# Patient Record
Sex: MALE | Race: WHITE | NOT HISPANIC OR LATINO | Employment: OTHER | URBAN - METROPOLITAN AREA
[De-identification: names, ages, dates, MRNs, and addresses within clinical notes are randomized per-mention and may not be internally consistent; named-entity substitution may affect disease eponyms.]

---

## 2017-01-05 ENCOUNTER — CLINICAL SUPPORT (OUTPATIENT)
Dept: ELECTROPHYSIOLOGY | Facility: CLINIC | Age: 82
End: 2017-01-05
Payer: MEDICARE

## 2017-01-05 ENCOUNTER — TELEPHONE (OUTPATIENT)
Dept: INTERNAL MEDICINE | Facility: CLINIC | Age: 82
End: 2017-01-05

## 2017-01-05 DIAGNOSIS — Z95.810 ICD (IMPLANTABLE CARDIOVERTER-DEFIBRILLATOR) IN PLACE: ICD-10-CM

## 2017-01-05 DIAGNOSIS — I47.20 VT (VENTRICULAR TACHYCARDIA): ICD-10-CM

## 2017-01-05 PROCEDURE — 93283 PRGRMG EVAL IMPLANTABLE DFB: CPT | Mod: S$GLB,,, | Performed by: INTERNAL MEDICINE

## 2017-01-09 ENCOUNTER — PATIENT MESSAGE (OUTPATIENT)
Dept: INTERNAL MEDICINE | Facility: CLINIC | Age: 82
End: 2017-01-09

## 2017-01-09 ENCOUNTER — OFFICE VISIT (OUTPATIENT)
Dept: INTERNAL MEDICINE | Facility: CLINIC | Age: 82
End: 2017-01-09
Payer: MEDICARE

## 2017-01-09 VITALS
DIASTOLIC BLOOD PRESSURE: 72 MMHG | HEIGHT: 73 IN | BODY MASS INDEX: 22.38 KG/M2 | SYSTOLIC BLOOD PRESSURE: 130 MMHG | HEART RATE: 72 BPM | WEIGHT: 168.88 LBS

## 2017-01-09 DIAGNOSIS — I10 ESSENTIAL HYPERTENSION: ICD-10-CM

## 2017-01-09 DIAGNOSIS — L02.212 ABSCESS OF BACK: Primary | ICD-10-CM

## 2017-01-09 PROCEDURE — 3075F SYST BP GE 130 - 139MM HG: CPT | Mod: S$GLB,,, | Performed by: INTERNAL MEDICINE

## 2017-01-09 PROCEDURE — 1160F RVW MEDS BY RX/DR IN RCRD: CPT | Mod: S$GLB,,, | Performed by: INTERNAL MEDICINE

## 2017-01-09 PROCEDURE — 99213 OFFICE O/P EST LOW 20 MIN: CPT | Mod: S$GLB,,, | Performed by: INTERNAL MEDICINE

## 2017-01-09 PROCEDURE — 3078F DIAST BP <80 MM HG: CPT | Mod: S$GLB,,, | Performed by: INTERNAL MEDICINE

## 2017-01-09 PROCEDURE — 99999 PR PBB SHADOW E&M-EST. PATIENT-LVL III: CPT | Mod: PBBFAC,,, | Performed by: INTERNAL MEDICINE

## 2017-01-09 PROCEDURE — 1126F AMNT PAIN NOTED NONE PRSNT: CPT | Mod: S$GLB,,, | Performed by: INTERNAL MEDICINE

## 2017-01-09 PROCEDURE — 1159F MED LIST DOCD IN RCRD: CPT | Mod: S$GLB,,, | Performed by: INTERNAL MEDICINE

## 2017-01-09 PROCEDURE — 99499 UNLISTED E&M SERVICE: CPT | Mod: S$GLB,,, | Performed by: INTERNAL MEDICINE

## 2017-01-09 PROCEDURE — 1157F ADVNC CARE PLAN IN RCRD: CPT | Mod: S$GLB,,, | Performed by: INTERNAL MEDICINE

## 2017-01-09 RX ORDER — SULFAMETHOXAZOLE AND TRIMETHOPRIM 800; 160 MG/1; MG/1
1 TABLET ORAL 2 TIMES DAILY
Qty: 14 TABLET | Refills: 0 | Status: SHIPPED | OUTPATIENT
Start: 2017-01-09 | End: 2017-01-18

## 2017-01-09 NOTE — PROGRESS NOTES
Subjective:       Patient ID: Nelson GIBBONS Parent is a 81 y.o. male.    Chief Complaint: Sore (Mid-Back)    HPI Pt. Here for sore to back for past few weeks and f/u for HTN; he has noticed some drainage at the site as well  Review of Systems   Constitutional: Negative for chills, fatigue and fever.   HENT: Negative for congestion, rhinorrhea and sore throat.    Respiratory: Negative for cough, shortness of breath and wheezing.    Cardiovascular: Negative for chest pain.   Gastrointestinal: Negative for abdominal pain, nausea and vomiting.   Genitourinary: Negative for dysuria.   Musculoskeletal: Negative for arthralgias.   Skin: Positive for wound. Negative for rash.        Draining lesion to mid back    Neurological: Negative for dizziness and headaches.   Psychiatric/Behavioral: Negative for sleep disturbance. The patient is not nervous/anxious.        Objective:      Physical Exam   Constitutional: He is oriented to person, place, and time. He appears well-developed.   Eyes: EOM are normal.   Neck: Normal range of motion.   Cardiovascular: Normal rate, regular rhythm and normal heart sounds.    Pulmonary/Chest: Effort normal and breath sounds normal.   Abdominal: Soft. There is no tenderness. There is no rebound and no guarding.   Musculoskeletal: Normal range of motion.   Neurological: He is alert and oriented to person, place, and time.   Skin: No rash noted.   Quarter size abscess with surrounding erythema noted to mid back; drainage noted       Assessment:       1. Abscess of back Active   2. Essential hypertension Well controlled       Plan:         Nelson GIBBONS was seen today for sore.    Diagnoses and all orders for this visit:    Abscess of back  Comments:  start bactrim x 10 days and refer to surgery for I&D; re-evaluate in 2 weeks  Orders:  -     sulfamethoxazole-trimethoprim 800-160mg (BACTRIM DS) 800-160 mg Tab; Take 1 tablet by mouth 2 (two) times daily.  -     Ambulatory referral to General  Surgery    Essential hypertension  Comments:  continue current regimen and encouraged low Na diet

## 2017-01-09 NOTE — MR AVS SNAPSHOT
[facility] Internal Medicine   [address]  [place] [address]  Phone: [phone]  Fax: [phone]                  Nelson Huntley   2017 3:40 PM   Office Visit    Description:  Male : 1935   Provider:  Ruddy Skelton MD   Department:  [facility]           Reason for Visit     Sore           Diagnoses this Visit        Comments    Abscess of back    -  Primary start bactrim x 10 days and refer to surgery for I&D; re-evaluate in 2 weeks    Essential hypertension     continue current regimen and encouraged low Na diet            To Do List           Future Appointments        Provider Department Dept Phone    2017 9:20 AM Mackenzie Mathew MD [facility] General Surgery [phone]    2017 8:00 AM LAB, [facility] [phone]    2017 1:00 PM EKG, APPT [facility] -[phone]    2017 2:00 PM Shiraz Cartagena MD [facility] - Cardiology [phone]    2017 3:40 PM Ruddy Skelton MD [facility] [phone]      Goals (5 Years of Data)     None      Follow-Up and Disposition     Return in about 2 weeks (around 2017).       These Medications        Disp Refills Start End    sulfamethoxazole-trimethoprim 800-160mg (BACTRIM DS) 800-160 mg Tab 14 tablet 0 2017    Take 1 tablet by mouth 2 (two) times daily. - Oral    [address] [address] [place] [address] [place] [address] Ph #: [phone]         [facility] On Call     [facility] On Call Nurse Care Line -  Assistance  Registered nurses in the [facility] Call Center provide clinical advisement, health education, appointment booking, and other advisory services.  Call for this free service at [phone].             Medications           Message regarding Medications     Verify the changes and/or additions to your medication regime listed below are the same as discussed with your clinician today.  If any of  these changes or additions are incorrect, please notify your healthcare provider.        START taking these NEW medications        Refills    sulfamethoxazole-trimethoprim 800-160mg (BACTRIM DS) 800-160 mg Tab 0    Sig: Take 1 tablet by mouth 2 (two) times daily.    Class: Normal    Route: Oral           Verify that the below list of medications is an accurate representation of the medications you are currently taking.  If none reported, the list may be blank. If incorrect, please contact your healthcare provider. Carry this list with you in case of emergency.           Current Medications     amlodipine (NORVASC) 10 MG tablet TAKE 1 TABLET BY MOUTH ONCE DAILY.    ascorbic acid (VITAMIN C) 500 MG tablet Take 1 tablet by mouth Daily. 1 Tablet Oral Every day    aspirin 81 MG chewable tablet Take 1 tablet by mouth Daily. 81  By mouth Every day    calcium citrate-vitamin D2 1,500-200 mg-unit Tab     clopidogrel (PLAVIX) 75 mg tablet TAKE 1 TABLET ONE TIME DAILY    DOCOSAHEXANOIC ACID/EPA (FISH OIL ORAL) Take 1 capsule by mouth Twice daily.    folic acid (FOLVITE) 400 MCG tablet Take 1 tablet by mouth Daily. 1 Tablet Oral Every day    lisinopril (PRINIVIL,ZESTRIL) 40 MG tablet TAKE 1/2 TO 1 TABLET EVERY DAY OR AS DIRECTED.    metoprolol succinate (TOPROL-XL) 25 MG 24 hr tablet TAKE 1/2 TO 1 TABLET TWO  TIMES  DAILY OR AS DIRECTED.    niacin 500 MG tablet Take 3 tablets by mouth Every PM. 3 Tablet Oral Every evening    nitroGLYCERIN (NITROSTAT) 0.4 MG SL tablet Take 1 tab under the tongue for chest pain. May repeat every 5 minutes for a total of 3 doses. If chest pain not relieved go to the ED.    pantoprazole (PROTONIX) 40 MG tablet 40 mg Twice daily.    pravastatin (PRAVACHOL) 40 MG tablet TAKE 1 TABLET AT BEDTIME EXCEPT ON WEDNESDAY AND FRIDAY TAKE 1/2 TABLET AT BEDTIME    sotalol (BETAPACE) 120 MG Tab Take 1 tablet (120 mg total) by mouth 2 (two) times daily.    VITAMIN B COMPLEX ORAL Take by mouth.     "sulfamethoxazole-trimethoprim 800-160mg (BACTRIM DS) 800-160 mg Tab Take 1 tablet by mouth 2 (two) times daily.           Clinical Reference Information           Vital Signs - Last Recorded  Most recent update: 1/9/2017  3:31 PM by Camille Barnett MA    BP Pulse Ht Wt BMI    130/72 (BP Location: Right arm, Patient Position: Sitting, BP Method: Manual) 72 6' 1" (1.854 m) 76.6 kg (168 lb 14 oz) 22.28 kg/m2      Blood Pressure          Most Recent Value    BP  130/72      Allergies as of 1/9/2017     Pcn  [Penicillins]    Clindamycin      Immunizations Administered on Date of Encounter - 1/9/2017     None      Orders Placed During Today's Visit      Normal Orders This Visit    Ambulatory referral to General Surgery       "

## 2017-01-11 ENCOUNTER — OFFICE VISIT (OUTPATIENT)
Dept: SURGERY | Facility: CLINIC | Age: 82
End: 2017-01-11
Payer: MEDICARE

## 2017-01-11 ENCOUNTER — APPOINTMENT (OUTPATIENT)
Dept: LAB | Facility: HOSPITAL | Age: 82
End: 2017-01-11
Attending: SURGERY
Payer: MEDICARE

## 2017-01-11 VITALS
WEIGHT: 168 LBS | BODY MASS INDEX: 22.26 KG/M2 | TEMPERATURE: 98 F | HEIGHT: 73 IN | HEART RATE: 66 BPM | DIASTOLIC BLOOD PRESSURE: 69 MMHG | SYSTOLIC BLOOD PRESSURE: 128 MMHG

## 2017-01-11 DIAGNOSIS — L02.212 ABSCESS OF UPPER BACK EXCLUDING SCAPULAR REGION: Primary | ICD-10-CM

## 2017-01-11 DIAGNOSIS — L02.212 ABSCESS OF BACK: ICD-10-CM

## 2017-01-11 LAB
GRAM STN SPEC: NORMAL
GRAM STN SPEC: NORMAL

## 2017-01-11 PROCEDURE — 87075 CULTR BACTERIA EXCEPT BLOOD: CPT

## 2017-01-11 PROCEDURE — 87070 CULTURE OTHR SPECIMN AEROBIC: CPT

## 2017-01-11 PROCEDURE — 99999 PR PBB SHADOW E&M-EST. PATIENT-LVL III: CPT | Mod: PBBFAC,,, | Performed by: SURGERY

## 2017-01-11 PROCEDURE — 99204 OFFICE O/P NEW MOD 45 MIN: CPT | Mod: 25,S$GLB,, | Performed by: SURGERY

## 2017-01-11 PROCEDURE — 1159F MED LIST DOCD IN RCRD: CPT | Mod: S$GLB,,, | Performed by: SURGERY

## 2017-01-11 PROCEDURE — 3078F DIAST BP <80 MM HG: CPT | Mod: S$GLB,,, | Performed by: SURGERY

## 2017-01-11 PROCEDURE — 1160F RVW MEDS BY RX/DR IN RCRD: CPT | Mod: S$GLB,,, | Performed by: SURGERY

## 2017-01-11 PROCEDURE — 3074F SYST BP LT 130 MM HG: CPT | Mod: S$GLB,,, | Performed by: SURGERY

## 2017-01-11 PROCEDURE — 87205 SMEAR GRAM STAIN: CPT

## 2017-01-11 PROCEDURE — 1157F ADVNC CARE PLAN IN RCRD: CPT | Mod: S$GLB,,, | Performed by: SURGERY

## 2017-01-11 PROCEDURE — 1126F AMNT PAIN NOTED NONE PRSNT: CPT | Mod: S$GLB,,, | Performed by: SURGERY

## 2017-01-11 PROCEDURE — 10061 I&D ABSCESS COMP/MULTIPLE: CPT | Mod: S$GLB,,, | Performed by: SURGERY

## 2017-01-11 NOTE — PROCEDURES
"Incision & Drainage  Date/Time: 1/11/2017 1:36 PM  Performed by: GERTRUDIS RONDON  Authorized by: GERTRUDIS RONDON     Time out: Immediately prior to procedure a "time out" was called to verify the correct patient, procedure, equipment, support staff and site/side marked as required.    Consent Done?:  Yes (Written)    Type:  Abscess  Body area:  Trunk  Anesthesia:  Local infiltration  Local anesthetic:  Xylocaine 1% with epinephrinelidocaine 1% with epinephrine  Anesthetic total (ml):  8  Scalpel size:  11  Incision type: Cruciate.  Complexity:  Complex (Cyst Removal)  Drainage:  Pus and bloody  Drainage amount:  Copious  Wound treatment:  Incision, sebum removal, deloculation, removal of cyst capsule, expression of material and wound packed  Packing material:  1/4 in gauze  Patient tolerance:  Patient tolerated the procedure well with no immediate complications      "

## 2017-01-11 NOTE — MR AVS SNAPSHOT
[facility] Surgery  [address] [place] [address]  [address]th [address] [place] [address]  Phone: [phone]                  Nelson Huntley   2017 9:20 AM   Office Visit    Description:  Male : 1935   Provider:  Mackenzie Mathew MD   Department:  [facility]           Reason for Visit     Consult                To Do List           Future Appointments        Provider Department Dept Phone    2017 9:20 AM Mackenzie Mathew MD [facility] Surgery [phone]    2017 8:00 AM LAB, [facility] [phone]    2017 1:00 PM EKG, APPT [facility] -[phone]    2017 2:00 PM Shiraz Cartagena MD [facility] - Cardiology [phone]    2017 3:40 PM Ruddy Skelton MD [facility] Internal Medicine [phone]      Goals (5 Years of Data)     None      [facility] On Call     [facility] On Call Nurse Care Line -  Assistance  Registered nurses in the [facility] On Call Center provide clinical advisement, health education, appointment booking, and other advisory services.  Call for this free service at [phone].             Medications           Message regarding Medications     Verify the changes and/or additions to your medication regime listed below are the same as discussed with your clinician today.  If any of these changes or additions are incorrect, please notify your healthcare provider.             Verify that the below list of medications is an accurate representation of the medications you are currently taking.  If none reported, the list may be blank. If incorrect, please contact your healthcare provider. Carry this list with you in case of emergency.           Current Medications     amlodipine (NORVASC) 10 MG tablet TAKE 1 TABLET BY MOUTH ONCE DAILY.    ascorbic acid (VITAMIN C) 500 MG tablet Take 1 tablet by mouth Daily. 1 Tablet Oral Every day    aspirin 81 MG chewable tablet Take 1 tablet by mouth Daily. 81  By mouth Every day     "calcium citrate-vitamin D2 1,500-200 mg-unit Tab     clopidogrel (PLAVIX) 75 mg tablet TAKE 1 TABLET ONE TIME DAILY    DOCOSAHEXANOIC ACID/EPA (FISH OIL ORAL) Take 1 capsule by mouth Twice daily.    folic acid (FOLVITE) 400 MCG tablet Take 1 tablet by mouth Daily. 1 Tablet Oral Every day    lisinopril (PRINIVIL,ZESTRIL) 40 MG tablet TAKE 1/2 TO 1 TABLET EVERY DAY OR AS DIRECTED.    metoprolol succinate (TOPROL-XL) 25 MG 24 hr tablet TAKE 1/2 TO 1 TABLET TWO  TIMES  DAILY OR AS DIRECTED.    niacin 500 MG tablet Take 3 tablets by mouth Every PM. 3 Tablet Oral Every evening    nitroGLYCERIN (NITROSTAT) 0.4 MG SL tablet Take 1 tab under the tongue for chest pain. May repeat every 5 minutes for a total of 3 doses. If chest pain not relieved go to the ED.    pantoprazole (PROTONIX) 40 MG tablet 40 mg Twice daily.    pravastatin (PRAVACHOL) 40 MG tablet TAKE 1 TABLET AT BEDTIME EXCEPT ON WEDNESDAY AND FRIDAY TAKE 1/2 TABLET AT BEDTIME    sotalol (BETAPACE) 120 MG Tab Take 1 tablet (120 mg total) by mouth 2 (two) times daily.    sulfamethoxazole-trimethoprim 800-160mg (BACTRIM DS) 800-160 mg Tab Take 1 tablet by mouth 2 (two) times daily.    VITAMIN B COMPLEX ORAL Take by mouth.           Clinical Reference Information           Vital Signs - Last Recorded  Most recent update: 1/11/2017  9:11 AM by Susan Easley LPN    BP Pulse Temp Ht Wt BMI    128/69 66 97.5 °F (36.4 °C) 6' 1" (1.854 m) 76.2 kg (168 lb) 22.16 kg/m2      Blood Pressure          Most Recent Value    BP  128/69      Allergies as of 1/11/2017     Pcn  [Penicillins]    Clindamycin      Immunizations Administered on Date of Encounter - 1/11/2017     None      "

## 2017-01-11 NOTE — LETTER
January 11, 2017      Ruddy Skelton MD  [address]  [place] [address]           [facility]  [address]  4th [address]  [place] [address]  Phone: [phone]          Patient: Nelson Huntley   MR Number: [id number]   YOB: 1935   Date of Visit: 1/11/2017       Dear Dr. Ruddy Skelton:    Thank you for referring Nelson Huntley to me for evaluation. Attached you will find relevant portions of my assessment and plan of care.    If you have questions, please do not hesitate to call me. I look forward to following Nelson Huntley along with you.    Sincerely,    Mackenzie Mathew MD    Enclosure  CC:  No Recipients    If you would like to receive this communication electronically, please contact [email or web address] or [phone] to request more information on [employer] access.    For providers and/or their staff who would like to refer a patient to [facility], please contact us through our one-stop-shop provider referral line, [employer] [facility], at [phone].    If you feel you have received this communication in error or would no longer like to receive these types of communications, please e-mail [email or web address]

## 2017-01-11 NOTE — PROGRESS NOTES
"History & Physical    SUBJECTIVE:     History of Present Illness:  Referred by Dr. Skelton for evalutation for back abscess  He started Bactrim 1/9  Feeling discomfort the past few weeks but over the past few days it started draining, drainage described as "white gooey" and now oozing "pink white"   No prior infections.      The pain is described as vague, and is 0-1/10 in intensity, except when touched or cleaned. The patient is experiencing middle back discomfort without radiation. Onset was a few days ago. Symptoms have been gradually worsening. Aggravating factors: pressure.  Alleviating factors: none. Associated symptoms: none. The patient denies constipation, diarrhea, dysuria, fever, hematochezia, hematuria, nausea and vomiting.    No history of DM  Takes plavix daily for TIA in 2002, 3 days a week      Chief Complaint   Patient presents with    Consult       Review of patient's allergies indicates:   Allergen Reactions    Pcn  [penicillins]      Other reaction(s): Unknown    Clindamycin Rash       Current Outpatient Prescriptions   Medication Sig Dispense Refill    amlodipine (NORVASC) 10 MG tablet TAKE 1 TABLET BY MOUTH ONCE DAILY. 90 tablet 3    ascorbic acid (VITAMIN C) 500 MG tablet Take 1 tablet by mouth Daily. 1 Tablet Oral Every day      aspirin 81 MG chewable tablet Take 1 tablet by mouth Daily. 81  By mouth Every day      calcium citrate-vitamin D2 1,500-200 mg-unit Tab       clopidogrel (PLAVIX) 75 mg tablet TAKE 1 TABLET ONE TIME DAILY 90 tablet 6    DOCOSAHEXANOIC ACID/EPA (FISH OIL ORAL) Take 1 capsule by mouth Twice daily.      folic acid (FOLVITE) 400 MCG tablet Take 1 tablet by mouth Daily. 1 Tablet Oral Every day      lisinopril (PRINIVIL,ZESTRIL) 40 MG tablet TAKE 1/2 TO 1 TABLET EVERY DAY OR AS DIRECTED. 90 tablet 3    metoprolol succinate (TOPROL-XL) 25 MG 24 hr tablet TAKE 1/2 TO 1 TABLET TWO  TIMES  DAILY OR AS DIRECTED. 180 tablet 3    niacin 500 MG tablet Take 3 tablets by " mouth Every PM. 3 Tablet Oral Every evening      nitroGLYCERIN (NITROSTAT) 0.4 MG SL tablet Take 1 tab under the tongue for chest pain. May repeat every 5 minutes for a total of 3 doses. If chest pain not relieved go to the ED. 25 tablet 4    pantoprazole (PROTONIX) 40 MG tablet 40 mg Twice daily.      pravastatin (PRAVACHOL) 40 MG tablet TAKE 1 TABLET AT BEDTIME EXCEPT ON WEDNESDAY AND FRIDAY TAKE 1/2 TABLET AT BEDTIME 78 tablet 3    sotalol (BETAPACE) 120 MG Tab Take 1 tablet (120 mg total) by mouth 2 (two) times daily. 180 tablet 3    sulfamethoxazole-trimethoprim 800-160mg (BACTRIM DS) 800-160 mg Tab Take 1 tablet by mouth 2 (two) times daily. 14 tablet 0    VITAMIN B COMPLEX ORAL Take by mouth.       No current facility-administered medications for this visit.        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 Vu    Hyperlipidemia 7/17/2012    Hypertension 7/17/2012    Ischemic cardiomyopathy 7/17/2012    Thrombocytopenia 7/17/2012    Ventricular tachycardia     VT (ventricular tachycardia) 7/17/2012     Past Surgical History   Procedure Laterality Date    Hernia repair      Aaa repair      Coronary angioplasty      Cardiac defibrillator placement       Family History   Problem Relation Age of Onset    Cancer Mother     Heart disease Father      Social History   Substance Use Topics    Smoking status: Never Smoker    Smokeless tobacco: None    Alcohol use No          Review of Systems   Constitutional: Negative for chills and fever.   HENT: Negative for congestion and sore throat.    Eyes: Negative for photophobia and visual disturbance.   Respiratory: Negative for cough and shortness of breath.    Cardiovascular: Negative for chest pain and  "palpitations.   Gastrointestinal: Negative for abdominal distention and abdominal pain.   Genitourinary: Negative for dysuria, frequency and hematuria.   Musculoskeletal: Negative for back pain and gait problem.   Skin: Positive for color change and wound. Negative for rash.   Neurological: Negative for seizures and headaches.   Hematological: Negative for adenopathy. Does not bruise/bleed easily.   Psychiatric/Behavioral: Negative for sleep disturbance. The patient is not nervous/anxious.        OBJECTIVE:     Vital Signs (Most Recent)  Temp: 97.5 °F (36.4 °C) (01/11/17 0910)  Pulse: 66 (01/11/17 0910)  BP: 128/69 (01/11/17 0910)  6' 1" (1.854 m)  76.2 kg (168 lb)     Physical Exam   Constitutional: He is oriented to person, place, and time. He appears well-developed and well-nourished. No distress.   HENT:   Head: Normocephalic and atraumatic.   Eyes: Conjunctivae and EOM are normal. No scleral icterus.   Neck: Normal range of motion.   Cardiovascular: Normal rate and intact distal pulses.    Pulmonary/Chest: Effort normal. No stridor. No respiratory distress.   Abdominal: Soft. He exhibits no distension. There is no tenderness.   Musculoskeletal: Normal range of motion. He exhibits no edema or tenderness.   Neurological: He is alert and oriented to person, place, and time.   Skin: No rash noted. He is not diaphoretic. No pallor.        Psychiatric: He has a normal mood and affect. His behavior is normal.       Laboratory  n/a    Diagnostic Results:  n/a    ASSESSMENT/PLAN:   81-year-old male with back abscess, need source control  We discussed the nature of this pathology and indications for surgery  Risks of surgery discussed including bleeding, infection, pain, scarring, wound complications, injury to local structures, and need for further surgery. he demonstrated understanding of risks and consent signed.     Procedure note to follow  Culture obtained  Local wound care discussed  Return to clinic 1 week    "

## 2017-01-12 ENCOUNTER — TELEPHONE (OUTPATIENT)
Dept: SURGERY | Facility: CLINIC | Age: 82
End: 2017-01-12

## 2017-01-12 NOTE — TELEPHONE ENCOUNTER
----- Message from Paris Wilder LPN sent at 1/12/2017  8:13 AM CST -----  Contact: Aleah, spouse, [phone]      ----- Message -----     From: Kayla Knight     Sent: 1/11/2017   4:59 PM       To: [facility] Staff    States patient took a shower and washed his incision with peroxide as instructed but states he is bleeding and wants to make sure that is considered normal. Please advise.

## 2017-01-12 NOTE — TELEPHONE ENCOUNTER
Attempted to return patient call x2 today with no answer. Voicemail left for patient to call office with questions or concerns.

## 2017-01-14 LAB — BACTERIA SPEC AEROBE CULT: NO GROWTH

## 2017-01-16 LAB — BACTERIA SPEC ANAEROBE CULT: NORMAL

## 2017-01-18 ENCOUNTER — LAB VISIT (OUTPATIENT)
Dept: LAB | Facility: HOSPITAL | Age: 82
End: 2017-01-18
Attending: INTERNAL MEDICINE
Payer: MEDICARE

## 2017-01-18 ENCOUNTER — OFFICE VISIT (OUTPATIENT)
Dept: SURGERY | Facility: CLINIC | Age: 82
End: 2017-01-18
Payer: MEDICARE

## 2017-01-18 VITALS
HEART RATE: 60 BPM | WEIGHT: 167.31 LBS | HEIGHT: 73 IN | TEMPERATURE: 98 F | SYSTOLIC BLOOD PRESSURE: 125 MMHG | BODY MASS INDEX: 22.17 KG/M2 | DIASTOLIC BLOOD PRESSURE: 69 MMHG

## 2017-01-18 DIAGNOSIS — E78.5 HYPERLIPIDEMIA: ICD-10-CM

## 2017-01-18 DIAGNOSIS — I25.10 CORONARY ARTERY DISEASE INVOLVING NATIVE CORONARY ARTERY WITHOUT ANGINA PECTORIS, UNSPECIFIED WHETHER NATIVE OR TRANSPLANTED HEART: ICD-10-CM

## 2017-01-18 DIAGNOSIS — L02.212 ABSCESS OF BACK: ICD-10-CM

## 2017-01-18 DIAGNOSIS — I10 ESSENTIAL HYPERTENSION: ICD-10-CM

## 2017-01-18 DIAGNOSIS — D69.6 THROMBOCYTOPENIA: ICD-10-CM

## 2017-01-18 LAB
ALBUMIN SERPL BCP-MCNC: 4.1 G/DL
ALP SERPL-CCNC: 52 U/L
ALT SERPL W/O P-5'-P-CCNC: 19 U/L
ANION GAP SERPL CALC-SCNC: 7 MMOL/L
AST SERPL-CCNC: 23 U/L
BASOPHILS # BLD AUTO: 0.06 K/UL
BASOPHILS NFR BLD: 1 %
BILIRUB SERPL-MCNC: 0.9 MG/DL
BUN SERPL-MCNC: 29 MG/DL
CALCIUM SERPL-MCNC: 9.9 MG/DL
CHLORIDE SERPL-SCNC: 108 MMOL/L
CHOLEST/HDLC SERPL: 2.2 {RATIO}
CO2 SERPL-SCNC: 26 MMOL/L
CREAT SERPL-MCNC: 1.9 MG/DL
DIFFERENTIAL METHOD: ABNORMAL
EOSINOPHIL # BLD AUTO: 0.3 K/UL
EOSINOPHIL NFR BLD: 4.9 %
ERYTHROCYTE [DISTWIDTH] IN BLOOD BY AUTOMATED COUNT: 13.9 %
EST. GFR  (AFRICAN AMERICAN): 37.4 ML/MIN/1.73 M^2
EST. GFR  (NON AFRICAN AMERICAN): 32.3 ML/MIN/1.73 M^2
GLUCOSE SERPL-MCNC: 94 MG/DL
HCT VFR BLD AUTO: 41.2 %
HDL/CHOLESTEROL RATIO: 45.5 %
HDLC SERPL-MCNC: 121 MG/DL
HDLC SERPL-MCNC: 55 MG/DL
HGB BLD-MCNC: 14.6 G/DL
LDLC SERPL CALC-MCNC: 57.2 MG/DL
LYMPHOCYTES # BLD AUTO: 2.5 K/UL
LYMPHOCYTES NFR BLD: 41 %
MCH RBC QN AUTO: 31.5 PG
MCHC RBC AUTO-ENTMCNC: 35.4 %
MCV RBC AUTO: 89 FL
MONOCYTES # BLD AUTO: 0.5 K/UL
MONOCYTES NFR BLD: 7.6 %
NEUTROPHILS # BLD AUTO: 2.8 K/UL
NEUTROPHILS NFR BLD: 45.3 %
NONHDLC SERPL-MCNC: 66 MG/DL
PLATELET # BLD AUTO: 86 K/UL
PMV BLD AUTO: 12 FL
POTASSIUM SERPL-SCNC: 4.4 MMOL/L
PROT SERPL-MCNC: 7.1 G/DL
RBC # BLD AUTO: 4.63 M/UL
SODIUM SERPL-SCNC: 141 MMOL/L
TRIGL SERPL-MCNC: 44 MG/DL
TSH SERPL DL<=0.005 MIU/L-ACNC: 1.45 UIU/ML
WBC # BLD AUTO: 6.17 K/UL

## 2017-01-18 PROCEDURE — 99024 POSTOP FOLLOW-UP VISIT: CPT | Mod: S$GLB,,, | Performed by: SURGERY

## 2017-01-18 PROCEDURE — 99999 PR PBB SHADOW E&M-EST. PATIENT-LVL III: CPT | Mod: PBBFAC,,, | Performed by: SURGERY

## 2017-01-18 RX ORDER — SULFAMETHOXAZOLE AND TRIMETHOPRIM 800; 160 MG/1; MG/1
1 TABLET ORAL 2 TIMES DAILY
Qty: 14 TABLET | Refills: 0 | Status: SHIPPED | OUTPATIENT
Start: 2017-01-18 | End: 2017-01-25

## 2017-01-18 NOTE — MR AVS SNAPSHOT
[facility] Surgery  [address] [place] [address]  [address] [place] [address]  Phone: [phone]                  Nelson GIBBONS Parent   2017 10:40 AM   Office Visit    Description:  Male : 1935   Provider:  Mackenzie Mathew MD   Department:  [facility] Surgery                To Do List           Future Appointments        Provider Department Dept Phone    2017 1:00 PM EKG, APPT [facility] - [phone]    2017 2:00 PM Shiraz Cartagena MD [facility] - Cardiology [phone]    2017 3:40 PM Ruddy Skelton MD [facility] Internal Medicine [phone]    3/1/2017 10:20 AM Ruddy Skelton MD [facility] Internal Medicine [phone]    2017 1:00 PM PACEMAKER, ICD [facility] Arrhythmia [phone]      Goals (5 Years of Data)     None      [facility] On Call     [facility] On Call Nurse [facility] -  Assistance  Registered nurses in the [facility] On Call Center provide clinical advisement, health education, appointment booking, and other advisory services.  Call for this free service at [phone].             Medications           Message regarding Medications     Verify the changes and/or additions to your medication regime listed below are the same as discussed with your clinician today.  If any of these changes or additions are incorrect, please notify your healthcare provider.             Verify that the below list of medications is an accurate representation of the medications you are currently taking.  If none reported, the list may be blank. If incorrect, please contact your healthcare provider. Carry this list with you in case of emergency.           Current Medications     amlodipine (NORVASC) 10 MG tablet TAKE 1 TABLET BY MOUTH ONCE DAILY.    ascorbic acid (VITAMIN C) 500 MG tablet Take 1 tablet by mouth Daily. 1 Tablet Oral Every day    aspirin 81 MG chewable tablet Take 1 tablet by mouth Daily. 81  By mouth Every day    calcium citrate-vitamin D2 1,500-200 mg-unit  "Tab     clopidogrel (PLAVIX) 75 mg tablet TAKE 1 TABLET ONE TIME DAILY    DOCOSAHEXANOIC ACID/EPA (FISH OIL ORAL) Take 1 capsule by mouth Twice daily.    folic acid (FOLVITE) 400 MCG tablet Take 1 tablet by mouth Daily. 1 Tablet Oral Every day    lisinopril (PRINIVIL,ZESTRIL) 40 MG tablet TAKE 1/2 TO 1 TABLET EVERY DAY OR AS DIRECTED.    metoprolol succinate (TOPROL-XL) 25 MG 24 hr tablet TAKE 1/2 TO 1 TABLET TWO  TIMES  DAILY OR AS DIRECTED.    niacin 500 MG tablet Take 3 tablets by mouth Every PM. 3 Tablet Oral Every evening    nitroGLYCERIN (NITROSTAT) 0.4 MG SL tablet Take 1 tab under the tongue for chest pain. May repeat every 5 minutes for a total of 3 doses. If chest pain not relieved go to the ED.    pantoprazole (PROTONIX) 40 MG tablet 40 mg Twice daily.    pravastatin (PRAVACHOL) 40 MG tablet TAKE 1 TABLET AT BEDTIME EXCEPT ON WEDNESDAY AND FRIDAY TAKE 1/2 TABLET AT BEDTIME    sotalol (BETAPACE) 120 MG Tab Take 1 tablet (120 mg total) by mouth 2 (two) times daily.    sulfamethoxazole-trimethoprim 800-160mg (BACTRIM DS) 800-160 mg Tab Take 1 tablet by mouth 2 (two) times daily.    VITAMIN B COMPLEX ORAL Take by mouth.           Clinical Reference Information           Vital Signs - Last Recorded  Most recent update: 1/18/2017 10:59 AM by Paris Wilder LPN    BP Pulse Temp Ht Wt BMI    125/69 (BP Location: Left arm, Patient Position: Sitting) 60 97.6 °F (36.4 °C) (Oral) 6' 1" (1.854 m) 75.9 kg (167 lb 5.3 oz) 22.08 kg/m2      Blood Pressure          Most Recent Value    BP  125/69      Allergies as of 1/18/2017     Pcn  [Penicillins]    Clindamycin      Immunizations Administered on Date of Encounter - 1/18/2017     None      "

## 2017-01-20 NOTE — PROGRESS NOTES
On bactrim, 1 week post I&D. Still with minimal erythema and drainage  Will extend abx  Cont local wound care  F/up 2 weeks

## 2017-01-25 ENCOUNTER — HOSPITAL ENCOUNTER (OUTPATIENT)
Dept: CARDIOLOGY | Facility: CLINIC | Age: 82
Discharge: HOME | End: 2017-01-25
Payer: MEDICARE

## 2017-01-25 ENCOUNTER — OFFICE VISIT (OUTPATIENT)
Dept: CARDIOLOGY | Facility: CLINIC | Age: 82
End: 2017-01-25
Payer: MEDICARE

## 2017-01-25 VITALS
HEART RATE: 59 BPM | WEIGHT: 168 LBS | BODY MASS INDEX: 22.26 KG/M2 | DIASTOLIC BLOOD PRESSURE: 60 MMHG | HEIGHT: 73 IN | SYSTOLIC BLOOD PRESSURE: 122 MMHG

## 2017-01-25 DIAGNOSIS — Z86.79 S/P AAA (ABDOMINAL AORTIC ANEURYSM) REPAIR: ICD-10-CM

## 2017-01-25 DIAGNOSIS — D63.1 ANEMIA ASSOCIATED WITH CHRONIC RENAL FAILURE: ICD-10-CM

## 2017-01-25 DIAGNOSIS — I10 ESSENTIAL HYPERTENSION: ICD-10-CM

## 2017-01-25 DIAGNOSIS — N18.9 ANEMIA ASSOCIATED WITH CHRONIC RENAL FAILURE: ICD-10-CM

## 2017-01-25 DIAGNOSIS — I25.10 CORONARY ARTERY DISEASE INVOLVING NATIVE CORONARY ARTERY WITHOUT ANGINA PECTORIS, UNSPECIFIED WHETHER NATIVE OR TRANSPLANTED HEART: ICD-10-CM

## 2017-01-25 DIAGNOSIS — N52.01 ERECTILE DYSFUNCTION DUE TO ARTERIAL INSUFFICIENCY: ICD-10-CM

## 2017-01-25 DIAGNOSIS — N18.30 CKD (CHRONIC KIDNEY DISEASE) STAGE 3, GFR 30-59 ML/MIN: ICD-10-CM

## 2017-01-25 DIAGNOSIS — I25.5 ISCHEMIC CARDIOMYOPATHY: ICD-10-CM

## 2017-01-25 DIAGNOSIS — I25.10 CORONARY ARTERY DISEASE INVOLVING NATIVE CORONARY ARTERY OF NATIVE HEART WITHOUT ANGINA PECTORIS: Primary | ICD-10-CM

## 2017-01-25 DIAGNOSIS — E86.0 DEHYDRATION: ICD-10-CM

## 2017-01-25 DIAGNOSIS — I47.20 VT (VENTRICULAR TACHYCARDIA): ICD-10-CM

## 2017-01-25 DIAGNOSIS — I66.9 CEREBRAL EMBOLISM AND THROMBOSIS: ICD-10-CM

## 2017-01-25 DIAGNOSIS — Z95.810 AICD (AUTOMATIC CARDIOVERTER/DEFIBRILLATOR) PRESENT: ICD-10-CM

## 2017-01-25 DIAGNOSIS — I25.2 OLD MYOCARDIAL INFARCTION: ICD-10-CM

## 2017-01-25 DIAGNOSIS — K22.710 BARRETT'S ESOPHAGUS WITH LOW GRADE DYSPLASIA: ICD-10-CM

## 2017-01-25 DIAGNOSIS — E78.2 MIXED HYPERLIPIDEMIA: ICD-10-CM

## 2017-01-25 DIAGNOSIS — I10 ESSENTIAL HYPERTENSION: Primary | ICD-10-CM

## 2017-01-25 DIAGNOSIS — Z98.890 S/P AAA (ABDOMINAL AORTIC ANEURYSM) REPAIR: ICD-10-CM

## 2017-01-25 DIAGNOSIS — R55 SYNCOPE AND COLLAPSE: ICD-10-CM

## 2017-01-25 DIAGNOSIS — D69.6 THROMBOCYTOPENIA: ICD-10-CM

## 2017-01-25 PROCEDURE — 3078F DIAST BP <80 MM HG: CPT | Mod: S$GLB,,, | Performed by: INTERNAL MEDICINE

## 2017-01-25 PROCEDURE — 93000 ELECTROCARDIOGRAM COMPLETE: CPT | Mod: S$GLB,,, | Performed by: INTERNAL MEDICINE

## 2017-01-25 PROCEDURE — 3074F SYST BP LT 130 MM HG: CPT | Mod: S$GLB,,, | Performed by: INTERNAL MEDICINE

## 2017-01-25 PROCEDURE — 1126F AMNT PAIN NOTED NONE PRSNT: CPT | Mod: S$GLB,,, | Performed by: INTERNAL MEDICINE

## 2017-01-25 PROCEDURE — 99999 PR PBB SHADOW E&M-EST. PATIENT-LVL III: CPT | Mod: PBBFAC,,, | Performed by: INTERNAL MEDICINE

## 2017-01-25 PROCEDURE — 99215 OFFICE O/P EST HI 40 MIN: CPT | Mod: S$GLB,,, | Performed by: INTERNAL MEDICINE

## 2017-01-25 PROCEDURE — 99499 UNLISTED E&M SERVICE: CPT | Mod: S$GLB,,, | Performed by: INTERNAL MEDICINE

## 2017-01-25 PROCEDURE — 1157F ADVNC CARE PLAN IN RCRD: CPT | Mod: S$GLB,,, | Performed by: INTERNAL MEDICINE

## 2017-01-25 PROCEDURE — 1160F RVW MEDS BY RX/DR IN RCRD: CPT | Mod: S$GLB,,, | Performed by: INTERNAL MEDICINE

## 2017-01-25 PROCEDURE — 1159F MED LIST DOCD IN RCRD: CPT | Mod: S$GLB,,, | Performed by: INTERNAL MEDICINE

## 2017-01-25 NOTE — MR AVS SNAPSHOT
[facility]  [address]  [facility] [address]  Phone: [phone]                  Nelson GIBBONS Parent   2017 2:00 PM   Office Visit    Description:  Male : 1935   Provider:  Shiraz Cartagena MD   Department:  [facility]           Reason for Visit     Coronary Artery Disease     Dizziness           Diagnoses this Visit        Comments    Coronary artery disease involving native coronary artery of native heart without angina pectoris    -  Primary     Old myocardial infarction         S/P AAA (abdominal aortic aneurysm) repair         Syncope and collapse         AICD (automatic cardioverter/defibrillator) present         VT (ventricular tachycardia)         Thrombocytopenia         Ischemic cardiomyopathy         Essential hypertension         Mixed hyperlipidemia         Erectile dysfunction due to arterial insufficiency         CKD (chronic kidney disease) stage 3, GFR 30-59 ml/min         Cerebral embolism and thrombosis         Tovar's esophagus with low grade dysplasia         Anemia associated with chronic renal failure                To Do List           Future Appointments        Provider Department Dept Phone    2017 3:40 PM Ruddy Skelton MD [facility] Internal Medicine [phone]    2017 10:20 AM Mackenzie Mathew MD [facility] General Surgery [phone]    2017 10:00 AM LAB, [facility] [phone]    3/1/2017 10:20 AM Ruddy Skelton MD [facility] [phone]    2017 1:00 PM PACEMAKER, ICD [facility] - Arrhythmia [phone]      Goals (5 Years of Data)     None      Follow-Up and Disposition     Return in about 9 months (around 10/25/2017) for with CFD Osiel day and ECG labs;chem 7 in 1 month.      [facility]     [facility] -  Assistance  Registered nurses in the [facility] provide clinical advisement, health education, appointment booking, and other  advisory services.  Call for this free service at [phone].             Medications           Message regarding Medications     Verify the changes and/or additions to your medication regime listed below are the same as discussed with your clinician today.  If any of these changes or additions are incorrect, please notify your healthcare provider.        STOP taking these medications     VITAMIN B COMPLEX ORAL Take by mouth.    folic acid (FOLVITE) 400 MCG tablet Take 1 tablet by mouth Daily. 1 Tablet Oral Every day    calcium citrate-vitamin D2 1,500-200 mg-unit Tab            Verify that the below list of medications is an accurate representation of the medications you are currently taking.  If none reported, the list may be blank. If incorrect, please contact your healthcare provider. Carry this list with you in case of emergency.           Current Medications     amlodipine (NORVASC) 10 MG tablet TAKE 1 TABLET BY MOUTH ONCE DAILY.    ascorbic acid (VITAMIN C) 500 MG tablet Take 1 tablet by mouth Daily. 1 Tablet Oral Every day    aspirin 81 MG chewable tablet Take 1 tablet by mouth Daily. 81  By mouth Every day    clopidogrel (PLAVIX) 75 mg tablet TAKE 1 TABLET ONE TIME DAILY    DOCOSAHEXANOIC ACID/EPA (FISH OIL ORAL) Take 1 capsule by mouth Twice daily.    lisinopril (PRINIVIL,ZESTRIL) 40 MG tablet TAKE 1/2 TO 1 TABLET EVERY DAY OR AS DIRECTED.    metoprolol succinate (TOPROL-XL) 25 MG 24 hr tablet TAKE 1/2 TO 1 TABLET TWO  TIMES  DAILY OR AS DIRECTED.    multivitamin with minerals tablet     niacin 500 MG tablet Take 3 tablets by mouth Every PM. 3 Tablet Oral Every evening    nitroGLYCERIN (NITROSTAT) 0.4 MG SL tablet Take 1 tab under the tongue for chest pain. May repeat every 5 minutes for a total of 3 doses. If chest pain not relieved go to the ED.    pantoprazole (PROTONIX) 40 MG tablet 40 mg Twice daily.    pravastatin (PRAVACHOL) 40 MG tablet TAKE 1 TABLET AT BEDTIME EXCEPT ON WEDNESDAY AND FRIDAY  "TAKE 1/2 TABLET AT BEDTIME    sotalol (BETAPACE) 120 MG Tab Take 1 tablet (120 mg total) by mouth 2 (two) times daily.    sulfamethoxazole-trimethoprim 800-160mg (BACTRIM DS) 800-160 mg Tab Take 1 tablet by mouth 2 (two) times daily.           Clinical Reference Information           Vital Signs - Last Recorded  Most recent update: 1/25/2017 12:57 PM by Nubia Miranda MA    BP Pulse Ht Wt BMI    122/60 (BP Location: Left arm, Patient Position: Sitting, BP Method: Automatic) (!) 59 6' 1" (1.854 m) 76.2 kg (167 lb 15.9 oz) 22.16 kg/m2      Blood Pressure          Most Recent Value    Right Arm BP - Sitting  118/61    Left Arm BP - Sitting  122/60    BP  122/60      Allergies as of 1/25/2017     Pcn  [Penicillins]    Clindamycin      Immunizations Administered on Date of Encounter - 1/25/2017     None      Instructions    Discussed diet , achieving and maintaining ideal body weight, and exercise.   We reviewed meds in detail.  Reassured  Discussed slightly more water intake  Take pulse and BP during spells         "

## 2017-01-25 NOTE — PATIENT INSTRUCTIONS
Discussed diet , achieving and maintaining ideal body weight, and exercise.   We reviewed meds in detail.  Reassured  Discussed slightly more water intake  Take pulse and BP during spells

## 2017-01-25 NOTE — PROGRESS NOTES
Subjective:   Patient ID:  Nelson GIBBONS Parent is a 81 y.o. male who presents for follow-up of Coronary Artery Disease (1 yr f/u ) and Dizziness      HPI:  The patient is here for CAD/ICM/VT    he patient has no chest pain, SOB, TIA, palpitations, syncope or pre-syncope.He has some dizziness.Patient currently exercises many times per week.Has some spells of wooziness/dizziness that last minutes to hours and started after he was on hands and knees for several hours-he has had several since.      Review of Systems   Constitution: Negative for chills, decreased appetite, diaphoresis, fever, weakness, malaise/fatigue, night sweats, weight gain and weight loss.   HENT: Negative for congestion, headaches, hoarse voice, nosebleeds, sore throat and tinnitus.    Eyes: Negative for blurred vision, double vision, vision loss in left eye, vision loss in right eye, visual disturbance and visual halos.   Cardiovascular: Negative for chest pain, claudication, cyanosis, dyspnea on exertion, irregular heartbeat, leg swelling, near-syncope, orthopnea, palpitations, paroxysmal nocturnal dyspnea and syncope.   Respiratory: Negative for cough, hemoptysis, shortness of breath, sleep disturbances due to breathing, snoring, sputum production and wheezing.    Endocrine: Negative for cold intolerance, heat intolerance, polydipsia, polyphagia and polyuria.   Hematologic/Lymphatic: Negative for adenopathy and bleeding problem. Does not bruise/bleed easily.   Skin: Negative for color change, dry skin, flushing, itching, nail changes, poor wound healing, rash, skin cancer, suspicious lesions and unusual hair distribution.   Musculoskeletal: Negative for arthritis, back pain, falls, gout, joint pain, joint swelling, muscle cramps, muscle weakness, myalgias and stiffness.   Gastrointestinal: Negative for abdominal pain, anorexia, change in bowel habit, constipation, diarrhea, dysphagia, heartburn, hematemesis, hematochezia, melena and vomiting.  "  Genitourinary: Negative for decreased libido, dysuria, hematuria, hesitancy and urgency.   Neurological: Positive for dizziness and light-headedness. Negative for excessive daytime sleepiness, focal weakness, loss of balance, numbness, paresthesias, seizures, sensory change, tremors and vertigo.   Psychiatric/Behavioral: Negative for altered mental status, depression, hallucinations, memory loss, substance abuse and suicidal ideas. The patient does not have insomnia and is not nervous/anxious.    Allergic/Immunologic: Negative for environmental allergies and hives.       Objective:   Visit Vitals    /60 (BP Location: Left arm, Patient Position: Sitting, BP Method: Automatic)    Pulse (!) 59    Ht 6' 1" (1.854 m)    Wt 76.2 kg (167 lb 15.9 oz)    BMI 22.16 kg/m2        Physical Exam   Constitutional: He is oriented to person, place, and time. He appears well-developed and well-nourished. No distress.   HENT:   Head: Normocephalic.   Eyes: EOM are normal. Pupils are equal, round, and reactive to light.   Neck: Normal range of motion. No thyromegaly present.   Cardiovascular: Normal rate, regular rhythm, normal heart sounds and intact distal pulses.  Exam reveals no gallop and no friction rub.    No murmur heard.  Pulses:       Carotid pulses are 3+ on the right side, and 3+ on the left side.       Radial pulses are 3+ on the right side, and 3+ on the left side.        Femoral pulses are 3+ on the right side, and 3+ on the left side.       Popliteal pulses are 3+ on the right side, and 3+ on the left side.        Dorsalis pedis pulses are 3+ on the right side, and 3+ on the left side.        Posterior tibial pulses are 3+ on the right side, and 3+ on the left side.   Pulmonary/Chest: Effort normal and breath sounds normal. No respiratory distress. He has no wheezes. He has no rales. He exhibits no tenderness.   Abdominal: Soft. He exhibits no distension and no mass. There is no tenderness. "   Musculoskeletal: Normal range of motion.   Lymphadenopathy:     He has no cervical adenopathy.   Neurological: He is alert and oriented to person, place, and time.   Skin: Skin is warm. He is not diaphoretic. No cyanosis. Nails show no clubbing.   Psychiatric: He has a normal mood and affect. His speech is normal and behavior is normal. Judgment and thought content normal. Cognition and memory are normal.       Assessment:     1. Coronary artery disease involving native coronary artery of native heart without angina pectoris    2. Old myocardial infarction    3. S/P AAA (abdominal aortic aneurysm) repair    4. Syncope and collapse    5. AICD (automatic cardioverter/defibrillator) present    6. VT (ventricular tachycardia)    7. Thrombocytopenia    8. Ischemic cardiomyopathy    9. Essential hypertension    10. Mixed hyperlipidemia    11. Erectile dysfunction due to arterial insufficiency    12. CKD (chronic kidney disease) stage 3, GFR 30-59 ml/min    13. Cerebral embolism and thrombosis    14. Tovar's esophagus with low grade dysplasia    15. Anemia associated with chronic renal failure        Plan:   Discussed diet , achieving and maintaining ideal body weight, and exercise.   We reviewed meds in detail.  Reassured  Discussed slightly more water intake  Take pulse and BP during spells      Nelson was seen today for coronary artery disease and dizziness.    Diagnoses and all orders for this visit:    Coronary artery disease involving native coronary artery of native heart without angina pectoris  -     Lipid panel; Future; Expected date: 10/25/17  -     Comprehensive metabolic panel; Future; Expected date: 10/25/17  -     TSH; Future; Expected date: 10/25/17  -     EKG 12-lead; Future; Expected date: 10/25/17  -     2D echo with color flow doppler; Future; Expected date: 10/25/17    Old myocardial infarction  -     Comprehensive metabolic panel; Future; Expected date: 10/25/17  -     EKG 12-lead; Future;  Expected date: 10/25/17    S/P AAA (abdominal aortic aneurysm) repair    Syncope and collapse  -     Lipid panel; Future; Expected date: 10/25/17  -     Comprehensive metabolic panel; Future; Expected date: 10/25/17  -     TSH; Future; Expected date: 10/25/17    AICD (automatic cardioverter/defibrillator) present  -     Lipid panel; Future; Expected date: 10/25/17  -     Comprehensive metabolic panel; Future; Expected date: 10/25/17  -     EKG 12-lead; Future; Expected date: 10/25/17    VT (ventricular tachycardia)  -     Lipid panel; Future; Expected date: 10/25/17  -     Comprehensive metabolic panel; Future; Expected date: 10/25/17  -     TSH; Future; Expected date: 10/25/17  -     EKG 12-lead; Future; Expected date: 10/25/17    Thrombocytopenia  -     CBC auto differential; Future; Expected date: 10/25/17    Ischemic cardiomyopathy  -     Lipid panel; Future; Expected date: 10/25/17  -     Comprehensive metabolic panel; Future; Expected date: 10/25/17  -     TSH; Future; Expected date: 10/25/17  -     2D echo with color flow doppler; Future; Expected date: 10/25/17    Essential hypertension  -     Lipid panel; Future; Expected date: 10/25/17  -     Comprehensive metabolic panel; Future; Expected date: 10/25/17  -     TSH; Future; Expected date: 10/25/17    Mixed hyperlipidemia  -     TSH; Future; Expected date: 10/25/17    Erectile dysfunction due to arterial insufficiency    CKD (chronic kidney disease) stage 3, GFR 30-59 ml/min  -     Comprehensive metabolic panel; Future; Expected date: 10/25/17    Cerebral embolism and thrombosis    Tovar's esophagus with low grade dysplasia    Anemia associated with chronic renal failure  -     CBC auto differential; Future; Expected date: 10/25/17    Other orders  -     multivitamin with minerals tablet;             Return in about 9 months (around 10/25/2017) for with CFD Lavie day and ECG labs;chem 7 in 1 month.

## 2017-01-27 ENCOUNTER — OFFICE VISIT (OUTPATIENT)
Dept: INTERNAL MEDICINE | Facility: CLINIC | Age: 82
End: 2017-01-27
Payer: MEDICARE

## 2017-01-27 VITALS
HEIGHT: 73 IN | DIASTOLIC BLOOD PRESSURE: 68 MMHG | SYSTOLIC BLOOD PRESSURE: 124 MMHG | HEART RATE: 65 BPM | BODY MASS INDEX: 22.09 KG/M2 | WEIGHT: 166.69 LBS

## 2017-01-27 DIAGNOSIS — I25.10 CORONARY ARTERY DISEASE, ANGINA PRESENCE UNSPECIFIED, UNSPECIFIED VESSEL OR LESION TYPE, UNSPECIFIED WHETHER NATIVE OR TRANSPLANTED HEART: ICD-10-CM

## 2017-01-27 DIAGNOSIS — E78.5 HYPERLIPIDEMIA, UNSPECIFIED HYPERLIPIDEMIA TYPE: ICD-10-CM

## 2017-01-27 DIAGNOSIS — I10 ESSENTIAL HYPERTENSION: Primary | ICD-10-CM

## 2017-01-27 DIAGNOSIS — N18.30 CHRONIC RENAL INSUFFICIENCY, STAGE 3 (MODERATE): ICD-10-CM

## 2017-01-27 DIAGNOSIS — L02.91 ABSCESS: ICD-10-CM

## 2017-01-27 DIAGNOSIS — D69.6 THROMBOCYTOPENIA: ICD-10-CM

## 2017-01-27 PROBLEM — N18.4 CHRONIC RENAL IMPAIRMENT, STAGE 4 (SEVERE): Status: ACTIVE | Noted: 2017-01-27

## 2017-01-27 PROCEDURE — 1126F AMNT PAIN NOTED NONE PRSNT: CPT | Mod: S$GLB,,, | Performed by: INTERNAL MEDICINE

## 2017-01-27 PROCEDURE — 1157F ADVNC CARE PLAN IN RCRD: CPT | Mod: S$GLB,,, | Performed by: INTERNAL MEDICINE

## 2017-01-27 PROCEDURE — 3074F SYST BP LT 130 MM HG: CPT | Mod: S$GLB,,, | Performed by: INTERNAL MEDICINE

## 2017-01-27 PROCEDURE — 1159F MED LIST DOCD IN RCRD: CPT | Mod: S$GLB,,, | Performed by: INTERNAL MEDICINE

## 2017-01-27 PROCEDURE — 3078F DIAST BP <80 MM HG: CPT | Mod: S$GLB,,, | Performed by: INTERNAL MEDICINE

## 2017-01-27 PROCEDURE — 99999 PR PBB SHADOW E&M-EST. PATIENT-LVL III: CPT | Mod: PBBFAC,,, | Performed by: INTERNAL MEDICINE

## 2017-01-27 PROCEDURE — 99499 UNLISTED E&M SERVICE: CPT | Mod: S$GLB,,, | Performed by: INTERNAL MEDICINE

## 2017-01-27 PROCEDURE — 1160F RVW MEDS BY RX/DR IN RCRD: CPT | Mod: S$GLB,,, | Performed by: INTERNAL MEDICINE

## 2017-01-27 PROCEDURE — 99214 OFFICE O/P EST MOD 30 MIN: CPT | Mod: S$GLB,,, | Performed by: INTERNAL MEDICINE

## 2017-01-27 NOTE — PROGRESS NOTES
Patient, Nelson Huntley (MRN #[id number]), presented with a recent Platelet count less than 150 K/uL consistent with the definition of thrombocytopenia (ICD10 - D69.6).    Platelets   Date Value Ref Range Status   01/18/2017 86 (L) 150 - 350 K/uL Final     The patient's thrombocytopenia was monitored, evaluated, addressed and/or treated. This addendum to the medical record is made on 01/27/2017.

## 2017-01-27 NOTE — MR AVS SNAPSHOT
[facility] Internal Medicine   [place]  [address] [address]  Phone: [phone]  Fax: [phone]                  Nelson Huntley   2017 3:40 PM   Office Visit    Description:  Male : 1935   Provider:  Ruddy Skelton MD   Department:  [facility]           Reason for Visit     Follow-up           Diagnoses this Visit        Comments    Essential hypertension    -  Primary continue current regimen and encouraged low Na diet     Abscess     completed antbx and s/p I/D    Thrombocytopenia         Chronic renal insufficiency, stage 4 (severe)         Hyperlipidemia, unspecified hyperlipidemia type         Coronary artery disease, angina presence unspecified, unspecified vessel or lesion type, unspecified whether native or transplanted heart                To Do List           Future Appointments        Provider Department Dept Phone    2017 10:20 AM Mackenzie Mathew MD [facility] General Surgery [phone]    2017 10:00 AM LAB, [facility] [phone]    3/1/2017 10:20 AM Ruddy Skelton MD [facility] [phone]    2017 1:00 PM PACEMAKER, ICD [address] - Arrhythmia [phone]      Goals (5 Years of Data)     None      Follow-Up and Disposition     Return in about 1 month (around 2017).      [facility]     [facility] Care [facility] - 24/7 Assistance  Registered nurses in the [facility] provide clinical advisement, health education, appointment booking, and other advisory services.  Call for this free service at [phone].             Medications           Message regarding Medications     Verify the changes and/or additions to your medication regime listed below are the same as discussed with your clinician today.  If any of these changes or additions are incorrect, please notify your healthcare provider.             Verify that the below list of medications is an accurate representation  "of the medications you are currently taking.  If none reported, the list may be blank. If incorrect, please contact your healthcare provider. Carry this list with you in case of emergency.           Current Medications     amlodipine (NORVASC) 10 MG tablet TAKE 1 TABLET BY MOUTH ONCE DAILY.    ascorbic acid (VITAMIN C) 500 MG tablet Take 1 tablet by mouth Daily. 1 Tablet Oral Every day    aspirin 81 MG chewable tablet Take 1 tablet by mouth Daily. 81  By mouth Every day    clopidogrel (PLAVIX) 75 mg tablet TAKE 1 TABLET ONE TIME DAILY    DOCOSAHEXANOIC ACID/EPA (FISH OIL ORAL) Take 1 capsule by mouth Twice daily.    lisinopril (PRINIVIL,ZESTRIL) 40 MG tablet TAKE 1/2 TO 1 TABLET EVERY DAY OR AS DIRECTED.    metoprolol succinate (TOPROL-XL) 25 MG 24 hr tablet TAKE 1/2 TO 1 TABLET TWO  TIMES  DAILY OR AS DIRECTED.    multivitamin with minerals tablet     niacin 500 MG tablet Take 3 tablets by mouth Every PM. 3 Tablet Oral Every evening    nitroGLYCERIN (NITROSTAT) 0.4 MG SL tablet Take 1 tab under the tongue for chest pain. May repeat every 5 minutes for a total of 3 doses. If chest pain not relieved go to the ED.    pantoprazole (PROTONIX) 40 MG tablet 40 mg Twice daily.    pravastatin (PRAVACHOL) 40 MG tablet TAKE 1 TABLET AT BEDTIME EXCEPT ON WEDNESDAY AND FRIDAY TAKE 1/2 TABLET AT BEDTIME    sotalol (BETAPACE) 120 MG Tab Take 1 tablet (120 mg total) by mouth 2 (two) times daily.           Clinical Reference Information           Vital Signs - Last Recorded  Most recent update: 1/27/2017  3:27 PM by Camille Barnett MA    BP Pulse Ht Wt BMI    124/68 (BP Location: Left arm, Patient Position: Sitting, BP Method: Manual) 65 6' 1" (1.854 m) 75.6 kg (166 lb 10.7 oz) 21.99 kg/m2      Blood Pressure          Most Recent Value    BP  124/68      Allergies as of 1/27/2017     Pcn  [Penicillins]    Clindamycin      Immunizations Administered on Date of Encounter - 1/27/2017     None      Orders Placed During Today's Visit  "     Normal Orders This Visit    Ambulatory Referral to Nephrology     Future Labs/Procedures Expected by Expires    Basic metabolic panel  2/17/2017 9/27/2017

## 2017-01-27 NOTE — PROGRESS NOTES
Subjective:       Patient ID: Nelson GIBBONS Parent is a 81 y.o. male.    Chief Complaint: Follow-up    HPI Pt. Here for f/u for abscess to back; his wife is with the pt. He has completed antbx and is scheduled to see surgery; area has improved; he is compliant with meds; he has f/u cardiology for CAD; I reviewed labs dated 1/18/17;  platelets were low but stable; kidney fxn went up from Cr 1.4 on 12/3/15; cholesterol is well controlled; he takes asa 81 mg QD  Review of Systems   Constitutional: Negative for chills, fatigue and fever.   HENT: Negative for congestion, rhinorrhea and sore throat.    Respiratory: Negative for cough, shortness of breath and wheezing.    Cardiovascular: Negative for chest pain.   Gastrointestinal: Negative for abdominal pain, nausea and vomiting.   Genitourinary: Negative for dysuria.   Musculoskeletal: Negative for arthralgias.   Skin: Negative for rash.   Neurological: Negative for dizziness and headaches.   Psychiatric/Behavioral: Negative for sleep disturbance. The patient is not nervous/anxious.        Objective:      Physical Exam   Constitutional: He is oriented to person, place, and time. He appears well-developed.   Eyes: EOM are normal.   Neck: Normal range of motion.   Cardiovascular: Normal rate, regular rhythm and normal heart sounds.    Pulmonary/Chest: Effort normal and breath sounds normal.   Abdominal: Soft. There is no tenderness. There is no rebound and no guarding.   Musculoskeletal: Normal range of motion.   Neurological: He is alert and oriented to person, place, and time.   Skin: No rash noted.       Assessment:       1. Essential hypertension Well controlled   2. Abscess Active   3. Chronic renal insufficiency, stage 3 (moderate) Active   4. Thrombocytopenia Stable   5. Hyperlipidemia, unspecified hyperlipidemia type Well controlled   6. Coronary artery disease, angina presence unspecified, unspecified vessel or lesion type, unspecified whether native or transplanted  heart Well controlled       Plan:         Essential hypertension  Comments:  continue current regimen and encouraged low Na diet   Orders:  -     Basic metabolic panel; Future; Expected date: 2/17/17    Abscess  Comments:  completed antbx and s/p I/D; f/u surgery as scheduled    Chronic renal insufficiency, stage 3 (moderate)  Comments:  encouraged PO fluid intake and repeat BMP in 3 weeks for surveillence; refer to renal and avoid NSAIDs  Orders:  -     Basic metabolic panel; Future; Expected date: 2/17/17  -     Ambulatory Referral to Nephrology    Thrombocytopenia  Comments:  monitor    Hyperlipidemia, unspecified hyperlipidemia type  Comments:  continue current regimen and encouraged diet modification    Coronary artery disease, angina presence unspecified, unspecified vessel or lesion type, unspecified whether native or transplanted heart  Comments:  continue current regimen and f/u cardiology who is monitoring

## 2017-01-31 ENCOUNTER — PATIENT MESSAGE (OUTPATIENT)
Dept: INTERNAL MEDICINE | Facility: CLINIC | Age: 82
End: 2017-01-31

## 2017-02-01 ENCOUNTER — OFFICE VISIT (OUTPATIENT)
Dept: SURGERY | Facility: CLINIC | Age: 82
End: 2017-02-01
Payer: MEDICARE

## 2017-02-01 VITALS
HEART RATE: 72 BPM | TEMPERATURE: 98 F | SYSTOLIC BLOOD PRESSURE: 118 MMHG | HEIGHT: 73 IN | DIASTOLIC BLOOD PRESSURE: 68 MMHG | WEIGHT: 167.44 LBS | BODY MASS INDEX: 22.19 KG/M2

## 2017-02-01 DIAGNOSIS — L02.212 ABSCESS OF BACK: Primary | ICD-10-CM

## 2017-02-01 PROBLEM — L02.91 ABSCESS: Status: RESOLVED | Noted: 2017-01-27 | Resolved: 2017-02-01

## 2017-02-01 PROCEDURE — 99999 PR PBB SHADOW E&M-EST. PATIENT-LVL III: CPT | Mod: PBBFAC,,, | Performed by: SURGERY

## 2017-02-01 PROCEDURE — 99024 POSTOP FOLLOW-UP VISIT: CPT | Mod: S$GLB,,, | Performed by: SURGERY

## 2017-02-01 NOTE — PROGRESS NOTES
Wound healing well no drainage  Having issues with elev cr likely related to bactrim, his nephro is following this    Plan rtc PRN

## 2017-02-06 ENCOUNTER — OFFICE VISIT (OUTPATIENT)
Dept: NEPHROLOGY | Facility: CLINIC | Age: 82
End: 2017-02-06
Payer: MEDICARE

## 2017-02-06 VITALS
HEIGHT: 73 IN | DIASTOLIC BLOOD PRESSURE: 70 MMHG | HEART RATE: 65 BPM | BODY MASS INDEX: 22.62 KG/M2 | WEIGHT: 170.63 LBS | SYSTOLIC BLOOD PRESSURE: 110 MMHG | OXYGEN SATURATION: 99 %

## 2017-02-06 DIAGNOSIS — K21.00 REFLUX ESOPHAGITIS: ICD-10-CM

## 2017-02-06 DIAGNOSIS — I10 ESSENTIAL HYPERTENSION: Primary | ICD-10-CM

## 2017-02-06 DIAGNOSIS — D63.1 ANEMIA ASSOCIATED WITH CHRONIC RENAL FAILURE: ICD-10-CM

## 2017-02-06 DIAGNOSIS — N17.9 ACUTE-ON-CHRONIC KIDNEY INJURY: ICD-10-CM

## 2017-02-06 DIAGNOSIS — Z95.810 AICD (AUTOMATIC CARDIOVERTER/DEFIBRILLATOR) PRESENT: ICD-10-CM

## 2017-02-06 DIAGNOSIS — N18.4 CHRONIC RENAL IMPAIRMENT, STAGE 4 (SEVERE): ICD-10-CM

## 2017-02-06 DIAGNOSIS — E78.2 MIXED HYPERLIPIDEMIA: ICD-10-CM

## 2017-02-06 DIAGNOSIS — K22.710 BARRETT'S ESOPHAGUS WITH LOW GRADE DYSPLASIA: ICD-10-CM

## 2017-02-06 DIAGNOSIS — N18.9 ACUTE-ON-CHRONIC KIDNEY INJURY: ICD-10-CM

## 2017-02-06 DIAGNOSIS — N18.30 CKD (CHRONIC KIDNEY DISEASE) STAGE 3, GFR 30-59 ML/MIN: ICD-10-CM

## 2017-02-06 DIAGNOSIS — N18.9 ANEMIA ASSOCIATED WITH CHRONIC RENAL FAILURE: ICD-10-CM

## 2017-02-06 PROCEDURE — 1160F RVW MEDS BY RX/DR IN RCRD: CPT | Mod: S$GLB,,, | Performed by: INTERNAL MEDICINE

## 2017-02-06 PROCEDURE — 3074F SYST BP LT 130 MM HG: CPT | Mod: S$GLB,,, | Performed by: INTERNAL MEDICINE

## 2017-02-06 PROCEDURE — 1159F MED LIST DOCD IN RCRD: CPT | Mod: S$GLB,,, | Performed by: INTERNAL MEDICINE

## 2017-02-06 PROCEDURE — 3078F DIAST BP <80 MM HG: CPT | Mod: S$GLB,,, | Performed by: INTERNAL MEDICINE

## 2017-02-06 PROCEDURE — 99999 PR PBB SHADOW E&M-EST. PATIENT-LVL III: CPT | Mod: PBBFAC,,, | Performed by: INTERNAL MEDICINE

## 2017-02-06 PROCEDURE — 1126F AMNT PAIN NOTED NONE PRSNT: CPT | Mod: S$GLB,,, | Performed by: INTERNAL MEDICINE

## 2017-02-06 PROCEDURE — 1157F ADVNC CARE PLAN IN RCRD: CPT | Mod: S$GLB,,, | Performed by: INTERNAL MEDICINE

## 2017-02-06 PROCEDURE — 99203 OFFICE O/P NEW LOW 30 MIN: CPT | Mod: S$GLB,,, | Performed by: INTERNAL MEDICINE

## 2017-02-06 PROCEDURE — 99499 UNLISTED E&M SERVICE: CPT | Mod: S$GLB,,, | Performed by: INTERNAL MEDICINE

## 2017-02-06 NOTE — MR AVS SNAPSHOT
[facility] - Nephrology  [address]  [facility] [address]  Phone: [phone]  Fax: [phone]                  Nelson Huntley   2017 1:30 PM   Office Visit    Description:  Male : 1935   Provider:  Jazmyn Guillen MD   Department:  [facility]           Diagnoses this Visit        Comments    Essential hypertension    -  Primary     Anemia associated with chronic renal failure         Chronic renal impairment, stage 4 (severe)         Acute-on-chronic kidney injury         Mixed hyperlipidemia         Reflux esophagitis         Tovar's esophagus with low grade dysplasia         AICD (automatic cardioverter/defibrillator) present         CKD (chronic kidney disease) stage 3, GFR 30-59 ml/min                To Do List           Future Appointments        Provider Department Dept Phone    2017 10:00 AM [facility], [facility] [phone]    3/1/2017 10:20 AM Ruddy Skelton MD [facility] Internal [facility] [phone]    2017 1:00 PM COORDINATED DEVICE CHECK [facility] [phone]      Goals (5 Years of Data)     None      Follow-Up and Disposition     Return in about 4 months (around 2017).      [facility]     [facility] Care [facility] -  Assistance  Registered nurses in the [facility] provide clinical advisement, health education, appointment booking, and other advisory services.  Call for this free service at [phone].             Medications           Message regarding Medications     Verify the changes and/or additions to your medication regime listed below are the same as discussed with your clinician today.  If any of these changes or additions are incorrect, please notify your healthcare provider.             Verify that the below list of medications is an accurate representation of the medications you are currently taking.  If none reported, the list may be blank. If incorrect, please  "contact your healthcare provider. Carry this list with you in case of emergency.           Current Medications     amlodipine (NORVASC) 10 MG tablet TAKE 1 TABLET BY MOUTH ONCE DAILY.    ascorbic acid (VITAMIN C) 500 MG tablet Take 1 tablet by mouth Daily. 1 Tablet Oral Every day    aspirin 81 MG chewable tablet Take 1 tablet by mouth Daily. 81  By mouth Every day    clopidogrel (PLAVIX) 75 mg tablet TAKE 1 TABLET ONE TIME DAILY    DOCOSAHEXANOIC ACID/EPA (FISH OIL ORAL) Take 1 capsule by mouth Twice daily.    lisinopril (PRINIVIL,ZESTRIL) 40 MG tablet TAKE 1/2 TO 1 TABLET EVERY DAY OR AS DIRECTED.    metoprolol succinate (TOPROL-XL) 25 MG 24 hr tablet TAKE 1/2 TO 1 TABLET TWO  TIMES  DAILY OR AS DIRECTED.    multivitamin with minerals tablet     niacin 500 MG tablet Take 3 tablets by mouth Every PM. 3 Tablet Oral Every evening    nitroGLYCERIN (NITROSTAT) 0.4 MG SL tablet Take 1 tab under the tongue for chest pain. May repeat every 5 minutes for a total of 3 doses. If chest pain not relieved go to the ED.    pantoprazole (PROTONIX) 40 MG tablet 40 mg Twice daily.    pravastatin (PRAVACHOL) 40 MG tablet TAKE 1 TABLET AT BEDTIME EXCEPT ON WEDNESDAY AND FRIDAY TAKE 1/2 TABLET AT BEDTIME    sotalol (BETAPACE) 120 MG Tab Take 1 tablet (120 mg total) by mouth 2 (two) times daily.           Clinical Reference Information           Your Vitals Were     BP Pulse Height Weight SpO2 BMI    110/70 65 6' 1" (1.854 m) 77.4 kg (170 lb 10.2 oz) 99% 22.51 kg/m2      Blood Pressure          Most Recent Value    BP  110/70      Allergies as of 2/6/2017     Pcn  [Penicillins]    Clindamycin      Immunizations Administered on Date of Encounter - 2/6/2017     None      Orders Placed During Today's Visit     Future Labs/Procedures Expected by Expires    ELYSIA  2/6/2017 4/7/2018    Anti-neutrophilic cytoplasmic antibody  2/6/2017 4/7/2018    Hepatitis B surface antibody  2/6/2017 4/7/2018    Hepatitis B surface antigen  2/6/2017 4/7/2018    " Hepatitis C antibody  2/6/2017 4/7/2018    Immunofixation electrophoresis  2/6/2017 4/7/2018    Protein / creatinine ratio, urine  2/6/2017 2/6/2018    Protein electrophoresis, serum  2/6/2017 4/7/2018    PTH, intact  2/6/2017 4/7/2018    Renal function panel  2/6/2017 4/7/2018    Urinalysis  2/6/2017 2/6/2018    US Doppler Renal Artery and Vein (xpd)  2/6/2017 2/6/2018    US Retroperitoneal Complete  2/6/2017 2/6/2018    Vitamin D  2/6/2017 4/7/2018    CBC auto differential  6/6/2017 2/6/2018    Protein / creatinine ratio, urine  6/6/2017 2/6/2018    Renal function panel  6/6/2017 2/6/2018    Urinalysis  6/6/2017 2/6/2018      Instructions      1. Labs: with other labs in 2 weeks and prior to next visit and US as soon as possible    2.  Medications: no change    \    4. Follow up with PCP regarding: switching from protonix /prilsoec to H2 blocker such as Pepcid, though with cami's esophagus this may not be feasible.    5. BP:  Take BP and pulse  twice daily for one week, record              Bring results  to next visit.              Goal :   <140/90    6. Diet:         Sodium: < 2000 milligrams daily including all food and drink      (one teaspoon of table salt has 2300 milligrams of sodium)          7. Please avoid or minimize all NSAIDS (ibuprofen, motrin, aleve, indocin, naprosyn) to minimize the risk to your kidneys    8. Return to clinic: 4 months       Language Assistance Services     ATTENTION: Language assistance services are available, free of charge. Please call [phone].      ATENCIÓN: Si habla español, tiene a paredes disposición servicios gratuitos de asistencia lingüística. Llame al [phone].     CHÚ Ý: N?u b?n nói Ti?ng Vi?t, có các d?ch v? h? tr? ngôn ng? mi?n phí dành cho b?n. G?i s? [phone].         [facility] complies with applicable Federal civil rights laws and does not discriminate on the basis of race, color, national origin, age, disability, or sex.

## 2017-02-06 NOTE — LETTER
February 6, 2017      Ruddy Skelton MD  [address] [place] [address]           [facility]  [address]  [facility] [address]  Phone: [phone]  Fax: [phone]          Patient: Nelson Huntley   MR Number: [id number]   YOB: 1935   Date of Visit: 2/6/2017       Dear Dr. Ruddy Skelton:    Thank you for referring Nelson Huntley to me for evaluation. Attached you will find relevant portions of my assessment and plan of care.    If you have questions, please do not hesitate to call me. I look forward to following Nelson Huntley along with you.    Sincerely,    Jazmyn Guillen MD    Enclosure  CC:  No Recipients    If you would like to receive this communication electronically, please contact [email or web address] or [phone] to request more information on [employer] access.    For providers and/or their staff who would like to refer a patient to [facility], please contact us through our one-stop-shop provider referral line, [facility] , at [phone].    If you feel you have received this communication in error or would no longer like to receive these types of communications, please e-mail [email or web address]

## 2017-02-06 NOTE — PROGRESS NOTES
Subjective:       Patient ID: Nelson GIBBONS Parent is a 81 y.o. White male who presents for new evaluation of renal function.  HPI     80 yo WM with h/o AAA repair (appears to have been above the renal arteries),TIA, thrombocytopenia, ischemic cardiomyopathy, v-tahc, AICD, HTN, Tovar's esophagus on PPI, who recently had a skin infection requiring bactrim. Serum crt had been 1.4 2015 and now is 1.9 serum K 4.3 CO2 26. Prior serum crt 2013 as high  as 2.2 He denies NSAIDs, LUTS, dysuria, hematuria, peripheral edema, SOB.  Possible remote nephrolithiasis x 1 episode, denies gout. There are no available Urinalysis and patient just relieved himself prior to exam.    Review of Systems   Constitutional: Negative for activity change, appetite change, chills, diaphoresis, fatigue, fever and unexpected weight change.   HENT: Negative for congestion, ear discharge, ear pain, facial swelling, hearing loss, nosebleeds, sinus pressure, sore throat and trouble swallowing.    Eyes: Negative for photophobia, pain, discharge, redness, itching and visual disturbance.   Respiratory: Negative for apnea, cough, chest tightness, shortness of breath and wheezing.    Cardiovascular: Negative for chest pain, palpitations and leg swelling.   Gastrointestinal: Negative for abdominal distention, abdominal pain, constipation, diarrhea and vomiting.   Endocrine: Negative for cold intolerance, heat intolerance, polydipsia and polyuria.   Genitourinary: Negative for decreased urine volume, difficulty urinating, dysuria, flank pain, frequency, hematuria, scrotal swelling, testicular pain and urgency.   Musculoskeletal: Negative for arthralgias, back pain, gait problem, joint swelling, myalgias, neck pain and neck stiffness.   Skin: Negative for color change, pallor, rash and wound.   Allergic/Immunologic: Negative for environmental allergies and food allergies.   Neurological: Negative for dizziness, tremors, seizures, syncope, facial asymmetry, speech  "difficulty, weakness, light-headedness, numbness and headaches.        Remote TIA   Hematological: Negative for adenopathy. Does not bruise/bleed easily.   Psychiatric/Behavioral: Negative for agitation, behavioral problems, dysphoric mood and sleep disturbance. The patient is not nervous/anxious.        Objective:     Blood pressure 110/70, pulse 65, height 6' 1" (1.854 m), weight 77.4 kg (170 lb 10.2 oz), SpO2 99 %.    Physical Exam   Constitutional: He is oriented to person, place, and time. He appears well-developed and well-nourished. No distress.   Lean  WNWD NAD   HENT:   Head: Normocephalic and atraumatic.   Mouth/Throat: Oropharynx is clear and moist. No oropharyngeal exudate.   Eyes: Conjunctivae and EOM are normal. Pupils are equal, round, and reactive to light. Right eye exhibits no discharge. Left eye exhibits no discharge. No scleral icterus.   Neck: Normal range of motion. Neck supple. No JVD present. No tracheal deviation present. No thyromegaly present.   Cardiovascular: Normal rate, regular rhythm, normal heart sounds and intact distal pulses.  Exam reveals no gallop and no friction rub.    No murmur heard.  Bilateral DP pulses +2 , no LE edema   Pulmonary/Chest: Effort normal and breath sounds normal. No stridor. No respiratory distress. He has no wheezes. He has no rales. He exhibits no tenderness.   Abdominal: Soft. Bowel sounds are normal. He exhibits no distension and no mass. There is no tenderness. There is no rebound and no guarding. No hernia.   Musculoskeletal: Normal range of motion. He exhibits no edema or tenderness.   Lymphadenopathy:     He has no cervical adenopathy.   Neurological: He is alert and oriented to person, place, and time. No cranial nerve deficit. He exhibits normal muscle tone. Coordination normal.   Skin: Skin is warm and dry. No rash noted. He is not diaphoretic. No erythema. No pallor.   Psychiatric: He has a normal mood and affect. His behavior is normal. Judgment " and thought content normal.   Nursing note and vitals reviewed.      Assessment:       1. Essential hypertension    2. Anemia associated with chronic renal failure    3. Chronic renal impairment, stage 4 (severe)    4. Acute-on-chronic kidney injury    5. Mixed hyperlipidemia    6. Reflux esophagitis    7. Tovar's esophagus with low grade dysplasia    8. AICD (automatic cardioverter/defibrillator) present    9. 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     KIMBERLY possibly complicating CKD. KIMBERLY related to use of bactrim which  may cause a reversible inhibition of renal  tubular secretion of creatinine and elevation of serum crt, and AIN. Would evaluate for obstruction, and obtain US and  serologies as well.  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 It is possible PPI may be contributing to LOYDA, but will await further tests as above. Given dx of Lizandro's esophagus would not change therapy unless ok with PCP and GI. The chronic use of PPI and association, but not causality of PPIs with CKD discussed with patient.    1. CKD: baseline serum crt 1.5-1.9 with est GFR 35 cc/min.  Would continue RAAS blockade at this time as there is no hyperkalemia , further recommendations after above evaluation.  Follow serum crt serially, maintaining low sodium diet and good BP control discussed with patient  Avoid NSAIDs       Lab Results   Component Value Date    CREATININE 1.9 (H) 01/18/2017         Proteinuria: check  No results found for: UTPCR       HTN: maintain  lsinopril      Medication: no change      Monitor BP as directed in instructions    Metabolic acidosis:  none       Hyponatremia: none       Hyperkalemia: none     Lab Results   Component Value Date     01/18/2017    K 4.4 01/18/2017    CO2 26 01/18/2017         Renal osteodystrophy secondary hyperparathyroidism:  Check PTH and vit d levels  Lab Results   Component Value Date    CALCIUM 9.9 01/18/2017        Anemia:  none  Lab Results   Component Value Date    HGB 14.6 01/18/2017             Weight: Weight: 77.4 kg (170 lb 10.2 oz). he states that his daily weights are stable  Wt Readings from Last 3 Encounters:   02/06/17 77.4 kg (170 lb 10.2 oz)   02/01/17 76 kg (167 lb 7 oz)   01/27/17 75.6 kg (166 lb 10.7 oz)          Hyperlipidemia: satble  Lab Results   Component Value Date    LDLCALC 57.2 (L) 01/18/2017         D iet:  Education/referral:       Sodium: 2gm    Potassium:      Phosphorus:      Protein:      Fluid:       ESRD planing: not indicated at this time       Education:       Access:     PCP followup: use of PPI   Referrals:      Please avoid or minimize all NSAIDS (ibuprofen, motrin, aleve, indocin, naprosyn) to minimize the risk to your kidneys.        [place] avoid and minimize use of all Proton Pump inhibitors (such as nexium prilosec, omeprazole, pantroprazole, protonix)and Please speak with your PCP about switching to H2 blocker such as Pepcid        Follow up in: 4 months, labs in 2 weeks

## 2017-02-06 NOTE — PATIENT INSTRUCTIONS
1. Labs: with other labs in 2 weeks and prior to next visit and US as soon as possible    2.  Medications: no change    \    4. Follow up with PCP regarding: switching from protonix /prilsoec to H2 blocker such as Pepcid, though with cami's esophagus this may not be feasible.    5. BP:  Take BP and pulse  twice daily for one week, record              Bring results  to next visit.              Goal :   <140/90    6. Diet:         Sodium: < 2000 milligrams daily including all food and drink      (one teaspoon of table salt has 2300 milligrams of sodium)          7. Please avoid or minimize all NSAIDS (ibuprofen, motrin, aleve, indocin, naprosyn) to minimize the risk to your kidneys    8. Return to clinic: 4 months

## 2017-02-22 ENCOUNTER — LAB VISIT (OUTPATIENT)
Dept: LAB | Facility: HOSPITAL | Age: 82
End: 2017-02-22
Attending: INTERNAL MEDICINE
Payer: MEDICARE

## 2017-02-22 DIAGNOSIS — N18.30 CKD (CHRONIC KIDNEY DISEASE) STAGE 3, GFR 30-59 ML/MIN: ICD-10-CM

## 2017-02-22 DIAGNOSIS — N18.9 ACUTE-ON-CHRONIC KIDNEY INJURY: ICD-10-CM

## 2017-02-22 DIAGNOSIS — I10 ESSENTIAL HYPERTENSION: ICD-10-CM

## 2017-02-22 DIAGNOSIS — K22.710 BARRETT'S ESOPHAGUS WITH LOW GRADE DYSPLASIA: ICD-10-CM

## 2017-02-22 DIAGNOSIS — N18.4 CHRONIC RENAL IMPAIRMENT, STAGE 4 (SEVERE): ICD-10-CM

## 2017-02-22 DIAGNOSIS — Z95.810 AICD (AUTOMATIC CARDIOVERTER/DEFIBRILLATOR) PRESENT: ICD-10-CM

## 2017-02-22 DIAGNOSIS — K21.00 REFLUX ESOPHAGITIS: ICD-10-CM

## 2017-02-22 DIAGNOSIS — E78.2 MIXED HYPERLIPIDEMIA: ICD-10-CM

## 2017-02-22 DIAGNOSIS — N17.9 ACUTE-ON-CHRONIC KIDNEY INJURY: ICD-10-CM

## 2017-02-22 DIAGNOSIS — N18.9 ANEMIA ASSOCIATED WITH CHRONIC RENAL FAILURE: ICD-10-CM

## 2017-02-22 DIAGNOSIS — N18.30 CHRONIC RENAL INSUFFICIENCY, STAGE 3 (MODERATE): ICD-10-CM

## 2017-02-22 DIAGNOSIS — D63.1 ANEMIA ASSOCIATED WITH CHRONIC RENAL FAILURE: ICD-10-CM

## 2017-02-22 LAB
25(OH)D3+25(OH)D2 SERPL-MCNC: 28 NG/ML
ALBUMIN SERPL BCP-MCNC: 4.1 G/DL
ANION GAP SERPL CALC-SCNC: 12 MMOL/L
ANION GAP SERPL CALC-SCNC: 12 MMOL/L
BASOPHILS # BLD AUTO: 0.04 K/UL
BASOPHILS NFR BLD: 0.5 %
BUN SERPL-MCNC: 16 MG/DL
BUN SERPL-MCNC: 16 MG/DL
CALCIUM SERPL-MCNC: 10.2 MG/DL
CALCIUM SERPL-MCNC: 10.2 MG/DL
CHLORIDE SERPL-SCNC: 108 MMOL/L
CHLORIDE SERPL-SCNC: 108 MMOL/L
CO2 SERPL-SCNC: 25 MMOL/L
CO2 SERPL-SCNC: 25 MMOL/L
CREAT SERPL-MCNC: 1.4 MG/DL
CREAT SERPL-MCNC: 1.4 MG/DL
DIFFERENTIAL METHOD: ABNORMAL
EOSINOPHIL # BLD AUTO: 0.3 K/UL
EOSINOPHIL NFR BLD: 3 %
ERYTHROCYTE [DISTWIDTH] IN BLOOD BY AUTOMATED COUNT: 14.2 %
EST. GFR  (AFRICAN AMERICAN): 54.1 ML/MIN/1.73 M^2
EST. GFR  (AFRICAN AMERICAN): 54.1 ML/MIN/1.73 M^2
EST. GFR  (NON AFRICAN AMERICAN): 46.8 ML/MIN/1.73 M^2
EST. GFR  (NON AFRICAN AMERICAN): 46.8 ML/MIN/1.73 M^2
GLUCOSE SERPL-MCNC: 94 MG/DL
GLUCOSE SERPL-MCNC: 94 MG/DL
HCT VFR BLD AUTO: 45.5 %
HGB BLD-MCNC: 15.4 G/DL
LYMPHOCYTES # BLD AUTO: 2.5 K/UL
LYMPHOCYTES NFR BLD: 29.4 %
MCH RBC QN AUTO: 31.4 PG
MCHC RBC AUTO-ENTMCNC: 33.8 %
MCV RBC AUTO: 93 FL
MONOCYTES # BLD AUTO: 0.6 K/UL
MONOCYTES NFR BLD: 6.8 %
NEUTROPHILS # BLD AUTO: 5 K/UL
NEUTROPHILS NFR BLD: 59.9 %
PHOSPHATE SERPL-MCNC: 3 MG/DL
PLATELET # BLD AUTO: 100 K/UL
PMV BLD AUTO: 12.4 FL
POTASSIUM SERPL-SCNC: 4 MMOL/L
POTASSIUM SERPL-SCNC: 4 MMOL/L
PTH-INTACT SERPL-MCNC: 40 PG/ML
RBC # BLD AUTO: 4.9 M/UL
SODIUM SERPL-SCNC: 145 MMOL/L
SODIUM SERPL-SCNC: 145 MMOL/L
WBC # BLD AUTO: 8.33 K/UL

## 2017-02-22 PROCEDURE — 85025 COMPLETE CBC W/AUTO DIFF WBC: CPT

## 2017-02-22 PROCEDURE — 86235 NUCLEAR ANTIGEN ANTIBODY: CPT | Mod: 59

## 2017-02-22 PROCEDURE — 36415 COLL VENOUS BLD VENIPUNCTURE: CPT | Mod: PO

## 2017-02-22 PROCEDURE — 83970 ASSAY OF PARATHORMONE: CPT

## 2017-02-22 PROCEDURE — 86803 HEPATITIS C AB TEST: CPT

## 2017-02-22 PROCEDURE — 80069 RENAL FUNCTION PANEL: CPT

## 2017-02-22 PROCEDURE — 86039 ANTINUCLEAR ANTIBODIES (ANA): CPT

## 2017-02-22 PROCEDURE — 86706 HEP B SURFACE ANTIBODY: CPT

## 2017-02-22 PROCEDURE — 87340 HEPATITIS B SURFACE AG IA: CPT

## 2017-02-22 PROCEDURE — 86334 IMMUNOFIX E-PHORESIS SERUM: CPT

## 2017-02-22 PROCEDURE — 86334 IMMUNOFIX E-PHORESIS SERUM: CPT | Mod: 26,,, | Performed by: PATHOLOGY

## 2017-02-22 PROCEDURE — 86255 FLUORESCENT ANTIBODY SCREEN: CPT

## 2017-02-22 PROCEDURE — 82306 VITAMIN D 25 HYDROXY: CPT

## 2017-02-22 PROCEDURE — 86038 ANTINUCLEAR ANTIBODIES: CPT

## 2017-02-22 PROCEDURE — 84165 PROTEIN E-PHORESIS SERUM: CPT | Mod: 26,,, | Performed by: PATHOLOGY

## 2017-02-22 PROCEDURE — 84165 PROTEIN E-PHORESIS SERUM: CPT

## 2017-02-23 DIAGNOSIS — D47.2 MONOCLONAL PARAPROTEINEMIA: ICD-10-CM

## 2017-02-23 LAB
ALBUMIN SERPL ELPH-MCNC: 4.36 G/DL
ALPHA1 GLOB SERPL ELPH-MCNC: 0.25 G/DL
ALPHA2 GLOB SERPL ELPH-MCNC: 0.76 G/DL
ANA SER QL IF: POSITIVE
ANA TITR SER IF: NORMAL {TITER}
B-GLOBULIN SERPL ELPH-MCNC: 0.73 G/DL
GAMMA GLOB SERPL ELPH-MCNC: 1.09 G/DL
HBV SURFACE AB SER-ACNC: NEGATIVE M[IU]/ML
HBV SURFACE AG SERPL QL IA: NEGATIVE
HCV AB SERPL QL IA: NEGATIVE
INTERPRETATION SERPL IFE-IMP: NORMAL
PATHOLOGIST INTERPRETATION IFE: NORMAL
PATHOLOGIST INTERPRETATION SPE: NORMAL
PROT SERPL-MCNC: 7.2 G/DL

## 2017-02-24 ENCOUNTER — OFFICE VISIT (OUTPATIENT)
Dept: FAMILY MEDICINE | Facility: CLINIC | Age: 82
End: 2017-02-24
Payer: MEDICARE

## 2017-02-24 ENCOUNTER — TELEPHONE (OUTPATIENT)
Dept: NEPHROLOGY | Facility: CLINIC | Age: 82
End: 2017-02-24

## 2017-02-24 VITALS
DIASTOLIC BLOOD PRESSURE: 64 MMHG | HEART RATE: 63 BPM | WEIGHT: 169.75 LBS | BODY MASS INDEX: 22.5 KG/M2 | HEIGHT: 73 IN | SYSTOLIC BLOOD PRESSURE: 116 MMHG | OXYGEN SATURATION: 97 %

## 2017-02-24 DIAGNOSIS — Z13.5 SCREENING FOR GLAUCOMA: ICD-10-CM

## 2017-02-24 DIAGNOSIS — I70.0 AORTIC ATHEROSCLEROSIS: ICD-10-CM

## 2017-02-24 DIAGNOSIS — I10 ESSENTIAL HYPERTENSION: ICD-10-CM

## 2017-02-24 DIAGNOSIS — J30.2 SEASONAL ALLERGIC RHINITIS, UNSPECIFIED ALLERGIC RHINITIS TRIGGER: ICD-10-CM

## 2017-02-24 DIAGNOSIS — D69.6 THROMBOCYTOPENIA: ICD-10-CM

## 2017-02-24 DIAGNOSIS — I47.20 VT (VENTRICULAR TACHYCARDIA): ICD-10-CM

## 2017-02-24 DIAGNOSIS — I25.5 ISCHEMIC CARDIOMYOPATHY: ICD-10-CM

## 2017-02-24 DIAGNOSIS — Z00.00 ENCOUNTER FOR PREVENTIVE HEALTH EXAMINATION: Primary | ICD-10-CM

## 2017-02-24 DIAGNOSIS — Z23 NEED FOR VACCINATION AGAINST STREPTOCOCCUS PNEUMONIAE: ICD-10-CM

## 2017-02-24 DIAGNOSIS — E78.2 MIXED HYPERLIPIDEMIA: ICD-10-CM

## 2017-02-24 LAB
ANCA AB TITR SER IF: NORMAL TITER
P-ANCA TITR SER IF: NORMAL TITER

## 2017-02-24 PROCEDURE — 90670 PCV13 VACCINE IM: CPT | Mod: S$GLB,,, | Performed by: NURSE PRACTITIONER

## 2017-02-24 PROCEDURE — G0439 PPPS, SUBSEQ VISIT: HCPCS | Mod: 25,S$GLB,, | Performed by: NURSE PRACTITIONER

## 2017-02-24 PROCEDURE — G0009 ADMIN PNEUMOCOCCAL VACCINE: HCPCS | Mod: 59,S$GLB,, | Performed by: NURSE PRACTITIONER

## 2017-02-24 PROCEDURE — 99499 UNLISTED E&M SERVICE: CPT | Mod: S$GLB,,, | Performed by: NURSE PRACTITIONER

## 2017-02-24 PROCEDURE — 3078F DIAST BP <80 MM HG: CPT | Mod: S$GLB,,, | Performed by: NURSE PRACTITIONER

## 2017-02-24 PROCEDURE — 99999 PR PBB SHADOW E&M-EST. PATIENT-LVL V: CPT | Mod: PBBFAC,,, | Performed by: NURSE PRACTITIONER

## 2017-02-24 PROCEDURE — 3074F SYST BP LT 130 MM HG: CPT | Mod: S$GLB,,, | Performed by: NURSE PRACTITIONER

## 2017-02-24 RX ORDER — LEVOCETIRIZINE DIHYDROCHLORIDE 5 MG/1
5 TABLET, FILM COATED ORAL NIGHTLY
Qty: 30 TABLET | Refills: 11 | Status: SHIPPED | OUTPATIENT
Start: 2017-02-24 | End: 2017-07-06

## 2017-02-24 RX ORDER — FLUTICASONE PROPIONATE 50 MCG
2 SPRAY, SUSPENSION (ML) NASAL DAILY
Qty: 1 BOTTLE | Refills: 0 | Status: SHIPPED | OUTPATIENT
Start: 2017-02-24 | End: 2017-07-06

## 2017-02-24 NOTE — PROGRESS NOTES
"Nelson Parent presented for a  Medicare AWV and comprehensive Health Risk Assessment today. The following components were reviewed and updated:    · Medical history  · Family History  · Social history  · Allergies and Current Medications  · Health Risk Assessment  · Health Maintenance  · Care Team     ** See Completed Assessments for Annual Wellness Visit within the encounter summary.**       The following assessments were completed:  · Living Situation  · CAGE  · Depression Screening  · Timed Get Up and Go  · Whisper Test  · Cognitive Function Screening  · Nutrition Screening  · ADL Screening  · PAQ Screening    Vitals:    02/24/17 1308   BP: 116/64   BP Location: Right arm   Patient Position: Sitting   BP Method: Manual   Pulse: 63   SpO2: 97%   Weight: 77 kg (169 lb 12.1 oz)   Height: 6' 1" (1.854 m)     Body mass index is 22.4 kg/(m^2).     Physical Exam   Constitutional: He is oriented to person, place, and time. He appears well-developed and well-nourished. No distress.   HENT:   Head: Normocephalic and atraumatic.   Right Ear: Hearing, tympanic membrane, external ear and ear canal normal.   Left Ear: Hearing, tympanic membrane, external ear and ear canal normal.   Nose: Mucosal edema and rhinorrhea present. Right sinus exhibits no maxillary sinus tenderness and no frontal sinus tenderness. Left sinus exhibits no maxillary sinus tenderness and no frontal sinus tenderness.   Mouth/Throat: Uvula is midline, oropharynx is clear and moist and mucous membranes are normal.   Eyes: EOM are normal. Pupils are equal, round, and reactive to light.   Neck: Neck supple. No JVD present. No tracheal deviation present.   Cardiovascular: Normal rate, regular rhythm, normal heart sounds and intact distal pulses.    No murmur heard.  Pulmonary/Chest: Effort normal and breath sounds normal. No respiratory distress. He has no wheezes. He has no rales.   Abdominal: Soft. Bowel sounds are normal. He exhibits no distension and no " mass. There is no tenderness.   Musculoskeletal: Normal range of motion. He exhibits no edema or tenderness.   Neurological: He is alert and oriented to person, place, and time. Coordination normal.   Skin: Skin is warm and dry. No erythema. No pallor.   Psychiatric: He has a normal mood and affect. His behavior is normal. Judgment and thought content normal. Cognition and memory are normal. He expresses no homicidal and no suicidal ideation.   Nursing note and vitals reviewed.        Diagnoses and health risks identified today and associated recommendations/orders:    1. Encounter for preventive health examination    2. Essential hypertension  Chronic; stable on medication.  Followed by PCP.    3. Mixed hyperlipidemia  Chronic; stable on medication.  Followed by PCP.    4. Aortic atherosclerosis  Chronic; stable.  As seen on imaging dated 03/15/12.  Followed by Cardiology.    5. Ischemic cardiomyopathy  Chronic; stable on medication.  Followed by Cardiology.    6. VT (ventricular tachycardia)  Chronic; stable.  Defibrillation/pacemaker in place.  Followed by Cardiology.    7. Thrombocytopenia  Chronic; stable.  Followed by PCP.    8. Screening for glaucoma  - Ambulatory referral to Optometry    9. Need for vaccination against Streptococcus pneumoniae  Prevnar 13 vaccination administered today in clinic.  - PNEUMOCOCCAL CONJUGATE VACCINE 13-VALENT LESS THAN 6YO & GREATER THAN 49YO IM    10. Body mass index (BMI) 22.0-22.9, adult      Provided Edward with a 5-10 year written screening schedule and personal prevention plan. Recommendations were developed using the USPSTF age appropriate recommendations. Education, counseling, and referrals were provided as needed. After Visit Summary printed and given to patient which includes a list of additional screenings\tests needed.    Return in 5 days (on 3/1/2017) for follow-up with PCP, HRA visit in 1 year.    Yue Marie NP

## 2017-02-24 NOTE — MR AVS SNAPSHOT
[facility]   [place] [address]  Phone: [phone]  Fax: [phone]                  Nelson Huntley   2017 1:00 PM   Office Visit    Description:  Male : 1935   Provider:  Yue Marie NP   Department:  [facility]           Reason for Visit     Health Risk Assessment           Diagnoses this Visit        Comments    Seasonal allergic rhinitis, unspecified allergic rhinitis trigger    -  Primary     Encounter for preventive health examination         Screening for glaucoma         Need for vaccination against Streptococcus pneumoniae                To Do List           Future Appointments        Provider Department Dept Phone    3/1/2017 10:20 AM Ruddy Skelton MD [facility] Internal Medicine [phone]    3/6/2017 8:00 AM [facility] [phone]    3/6/2017 2:00 PM [facility] [phone]    3/7/2017 1:40 PM Aye Price MD [facility] [phone]    3/28/2017 10:00 AM Shane Arvizu OD [facility] [phone]      Goals (5 Years of Data)     None      Follow-Up and Disposition     Return in 5 days (on 3/1/2017) for follow-up with PCP, HRA visit in 1 year.       These Medications        Disp Refills Start End    fluticasone (FLONASE) 50 mcg/actuation nasal spray 1 Bottle 0 2017     2 sprays by Each Nare route once daily. - Each Nare    Pharmacy: [email or web address] [address] [place], [place] - [address] Ph #: [phone]       levocetirizine (XYZAL) 5 MG tablet 30 tablet 11 2017    Take 1 tablet (5 mg total) by mouth every evening. - Oral    Pharmacy: [email or web address] [address] - [place] - [address] Ph #: [phone]         [facility]     [facility] On Call Nurse Care Line -  Assistance  Registered nurses in the [facility] On Call Center provide clinical advisement, health  education, appointment booking, and other advisory services.  Call for this free service at [phone].             Medications           Message regarding Medications     Verify the changes and/or additions to your medication regime listed below are the same as discussed with your clinician today.  If any of these changes or additions are incorrect, please notify your healthcare provider.        START taking these NEW medications        Refills    fluticasone (FLONASE) 50 mcg/actuation nasal spray 0    Si sprays by Each Nare route once daily.    Class: Normal    Route: Each Nare    levocetirizine (XYZAL) 5 MG tablet 11    Sig: Take 1 tablet (5 mg total) by mouth every evening.    Class: Normal    Route: Oral           Verify that the below list of medications is an accurate representation of the medications you are currently taking.  If none reported, the list may be blank. If incorrect, please contact your healthcare provider. Carry this list with you in case of emergency.           Current Medications     amlodipine (NORVASC) 10 MG tablet TAKE 1 TABLET BY MOUTH ONCE DAILY.    ascorbic acid (VITAMIN C) 500 MG tablet Take 1 tablet by mouth Daily. 1 Tablet Oral Every day    aspirin 81 MG chewable tablet Take 1 tablet by mouth Daily. 81  By mouth Every day    clopidogrel (PLAVIX) 75 mg tablet TAKE 1 TABLET ONE TIME DAILY    lisinopril (PRINIVIL,ZESTRIL) 40 MG tablet TAKE 1/2 TO 1 TABLET EVERY DAY OR AS DIRECTED.    metoprolol succinate (TOPROL-XL) 25 MG 24 hr tablet TAKE 1/2 TO 1 TABLET TWO  TIMES  DAILY OR AS DIRECTED.    multivitamin with minerals tablet Take 1 tablet by mouth once daily.    niacin 500 MG tablet Take 3 tablets by mouth Every PM. 3 Tablet Oral Every evening    omega-3 fatty acids-vitamin E (FISH OIL) 1,000 mg Cap Take 1 capsule by mouth 2 (two) times daily.    pantoprazole (PROTONIX) 40 MG tablet 40 mg Twice daily.    pravastatin (PRAVACHOL) 40 MG tablet TAKE 1 TABLET AT BEDTIME EXCEPT ON  WEDNESDAY AND FRIDAY TAKE 1/2 TABLET AT BEDTIME    sotalol (BETAPACE) 120 MG Tab Take 1 tablet (120 mg total) by mouth 2 (two) times daily.    fluticasone (FLONASE) 50 mcg/actuation nasal spray 2 sprays by Each Nare route once daily.    levocetirizine (XYZAL) 5 MG tablet Take 1 tablet (5 mg total) by mouth every evening.    nitroGLYCERIN (NITROSTAT) 0.4 MG SL tablet Take 1 tab under the tongue for chest pain. May repeat every 5 minutes for a total of 3 doses. If chest pain not relieved go to the ED.           Clinical Reference Information           Your Vitals Were     BP                   116/64 (BP Location: Right arm, Patient Position: Sitting, BP Method: Manual)           Blood Pressure          Most Recent Value    BP  116/64      Allergies as of 2/24/2017     Pcn  [Penicillins]    Clindamycin      Immunizations Administered on Date of Encounter - 2/24/2017     Name Date Dose VIS Date Route    Pneumococcal Conjugate - 13 Valent  Incomplete 0.5 mL 11/5/2015 Intramuscular      Orders Placed During Today's Visit      Normal Orders This Visit    Ambulatory referral to Optometry     PNEUMOCOCCAL CONJUGATE VACCINE 13-VALENT LESS THAN 6YO & GREATER THAN 51YO IM       Instructions      Counseling and Referral of Other Preventative  (Italic type indicates deductible and co-insurance are waived)    Patient Name: Nelson Huntley  Today's Date: 2/24/2017      SERVICE LIMITATIONS RECOMMENDATION    Vaccines    · Pneumococcal (once after 65)    · Influenza (annually)    · Hepatitis B (if medium/high risk)    · Prevnar 13      Hepatitis B medium/high risk factors:       - End-stage renal disease       - Hemophiliacs who received Factor VII or         IX concentrates       - Clients of institutions for the mentally             retarded       - Persons who live in the same house as          a HepB carrier       - Homosexual men       - Illicit injectable drug abusers     Pneumococcal: Done, no repeat necessary     Influenza:  Done, repeat in one year     Hepatitis B: N/A Not recommended     Prevnar 13: Scheduled - see appointments    Prostate cancer screening (annually to age 75)     Prostate specific antigen (PSA) Shared decision making with Provider. Sometimes a co-pay may be required if the patient decides to have this test. The USPSTF no longer recommends prostate cancer screening routinely in medicine: not to follow    Colorectal cancer screening (to age 75)    · Fecal occult blood test (annual)  · Flexible sigmoidoscopy (5y)  · Screening colonoscopy (10y)  · Barium enema   N/A Not recommended    Diabetes self-management training (no USPSTF recommendations)  Requires referral by treating physician for patient with diabetes or renal disease. 10 hours of initial DSMT sessions of no less than 30 minutes each in a continuous 12-month period. 2 hours of follow-up DSMT in subsequent years.  N/A Not recommended    Glaucoma screening (no USPSTF recommendation)  Diabetes mellitus, family history   , age 50 or over    American, age 65 or over  Scheduled, see appointments    Medical nutrition therapy for diabetes or renal disease (no recommended schedule)  Requires referral by treating physician for patient with diabetes or renal disease or kidney transplant within the past 3 years.  Can be provided in same year as diabetes self-management training (DSMT), and CMS recommends medical nutrition therapy take place after DSMT. Up to 3 hours for initial year and 2 hours in subsequent years.  N/A Not recommended    Cardiovascular screening blood tests (every 5 years)  · Fasting lipid panel  Order as a panel if possible  Done this year, repeat every year    Diabetes screening tests (at least every 3 years, Medicare covers annually or at 6-month intervals for prediabetic patients)  · Fasting blood sugar (FBS) or glucose tolerance test (GTT)  Patient must be diagnosed with one of the following:       - Hypertension       -  Dyslipidemia       - Obesity (BMI 30kg/m2)       - Previous elevated impaired FBS or GTT       ... or any two of the following:       - Overweight (BMI 25 but <30)       - Family history of diabetes       - Age 65 or older       - History of gestational diabetes or birth of baby weighing more than 9 pounds  Done this year, repeat every year    Abdominal aortic aneurysm screening (once)  · Sonogram   Limited to patients who meet one of the following criteria:       - Men who are 65-75 years old and have smoked more than 100 cigarette in their lifetime       - Anyone with a family history of abdominal aortic aneurysm       - Anyone recommended for screening by the USPSTF  N/A AAA Repair    HIV screening (annually for increased risk patients)  · HIV-1 and HIV-2 by EIA, or MADELEINE, rapid antibody test or oral mucosa transudate  Patients must be at increased risk for HIV infection per USPSTF guidelines or pregnant. Tests covered annually for patient at increased risk or as requested by the patient. Pregnant patients may receive up to 3 tests during pregnancy.  Risks discussed, screening is not recommended    Smoking cessation counseling (up to 8 sessions per year)  Patients must be asymptomatic of tobacco-related conditions to receive as a preventative service.  Never Smoker    Subsequent annual wellness visit  At least 12 months since last AWV  Return in one year     The following information is provided to all patients.  This information is to help you find resources for any of the problems found today that may be affecting your health:                Living healthy guide: www.[facility].[place].gov      Understanding Diabetes: www.diabetes.org      Eating healthy: www.cdc.gov/healthyweight      CDC home safety checklist: www.cdc.gov/steadi/patient.html      Agency on Aging: www.goea.[place].gov      Alcoholics anonymous (AA): www.aa.org      Physical Activity: www.blas.nih.gov/nq4zmvg      Tobacco use: www.quitwithusla.org           Language Assistance Services     ATTENTION: Language assistance services are available, free of charge. Please call [phone].      ATENCIÓN: Si habla renetta, tiene a paredes disposición servicios gratuitos de asistencia lingüística. Llame al [phone].     CHÚ Ý: N?u b?n nói Ti?ng Vi?t, có các d?ch v? h? tr? ngôn ng? mi?n phí dành cho b?n. G?i s? [phone].         [facility] complies with applicable Federal civil rights laws and does not discriminate on the basis of race, color, national origin, age, disability, or sex.

## 2017-02-24 NOTE — PROGRESS NOTES
Subjective:       Patient ID: Nelson GIBBONS Parent is a 81 y.o. male.    Chief Complaint: Health Risk Assessment    Sinus Problem   This is a new problem. The current episode started in the past 7 days. The problem is unchanged. There has been no fever. He is experiencing no pain. Pertinent negatives include no congestion, coughing, headaches, sinus pressure, sneezing or sore throat. Past treatments include nothing.     Review of Systems   HENT: Positive for postnasal drip and rhinorrhea. Negative for congestion, sinus pressure, sneezing and sore throat.    Respiratory: Negative for cough.    Neurological: Negative for headaches.   All other systems reviewed and are negative.      Objective:      Physical Exam    SEE ATTACHED PE    Assessment:       1. Seasonal allergic rhinitis, unspecified allergic rhinitis trigger        Plan:     Nelson was seen today for health risk assessment.    Diagnoses and all orders for this visit:    Seasonal allergic rhinitis, unspecified allergic rhinitis trigger  -     fluticasone (FLONASE) 50 mcg/actuation nasal spray; 2 sprays by Each Nare route once daily.  -     levocetirizine (XYZAL) 5 MG tablet; Take 1 tablet (5 mg total) by mouth every evening.      Follow-up with PCP if symptoms worsen or fail to improve.

## 2017-02-24 NOTE — Clinical Note
Primary Care Providers: Ruddy Skelton MD, MD (General)  Your patient was seen today for a HRA visit. Gap(s) in care (HEDIS gaps) have been identified during this visit that require additional testing and possible follow up.  Orders Placed This Encounter     PNEUMOCOCCAL CONJUGATE VACCINE 13-VALENT LESS THAN 4YO & GREATER THAN 51YO IM     Ambulatory referral to Optometry         Referral Priority:Routine         Referral Type:Vision (Optometry)         Referral Reason:Specialty Services Required         Requested Specialty:Optometry         Number of Visits Requested:1   These orders were placed using [facility] approved protocol and any results will be forwarded to your office for appropriate follow up. I have included a copy of my visit note; please review the note and feel free to contact me with any questions.   Thank you for allowing me to participate in the care of your patients. Yue Marie NP

## 2017-02-24 NOTE — PATIENT INSTRUCTIONS
Counseling and Referral of Other Preventative  (Italic type indicates deductible and co-insurance are waived)    Patient Name: Nelson Huntley  Today's Date: 2/24/2017      SERVICE LIMITATIONS RECOMMENDATION    Vaccines    · Pneumococcal (once after 65)    · Influenza (annually)    · Hepatitis B (if medium/high risk)    · Prevnar 13      Hepatitis B medium/high risk factors:       - End-stage renal disease       - Hemophiliacs who received Factor VII or         IX concentrates       - Clients of institutions for the mentally             retarded       - Persons who live in the same house as          a HepB carrier       - Homosexual men       - Illicit injectable drug abusers     Pneumococcal: Done, no repeat necessary     Influenza: Done, repeat in one year     Hepatitis B: N/A Not recommended     Prevnar 13: Scheduled - see appointments    Prostate cancer screening (annually to age 75)     Prostate specific antigen (PSA) Shared decision making with Provider. Sometimes a co-pay may be required if the patient decides to have this test. The USPSTF no longer recommends prostate cancer screening routinely in medicine: not to follow    Colorectal cancer screening (to age 75)    · Fecal occult blood test (annual)  · Flexible sigmoidoscopy (5y)  · Screening colonoscopy (10y)  · Barium enema   N/A Not recommended    Diabetes self-management training (no USPSTF recommendations)  Requires referral by treating physician for patient with diabetes or renal disease. 10 hours of initial DSMT sessions of no less than 30 minutes each in a continuous 12-month period. 2 hours of follow-up DSMT in subsequent years.  N/A Not recommended    Glaucoma screening (no USPSTF recommendation)  Diabetes mellitus, family history   , age 50 or over    American, age 65 or over  Scheduled, see appointments    Medical nutrition therapy for diabetes or renal disease (no recommended schedule)  Requires referral by treating  physician for patient with diabetes or renal disease or kidney transplant within the past 3 years.  Can be provided in same year as diabetes self-management training (DSMT), and CMS recommends medical nutrition therapy take place after DSMT. Up to 3 hours for initial year and 2 hours in subsequent years.  N/A Not recommended    Cardiovascular screening blood tests (every 5 years)  · Fasting lipid panel  Order as a panel if possible  Done this year, repeat every year    Diabetes screening tests (at least every 3 years, Medicare covers annually or at 6-month intervals for prediabetic patients)  · Fasting blood sugar (FBS) or glucose tolerance test (GTT)  Patient must be diagnosed with one of the following:       - Hypertension       - Dyslipidemia       - Obesity (BMI 30kg/m2)       - Previous elevated impaired FBS or GTT       ... or any two of the following:       - Overweight (BMI 25 but <30)       - Family history of diabetes       - Age 65 or older       - History of gestational diabetes or birth of baby weighing more than 9 pounds  Done this year, repeat every year    Abdominal aortic aneurysm screening (once)  · Sonogram   Limited to patients who meet one of the following criteria:       - Men who are 65-75 years old and have smoked more than 100 cigarette in their lifetime       - Anyone with a family history of abdominal aortic aneurysm       - Anyone recommended for screening by the USPSTF  N/A AAA Repair    HIV screening (annually for increased risk patients)  · HIV-1 and HIV-2 by EIA, or MADELEINE, rapid antibody test or oral mucosa transudate  Patients must be at increased risk for HIV infection per USPSTF guidelines or pregnant. Tests covered annually for patient at increased risk or as requested by the patient. Pregnant patients may receive up to 3 tests during pregnancy.  Risks discussed, screening is not recommended    Smoking cessation counseling (up to 8 sessions per year)  Patients must be asymptomatic  of tobacco-related conditions to receive as a preventative service.  Never Smoker    Subsequent annual wellness visit  At least 12 months since last AWV  Return in one year     The following information is provided to all patients.  This information is to help you find resources for any of the problems found today that may be affecting your health:                Living healthy guide: www.[facility].[place].[facility]      Understanding Diabetes: www.diabetes.org      Eating healthy: www.cdc.gov/healthyweight      CDC home safety checklist: www.cdc.gov/steadi/patient.html      Agency on Aging: www.goea.[place].[facility]      Alcoholics anonymous (AA): www.aa.org      Physical Activity: www.blas.nih.gov/tl6icfk      Tobacco use: www.quitwithusla.org   Bandaid applied, tolerated well, pt instructed to wait 15 minutes.

## 2017-02-25 ENCOUNTER — PATIENT MESSAGE (OUTPATIENT)
Dept: NEPHROLOGY | Facility: CLINIC | Age: 82
End: 2017-02-25

## 2017-02-27 ENCOUNTER — TELEPHONE (OUTPATIENT)
Dept: NEPHROLOGY | Facility: CLINIC | Age: 82
End: 2017-02-27

## 2017-02-27 LAB
ANTI SM ANTIBODY: 0.35 EU
ANTI SM/RNP ANTIBODY: 1.68 EU
ANTI-SM INTERPRETATION: NEGATIVE
ANTI-SM/RNP INTERPRETATION: NEGATIVE
ANTI-SSA ANTIBODY: 1.73 EU
ANTI-SSA INTERPRETATION: NEGATIVE
ANTI-SSB ANTIBODY: 0.21 EU
ANTI-SSB INTERPRETATION: NEGATIVE
DSDNA AB SER-ACNC: NORMAL [IU]/ML

## 2017-03-01 ENCOUNTER — LAB VISIT (OUTPATIENT)
Dept: LAB | Facility: HOSPITAL | Age: 82
End: 2017-03-01
Attending: INTERNAL MEDICINE
Payer: MEDICARE

## 2017-03-01 ENCOUNTER — OFFICE VISIT (OUTPATIENT)
Dept: INTERNAL MEDICINE | Facility: CLINIC | Age: 82
End: 2017-03-01
Payer: MEDICARE

## 2017-03-01 VITALS
SYSTOLIC BLOOD PRESSURE: 142 MMHG | DIASTOLIC BLOOD PRESSURE: 66 MMHG | WEIGHT: 171.06 LBS | BODY MASS INDEX: 22.57 KG/M2 | HEART RATE: 69 BPM

## 2017-03-01 DIAGNOSIS — R76.8 POSITIVE ANA (ANTINUCLEAR ANTIBODY): ICD-10-CM

## 2017-03-01 DIAGNOSIS — R77.8 ABNORMAL SERUM PROTEIN ELECTROPHORESIS: ICD-10-CM

## 2017-03-01 DIAGNOSIS — D47.2 MONOCLONAL PARAPROTEINEMIA: ICD-10-CM

## 2017-03-01 DIAGNOSIS — N18.30 CKD (CHRONIC KIDNEY DISEASE) STAGE 3, GFR 30-59 ML/MIN: ICD-10-CM

## 2017-03-01 DIAGNOSIS — I10 ESSENTIAL HYPERTENSION: Primary | ICD-10-CM

## 2017-03-01 DIAGNOSIS — D69.6 THROMBOCYTOPENIA: ICD-10-CM

## 2017-03-01 DIAGNOSIS — E55.9 VITAMIN D DEFICIENCY: ICD-10-CM

## 2017-03-01 LAB
PROT 24H UR-MRATE: NORMAL MG/SPEC
PROT UR-MCNC: <7 MG/DL
URINE COLLECTION DURATION: 24 HR
URINE VOLUME: 1800 ML

## 2017-03-01 PROCEDURE — 86335 IMMUNFIX E-PHORSIS/URINE/CSF: CPT | Mod: 26,,, | Performed by: PATHOLOGY

## 2017-03-01 PROCEDURE — 1159F MED LIST DOCD IN RCRD: CPT | Mod: S$GLB,,, | Performed by: INTERNAL MEDICINE

## 2017-03-01 PROCEDURE — 86335 IMMUNFIX E-PHORSIS/URINE/CSF: CPT

## 2017-03-01 PROCEDURE — 1160F RVW MEDS BY RX/DR IN RCRD: CPT | Mod: S$GLB,,, | Performed by: INTERNAL MEDICINE

## 2017-03-01 PROCEDURE — 3077F SYST BP >= 140 MM HG: CPT | Mod: S$GLB,,, | Performed by: INTERNAL MEDICINE

## 2017-03-01 PROCEDURE — 99999 PR PBB SHADOW E&M-EST. PATIENT-LVL III: CPT | Mod: PBBFAC,,, | Performed by: INTERNAL MEDICINE

## 2017-03-01 PROCEDURE — 1126F AMNT PAIN NOTED NONE PRSNT: CPT | Mod: S$GLB,,, | Performed by: INTERNAL MEDICINE

## 2017-03-01 PROCEDURE — 3078F DIAST BP <80 MM HG: CPT | Mod: S$GLB,,, | Performed by: INTERNAL MEDICINE

## 2017-03-01 PROCEDURE — 84166 PROTEIN E-PHORESIS/URINE/CSF: CPT | Mod: 26,,, | Performed by: PATHOLOGY

## 2017-03-01 PROCEDURE — 84166 PROTEIN E-PHORESIS/URINE/CSF: CPT

## 2017-03-01 PROCEDURE — 84156 ASSAY OF PROTEIN URINE: CPT

## 2017-03-01 PROCEDURE — 1157F ADVNC CARE PLAN IN RCRD: CPT | Mod: S$GLB,,, | Performed by: INTERNAL MEDICINE

## 2017-03-01 PROCEDURE — 99214 OFFICE O/P EST MOD 30 MIN: CPT | Mod: S$GLB,,, | Performed by: INTERNAL MEDICINE

## 2017-03-01 NOTE — MR AVS SNAPSHOT
[facility] Internal Medicine   [place] [address]  Phone: [phone]  Fax: [phone]                  Nelson Huntley   3/1/2017 10:20 AM   Office Visit    Description:  Male : 1935   Provider:  Ruddy Skelton MD   Department:  [facility]           Reason for Visit     Follow-up           Diagnoses this Visit        Comments    Essential hypertension    -  Primary asked pt. to take BP med 2 hours prior to f/u; continue current regimen and encouraged low Na diet     CKD (chronic kidney disease) stage 3, GFR 30-59 ml/min         Thrombocytopenia         Positive ELYSIA (antinuclear antibody)         Abnormal serum protein electrophoresis         Vitamin D deficiency                To Do List           Future Appointments        Provider Department Dept Phone    3/1/2017 11:00 AM [facility] [phone]    3/6/2017 8:00 AM [facility] [phone]    3/6/2017 2:00 PM [facility] [phone]    3/7/2017 1:40 PM Aye Price MD [facility] [phone]    3/28/2017 10:00 AM Shane Arvizu OD [place] - Optometry [phone]      Goals (5 Years of Data)     None      Follow-Up and Disposition     Return in about 2 months (around 2017).      [facility]     [facility] Nurse Care Line -  Assistance  Registered nurses in the [facility] Center provide clinical advisement, health education, appointment booking, and other advisory services.  Call for this free service at [phone].             Medications           Message regarding Medications     Verify the changes and/or additions to your medication regime listed below are the same as discussed with your clinician today.  If any of these changes or additions are incorrect, please notify your healthcare provider.             Verify that the below list of medications is an accurate representation of  the medications you are currently taking.  If none reported, the list may be blank. If incorrect, please contact your healthcare provider. Carry this list with you in case of emergency.           Current Medications     amlodipine (NORVASC) 10 MG tablet TAKE 1 TABLET BY MOUTH ONCE DAILY.    ascorbic acid (VITAMIN C) 500 MG tablet Take 1 tablet by mouth Daily. 1 Tablet Oral Every day    aspirin 81 MG chewable tablet Take 1 tablet by mouth Daily. 81  By mouth Every day    clopidogrel (PLAVIX) 75 mg tablet TAKE 1 TABLET ONE TIME DAILY    fluticasone (FLONASE) 50 mcg/actuation nasal spray 2 sprays by Each Nare route once daily.    levocetirizine (XYZAL) 5 MG tablet Take 1 tablet (5 mg total) by mouth every evening.    lisinopril (PRINIVIL,ZESTRIL) 40 MG tablet TAKE 1/2 TO 1 TABLET EVERY DAY OR AS DIRECTED.    metoprolol succinate (TOPROL-XL) 25 MG 24 hr tablet TAKE 1/2 TO 1 TABLET TWO  TIMES  DAILY OR AS DIRECTED.    multivitamin with minerals tablet Take 1 tablet by mouth once daily.    niacin 500 MG tablet Take 3 tablets by mouth Every PM. 3 Tablet Oral Every evening    nitroGLYCERIN (NITROSTAT) 0.4 MG SL tablet Take 1 tab under the tongue for chest pain. May repeat every 5 minutes for a total of 3 doses. If chest pain not relieved go to the ED.    omega-3 fatty acids-vitamin E (FISH OIL) 1,000 mg Cap Take 1 capsule by mouth 2 (two) times daily.    pantoprazole (PROTONIX) 40 MG tablet 40 mg Twice daily.    pravastatin (PRAVACHOL) 40 MG tablet TAKE 1 TABLET AT BEDTIME EXCEPT ON WEDNESDAY AND FRIDAY TAKE 1/2 TABLET AT BEDTIME    sotalol (BETAPACE) 120 MG Tab Take 1 tablet (120 mg total) by mouth 2 (two) times daily.           Clinical Reference Information           Your Vitals Were     BP                   142/66 (BP Location: Right arm, Patient Position: Sitting, BP Method: Manual)           Blood Pressure          Most Recent Value    BP  (!)  142/66      Allergies as of 3/1/2017     Pcn  [Penicillins]    Clindamycin       Immunizations Administered on Date of Encounter - 3/1/2017     None      Language Assistance Services     ATTENTION: Language assistance services are available, free of charge. Please call [phone].      ATENCIÓN: Si habmichael jackson, tiene a paredes disposición servicios gratuitos de asistencia lingüística. Llame al [phone].     CHÚ Ý: N?u b?n nói Ti?ng Vi?t, có các d?ch v? h? tr? ngôn ng? mi?n phí dành cho b?n. G?i s? [phone].         [facility] Medicine complies with applicable Federal civil rights laws and does not discriminate on the basis of race, color, national origin, age, disability, or sex.

## 2017-03-01 NOTE — PROGRESS NOTES
Subjective:       Patient ID: Nelson GIBBONS Parent is a 81 y.o. male.    Chief Complaint: Follow-up (6 mths follow-up)    HPI Pt. Here for f/u for HTN and renal insufficiency; I reviewed labs dated 2/22/17; kidney fxn is mildly elevated but improving; vitamin D was mildly low; ELYSIA was positive; platelets were low but stable; SPE was abnormal and pt. Was referred to hematology; BP is borderline elevated and he states he just took BP med; he states he is compliant with meds; he does not have smart phone for HTN digital medicine program  Review of Systems   Constitutional: Negative for chills, fatigue and fever.   HENT: Negative for congestion, rhinorrhea and sore throat.    Respiratory: Negative for cough, shortness of breath and wheezing.    Cardiovascular: Negative for chest pain.   Gastrointestinal: Negative for abdominal pain, nausea and vomiting.   Genitourinary: Negative for dysuria.   Musculoskeletal: Negative for arthralgias.   Skin: Negative for rash.   Neurological: Negative for dizziness and headaches.   Psychiatric/Behavioral: Negative for sleep disturbance. The patient is not nervous/anxious.        Objective:      Physical Exam   Constitutional: He is oriented to person, place, and time. He appears well-developed.   Eyes: EOM are normal.   Neck: Normal range of motion.   Cardiovascular: Normal rate, regular rhythm and normal heart sounds.    Pulmonary/Chest: Effort normal and breath sounds normal.   Abdominal: Soft. There is no tenderness. There is no rebound and no guarding.   Musculoskeletal: Normal range of motion.   Neurological: He is alert and oriented to person, place, and time.   Skin: No rash noted.       Assessment:       1. Essential hypertension Sub-optimally controlled   2. CKD (chronic kidney disease) stage 3, GFR 30-59 ml/min Stable   3. Thrombocytopenia Stable   4. Positive ELYSIA (antinuclear antibody) Active   5. Abnormal serum protein electrophoresis Active   6. Vitamin D deficiency Active        Plan:         Nelson was seen today for follow-up.    Diagnoses and all orders for this visit:    Essential hypertension  Comments:  asked pt. to take BP med 2 hours prior to f/u; continue current regimen and encouraged low Na diet     CKD (chronic kidney disease) stage 3, GFR 30-59 ml/min  Comments:  improving; avoid NSAIDs and workup underway by renal; encouraged fluid intake    Thrombocytopenia  Comments:  monitor    Positive ELYSIA (antinuclear antibody)  Comments:  workup underway     Abnormal serum protein electrophoresis  Comments:  f/u hematology as scheduled    Vitamin D deficiency  Comments:  start vitamin D 1000 units QD

## 2017-03-02 ENCOUNTER — TELEPHONE (OUTPATIENT)
Dept: ELECTROPHYSIOLOGY | Facility: CLINIC | Age: 82
End: 2017-03-02

## 2017-03-02 ENCOUNTER — TELEPHONE (OUTPATIENT)
Dept: NEPHROLOGY | Facility: CLINIC | Age: 82
End: 2017-03-02

## 2017-03-02 LAB
PATHOLOGIST INTERPRETATION UPE: NORMAL
PROT PATTERN UR ELPH-IMP: NORMAL

## 2017-03-03 LAB
INTERPRETATION UR IFE-IMP: NORMAL
PATHOLOGIST INTERPRETATION UIFE: NORMAL

## 2017-03-06 ENCOUNTER — HOSPITAL ENCOUNTER (OUTPATIENT)
Dept: RADIOLOGY | Facility: HOSPITAL | Age: 82
Discharge: HOME | End: 2017-03-06
Attending: INTERNAL MEDICINE
Payer: MEDICARE

## 2017-03-06 DIAGNOSIS — N18.30 CKD (CHRONIC KIDNEY DISEASE) STAGE 3, GFR 30-59 ML/MIN: ICD-10-CM

## 2017-03-06 DIAGNOSIS — N17.9 ACUTE-ON-CHRONIC KIDNEY INJURY: ICD-10-CM

## 2017-03-06 DIAGNOSIS — E78.2 MIXED HYPERLIPIDEMIA: ICD-10-CM

## 2017-03-06 DIAGNOSIS — Z95.810 AICD (AUTOMATIC CARDIOVERTER/DEFIBRILLATOR) PRESENT: ICD-10-CM

## 2017-03-06 DIAGNOSIS — I10 ESSENTIAL HYPERTENSION: ICD-10-CM

## 2017-03-06 DIAGNOSIS — N18.4 CHRONIC RENAL IMPAIRMENT, STAGE 4 (SEVERE): ICD-10-CM

## 2017-03-06 DIAGNOSIS — N18.9 ANEMIA ASSOCIATED WITH CHRONIC RENAL FAILURE: ICD-10-CM

## 2017-03-06 DIAGNOSIS — N18.9 ACUTE-ON-CHRONIC KIDNEY INJURY: ICD-10-CM

## 2017-03-06 DIAGNOSIS — K21.00 REFLUX ESOPHAGITIS: ICD-10-CM

## 2017-03-06 DIAGNOSIS — K22.710 BARRETT'S ESOPHAGUS WITH LOW GRADE DYSPLASIA: ICD-10-CM

## 2017-03-06 DIAGNOSIS — D63.1 ANEMIA ASSOCIATED WITH CHRONIC RENAL FAILURE: ICD-10-CM

## 2017-03-06 PROCEDURE — 93975 VASCULAR STUDY: CPT | Mod: 26,,, | Performed by: RADIOLOGY

## 2017-03-06 PROCEDURE — 76770 US EXAM ABDO BACK WALL COMP: CPT | Mod: 26,59,, | Performed by: RADIOLOGY

## 2017-03-06 PROCEDURE — 76770 US EXAM ABDO BACK WALL COMP: CPT | Mod: TC

## 2017-03-07 ENCOUNTER — INITIAL CONSULT (OUTPATIENT)
Dept: HEMATOLOGY/ONCOLOGY | Facility: CLINIC | Age: 82
End: 2017-03-07
Payer: MEDICARE

## 2017-03-07 VITALS
HEART RATE: 65 BPM | DIASTOLIC BLOOD PRESSURE: 63 MMHG | HEIGHT: 73 IN | OXYGEN SATURATION: 95 % | SYSTOLIC BLOOD PRESSURE: 135 MMHG | TEMPERATURE: 98 F | BODY MASS INDEX: 22.65 KG/M2 | RESPIRATION RATE: 18 BRPM | WEIGHT: 170.88 LBS

## 2017-03-07 DIAGNOSIS — D47.2 MGUS (MONOCLONAL GAMMOPATHY OF UNKNOWN SIGNIFICANCE): Primary | ICD-10-CM

## 2017-03-07 PROCEDURE — 1160F RVW MEDS BY RX/DR IN RCRD: CPT | Mod: S$GLB,,, | Performed by: INTERNAL MEDICINE

## 2017-03-07 PROCEDURE — 3075F SYST BP GE 130 - 139MM HG: CPT | Mod: S$GLB,,, | Performed by: INTERNAL MEDICINE

## 2017-03-07 PROCEDURE — 99999 PR PBB SHADOW E&M-EST. PATIENT-LVL III: CPT | Mod: PBBFAC,,, | Performed by: INTERNAL MEDICINE

## 2017-03-07 PROCEDURE — 3078F DIAST BP <80 MM HG: CPT | Mod: S$GLB,,, | Performed by: INTERNAL MEDICINE

## 2017-03-07 PROCEDURE — 1159F MED LIST DOCD IN RCRD: CPT | Mod: S$GLB,,, | Performed by: INTERNAL MEDICINE

## 2017-03-07 PROCEDURE — 99214 OFFICE O/P EST MOD 30 MIN: CPT | Mod: S$GLB,,, | Performed by: INTERNAL MEDICINE

## 2017-03-07 PROCEDURE — 1126F AMNT PAIN NOTED NONE PRSNT: CPT | Mod: S$GLB,,, | Performed by: INTERNAL MEDICINE

## 2017-03-07 PROCEDURE — 1157F ADVNC CARE PLAN IN RCRD: CPT | Mod: S$GLB,,, | Performed by: INTERNAL MEDICINE

## 2017-03-07 NOTE — LETTER
March 7, 2017      Jazmyn Guillen MD  [address]  [facility] [address]           [facility]  [address]  [facility] [address]  Phone: [phone]          Patient: Nelson Huntley   MR Number: [id number]   YOB: 1935   Date of Visit: 3/7/2017       Dear Dr. Jazmyn Guillen:    Thank you for referring Nelson Huntley to me for evaluation. Attached you will find relevant portions of my assessment and plan of care.    If you have questions, please do not hesitate to call me. I look forward to following Nelson Huntley along with you.    Sincerely,    Aye Price MD    Enclosure  CC:  No Recipients    If you would like to receive this communication electronically, please contact [email or web address] or [phone] to request more information on [employer] access.    For providers and/or their staff who would like to refer a patient to [facility], please contact us through our one-stop-shop provider referral line, [facility] , at [phone].    If you feel you have received this communication in error or would no longer like to receive these types of communications, please e-mail [email or web address]

## 2017-03-07 NOTE — Clinical Note
Return visit in 6 months with labs 1 week prior: CBC, CMP, SPEP, free light chain and beta 2 microglobulin

## 2017-03-07 NOTE — PROGRESS NOTES
Subjective:       Patient ID: Nelson GIBBONS Parent is a 81 y.o. male.    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   The monoclonal protein was measured at 0.27 grams. The patient does not meet any CRAB criteria. He appears well today. He does not have constitutional B symptoms.  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    HPI  Review of Systems   Constitutional: Negative.    HENT: Negative.    Eyes: Negative.    Respiratory: Negative.    Cardiovascular: Negative.    Gastrointestinal: Negative.    Endocrine: Negative.    Genitourinary: Negative.    Musculoskeletal: Negative.    Skin: Negative.    Allergic/Immunologic: Negative for environmental allergies, food allergies and immunocompromised state.   Neurological: Negative.    Hematological: Negative for adenopathy. Does not bruise/bleed easily.   Psychiatric/Behavioral: Negative.        Objective:      Physical Exam   Constitutional: He is oriented to person, place, and time. He appears well-developed and well-nourished.   HENT:   Head: Normocephalic and atraumatic.   Eyes: Conjunctivae are normal. No scleral icterus.   Neck: Normal range of motion.   Cardiovascular: Normal rate and intact distal pulses.    Pulmonary/Chest: Effort normal. No respiratory distress.   Musculoskeletal: Normal range of motion.   Neurological: He is alert and oriented to person, place, and time.   Skin: Skin is warm and dry.   Psychiatric: He has a normal mood and affect. His behavior is normal.   Nursing note and vitals reviewed.      Assessment:       1. MGUS (monoclonal gammopathy of unknown significance)        Plan:       MGUS  Repeat monoclonal protein markers in 6 months

## 2017-03-10 ENCOUNTER — TELEPHONE (OUTPATIENT)
Dept: NEPHROLOGY | Facility: CLINIC | Age: 82
End: 2017-03-10

## 2017-03-21 ENCOUNTER — PATIENT MESSAGE (OUTPATIENT)
Dept: NEPHROLOGY | Facility: CLINIC | Age: 82
End: 2017-03-21

## 2017-05-08 ENCOUNTER — CLINICAL SUPPORT (OUTPATIENT)
Dept: ELECTROPHYSIOLOGY | Facility: CLINIC | Age: 82
End: 2017-05-08
Payer: MEDICARE

## 2017-05-08 DIAGNOSIS — I47.20 VT (VENTRICULAR TACHYCARDIA): ICD-10-CM

## 2017-05-08 DIAGNOSIS — Z95.810 ICD (IMPLANTABLE CARDIOVERTER-DEFIBRILLATOR) IN PLACE: ICD-10-CM

## 2017-05-08 PROCEDURE — 93283 PRGRMG EVAL IMPLANTABLE DFB: CPT | Mod: S$GLB,,, | Performed by: INTERNAL MEDICINE

## 2017-05-09 RX ORDER — LISINOPRIL 40 MG/1
TABLET ORAL
Qty: 90 TABLET | Refills: 3 | Status: SHIPPED | OUTPATIENT
Start: 2017-05-09 | End: 2017-05-10 | Stop reason: SDUPTHER

## 2017-05-12 RX ORDER — LISINOPRIL 40 MG/1
TABLET ORAL
Qty: 90 TABLET | Refills: 3 | Status: SHIPPED | OUTPATIENT
Start: 2017-05-12 | End: 2018-06-15 | Stop reason: SDUPTHER

## 2017-05-17 ENCOUNTER — OFFICE VISIT (OUTPATIENT)
Dept: INTERNAL MEDICINE | Facility: CLINIC | Age: 82
End: 2017-05-17
Payer: MEDICARE

## 2017-05-17 VITALS
SYSTOLIC BLOOD PRESSURE: 116 MMHG | DIASTOLIC BLOOD PRESSURE: 62 MMHG | HEIGHT: 73 IN | WEIGHT: 173.94 LBS | BODY MASS INDEX: 23.05 KG/M2

## 2017-05-17 DIAGNOSIS — I25.10 CORONARY ARTERY DISEASE, ANGINA PRESENCE UNSPECIFIED, UNSPECIFIED VESSEL OR LESION TYPE, UNSPECIFIED WHETHER NATIVE OR TRANSPLANTED HEART: ICD-10-CM

## 2017-05-17 DIAGNOSIS — N18.30 CKD (CHRONIC KIDNEY DISEASE) STAGE 3, GFR 30-59 ML/MIN: ICD-10-CM

## 2017-05-17 DIAGNOSIS — D47.2 MGUS (MONOCLONAL GAMMOPATHY OF UNKNOWN SIGNIFICANCE): ICD-10-CM

## 2017-05-17 DIAGNOSIS — Z00.00 PREVENTATIVE HEALTH CARE: ICD-10-CM

## 2017-05-17 DIAGNOSIS — I10 ESSENTIAL HYPERTENSION: Primary | ICD-10-CM

## 2017-05-17 DIAGNOSIS — E78.5 HYPERLIPIDEMIA, UNSPECIFIED HYPERLIPIDEMIA TYPE: ICD-10-CM

## 2017-05-17 DIAGNOSIS — Z12.5 PROSTATE CANCER SCREENING: ICD-10-CM

## 2017-05-17 PROCEDURE — 3078F DIAST BP <80 MM HG: CPT | Mod: S$GLB,,, | Performed by: INTERNAL MEDICINE

## 2017-05-17 PROCEDURE — 99499 UNLISTED E&M SERVICE: CPT | Mod: S$GLB,,, | Performed by: INTERNAL MEDICINE

## 2017-05-17 PROCEDURE — 99999 PR PBB SHADOW E&M-EST. PATIENT-LVL III: CPT | Mod: PBBFAC,,, | Performed by: INTERNAL MEDICINE

## 2017-05-17 PROCEDURE — 3074F SYST BP LT 130 MM HG: CPT | Mod: S$GLB,,, | Performed by: INTERNAL MEDICINE

## 2017-05-17 PROCEDURE — 99214 OFFICE O/P EST MOD 30 MIN: CPT | Mod: S$GLB,,, | Performed by: INTERNAL MEDICINE

## 2017-05-17 PROCEDURE — 1159F MED LIST DOCD IN RCRD: CPT | Mod: S$GLB,,, | Performed by: INTERNAL MEDICINE

## 2017-05-17 PROCEDURE — 1126F AMNT PAIN NOTED NONE PRSNT: CPT | Mod: S$GLB,,, | Performed by: INTERNAL MEDICINE

## 2017-05-17 PROCEDURE — 1160F RVW MEDS BY RX/DR IN RCRD: CPT | Mod: S$GLB,,, | Performed by: INTERNAL MEDICINE

## 2017-05-17 NOTE — PROGRESS NOTES
Subjective:       Patient ID: Nelson GIBBONS Parent is a 81 y.o. male.    Chief Complaint: Follow-up    HPI Pt. Here for f/u for HTN and CKD; pt. Wife Aleah is with the pt.; of note, pt. Is seeing Hem/Onc for MGUS; he is seeing renal for CKD; kidney fxn dated 2/22/17 was improving; he is compliant with meds; pt. Is seeing cardiology for CAD and AICD management; he is compliant with meds; cholesterol was well controlled dated 1/18/17  Review of Systems   Constitutional: Negative for chills, fatigue and fever.   HENT: Negative for congestion, rhinorrhea and sore throat.    Respiratory: Negative for cough, shortness of breath and wheezing.    Cardiovascular: Negative for chest pain.   Gastrointestinal: Negative for abdominal pain, nausea and vomiting.   Genitourinary: Negative for dysuria.   Musculoskeletal: Negative for arthralgias.   Skin: Negative for rash.   Neurological: Negative for dizziness and headaches.   Psychiatric/Behavioral: Negative for sleep disturbance. The patient is not nervous/anxious.        Objective:      Physical Exam   Constitutional: He is oriented to person, place, and time. He appears well-developed.   Eyes: EOM are normal.   Neck: Normal range of motion.   Cardiovascular: Normal rate, regular rhythm and normal heart sounds.    Pulmonary/Chest: Effort normal and breath sounds normal.   Abdominal: Soft. There is no tenderness. There is no rebound and no guarding.   Musculoskeletal: Normal range of motion.   Neurological: He is alert and oriented to person, place, and time.   Skin: No rash noted.       Assessment:       1. Essential hypertension Well controlled   2. Coronary artery disease, angina presence unspecified, unspecified vessel or lesion type, unspecified whether native or transplanted heart Well controlled   3. CKD (chronic kidney disease) stage 3, GFR 30-59 ml/min Stable   4. MGUS (monoclonal gammopathy of unknown significance) Active   5. Hyperlipidemia, unspecified hyperlipidemia type  Well controlled   6. Preventative health care Active   7. Prostate cancer screening Active       Plan:         Essential hypertension  Comments:  continue current regimen and encouraged low Na diet  Orders:  -     CBC auto differential; Future; Expected date: 8/17/17  -     Comprehensive metabolic panel; Future; Expected date: 8/17/17    Coronary artery disease, angina presence unspecified, unspecified vessel or lesion type, unspecified whether native or transplanted heart  Comments:  continue current regimen and f/u cardiology who is managing     CKD (chronic kidney disease) stage 3, GFR 30-59 ml/min  Comments:  monitor and f/u renal who is managing; avoid NSAIDs    MGUS (monoclonal gammopathy of unknown significance)  Comments:  f/u Hem/Onc who is monitoring    Hyperlipidemia, unspecified hyperlipidemia type  Comments:  continue current regimen and encouraged diet modification; repeat lipids on f/u   Orders:  -     Lipid panel; Future; Expected date: 8/17/17    Preventative health care  -     CBC auto differential; Future; Expected date: 8/17/17  -     Comprehensive metabolic panel; Future; Expected date: 8/17/17  -     Lipid panel; Future; Expected date: 8/17/17    Prostate cancer screening  -     PSA, Screening; Future; Expected date: 8/17/17

## 2017-05-17 NOTE — MR AVS SNAPSHOT
[facility]   [place]  [place] [address]  Phone: [phone]  Fax: [phone]                  Nelson Huntley   2017 2:00 PM   Office Visit    Description:  Male : 1935   Provider:  Ruddy Skelton MD   Department:  [facility]           Reason for Visit     Follow-up           Diagnoses this Visit        Comments    Essential hypertension    -  Primary continue current regimen and encouraged low Na diet    Coronary artery disease, angina presence unspecified, unspecified vessel or lesion type, unspecified whether native or transplanted heart     continue current regimen and f/u cardiology who is managing     CKD (chronic kidney disease) stage 3, GFR 30-59 ml/min     monitor and f/u renal who is managing; avoid NSAIDs    MGUS (monoclonal gammopathy of unknown significance)     f/u Hem/Onc who is monitoring    Hyperlipidemia, unspecified hyperlipidemia type     continue current regimen and encouraged diet modification; repeat lipids on f/u     Preventative health care         Prostate cancer screening                To Do List           Future Appointments        Provider Department Dept Phone    2017 11:00 AM [facility] US1 [facility] [phone]    2017 12:30 PM SPECIMEN, [facility] [phone]    2017 12:50 PM APPOINTMENT LAB, [facility] MOB [facility] [phone]    2017 3:00 PM MD Isma V[facility]quez [facility] - Nephrology [phone]    8/15/2017 3:00 PM PACEMAKER, ICD [facility] - Arrhythmia [phone]      Goals (5 Years of Data)     None      Follow-Up and Disposition     Return in about 4 months (around 2017).      [facility]     [facility] On [facility] Nurse Care Line - / Assistance  Unless otherwise directed by your provider, please contact [facility], our nurse care line that is available for  assistance.     Registered nurses in the [facility]  [facility] provide: appointment scheduling, clinical advisement, health education, and other advisory services.  Call: [phone] (toll free)               Medications           Message regarding Medications     Verify the changes and/or additions to your medication regime listed below are the same as discussed with your clinician today.  If any of these changes or additions are incorrect, please notify your healthcare provider.             Verify that the below list of medications is an accurate representation of the medications you are currently taking.  If none reported, the list may be blank. If incorrect, please contact your healthcare provider. Carry this list with you in case of emergency.           Current Medications     amlodipine (NORVASC) 10 MG tablet TAKE 1 TABLET BY MOUTH ONCE DAILY.    ascorbic acid (VITAMIN C) 500 MG tablet Take 1 tablet by mouth Daily. 1 Tablet Oral Every day    aspirin 81 MG chewable tablet Take 1 tablet by mouth Daily. 81  By mouth Every day    clopidogrel (PLAVIX) 75 mg tablet TAKE 1 TABLET ONE TIME DAILY    fluticasone (FLONASE) 50 mcg/actuation nasal spray 2 sprays by Each Nare route once daily.    levocetirizine (XYZAL) 5 MG tablet Take 1 tablet (5 mg total) by mouth every evening.    lisinopril (PRINIVIL,ZESTRIL) 40 MG tablet TAKE 1/2 TO 1 TABLET EVERY DAY OR AS DIRECTED.    metoprolol succinate (TOPROL-XL) 25 MG 24 hr tablet TAKE 1/2 TO 1 TABLET TWO  TIMES  DAILY OR AS DIRECTED.    multivitamin with minerals tablet Take 1 tablet by mouth once daily.    niacin 500 MG tablet Take 3 tablets by mouth Every PM. 3 Tablet Oral Every evening    nitroGLYCERIN (NITROSTAT) 0.4 MG SL tablet Take 1 tab under the tongue for chest pain. May repeat every 5 minutes for a total of 3 doses. If chest pain not relieved go to the ED.    omega-3 fatty acids-vitamin E (FISH OIL) 1,000 mg Cap Take 1 capsule by mouth 2 (two) times daily.    pantoprazole (PROTONIX) 40 MG tablet 40 mg Twice daily.     "pravastatin (PRAVACHOL) 40 MG tablet TAKE 1 TABLET AT BEDTIME EXCEPT ON WEDNESDAY AND FRIDAY TAKE 1/2 TABLET AT BEDTIME    sotalol (BETAPACE) 120 MG Tab Take 1 tablet (120 mg total) by mouth 2 (two) times daily.           Clinical Reference Information           Your Vitals Were     BP Height Weight BMI       116/62 (BP Location: Right arm, Patient Position: Sitting, BP Method: Manual) 6' 1" (1.854 m) 78.9 kg (173 lb 15.1 oz) 22.95 kg/m2       Blood Pressure          Most Recent Value    BP  116/62      Allergies as of 5/17/2017     Pcn  [Penicillins]    Clindamycin      Immunizations Administered on Date of Encounter - 5/17/2017     None      Orders Placed During Today's Visit     Future Labs/Procedures Expected by Expires    CBC auto differential  8/17/2017 1/17/2018    Comprehensive metabolic panel  8/17/2017 1/17/2018    Lipid panel  8/17/2017 1/17/2018    PSA, Screening  8/17/2017 1/17/2018      Language Assistance Services     ATTENTION: Language assistance services are available, free of charge. Please call [phone].      ATENCIÓN: Si habla español, tiene a paredes disposición servicios gratuitos de asistencia lingüística. Llame al [phone].     OMID Ý: N?u b?n nói Ti?ng Vi?t, có các d?ch v? h? tr? ngôn ng? mi?n phí dành cho b?n. G?i s? [phone].         [facility] complies with applicable Federal civil rights laws and does not discriminate on the basis of race, color, national origin, age, disability, or sex.        "

## 2017-06-20 ENCOUNTER — PATIENT MESSAGE (OUTPATIENT)
Dept: CARDIOLOGY | Facility: CLINIC | Age: 82
End: 2017-06-20

## 2017-06-23 ENCOUNTER — HOSPITAL ENCOUNTER (OUTPATIENT)
Dept: RADIOLOGY | Facility: HOSPITAL | Age: 82
Discharge: HOME | End: 2017-06-23
Attending: INTERNAL MEDICINE
Payer: MEDICARE

## 2017-06-23 DIAGNOSIS — N17.9 ACUTE-ON-CHRONIC KIDNEY INJURY: ICD-10-CM

## 2017-06-23 DIAGNOSIS — Z95.810 AICD (AUTOMATIC CARDIOVERTER/DEFIBRILLATOR) PRESENT: ICD-10-CM

## 2017-06-23 DIAGNOSIS — K22.710 BARRETT'S ESOPHAGUS WITH LOW GRADE DYSPLASIA: ICD-10-CM

## 2017-06-23 DIAGNOSIS — I10 ESSENTIAL HYPERTENSION: ICD-10-CM

## 2017-06-23 DIAGNOSIS — N18.4 CHRONIC RENAL IMPAIRMENT, STAGE 4 (SEVERE): ICD-10-CM

## 2017-06-23 DIAGNOSIS — D63.1 ANEMIA ASSOCIATED WITH CHRONIC RENAL FAILURE: ICD-10-CM

## 2017-06-23 DIAGNOSIS — K21.00 REFLUX ESOPHAGITIS: ICD-10-CM

## 2017-06-23 DIAGNOSIS — N18.30 CKD (CHRONIC KIDNEY DISEASE) STAGE 3, GFR 30-59 ML/MIN: ICD-10-CM

## 2017-06-23 DIAGNOSIS — N18.9 ANEMIA ASSOCIATED WITH CHRONIC RENAL FAILURE: ICD-10-CM

## 2017-06-23 DIAGNOSIS — N18.9 ACUTE-ON-CHRONIC KIDNEY INJURY: ICD-10-CM

## 2017-06-23 DIAGNOSIS — E78.2 MIXED HYPERLIPIDEMIA: ICD-10-CM

## 2017-06-23 PROCEDURE — 76770 US EXAM ABDO BACK WALL COMP: CPT | Mod: 26,59,, | Performed by: RADIOLOGY

## 2017-06-23 PROCEDURE — 93975 VASCULAR STUDY: CPT | Mod: 26,,, | Performed by: RADIOLOGY

## 2017-06-23 PROCEDURE — 76770 US EXAM ABDO BACK WALL COMP: CPT | Mod: TC

## 2017-06-28 RX ORDER — PRAVASTATIN SODIUM 40 MG/1
TABLET ORAL
Qty: 78 TABLET | Refills: 3 | Status: SHIPPED | OUTPATIENT
Start: 2017-06-28 | End: 2018-04-09 | Stop reason: SDUPTHER

## 2017-07-06 ENCOUNTER — OFFICE VISIT (OUTPATIENT)
Dept: NEPHROLOGY | Facility: CLINIC | Age: 82
End: 2017-07-06
Payer: MEDICARE

## 2017-07-06 ENCOUNTER — PATIENT MESSAGE (OUTPATIENT)
Dept: NEPHROLOGY | Facility: CLINIC | Age: 82
End: 2017-07-06

## 2017-07-06 VITALS
SYSTOLIC BLOOD PRESSURE: 130 MMHG | HEART RATE: 62 BPM | DIASTOLIC BLOOD PRESSURE: 70 MMHG | BODY MASS INDEX: 23.03 KG/M2 | WEIGHT: 173.75 LBS | OXYGEN SATURATION: 96 % | HEIGHT: 73 IN

## 2017-07-06 DIAGNOSIS — I10 ESSENTIAL HYPERTENSION: ICD-10-CM

## 2017-07-06 DIAGNOSIS — R80.0 ISOLATED PROTEINURIA WITHOUT SPECIFIC MORPHOLOGIC LESION: ICD-10-CM

## 2017-07-06 DIAGNOSIS — D63.1 ANEMIA ASSOCIATED WITH CHRONIC RENAL FAILURE: ICD-10-CM

## 2017-07-06 DIAGNOSIS — N18.9 ANEMIA ASSOCIATED WITH CHRONIC RENAL FAILURE: ICD-10-CM

## 2017-07-06 DIAGNOSIS — D47.2 MGUS (MONOCLONAL GAMMOPATHY OF UNKNOWN SIGNIFICANCE): ICD-10-CM

## 2017-07-06 DIAGNOSIS — N18.30 CKD (CHRONIC KIDNEY DISEASE) STAGE 3, GFR 30-59 ML/MIN: Primary | ICD-10-CM

## 2017-07-06 PROBLEM — R80.9 PROTEINURIA: Status: ACTIVE | Noted: 2017-07-06

## 2017-07-06 PROCEDURE — 99499 UNLISTED E&M SERVICE: CPT | Mod: S$GLB,,, | Performed by: INTERNAL MEDICINE

## 2017-07-06 PROCEDURE — 99999 PR PBB SHADOW E&M-EST. PATIENT-LVL IV: CPT | Mod: PBBFAC,,, | Performed by: INTERNAL MEDICINE

## 2017-07-06 PROCEDURE — 1126F AMNT PAIN NOTED NONE PRSNT: CPT | Mod: S$GLB,,, | Performed by: INTERNAL MEDICINE

## 2017-07-06 PROCEDURE — 1159F MED LIST DOCD IN RCRD: CPT | Mod: S$GLB,,, | Performed by: INTERNAL MEDICINE

## 2017-07-06 PROCEDURE — 99213 OFFICE O/P EST LOW 20 MIN: CPT | Mod: S$GLB,,, | Performed by: INTERNAL MEDICINE

## 2017-07-06 NOTE — PATIENT INSTRUCTIONS
1. Labs: in 3-4 months with 24 hr urine and UPEP    2.  Medications: no change      5. BP:  Take BP and pulse  twice daily for one week, record              Bring results  to next visit.              Goal :   <130/80    6. Diet:  National Kidney Foundation:  [email or web address]       Sodium: < 2000 milligrams daily including all food and drink      (one teaspoon of table salt has 2300 milligrams of sodium)         Phosphorus: <1000mg daily       Vaccines: please check with your PCP and be sure to have an annual flu vaccine and keep up to date with your pneumococcal vaccine for pneumonia      Please avoid or minimize all NSAIDS (ibuprofen, motrin, aleve, indocin, naprosyn, BC powder, mobic, relafen, alleve, and any others) to minimize the risk to your kidneys    Please avoid Proton pump inhibitors unless specifically necessary and speak with your PCP about alternatives such as H2 blockers     Return to clinic:  3-4 months with urine studies and labs prior

## 2017-07-06 NOTE — PROGRESS NOTES
Subjective:       Patient ID: Nelson GIBBONS Parent is a 81 y.o. White male who presents for new evaluation of renal function.  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 Serum crt had been 1.4 2015 and now is 1.9 serum K 4.3 CO2 26. Prior serum crt 2013 as high  as 2.2 He denies NSAIDs, LUTS, dysuria, hematuria, peripheral edema, SOB.  Possible remote nephrolithiasis x 1 episode, denies gout. There are no available Urinalysis and patient just relieved himself prior to exam.  With serum crt now 1.6, MGUS ig g kappa light chain of 0.27 mg/L, UPEP negative but now with UPRCT of 0.11 where it was not detectable in past, renal US with chronic medical disease and simple cysts.   The patient has no symptoms of fatigue, shortness of breath, chest pain, peripheral edema, flank pain, dysuria, hematuria, foamy urine, orange colored urine, nephrolithiasis,  diarrhea, constipation, lower extremity leg cramping, headache, nausea and vomiting, or weakness.      Review of Systems   Constitutional: Negative for activity change, appetite change, chills, diaphoresis, fatigue, fever and unexpected weight change.   HENT: Negative for congestion, ear discharge, ear pain, facial swelling, hearing loss, nosebleeds, sinus pressure, sore throat and trouble swallowing.    Eyes: Negative for photophobia, pain, discharge, redness, itching and visual disturbance.   Respiratory: Negative for apnea, cough, chest tightness, shortness of breath and wheezing.    Cardiovascular: Negative for chest pain, palpitations and leg swelling.   Gastrointestinal: Negative for abdominal distention, abdominal pain, constipation, diarrhea and vomiting.   Endocrine: Negative for cold intolerance, heat intolerance, polydipsia and polyuria.   Genitourinary: Negative for decreased urine  "volume, difficulty urinating, dysuria, flank pain, frequency, hematuria, scrotal swelling, testicular pain and urgency.   Musculoskeletal: Negative for arthralgias, back pain, gait problem, joint swelling, myalgias, neck pain and neck stiffness.   Skin: Negative for color change, pallor, rash and wound.   Allergic/Immunologic: Negative for environmental allergies and food allergies.   Neurological: Negative for dizziness, tremors, seizures, syncope, facial asymmetry, speech difficulty, weakness, light-headedness, numbness and headaches.        Remote TIA   Hematological: Negative for adenopathy. Does not bruise/bleed easily.   Psychiatric/Behavioral: Negative for agitation, behavioral problems, dysphoric mood and sleep disturbance. The patient is not nervous/anxious.        Objective:     Blood pressure 130/70, pulse 62, height 6' 1" (1.854 m), weight 78.8 kg (173 lb 11.6 oz), SpO2 96 %.    Physical Exam   Constitutional: He is oriented to person, place, and time. He appears well-developed and well-nourished. No distress.   Lean  WNWD NAD   HENT:   Head: Normocephalic and atraumatic.   Mouth/Throat: Oropharynx is clear and moist. No oropharyngeal exudate.   Eyes: Conjunctivae and EOM are normal. Pupils are equal, round, and reactive to light. Right eye exhibits no discharge. Left eye exhibits no discharge. No scleral icterus.   Neck: Normal range of motion. Neck supple. No JVD present. No tracheal deviation present. No thyromegaly present.   Cardiovascular: Normal rate, regular rhythm, normal heart sounds and intact distal pulses.  Exam reveals no gallop and no friction rub.    No murmur heard.  Bilateral DP pulses +2 , no LE edema   Pulmonary/Chest: Effort normal and breath sounds normal. No stridor. No respiratory distress. He has no wheezes. He has no rales. He exhibits no tenderness.   Abdominal: Soft. Bowel sounds are normal. He exhibits no distension and no mass. There is no tenderness. There is no rebound and " no guarding. No hernia.   Musculoskeletal: Normal range of motion. He exhibits no edema or tenderness.   Lymphadenopathy:     He has no cervical adenopathy.   Neurological: He is alert and oriented to person, place, and time. No cranial nerve deficit. He exhibits normal muscle tone. Coordination normal.   Skin: Skin is warm and dry. No rash noted. He is not diaphoretic. No erythema. No pallor.   Psychiatric: He has a normal mood and affect. His behavior is normal. Judgment and thought content normal.   Nursing note and vitals reviewed.      Assessment:       1. CKD (chronic kidney disease) stage 3, GFR 30-59 ml/min    2. Anemia associated with chronic renal failure    3. Essential hypertension    4. MGUS (monoclonal gammopathy of unknown significance)    5. 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     KIMBERLY resolved,  KIMBERLY related to use of bactrim which  may cause a reversible inhibition of renal  tubular secretion of creatinine and elevation of serum crt, and AIN. Would evaluate for obstruction, and obtain US and  serologies as well.    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 It is possible PPI may be contributing to LOYDA, but will await further tests as above. Given dx of Lizandro's esophagus would not change therapy unless ok with PCP and GI. The chronic use of PPI and association, but not causality of PPIs with CKD discussed with patient.      MGUS: follow by hematology oncology    UPEP negative, but now with UPRCT of 0.11 where it was not able  to calc in prior tests, will follow serially  For now ,and repeat UPEP/IPEP     1. CKD: baseline serum crt 1.5-1.9 with est GFR 35 cc/min.  Would continue RAAS blockade at this time as there is no hyperkalemia , further recommendations after above evaluation.  Follow serum crt serially, maintaining low sodium diet and good BP control discussed with, more than 50% of the time counseling on sodium/calorie restriction, weight loss and activity for better blood pressure control and prevention of progression of kidney disease  Avoid NSAIDs       Lab Results   Component Value Date    CREATININE 1.6 (H) 06/23/2017         Proteinuria: check as above repeat URPCT with upep/ipep  Prot/Creat Ratio, Ur   Date Value Ref Range Status   06/23/2017 0.11 0.00 - 0.20 Final   02/22/2017 Unable to calculate 0.00 - 0.20 Final          HTN: maintain lsinopril      Medication: no change      Monitor BP as directed in instructions    Metabolic acidosis:  none       Hyponatremia: none       Hyperkalemia: none     Lab Results   Component Value Date     06/23/2017    K 4.3 06/23/2017    CO2 29 06/23/2017         Renal osteodystrophy secondary hyperparathyroidism:  Check PTH and vit d levels next visit,s table now  Lab Results   Component Value Date    PTH 40.0 02/22/2017    CALCIUM 10.0 06/23/2017    PHOS 2.8 06/23/2017        Anemia:  none  Lab Results   Component Value Date    HGB 15.4 02/22/2017             Weight: Weight: 78.8 kg (173 lb 11.6 oz). he states that his daily weights are stable  Wt Readings from Last 3 Encounters:   07/06/17 78.8 kg (173 lb 11.6 oz)   05/17/17 78.9 kg (173 lb 15.1 oz)   03/07/17 77.5 kg (170 lb 13.7 oz)          Hyperlipidemia: satble  Lab Results   Component Value Date    LDLCALC 57.2 (L) 01/18/2017         D iet:  Education/referral:       Sodium: 2gm    Potassium:      Phosphorus:      Protein:      Fluid:       ESRD planing: not indicated at this time       Education:       Access:     PCP  followup: use of PPI   Referrals:      Please avoid or minimize all NSAIDS (ibuprofen, motrin, aleve, indocin, naprosyn) to minimize the risk to your kidneys.        [place] avoid and minimize use of all Proton Pump inhibitors (such as nexium prilosec, omeprazole, pantroprazole, protonix)and Please speak with your PCP about switching to H2 blocker such as Pepcid        Follow up in: 3-4 months with 24 hr urine

## 2017-07-31 ENCOUNTER — PATIENT MESSAGE (OUTPATIENT)
Dept: NEPHROLOGY | Facility: CLINIC | Age: 82
End: 2017-07-31

## 2017-08-10 ENCOUNTER — PATIENT MESSAGE (OUTPATIENT)
Dept: NEPHROLOGY | Facility: CLINIC | Age: 82
End: 2017-08-10

## 2017-08-15 ENCOUNTER — CLINICAL SUPPORT (OUTPATIENT)
Dept: ELECTROPHYSIOLOGY | Facility: CLINIC | Age: 82
End: 2017-08-15
Payer: MEDICARE

## 2017-08-15 ENCOUNTER — PATIENT MESSAGE (OUTPATIENT)
Dept: NEPHROLOGY | Facility: CLINIC | Age: 82
End: 2017-08-15

## 2017-08-15 DIAGNOSIS — I47.20 VT (VENTRICULAR TACHYCARDIA): ICD-10-CM

## 2017-08-15 DIAGNOSIS — Z95.810 ICD (IMPLANTABLE CARDIOVERTER-DEFIBRILLATOR) IN PLACE: Primary | ICD-10-CM

## 2017-08-15 DIAGNOSIS — Z95.810 ICD (IMPLANTABLE CARDIOVERTER-DEFIBRILLATOR) IN PLACE: ICD-10-CM

## 2017-08-15 PROCEDURE — 93283 PRGRMG EVAL IMPLANTABLE DFB: CPT | Mod: S$GLB,,, | Performed by: INTERNAL MEDICINE

## 2017-08-17 ENCOUNTER — LAB VISIT (OUTPATIENT)
Dept: LAB | Facility: HOSPITAL | Age: 82
End: 2017-08-17
Attending: INTERNAL MEDICINE
Payer: MEDICARE

## 2017-08-17 DIAGNOSIS — Z00.00 PREVENTATIVE HEALTH CARE: ICD-10-CM

## 2017-08-17 DIAGNOSIS — I10 ESSENTIAL HYPERTENSION: ICD-10-CM

## 2017-08-17 DIAGNOSIS — E78.5 HYPERLIPIDEMIA, UNSPECIFIED HYPERLIPIDEMIA TYPE: ICD-10-CM

## 2017-08-17 DIAGNOSIS — Z12.5 PROSTATE CANCER SCREENING: ICD-10-CM

## 2017-08-17 LAB
ALBUMIN SERPL BCP-MCNC: 4.1 G/DL
ALP SERPL-CCNC: 47 U/L
ALT SERPL W/O P-5'-P-CCNC: 20 U/L
ANION GAP SERPL CALC-SCNC: 11 MMOL/L
AST SERPL-CCNC: 25 U/L
BASOPHILS # BLD AUTO: 0.07 K/UL
BASOPHILS NFR BLD: 0.9 %
BILIRUB SERPL-MCNC: 1 MG/DL
BUN SERPL-MCNC: 26 MG/DL
CALCIUM SERPL-MCNC: 9.9 MG/DL
CHLORIDE SERPL-SCNC: 106 MMOL/L
CHOLEST/HDLC SERPL: 2.1 {RATIO}
CO2 SERPL-SCNC: 24 MMOL/L
COMPLEXED PSA SERPL-MCNC: 1 NG/ML
CREAT SERPL-MCNC: 1.9 MG/DL
DIFFERENTIAL METHOD: ABNORMAL
EOSINOPHIL # BLD AUTO: 0.3 K/UL
EOSINOPHIL NFR BLD: 3.3 %
ERYTHROCYTE [DISTWIDTH] IN BLOOD BY AUTOMATED COUNT: 14 %
EST. GFR  (AFRICAN AMERICAN): 37.1 ML/MIN/1.73 M^2
EST. GFR  (NON AFRICAN AMERICAN): 32.1 ML/MIN/1.73 M^2
GLUCOSE SERPL-MCNC: 83 MG/DL
HCT VFR BLD AUTO: 42.3 %
HDL/CHOLESTEROL RATIO: 48.3 %
HDLC SERPL-MCNC: 120 MG/DL
HDLC SERPL-MCNC: 58 MG/DL
HGB BLD-MCNC: 14.8 G/DL
LDLC SERPL CALC-MCNC: 53.8 MG/DL
LYMPHOCYTES # BLD AUTO: 3.1 K/UL
LYMPHOCYTES NFR BLD: 41.8 %
MCH RBC QN AUTO: 31.3 PG
MCHC RBC AUTO-ENTMCNC: 35 G/DL
MCV RBC AUTO: 89 FL
MONOCYTES # BLD AUTO: 0.7 K/UL
MONOCYTES NFR BLD: 8.9 %
NEUTROPHILS # BLD AUTO: 3.4 K/UL
NEUTROPHILS NFR BLD: 44.8 %
NONHDLC SERPL-MCNC: 62 MG/DL
PLATELET # BLD AUTO: 110 K/UL
PMV BLD AUTO: 11.5 FL
POTASSIUM SERPL-SCNC: 4.6 MMOL/L
PROT SERPL-MCNC: 7.2 G/DL
RBC # BLD AUTO: 4.73 M/UL
SODIUM SERPL-SCNC: 141 MMOL/L
TRIGL SERPL-MCNC: 41 MG/DL
WBC # BLD AUTO: 7.49 K/UL

## 2017-08-17 PROCEDURE — 85025 COMPLETE CBC W/AUTO DIFF WBC: CPT

## 2017-08-17 PROCEDURE — 80053 COMPREHEN METABOLIC PANEL: CPT

## 2017-08-17 PROCEDURE — 36415 COLL VENOUS BLD VENIPUNCTURE: CPT | Mod: PO

## 2017-08-17 PROCEDURE — 80061 LIPID PANEL: CPT

## 2017-08-17 PROCEDURE — 84153 ASSAY OF PSA TOTAL: CPT

## 2017-08-18 ENCOUNTER — TELEPHONE (OUTPATIENT)
Dept: INTERNAL MEDICINE | Facility: CLINIC | Age: 82
End: 2017-08-18

## 2017-08-21 ENCOUNTER — OFFICE VISIT (OUTPATIENT)
Dept: NEPHROLOGY | Facility: CLINIC | Age: 82
End: 2017-08-21
Payer: MEDICARE

## 2017-08-21 VITALS
OXYGEN SATURATION: 92 % | SYSTOLIC BLOOD PRESSURE: 138 MMHG | HEART RATE: 62 BPM | HEIGHT: 73 IN | WEIGHT: 171.5 LBS | DIASTOLIC BLOOD PRESSURE: 80 MMHG | BODY MASS INDEX: 22.73 KG/M2

## 2017-08-21 DIAGNOSIS — N18.30 CKD (CHRONIC KIDNEY DISEASE) STAGE 3, GFR 30-59 ML/MIN: Primary | ICD-10-CM

## 2017-08-21 DIAGNOSIS — R76.8 POSITIVE ANA (ANTINUCLEAR ANTIBODY): ICD-10-CM

## 2017-08-21 PROBLEM — Z00.00 PREVENTATIVE HEALTH CARE: Status: RESOLVED | Noted: 2017-05-17 | Resolved: 2017-08-21

## 2017-08-21 PROCEDURE — 3075F SYST BP GE 130 - 139MM HG: CPT | Mod: S$GLB,,, | Performed by: INTERNAL MEDICINE

## 2017-08-21 PROCEDURE — 1126F AMNT PAIN NOTED NONE PRSNT: CPT | Mod: S$GLB,,, | Performed by: INTERNAL MEDICINE

## 2017-08-21 PROCEDURE — 99499 UNLISTED E&M SERVICE: CPT | Mod: S$GLB,,, | Performed by: INTERNAL MEDICINE

## 2017-08-21 PROCEDURE — 99213 OFFICE O/P EST LOW 20 MIN: CPT | Mod: S$GLB,,, | Performed by: INTERNAL MEDICINE

## 2017-08-21 PROCEDURE — 99999 PR PBB SHADOW E&M-EST. PATIENT-LVL III: CPT | Mod: PBBFAC,,, | Performed by: INTERNAL MEDICINE

## 2017-08-21 PROCEDURE — 3008F BODY MASS INDEX DOCD: CPT | Mod: S$GLB,,, | Performed by: INTERNAL MEDICINE

## 2017-08-21 PROCEDURE — 3079F DIAST BP 80-89 MM HG: CPT | Mod: S$GLB,,, | Performed by: INTERNAL MEDICINE

## 2017-08-21 PROCEDURE — 1159F MED LIST DOCD IN RCRD: CPT | Mod: S$GLB,,, | Performed by: INTERNAL MEDICINE

## 2017-08-21 NOTE — PROGRESS NOTES
Subjective:       Patient ID: Nelson GIBBONS Parent is a 82 y.o. White male who presents for followup  evaluation of renal function.    HPI     82 yo WM with h/o AAA repair (appears to have been above the renal arteries),TIA, thrombocytopenia, ischemic cardiomyopathy 2015 echo LVEF 45 with diastolic dysfunction, v-tahc, AICD, HTN, Tovar's esophagus on PPI, who recently had a skin infection requiring bactrim. Serum crt had been 1.4 2015 and now is 1.9 serum K 4.3 CO2 26. Prior serum crt 2013 as high  as 2.2 He denies NSAIDs, LUTS, dysuria, hematuria, peripheral edema, SOB.  Possible remote nephrolithiasis x 1 episode, denies gout. There are no available Urinalysis and patient just relieved himself prior to exam.    Interval history:  The patient is concerned because he notices that his serum creatinine had gone down to 1.4 after stopping Bactrim in the past and now serum creatinine is gradually creep back up to 1.9 on labs last week.  He is drinking at least 60 cc of water a day and he has reduced his pantoprazole to 40 mg once a day.  Reviewing the media section there is an upper GI series from January 2016 that demonstrates normal esophagus without Tovar's or ulcer, just a hiatal hernia.  He has been on pantoprazole 40 mg twice a day for at least 8 years he claims.  Recent renal ultrasound did not clearly define renal arteries.  With suggestion of elevated renal resistive indices bilaterally.    MGUS IgG kappa light chain of 0.27 mg/L, UPEP negative in past , ELYSIA 1:160, ANCA negative, UPRCT of 0.11 where it was not detectable in past, renal US with chronic medical disease and simple cysts.   The patient has no symptoms of fatigue, shortness of breath, chest pain, peripheral edema, flank pain, dysuria, hematuria, foamy urine, orange colored urine, nephrolithiasis,  diarrhea, constipation, lower extremity leg cramping, headache, nausea and vomiting, or weakness.      Review of Systems   Constitutional: Negative for  "activity change, appetite change, chills, diaphoresis, fatigue, fever and unexpected weight change.   HENT: Negative for congestion, ear discharge, ear pain, facial swelling, hearing loss, nosebleeds, sinus pressure, sore throat and trouble swallowing.    Eyes: Negative for photophobia, pain, discharge, redness, itching and visual disturbance.   Respiratory: Negative for apnea, cough, chest tightness, shortness of breath and wheezing.    Cardiovascular: Negative for chest pain, palpitations and leg swelling.   Gastrointestinal: Negative for abdominal distention, abdominal pain, constipation, diarrhea and vomiting.   Endocrine: Negative for cold intolerance, heat intolerance, polydipsia and polyuria.   Genitourinary: Negative for decreased urine volume, difficulty urinating, dysuria, flank pain, frequency, hematuria, scrotal swelling, testicular pain and urgency.   Musculoskeletal: Negative for arthralgias, back pain, gait problem, joint swelling, myalgias, neck pain and neck stiffness.   Skin: Negative for color change, pallor, rash and wound.   Allergic/Immunologic: Negative for environmental allergies and food allergies.   Neurological: Negative for dizziness, tremors, seizures, syncope, facial asymmetry, speech difficulty, weakness, light-headedness, numbness and headaches.        Remote TIA   Hematological: Negative for adenopathy. Does not bruise/bleed easily.   Psychiatric/Behavioral: Negative for agitation, behavioral problems, dysphoric mood and sleep disturbance. The patient is not nervous/anxious.        Objective:     Blood pressure 138/80, pulse 62, height 6' 1" (1.854 m), weight 77.8 kg (171 lb 8.3 oz), SpO2 (!) 92 %.    Physical Exam   Constitutional: He is oriented to person, place, and time. He appears well-developed and well-nourished. No distress.   Lean  WNWD NAD   HENT:   Head: Normocephalic and atraumatic.   Mouth/Throat: Oropharynx is clear and moist. No oropharyngeal exudate.   Eyes: " Conjunctivae and EOM are normal. Pupils are equal, round, and reactive to light. Right eye exhibits no discharge. Left eye exhibits no discharge. No scleral icterus.   Neck: Normal range of motion. Neck supple. No JVD present. No tracheal deviation present. No thyromegaly present.   Cardiovascular: Normal rate, regular rhythm, normal heart sounds and intact distal pulses.  Exam reveals no gallop and no friction rub.    No murmur heard.  Bilateral DP pulses +2 , no LE edema   Pulmonary/Chest: Effort normal and breath sounds normal. No stridor. No respiratory distress. He has no wheezes. He has no rales. He exhibits no tenderness.   Abdominal: Soft. Bowel sounds are normal. He exhibits no distension and no mass. There is no tenderness. There is no rebound and no guarding. No hernia.   Musculoskeletal: Normal range of motion. He exhibits no edema or tenderness.   Lymphadenopathy:     He has no cervical adenopathy.   Neurological: He is alert and oriented to person, place, and time. No cranial nerve deficit. He exhibits normal muscle tone. Coordination normal.   Skin: Skin is warm and dry. No rash noted. He is not diaphoretic. No erythema. No pallor.   Psychiatric: He has a normal mood and affect. His behavior is normal. Judgment and thought content normal.   Nursing note and vitals reviewed.      Assessment:       1. CKD (chronic kidney disease) stage 3, GFR 30-59 ml/min    2. Positive ELYSIA (antinuclear antibody)        Plan:     82 yo WM with h/o AAA repair (appears to have been above the renal arteries),TIA, thrombocytopenia, ischemic cardiomyopathy, v-tahc, AICD, HTN well controlled, HPL, Tovar's esophagus on PPI, with CKD and remote KIMBERLY, with last serum crt 1.4 in 2015 and now with serum crt 1.9 after use of bactrim for skin infection that has resolved.     KIMBERLY resolved,  KIMBERLY related to use of bactrim which  may cause a reversible inhibition of renal  tubular secretion of creatinine and elevation of serum crt, and  AIN. Would evaluate for obstruction, and obtain US and  serologies as well.    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 It is possible PPI may be contributing to LOYDA, but will await further tests as above. Given dx of Lizandro's esophagus would not change therapy unless ok with PCP and GI. The chronic use of PPI and association, but not causality of PPIs with CKD discussed with patient.    CKD with recent  worsening renal function: may be related to LOYDA from Pantoprazole 40 mg bid long term, -advised to stop and switch to H2 blocker and discuss with GI  +ELYSIA borderline, will repeat with serum compelments  +Uprct, minimal, but will repeat and follow serially  Ischemic nephropathy, if serum creatinine rises any further will repeat Renal US with dopplers and consider further vascular workup..  baseline serum crt 1.5-1.9 with est GFR 35 cc/min.now serum crt top normal.  Would continue RAAS blockade at this time as there is no hyperkalemia , further recommendations after above evaluation.  Follow serum crt serially, maintaining low sodium diet and good BP control discussed with, more than 50% of the time counseling on sodium/calorie restriction, weight loss and activity for better blood pressure control and prevention of progression of kidney disease  Avoid NSAIDs      Decreased LVEF:  May need to consider further cardiac eval      MGUS: follow by hematology oncology    UPEP negative, but now with UPRCT of 0.11 where it was not able to calc in prior tests, will follow serially  For now ,and repeat UPEP/IPEP     1. CKD:    Lab Results   Component Value Date    CREATININE 1.9 (H) 08/17/2017         Proteinuria: check as above repeat URPCT with upep/ipep  Prot/Creat Ratio, Ur   Date Value Ref Range Status   06/23/2017 0.11 0.00 - 0.20 Final   02/22/2017 Unable to calculate 0.00 - 0.20 Final          HTN: maintain  lsinopril      Medication: no change      Monitor BP as directed in instructions    Metabolic acidosis:  none       Hyponatremia: none       Hyperkalemia: none     Lab Results   Component Value Date     08/17/2017    K 4.6 08/17/2017    CO2 24 08/17/2017         Renal osteodystrophy secondary hyperparathyroidism:  Check PTH and vit d levels next visit,s table now  Lab Results   Component Value Date    PTH 40.0 02/22/2017    CALCIUM 9.9 08/17/2017    PHOS 2.8 06/23/2017        Anemia:  none  Lab Results   Component Value Date    HGB 14.8 08/17/2017             Weight: Weight: 77.8 kg (171 lb 8.3 oz). he states that his daily weights are stable  Wt Readings from Last 3 Encounters:   08/21/17 77.8 kg (171 lb 8.3 oz)   07/06/17 78.8 kg (173 lb 11.6 oz)   05/17/17 78.9 kg (173 lb 15.1 oz)          Hyperlipidemia: satble  Lab Results   Component Value Date    LDLCALC 53.8 (L) 08/17/2017         D iet:  Education/referral:       Sodium: 2gm    Potassium:      Phosphorus:      Protein:      Fluid:       ESRD planing: not indicated at this time       Education:       Access:     PCP followup: use of PPI   Referrals:      Please avoid or minimize all NSAIDS (ibuprofen, motrin, aleve, indocin, naprosyn) to minimize the risk to your kidneys.        Dulce avoid and minimize use of all Proton Pump inhibitors (such as nexium prilosec, omeprazole, pantroprazole, protonix)and Please speak with your PCP about switching to H2 blocker such as Pepcid        Follow up in: 3-4 months with 24 hr urine

## 2017-08-21 NOTE — PATIENT INSTRUCTIONS
Labs in 4-6 weeks Ua, UPRCT, RFP    And again prior to next visit    Stay off Pantoprazole   And speak with Gi doctor about using an H2 blocker as Pepcid instead    RTC 12 weeks

## 2017-08-29 ENCOUNTER — OFFICE VISIT (OUTPATIENT)
Dept: INTERNAL MEDICINE | Facility: CLINIC | Age: 82
End: 2017-08-29
Payer: MEDICARE

## 2017-08-29 VITALS
BODY MASS INDEX: 22.82 KG/M2 | HEART RATE: 66 BPM | HEIGHT: 73 IN | DIASTOLIC BLOOD PRESSURE: 60 MMHG | WEIGHT: 172.19 LBS | SYSTOLIC BLOOD PRESSURE: 128 MMHG | OXYGEN SATURATION: 98 %

## 2017-08-29 DIAGNOSIS — D47.2 MGUS (MONOCLONAL GAMMOPATHY OF UNKNOWN SIGNIFICANCE): ICD-10-CM

## 2017-08-29 DIAGNOSIS — D69.6 THROMBOCYTOPENIA: ICD-10-CM

## 2017-08-29 DIAGNOSIS — K22.70 BARRETT'S ESOPHAGUS WITHOUT DYSPLASIA: ICD-10-CM

## 2017-08-29 DIAGNOSIS — E78.5 HYPERLIPIDEMIA, UNSPECIFIED HYPERLIPIDEMIA TYPE: ICD-10-CM

## 2017-08-29 DIAGNOSIS — I10 ESSENTIAL HYPERTENSION: Primary | ICD-10-CM

## 2017-08-29 DIAGNOSIS — N18.30 CKD (CHRONIC KIDNEY DISEASE) STAGE 3, GFR 30-59 ML/MIN: ICD-10-CM

## 2017-08-29 DIAGNOSIS — K21.9 GASTROESOPHAGEAL REFLUX DISEASE, ESOPHAGITIS PRESENCE NOT SPECIFIED: ICD-10-CM

## 2017-08-29 PROCEDURE — 1159F MED LIST DOCD IN RCRD: CPT | Mod: S$GLB,,, | Performed by: INTERNAL MEDICINE

## 2017-08-29 PROCEDURE — 3078F DIAST BP <80 MM HG: CPT | Mod: S$GLB,,, | Performed by: INTERNAL MEDICINE

## 2017-08-29 PROCEDURE — 1126F AMNT PAIN NOTED NONE PRSNT: CPT | Mod: S$GLB,,, | Performed by: INTERNAL MEDICINE

## 2017-08-29 PROCEDURE — 99499 UNLISTED E&M SERVICE: CPT | Mod: S$GLB,,, | Performed by: INTERNAL MEDICINE

## 2017-08-29 PROCEDURE — 99999 PR PBB SHADOW E&M-EST. PATIENT-LVL IV: CPT | Mod: PBBFAC,,, | Performed by: INTERNAL MEDICINE

## 2017-08-29 PROCEDURE — 3008F BODY MASS INDEX DOCD: CPT | Mod: S$GLB,,, | Performed by: INTERNAL MEDICINE

## 2017-08-29 PROCEDURE — 3074F SYST BP LT 130 MM HG: CPT | Mod: S$GLB,,, | Performed by: INTERNAL MEDICINE

## 2017-08-29 PROCEDURE — 99214 OFFICE O/P EST MOD 30 MIN: CPT | Mod: S$GLB,,, | Performed by: INTERNAL MEDICINE

## 2017-08-29 NOTE — PROGRESS NOTES
Pt. ID: Nelson GIBBONS Parent is a 82 y.o. male      Chief complaint:   Chief Complaint   Patient presents with    Results     follow up       HPI: Pt. Here for f/u for HTN and CKD; pt. Wife, Aleah, is with pt.; I reviewed labs dated 8/17/17; platelet count is low but stable; kidney fxn went up slightly and he went to renal 1 weeks ago and is scheduled to f/u renal again with kidney fxn check; he was asked by renal to stop PPI for GERD and start zantac or pepcid; cholesterol is WNL; he sees GI for Barretts and has been lost to f/u for a few years; he is compliant with meds; he is scheduled to see Hem/Onc who is managing  MGUS      Review of Systems   HENT: Negative for hearing loss.    Eyes: Negative for discharge.   Respiratory: Negative for wheezing.    Cardiovascular: Negative for chest pain and palpitations.   Gastrointestinal: Positive for heartburn. Negative for blood in stool, constipation, diarrhea and vomiting.   Genitourinary: Negative for hematuria and urgency.   Musculoskeletal: Negative for neck pain.   Neurological: Negative for weakness and headaches.   Endo/Heme/Allergies: Negative for polydipsia.         Objective:    Physical Exam   Constitutional: He is oriented to person, place, and time. He appears well-developed.   Eyes: EOM are normal.   Neck: Normal range of motion.   Cardiovascular: Normal rate, regular rhythm and normal heart sounds.    Pulmonary/Chest: Effort normal and breath sounds normal.   Abdominal: Soft. There is no tenderness. There is no rebound and no guarding.   Musculoskeletal: Normal range of motion.   Neurological: He is alert and oriented to person, place, and time.   Skin: No rash noted.         Health Maintenance   Topic Date Due    Influenza Vaccine  08/01/2017    Lipid Panel  08/17/2022    TETANUS VACCINE  11/22/2023    Zoster Vaccine  Addressed    Pneumococcal (65+)  Completed         Assessment:     1. Essential hypertension Well controlled   2. CKD (chronic kidney  disease) stage 3, GFR 30-59 ml/min Active   3. Hyperlipidemia, unspecified hyperlipidemia type Well controlled   4. Thrombocytopenia Stable   5. Gastroesophageal reflux disease, esophagitis presence not specified Active   6. Tovar's esophagus without dysplasia Active   7. MGUS (monoclonal gammopathy of unknown significance) Active         Plan: Essential hypertension  Comments:  continue current regimen and encouraged low Na diet  Orders:  -     CBC auto differential; Future; Expected date: 11/29/2017  -     Comprehensive metabolic panel; Future; Expected date: 11/29/2017    CKD (chronic kidney disease) stage 3, GFR 30-59 ml/min  Comments:  f/u renal who is managing; avoid NSAIDs; taken off PPI   Orders:  -     Comprehensive metabolic panel; Future; Expected date: 11/29/2017    Hyperlipidemia, unspecified hyperlipidemia type  Comments:  continue current regimen and encouraged diet modification     Thrombocytopenia  Comments:  monitor  Orders:  -     CBC auto differential; Future; Expected date: 11/29/2017    Gastroesophageal reflux disease, esophagitis presence not specified  Comments:  D/C PPI and start zantac or pepcid prn     Tovar's esophagus without dysplasia  Comments:  f/u GI who is managing; pt. states he will schedule f/u appt.     MGUS (monoclonal gammopathy of unknown significance)  Comments:  f/u hem/onc who is managing        Problem List Items Addressed This Visit        Cardiac/Vascular    Hypertension - Primary    Relevant Orders    CBC auto differential    Comprehensive metabolic panel    Hyperlipidemia       Renal/    CKD (chronic kidney disease) stage 3, GFR 30-59 ml/min    Relevant Orders    Comprehensive metabolic panel       Hematology    Thrombocytopenia    Relevant Orders    CBC auto differential       Oncology    MGUS (monoclonal gammopathy of unknown significance)       GI    Tovar's esophagus without dysplasia    Gastroesophageal reflux disease      Other Visit Diagnoses    None.        Answers for HPI/ROS submitted by the patient on 8/27/2017   activity change: No  unexpected weight change: No  rhinorrhea: No  trouble swallowing: No  visual disturbance: No  chest tightness: No  polyuria: No  difficulty urinating: No  joint swelling: No  arthralgias: No  confusion: No  dysphoric mood: No    Answers for HPI/ROS submitted by the patient on 8/27/2017   activity change: No  unexpected weight change: No  rhinorrhea: No  trouble swallowing: No  visual disturbance: No  chest tightness: No  polyuria: No  difficulty urinating: No  joint swelling: No  arthralgias: No  confusion: No  dysphoric mood: No

## 2017-09-05 ENCOUNTER — LAB VISIT (OUTPATIENT)
Dept: LAB | Facility: HOSPITAL | Age: 82
End: 2017-09-05
Attending: INTERNAL MEDICINE
Payer: MEDICARE

## 2017-09-05 ENCOUNTER — OFFICE VISIT (OUTPATIENT)
Dept: HEMATOLOGY/ONCOLOGY | Facility: CLINIC | Age: 82
End: 2017-09-05
Payer: MEDICARE

## 2017-09-05 VITALS
HEART RATE: 65 BPM | WEIGHT: 171.94 LBS | TEMPERATURE: 98 F | SYSTOLIC BLOOD PRESSURE: 139 MMHG | DIASTOLIC BLOOD PRESSURE: 66 MMHG | OXYGEN SATURATION: 95 % | RESPIRATION RATE: 16 BRPM | BODY MASS INDEX: 22.79 KG/M2 | HEIGHT: 73 IN

## 2017-09-05 DIAGNOSIS — D47.2 MGUS (MONOCLONAL GAMMOPATHY OF UNKNOWN SIGNIFICANCE): Primary | ICD-10-CM

## 2017-09-05 DIAGNOSIS — D47.2 MGUS (MONOCLONAL GAMMOPATHY OF UNKNOWN SIGNIFICANCE): ICD-10-CM

## 2017-09-05 LAB
ALBUMIN SERPL BCP-MCNC: 3.9 G/DL
ALP SERPL-CCNC: 50 U/L
ALT SERPL W/O P-5'-P-CCNC: 25 U/L
ANION GAP SERPL CALC-SCNC: 5 MMOL/L
AST SERPL-CCNC: 25 U/L
B2 MICROGLOB SERPL-MCNC: 3.4 UG/ML
BASOPHILS # BLD AUTO: 0.06 K/UL
BASOPHILS NFR BLD: 0.9 %
BILIRUB SERPL-MCNC: 0.8 MG/DL
BUN SERPL-MCNC: 16 MG/DL
CALCIUM SERPL-MCNC: 10.2 MG/DL
CHLORIDE SERPL-SCNC: 103 MMOL/L
CO2 SERPL-SCNC: 32 MMOL/L
CREAT SERPL-MCNC: 1.7 MG/DL
DIFFERENTIAL METHOD: ABNORMAL
EOSINOPHIL # BLD AUTO: 0.3 K/UL
EOSINOPHIL NFR BLD: 4.2 %
ERYTHROCYTE [DISTWIDTH] IN BLOOD BY AUTOMATED COUNT: 13.5 %
EST. GFR  (AFRICAN AMERICAN): 42.5 ML/MIN/1.73 M^2
EST. GFR  (NON AFRICAN AMERICAN): 36.7 ML/MIN/1.73 M^2
GLUCOSE SERPL-MCNC: 99 MG/DL
HCT VFR BLD AUTO: 42.4 %
HGB BLD-MCNC: 15 G/DL
LYMPHOCYTES # BLD AUTO: 2 K/UL
LYMPHOCYTES NFR BLD: 29.9 %
MCH RBC QN AUTO: 31.6 PG
MCHC RBC AUTO-ENTMCNC: 35.4 G/DL
MCV RBC AUTO: 89 FL
MONOCYTES # BLD AUTO: 0.8 K/UL
MONOCYTES NFR BLD: 12.5 %
NEUTROPHILS # BLD AUTO: 3.5 K/UL
NEUTROPHILS NFR BLD: 52.2 %
PLATELET # BLD AUTO: 89 K/UL
PMV BLD AUTO: 10.8 FL
POTASSIUM SERPL-SCNC: 4.4 MMOL/L
PROT SERPL-MCNC: 7.2 G/DL
RBC # BLD AUTO: 4.75 M/UL
SODIUM SERPL-SCNC: 140 MMOL/L
WBC # BLD AUTO: 6.73 K/UL

## 2017-09-05 PROCEDURE — 83520 IMMUNOASSAY QUANT NOS NONAB: CPT

## 2017-09-05 PROCEDURE — 82232 ASSAY OF BETA-2 PROTEIN: CPT

## 2017-09-05 PROCEDURE — 99999 PR PBB SHADOW E&M-EST. PATIENT-LVL III: CPT | Mod: PBBFAC,,, | Performed by: INTERNAL MEDICINE

## 2017-09-05 PROCEDURE — 99499 UNLISTED E&M SERVICE: CPT | Mod: S$GLB,,, | Performed by: INTERNAL MEDICINE

## 2017-09-05 PROCEDURE — 3008F BODY MASS INDEX DOCD: CPT | Mod: S$GLB,,, | Performed by: INTERNAL MEDICINE

## 2017-09-05 PROCEDURE — 99215 OFFICE O/P EST HI 40 MIN: CPT | Mod: S$GLB,,, | Performed by: INTERNAL MEDICINE

## 2017-09-05 PROCEDURE — 3075F SYST BP GE 130 - 139MM HG: CPT | Mod: S$GLB,,, | Performed by: INTERNAL MEDICINE

## 2017-09-05 PROCEDURE — 80053 COMPREHEN METABOLIC PANEL: CPT

## 2017-09-05 PROCEDURE — 85025 COMPLETE CBC W/AUTO DIFF WBC: CPT

## 2017-09-05 PROCEDURE — 1126F AMNT PAIN NOTED NONE PRSNT: CPT | Mod: S$GLB,,, | Performed by: INTERNAL MEDICINE

## 2017-09-05 PROCEDURE — 84165 PROTEIN E-PHORESIS SERUM: CPT | Mod: 26,,, | Performed by: PATHOLOGY

## 2017-09-05 PROCEDURE — 1159F MED LIST DOCD IN RCRD: CPT | Mod: S$GLB,,, | Performed by: INTERNAL MEDICINE

## 2017-09-05 PROCEDURE — 36415 COLL VENOUS BLD VENIPUNCTURE: CPT

## 2017-09-05 PROCEDURE — 3078F DIAST BP <80 MM HG: CPT | Mod: S$GLB,,, | Performed by: INTERNAL MEDICINE

## 2017-09-05 PROCEDURE — 84165 PROTEIN E-PHORESIS SERUM: CPT

## 2017-09-05 NOTE — PROGRESS NOTES
Subjective:       Patient ID: Nelson GIBBONS Parent is a 82 y.o. male.    Chief Complaint: MGUS (monoclonal gammopathy of unknown significance) and Results    Nelson presents with his wife for evaluation of small IgM kappa monoclonal protein found during renal evaluation after kidney injury during antibiotic therapy with Bactrim.   The monoclonal protein was measured at 0.27 grams. The patient does not meet any CRAB criteria. He appears well today. He does not have constitutional B symptoms.  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    Follow-up 9/5/17  Return visit with his wife for evaluation of MGUS. Renal function is stable. CBC is stable with moderate, stable thrombocytopenia.  Total protein remains normal with paraprotein levels pending.    HPI  Review of Systems   Constitutional: Negative.    HENT: Negative.    Eyes: Negative.    Respiratory: Negative.    Cardiovascular: Negative.    Gastrointestinal: Negative.    Endocrine: Negative.    Genitourinary: Negative.    Musculoskeletal: Negative.    Skin: Negative.    Allergic/Immunologic: Negative for environmental allergies, food allergies and immunocompromised state.   Neurological: Negative.    Hematological: Negative for adenopathy. Does not bruise/bleed easily.   Psychiatric/Behavioral: Negative.        Objective:      Physical Exam   Constitutional: He is oriented to person, place, and time. He appears well-developed and well-nourished.   HENT:   Head: Normocephalic and atraumatic.   Eyes: Conjunctivae are normal. No scleral icterus.   Neck: Normal range of motion.   Cardiovascular: Normal rate and intact distal pulses.    Pulmonary/Chest: Effort normal. No respiratory distress.   Musculoskeletal: Normal range of motion.   Neurological: He is alert and oriented to person, place, and time.   Skin: Skin is warm and dry.   Psychiatric: He has a normal mood and affect. His behavior is normal.   Nursing note and vitals  reviewed.      Assessment:       1. MGUS (monoclonal gammopathy of unknown significance)        Plan:       MGUS stable.  Repeat monoclonal protein markers in 6 months

## 2017-09-06 LAB
ALBUMIN SERPL ELPH-MCNC: 4.22 G/DL
ALPHA1 GLOB SERPL ELPH-MCNC: 0.26 G/DL
ALPHA2 GLOB SERPL ELPH-MCNC: 0.72 G/DL
B-GLOBULIN SERPL ELPH-MCNC: 0.74 G/DL
GAMMA GLOB SERPL ELPH-MCNC: 1.06 G/DL
KAPPA LC SER QL IA: 2.54 MG/DL
KAPPA LC/LAMBDA SER IA: 1.25
LAMBDA LC SER QL IA: 2.04 MG/DL
PATHOLOGIST INTERPRETATION SPE: NORMAL
PROT SERPL-MCNC: 7 G/DL

## 2017-09-11 DIAGNOSIS — I47.20 VT (VENTRICULAR TACHYCARDIA): ICD-10-CM

## 2017-09-11 DIAGNOSIS — I25.2 OLD MYOCARDIAL INFARCTION: ICD-10-CM

## 2017-09-11 DIAGNOSIS — I25.10 CORONARY ARTERY DISEASE INVOLVING NATIVE CORONARY ARTERY WITHOUT ANGINA PECTORIS, UNSPECIFIED WHETHER NATIVE OR TRANSPLANTED HEART: ICD-10-CM

## 2017-09-11 DIAGNOSIS — I25.5 ISCHEMIC CARDIOMYOPATHY: ICD-10-CM

## 2017-09-11 DIAGNOSIS — I10 ESSENTIAL HYPERTENSION: ICD-10-CM

## 2017-09-11 RX ORDER — AMLODIPINE BESYLATE 10 MG/1
TABLET ORAL
Qty: 90 TABLET | Refills: 3 | Status: SHIPPED | OUTPATIENT
Start: 2017-09-11 | End: 2018-06-15 | Stop reason: SDUPTHER

## 2017-09-11 RX ORDER — CLOPIDOGREL BISULFATE 75 MG/1
TABLET ORAL
Qty: 90 TABLET | Refills: 6 | Status: SHIPPED | OUTPATIENT
Start: 2017-09-11 | End: 2018-11-05 | Stop reason: SDUPTHER

## 2017-09-11 RX ORDER — METOPROLOL SUCCINATE 25 MG/1
TABLET, EXTENDED RELEASE ORAL
Qty: 180 TABLET | Refills: 3 | Status: SHIPPED | OUTPATIENT
Start: 2017-09-11 | End: 2018-06-15 | Stop reason: SDUPTHER

## 2017-09-12 ENCOUNTER — TELEPHONE (OUTPATIENT)
Dept: NEPHROLOGY | Facility: CLINIC | Age: 82
End: 2017-09-12

## 2017-09-12 NOTE — TELEPHONE ENCOUNTER
Spoke with pt wife pt was recently seen in August  Wants him to return in Nov----- Message from Afua Alexander sent at 9/12/2017 11:47 AM CDT -----  Contact: lAeah, pts wife  Aleah has received a letter asking her to schedule a follow up for pt.  Please call her to schedule as I am not showing anything in the month of October to offer her.    Aleah can be reached at [phone]

## 2017-09-19 ENCOUNTER — TELEPHONE (OUTPATIENT)
Dept: NEPHROLOGY | Facility: CLINIC | Age: 82
End: 2017-09-19

## 2017-09-19 NOTE — TELEPHONE ENCOUNTER
Contacted pt to schedule his appt but the slots was heal and lock by the manager so I let pt wife know ill leave a note with dr mindy gutiérrez who normally schedule her appt and they request wasn't ignore    ----- Message from Marilyn Benítez sent at 9/19/2017 10:50 AM CDT -----  Contact: Self   [phone]  Mindy  /  Pt calling  To  make an appt to see the  for the month of November   Call back number [phone]  Thanks,

## 2017-10-23 ENCOUNTER — PATIENT MESSAGE (OUTPATIENT)
Dept: NEPHROLOGY | Facility: CLINIC | Age: 82
End: 2017-10-23

## 2017-11-10 ENCOUNTER — HOSPITAL ENCOUNTER (OUTPATIENT)
Dept: CARDIOLOGY | Facility: CLINIC | Age: 82
Discharge: HOME | End: 2017-11-10
Payer: MEDICARE

## 2017-11-10 DIAGNOSIS — I25.10 CORONARY ARTERY DISEASE INVOLVING NATIVE CORONARY ARTERY OF NATIVE HEART WITHOUT ANGINA PECTORIS: ICD-10-CM

## 2017-11-10 DIAGNOSIS — I25.5 ISCHEMIC CARDIOMYOPATHY: ICD-10-CM

## 2017-11-10 LAB
DIASTOLIC DYSFUNCTION: YES
ESTIMATED PA SYSTOLIC PRESSURE: 17.29
RETIRED EF AND QEF - SEE NOTES: 45 (ref 55–65)
TRICUSPID VALVE REGURGITATION: ABNORMAL

## 2017-11-10 PROCEDURE — 93306 TTE W/DOPPLER COMPLETE: CPT | Mod: S$GLB,,, | Performed by: INTERNAL MEDICINE

## 2017-11-13 ENCOUNTER — PATIENT MESSAGE (OUTPATIENT)
Dept: NEPHROLOGY | Facility: CLINIC | Age: 82
End: 2017-11-13

## 2017-11-13 ENCOUNTER — OFFICE VISIT (OUTPATIENT)
Dept: NEPHROLOGY | Facility: CLINIC | Age: 82
End: 2017-11-13
Payer: MEDICARE

## 2017-11-13 VITALS
OXYGEN SATURATION: 98 % | WEIGHT: 169 LBS | HEART RATE: 62 BPM | SYSTOLIC BLOOD PRESSURE: 112 MMHG | DIASTOLIC BLOOD PRESSURE: 70 MMHG | BODY MASS INDEX: 22.4 KG/M2 | HEIGHT: 73 IN

## 2017-11-13 DIAGNOSIS — N18.30 CKD (CHRONIC KIDNEY DISEASE) STAGE 3, GFR 30-59 ML/MIN: Primary | ICD-10-CM

## 2017-11-13 DIAGNOSIS — D47.2 MGUS (MONOCLONAL GAMMOPATHY OF UNKNOWN SIGNIFICANCE): ICD-10-CM

## 2017-11-13 DIAGNOSIS — E78.49 OTHER HYPERLIPIDEMIA: ICD-10-CM

## 2017-11-13 DIAGNOSIS — N18.9 ANEMIA ASSOCIATED WITH CHRONIC RENAL FAILURE: ICD-10-CM

## 2017-11-13 DIAGNOSIS — E55.9 VITAMIN D DEFICIENCY: ICD-10-CM

## 2017-11-13 DIAGNOSIS — K22.70 BARRETT'S ESOPHAGUS WITHOUT DYSPLASIA: ICD-10-CM

## 2017-11-13 DIAGNOSIS — I10 ESSENTIAL HYPERTENSION: ICD-10-CM

## 2017-11-13 DIAGNOSIS — I25.5 ISCHEMIC CARDIOMYOPATHY: ICD-10-CM

## 2017-11-13 DIAGNOSIS — D63.1 ANEMIA ASSOCIATED WITH CHRONIC RENAL FAILURE: ICD-10-CM

## 2017-11-13 PROCEDURE — 99999 PR PBB SHADOW E&M-EST. PATIENT-LVL III: CPT | Mod: PBBFAC,,, | Performed by: INTERNAL MEDICINE

## 2017-11-13 PROCEDURE — 99499 UNLISTED E&M SERVICE: CPT | Mod: S$GLB,,, | Performed by: INTERNAL MEDICINE

## 2017-11-13 PROCEDURE — 99213 OFFICE O/P EST LOW 20 MIN: CPT | Mod: S$GLB,,, | Performed by: INTERNAL MEDICINE

## 2017-11-13 RX ORDER — PANTOPRAZOLE SODIUM 40 MG/1
TABLET, DELAYED RELEASE ORAL
COMMUNITY
Start: 2017-09-20 | End: 2017-11-17

## 2017-11-13 NOTE — PROGRESS NOTES
Subjective:       Patient ID: Nelson GIBBONS Parent is a 82 y.o. White male who presents for followup  evaluation of renal function.    HPI   82 yo WM with h/o AAA repair (appears to have been above the renal arteries),TIA, thrombocytopenia, ischemic cardiomyopathy 2015 echo LVEF 45 with diastolic dysfunction, v-tahc, AICD, HTN, Tovar's esophagus on PPI, who recently had a skin infection requiring bactrim. Serum crt had been 1.4 2015 and now is 1.9 serum K 4.3 CO2 26. Prior serum crt 2013 as high  as 2.2 He denies NSAIDs, LUTS, dysuria, hematuria, peripheral edema, SOB.  Possible remote nephrolithiasis x 1 episode, denies gout. There are no available Urinalysis and patient just relieved himself prior to exam.    Interval history:  Overall he feels quite well, following a low sal t mediterranean diet.   Serum creatinine had gone down to 1.4 after stopping Bactrim in the past and now serum creatinine is gradually creep back up to 1.8-  1.9 on labs last week.  He is drinking at least 60 cc of water a day and he has reduced his pantoprazole to 40 mg once a day.  Reviewing the media section there is an upper GI series from January 2016 that demonstrates normal esophagus without Tovar's or ulcer, just a hiatal hernia.  He has been on pantoprazole 40 mg twice a day for at least 8 years he claims.  Recent renal ultrasound did not clearly define renal arteries.  With suggestion of elevated renal resistive indices bilaterally.   11/10/2017 echo demonstrates EF  and grade 1 disatolic dysfunction.   MGUS IgG kappa light chain of 0.27 mg/L present 2/2017, not seen on repeat 6/2017.  UPEP negative in past , ELYSIA 1:160, ANCA negative, UPRCT of 0.11 where it was not detectable in past, renal US with chronic medical disease and simple cysts.   The patient has no symptoms of fatigue, shortness of breath, chest pain, peripheral edema, flank pain, dysuria, hematuria, foamy urine, orange colored urine, nephrolithiasis,  diarrhea,  "constipation, lower extremity leg cramping, headache, nausea and vomiting, or weakness.      Review of Systems   Constitutional: Negative for activity change, appetite change, chills, diaphoresis, fatigue, fever and unexpected weight change.   HENT: Negative for congestion, ear discharge, ear pain, facial swelling, hearing loss, nosebleeds, sinus pressure, sore throat and trouble swallowing.    Eyes: Negative for photophobia, pain, discharge, redness, itching and visual disturbance.   Respiratory: Negative for apnea, cough, chest tightness, shortness of breath and wheezing.    Cardiovascular: Negative for chest pain, palpitations and leg swelling.   Gastrointestinal: Negative for abdominal distention, abdominal pain, constipation, diarrhea and vomiting.   Endocrine: Negative for cold intolerance, heat intolerance, polydipsia and polyuria.   Genitourinary: Negative for decreased urine volume, difficulty urinating, dysuria, flank pain, frequency, hematuria, scrotal swelling, testicular pain and urgency.   Musculoskeletal: Negative for arthralgias, back pain, gait problem, joint swelling, myalgias, neck pain and neck stiffness.   Skin: Negative for color change, pallor, rash and wound.   Allergic/Immunologic: Negative for environmental allergies and food allergies.   Neurological: Negative for dizziness, tremors, seizures, syncope, facial asymmetry, speech difficulty, weakness, light-headedness, numbness and headaches.        Remote TIA   Hematological: Negative for adenopathy. Does not bruise/bleed easily.   Psychiatric/Behavioral: Negative for agitation, behavioral problems, dysphoric mood and sleep disturbance. The patient is not nervous/anxious.        Objective:     Blood pressure 112/70, pulse 62, height 6' 1" (1.854 m), weight 76.7 kg (169 lb), SpO2 98 %.    Physical Exam   Constitutional: He is oriented to person, place, and time. He appears well-developed and well-nourished. No distress.   Lean  WNWD NAD "   HENT:   Head: Normocephalic and atraumatic.   Mouth/Throat: Oropharynx is clear and moist. No oropharyngeal exudate.   Eyes: Conjunctivae and EOM are normal. Pupils are equal, round, and reactive to light. Right eye exhibits no discharge. Left eye exhibits no discharge. No scleral icterus.   Neck: Normal range of motion. Neck supple. No JVD present. No tracheal deviation present. No thyromegaly present.   Cardiovascular: Normal rate, regular rhythm, normal heart sounds and intact distal pulses.  Exam reveals no gallop and no friction rub.    No murmur heard.  Bilateral DP pulses +2 , no LE edema   Pulmonary/Chest: Effort normal and breath sounds normal. No stridor. No respiratory distress. He has no wheezes. He has no rales. He exhibits no tenderness.   Abdominal: Soft. Bowel sounds are normal. He exhibits no distension and no mass. There is no tenderness. There is no rebound and no guarding. No hernia.   Musculoskeletal: Normal range of motion. He exhibits no edema or tenderness.   Lymphadenopathy:     He has no cervical adenopathy.   Neurological: He is alert and oriented to person, place, and time. No cranial nerve deficit. He exhibits normal muscle tone. Coordination normal.   Skin: Skin is warm and dry. No rash noted. He is not diaphoretic. No erythema. No pallor.   Psychiatric: He has a normal mood and affect. His behavior is normal. Judgment and thought content normal.   Nursing note and vitals reviewed.      Assessment:       1. CKD (chronic kidney disease) stage 3, GFR 30-59 ml/min    2. Anemia associated with chronic renal failure    3. Vitamin D deficiency    4. Tovar's esophagus without dysplasia    5. MGUS (monoclonal gammopathy of unknown significance)    6. Essential hypertension    7. Ischemic cardiomyopathy    8. Other hyperlipidemia        Plan:     80 yo WM with h/o AAA repair (appears to have been above the renal arteries),TIA, thrombocytopenia, ischemic cardiomyopathy, v-tahc, AICD, HTN well  controlled, HPL, Tovar's esophagus on PPI, with CKD and remote KIMBERLY, with last serum crt 1.4 in 2015 and now with serum crt 1.9 after use of bactrim for skin infection that has resolved.     KIMBERLY resolved,  KIMBERLY related to use of bactrim which  may cause a reversible inhibition of renal  tubular secretion of creatinine and elevation of serum crt, and AIN. Would evaluate for obstruction, and obtain US and  serologies as well.    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 It is possible PPI may be contributing to LOYDA, Given dx of Lizandro's esophagus would not change therapy unless ok with PCP and GI. The chronic use of PPI and association, but not causality of PPIs with CKD discussed with patient.  Repeat lila next labs a sit was borderline elevated in past , complements wnl.  +Uprct, minimal, but will repeat and follow serially    Would continue RAAS blockade at this time as there is no hyperkalemia , further recommendations after above evaluation.  Follow serum crt serially, maintaining low sodium diet and good BP control discussed with, more than 50% of the time counseling on sodium/calorie restriction, weight loss and activity for better blood pressure control and prevention of progression of kidney disease  Avoid NSAIDs      Decreased LVEF f/u with PCP on RAAS inhibtion    MGUS: follow by hematology oncology    UPEP negative      1. CKD:    Lab Results   Component Value Date    CREATININE 1.8 (H) 11/10/2017    CREATININE 1.8 (H) 11/10/2017    CREATININE 1.8 (H) 11/10/2017         Proteinuria: stable on lisinopril  Prot/Creat Ratio, Ur   Date Value Ref Range Status   11/10/2017 Unable to calculate 0.00 - 0.20 Final   06/23/2017 0.11 0.00 - 0.20 Final   02/22/2017 Unable to calculate 0.00 - 0.20 Final          HTN:stable       Medication: no  change      Monitor BP as directed in instructions    Metabolic acidosis:  none       Hyponatremia: none       Hyperkalemia: none     Lab Results   Component Value Date     11/10/2017     11/10/2017     11/10/2017    K 4.2 11/10/2017    K 4.2 11/10/2017    K 4.2 11/10/2017    CO2 26 11/10/2017    CO2 26 11/10/2017    CO2 26 11/10/2017         Renal osteodystrophy secondary hyperparathyroidism: taking vit d 3 1000 u daily check pth and vit d next visit  Lab Results   Component Value Date    PTH 40.0 02/22/2017    CALCIUM 10.4 11/10/2017    CALCIUM 10.4 11/10/2017    CALCIUM 10.4 11/10/2017    PHOS 3.0 11/10/2017        Anemia:  none  Lab Results   Component Value Date    HGB 14.7 11/10/2017             Weight: Weight: 76.7 kg (169 lb). he states that his daily weights are stable  Wt Readings from Last 3 Encounters:   11/13/17 76.7 kg (169 lb)   09/05/17 78 kg (171 lb 15.3 oz)   08/29/17 78.1 kg (172 lb 2.9 oz)          Hyperlipidemia: stable on pravachol  Lab Results   Component Value Date    LDLCALC 52.2 (L) 11/10/2017         D iet:  Education/referral: mediterranean/vegetarian diet discussed      Sodium: 2gm    Potassium:      Phosphorus:      Protein:      Fluid:       ESRD planing: not indicated at this time       Education:       Access:     PCP followup: use of PPI   Referrals:      Please avoid or minimize all NSAIDS (ibuprofen, motrin, aleve, indocin, naprosyn) to minimize the risk to your kidneys.        [place] avoid and minimize use of all Proton Pump inhibitors (such as nexium prilosec, omeprazole, pantroprazole, protonix)and Please speak with your PCP about switching to H2 blocker such as Pepcid        Follow up in: 3-4 months

## 2017-11-16 ENCOUNTER — CLINICAL SUPPORT (OUTPATIENT)
Dept: ELECTROPHYSIOLOGY | Facility: CLINIC | Age: 82
End: 2017-11-16
Payer: MEDICARE

## 2017-11-16 DIAGNOSIS — I47.20 VT (VENTRICULAR TACHYCARDIA): ICD-10-CM

## 2017-11-16 DIAGNOSIS — Z95.810 ICD (IMPLANTABLE CARDIOVERTER-DEFIBRILLATOR) IN PLACE: ICD-10-CM

## 2017-11-16 PROCEDURE — 93283 PRGRMG EVAL IMPLANTABLE DFB: CPT | Mod: S$GLB,,, | Performed by: INTERNAL MEDICINE

## 2017-11-16 NOTE — PROGRESS NOTES
Subjective:   Patient ID:  Nelson GIBBONS Parent is a 82 y.o. male who presents for follow-up of CVD    HPI: The patient is here for CAD/ICM/VT.     The patient has no chest pain, SOB, TIA, palpitations, syncope or pre-syncope.Patient currently exercises several times per week.BP typically 120 or less        Review of Systems   Constitution: Negative for chills, decreased appetite, diaphoresis, fever, weakness, malaise/fatigue, night sweats, weight gain and weight loss.   HENT: Negative for congestion, hoarse voice, nosebleeds, sore throat and tinnitus.    Eyes: Negative for blurred vision, double vision, vision loss in left eye, vision loss in right eye, visual disturbance and visual halos.   Cardiovascular: Negative for chest pain, claudication, cyanosis, dyspnea on exertion, irregular heartbeat, leg swelling, near-syncope, orthopnea, palpitations, paroxysmal nocturnal dyspnea and syncope.   Respiratory: Negative for cough, hemoptysis, shortness of breath, sleep disturbances due to breathing, snoring, sputum production and wheezing.    Endocrine: Negative for cold intolerance, heat intolerance, polydipsia, polyphagia and polyuria.   Hematologic/Lymphatic: Negative for adenopathy and bleeding problem. Does not bruise/bleed easily.   Skin: Negative for color change, dry skin, flushing, itching, nail changes, poor wound healing, rash, skin cancer, suspicious lesions and unusual hair distribution.   Musculoskeletal: Negative for arthritis, back pain, falls, gout, joint pain, joint swelling, muscle cramps, muscle weakness, myalgias and stiffness.   Gastrointestinal: Negative for abdominal pain, anorexia, change in bowel habit, constipation, diarrhea, dysphagia, heartburn, hematemesis, hematochezia, melena and vomiting.   Genitourinary: Negative for decreased libido, dysuria, hematuria, hesitancy and urgency.   Neurological: Negative for excessive daytime sleepiness, dizziness, focal weakness, headaches, light-headedness,  "loss of balance, numbness, paresthesias, seizures, sensory change, tremors and vertigo.   Psychiatric/Behavioral: Negative for altered mental status, depression, hallucinations, memory loss, substance abuse and suicidal ideas. The patient does not have insomnia and is not nervous/anxious.    Allergic/Immunologic: Negative for environmental allergies and hives.       Objective: /69 (BP Location: Left arm, Patient Position: Sitting, BP Method: Medium (Automatic))   Pulse 72   Ht 6' 1" (1.854 m)   Wt 77.4 kg (170 lb 10.2 oz)   BMI 22.51 kg/m²      Physical Exam   Constitutional: He is oriented to person, place, and time. He appears well-developed and well-nourished. No distress.   HENT:   Head: Normocephalic.   Eyes: EOM are normal. Pupils are equal, round, and reactive to light.   Neck: Normal range of motion. No thyromegaly present.   Cardiovascular: Normal rate, regular rhythm, normal heart sounds and intact distal pulses.  Exam reveals no gallop and no friction rub.    No murmur heard.  Pulses:       Carotid pulses are 3+ on the right side, and 3+ on the left side.       Radial pulses are 3+ on the right side, and 3+ on the left side.        Femoral pulses are 3+ on the right side, and 3+ on the left side.       Popliteal pulses are 3+ on the right side, and 3+ on the left side.        Dorsalis pedis pulses are 3+ on the right side, and 3+ on the left side.        Posterior tibial pulses are 3+ on the right side, and 3+ on the left side.   Pulmonary/Chest: Effort normal and breath sounds normal. No respiratory distress. He has no wheezes. He has no rales. He exhibits no tenderness.   Abdominal: Soft. He exhibits no distension and no mass. There is no tenderness.   Musculoskeletal: Normal range of motion.   Lymphadenopathy:     He has no cervical adenopathy.   Neurological: He is alert and oriented to person, place, and time.   Skin: Skin is warm. He is not diaphoretic. No cyanosis. Nails show no clubbing. "   Psychiatric: He has a normal mood and affect. His speech is normal and behavior is normal. Judgment and thought content normal. Cognition and memory are normal.       Assessment:     1. Coronary artery disease involving native coronary artery of native heart without angina pectoris    2. AICD (automatic cardioverter/defibrillator) present    3. Ischemic cardiomyopathy    4. MGUS (monoclonal gammopathy of unknown significance)    5. VT (ventricular tachycardia)    6. Vitamin D deficiency    7. Thrombocytopenia    8. S/P AAA (abdominal aortic aneurysm) repair    9. Reflux esophagitis    10. Old myocardial infarction    11. Monoclonal paraproteinemia    12. Essential hypertension    13. Mixed hyperlipidemia    14. Gastroesophageal reflux disease with esophagitis    15. Erectile dysfunction due to arterial insufficiency        Plan:   Discussed diet , achieving and maintaining ideal body weight, and exercise.   We reviewed meds in detail.  Reassured-discussed options, goals, and plan.        Nelson was seen today for coronary artery disease.    Diagnoses and all orders for this visit:    Coronary artery disease involving native coronary artery of native heart without angina pectoris  -     Lipid panel; Future; Expected date: 11/17/2018  -     Comprehensive metabolic panel; Future; Expected date: 11/17/2018  -     TSH; Future; Expected date: 11/17/2018  -     EKG 12-lead; Future; Expected date: 11/17/2018    AICD (automatic cardioverter/defibrillator) present  -     EKG 12-lead; Future; Expected date: 11/17/2018    Ischemic cardiomyopathy  -     Lipid panel; Future; Expected date: 11/17/2018  -     Comprehensive metabolic panel; Future; Expected date: 11/17/2018  -     TSH; Future; Expected date: 11/17/2018  -     EKG 12-lead; Future; Expected date: 11/17/2018    MGUS (monoclonal gammopathy of unknown significance)    VT (ventricular tachycardia)  -     TSH; Future; Expected date: 11/17/2018  -     EKG 12-lead; Future;  Expected date: 11/17/2018    Vitamin D deficiency  -     Vitamin D; Future; Expected date: 11/17/2018    Thrombocytopenia  -     CBC auto differential; Future; Expected date: 11/17/2018    S/P AAA (abdominal aortic aneurysm) repair    Reflux esophagitis    Old myocardial infarction    Monoclonal paraproteinemia  -     CBC auto differential; Future; Expected date: 11/17/2018    Essential hypertension  -     Comprehensive metabolic panel; Future; Expected date: 11/17/2018  -     TSH; Future; Expected date: 11/17/2018    Mixed hyperlipidemia  -     Lipid panel; Future; Expected date: 11/17/2018  -     Comprehensive metabolic panel; Future; Expected date: 11/17/2018  -     TSH; Future; Expected date: 11/17/2018    Gastroesophageal reflux disease with esophagitis    Erectile dysfunction due to arterial insufficiency            Return in about 1 year (around 11/17/2018) for with ECG and labs.

## 2017-11-17 ENCOUNTER — OFFICE VISIT (OUTPATIENT)
Dept: CARDIOLOGY | Facility: CLINIC | Age: 82
End: 2017-11-17
Payer: MEDICARE

## 2017-11-17 VITALS
SYSTOLIC BLOOD PRESSURE: 135 MMHG | HEIGHT: 73 IN | WEIGHT: 170.63 LBS | HEART RATE: 72 BPM | DIASTOLIC BLOOD PRESSURE: 69 MMHG | BODY MASS INDEX: 22.62 KG/M2

## 2017-11-17 DIAGNOSIS — Z95.810 AICD (AUTOMATIC CARDIOVERTER/DEFIBRILLATOR) PRESENT: ICD-10-CM

## 2017-11-17 DIAGNOSIS — D47.2 MONOCLONAL PARAPROTEINEMIA: ICD-10-CM

## 2017-11-17 DIAGNOSIS — D47.2 MGUS (MONOCLONAL GAMMOPATHY OF UNKNOWN SIGNIFICANCE): ICD-10-CM

## 2017-11-17 DIAGNOSIS — I25.5 ISCHEMIC CARDIOMYOPATHY: ICD-10-CM

## 2017-11-17 DIAGNOSIS — I25.2 OLD MYOCARDIAL INFARCTION: ICD-10-CM

## 2017-11-17 DIAGNOSIS — N52.01 ERECTILE DYSFUNCTION DUE TO ARTERIAL INSUFFICIENCY: ICD-10-CM

## 2017-11-17 DIAGNOSIS — Z98.890 S/P AAA (ABDOMINAL AORTIC ANEURYSM) REPAIR: ICD-10-CM

## 2017-11-17 DIAGNOSIS — Z86.79 S/P AAA (ABDOMINAL AORTIC ANEURYSM) REPAIR: ICD-10-CM

## 2017-11-17 DIAGNOSIS — I10 ESSENTIAL HYPERTENSION: ICD-10-CM

## 2017-11-17 DIAGNOSIS — E55.9 VITAMIN D DEFICIENCY: ICD-10-CM

## 2017-11-17 DIAGNOSIS — K21.00 REFLUX ESOPHAGITIS: ICD-10-CM

## 2017-11-17 DIAGNOSIS — I25.10 CORONARY ARTERY DISEASE INVOLVING NATIVE CORONARY ARTERY OF NATIVE HEART WITHOUT ANGINA PECTORIS: Primary | ICD-10-CM

## 2017-11-17 DIAGNOSIS — I47.20 VT (VENTRICULAR TACHYCARDIA): ICD-10-CM

## 2017-11-17 DIAGNOSIS — D69.6 THROMBOCYTOPENIA: ICD-10-CM

## 2017-11-17 DIAGNOSIS — E78.2 MIXED HYPERLIPIDEMIA: ICD-10-CM

## 2017-11-17 DIAGNOSIS — K21.00 GASTROESOPHAGEAL REFLUX DISEASE WITH ESOPHAGITIS: ICD-10-CM

## 2017-11-17 PROCEDURE — 99499 UNLISTED E&M SERVICE: CPT | Mod: S$GLB,,, | Performed by: INTERNAL MEDICINE

## 2017-11-17 PROCEDURE — 99215 OFFICE O/P EST HI 40 MIN: CPT | Mod: S$GLB,,, | Performed by: INTERNAL MEDICINE

## 2017-11-17 PROCEDURE — 99999 PR PBB SHADOW E&M-EST. PATIENT-LVL III: CPT | Mod: PBBFAC,,, | Performed by: INTERNAL MEDICINE

## 2017-11-17 NOTE — PATIENT INSTRUCTIONS
Discussed diet , achieving and maintaining ideal body weight, and exercise.   We reviewed meds in detail.  Reassured-discussed options, goals, and plan.

## 2017-11-21 RX ORDER — SOTALOL HYDROCHLORIDE 120 MG/1
TABLET ORAL
Qty: 180 TABLET | Refills: 3 | Status: SHIPPED | OUTPATIENT
Start: 2017-11-21 | End: 2018-08-29 | Stop reason: SDUPTHER

## 2017-12-22 ENCOUNTER — LAB VISIT (OUTPATIENT)
Dept: LAB | Facility: HOSPITAL | Age: 82
End: 2017-12-22
Attending: INTERNAL MEDICINE
Payer: MEDICARE

## 2017-12-22 DIAGNOSIS — D69.6 THROMBOCYTOPENIA: ICD-10-CM

## 2017-12-22 DIAGNOSIS — I10 ESSENTIAL HYPERTENSION: ICD-10-CM

## 2017-12-22 DIAGNOSIS — N18.30 CKD (CHRONIC KIDNEY DISEASE) STAGE 3, GFR 30-59 ML/MIN: ICD-10-CM

## 2017-12-22 LAB
ALBUMIN SERPL BCP-MCNC: 3.9 G/DL
ALP SERPL-CCNC: 52 U/L
ALT SERPL W/O P-5'-P-CCNC: 20 U/L
ANION GAP SERPL CALC-SCNC: 9 MMOL/L
AST SERPL-CCNC: 24 U/L
BASOPHILS # BLD AUTO: 0.08 K/UL
BASOPHILS NFR BLD: 1.1 %
BILIRUB SERPL-MCNC: 0.9 MG/DL
BUN SERPL-MCNC: 20 MG/DL
CALCIUM SERPL-MCNC: 10.1 MG/DL
CHLORIDE SERPL-SCNC: 108 MMOL/L
CO2 SERPL-SCNC: 27 MMOL/L
CREAT SERPL-MCNC: 1.6 MG/DL
DIFFERENTIAL METHOD: ABNORMAL
EOSINOPHIL # BLD AUTO: 0.3 K/UL
EOSINOPHIL NFR BLD: 4.4 %
ERYTHROCYTE [DISTWIDTH] IN BLOOD BY AUTOMATED COUNT: 13.5 %
EST. GFR  (AFRICAN AMERICAN): 45.7 ML/MIN/1.73 M^2
EST. GFR  (NON AFRICAN AMERICAN): 39.5 ML/MIN/1.73 M^2
GLUCOSE SERPL-MCNC: 94 MG/DL
HCT VFR BLD AUTO: 41.4 %
HGB BLD-MCNC: 14.2 G/DL
IMM GRANULOCYTES # BLD AUTO: 0.03 K/UL
IMM GRANULOCYTES NFR BLD AUTO: 0.4 %
LYMPHOCYTES # BLD AUTO: 2.7 K/UL
LYMPHOCYTES NFR BLD: 36.4 %
MCH RBC QN AUTO: 31.6 PG
MCHC RBC AUTO-ENTMCNC: 34.3 G/DL
MCV RBC AUTO: 92 FL
MONOCYTES # BLD AUTO: 0.8 K/UL
MONOCYTES NFR BLD: 10.9 %
NEUTROPHILS # BLD AUTO: 3.5 K/UL
NEUTROPHILS NFR BLD: 46.8 %
NRBC BLD-RTO: 0 /100 WBC
PLATELET # BLD AUTO: 86 K/UL
PMV BLD AUTO: 12.5 FL
POTASSIUM SERPL-SCNC: 3.7 MMOL/L
PROT SERPL-MCNC: 7.2 G/DL
RBC # BLD AUTO: 4.49 M/UL
SODIUM SERPL-SCNC: 144 MMOL/L
WBC # BLD AUTO: 7.44 K/UL

## 2017-12-22 PROCEDURE — 80053 COMPREHEN METABOLIC PANEL: CPT

## 2017-12-22 PROCEDURE — 85025 COMPLETE CBC W/AUTO DIFF WBC: CPT

## 2017-12-22 PROCEDURE — 36415 COLL VENOUS BLD VENIPUNCTURE: CPT | Mod: PO

## 2017-12-27 ENCOUNTER — OFFICE VISIT (OUTPATIENT)
Dept: INTERNAL MEDICINE | Facility: CLINIC | Age: 82
End: 2017-12-27
Payer: MEDICARE

## 2017-12-27 VITALS
HEART RATE: 70 BPM | WEIGHT: 166.69 LBS | DIASTOLIC BLOOD PRESSURE: 60 MMHG | HEIGHT: 73 IN | SYSTOLIC BLOOD PRESSURE: 108 MMHG | BODY MASS INDEX: 22.09 KG/M2

## 2017-12-27 DIAGNOSIS — I10 ESSENTIAL HYPERTENSION: Primary | ICD-10-CM

## 2017-12-27 DIAGNOSIS — N18.30 CKD (CHRONIC KIDNEY DISEASE) STAGE 3, GFR 30-59 ML/MIN: ICD-10-CM

## 2017-12-27 PROCEDURE — 99499 UNLISTED E&M SERVICE: CPT | Mod: S$GLB,,, | Performed by: INTERNAL MEDICINE

## 2017-12-27 PROCEDURE — 99213 OFFICE O/P EST LOW 20 MIN: CPT | Mod: S$GLB,,, | Performed by: INTERNAL MEDICINE

## 2017-12-27 PROCEDURE — 99999 PR PBB SHADOW E&M-EST. PATIENT-LVL III: CPT | Mod: PBBFAC,,, | Performed by: INTERNAL MEDICINE

## 2017-12-27 NOTE — PROGRESS NOTES
Pt. ID: Nelson GIBBONS Parent is a 82 y.o. male      Chief complaint:   Chief Complaint   Patient presents with    Hypertension     follow up       HPI: Pt. Here for f/u for HTN; I reviewed labs dated 12/22/17; platelets were low but stable; he sees hematology; kidney fxn was elevated but stable and he sees renal      Review of Systems   HENT: Negative for hearing loss.    Eyes: Negative for discharge.   Respiratory: Negative for wheezing.    Cardiovascular: Negative for chest pain and palpitations.   Gastrointestinal: Negative for blood in stool, constipation, diarrhea and vomiting.   Genitourinary: Negative for hematuria and urgency.   Musculoskeletal: Negative for neck pain.   Neurological: Negative for weakness and headaches.   Endo/Heme/Allergies: Negative for polydipsia.         Objective:    Physical Exam   Constitutional: He is oriented to person, place, and time. He appears well-developed.   Eyes: EOM are normal.   Neck: Normal range of motion.   Cardiovascular: Normal rate, regular rhythm and normal heart sounds.    Pulmonary/Chest: Effort normal and breath sounds normal.   Abdominal: Soft. There is no tenderness. There is no rebound and no guarding.   Musculoskeletal: Normal range of motion.   Neurological: He is alert and oriented to person, place, and time.   Skin: No rash noted.         Health Maintenance   Topic Date Due    Lipid Panel  11/10/2022    TETANUS VACCINE  11/22/2023    Zoster Vaccine  Addressed    Pneumococcal (65+)  Completed    Influenza Vaccine  Completed         Assessment:     1. Essential hypertension Well controlled   2. CKD (chronic kidney disease) stage 3, GFR 30-59 ml/min Stable         Plan: Essential hypertension  Comments:  continue current regimen and encouraged low Na diet     CKD (chronic kidney disease) stage 3, GFR 30-59 ml/min  Comments:  monitor and f/u renal who is managing; avoid NSAIDs        Problem List Items Addressed This Visit        Cardiac/Vascular     Hypertension - Primary       Renal/    CKD (chronic kidney disease) stage 3, GFR 30-59 ml/min

## 2018-02-08 ENCOUNTER — LAB VISIT (OUTPATIENT)
Dept: LAB | Facility: HOSPITAL | Age: 83
End: 2018-02-08
Attending: INTERNAL MEDICINE
Payer: MEDICARE

## 2018-02-08 DIAGNOSIS — N18.9 ANEMIA ASSOCIATED WITH CHRONIC RENAL FAILURE: ICD-10-CM

## 2018-02-08 DIAGNOSIS — E55.9 VITAMIN D DEFICIENCY: ICD-10-CM

## 2018-02-08 DIAGNOSIS — K22.70 BARRETT'S ESOPHAGUS WITHOUT DYSPLASIA: ICD-10-CM

## 2018-02-08 DIAGNOSIS — D47.2 MGUS (MONOCLONAL GAMMOPATHY OF UNKNOWN SIGNIFICANCE): ICD-10-CM

## 2018-02-08 DIAGNOSIS — R80.0 ISOLATED PROTEINURIA WITHOUT SPECIFIC MORPHOLOGIC LESION: ICD-10-CM

## 2018-02-08 DIAGNOSIS — E78.49 OTHER HYPERLIPIDEMIA: ICD-10-CM

## 2018-02-08 DIAGNOSIS — I25.5 ISCHEMIC CARDIOMYOPATHY: ICD-10-CM

## 2018-02-08 DIAGNOSIS — N18.30 CKD (CHRONIC KIDNEY DISEASE) STAGE 3, GFR 30-59 ML/MIN: ICD-10-CM

## 2018-02-08 DIAGNOSIS — I10 ESSENTIAL HYPERTENSION: ICD-10-CM

## 2018-02-08 DIAGNOSIS — D63.1 ANEMIA ASSOCIATED WITH CHRONIC RENAL FAILURE: ICD-10-CM

## 2018-02-08 LAB
ALBUMIN SERPL BCP-MCNC: 3.9 G/DL
ANION GAP SERPL CALC-SCNC: 7 MMOL/L
BASOPHILS # BLD AUTO: 0.08 K/UL
BASOPHILS NFR BLD: 1.2 %
BUN SERPL-MCNC: 23 MG/DL
CALCIUM SERPL-MCNC: 10 MG/DL
CHLORIDE SERPL-SCNC: 107 MMOL/L
CO2 SERPL-SCNC: 28 MMOL/L
CREAT SERPL-MCNC: 1.8 MG/DL
DIFFERENTIAL METHOD: ABNORMAL
EOSINOPHIL # BLD AUTO: 0.4 K/UL
EOSINOPHIL NFR BLD: 6.2 %
ERYTHROCYTE [DISTWIDTH] IN BLOOD BY AUTOMATED COUNT: 13.2 %
EST. GFR  (AFRICAN AMERICAN): 39.6 ML/MIN/1.73 M^2
EST. GFR  (NON AFRICAN AMERICAN): 34.3 ML/MIN/1.73 M^2
GLUCOSE SERPL-MCNC: 102 MG/DL
HCT VFR BLD AUTO: 42.9 %
HGB BLD-MCNC: 14.6 G/DL
IMM GRANULOCYTES # BLD AUTO: 0.02 K/UL
IMM GRANULOCYTES NFR BLD AUTO: 0.3 %
LYMPHOCYTES # BLD AUTO: 2.5 K/UL
LYMPHOCYTES NFR BLD: 38.1 %
MCH RBC QN AUTO: 31.3 PG
MCHC RBC AUTO-ENTMCNC: 34 G/DL
MCV RBC AUTO: 92 FL
MONOCYTES # BLD AUTO: 0.7 K/UL
MONOCYTES NFR BLD: 11.2 %
NEUTROPHILS # BLD AUTO: 2.8 K/UL
NEUTROPHILS NFR BLD: 43 %
NRBC BLD-RTO: 0 /100 WBC
PHOSPHATE SERPL-MCNC: 3.6 MG/DL
PLATELET # BLD AUTO: 97 K/UL
PMV BLD AUTO: 12.2 FL
POTASSIUM SERPL-SCNC: 4.1 MMOL/L
PTH-INTACT SERPL-MCNC: 40 PG/ML
RBC # BLD AUTO: 4.66 M/UL
SODIUM SERPL-SCNC: 142 MMOL/L
WBC # BLD AUTO: 6.61 K/UL

## 2018-02-08 PROCEDURE — 85025 COMPLETE CBC W/AUTO DIFF WBC: CPT

## 2018-02-08 PROCEDURE — 36415 COLL VENOUS BLD VENIPUNCTURE: CPT | Mod: PO

## 2018-02-08 PROCEDURE — 83970 ASSAY OF PARATHORMONE: CPT

## 2018-02-08 PROCEDURE — 80069 RENAL FUNCTION PANEL: CPT

## 2018-02-12 ENCOUNTER — PES CALL (OUTPATIENT)
Dept: ADMINISTRATIVE | Facility: CLINIC | Age: 83
End: 2018-02-12

## 2018-02-21 ENCOUNTER — OFFICE VISIT (OUTPATIENT)
Dept: NEPHROLOGY | Facility: CLINIC | Age: 83
End: 2018-02-21
Payer: MEDICARE

## 2018-02-21 VITALS
HEART RATE: 65 BPM | BODY MASS INDEX: 22.32 KG/M2 | WEIGHT: 168.44 LBS | HEIGHT: 73 IN | SYSTOLIC BLOOD PRESSURE: 122 MMHG | DIASTOLIC BLOOD PRESSURE: 60 MMHG | OXYGEN SATURATION: 98 %

## 2018-02-21 DIAGNOSIS — E78.5 HYPERLIPIDEMIA, UNSPECIFIED HYPERLIPIDEMIA TYPE: ICD-10-CM

## 2018-02-21 DIAGNOSIS — N18.30 CKD (CHRONIC KIDNEY DISEASE) STAGE 3, GFR 30-59 ML/MIN: Primary | ICD-10-CM

## 2018-02-21 DIAGNOSIS — D47.2 MGUS (MONOCLONAL GAMMOPATHY OF UNKNOWN SIGNIFICANCE): ICD-10-CM

## 2018-02-21 DIAGNOSIS — I10 ESSENTIAL HYPERTENSION: ICD-10-CM

## 2018-02-21 PROCEDURE — 99999 PR PBB SHADOW E&M-EST. PATIENT-LVL III: CPT | Mod: PBBFAC,,, | Performed by: INTERNAL MEDICINE

## 2018-02-21 PROCEDURE — 99213 OFFICE O/P EST LOW 20 MIN: CPT | Mod: S$GLB,,, | Performed by: INTERNAL MEDICINE

## 2018-02-21 PROCEDURE — 1159F MED LIST DOCD IN RCRD: CPT | Mod: S$GLB,,, | Performed by: INTERNAL MEDICINE

## 2018-02-21 PROCEDURE — 3008F BODY MASS INDEX DOCD: CPT | Mod: S$GLB,,, | Performed by: INTERNAL MEDICINE

## 2018-02-21 PROCEDURE — 1126F AMNT PAIN NOTED NONE PRSNT: CPT | Mod: S$GLB,,, | Performed by: INTERNAL MEDICINE

## 2018-02-21 PROCEDURE — 99499 UNLISTED E&M SERVICE: CPT | Mod: S$GLB,,, | Performed by: INTERNAL MEDICINE

## 2018-02-21 NOTE — PROGRESS NOTES
Subjective:       Patient ID: Nelson GIBBONS Parent is a 82 y.o. White male who presents for followup  evaluation of renal function.    HPI   80 yo WM with h/o AAA repair (appears to have been above the renal arteries),TIA, thrombocytopenia, ischemic cardiomyopathy 2015 echo LVEF 45 with diastolic dysfunction, v-tahc, AICD, HTN, Tovar's esophagus on PPI, who recently had a skin infection requiring bactrim. Serum crt had been 1.4 2015 and now is 1.8- 1.9 prior serum crt 2013 as high  as 2.2 He denies NSAIDs, LUTS, dysuria, hematuria, peripheral edema, SOB.  Possible remote nephrolithiasis x 1 episode, denies gout      Interval history:  Overall he feels quite well, following a low sal t mediterranean diet.   Serum creatinine had gone down to 1.4 after stopping Bactrim in the past and now serum creatinine is gradually creep back up to 1.8-  1.9 on labs last week.  He is drinking at least 60 cc of water a day and he has reduced his pantoprazole to 40 mg once a day.  Reviewing the media section there is an upper GI series from January 2016 that demonstrates normal esophagus without Tovar's or ulcer, just a hiatal hernia.  He has been on pantoprazole 40 mg twice a day for at least 8 years he claims.  Recent renal ultrasound did not clearly define renal arteries.  With suggestion of elevated renal resistive indices bilaterally.   11/10/2017 echo demonstrates EF  and grade 1 disatolic dysfunction.   MGUS IgG kappa light chain of 0.27 mg/L present 2/2017, not seen on repeat 6/2017.  UPEP negative in past , ELYSIA 1:160, ANCA negative, UPRCT of 0.11 where it was not detectable in past, renal US with chronic medical disease and simple cysts.   The patient has no symptoms of fatigue, shortness of breath, chest pain, peripheral edema, flank pain, dysuria, hematuria, foamy urine, orange colored urine, nephrolithiasis,  diarrhea, constipation, lower extremity leg cramping, headache, nausea and vomiting, or weakness.      Review  "of Systems   Constitutional: Negative for activity change, appetite change, chills, diaphoresis, fatigue, fever and unexpected weight change.   HENT: Negative for congestion, ear discharge, ear pain, facial swelling, hearing loss, nosebleeds, sinus pressure, sore throat and trouble swallowing.    Eyes: Negative for photophobia, pain, discharge, redness, itching and visual disturbance.   Respiratory: Negative for apnea, cough, chest tightness, shortness of breath and wheezing.    Cardiovascular: Negative for chest pain, palpitations and leg swelling.   Gastrointestinal: Negative for abdominal distention, abdominal pain, constipation, diarrhea and vomiting.   Endocrine: Negative for cold intolerance, heat intolerance, polydipsia and polyuria.   Genitourinary: Negative for decreased urine volume, difficulty urinating, dysuria, flank pain, frequency, hematuria, scrotal swelling, testicular pain and urgency.   Musculoskeletal: Negative for arthralgias, back pain, gait problem, joint swelling, myalgias, neck pain and neck stiffness.   Skin: Negative for color change, pallor, rash and wound.   Allergic/Immunologic: Negative for environmental allergies and food allergies.   Neurological: Negative for dizziness, tremors, seizures, syncope, facial asymmetry, speech difficulty, weakness, light-headedness, numbness and headaches.        Remote TIA   Hematological: Negative for adenopathy. Does not bruise/bleed easily.   Psychiatric/Behavioral: Negative for agitation, behavioral problems, dysphoric mood and sleep disturbance. The patient is not nervous/anxious.        Objective:     Blood pressure 122/60, pulse 65, height 6' 1" (1.854 m), weight 76.4 kg (168 lb 6.9 oz), SpO2 98 %.    Physical Exam   Constitutional: He is oriented to person, place, and time. He appears well-developed and well-nourished. No distress.   Lean  WNWD NAD   HENT:   Head: Normocephalic and atraumatic.   Mouth/Throat: Oropharynx is clear and moist. No " oropharyngeal exudate.   Eyes: Conjunctivae and EOM are normal. Pupils are equal, round, and reactive to light. Right eye exhibits no discharge. Left eye exhibits no discharge. No scleral icterus.   Neck: Normal range of motion. Neck supple. No JVD present. No tracheal deviation present. No thyromegaly present.   Cardiovascular: Normal rate, regular rhythm, normal heart sounds and intact distal pulses.  Exam reveals no gallop and no friction rub.    No murmur heard.  Bilateral DP pulses +2 , no LE edema   Pulmonary/Chest: Effort normal and breath sounds normal. No stridor. No respiratory distress. He has no wheezes. He has no rales. He exhibits no tenderness.   Abdominal: Soft. Bowel sounds are normal. He exhibits no distension and no mass. There is no tenderness. There is no rebound and no guarding. No hernia.   Musculoskeletal: Normal range of motion. He exhibits no edema or tenderness.   Lymphadenopathy:     He has no cervical adenopathy.   Neurological: He is alert and oriented to person, place, and time. No cranial nerve deficit. He exhibits normal muscle tone. Coordination normal.   Skin: Skin is warm and dry. No rash noted. He is not diaphoretic. No erythema. No pallor.   Psychiatric: He has a normal mood and affect. His behavior is normal. Judgment and thought content normal.   Nursing note and vitals reviewed.      Assessment:       1. CKD (chronic kidney disease) stage 3, GFR 30-59 ml/min    2. Essential hypertension    3. Hyperlipidemia, unspecified hyperlipidemia type    4. MGUS (monoclonal gammopathy of unknown significance)        Plan:     82 yo WM with h/o AAA repair (appears to have been above the renal arteries),TIA, thrombocytopenia, ischemic cardiomyopathy, v-tahc, AICD, HTN well controlled, HPL, Tovar's esophagus on PPI, with CKD and remote KIMBERLY, with last serum crt 1.4 in 2015 and now with serum crt 1.9 after use of bactrim for skin infection that has resolved.   IgM kappa 0.27 mg/dl followed  by Hem/Onc    KIMBERLY resolved,  KIMBERLY related to use of bactrim which  may cause a reversible inhibition of renal  tubular secretion of creatinine and elevation of serum crt, and AIN. Would evaluate for obstruction, and obtain US and  serologies as well.    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 It is possible PPI may be contributing to LOYDA, Given dx of Lizandro's esophagus would not change therapy unless ok with PCP and GI. The chronic use of PPI and association, but not causality of PPIs with CKD discussed with patient.  Repeat lila next labs a sit was borderline elevated in past , complements wnl.  +Uprct, minimal, but will repeat and follow serially    Would continue RAAS blockade at this time as there is no hyperkalemia , further recommendations after above evaluation.  Follow serum crt serially, maintaining low sodium diet and good BP control discussed with, more than 50% of the time counseling on sodium/calorie restriction, weight loss and activity for better blood pressure control and prevention of progression of kidney disease  Avoid NSAIDs      Decreased LVEF f/u with PCP on RAAS inhibtion    MGUS: follow by hematology oncology    UPEP negative      1. CKD:    Lab Results   Component Value Date    CREATININE 1.8 (H) 02/08/2018         Proteinuria: stable on lisinopril  Prot/Creat Ratio, Ur   Date Value Ref Range Status   02/08/2018 Unable to calculate 0.00 - 0.20 Final   11/10/2017 Unable to calculate 0.00 - 0.20 Final   06/23/2017 0.11 0.00 - 0.20 Final          HTN:stable       Medication: no change      Monitor BP as directed in instructions    Metabolic acidosis:  none       Hyponatremia: none       Hyperkalemia: none     Lab Results   Component Value Date     02/08/2018    K 4.1 02/08/2018    CO2 28 02/08/2018         Renal  osteodystrophy secondary hyperparathyroidism: taking vit d 3 1000 u daily check pth and vit d next visit  Lab Results   Component Value Date    PTH 40.0 02/08/2018    CALCIUM 10.0 02/08/2018    PHOS 3.6 02/08/2018        Anemia:  none  Lab Results   Component Value Date    HGB 14.6 02/08/2018             Weight: Weight: 76.4 kg (168 lb 6.9 oz). he states that his daily weights are stable  Wt Readings from Last 3 Encounters:   02/21/18 76.4 kg (168 lb 6.9 oz)   12/27/17 75.6 kg (166 lb 10.7 oz)   11/17/17 77.4 kg (170 lb 10.2 oz)          Hyperlipidemia: stable on pravachol  Lab Results   Component Value Date    LDLCALC 52.2 (L) 11/10/2017         D iet:  Education/referral: mediterranean/vegetarian diet discussed      Sodium: 2gm    Potassium:      Phosphorus:      Protein:      Fluid:       ESRD planing: not indicated at this time       Education:       Access:     PCP followup: use of PPI   Referrals:      Please avoid or minimize all NSAIDS (ibuprofen, motrin, aleve, indocin, naprosyn) to minimize the risk to your kidneys.        Dulce avoid and minimize use of all Proton Pump inhibitors (such as nexium prilosec, omeprazole, pantroprazole, protonix)and Please speak with your PCP about switching to H2 blocker such as Pepcid        Follow up in: 3-4 months

## 2018-02-27 ENCOUNTER — PATIENT MESSAGE (OUTPATIENT)
Dept: NEPHROLOGY | Facility: CLINIC | Age: 83
End: 2018-02-27

## 2018-03-19 ENCOUNTER — CLINICAL SUPPORT (OUTPATIENT)
Dept: ELECTROPHYSIOLOGY | Facility: CLINIC | Age: 83
End: 2018-03-19
Payer: MEDICARE

## 2018-03-19 DIAGNOSIS — I47.20 VT (VENTRICULAR TACHYCARDIA): ICD-10-CM

## 2018-03-19 DIAGNOSIS — Z95.810 ICD (IMPLANTABLE CARDIOVERTER-DEFIBRILLATOR) IN PLACE: ICD-10-CM

## 2018-03-19 PROCEDURE — 93283 PRGRMG EVAL IMPLANTABLE DFB: CPT | Mod: S$GLB,,, | Performed by: INTERNAL MEDICINE

## 2018-03-20 ENCOUNTER — OFFICE VISIT (OUTPATIENT)
Dept: HEMATOLOGY/ONCOLOGY | Facility: CLINIC | Age: 83
End: 2018-03-20
Payer: MEDICARE

## 2018-03-20 ENCOUNTER — LAB VISIT (OUTPATIENT)
Dept: LAB | Facility: HOSPITAL | Age: 83
End: 2018-03-20
Attending: INTERNAL MEDICINE
Payer: MEDICARE

## 2018-03-20 VITALS
OXYGEN SATURATION: 95 % | DIASTOLIC BLOOD PRESSURE: 70 MMHG | TEMPERATURE: 98 F | HEART RATE: 68 BPM | SYSTOLIC BLOOD PRESSURE: 155 MMHG | BODY MASS INDEX: 22.73 KG/M2 | WEIGHT: 171.5 LBS | RESPIRATION RATE: 16 BRPM | HEIGHT: 73 IN

## 2018-03-20 DIAGNOSIS — D69.6 THROMBOCYTOPENIA: ICD-10-CM

## 2018-03-20 DIAGNOSIS — D47.2 MGUS (MONOCLONAL GAMMOPATHY OF UNKNOWN SIGNIFICANCE): Primary | ICD-10-CM

## 2018-03-20 DIAGNOSIS — D47.2 MGUS (MONOCLONAL GAMMOPATHY OF UNKNOWN SIGNIFICANCE): ICD-10-CM

## 2018-03-20 LAB
BASOPHILS # BLD AUTO: 0.08 K/UL
BASOPHILS NFR BLD: 1.2 %
DIFFERENTIAL METHOD: ABNORMAL
EOSINOPHIL # BLD AUTO: 0.3 K/UL
EOSINOPHIL NFR BLD: 5.1 %
ERYTHROCYTE [DISTWIDTH] IN BLOOD BY AUTOMATED COUNT: 12.9 %
HCT VFR BLD AUTO: 42.9 %
HGB BLD-MCNC: 14.9 G/DL
IMM GRANULOCYTES # BLD AUTO: 0.02 K/UL
IMM GRANULOCYTES NFR BLD AUTO: 0.3 %
LYMPHOCYTES # BLD AUTO: 2.1 K/UL
LYMPHOCYTES NFR BLD: 31.9 %
MCH RBC QN AUTO: 31.9 PG
MCHC RBC AUTO-ENTMCNC: 34.7 G/DL
MCV RBC AUTO: 92 FL
MONOCYTES # BLD AUTO: 0.8 K/UL
MONOCYTES NFR BLD: 12.4 %
NEUTROPHILS # BLD AUTO: 3.3 K/UL
NEUTROPHILS NFR BLD: 49.1 %
NRBC BLD-RTO: 0 /100 WBC
PLATELET # BLD AUTO: 87 K/UL
PMV BLD AUTO: 11.8 FL
RBC # BLD AUTO: 4.67 M/UL
WBC # BLD AUTO: 6.71 K/UL

## 2018-03-20 PROCEDURE — 3077F SYST BP >= 140 MM HG: CPT | Mod: CPTII,S$GLB,, | Performed by: INTERNAL MEDICINE

## 2018-03-20 PROCEDURE — 3078F DIAST BP <80 MM HG: CPT | Mod: CPTII,S$GLB,, | Performed by: INTERNAL MEDICINE

## 2018-03-20 PROCEDURE — 86334 IMMUNOFIX E-PHORESIS SERUM: CPT

## 2018-03-20 PROCEDURE — 83520 IMMUNOASSAY QUANT NOS NONAB: CPT | Mod: 59

## 2018-03-20 PROCEDURE — 86334 IMMUNOFIX E-PHORESIS SERUM: CPT | Mod: 26,,, | Performed by: PATHOLOGY

## 2018-03-20 PROCEDURE — 36415 COLL VENOUS BLD VENIPUNCTURE: CPT

## 2018-03-20 PROCEDURE — 84165 PROTEIN E-PHORESIS SERUM: CPT

## 2018-03-20 PROCEDURE — 99499 UNLISTED E&M SERVICE: CPT | Mod: S$GLB,,, | Performed by: INTERNAL MEDICINE

## 2018-03-20 PROCEDURE — 85025 COMPLETE CBC W/AUTO DIFF WBC: CPT

## 2018-03-20 PROCEDURE — 99215 OFFICE O/P EST HI 40 MIN: CPT | Mod: S$GLB,,, | Performed by: INTERNAL MEDICINE

## 2018-03-20 PROCEDURE — 99999 PR PBB SHADOW E&M-EST. PATIENT-LVL III: CPT | Mod: PBBFAC,,, | Performed by: INTERNAL MEDICINE

## 2018-03-20 PROCEDURE — 84165 PROTEIN E-PHORESIS SERUM: CPT | Mod: 26,,, | Performed by: PATHOLOGY

## 2018-03-20 NOTE — PROGRESS NOTES
Subjective:       Patient ID: Nelson GIBBONS Parent is a 82 y.o. male.    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   The monoclonal protein was measured at 0.27 grams. The patient does not meet any CRAB criteria. He appears well today. He does not have constitutional B symptoms.  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    Follow-up 9/5/17  Return visit with his wife for evaluation of MGUS. Renal function is stable. CBC is stable with moderate, stable thrombocytopenia.  Total protein remains normal with paraprotein levels pending.    Follow-up 3/20/18  Return visit for MGUS. Renal function remains stable. CBC remains stable- mild, asymptomatic thrombocytopenia.  Total protein is normal. Paraprotein was normal 9/2017. Free light chains pending.  ROS is negative.    HPI  Review of Systems   Constitutional: Negative.    HENT: Negative.    Eyes: Negative.    Respiratory: Negative.    Cardiovascular: Negative.    Gastrointestinal: Negative.    Endocrine: Negative.    Genitourinary: Negative.    Musculoskeletal: Negative.    Skin: Negative.    Allergic/Immunologic: Negative for environmental allergies, food allergies and immunocompromised state.   Neurological: Negative.    Hematological: Negative for adenopathy. Does not bruise/bleed easily.   Psychiatric/Behavioral: Negative.        Objective:      Physical Exam   Constitutional: He is oriented to person, place, and time. He appears well-developed and well-nourished.   HENT:   Head: Normocephalic and atraumatic.   Eyes: Conjunctivae are normal. No scleral icterus.   Neck: Normal range of motion.   Cardiovascular: Normal rate and intact distal pulses.    Pulmonary/Chest: Effort normal. No respiratory distress.   Musculoskeletal: Normal range of motion.   Neurological: He is  alert and oriented to person, place, and time.   Skin: Skin is warm and dry.   Psychiatric: He has a normal mood and affect. His behavior is normal.   Nursing note and vitals reviewed.      Assessment:       1. MGUS (monoclonal gammopathy of unknown significance)    2. Thrombocytopenia        Plan:       MGUS stable. Continue to monitor.  Thrombocytopenia. Continue to monitor  Repeat monoclonal protein markers in 12 months

## 2018-03-21 LAB
ALBUMIN SERPL ELPH-MCNC: 4.09 G/DL
ALPHA1 GLOB SERPL ELPH-MCNC: 0.27 G/DL
ALPHA2 GLOB SERPL ELPH-MCNC: 0.7 G/DL
B-GLOBULIN SERPL ELPH-MCNC: 0.71 G/DL
GAMMA GLOB SERPL ELPH-MCNC: 1.03 G/DL
KAPPA LC SER QL IA: 2.67 MG/DL
KAPPA LC/LAMBDA SER IA: 1.24
LAMBDA LC SER QL IA: 2.15 MG/DL
PROT SERPL-MCNC: 6.8 G/DL

## 2018-03-22 LAB
INTERPRETATION SERPL IFE-IMP: NORMAL
PATHOLOGIST INTERPRETATION IFE: NORMAL

## 2018-03-23 ENCOUNTER — PATIENT MESSAGE (OUTPATIENT)
Dept: HEMATOLOGY/ONCOLOGY | Facility: CLINIC | Age: 83
End: 2018-03-23

## 2018-03-23 LAB — PATHOLOGIST INTERPRETATION SPE: NORMAL

## 2018-03-24 ENCOUNTER — PATIENT MESSAGE (OUTPATIENT)
Dept: CARDIOLOGY | Facility: CLINIC | Age: 83
End: 2018-03-24

## 2018-03-27 ENCOUNTER — PES CALL (OUTPATIENT)
Dept: ADMINISTRATIVE | Facility: CLINIC | Age: 83
End: 2018-03-27

## 2018-04-10 RX ORDER — PRAVASTATIN SODIUM 40 MG/1
TABLET ORAL
Qty: 78 TABLET | Refills: 3 | Status: SHIPPED | OUTPATIENT
Start: 2018-04-10 | End: 2019-02-07 | Stop reason: SDUPTHER

## 2018-05-15 ENCOUNTER — OFFICE VISIT (OUTPATIENT)
Dept: FAMILY MEDICINE | Facility: CLINIC | Age: 83
End: 2018-05-15
Payer: MEDICARE

## 2018-05-15 VITALS
BODY MASS INDEX: 22.62 KG/M2 | HEIGHT: 73 IN | DIASTOLIC BLOOD PRESSURE: 60 MMHG | HEART RATE: 67 BPM | WEIGHT: 170.63 LBS | SYSTOLIC BLOOD PRESSURE: 112 MMHG | OXYGEN SATURATION: 97 %

## 2018-05-15 DIAGNOSIS — D69.6 THROMBOCYTOPENIA: ICD-10-CM

## 2018-05-15 DIAGNOSIS — Z00.00 ENCOUNTER FOR PREVENTIVE HEALTH EXAMINATION: Primary | ICD-10-CM

## 2018-05-15 DIAGNOSIS — I25.5 ISCHEMIC CARDIOMYOPATHY: ICD-10-CM

## 2018-05-15 DIAGNOSIS — I25.10 CORONARY ARTERY DISEASE INVOLVING NATIVE CORONARY ARTERY OF NATIVE HEART WITHOUT ANGINA PECTORIS: ICD-10-CM

## 2018-05-15 DIAGNOSIS — I70.0 AORTIC ATHEROSCLEROSIS: ICD-10-CM

## 2018-05-15 DIAGNOSIS — I10 ESSENTIAL HYPERTENSION: ICD-10-CM

## 2018-05-15 DIAGNOSIS — E55.9 VITAMIN D DEFICIENCY: ICD-10-CM

## 2018-05-15 DIAGNOSIS — K21.00 GASTROESOPHAGEAL REFLUX DISEASE WITH ESOPHAGITIS: ICD-10-CM

## 2018-05-15 DIAGNOSIS — N18.30 CKD (CHRONIC KIDNEY DISEASE) STAGE 3, GFR 30-59 ML/MIN: ICD-10-CM

## 2018-05-15 DIAGNOSIS — E78.5 HYPERLIPIDEMIA, UNSPECIFIED HYPERLIPIDEMIA TYPE: ICD-10-CM

## 2018-05-15 PROBLEM — Z86.79 HISTORY OF CEREBRAL EMBOLISM: Status: ACTIVE | Noted: 2018-05-15

## 2018-05-15 PROCEDURE — 3078F DIAST BP <80 MM HG: CPT | Mod: CPTII,S$GLB,, | Performed by: NURSE PRACTITIONER

## 2018-05-15 PROCEDURE — 99999 PR PBB SHADOW E&M-EST. PATIENT-LVL IV: CPT | Mod: PBBFAC,,, | Performed by: NURSE PRACTITIONER

## 2018-05-15 PROCEDURE — G0439 PPPS, SUBSEQ VISIT: HCPCS | Mod: S$GLB,,, | Performed by: NURSE PRACTITIONER

## 2018-05-15 PROCEDURE — 99499 UNLISTED E&M SERVICE: CPT | Mod: S$PBB,,, | Performed by: NURSE PRACTITIONER

## 2018-05-15 PROCEDURE — 3074F SYST BP LT 130 MM HG: CPT | Mod: CPTII,S$GLB,, | Performed by: NURSE PRACTITIONER

## 2018-05-15 RX ORDER — CHOLECALCIFEROL (VITAMIN D3) 25 MCG
1000 TABLET ORAL
COMMUNITY

## 2018-05-15 NOTE — PROGRESS NOTES
"Nelson Parent presented for a  Medicare AWV and comprehensive Health Risk Assessment today. The following components were reviewed and updated:    · Medical history  · Family History  · Social history  · Allergies and Current Medications  · Health Risk Assessment  · Health Maintenance  · Care Team     ** See Completed Assessments for Annual Wellness Visit within the encounter summary.**       The following assessments were completed:  · Living Situation  · CAGE  · Depression Screening  · Timed Get Up and Go  · Whisper Test  · Cognitive Function Screening  · Nutrition Screening  · ADL Screening  · PAQ Screening    Vitals:    05/15/18 1307   BP: 112/60   BP Location: Right arm   Patient Position: Sitting   BP Method: Medium (Manual)   Pulse: 67   SpO2: 97%   Weight: 77.4 kg (170 lb 10.2 oz)   Height: 6' 1" (1.854 m)     Body mass index is 22.51 kg/m².     Physical Exam   Constitutional: He is oriented to person, place, and time. He appears well-developed and well-nourished. No distress.   HENT:   Head: Normocephalic and atraumatic.   Right sided hearing loss   Eyes: EOM are normal. Pupils are equal, round, and reactive to light.   Neck: Neck supple. No JVD present. No tracheal deviation present.   Cardiovascular: Normal rate, regular rhythm, normal heart sounds and intact distal pulses.    No murmur heard.  Pulmonary/Chest: Effort normal and breath sounds normal. No respiratory distress. He has no wheezes. He has no rales.   Abdominal: Soft. Bowel sounds are normal. He exhibits no distension and no mass. There is no tenderness.   Musculoskeletal: Normal range of motion. He exhibits no edema or tenderness.   Neurological: He is alert and oriented to person, place, and time. Coordination normal.   Skin: Skin is warm and dry. No erythema. No pallor.   Psychiatric: He has a normal mood and affect. His behavior is normal. Judgment and thought content normal. Cognition and memory are normal. He expresses no homicidal and no " suicidal ideation.   Nursing note and vitals reviewed.        Diagnoses and health risks identified today and associated recommendations/orders:    1. Encounter for preventive health examination    2. Ischemic cardiomyopathy  Chronic; stable on medication.  Followed by Cardiology.    3. Essential hypertension  Chronic; stable on medication.  Followed by PCP.    4. Hyperlipidemia, unspecified hyperlipidemia type  Chronic; stable on medication.  Followed by PCP.    5. Aortic atherosclerosis  Chronic; stable.  Followed by Cardiology.    6. Coronary artery disease involving native coronary artery of native heart without angina pectoris  Chronic; stable on medication.  Followed by Cardiology.    7. CKD (chronic kidney disease) stage 3, GFR 30-59 ml/min  Chronic; stable.  Followed by Nephrology.    8. Gastroesophageal reflux disease with esophagitis  Chronic; stable on medication.  Followed by PCP.    9. Thrombocytopenia  Chronic; stable on medication.  Followed by Hem/Onc.    10. Vitamin D deficiency  Chronic; stable on medication.  Followed by PCP.    11. Body mass index (BMI) 22.0-22.9, adult      Provided Edward with a 5-10 year written screening schedule and personal prevention plan. Recommendations were developed using the USPSTF age appropriate recommendations. Education, counseling, and referrals were provided as needed. After Visit Summary printed and given to patient which includes a list of additional screenings\tests needed.    Follow-up in 6 weeks (on 6/28/2018) for follow-up with PCP, Annual Wellness Visit in 1 year.    Yue Marie NP

## 2018-05-15 NOTE — PATIENT INSTRUCTIONS
Counseling and Referral of Other Preventative  (Italic type indicates deductible and co-insurance are waived)    Patient Name: Nelson Huntley  Today's Date: 5/15/2018    Health Maintenance       Date Due Completion Date    Influenza Vaccine 08/01/2018 10/10/2017    Lipid Panel 11/10/2022 11/10/2017    TETANUS VACCINE 11/22/2023 11/22/2013        No orders of the defined types were placed in this encounter.    The following information is provided to all patients.  This information is to help you find resources for any of the problems found today that may be affecting your health:                Living healthy guide: www.[facility].[place].[facility]      Understanding Diabetes: www.diabetes.org      Eating healthy: www.cdc.gov/healthyweight      CDC home safety checklist: www.cdc.gov/steadi/patient.html      Agency on Aging: www.goea.[place].[facility]      Alcoholics anonymous (AA): www.aa.org      Physical Activity: www.blas.nih.gov/gf6mvsl      Tobacco use: www.quitwithusla.org

## 2018-05-29 ENCOUNTER — PATIENT MESSAGE (OUTPATIENT)
Dept: INTERNAL MEDICINE | Facility: CLINIC | Age: 83
End: 2018-05-29

## 2018-06-08 ENCOUNTER — PATIENT MESSAGE (OUTPATIENT)
Dept: ADMINISTRATIVE | Facility: OTHER | Age: 83
End: 2018-06-08

## 2018-06-15 DIAGNOSIS — I25.10 CORONARY ARTERY DISEASE INVOLVING NATIVE CORONARY ARTERY WITHOUT ANGINA PECTORIS, UNSPECIFIED WHETHER NATIVE OR TRANSPLANTED HEART: ICD-10-CM

## 2018-06-15 DIAGNOSIS — I25.2 OLD MYOCARDIAL INFARCTION: ICD-10-CM

## 2018-06-15 DIAGNOSIS — I10 ESSENTIAL HYPERTENSION: ICD-10-CM

## 2018-06-15 DIAGNOSIS — I47.20 VT (VENTRICULAR TACHYCARDIA): ICD-10-CM

## 2018-06-15 DIAGNOSIS — I25.5 ISCHEMIC CARDIOMYOPATHY: ICD-10-CM

## 2018-06-18 ENCOUNTER — PATIENT MESSAGE (OUTPATIENT)
Dept: CARDIOLOGY | Facility: CLINIC | Age: 83
End: 2018-06-18

## 2018-06-18 RX ORDER — LISINOPRIL 40 MG/1
TABLET ORAL
Qty: 90 TABLET | Refills: 3 | Status: SHIPPED | OUTPATIENT
Start: 2018-06-18 | End: 2019-01-08 | Stop reason: ALTCHOICE

## 2018-06-18 RX ORDER — METOPROLOL SUCCINATE 25 MG/1
TABLET, EXTENDED RELEASE ORAL
Qty: 180 TABLET | Refills: 3 | Status: SHIPPED | OUTPATIENT
Start: 2018-06-18 | End: 2019-06-27

## 2018-06-18 RX ORDER — AMLODIPINE BESYLATE 10 MG/1
TABLET ORAL
Qty: 90 TABLET | Refills: 3 | Status: SHIPPED | OUTPATIENT
Start: 2018-06-18 | End: 2019-04-15 | Stop reason: SDUPTHER

## 2018-06-28 ENCOUNTER — OFFICE VISIT (OUTPATIENT)
Dept: INTERNAL MEDICINE | Facility: CLINIC | Age: 83
End: 2018-06-28
Payer: MEDICARE

## 2018-06-28 VITALS
HEIGHT: 73 IN | OXYGEN SATURATION: 99 % | SYSTOLIC BLOOD PRESSURE: 102 MMHG | BODY MASS INDEX: 22.67 KG/M2 | WEIGHT: 171.06 LBS | DIASTOLIC BLOOD PRESSURE: 60 MMHG | HEART RATE: 73 BPM

## 2018-06-28 DIAGNOSIS — I25.10 CORONARY ARTERY DISEASE INVOLVING NATIVE CORONARY ARTERY OF NATIVE HEART WITHOUT ANGINA PECTORIS: ICD-10-CM

## 2018-06-28 DIAGNOSIS — I10 ESSENTIAL HYPERTENSION: Primary | ICD-10-CM

## 2018-06-28 DIAGNOSIS — D47.2 MGUS (MONOCLONAL GAMMOPATHY OF UNKNOWN SIGNIFICANCE): ICD-10-CM

## 2018-06-28 DIAGNOSIS — N18.30 CKD (CHRONIC KIDNEY DISEASE) STAGE 3, GFR 30-59 ML/MIN: ICD-10-CM

## 2018-06-28 DIAGNOSIS — Z12.5 PROSTATE CANCER SCREENING: ICD-10-CM

## 2018-06-28 DIAGNOSIS — Z00.00 PREVENTATIVE HEALTH CARE: ICD-10-CM

## 2018-06-28 DIAGNOSIS — D69.6 THROMBOCYTOPENIA: ICD-10-CM

## 2018-06-28 DIAGNOSIS — E78.5 HYPERLIPIDEMIA, UNSPECIFIED HYPERLIPIDEMIA TYPE: ICD-10-CM

## 2018-06-28 DIAGNOSIS — Z95.810 AICD (AUTOMATIC CARDIOVERTER/DEFIBRILLATOR) PRESENT: ICD-10-CM

## 2018-06-28 PROCEDURE — 99999 PR PBB SHADOW E&M-EST. PATIENT-LVL IV: CPT | Mod: PBBFAC,,, | Performed by: INTERNAL MEDICINE

## 2018-06-28 PROCEDURE — 3078F DIAST BP <80 MM HG: CPT | Mod: CPTII,S$GLB,, | Performed by: INTERNAL MEDICINE

## 2018-06-28 PROCEDURE — 99214 OFFICE O/P EST MOD 30 MIN: CPT | Mod: S$GLB,,, | Performed by: INTERNAL MEDICINE

## 2018-06-28 PROCEDURE — 3074F SYST BP LT 130 MM HG: CPT | Mod: CPTII,S$GLB,, | Performed by: INTERNAL MEDICINE

## 2018-06-28 PROCEDURE — 99499 UNLISTED E&M SERVICE: CPT | Mod: S$PBB,,, | Performed by: INTERNAL MEDICINE

## 2018-06-28 RX ORDER — PANTOPRAZOLE SODIUM 40 MG/1
TABLET, DELAYED RELEASE ORAL
COMMUNITY
Start: 2018-06-19 | End: 2018-06-28

## 2018-06-28 NOTE — PROGRESS NOTES
Pt. ID: Nelson GIBBONS Parent is a 82 y.o. male      Chief complaint:   Chief Complaint   Patient presents with    Follow-up       HPI: Pt. Here for f/u for HTN and CKD; I reviewed labs dated 3/20/18 and 2/8/18; platelets are low but stable; kidney fxn is elevated but stable and pt. Sees renal; he is scheduled for f/u; of note, pt. Is seeing hematology for MGUS and thrombocytopenia; pt. Has f/u cardiology for CAD and AICD; he is compliant with meds     Answers for HPI/ROS submitted by the patient on 6/22/2018   activity change: No  unexpected weight change: No  rhinorrhea: No  trouble swallowing: No  visual disturbance: No  chest tightness: No  polyuria: No  difficulty urinating: No  joint swelling: No  arthralgias: No  confusion: No  dysphoric mood: No      Review of Systems   HENT: Negative for hearing loss.    Eyes: Negative for discharge.   Respiratory: Negative for wheezing.    Cardiovascular: Negative for chest pain and palpitations.   Gastrointestinal: Negative for blood in stool, constipation, diarrhea and vomiting.   Genitourinary: Negative for hematuria and urgency.   Musculoskeletal: Negative for neck pain.   Neurological: Negative for weakness and headaches.   Endo/Heme/Allergies: Negative for polydipsia.         Objective:    Physical Exam   Constitutional: He is oriented to person, place, and time.   Eyes: EOM are normal.   Neck: Normal range of motion.   Cardiovascular: Normal rate, regular rhythm and normal heart sounds.    Pulmonary/Chest: Effort normal and breath sounds normal.   Abdominal: Soft. There is no tenderness. There is no rebound and no guarding.   Musculoskeletal: Normal range of motion.   Neurological: He is alert and oriented to person, place, and time.   Skin: No rash noted.   Vitals reviewed.        Health Maintenance   Topic Date Due    Influenza Vaccine  08/01/2018    Lipid Panel  11/10/2022    Colonoscopy  05/07/2023    TETANUS VACCINE  11/22/2023    Zoster Vaccine  Addressed     Pneumococcal (65+)  Completed         Assessment:     1. Essential hypertension Well controlled   2. Thrombocytopenia Stable   3. CKD (chronic kidney disease) stage 3, GFR 30-59 ml/min Stable   4. Hyperlipidemia, unspecified hyperlipidemia type Active   5. MGUS (monoclonal gammopathy of unknown significance) Stable   6. AICD (automatic cardioverter/defibrillator) present Active   7. Coronary artery disease involving native coronary artery of native heart without angina pectoris Well controlled   8. Prostate cancer screening Active   9. Preventative health care Active         Plan: Nelson was seen today for follow-up.    Diagnoses and all orders for this visit:    Essential hypertension  Comments:  continue current regimen and encouraged low Na diet  Orders:  -     CBC auto differential; Future  -     Comprehensive metabolic panel; Future  -     Urinalysis; Future    Thrombocytopenia  Comments:  monitor   Orders:  -     CBC auto differential; Future    CKD (chronic kidney disease) stage 3, GFR 30-59 ml/min  Comments:  monitor and avoid NSAIDs; f/u renal who is managing    Orders:  -     Comprehensive metabolic panel; Future    Hyperlipidemia, unspecified hyperlipidemia type  Comments:  continue current regimen and encouraged diet modification   Orders:  -     Lipid panel; Future    MGUS (monoclonal gammopathy of unknown significance)  Comments:  f/u hematology who is managing     AICD (automatic cardioverter/defibrillator) present  Comments:  cardiology monitoring     Coronary artery disease involving native coronary artery of native heart without angina pectoris  Comments:  continue current regimen and f/u cardiology     Prostate cancer screening  -     PSA, Screening; Future    Preventative health care  -     CBC auto differential; Future  -     Comprehensive metabolic panel; Future  -     Lipid panel; Future  -     Urinalysis; Future        Problem List Items Addressed This Visit        Cardiac/Vascular    AICD  (automatic cardioverter/defibrillator) present    CAD (coronary artery disease)    Hypertension - Primary    Relevant Orders    CBC auto differential    Comprehensive metabolic panel    Urinalysis    Hyperlipidemia    Relevant Orders    Lipid panel       Renal/    CKD (chronic kidney disease) stage 3, GFR 30-59 ml/min    Relevant Orders    Comprehensive metabolic panel    Prostate cancer screening    Relevant Orders    PSA, Screening       Hematology    Thrombocytopenia    Relevant Orders    CBC auto differential       Oncology    MGUS (monoclonal gammopathy of unknown significance)

## 2018-06-29 ENCOUNTER — LAB VISIT (OUTPATIENT)
Dept: LAB | Facility: HOSPITAL | Age: 83
End: 2018-06-29
Attending: INTERNAL MEDICINE
Payer: MEDICARE

## 2018-06-29 DIAGNOSIS — N18.30 CKD (CHRONIC KIDNEY DISEASE) STAGE 3, GFR 30-59 ML/MIN: ICD-10-CM

## 2018-06-29 DIAGNOSIS — D47.2 MGUS (MONOCLONAL GAMMOPATHY OF UNKNOWN SIGNIFICANCE): ICD-10-CM

## 2018-06-29 DIAGNOSIS — E78.5 HYPERLIPIDEMIA, UNSPECIFIED HYPERLIPIDEMIA TYPE: ICD-10-CM

## 2018-06-29 DIAGNOSIS — I10 ESSENTIAL HYPERTENSION: ICD-10-CM

## 2018-06-29 LAB
ALBUMIN SERPL BCP-MCNC: 4.2 G/DL
ANION GAP SERPL CALC-SCNC: 8 MMOL/L
BASOPHILS # BLD AUTO: 0.07 K/UL
BASOPHILS NFR BLD: 1.1 %
BUN SERPL-MCNC: 14 MG/DL
CALCIUM SERPL-MCNC: 10.5 MG/DL
CHLORIDE SERPL-SCNC: 104 MMOL/L
CO2 SERPL-SCNC: 29 MMOL/L
CREAT SERPL-MCNC: 1.6 MG/DL
DIFFERENTIAL METHOD: ABNORMAL
EOSINOPHIL # BLD AUTO: 0.3 K/UL
EOSINOPHIL NFR BLD: 4 %
ERYTHROCYTE [DISTWIDTH] IN BLOOD BY AUTOMATED COUNT: 13.5 %
EST. GFR  (AFRICAN AMERICAN): 45.7 ML/MIN/1.73 M^2
EST. GFR  (NON AFRICAN AMERICAN): 39.5 ML/MIN/1.73 M^2
GLUCOSE SERPL-MCNC: 137 MG/DL
HCT VFR BLD AUTO: 43.8 %
HGB BLD-MCNC: 15.3 G/DL
IMM GRANULOCYTES # BLD AUTO: 0.02 K/UL
IMM GRANULOCYTES NFR BLD AUTO: 0.3 %
LYMPHOCYTES # BLD AUTO: 2.2 K/UL
LYMPHOCYTES NFR BLD: 33.7 %
MCH RBC QN AUTO: 32.2 PG
MCHC RBC AUTO-ENTMCNC: 34.9 G/DL
MCV RBC AUTO: 92 FL
MONOCYTES # BLD AUTO: 0.7 K/UL
MONOCYTES NFR BLD: 10.1 %
NEUTROPHILS # BLD AUTO: 3.3 K/UL
NEUTROPHILS NFR BLD: 50.8 %
NRBC BLD-RTO: 0 /100 WBC
PHOSPHATE SERPL-MCNC: 3.2 MG/DL
PLATELET # BLD AUTO: 86 K/UL
PMV BLD AUTO: 12.4 FL
POTASSIUM SERPL-SCNC: 3.9 MMOL/L
RBC # BLD AUTO: 4.75 M/UL
SODIUM SERPL-SCNC: 141 MMOL/L
WBC # BLD AUTO: 6.46 K/UL

## 2018-06-29 PROCEDURE — 80069 RENAL FUNCTION PANEL: CPT

## 2018-06-29 PROCEDURE — 85025 COMPLETE CBC W/AUTO DIFF WBC: CPT

## 2018-06-29 PROCEDURE — 36415 COLL VENOUS BLD VENIPUNCTURE: CPT | Mod: PO

## 2018-07-05 ENCOUNTER — OFFICE VISIT (OUTPATIENT)
Dept: NEPHROLOGY | Facility: CLINIC | Age: 83
End: 2018-07-05
Payer: MEDICARE

## 2018-07-05 VITALS
BODY MASS INDEX: 22.66 KG/M2 | DIASTOLIC BLOOD PRESSURE: 74 MMHG | OXYGEN SATURATION: 98 % | HEART RATE: 62 BPM | WEIGHT: 171 LBS | HEIGHT: 73 IN | SYSTOLIC BLOOD PRESSURE: 118 MMHG

## 2018-07-05 DIAGNOSIS — M85.88 OTHER SPECIFIED DISORDERS OF BONE DENSITY AND STRUCTURE, OTHER SITE: ICD-10-CM

## 2018-07-05 DIAGNOSIS — E78.49 OTHER HYPERLIPIDEMIA: ICD-10-CM

## 2018-07-05 DIAGNOSIS — E83.52 HYPERCALCEMIA: ICD-10-CM

## 2018-07-05 DIAGNOSIS — D47.2 MGUS (MONOCLONAL GAMMOPATHY OF UNKNOWN SIGNIFICANCE): ICD-10-CM

## 2018-07-05 DIAGNOSIS — I10 HYPERTENSION, UNSPECIFIED TYPE: ICD-10-CM

## 2018-07-05 DIAGNOSIS — N18.30 CKD (CHRONIC KIDNEY DISEASE) STAGE 3, GFR 30-59 ML/MIN: ICD-10-CM

## 2018-07-05 DIAGNOSIS — M89.8X9 METABOLIC BONE DISEASE: ICD-10-CM

## 2018-07-05 DIAGNOSIS — I25.5 ISCHEMIC CARDIOMYOPATHY: Primary | ICD-10-CM

## 2018-07-05 DIAGNOSIS — E55.9 VITAMIN D DEFICIENCY: ICD-10-CM

## 2018-07-05 PROCEDURE — 99499 UNLISTED E&M SERVICE: CPT | Mod: HCNC,S$GLB,, | Performed by: INTERNAL MEDICINE

## 2018-07-05 PROCEDURE — 99213 OFFICE O/P EST LOW 20 MIN: CPT | Mod: S$GLB,,, | Performed by: INTERNAL MEDICINE

## 2018-07-05 PROCEDURE — 3074F SYST BP LT 130 MM HG: CPT | Mod: CPTII,S$GLB,, | Performed by: INTERNAL MEDICINE

## 2018-07-05 PROCEDURE — 99999 PR PBB SHADOW E&M-EST. PATIENT-LVL III: CPT | Mod: PBBFAC,,, | Performed by: INTERNAL MEDICINE

## 2018-07-05 PROCEDURE — 3078F DIAST BP <80 MM HG: CPT | Mod: CPTII,S$GLB,, | Performed by: INTERNAL MEDICINE

## 2018-07-05 NOTE — PROGRESS NOTES
Subjective:       Patient ID: Nelson GIBBONS Parent is a 82 y.o. White male who presents for followup  evaluation of renal function.    HPI   82 yo WM with h/o AAA repair (appears to have been above the renal arteries),TIA, thrombocytopenia, ischemic cardiomyopathy 2015 echo LVEF 45 with diastolic dysfunction, v-tahc, AICD, HTN, Tovar's esophagus on PPI, who recently had a skin infection requiring bactrim. Serum crt had been 1.4 2015 and now is 1.8- 1.9 prior serum crt 2013 as high  as 2.2 He denies NSAIDs, LUTS, dysuria, hematuria, peripheral edema, SOB.  Possible remote nephrolithiasis x 1 episode, denies gout      Interval history:  Overall he feels quite well, following a low sal t mediterranean diet.   Serum creatinine had gone down to 1.6   On zantac rather than pantoprazole daily   Calcium 10.5 last pth 40 albumin 4.2 anion gap low normal   last seen by hem/onc 3/2018  No SOB, dysuria , hematuria       Recent renal ultrasound did not clearly define renal arteries.  With suggestion of elevated renal resistive indices bilaterally.   11/10/2017 echo demonstrates EF  and grade 1 disatolic dysfunction.   MGUS IgG kappa light chain of 0.27 mg/L present 2/2017, not seen on repeat 6/2017.  UPEP negative in past , ELYSIA 1:160, ANCA negative, UPRCT of 0.11 where it was not detectable in past, renal US with chronic medical disease and simple cysts.   The patient has no symptoms of fatigue, shortness of breath, chest pain, peripheral edema, flank pain, dysuria, hematuria, foamy urine, orange colored urine, nephrolithiasis,  diarrhea, constipation, lower extremity leg cramping, headache, nausea and vomiting, or weakness.      Review of Systems   Constitutional: Negative for activity change, appetite change, chills, diaphoresis, fatigue, fever and unexpected weight change.   HENT: Negative for congestion, ear discharge, ear pain, facial swelling, hearing loss, nosebleeds, sinus pressure, sore throat and trouble swallowing.  "   Eyes: Negative for photophobia, pain, discharge, redness, itching and visual disturbance.   Respiratory: Negative for apnea, cough, chest tightness, shortness of breath and wheezing.    Cardiovascular: Negative for chest pain, palpitations and leg swelling.   Gastrointestinal: Negative for abdominal distention, abdominal pain, constipation, diarrhea and vomiting.   Endocrine: Negative for cold intolerance, heat intolerance, polydipsia and polyuria.   Genitourinary: Negative for decreased urine volume, difficulty urinating, dysuria, flank pain, frequency, hematuria, scrotal swelling, testicular pain and urgency.   Musculoskeletal: Negative for arthralgias, back pain, gait problem, joint swelling, myalgias, neck pain and neck stiffness.   Skin: Negative for color change, pallor, rash and wound.   Allergic/Immunologic: Negative for environmental allergies and food allergies.   Neurological: Negative for dizziness, tremors, seizures, syncope, facial asymmetry, speech difficulty, weakness, light-headedness, numbness and headaches.        Remote TIA   Hematological: Negative for adenopathy. Does not bruise/bleed easily.   Psychiatric/Behavioral: Negative for agitation, behavioral problems, dysphoric mood and sleep disturbance. The patient is not nervous/anxious.        Objective:     Blood pressure 118/74, pulse 62, height 6' 1" (1.854 m), weight 77.6 kg (171 lb), SpO2 98 %.    Physical Exam   Constitutional: He is oriented to person, place, and time. He appears well-developed and well-nourished. No distress.   Lean  WNWD NAD   HENT:   Head: Normocephalic and atraumatic.   Mouth/Throat: Oropharynx is clear and moist. No oropharyngeal exudate.   Eyes: Conjunctivae and EOM are normal. Pupils are equal, round, and reactive to light. Right eye exhibits no discharge. Left eye exhibits no discharge. No scleral icterus.   Neck: Normal range of motion. Neck supple. No JVD present. No tracheal deviation present. No thyromegaly " present.   Cardiovascular: Normal rate, regular rhythm, normal heart sounds and intact distal pulses.  Exam reveals no gallop and no friction rub.    No murmur heard.  Bilateral DP pulses +2 , no LE edema   Pulmonary/Chest: Effort normal and breath sounds normal. No stridor. No respiratory distress. He has no wheezes. He has no rales. He exhibits no tenderness.   Abdominal: Soft. Bowel sounds are normal. He exhibits no distension and no mass. There is no tenderness. There is no rebound and no guarding. No hernia.   Musculoskeletal: Normal range of motion. He exhibits no edema or tenderness.   Lymphadenopathy:     He has no cervical adenopathy.   Neurological: He is alert and oriented to person, place, and time. No cranial nerve deficit. He exhibits normal muscle tone. Coordination normal.   Skin: Skin is warm and dry. No rash noted. He is not diaphoretic. No erythema. No pallor.   Psychiatric: He has a normal mood and affect. His behavior is normal. Judgment and thought content normal.   Nursing note and vitals reviewed.      Assessment:       No diagnosis found.    Plan:     82 yo WM with h/o AAA repair (appears to have been above the renal arteries),TIA, thrombocytopenia, ischemic cardiomyopathy, v-tahc, AICD, HTN well controlled, HPL, Tovar's esophagus on PPI, with CKD and remote KIMBERLY, with last serum crt 1.4 in 2015 and now with serum crt 1.9 after use of bactrim for skin infection that has resolved.   IgM kappa 0.27---> .43  mg/dl followed by Hem/Onc    KIMBERLY resolved,  KIMBERLY related to use of bactrim which  may cause a reversible inhibition of renal  tubular secretion of creatinine and elevation of serum crt, and AIN. Would evaluate for obstruction, and obtain US and  serologies as well.    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 It is  possible PPI may be contributing to LOYDA, Given dx of Lizandro's esophagus would not change therapy unless ok with PCP and GI. The chronic use of PPI and association, but not causality of PPIs with CKD discussed with patient., he is now on Zantac instead.  Repeat lila next labs a sit was borderline elevated in past , complements wnl.  +Uprct, minimal, but will repeat and follow serially    Would continue RAAS blockade at this time as there is no hyperkalemia , further recommendations after above evaluation.  Follow serum crt serially, maintaining low sodium diet and good BP control discussed with, more than 50% of the time counseling on sodium/calorie restriction, weight loss and activity for better blood pressure control and prevention of progression of kidney disease  Avoid NSAIDs      Decreased LVEF f/u with PCP on RAAS inhibition, consider repeat echo    MGUS: follow by hematology oncology    UPEP negative   Bordrline hypercalcemia, which appears to be trending up with PTH of 40 in pat,   repeat pthvit d level and ionized calcium ( alb 4.2),  With secondary hPTH related to renal disease would expect lower calcium.   Other possibilities include adynamic bone disease, in which case he may be a candidate for teripartide ( this may require a bone bx),     continue to observe with MGUS and repeat spep/ipep, free light chains  with next labs    will order Bone Dexa to eval for osteopenia/osteoproriss in [facility] case he may be a candidate for Prolia with elevated calcium   reduce vit d 1000 u tiw  Follow calcium serially       1. CKD:  Stable   Lab Results   Component Value Date    CREATININE 1.6 (H) 06/29/2018         Proteinuria: stable on lisinopril  Prot/Creat Ratio, Ur   Date Value Ref Range Status   06/29/2018 0.07 0.00 - 0.20 Final   02/08/2018 Unable to calculate 0.00 - 0.20 Final   11/10/2017 Unable to calculate 0.00 - 0.20 Final          HTN:stable       Medication: no change      Monitor BP as directed in  instructions    Metabolic acidosis:  none       Hyponatremia: none       Hyperkalemia: none     Lab Results   Component Value Date     06/29/2018    K 3.9 06/29/2018    CO2 29 06/29/2018         Renal osteodystrophy secondary hyperparathyroidism: see above   Lab Results   Component Value Date    PTH 40.0 02/08/2018    CALCIUM 10.5 06/29/2018    PHOS 3.2 06/29/2018        Anemia:  none  Lab Results   Component Value Date    HGB 15.3 06/29/2018             Weight: Weight: 77.6 kg (171 lb). he states that his daily weights are stable  Wt Readings from Last 3 Encounters:   07/05/18 77.6 kg (171 lb)   06/28/18 77.6 kg (171 lb 1.2 oz)   05/15/18 77.4 kg (170 lb 10.2 oz)          Hyperlipidemia: stable on pravachol  Lab Results   Component Value Date    LDLCALC 52.2 (L) 11/10/2017         D iet:  Education/referral: mediterranean/vegetarian diet discussed      Sodium: 2gm    Potassium:      Phosphorus:      Protein:      Fluid:       ESRD planing: not indicated at this time       Education:       Access:     PCP followup: cardiology eval    Referrals:      Please avoid or minimize all NSAIDS (ibuprofen, motrin, aleve, indocin, naprosyn) to minimize the risk to your kidneys.        Dulce avoid and minimize use of all Proton Pump inhibitors (such as nexium prilosec, omeprazole, pantroprazole, protonix)and Please speak with your PCP about switching to H2 blocker such as Pepcid        Follow up in: 4 months

## 2018-07-10 ENCOUNTER — HOSPITAL ENCOUNTER (OUTPATIENT)
Dept: RADIOLOGY | Facility: HOSPITAL | Age: 83
Discharge: HOME | End: 2018-07-10
Attending: INTERNAL MEDICINE
Payer: MEDICARE

## 2018-07-10 DIAGNOSIS — D47.2 MGUS (MONOCLONAL GAMMOPATHY OF UNKNOWN SIGNIFICANCE): ICD-10-CM

## 2018-07-10 DIAGNOSIS — E55.9 VITAMIN D DEFICIENCY: ICD-10-CM

## 2018-07-10 DIAGNOSIS — N18.30 CKD (CHRONIC KIDNEY DISEASE) STAGE 3, GFR 30-59 ML/MIN: ICD-10-CM

## 2018-07-10 DIAGNOSIS — I10 HYPERTENSION, UNSPECIFIED TYPE: ICD-10-CM

## 2018-07-10 DIAGNOSIS — E83.52 HYPERCALCEMIA: ICD-10-CM

## 2018-07-10 DIAGNOSIS — E78.49 OTHER HYPERLIPIDEMIA: ICD-10-CM

## 2018-07-10 DIAGNOSIS — M85.88 OTHER SPECIFIED DISORDERS OF BONE DENSITY AND STRUCTURE, OTHER SITE: ICD-10-CM

## 2018-07-10 DIAGNOSIS — I25.5 ISCHEMIC CARDIOMYOPATHY: ICD-10-CM

## 2018-07-10 DIAGNOSIS — M89.8X9 METABOLIC BONE DISEASE: ICD-10-CM

## 2018-07-10 PROCEDURE — 77080 DXA BONE DENSITY AXIAL: CPT | Mod: TC

## 2018-07-10 PROCEDURE — 77080 DXA BONE DENSITY AXIAL: CPT | Mod: 26,,, | Performed by: RADIOLOGY

## 2018-07-19 ENCOUNTER — CLINICAL SUPPORT (OUTPATIENT)
Dept: ELECTROPHYSIOLOGY | Facility: CLINIC | Age: 83
End: 2018-07-19
Payer: MEDICARE

## 2018-07-19 DIAGNOSIS — I47.20 VT (VENTRICULAR TACHYCARDIA): ICD-10-CM

## 2018-07-19 DIAGNOSIS — Z95.810 ICD (IMPLANTABLE CARDIOVERTER-DEFIBRILLATOR) IN PLACE: ICD-10-CM

## 2018-07-19 PROCEDURE — 93283 PRGRMG EVAL IMPLANTABLE DFB: CPT | Mod: S$GLB,,, | Performed by: INTERNAL MEDICINE

## 2018-08-27 ENCOUNTER — TELEPHONE (OUTPATIENT)
Dept: OPTOMETRY | Facility: CLINIC | Age: 83
End: 2018-08-27

## 2018-08-27 NOTE — TELEPHONE ENCOUNTER
----- Message from Alma Woodard sent at 8/27/2018  1:07 PM CDT -----  Contact: Parent,Luiz GIBBONS   The MsJaylonCarlene from the [facility] would like to speak with  nurse please about the meds,she can be reached at [phone] [phone] please thank you.

## 2018-08-29 RX ORDER — SOTALOL HYDROCHLORIDE 120 MG/1
TABLET ORAL
Qty: 180 TABLET | Refills: 3 | Status: SHIPPED | OUTPATIENT
Start: 2018-08-29 | End: 2019-06-13 | Stop reason: SDUPTHER

## 2018-09-12 ENCOUNTER — PATIENT MESSAGE (OUTPATIENT)
Dept: CARDIOLOGY | Facility: CLINIC | Age: 83
End: 2018-09-12

## 2018-09-13 ENCOUNTER — PATIENT MESSAGE (OUTPATIENT)
Dept: CARDIOLOGY | Facility: CLINIC | Age: 83
End: 2018-09-13

## 2018-09-24 ENCOUNTER — HOSPITAL ENCOUNTER (OUTPATIENT)
Facility: HOSPITAL | Age: 83
Discharge: HOME | End: 2018-09-25
Attending: EMERGENCY MEDICINE | Admitting: HOSPITALIST
Payer: MEDICARE

## 2018-09-24 DIAGNOSIS — R53.83 FATIGUE: ICD-10-CM

## 2018-09-24 DIAGNOSIS — R55 SYNCOPE: Primary | ICD-10-CM

## 2018-09-24 DIAGNOSIS — R55 SYNCOPE AND COLLAPSE: ICD-10-CM

## 2018-09-24 PROBLEM — R42 DIZZINESS: Chronic | Status: ACTIVE | Noted: 2018-09-24

## 2018-09-24 PROBLEM — Z86.79 HISTORY OF CEREBRAL EMBOLISM: Chronic | Status: ACTIVE | Noted: 2018-05-15

## 2018-09-24 PROBLEM — R42 DIZZINESS: Status: ACTIVE | Noted: 2018-09-24

## 2018-09-24 LAB
ALBUMIN SERPL BCP-MCNC: 3.7 G/DL
ALP SERPL-CCNC: 49 U/L
ALT SERPL W/O P-5'-P-CCNC: 21 U/L
ANION GAP SERPL CALC-SCNC: 9 MMOL/L
APTT BLDCRRT: 22.2 SEC
AST SERPL-CCNC: 24 U/L
BASOPHILS # BLD AUTO: 0.03 K/UL
BASOPHILS # BLD AUTO: 0.03 K/UL
BASOPHILS NFR BLD: 0.4 %
BASOPHILS NFR BLD: 0.4 %
BILIRUB SERPL-MCNC: 1.1 MG/DL
BILIRUB UR QL STRIP: NEGATIVE
BNP SERPL-MCNC: 170 PG/ML
BUN SERPL-MCNC: 16 MG/DL
CALCIUM SERPL-MCNC: 9.5 MG/DL
CHLORIDE SERPL-SCNC: 108 MMOL/L
CLARITY UR: CLEAR
CO2 SERPL-SCNC: 24 MMOL/L
COLOR UR: YELLOW
CREAT SERPL-MCNC: 1.6 MG/DL
DIFFERENTIAL METHOD: ABNORMAL
DIFFERENTIAL METHOD: ABNORMAL
EOSINOPHIL # BLD AUTO: 0.1 K/UL
EOSINOPHIL # BLD AUTO: 0.3 K/UL
EOSINOPHIL NFR BLD: 1.5 %
EOSINOPHIL NFR BLD: 3.1 %
ERYTHROCYTE [DISTWIDTH] IN BLOOD BY AUTOMATED COUNT: 13.6 %
ERYTHROCYTE [DISTWIDTH] IN BLOOD BY AUTOMATED COUNT: 13.6 %
EST. GFR  (AFRICAN AMERICAN): 45 ML/MIN/1.73 M^2
EST. GFR  (NON AFRICAN AMERICAN): 39 ML/MIN/1.73 M^2
GLUCOSE SERPL-MCNC: 103 MG/DL
GLUCOSE UR QL STRIP: NEGATIVE
HCT VFR BLD AUTO: 42.7 %
HCT VFR BLD AUTO: 42.9 %
HGB BLD-MCNC: 14.8 G/DL
HGB BLD-MCNC: 14.8 G/DL
HGB UR QL STRIP: ABNORMAL
INR PPP: 1.1
KETONES UR QL STRIP: NEGATIVE
LEUKOCYTE ESTERASE UR QL STRIP: NEGATIVE
LYMPHOCYTES # BLD AUTO: 1.6 K/UL
LYMPHOCYTES # BLD AUTO: 1.6 K/UL
LYMPHOCYTES NFR BLD: 19.2 %
LYMPHOCYTES NFR BLD: 19.6 %
MCH RBC QN AUTO: 31 PG
MCH RBC QN AUTO: 31.2 PG
MCHC RBC AUTO-ENTMCNC: 34.5 G/DL
MCHC RBC AUTO-ENTMCNC: 34.7 G/DL
MCV RBC AUTO: 90 FL
MCV RBC AUTO: 90 FL
MONOCYTES # BLD AUTO: 0.6 K/UL
MONOCYTES # BLD AUTO: 0.9 K/UL
MONOCYTES NFR BLD: 10.3 %
MONOCYTES NFR BLD: 7.4 %
NEUTROPHILS # BLD AUTO: 5.5 K/UL
NEUTROPHILS # BLD AUTO: 5.9 K/UL
NEUTROPHILS NFR BLD: 66.5 %
NEUTROPHILS NFR BLD: 70.6 %
NITRITE UR QL STRIP: NEGATIVE
PH UR STRIP: 6 [PH] (ref 5–8)
PLATELET # BLD AUTO: 80 K/UL
PLATELET # BLD AUTO: 87 K/UL
PMV BLD AUTO: 11.6 FL
PMV BLD AUTO: 12 FL
POCT GLUCOSE: 98 MG/DL (ref 70–110)
POTASSIUM SERPL-SCNC: 4.4 MMOL/L
PROT SERPL-MCNC: 6.1 G/DL
PROT UR QL STRIP: NEGATIVE
PROTHROMBIN TIME: 11.5 SEC
RBC # BLD AUTO: 4.75 M/UL
RBC # BLD AUTO: 4.77 M/UL
SODIUM SERPL-SCNC: 141 MMOL/L
SP GR UR STRIP: 1.01 (ref 1–1.03)
TROPONIN I SERPL DL<=0.01 NG/ML-MCNC: 0.01 NG/ML
TSH SERPL DL<=0.005 MIU/L-ACNC: 1.75 UIU/ML
URN SPEC COLLECT METH UR: ABNORMAL
UROBILINOGEN UR STRIP-ACNC: NEGATIVE EU/DL
WBC # BLD AUTO: 8.27 K/UL
WBC # BLD AUTO: 8.29 K/UL

## 2018-09-24 PROCEDURE — G0378 HOSPITAL OBSERVATION PER HR: HCPCS

## 2018-09-24 PROCEDURE — 82962 GLUCOSE BLOOD TEST: CPT

## 2018-09-24 PROCEDURE — 85025 COMPLETE CBC W/AUTO DIFF WBC: CPT | Mod: 91

## 2018-09-24 PROCEDURE — 84443 ASSAY THYROID STIM HORMONE: CPT

## 2018-09-24 PROCEDURE — 83880 ASSAY OF NATRIURETIC PEPTIDE: CPT

## 2018-09-24 PROCEDURE — 94761 N-INVAS EAR/PLS OXIMETRY MLT: CPT

## 2018-09-24 PROCEDURE — 80053 COMPREHEN METABOLIC PANEL: CPT

## 2018-09-24 PROCEDURE — 93005 ELECTROCARDIOGRAM TRACING: CPT

## 2018-09-24 PROCEDURE — 25000003 PHARM REV CODE 250: Performed by: PHYSICIAN ASSISTANT

## 2018-09-24 PROCEDURE — 25000003 PHARM REV CODE 250: Performed by: NURSE PRACTITIONER

## 2018-09-24 PROCEDURE — 81003 URINALYSIS AUTO W/O SCOPE: CPT

## 2018-09-24 PROCEDURE — 85610 PROTHROMBIN TIME: CPT

## 2018-09-24 PROCEDURE — 99285 EMERGENCY DEPT VISIT HI MDM: CPT | Mod: 25

## 2018-09-24 PROCEDURE — 36415 COLL VENOUS BLD VENIPUNCTURE: CPT

## 2018-09-24 PROCEDURE — 93010 ELECTROCARDIOGRAM REPORT: CPT | Mod: ,,, | Performed by: INTERNAL MEDICINE

## 2018-09-24 PROCEDURE — 85730 THROMBOPLASTIN TIME PARTIAL: CPT

## 2018-09-24 PROCEDURE — 84484 ASSAY OF TROPONIN QUANT: CPT | Mod: 91

## 2018-09-24 PROCEDURE — 25000003 PHARM REV CODE 250: Performed by: HOSPITALIST

## 2018-09-24 PROCEDURE — 84484 ASSAY OF TROPONIN QUANT: CPT

## 2018-09-24 PROCEDURE — 93010 ELECTROCARDIOGRAM REPORT: CPT | Mod: 76,,, | Performed by: INTERNAL MEDICINE

## 2018-09-24 RX ORDER — FAMOTIDINE 20 MG/1
20 TABLET, FILM COATED ORAL 2 TIMES DAILY
Status: DISCONTINUED | OUTPATIENT
Start: 2018-09-24 | End: 2018-09-25 | Stop reason: HOSPADM

## 2018-09-24 RX ORDER — ACETAMINOPHEN 325 MG/1
650 TABLET ORAL EVERY 6 HOURS PRN
Status: DISCONTINUED | OUTPATIENT
Start: 2018-09-24 | End: 2018-09-25 | Stop reason: HOSPADM

## 2018-09-24 RX ORDER — LISINOPRIL 20 MG/1
40 TABLET ORAL DAILY
Status: DISCONTINUED | OUTPATIENT
Start: 2018-09-25 | End: 2018-09-25 | Stop reason: HOSPADM

## 2018-09-24 RX ORDER — AMLODIPINE BESYLATE 5 MG/1
10 TABLET ORAL DAILY
Status: DISCONTINUED | OUTPATIENT
Start: 2018-09-25 | End: 2018-09-25 | Stop reason: HOSPADM

## 2018-09-24 RX ORDER — CLOPIDOGREL BISULFATE 75 MG/1
75 TABLET ORAL ONCE
Status: DISCONTINUED | OUTPATIENT
Start: 2018-09-24 | End: 2018-09-25 | Stop reason: HOSPADM

## 2018-09-24 RX ORDER — NAPROXEN SODIUM 220 MG/1
81 TABLET, FILM COATED ORAL DAILY
Status: DISCONTINUED | OUTPATIENT
Start: 2018-09-25 | End: 2018-09-25 | Stop reason: HOSPADM

## 2018-09-24 RX ORDER — SOTALOL HYDROCHLORIDE 80 MG/1
120 TABLET ORAL EVERY 12 HOURS
Status: DISCONTINUED | OUTPATIENT
Start: 2018-09-24 | End: 2018-09-25 | Stop reason: HOSPADM

## 2018-09-24 RX ORDER — SODIUM CHLORIDE 9 MG/ML
1000 INJECTION, SOLUTION INTRAVENOUS
Status: COMPLETED | OUTPATIENT
Start: 2018-09-24 | End: 2018-09-24

## 2018-09-24 RX ORDER — CHOLECALCIFEROL (VITAMIN D3) 25 MCG
1000 TABLET ORAL
Status: DISCONTINUED | OUTPATIENT
Start: 2018-09-25 | End: 2018-09-25 | Stop reason: HOSPADM

## 2018-09-24 RX ORDER — MULTIVIT WITH IRON,MINERALS
1500 TABLET ORAL NIGHTLY
Status: DISCONTINUED | OUTPATIENT
Start: 2018-09-24 | End: 2018-09-25 | Stop reason: SDUPTHER

## 2018-09-24 RX ORDER — ONDANSETRON 2 MG/ML
4 INJECTION INTRAMUSCULAR; INTRAVENOUS EVERY 6 HOURS PRN
Status: DISCONTINUED | OUTPATIENT
Start: 2018-09-24 | End: 2018-09-25 | Stop reason: HOSPADM

## 2018-09-24 RX ORDER — PRAVASTATIN SODIUM 40 MG/1
40 TABLET ORAL DAILY
Status: DISCONTINUED | OUTPATIENT
Start: 2018-09-25 | End: 2018-09-25 | Stop reason: HOSPADM

## 2018-09-24 RX ADMIN — METOPROLOL SUCCINATE 12.5 MG: 25 TABLET, EXTENDED RELEASE ORAL at 08:09

## 2018-09-24 RX ADMIN — FAMOTIDINE 20 MG: 20 TABLET, FILM COATED ORAL at 08:09

## 2018-09-24 RX ADMIN — SODIUM CHLORIDE 1000 ML: 0.9 INJECTION, SOLUTION INTRAVENOUS at 03:09

## 2018-09-24 RX ADMIN — Medication 1500 MG: at 08:09

## 2018-09-24 RX ADMIN — SOTALOL HYDROCHLORIDE 120 MG: 80 TABLET ORAL at 08:09

## 2018-09-24 NOTE — ED PROVIDER NOTES
"Encounter Date: 9/24/2018       History     Chief Complaint   Patient presents with    Fatigue     Pt was working out at [facility] and after he finished he felt weak became pale and diaphoretic, dizziness. Hx Pacemaker. No loss of consciousness.      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  He was working out today at the [facility] when he suddenly felt weak and lightheaded."  He sat down and then had a syncopal episode which lasted just a few seconds.  Wife states that he "wasn't responding to anything." No head injury. He denies chest pain and shortness of breath. No palpitations.  He does have history of AICD for previous episodes of V-tach but reports not feeling the device fired.  No headache, visual changes, numbness/tingling, vomiting, or diarrhea.  He reports that he now feels back to normal. The patient works out at least 3 times a week.  Patient is established with [facility] at [facility].  He takes eliquis three times a week, and is not scheduled to take it today.  No other complaints at this time.       The history is provided by the patient and the spouse.     Review of patient's allergies indicates:   Allergen Reactions    Pcn  [penicillins]      Other reaction(s): Unknown    Clindamycin Rash     Past Medical History:   Diagnosis Date    AICD (automatic cardioverter/defibrillator) present 7/17/2012    Cardiomyopathy     CKD (chronic kidney disease) stage 3, GFR 30-59 ml/min 7/17/2012    Coronary artery disease     GERD (gastroesophageal reflux disease)     [facility]; Dr. Vu    Hyperlipidemia 7/17/2012    Hypertension 7/17/2012    Ischemic cardiomyopathy 7/17/2012    Thrombocytopenia 7/17/2012    Ventricular tachycardia     VT (ventricular tachycardia) 7/17/2012 "     Past Surgical History:   Procedure Laterality Date    ABDOMINAL AORTIC ANEURYSM REPAIR      CARDIAC DEFIBRILLATOR PLACEMENT      CORONARY ANGIOPLASTY      HERNIA REPAIR      x2     Family History   Problem Relation Age of Onset    Cancer Mother         Lymphoma    Lymphoma Mother     Coronary artery disease Mother     Heart disease Father     Heart attacks under age 50 Father     No Known Problems Brother      Social History     Tobacco Use    Smoking status: Never Smoker    Smokeless tobacco: Never Used   Substance Use Topics    Alcohol use: No    Drug use: No     Review of Systems   Constitutional: Negative for activity change, appetite change, chills, fatigue and fever.   HENT: Negative for congestion, nosebleeds and sore throat.    Eyes: Negative for visual disturbance.   Respiratory: Negative for cough, chest tightness and shortness of breath.    Cardiovascular: Negative for chest pain.   Gastrointestinal: Negative for abdominal pain, diarrhea, nausea and vomiting.   Musculoskeletal: Negative for back pain, joint swelling, myalgias and neck pain.   Skin: Negative.    Neurological: Positive for dizziness, syncope and light-headedness. Negative for speech difficulty, weakness and headaches.   Hematological: Bruises/bleeds easily.   Psychiatric/Behavioral: Negative for confusion.   All other systems reviewed and are negative.      Physical Exam     Initial Vitals [09/24/18 1350]   BP Pulse Resp Temp SpO2   (!) 126/57 (!) 55 -- 97.6 °F (36.4 °C) 98 %      MAP       --         Physical Exam    Nursing note and vitals reviewed.  Constitutional: Vital signs are normal. He appears well-developed and well-nourished. He is active and cooperative. He is easily aroused.  Non-toxic appearance. He does not have a sickly appearance. He does not appear ill. No distress.   HENT:   Head: Normocephalic and atraumatic.   Right Ear: External ear normal.   Left Ear: External ear normal.   Nose: Nose normal.    Mouth/Throat: Uvula is midline and oropharynx is clear and moist.   Eyes: Conjunctivae and EOM are normal. Pupils are equal, round, and reactive to light.   Neck: Normal range of motion.   Cardiovascular: Normal rate, regular rhythm and normal heart sounds. The patient has a device (subcutaneous AICD) in the left chest.   Pulmonary/Chest: Effort normal and breath sounds normal.   Abdominal: Soft. Normal appearance and bowel sounds are normal. There is no tenderness.   Neurological: He is alert, oriented to person, place, and time and easily aroused. No cranial nerve deficit or sensory deficit. GCS eye subscore is 4. GCS verbal subscore is 5. GCS motor subscore is 6.   Skin: Skin is warm, dry and intact. No bruising and no rash noted.   Psychiatric: He has a normal mood and affect. His speech is normal and behavior is normal. Judgment and thought content normal. Cognition and memory are normal.         ED Course   Procedures  Labs Reviewed   COMPREHENSIVE METABOLIC PANEL - Abnormal; Notable for the following components:       Result Value    Creatinine 1.6 (*)     Total Bilirubin 1.1 (*)     Alkaline Phosphatase 49 (*)     eGFR if  45 (*)     eGFR if non  39 (*)     All other components within normal limits   TROPONIN I   CBC W/ AUTO DIFFERENTIAL   B-TYPE NATRIURETIC PEPTIDE   APTT   PROTIME-INR   TSH   POCT GLUCOSE MONITORING CONTINUOUS          Imaging Results          CT Head Without Contrast (Final result)  Result time 09/24/18 15:51:31    Final result by Kumar Sanchez MD (09/24/18 15:51:31)                 Impression:      1. Focus of low attenuation within the right frontoparietal lobe, configuration of which suggests sequela of remote infarct or insult although no previous exams are available for comparison, and remains age indeterminate.  Clinical correlation is advised.  2. Sequela of chronic microvascular ischemic change and senescent change.      Electronically signed  by: Kumar Sanchez MD  Date:    09/24/2018  Time:    15:51             Narrative:    EXAMINATION:  CT HEAD WITHOUT CONTRAST    CLINICAL HISTORY:  Syncope/fainting;    TECHNIQUE:  Low dose axial images were obtained through the head.  Coronal and sagittal reformations were also performed. Contrast was not administered.    COMPARISON:  None.    FINDINGS:  There is generalized cerebral volume loss.  There is hypoattenuation in a periventricular fashion, likely sequela of chronic microvascular ischemic change.There is a focus of low attenuation within the right frontoparietal lobe, may reflect sequela of previous infarct or insult although no previous exams are available for comparison.  There is a punctate focus of low attenuation within the right basal ganglia, suggesting prominent perivascular space or remote infarct.  There is no evidence of acute major vascular territory infarct, hemorrhage, or mass.  There is no hydrocephalus.  There are no abnormal extra-axial fluid collections.  The paranasal sinuses and mastoid air cells are clear, and there is no evidence of calvarial fracture.  The visualized soft tissues are unremarkable.                               X-Ray Chest 1 View (Final result)  Result time 09/24/18 14:51:58    Final result by Brent Polanco MD (09/24/18 14:51:58)                 Impression:      Mild increased opacity in the left lower lung zone that could relate to effusion or atelectasis versus chronic changes.      Electronically signed by: Brent Polanco MD  Date:    09/24/2018  Time:    14:51             Narrative:    EXAMINATION:  XR CHEST 1 VIEW    CLINICAL HISTORY:  Syncope and collapse    TECHNIQUE:  Single frontal view of the chest was performed.    COMPARISON:  12/13/2012    FINDINGS:  There is a left chest AICD with 2 leads projected over the heart.  The trachea is patent.  Cardiac silhouette is prominent.  There is atherosclerosis.  The visualized lungs appear clear.  There is blunting  of the left lower lung zone that could represent chronic changes.  No pneumothorax or free air below the diaphragm.  No acute osseous abnormality.                                 Medical Decision Making:   Initial Assessment:   82yo male here for a syncopal episode while exercising.  Pt denies CP, SOB, and feeling his AICD fire.  Upon presentation to ED, he felt back to normal. No focal neuro findings.    Differential Diagnosis:   STEMI, non-stemi, electrolyte derangement, dehydration, orthostatic hypotension, vasovagal episode, CVA, ICH, dysrhythmia  Clinical Tests:   Lab Tests: Ordered and Reviewed  Radiological Study: Ordered and Reviewed  Medical Tests: Ordered and Reviewed  ED Management:  Labs, EKG, CXR, CT head, IV fluids  Other:   I have discussed this case with another health care provider.       <> Summary of the Discussion: Abadco- Discussed HPI and ED course.  Agrees to accept patient to CDU.   Creatinine 1.6, which is the patient's baseline.  Initial troponin negative. No significant ectopy on the patient's bedside monitor.  Chest x-ray negative for acute change.  CT reveals a focus of low attenuation within the right frontoparietal lobe which suggests sequela of remote infarct.  Patient will be admitted to CDU for continued management.              Attending Attestation:     Physician Attestation Statement for NP/PA:   I have conducted a face to face encounter with this patient in addition to the NP/PA, due to Medical Complexity          Attending ED Notes:   Attending Note:  I have personally seen and examined this patient with the NP. As the supervising MD I agree with the above history and PE. I agree with the above treatment, course, plan, and disposition, with the following additions:    Patient is a 83 y.o. year-old M with presenting to ED with exertional syncope. Vitals stable in ED. Physical exam reassuring. Plan to place in observation for further management.            ED Course as of Sep 24  1615   Mon Sep 24, 2018   1601 EKG Interpretation:  Atrial paced rhythm, rate 55, nonspecific ST changes inferior leads, no reciprocal elevations or other signs of ischemia, normal intervals.  Compared to prior EKG dated 01/2017, grossly stable, no significant change. EKG 12-lead [SS]      ED Course User Index  [SS] Tarik Worrell MD     Clinical Impression:   Diagnoses of Fatigue and Syncope were pertinent to this visit.                             Ericka Allison, JEWELL  09/24/18 1617       Tarik Worrell MD  09/29/18 [id number]

## 2018-09-24 NOTE — ED NOTES
Pt A&Ox4, calm & cooperative. Denies any distress at this time. Resting comfortably in bed. VSS. Denies any needs at this time. Wife at bedside.

## 2018-09-24 NOTE — ED NOTES
Spoke to Carlo who will be doing the pacemaker interrogation, will head out as soon as possible but is currently in [place], may not be here until morning.

## 2018-09-24 NOTE — SUBJECTIVE & OBJECTIVE
Past Medical History:   Diagnosis Date    AICD (automatic cardioverter/defibrillator) present 7/17/2012    Cardiomyopathy     CKD (chronic kidney disease) stage 3, GFR 30-59 ml/min 7/17/2012    Coronary artery disease     GERD (gastroesophageal reflux disease)     Tovar's; Dr. Vu    Hyperlipidemia 7/17/2012    Hypertension 7/17/2012    Ischemic cardiomyopathy 7/17/2012    Thrombocytopenia 7/17/2012    Ventricular tachycardia     VT (ventricular tachycardia) 7/17/2012       Past Surgical History:   Procedure Laterality Date    ABDOMINAL AORTIC ANEURYSM REPAIR      CARDIAC DEFIBRILLATOR PLACEMENT      CORONARY ANGIOPLASTY      HERNIA REPAIR      x2       Review of patient's allergies indicates:   Allergen Reactions    Pcn  [penicillins]      Other reaction(s): Unknown    Clindamycin Rash       No current facility-administered medications on file prior to encounter.      Current Outpatient Medications on File Prior to Encounter   Medication Sig    amLODIPine (NORVASC) 10 MG tablet TAKE 1 TABLET EVERY DAY    aspirin 81 MG chewable tablet Take 1 tablet by mouth Daily. 81  By mouth Every day    clopidogrel (PLAVIX) 75 mg tablet TAKE 1 TABLET ONE TIME DAILY (Patient taking differently: ONE TABLET 3 DAYS A WEEK.)    lisinopril (PRINIVIL,ZESTRIL) 40 MG tablet TAKE 1/2 TO 1 TABLET EVERY DAY  OR AS DIRECTED    metoprolol succinate (TOPROL-XL) 25 MG 24 hr tablet TAKE 1/2 TO 1 TABLET ONE OR TWO TIMES  DAILY OR AS DIRECTED    multivitamin with minerals tablet Take 1 tablet by mouth once daily.     niacin 500 MG tablet Take 3 tablets by mouth Every PM. 3 Tablet Oral Every evening    omega-3 fatty acids-vitamin E (FISH OIL) 1,000 mg Cap Take 1 capsule by mouth 2 (two) times daily.    pravastatin (PRAVACHOL) 40 MG tablet TAKE 1 TABLET AT BEDTIME EXCEPT ON WEDNESDAY AND FRIDAY TAKE 1/2 TABLET AT BEDTIME    sotalol (BETAPACE) 120 MG Tab TAKE 1 TABLET TWICE DAILY    vitamin D 1000 units Tab Take 1,000  Units by mouth every Tues, Thurs, Sat.     multivitamin with minerals tablet     nitroGLYCERIN (NITROSTAT) 0.4 MG SL tablet Take 1 tab under the tongue for chest pain. May repeat every 5 minutes for a total of 3 doses. If chest pain not relieved go to the ED.    ranitidine (ZANTAC) 150 MG tablet Take 150 mg by mouth 2 (two) times daily.     Family History     Problem Relation (Age of Onset)    Cancer Mother    Coronary artery disease Mother    Heart attacks under age 50 Father    Heart disease Mother, Father, Sister    Lymphoma Mother    No Known Problems Brother        Tobacco Use    Smoking status: Never Smoker    Smokeless tobacco: Never Used   Substance and Sexual Activity    Alcohol use: No    Drug use: No    Sexual activity: Yes     Partners: Female     Comment:      Review of Systems   Constitutional: Negative for fever.   Respiratory: Negative for shortness of breath.    Cardiovascular: Negative for chest pain and palpitations.   Gastrointestinal: Negative for nausea and vomiting.   Musculoskeletal: Negative for gait problem.   Skin: Negative for color change.   Neurological: Positive for dizziness and light-headedness. Negative for facial asymmetry.   Hematological: Bruises/bleeds easily.   Psychiatric/Behavioral: Negative for agitation.   All other systems reviewed and are negative.    Objective:     Vital Signs (Most Recent):  Temp: 97.6 °F (36.4 °C) (09/24/18 1350)  Pulse: 67 (09/24/18 1700)  Resp: (!) 25 (09/24/18 1531)  BP: 137/60 (09/24/18 1700)  SpO2: 98 % (09/24/18 1700) Vital Signs (24h Range):  Temp:  [97.6 °F (36.4 °C)] 97.6 °F (36.4 °C)  Pulse:  [50-67] 67  Resp:  [23-32] 25  SpO2:  [96 %-98 %] 98 %  BP: (121-137)/(57-69) 137/60     Weight: 77.6 kg (171 lb)  Body mass index is 22.56 kg/m².    Physical Exam   Constitutional: He is oriented to person, place, and time. He appears well-developed and well-nourished. No distress.   HENT:   Head: Normocephalic.   Right Ear: External ear  normal.   Left Ear: External ear normal.   Nose: Nose normal.   Mouth/Throat: Oropharynx is clear and moist.   Eyes: EOM are normal. Pupils are equal, round, and reactive to light.   Neck: Normal range of motion. Neck supple. No JVD present.   Cardiovascular: Normal rate and regular rhythm.   AICD to left chest    Pulmonary/Chest: Effort normal and breath sounds normal. No stridor. No respiratory distress. He has no wheezes. He has no rales.   Abdominal: Soft. Bowel sounds are normal. He exhibits no distension and no mass. There is no tenderness. There is no guarding.   Musculoskeletal: Normal range of motion.   Neurological: He is alert and oriented to person, place, and time. He has normal strength. No cranial nerve deficit or sensory deficit. GCS eye subscore is 4. GCS verbal subscore is 5. GCS motor subscore is 6.   Skin: Skin is warm and dry. Capillary refill takes less than 2 seconds. He is not diaphoretic.   Nursing note and vitals reviewed.        CRANIAL NERVES     CN III, IV, VI   Pupils are equal, round, and reactive to light.  Extraocular motions are normal.        Significant Labs: All pertinent labs within the past 24 hours have been reviewed.    Significant Imaging: I have reviewed and interpreted all pertinent imaging results/findings within the past 24 hours.

## 2018-09-24 NOTE — ASSESSMENT & PLAN NOTE
Fatigue  - Resolved  - trend trops, repeat EKG.  CBC, CMP for AM labs and add on UA.    - ECHO and carotid US ordered.

## 2018-09-24 NOTE — HPI
"HPI: Nelson Huntley, a 83 y.o. 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   Wife states that he "wasn't responding to anything" and looked gray and diaphoretic.  Patient states that he heard questioning from others at all times, but was focused on feeling unwell and chose not to respond.  Denies syncope.  He states that he frequently works out and denies a more vigorous routine today.  No concerning issues yesterday; ate breakfast this morning.  No head injury. He denies chest pain and shortness of breath. No palpitations.  He does have history of AICD for previous episodes of V-tach but reports not feeling the device fire today.  He states that the device was placed about 6 years ago.  It has been regularly interrogated without issue.  No headache, visual changes, numbness/tingling, vomiting, or diarrhea.  He was initially hypotensive in the ambulance at 90/60s, IVF were given with improvement. Patient has no complaints at this time and states he's had no further episodes since his arrival to the hospital.    "

## 2018-09-24 NOTE — H&P
"[facility] Hospital Medicine  History & Physical    Patient Name: Nelson Huntley  MRN: [id number]  Admission Date: 9/24/2018  Attending Physician: Jayy Alberto MD   Primary Care Provider: Ruddy Skelton MD         Patient information was obtained from patient, spouse/SO and ER records.     Subjective:     Principal Problem:<principal problem not specified>    Chief Complaint:   Chief Complaint   Patient presents with    Fatigue     Pt was working out at [facility] and after he finished he felt weak became pale and diaphoretic, dizziness. Hx Pacemaker. No loss of consciousness.         HPI: HPI: Nelson Huntley, a 83 y.o. 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   Wife states that he "wasn't responding to anything" and looked gray and diaphoretic.  Patient states that he heard questioning from others at all times, but was focused on feeling unwell and chose not to respond.  Denies syncope.  He states that he frequently works out and denies a more vigorous routine today.  No concerning issues yesterday; ate breakfast this morning.  No head injury. He denies chest pain and shortness of breath. No palpitations.  He does have history of AICD for previous episodes of V-tach but reports not feeling the device fire today.  He states that the device was placed about 6 years ago.  It has been regularly interrogated without issue.  No headache, visual changes, numbness/tingling, vomiting, or diarrhea.  He was initially hypotensive in the ambulance at 90/60s, IVF were given with improvement. Patient has no complaints at this time and states he's had no further episodes " since his arrival to the hospital.      Past Medical History:   Diagnosis Date    AICD (automatic cardioverter/defibrillator) present 7/17/2012    Cardiomyopathy     CKD (chronic kidney disease) stage 3, GFR 30-59 ml/min 7/17/2012    Coronary artery disease     GERD (gastroesophageal reflux disease)     Tovar's; Dr. Vu    Hyperlipidemia 7/17/2012    Hypertension 7/17/2012    Ischemic cardiomyopathy 7/17/2012    Thrombocytopenia 7/17/2012    Ventricular tachycardia     VT (ventricular tachycardia) 7/17/2012       Past Surgical History:   Procedure Laterality Date    ABDOMINAL AORTIC ANEURYSM REPAIR      CARDIAC DEFIBRILLATOR PLACEMENT      CORONARY ANGIOPLASTY      HERNIA REPAIR      x2       Review of patient's allergies indicates:   Allergen Reactions    Pcn  [penicillins]      Other reaction(s): Unknown    Clindamycin Rash       No current facility-administered medications on file prior to encounter.      Current Outpatient Medications on File Prior to Encounter   Medication Sig    amLODIPine (NORVASC) 10 MG tablet TAKE 1 TABLET EVERY DAY    aspirin 81 MG chewable tablet Take 1 tablet by mouth Daily. 81  By mouth Every day    clopidogrel (PLAVIX) 75 mg tablet TAKE 1 TABLET ONE TIME DAILY (Patient taking differently: ONE TABLET 3 DAYS A WEEK.)    lisinopril (PRINIVIL,ZESTRIL) 40 MG tablet TAKE 1/2 TO 1 TABLET EVERY DAY  OR AS DIRECTED    metoprolol succinate (TOPROL-XL) 25 MG 24 hr tablet TAKE 1/2 TO 1 TABLET ONE OR TWO TIMES  DAILY OR AS DIRECTED    multivitamin with minerals tablet Take 1 tablet by mouth once daily.     niacin 500 MG tablet Take 3 tablets by mouth Every PM. 3 Tablet Oral Every evening    omega-3 fatty acids-vitamin E (FISH OIL) 1,000 mg Cap Take 1 capsule by mouth 2 (two) times daily.    pravastatin (PRAVACHOL) 40 MG tablet TAKE 1 TABLET AT BEDTIME EXCEPT ON WEDNESDAY AND FRIDAY TAKE 1/2 TABLET AT BEDTIME    sotalol (BETAPACE) 120 MG Tab TAKE 1 TABLET TWICE DAILY     vitamin D 1000 units Tab Take 1,000 Units by mouth every Tues, Thurs, Sat.     multivitamin with minerals tablet     nitroGLYCERIN (NITROSTAT) 0.4 MG SL tablet Take 1 tab under the tongue for chest pain. May repeat every 5 minutes for a total of 3 doses. If chest pain not relieved go to the ED.    ranitidine (ZANTAC) 150 MG tablet Take 150 mg by mouth 2 (two) times daily.     Family History     Problem Relation (Age of Onset)    Cancer Mother    Coronary artery disease Mother    Heart attacks under age 50 Father    Heart disease Mother, Father, Sister    Lymphoma Mother    No Known Problems Brother        Tobacco Use    Smoking status: Never Smoker    Smokeless tobacco: Never Used   Substance and Sexual Activity    Alcohol use: No    Drug use: No    Sexual activity: Yes     Partners: Female     Comment:      Review of Systems   Constitutional: Negative for fever.   Respiratory: Negative for shortness of breath.    Cardiovascular: Negative for chest pain and palpitations.   Gastrointestinal: Negative for nausea and vomiting.   Musculoskeletal: Negative for gait problem.   Skin: Negative for color change.   Neurological: Positive for dizziness and light-headedness. Negative for facial asymmetry.   Hematological: Bruises/bleeds easily.   Psychiatric/Behavioral: Negative for agitation.   All other systems reviewed and are negative.    Objective:     Vital Signs (Most Recent):  Temp: 97.6 °F (36.4 °C) (09/24/18 1350)  Pulse: 67 (09/24/18 1700)  Resp: (!) 25 (09/24/18 1531)  BP: 137/60 (09/24/18 1700)  SpO2: 98 % (09/24/18 1700) Vital Signs (24h Range):  Temp:  [97.6 °F (36.4 °C)] 97.6 °F (36.4 °C)  Pulse:  [50-67] 67  Resp:  [23-32] 25  SpO2:  [96 %-98 %] 98 %  BP: (121-137)/(57-69) 137/60     Weight: 77.6 kg (171 lb)  Body mass index is 22.56 kg/m².    Physical Exam   Constitutional: He is oriented to person, place, and time. He appears well-developed and well-nourished. No distress.   HENT:   Head:  Normocephalic.   Right Ear: External ear normal.   Left Ear: External ear normal.   Nose: Nose normal.   Mouth/Throat: Oropharynx is clear and moist.   Eyes: EOM are normal. Pupils are equal, round, and reactive to light.   Neck: Normal range of motion. Neck supple. No JVD present.   Cardiovascular: Normal rate and regular rhythm.   AICD to left chest    Pulmonary/Chest: Effort normal and breath sounds normal. No stridor. No respiratory distress. He has no wheezes. He has no rales.   Abdominal: Soft. Bowel sounds are normal. He exhibits no distension and no mass. There is no tenderness. There is no guarding.   Musculoskeletal: Normal range of motion.   Neurological: He is alert and oriented to person, place, and time. He has normal strength. No cranial nerve deficit or sensory deficit. GCS eye subscore is 4. GCS verbal subscore is 5. GCS motor subscore is 6.   Skin: Skin is warm and dry. Capillary refill takes less than 2 seconds. He is not diaphoretic.   Nursing note and vitals reviewed.        CRANIAL NERVES     CN III, IV, VI   Pupils are equal, round, and reactive to light.  Extraocular motions are normal.        Significant Labs: All pertinent labs within the past 24 hours have been reviewed.    Significant Imaging: I have reviewed and interpreted all pertinent imaging results/findings within the past 24 hours.    Assessment/Plan:     Dizziness    Fatigue  - Resolved  - trend trops, repeat EKG.  CBC, CMP for AM labs and add on UA.    - ECHO and carotid US ordered.            Hypertension    Hyperlipidemia  Vitamin D deficiency  - controlled, continue to give at home meds.            AICD (automatic cardioverter/defibrillator) present    - order placed for interrogation           CKD (chronic kidney disease) stage 3, GFR 30-59 ml/min    Creatinine at 1.6 today which is pt's baseline.  1L IVF given in ED.  Patient transitioned to PO hydration.  Continue to monitor.              VTE Risk Mitigation (From  admission, onward)    None             Mariya Warner PA-C  Department of Hospital Medicine   [facility]

## 2018-09-24 NOTE — ASSESSMENT & PLAN NOTE
Creatinine at 1.6 today which is pt's baseline.  1L IVF given in ED.  Patient transitioned to PO hydration.  Continue to monitor.

## 2018-09-24 NOTE — HOSPITAL COURSE
Labs: CBC, CMP, PT, PTT, BNP show no acute abnormalities    CXR: Mild increased opacity in the left lower lung zone that could relate to effusion or atelectasis versus chronic changes.    CT Head: 1. Focus of low attenuation within the right frontoparietal lobe, configuration of which suggests sequela of remote infarct or insult although no previous exams are available for comparison, and remains age indeterminate.  Clinical correlation is advised.  2. Sequela of chronic microvascular ischemic change and senescent change.    No orthostasis noted, but pt is on a beta blocker.  Fluids given in the ED.      While in CDU, pt has continuous cardiac monitoring.  Carotid US negative for significant stenosis.  Troponin negative X3.  Echo:   1 - Mildly to moderately depressed left ventricular systolic function (EF 40-45%).     2 - Impaired LV relaxation, normal LAP (grade 1 diastolic dysfunction).     3 - Eccentric hypertrophy.     4 - Normal right ventricular systolic function .     5 - The estimated PA systolic pressure is 19 mmHg.  Echo results nearly the same as results from previous echo.     Pt had his AICD interrogated and an event causing syncope was not identified.  He did have a short episode of v-tach on Friday, but denies symptoms.  Pt had no syncopal episodes, near-syncope, dizziness, weakness/numbness, or tingling while in CDU.  Likely, the pt's syncopal episode was caused by a vasovagal episode sustained while lifting weights.  He was given safety precautions.  Pt was discharged to home with his wife.

## 2018-09-25 ENCOUNTER — PATIENT MESSAGE (OUTPATIENT)
Dept: NEPHROLOGY | Facility: CLINIC | Age: 83
End: 2018-09-25

## 2018-09-25 VITALS
SYSTOLIC BLOOD PRESSURE: 128 MMHG | HEART RATE: 52 BPM | BODY MASS INDEX: 22.66 KG/M2 | TEMPERATURE: 96 F | WEIGHT: 171 LBS | RESPIRATION RATE: 16 BRPM | HEIGHT: 73 IN | DIASTOLIC BLOOD PRESSURE: 60 MMHG | OXYGEN SATURATION: 95 %

## 2018-09-25 PROBLEM — R53.83 FATIGUE: Status: RESOLVED | Noted: 2018-09-24 | Resolved: 2018-09-25

## 2018-09-25 PROBLEM — R42 DIZZINESS: Chronic | Status: RESOLVED | Noted: 2018-09-24 | Resolved: 2018-09-25

## 2018-09-25 LAB
ALBUMIN SERPL BCP-MCNC: 3.8 G/DL
ALP SERPL-CCNC: 54 U/L
ALT SERPL W/O P-5'-P-CCNC: 22 U/L
ANION GAP SERPL CALC-SCNC: 12 MMOL/L
AST SERPL-CCNC: 23 U/L
BASOPHILS # BLD AUTO: 0.05 K/UL
BASOPHILS NFR BLD: 0.6 %
BILIRUB SERPL-MCNC: 1.2 MG/DL
BUN SERPL-MCNC: 20 MG/DL
CALCIUM SERPL-MCNC: 9.9 MG/DL
CHLORIDE SERPL-SCNC: 108 MMOL/L
CO2 SERPL-SCNC: 22 MMOL/L
CREAT SERPL-MCNC: 1.4 MG/DL
DIASTOLIC DYSFUNCTION: YES
DIFFERENTIAL METHOD: ABNORMAL
EOSINOPHIL # BLD AUTO: 0.3 K/UL
EOSINOPHIL NFR BLD: 2.8 %
ERYTHROCYTE [DISTWIDTH] IN BLOOD BY AUTOMATED COUNT: 13.8 %
EST. GFR  (AFRICAN AMERICAN): 53 ML/MIN/1.73 M^2
EST. GFR  (NON AFRICAN AMERICAN): 46 ML/MIN/1.73 M^2
ESTIMATED PA SYSTOLIC PRESSURE: 18.84
GLOBAL PERICARDIAL EFFUSION: ABNORMAL
GLUCOSE SERPL-MCNC: 81 MG/DL
HCT VFR BLD AUTO: 43.2 %
HGB BLD-MCNC: 15 G/DL
LYMPHOCYTES # BLD AUTO: 2.7 K/UL
LYMPHOCYTES NFR BLD: 29.5 %
MCH RBC QN AUTO: 31.2 PG
MCHC RBC AUTO-ENTMCNC: 34.7 G/DL
MCV RBC AUTO: 90 FL
MONOCYTES # BLD AUTO: 0.9 K/UL
MONOCYTES NFR BLD: 10.4 %
NEUTROPHILS # BLD AUTO: 5.1 K/UL
NEUTROPHILS NFR BLD: 56.4 %
PLATELET # BLD AUTO: 82 K/UL
PMV BLD AUTO: 11.8 FL
POTASSIUM SERPL-SCNC: 3.8 MMOL/L
PROT SERPL-MCNC: 6.5 G/DL
RBC # BLD AUTO: 4.81 M/UL
RETIRED EF AND QEF - SEE NOTES: 40 (ref 55–65)
SODIUM SERPL-SCNC: 142 MMOL/L
WBC # BLD AUTO: 9.02 K/UL

## 2018-09-25 PROCEDURE — 80053 COMPREHEN METABOLIC PANEL: CPT

## 2018-09-25 PROCEDURE — 85025 COMPLETE CBC W/AUTO DIFF WBC: CPT

## 2018-09-25 PROCEDURE — 25000003 PHARM REV CODE 250: Performed by: PHYSICIAN ASSISTANT

## 2018-09-25 PROCEDURE — 94761 N-INVAS EAR/PLS OXIMETRY MLT: CPT

## 2018-09-25 PROCEDURE — 36415 COLL VENOUS BLD VENIPUNCTURE: CPT

## 2018-09-25 PROCEDURE — 25000003 PHARM REV CODE 250: Performed by: HOSPITALIST

## 2018-09-25 PROCEDURE — 93306 TTE W/DOPPLER COMPLETE: CPT

## 2018-09-25 PROCEDURE — G0378 HOSPITAL OBSERVATION PER HR: HCPCS

## 2018-09-25 PROCEDURE — 93306 TTE W/DOPPLER COMPLETE: CPT | Mod: 26,,, | Performed by: INTERNAL MEDICINE

## 2018-09-25 RX ORDER — NIACIN 500 MG/1
1500 TABLET, EXTENDED RELEASE ORAL NIGHTLY
Status: DISCONTINUED | OUTPATIENT
Start: 2018-09-25 | End: 2018-09-25 | Stop reason: HOSPADM

## 2018-09-25 RX ADMIN — LISINOPRIL 40 MG: 20 TABLET ORAL at 09:09

## 2018-09-25 RX ADMIN — SOTALOL HYDROCHLORIDE 120 MG: 80 TABLET ORAL at 09:09

## 2018-09-25 RX ADMIN — METOPROLOL SUCCINATE 12.5 MG: 25 TABLET, EXTENDED RELEASE ORAL at 09:09

## 2018-09-25 RX ADMIN — Medication 1 CAPSULE: at 09:09

## 2018-09-25 RX ADMIN — ASPIRIN 81 MG 81 MG: 81 TABLET ORAL at 09:09

## 2018-09-25 RX ADMIN — FAMOTIDINE 20 MG: 20 TABLET, FILM COATED ORAL at 09:09

## 2018-09-25 RX ADMIN — AMLODIPINE BESYLATE 10 MG: 5 TABLET ORAL at 09:09

## 2018-09-25 NOTE — PLAN OF CARE
Problem: Patient Care Overview  Goal: Plan of Care Review  Outcome: Ongoing (interventions implemented as appropriate)  Pt AAO x 4.  VSS.  Pt remained afebrile throughout this shift.    Pt remained free of falls this shift.   Pt denied pain this shift.  Plan of care reviewed. Patient verbalizes understanding.   Pt moving/turning. Frequent weight shifting encouraged.  Patient sinus seamus on monitor.   Bed low, side rails up x 2, wheels locked, call light in reach.   Patient instructed to call for assistance.   Hourly rounding completed.   24 hour chart check completed.  Will continue to monitor.

## 2018-09-25 NOTE — NURSING
Pt and spouse given D/C instructions and F/U appts. PIV and telemetry removed. Pt and spouse ambulated to exit without difficulty. NAD noted.

## 2018-09-25 NOTE — ASSESSMENT & PLAN NOTE
-Syncope while lifting weights at the gym.  - Resolved after syncopal episode.  No focal neuro findings to suggest CVA.     -CT head negative.   -Likely vasovagal episode  - Troponin negative X3.  Bilateral carotid US negative for significant stenosis. Echo essentially the same as 10 months ago.  AICD interrogated without event surrounding syncope.

## 2018-09-25 NOTE — DISCHARGE SUMMARY
"[facility] Hospital Medicine  Discharge Summary      Patient Name: Nelson Huntley  MRN: [id number]  Admission Date: 9/24/2018  Hospital Length of Stay: 0 days  Discharge Date and Time: No discharge date for patient encounter.  Attending Physician: Brenden Esparza MD   Discharging Provider: JEWELL Oh  Primary Care Provider: Ruddy Skelton MD      HPI:   HPI: Nelson Huntley, a 83 y.o. 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   Wife states that he "wasn't responding to anything" and looked gray and diaphoretic.  Patient states that he heard questioning from others at all times, but was focused on feeling unwell and chose not to respond.  Denies syncope.  He states that he frequently works out and denies a more vigorous routine today.  No concerning issues yesterday; ate breakfast this morning.  No head injury. He denies chest pain and shortness of breath. No palpitations.  He does have history of AICD for previous episodes of V-tach but reports not feeling the device fire today.  He states that the device was placed about 6 years ago.  It has been regularly interrogated without issue.  No headache, visual changes, numbness/tingling, vomiting, or diarrhea.  He was initially hypotensive in the ambulance at 90/60s, IVF were given with improvement. Patient has no complaints at this time and states he's had no further episodes since his arrival to the hospital.      * No surgery found *      Hospital Course:   Labs: CBC, CMP, PT, PTT, BNP show no acute abnormalities    CXR: Mild increased opacity in the left lower lung zone that could relate to " effusion or atelectasis versus chronic changes.    CT Head: 1. Focus of low attenuation within the right frontoparietal lobe, configuration of which suggests sequela of remote infarct or insult although no previous exams are available for comparison, and remains age indeterminate.  Clinical correlation is advised.  2. Sequela of chronic microvascular ischemic change and senescent change.    No orthostasis noted, but pt is on a beta blocker.  Fluids given in the ED.      While in CDU, pt has continuous cardiac monitoring.  Carotid US negative for significant stenosis.  Troponin negative X3.  Echo:   1 - Mildly to moderately depressed left ventricular systolic function (EF 40-45%).     2 - Impaired LV relaxation, normal LAP (grade 1 diastolic dysfunction).     3 - Eccentric hypertrophy.     4 - Normal right ventricular systolic function .     5 - The estimated PA systolic pressure is 19 mmHg.  Echo results nearly the same as results from previous echo.     Pt had his AICD interrogated and an event causing syncope was not identified.  He did have a short episode of v-tach on Friday, but denies symptoms.  Pt had no syncopal episodes, near-syncope, dizziness, weakness/numbness, or tingling while in CDU.  Likely, the pt's syncopal episode was caused by a vasovagal episode sustained while lifting weights.  He was given safety precautions.  Pt was discharged to home with his wife.       Consults:     * Dizziness-resolved as of 9/25/2018    -Syncope while lifting weights at the gym.  - Resolved after syncopal episode.  No focal neuro findings to suggest CVA.     -CT head negative.   -Likely vasovagal episode  - Troponin negative X3.  Bilateral carotid US negative for significant stenosis. Echo essentially the same as 10 months ago.  AICD interrogated without event surrounding syncope. Follow up with PCP.           CKD (chronic kidney disease) stage 3, GFR 30-59 ml/min    Creatinine at 1.4 today, improved from 1.6 yesterday.   Pt to continue PO hydration.  Follow up with PCP.         AICD (automatic cardioverter/defibrillator) present    - Interrogated.  No event yesterday.  Pt did have a 3 second run of v-tach on Friday, but denies symptoms.   -Follow up outpatient.             Final Active Diagnoses:    Diagnosis Date Noted POA    CKD (chronic kidney disease) stage 3, GFR 30-59 ml/min [N18.3] 07/17/2012 Yes     Chronic    AICD (automatic cardioverter/defibrillator) present [Z95.810] 07/17/2012 Yes     Chronic    Vitamin D deficiency [E55.9] 03/01/2017 Yes    Hypertension [I10] 07/17/2012 Yes    Hyperlipidemia [E78.5] 07/17/2012 Yes      Problems Resolved During this Admission:    Diagnosis Date Noted Date Resolved POA    PRINCIPAL PROBLEM:  Dizziness [R42] 09/24/2018 09/25/2018 Yes     Chronic    Fatigue [R53.83] 09/24/2018 09/25/2018 Yes       Discharged Condition: good    Disposition: Home or Self Care    Follow Up:  Follow-up Information     Ruddy Skelton MD On 10/8/2018.    Specialty:  Internal Medicine  Why:  Time: 2:40  Contact information:  [address] [facility]  [place] [address]  [phone]                 Patient Instructions:      Diet Cardiac     Notify your health care provider if you experience any of the following:  temperature >100.4     Notify your health care provider if you experience any of the following:  persistent nausea and vomiting or diarrhea     Notify your health care provider if you experience any of the following:  severe uncontrolled pain     Notify your health care provider if you experience any of the following:  difficulty breathing or increased cough     Notify your health care provider if you experience any of the following:  severe persistent headache     Notify your health care provider if you experience any of the following:  persistent dizziness, light-headedness, or visual disturbances     Notify your health care provider if you experience any of the following:  increased confusion or  weakness     Activity as tolerated       Significant Diagnostic Studies: Labs:   CMP   Recent Labs   Lab  09/24/18   1432  09/25/18   0430   NA  141  142   K  4.4  3.8   CL  108  108   CO2  24  22*   GLU  103  81   BUN  16  20   CREATININE  1.6*  1.4   CALCIUM  9.5  9.9   PROT  6.1  6.5   ALBUMIN  3.7  3.8   BILITOT  1.1*  1.2*   ALKPHOS  49*  54*   AST  24  23   ALT  21  22   ANIONGAP  9  12   ESTGFRAFRICA  45*  53*   EGFRNONAA  39*  46*   , CBC   Recent Labs   Lab  09/24/18   1432  09/24/18   1754  09/25/18   0430   WBC  8.29  8.27  9.02   HGB  14.8  14.8  15.0   HCT  42.7  42.9  43.2   PLT  87*  80*  82*   , INR   Lab Results   Component Value Date    INR 1.1 09/24/2018    INR 1.1 12/13/2012    INR 1.2 03/15/2012   , Troponin   Recent Labs   Lab  09/24/18 2033   TROPONINI  0.009    and All labs within the past 24 hours have been reviewed  Radiology: Ultrasound: Carotid Bilateral: No evidence of a hemodynamically significant carotid bifurcation stenosis.  Cardiac Graphics: Echo:  1 - Mildly to moderately depressed left ventricular systolic function (EF 40-45%).     2 - Impaired LV relaxation, normal LAP (grade 1 diastolic dysfunction).     3 - Eccentric hypertrophy.     4 - Normal right ventricular systolic function .     5 - The estimated PA systolic pressure is 19 mmHg.     Pending Diagnostic Studies:     None         Medications:  Reconciled Home Medications:      Medication List      CHANGE how you take these medications    clopidogrel 75 mg tablet  Commonly known as:  PLAVIX  TAKE 1 TABLET ONE TIME DAILY  What changed:  See the new instructions.        CONTINUE taking these medications    amLODIPine 10 MG tablet  Commonly known as:  NORVASC  TAKE 1 TABLET EVERY DAY     aspirin 81 MG Chew  Take 1 tablet by mouth Daily. 81  By mouth Every day     FISH OIL 1,000 mg Cap  Generic drug:  omega-3 fatty acids-vitamin E  Take 1 capsule by mouth 2 (two) times daily.     lisinopril 40 MG tablet  Commonly known as:   PRINIVIL,ZESTRIL  TAKE 1/2 TO 1 TABLET EVERY DAY  OR AS DIRECTED     metoprolol succinate 25 MG 24 hr tablet  Commonly known as:  TOPROL-XL  TAKE 1/2 TO 1 TABLET ONE OR TWO TIMES  DAILY OR AS DIRECTED     * multivitamin with minerals tablet  Take 1 tablet by mouth once daily.     * multivitamin with minerals tablet     niacin 500 MG Tab  Take 3 tablets by mouth Every PM. 3 Tablet Oral Every evening     nitroGLYCERIN 0.4 MG SL tablet  Commonly known as:  NITROSTAT  Take 1 tab under the tongue for chest pain. May repeat every 5 minutes for a total of 3 doses. If chest pain not relieved go to the ED.     pravastatin 40 MG tablet  Commonly known as:  PRAVACHOL  TAKE 1 TABLET AT BEDTIME EXCEPT ON WEDNESDAY AND FRIDAY TAKE 1/2 TABLET AT BEDTIME     ranitidine 150 MG tablet  Commonly known as:  ZANTAC  Take 150 mg by mouth 2 (two) times daily.     sotalol 120 MG Tab  Commonly known as:  BETAPACE  TAKE 1 TABLET TWICE DAILY     vitamin D 1000 units Tab  Commonly known as:  VITAMIN D3  Take 1,000 Units by mouth every Tues, Thurs, Sat.         * This list has 2 medication(s) that are the same as other medications prescribed for you. Read the directions carefully, and ask your doctor or other care provider to review them with you.                Indwelling Lines/Drains at time of discharge:   Lines/Drains/Airways          None          Time spent on the discharge of patient: 15 minutes  Patient was seen and examined on the date of discharge and determined to be suitable for discharge.         JEWELL Oh  Department of Hospital Medicine  [facility]

## 2018-09-25 NOTE — PLAN OF CARE
09/25/18 1436   Final Note   Assessment Type Final Discharge Note   Discharge Disposition Home   What phone number can be called within the next 1-3 days to see how you are doing after discharge? [phone]   Hospital Follow Up  Appt(s) scheduled? Yes   Discharge plans and expectations educations in teach back method with documentation complete? Yes   Right Care Referral Info   Post Acute Recommendation No Care

## 2018-09-25 NOTE — SUBJECTIVE & OBJECTIVE
Interval History: Uneventful night.  Pt states that he feels well and is ready for DC.  No noted significant ectopy. No syncope, CP, SOB. No headache or dizziness. US negative for significant stenosis.  Cardiac echo pending. DC anticipated for this afternoon.     Review of Systems   Constitutional: Negative for activity change, fatigue and fever.   HENT: Negative for congestion.    Eyes: Negative for visual disturbance.   Respiratory: Negative for cough, chest tightness and shortness of breath.    Cardiovascular: Negative for chest pain and leg swelling.   Gastrointestinal: Negative for abdominal pain, diarrhea, nausea and vomiting.   Musculoskeletal: Negative for back pain, joint swelling, myalgias and neck pain.   Skin: Negative.    Neurological: Negative for dizziness, syncope, weakness, light-headedness, numbness and headaches.   Hematological: Bruises/bleeds easily.   Psychiatric/Behavioral: Negative for confusion.   All other systems reviewed and are negative.    Objective:     Vital Signs (Most Recent):  Temp: 96.3 °F (35.7 °C) (09/25/18 1105)  Pulse: (!) 59 (09/25/18 1105)  Resp: 16 (09/25/18 1105)  BP: 128/60 (09/25/18 1105)  SpO2: 96 % (09/25/18 1105) Vital Signs (24h Range):  Temp:  [96.3 °F (35.7 °C)-98.6 °F (37 °C)] 96.3 °F (35.7 °C)  Pulse:  [50-67] 59  Resp:  [16-32] 16  SpO2:  [94 %-98 %] 96 %  BP: (121-153)/(54-71) 128/60     Weight: 77.6 kg (171 lb)  Body mass index is 22.56 kg/m².    Intake/Output Summary (Last 24 hours) at 9/25/2018 1132  Last data filed at 9/25/2018 0705  Gross per 24 hour   Intake --   Output 550 ml   Net -550 ml      Physical Exam   Constitutional: He is oriented to person, place, and time. Vital signs are normal. He appears well-developed and well-nourished. He is active and cooperative. He is easily aroused.  Non-toxic appearance. He does not have a sickly appearance. He does not appear ill. No distress.   HENT:   Head: Normocephalic and atraumatic.   Right Ear: External ear  normal.   Left Ear: External ear normal.   Nose: Nose normal.   Mouth/Throat: Uvula is midline, oropharynx is clear and moist and mucous membranes are normal.   Eyes: Conjunctivae and lids are normal.   Neck: Normal range of motion and phonation normal.   Cardiovascular: Normal rate, regular rhythm and normal heart sounds.   Pulmonary/Chest: Effort normal and breath sounds normal.   SQ AICD left chest.   Abdominal: Soft. Normal appearance and bowel sounds are normal. He exhibits no distension. There is no tenderness. There is no rigidity, no rebound and no guarding.   Neurological: He is alert, oriented to person, place, and time and easily aroused. He has normal strength. Coordination normal. GCS eye subscore is 4. GCS verbal subscore is 5. GCS motor subscore is 6.   Skin: Skin is warm, dry and intact. Capillary refill takes less than 2 seconds. No abrasion and no bruising noted. No pallor.   Psychiatric: He has a normal mood and affect. His speech is normal and behavior is normal. Judgment and thought content normal.       Significant Labs:   CBC:   Recent Labs   Lab  09/24/18   1432  09/24/18   1754  09/25/18   0430   WBC  8.29  8.27  9.02   HGB  14.8  14.8  15.0   HCT  42.7  42.9  43.2   PLT  87*  80*  82*     CMP:   Recent Labs   Lab  09/24/18   1432  09/25/18   0430   NA  141  142   K  4.4  3.8   CL  108  108   CO2  24  22*   GLU  103  81   BUN  16  20   CREATININE  1.6*  1.4   CALCIUM  9.5  9.9   PROT  6.1  6.5   ALBUMIN  3.7  3.8   BILITOT  1.1*  1.2*   ALKPHOS  49*  54*   AST  24  23   ALT  21  22   ANIONGAP  9  12   EGFRNONAA  39*  46*     Coagulation:   Recent Labs   Lab  09/24/18   1432   INR  1.1   APTT  22.2     Lipid Panel: No results for input(s): CHOL, HDL, LDLCALC, TRIG, CHOLHDL in the last 48 hours.  Troponin:   Recent Labs   Lab  09/24/18   1432  09/24/18   1754  09/24/18   2033   TROPONINI  0.010  0.013  0.009     All pertinent labs within the past 24 hours have been reviewed.    Significant  Imaging: Bilateral carotid US:  No evidence of a hemodynamically significant carotid bifurcation stenosis.

## 2018-09-25 NOTE — PROGRESS NOTES
"[facility] Hospital Medicine  Progress Note    Patient Name: Nelson Huntley  MRN: [id number]  Patient Class: OP- Observation   Admission Date: 9/24/2018  Length of Stay: 0 days  Attending Physician: Brenden Esparza MD  Primary Care Provider: Ruddy Skelton MD        Subjective:     Principal Problem:Dizziness    HPI:  HPI: Nelson Huntley, a 83 y.o. 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   Wife states that he "wasn't responding to anything" and looked gray and diaphoretic.  Patient states that he heard questioning from others at all times, but was focused on feeling unwell and chose not to respond.  Denies syncope.  He states that he frequently works out and denies a more vigorous routine today.  No concerning issues yesterday; ate breakfast this morning.  No head injury. He denies chest pain and shortness of breath. No palpitations.  He does have history of AICD for previous episodes of V-tach but reports not feeling the device fire today.  He states that the device was placed about 6 years ago.  It has been regularly interrogated without issue.  No headache, visual changes, numbness/tingling, vomiting, or diarrhea.  He was initially hypotensive in the ambulance at 90/60s, IVF were given with improvement. Patient has no complaints at this time and states he's had no further episodes since his arrival to the hospital.      Hospital Course:  Labs: CBC, CMP, PT, PTT, BNP show no acute abnormalities    CXR: Mild increased opacity in the left lower lung zone that could relate to effusion or atelectasis versus chronic changes.    CT Head: 1. Focus of low " attenuation within the right frontoparietal lobe, configuration of which suggests sequela of remote infarct or insult although no previous exams are available for comparison, and remains age indeterminate.  Clinical correlation is advised.  2. Sequela of chronic microvascular ischemic change and senescent change.    No orthostasis noted, but pt is on a beta blocker.  Fluids given in the ED.      While in CDU, pt has continuous cardiac monitoring.  Carotid US negative for significant stenosis.  Troponin negative X3.  Pt reports that he feels well.  Cardiac echo pending.     Interval History: Uneventful night.  Pt states that he feels well and is ready for DC.  No noted significant ectopy. No syncope, CP, SOB. No headache or dizziness. US negative for significant stenosis.  Cardiac echo pending. DC anticipated for this afternoon.     Review of Systems   Constitutional: Negative for activity change, fatigue and fever.   HENT: Negative for congestion.    Eyes: Negative for visual disturbance.   Respiratory: Negative for cough, chest tightness and shortness of breath.    Cardiovascular: Negative for chest pain and leg swelling.   Gastrointestinal: Negative for abdominal pain, diarrhea, nausea and vomiting.   Musculoskeletal: Negative for back pain, joint swelling, myalgias and neck pain.   Skin: Negative.    Neurological: Negative for dizziness, syncope, weakness, light-headedness, numbness and headaches.   Hematological: Bruises/bleeds easily.   Psychiatric/Behavioral: Negative for confusion.   All other systems reviewed and are negative.    Objective:     Vital Signs (Most Recent):  Temp: 96.3 °F (35.7 °C) (09/25/18 1105)  Pulse: (!) 59 (09/25/18 1105)  Resp: 16 (09/25/18 1105)  BP: 128/60 (09/25/18 1105)  SpO2: 96 % (09/25/18 1105) Vital Signs (24h Range):  Temp:  [96.3 °F (35.7 °C)-98.6 °F (37 °C)] 96.3 °F (35.7 °C)  Pulse:  [50-67] 59  Resp:  [16-32] 16  SpO2:  [94 %-98 %] 96 %  BP: (121-153)/(54-71) 128/60      Weight: 77.6 kg (171 lb)  Body mass index is 22.56 kg/m².    Intake/Output Summary (Last 24 hours) at 9/25/2018 1132  Last data filed at 9/25/2018 0705  Gross per 24 hour   Intake --   Output 550 ml   Net -550 ml      Physical Exam   Constitutional: He is oriented to person, place, and time. Vital signs are normal. He appears well-developed and well-nourished. He is active and cooperative. He is easily aroused.  Non-toxic appearance. He does not have a sickly appearance. He does not appear ill. No distress.   HENT:   Head: Normocephalic and atraumatic.   Right Ear: External ear normal.   Left Ear: External ear normal.   Nose: Nose normal.   Mouth/Throat: Uvula is midline, oropharynx is clear and moist and mucous membranes are normal.   Eyes: Conjunctivae and lids are normal.   Neck: Normal range of motion and phonation normal.   Cardiovascular: Normal rate, regular rhythm and normal heart sounds.   Pulmonary/Chest: Effort normal and breath sounds normal.   SQ AICD left chest.   Abdominal: Soft. Normal appearance and bowel sounds are normal. He exhibits no distension. There is no tenderness. There is no rigidity, no rebound and no guarding.   Neurological: He is alert, oriented to person, place, and time and easily aroused. He has normal strength. Coordination normal. GCS eye subscore is 4. GCS verbal subscore is 5. GCS motor subscore is 6.   Skin: Skin is warm, dry and intact. Capillary refill takes less than 2 seconds. No abrasion and no bruising noted. No pallor.   Psychiatric: He has a normal mood and affect. His speech is normal and behavior is normal. Judgment and thought content normal.       Significant Labs:   CBC:   Recent Labs   Lab  09/24/18   1432  09/24/18   1754  09/25/18   0430   WBC  8.29  8.27  9.02   HGB  14.8  14.8  15.0   HCT  42.7  42.9  43.2   PLT  87*  80*  82*     CMP:   Recent Labs   Lab  09/24/18   1432  09/25/18   0430   NA  141  142   K  4.4  3.8   CL  108  108   CO2  24  22*   GLU   103  81   BUN  16  20   CREATININE  1.6*  1.4   CALCIUM  9.5  9.9   PROT  6.1  6.5   ALBUMIN  3.7  3.8   BILITOT  1.1*  1.2*   ALKPHOS  49*  54*   AST  24  23   ALT  21  22   ANIONGAP  9  12   EGFRNONAA  39*  46*     Coagulation:   Recent Labs   Lab  09/24/18   1432   INR  1.1   APTT  22.2     Lipid Panel: No results for input(s): CHOL, HDL, LDLCALC, TRIG, CHOLHDL in the last 48 hours.  Troponin:   Recent Labs   Lab  09/24/18   1432  09/24/18   1754  09/24/18   2033   TROPONINI  0.010  0.013  0.009     All pertinent labs within the past 24 hours have been reviewed.    Significant Imaging: Bilateral carotid US:  No evidence of a hemodynamically significant carotid bifurcation stenosis.    Assessment/Plan:      * Dizziness    - Resolved after syncopal episode.  No focal neuro findings to suggest CVA.     -CT head negative.   -Likely vasovagal episode while lifting weights.   - Troponin negative X3.  Bilateral carotid US negative for significant stenosis. Echo pending.         CKD (chronic kidney disease) stage 3, GFR 30-59 ml/min    Creatinine at 1.4 today, improved from 1.6 yesterday.  Pt to continue PO hydration.          AICD (automatic cardioverter/defibrillator) present    - Interrogated.  No event yesterday.  Pt did have a 3 second run of v-tach on Friday, but denies symptoms.   -Follow up outpatient.           Hyperlipidemia    -Pravastatin 40mg tablet daily. Continue home medication.           Hypertension    - controlled, continue to give at home meds.    Lisinopril 40mg daily, metoprolol succinate 24mg daily, sotalol 120mg daily, and Norvasc 10mg daily.   -Follow up with PCP for management.           VTE Risk Mitigation (From admission, onward)        Ordered     Place STAR hose  Until discontinued.      09/24/18 1802              JEWELL Oh  Department of Hospital Medicine   [facility]

## 2018-09-25 NOTE — PLAN OF CARE
Pt voices no needs; wife at bedside and will provide transportation.PCP follow up scheduled.  D/C folder, brochure and card given.     09/25/18 1149   Discharge Assessment   Assessment Type Discharge Planning Assessment   Confirmed/corrected address and phone number on facesheet? Yes   Assessment information obtained from? Patient   Expected Length of Stay (days) 1   Communicated expected length of stay with patient/caregiver yes   Prior to hospitilization cognitive status: Alert/Oriented   Prior to hospitalization functional status: Independent   Current cognitive status: Alert/Oriented   Current Functional Status: Independent   Lives With spouse   Able to Return to Prior Arrangements yes   Is patient able to care for self after discharge? Yes   Who are your caregiver(s) and their phone number(s)? Aleah (spouse) [phone]   Patient's perception of discharge disposition home or selfcare   Readmission Within The Last 30 Days no previous admission in last 30 days   Patient currently being followed by outpatient case management? No   Patient currently receives any other outside agency services? No   Equipment Currently Used at Home none   Do you have any problems affording any of your prescribed medications? No   Is the patient taking medications as prescribed? yes   Does the patient have transportation home? Yes  (spouse)   Transportation Available family or friend will provide   Does the patient receive services at the [facility]madin Clinic? No   Discharge Plan A Home   Discharge Plan B Home with family   Patient/Family In Agreement With Plan yes

## 2018-09-25 NOTE — ASSESSMENT & PLAN NOTE
- controlled, continue to give at home meds.    Lisinopril 40mg daily, metoprolol succinate 24mg daily, sotalol 120mg daily, and Norvasc 10mg daily.   -Follow up with PCP for management.

## 2018-09-25 NOTE — ASSESSMENT & PLAN NOTE
- Interrogated.  No event yesterday.  Pt did have a 3 second run of v-tach on Friday, but denies symptoms.   -Follow up outpatient.

## 2018-09-25 NOTE — ASSESSMENT & PLAN NOTE
- Resolved after syncopal episode.  No focal neuro findings to suggest CVA.     -CT head negative.   -Likely vasovagal episode while lifting weights.   - Troponin negative X3.  Bilateral carotid US negative for significant stenosis. Echo pending.

## 2018-10-08 ENCOUNTER — OFFICE VISIT (OUTPATIENT)
Dept: INTERNAL MEDICINE | Facility: CLINIC | Age: 83
End: 2018-10-08
Payer: MEDICARE

## 2018-10-08 VITALS
WEIGHT: 172.81 LBS | SYSTOLIC BLOOD PRESSURE: 121 MMHG | OXYGEN SATURATION: 97 % | HEART RATE: 58 BPM | BODY MASS INDEX: 22.9 KG/M2 | HEIGHT: 73 IN | DIASTOLIC BLOOD PRESSURE: 64 MMHG

## 2018-10-08 DIAGNOSIS — I10 ESSENTIAL HYPERTENSION: ICD-10-CM

## 2018-10-08 DIAGNOSIS — R41.82 ALTERED MENTAL STATUS, UNSPECIFIED ALTERED MENTAL STATUS TYPE: Primary | ICD-10-CM

## 2018-10-08 DIAGNOSIS — R93.0 ABNORMAL CT OF THE HEAD: ICD-10-CM

## 2018-10-08 DIAGNOSIS — Z23 NEEDS FLU SHOT: ICD-10-CM

## 2018-10-08 DIAGNOSIS — N18.30 CKD (CHRONIC KIDNEY DISEASE) STAGE 3, GFR 30-59 ML/MIN: Chronic | ICD-10-CM

## 2018-10-08 DIAGNOSIS — Z95.810 AICD (AUTOMATIC CARDIOVERTER/DEFIBRILLATOR) PRESENT: Chronic | ICD-10-CM

## 2018-10-08 DIAGNOSIS — I25.10 CORONARY ARTERY DISEASE INVOLVING NATIVE CORONARY ARTERY OF NATIVE HEART WITHOUT ANGINA PECTORIS: ICD-10-CM

## 2018-10-08 DIAGNOSIS — D69.6 THROMBOCYTOPENIA: ICD-10-CM

## 2018-10-08 DIAGNOSIS — Z86.73 HISTORY OF TIA (TRANSIENT ISCHEMIC ATTACK): ICD-10-CM

## 2018-10-08 PROCEDURE — 3074F SYST BP LT 130 MM HG: CPT | Mod: CPTII,,, | Performed by: INTERNAL MEDICINE

## 2018-10-08 PROCEDURE — 3078F DIAST BP <80 MM HG: CPT | Mod: CPTII,,, | Performed by: INTERNAL MEDICINE

## 2018-10-08 PROCEDURE — 99999 PR PBB SHADOW E&M-EST. PATIENT-LVL V: CPT | Mod: PBBFAC,,, | Performed by: INTERNAL MEDICINE

## 2018-10-08 PROCEDURE — 99214 OFFICE O/P EST MOD 30 MIN: CPT | Mod: S$PBB,,, | Performed by: INTERNAL MEDICINE

## 2018-10-08 PROCEDURE — 1101F PT FALLS ASSESS-DOCD LE1/YR: CPT | Mod: CPTII,,, | Performed by: INTERNAL MEDICINE

## 2018-10-08 PROCEDURE — 90662 IIV NO PRSV INCREASED AG IM: CPT | Mod: PBBFAC,PO

## 2018-10-08 PROCEDURE — 99215 OFFICE O/P EST HI 40 MIN: CPT | Mod: PBBFAC,PO,25 | Performed by: INTERNAL MEDICINE

## 2018-10-08 NOTE — PROGRESS NOTES
Pt. ID: Nelson GIBBONS Parent is a 83 y.o. male      Chief complaint:   Chief Complaint   Patient presents with    Follow-up       HPI: Pt. Here for f/u from hospital for dizziness; pt. Wife, Aleah, is with the pt.; I reviewed CXR dated 9/24/18 which showed no acute changes; CT head showed age indeterminate infarct; he states he has hx of TIA; he is on plavix since his TIA; he states his plavix was decreased to 3 x week by cardiology a year or two ago;  he states his symptoms have resolved; he states he felt like he could hear everything but not respond for about 5 minutes; carotid US showed no hemodynamically significant stenosis; echo showed EF 40-45 % with diastolic dysfxn; this is unchanged from previous echos and pt. See cardiology for CAD; I reviewed labs dated 9/25/18; platelets were low but stable; kidney fxn was elevated but stable; pt. Sees renal; he also had AICD evaluated; he is scheduled to see cardiology       Review of Systems   Constitutional: Negative for chills and fever.   HENT: Negative for sore throat.    Respiratory: Negative for cough and shortness of breath.    Cardiovascular: Negative for chest pain.   Gastrointestinal: Negative for abdominal pain, nausea and vomiting.   Genitourinary: Negative for dysuria.   Neurological: Negative for dizziness and headaches.         Objective:    Physical Exam   Constitutional: He is oriented to person, place, and time.   Eyes: EOM are normal.   Neck: Normal range of motion.   Cardiovascular: Normal rate, regular rhythm and normal heart sounds.   Pulmonary/Chest: Effort normal and breath sounds normal. No respiratory distress. He has no wheezes. He has no rales.   Abdominal: Soft. There is no tenderness. There is no rebound and no guarding.   Musculoskeletal: Normal range of motion.   Neurological: He is alert and oriented to person, place, and time. Coordination normal.   Motor strength normal all extremities   Skin: No rash noted.   Vitals  reviewed.        Health Maintenance   Topic Date Due    Lipid Panel  11/10/2022    Colonoscopy  05/07/2023    TETANUS VACCINE  11/22/2023    Pneumococcal (65+)  Completed    Influenza Vaccine  Completed    Zoster Vaccine  Addressed         Assessment:     1. Altered mental status, unspecified altered mental status type Active   2. Essential hypertension Well controlled   3. History of TIA (transient ischemic attack) Well controlled   4. Abnormal CT of the head Active   5. CKD (chronic kidney disease) stage 3, GFR 30-59 ml/min Stable   6. Thrombocytopenia Stable   7. AICD (automatic cardioverter/defibrillator) present Active   8. Coronary artery disease involving native coronary artery of native heart without angina pectoris Active   9. Needs flu shot Active         Plan: Altered mental status, unspecified altered mental status type  Comments:  isolated episode of ~ 5 minutes with no further episodes; no focal neurologic deficits; refer to neurology to evaluate for seizure disorder    Essential hypertension  Comments:  continue current regimen and encouraged low Na diet  Orders:  -     CBC auto differential; Future; Expected date: 10/29/2018  -     Comprehensive metabolic panel; Future; Expected date: 10/29/2018    History of TIA (transient ischemic attack)  Comments:  continue current regimen and refer to neurology to evaluate   Orders:  -     Cancel: Ambulatory referral to Neurology  -     Ambulatory referral to Neurology    Abnormal CT of the head  Comments:  with AMS; refer to neurology for evaluation   Orders:  -     Ambulatory referral to Neurology    CKD (chronic kidney disease) stage 3, GFR 30-59 ml/min  Comments:  monitor and avoid NSAIDs; f/u renal who is managing   Orders:  -     Comprehensive metabolic panel; Future; Expected date: 10/29/2018    Thrombocytopenia  Comments:  monitor   Orders:  -     CBC auto differential; Future; Expected date: 10/29/2018    AICD (automatic  cardioverter/defibrillator) present    Coronary artery disease involving native coronary artery of native heart without angina pectoris  Comments:  continue ucrrent regimen and f/u cardiology who is managing     Needs flu shot  -     Influenza - High Dose (65+) (PF) (IM)        Problem List Items Addressed This Visit        Neuro    History of TIA (transient ischemic attack) (Chronic)    Overview     10/28/2012         Relevant Orders    Ambulatory referral to Neurology    Altered mental status - Primary       Cardiac/Vascular    CAD (coronary artery disease)    AICD (automatic cardioverter/defibrillator) present (Chronic)    Hypertension    Relevant Orders    CBC auto differential    Comprehensive metabolic panel       Renal/    CKD (chronic kidney disease) stage 3, GFR 30-59 ml/min (Chronic)    Relevant Orders    Comprehensive metabolic panel       ID    Needs flu shot    Relevant Orders    Influenza - High Dose (65+) (PF) (IM) (Completed)       Hematology    Thrombocytopenia    Relevant Orders    CBC auto differential       Other    Abnormal CT of the head    Relevant Orders    Ambulatory referral to Neurology

## 2018-10-09 ENCOUNTER — OFFICE VISIT (OUTPATIENT)
Dept: NEUROLOGY | Facility: CLINIC | Age: 83
End: 2018-10-09
Payer: MEDICARE

## 2018-10-09 VITALS
DIASTOLIC BLOOD PRESSURE: 66 MMHG | SYSTOLIC BLOOD PRESSURE: 112 MMHG | WEIGHT: 173.06 LBS | HEIGHT: 73 IN | HEART RATE: 73 BPM | BODY MASS INDEX: 22.94 KG/M2

## 2018-10-09 DIAGNOSIS — I21.A9 OTHER TYPE OF MYOCARDIAL INFARCTION: ICD-10-CM

## 2018-10-09 DIAGNOSIS — I63.9 CEREBROVASCULAR ACCIDENT (CVA), UNSPECIFIED MECHANISM: ICD-10-CM

## 2018-10-09 DIAGNOSIS — R40.4 ALTERED AWARENESS, TRANSIENT: Primary | ICD-10-CM

## 2018-10-09 DIAGNOSIS — Z86.73 HX OF TRANSIENT ISCHEMIC ATTACK (TIA): ICD-10-CM

## 2018-10-09 PROCEDURE — 3078F DIAST BP <80 MM HG: CPT | Mod: CPTII,,, | Performed by: PSYCHIATRY & NEUROLOGY

## 2018-10-09 PROCEDURE — 99205 OFFICE O/P NEW HI 60 MIN: CPT | Mod: S$PBB,,, | Performed by: PSYCHIATRY & NEUROLOGY

## 2018-10-09 PROCEDURE — 3074F SYST BP LT 130 MM HG: CPT | Mod: CPTII,,, | Performed by: PSYCHIATRY & NEUROLOGY

## 2018-10-09 PROCEDURE — 99999 PR PBB SHADOW E&M-EST. PATIENT-LVL IV: CPT | Mod: PBBFAC,,, | Performed by: PSYCHIATRY & NEUROLOGY

## 2018-10-09 PROCEDURE — 1101F PT FALLS ASSESS-DOCD LE1/YR: CPT | Mod: CPTII,,, | Performed by: PSYCHIATRY & NEUROLOGY

## 2018-10-09 PROCEDURE — 99214 OFFICE O/P EST MOD 30 MIN: CPT | Mod: PBBFAC,PO | Performed by: PSYCHIATRY & NEUROLOGY

## 2018-10-09 NOTE — LETTER
October 10, 2018      Ruddy Skelton MD  [address] [facility]  [place] [id number]           [facility]  [address] [place] [address]  [place] [address]  Phone: [phone]  Fax: [phone]          Patient: Nelson Huntley   MR Number: [id number]   YOB: 1935   Date of Visit: 10/9/2018       Dear Dr. Ruddy Skelton:    Thank you for referring Nelson Huntley to me for evaluation. Attached you will find relevant portions of my assessment and plan of care.    If you have questions, please do not hesitate to call me. I look forward to following Nelson Huntley along with you.    Sincerely,    Yovany Wooten MD    Enclosure  CC:  No Recipients    If you would like to receive this communication electronically, please contact [email or web address] or [phone] to request more information on [employer] Link access.    For providers and/or their staff who would like to refer a patient to [facility], please contact us through our one-stop-shop provider referral line, Kalin Castellanos, at [phone].    If you feel you have received this communication in error or would no longer like to receive these types of communications, please e-mail [email or web address]

## 2018-10-10 ENCOUNTER — PATIENT MESSAGE (OUTPATIENT)
Dept: NEUROLOGY | Facility: CLINIC | Age: 83
End: 2018-10-10

## 2018-10-10 ENCOUNTER — TELEPHONE (OUTPATIENT)
Dept: NEUROLOGY | Facility: CLINIC | Age: 83
End: 2018-10-10

## 2018-10-10 DIAGNOSIS — I25.10 CAD (CORONARY ARTERY DISEASE): ICD-10-CM

## 2018-10-10 DIAGNOSIS — I63.9 CEREBROVASCULAR ACCIDENT (CVA), UNSPECIFIED MECHANISM: Primary | ICD-10-CM

## 2018-10-10 DIAGNOSIS — I25.10 CORONARY ARTERY DISEASE, ANGINA PRESENCE UNSPECIFIED, UNSPECIFIED VESSEL OR LESION TYPE, UNSPECIFIED WHETHER NATIVE OR TRANSPLANTED HEART: Primary | ICD-10-CM

## 2018-10-10 NOTE — PROGRESS NOTES
Neurology Clinic Visit  Primary Care Provider: Ruddy Skelton  Referring Provider: Ruddy Skelton  Date of Visit: 10/9/2018  Reason for referral: Transient altered awareness and abnormal CTH     chief complaint:   Chief Complaint   Patient presents with    Transient Ischemic Attack     Abnormal CT       History of Present Illness  Nelson GIBBONS Parent is a 83 y.o. right handed male I have been asked to consult for evaluation and treatment of abnormal CT Head and recent hospitalization for transient altered awareness.  The patient is accompanied by his wife who assists with the history.  The patient states on 09/24/2018, he was at his baseline.  He was at the gym working out where he did 40 min of treadmill.  After exercising on the treadmill, he decided to lift weights.  While he was lifting weights he started to feel funny.  Patient has difficulty explaining what exactly this meant but he said that he felt different.  He then went to sit in the lounge where he became dizzy, felt weak all over, and started to have blue-gray vision.  The then leaned over on the counter.  His wife witnessed the event and stated that he was pale and he did not respond to those that were talking to him.  However, she does report that his eyes were still open.  Patient states that he was able to hear everything but he chose not to respond.  There was no seizure activity.  EMS was called and within 5 min of the onset of the patient's event he reports that he was back to his baseline.  There was no urinary incontinence or tongue bite.  The patient reports his blood pressure was 90/50 by EMS.  He presented to the emergency department and he was admitted for a presyncopal workup.  Patient does have an AICD which was interrogated and did not show any abnormal rhythm during the event.  He also had CT of the head which showed low attenuation in the right frontotemporal region.  He also had an echo that was done which showed an ejection fraction of 40-45  %.  Patient was discharged with the diagnosis of syncope/presyncope.  He is here for follow-up.    Of note the patient also mentions that in 2002 he had a TIA.  He reports he was at work and he felt funny.  He thought that his speech was slurred  but His colleague did not think so.  The patient decided to lay on the floor and within 5 minutes he was back to baseline.  He presented to a hospital and reports that he was diagnosed with a TIA but he is unclear of the workup that was done at that time.  He reports he was taking aspirin 81 mg daily at the time but then Plavix was added.  He now currently takes aspirin daily and Plavix  Three days a week and a statin daily for coronary artery disease. He denies any stroke like symptoms that has occurred in the past other than during his TIA.  He denies ever knowing that he had a stroke.  I discussed with the patient that the CT of the head shown during his recent hospitalization showed evidence of a remote infarct.    Stroke risk factors:  Hypertension, coronary artery disease, hyperlipidemia, previous TIA    Stroke workup:  CT of the head on 09/24/2018:  Low attenuation in the right frontal temporal region   Carotid ultrasound normal  TTE:  EF 40-45%, left atrial size normal  Patient reports he does not have a history of atrial fibrillation.    Stroke labs: I reviewed the following lab results.  TSH 1.752 on 09/24/2018  A1c to be ordered  Lipid panel pending      Patient Active Problem List    Diagnosis Date Noted    Abnormal CT of the head 10/08/2018    Altered mental status 10/08/2018    Needs flu shot 10/08/2018    Hypercalcemia 07/05/2018    Metabolic bone disease 07/05/2018    History of TIA (transient ischemic attack) 05/15/2018    Gastroesophageal reflux disease 08/29/2017    Proteinuria 07/06/2017    Prostate cancer screening 05/17/2017    MGUS (monoclonal gammopathy of unknown significance) 03/07/2017    Positive ELYSIA (antinuclear antibody) 03/01/2017     Abnormal serum protein electrophoresis 03/01/2017    Vitamin D deficiency 03/01/2017    Ischemic cardiomyopathy 02/24/2017    Seasonal allergic rhinitis 02/24/2017    Body mass index (BMI) 22.0-22.9, adult 02/24/2017    Monoclonal paraproteinemia 02/23/2017    Tovar's esophagus without dysplasia 01/18/2016    ED (erectile dysfunction) 07/08/2014    Nuclear sclerosis 12/13/2012    CAD (coronary artery disease) 10/28/2012    Old myocardial infarction 10/28/2012    Syncope and collapse 10/28/2012    S/P AAA (abdominal aortic aneurysm) repair 10/28/2012    Diverticulosis 10/28/2012    VT (ventricular tachycardia) 07/17/2012    Thrombocytopenia 07/17/2012    CKD (chronic kidney disease) stage 3, GFR 30-59 ml/min 07/17/2012    AICD (automatic cardioverter/defibrillator) present 07/17/2012    Hypertension 07/17/2012    Hyperlipidemia 07/17/2012    Anemia associated with chronic renal failure 07/17/2012    Aortic atherosclerosis 03/15/2012     Past Medical History:   Diagnosis Date    AICD (automatic cardioverter/defibrillator) present 7/17/2012    Cardiomyopathy     CKD (chronic kidney disease) stage 3, GFR 30-59 ml/min 7/17/2012    Coronary artery disease     GERD (gastroesophageal reflux disease)     Tovar's; Dr. Vu    Hyperlipidemia 7/17/2012    Hypertension 7/17/2012    Ischemic cardiomyopathy 7/17/2012    Thrombocytopenia 7/17/2012    Ventricular tachycardia     VT (ventricular tachycardia) 7/17/2012     Past Surgical History:   Procedure Laterality Date    ABDOMINAL AORTIC ANEURYSM REPAIR      CARDIAC DEFIBRILLATOR PLACEMENT      CORONARY ANGIOPLASTY      HERNIA REPAIR      x2     Family History   Problem Relation Age of Onset    Cancer Mother         Lymphoma    Lymphoma Mother     Coronary artery disease Mother     Heart disease Mother     Heart disease Father     Heart attacks under age 50 Father     No Known Problems Brother     Heart disease Sister           Current Outpatient Medications   Medication Sig    amLODIPine (NORVASC) 10 MG tablet TAKE 1 TABLET EVERY DAY    aspirin 81 MG chewable tablet Take 1 tablet by mouth Daily. 81  By mouth Every day    clopidogrel (PLAVIX) 75 mg tablet TAKE 1 TABLET ONE TIME DAILY (Patient taking differently: ONE TABLET 3 DAYS A WEEK.)    lisinopril (PRINIVIL,ZESTRIL) 40 MG tablet TAKE 1/2 TO 1 TABLET EVERY DAY  OR AS DIRECTED    metoprolol succinate (TOPROL-XL) 25 MG 24 hr tablet TAKE 1/2 TO 1 TABLET ONE OR TWO TIMES  DAILY OR AS DIRECTED    multivitamin with minerals tablet Take 1 tablet by mouth once daily.     nitroGLYCERIN (NITROSTAT) 0.4 MG SL tablet Take 1 tab under the tongue for chest pain. May repeat every 5 minutes for a total of 3 doses. If chest pain not relieved go to the ED.    omega-3 fatty acids-vitamin E (FISH OIL) 1,000 mg Cap Take 1 capsule by mouth 2 (two) times daily.    pravastatin (PRAVACHOL) 40 MG tablet TAKE 1 TABLET AT BEDTIME EXCEPT ON WEDNESDAY AND FRIDAY TAKE 1/2 TABLET AT BEDTIME    ranitidine (ZANTAC) 150 MG tablet Take 150 mg by mouth 2 (two) times daily.    sotalol (BETAPACE) 120 MG Tab TAKE 1 TABLET TWICE DAILY    vitamin D 1000 units Tab Take 1,000 Units by mouth every Tues, Thurs, Sat.     multivitamin with minerals tablet     niacin 500 MG tablet Take 3 tablets by mouth Every PM. 3 Tablet Oral Every evening     No current facility-administered medications for this visit.          Review of patient's allergies indicates:   Allergen Reactions    Fluress [fluorescein-benoxinate]     Mydriacyl [tropicamide]     Pcn  [penicillins]      Other reaction(s): Unknown    Clindamycin Rash     Social History     Socioeconomic History    Marital status:      Spouse name: Not on file    Number of children: Not on file    Years of education: Not on file    Highest education level: Not on file   Social Needs    Financial resource strain: Not on file    Food insecurity - worry:  "Not on file    Food insecurity - inability: Not on file    Transportation needs - medical: Not on file    Transportation needs - non-medical: Not on file   Occupational History    Not on file   Tobacco Use    Smoking status: Never Smoker    Smokeless tobacco: Never Used   Substance and Sexual Activity    Alcohol use: No    Drug use: No    Sexual activity: Yes     Partners: Female     Comment:    Other Topics Concern    Not on file   Social History Narrative    Not on file       Review of Systems  Constitutional: negative  Eyes: negative  Ears, nose, mouth, throat, and face: negative  Respiratory: negative  Cardiovascular: negative  Gastrointestinal: negative  Genitourinary:negative  Integument/breast: negative  Hematologic/lymphatic: negative  Musculoskeletal:negative  Neurological: negative    Objective:  Vital signs in last 24 hours:  Vitals:    10/09/18 1402   BP: 112/66   Pulse: 73   Weight: 78.5 kg (173 lb 1 oz)   Height: 6' 1" (1.854 m)     General: no acute distress, well nourished, well-groomed  CVS: RRR, no murmur, gallops or rubs  Respiratory: Clear to ausculation  Extremities: no edema    Neurological Examination:    HIGHER INTEGRATIVE FUNCTIONS:  -Normal attention span and concentration; immediately responds to questions and commands  -Oriented to time, place and person  -Recent and remote memory intact  -Language normal (no aphasia or dysarthria)  -Normal fund of knowledge    CN:  -Right pupil 2mm and left pupil 3mm, reactive to light, visual fields full, unable to visualized optic disc due to small pupils on fundoscopic exam  -EOMI with normal saccades and smooth pursuit  -Facial sensation intact bilaterally  -Facial strength/movement intact bilaterally  -Hearing intact to finger rub or tuning fork  -Palate elevates symmetrically  -Normal shoulder shrug and head turn  -Tongue protrudes midline    MOTOR: (left/right graded 1-5)  -UE: 5/5 deltoids; 5/5 biceps, triceps; 5/5 wrist " flexors, extensors; 5/5 interosseous; 5/5   -LEs: 5/5 hip flexion, extension; 5/5 knee flexion, extension; 5/5 ankle flexion, extension  -Tone: normal  -No pronator drift, no orbiting    SENSORY:  -Light touch, temperature sensation intact bilaterally    REFLEXES:  -2+ upper and lower bilaterally  -Flexor plantar reflex bilaterally  -No clonus    COORDINATION:  -FNF, HKS, OSMIN intact bilaterally    GAIT:  -Normal casual gait with intact tandem    Imaging.   CT Head: 9/24/2018:  Impression       1. Focus of low attenuation within the right frontoparietal lobe, configuration of which suggests sequela of remote infarct or insult although no previous exams are available for comparison, and remains age indeterminate.  Clinical correlation is advised.  2. Sequela of chronic microvascular ischemic change and senescent change.      I personally reviewed the above image and the report.    Assessment/Plan:  1. Recent transient altered awareness, likely related to presyncope in the setting of exertion.  CT Head shows evidence of old stroke. History is not suggestive of seizure.    No further workup needed from a neurological standpoint.    2. 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 History of TIA  As per #2: currently on asa/plavix and statin    Patient to followup in 1 month.    I discussed the assessment and plan with the patient and his wife and answered the questions that they had.

## 2018-10-10 NOTE — PROGRESS NOTES
Unable to get MRI/MRA due to cardiac device  Will place order for CTA of head and neck to evaluate vasculature.

## 2018-10-10 NOTE — TELEPHONE ENCOUNTER
I called patient to inform them that the MRI had to be canceled and Dr. Wooten will be ordering a CTA and I will call them the schedule when the orders were in. The understood

## 2018-10-10 NOTE — TELEPHONE ENCOUNTER
I spoke to Ms Parent and I have the model numbers and I am calling [facility] to see if the patient can still have the mri done

## 2018-10-25 ENCOUNTER — HOSPITAL ENCOUNTER (OUTPATIENT)
Dept: RADIOLOGY | Facility: HOSPITAL | Age: 83
Discharge: HOME | End: 2018-10-25
Attending: PSYCHIATRY & NEUROLOGY
Payer: MEDICARE

## 2018-10-25 DIAGNOSIS — I63.9 CEREBROVASCULAR ACCIDENT (CVA), UNSPECIFIED MECHANISM: ICD-10-CM

## 2018-10-25 PROCEDURE — 70496 CT ANGIOGRAPHY HEAD: CPT | Mod: TC

## 2018-10-25 PROCEDURE — 25500020 PHARM REV CODE 255: Performed by: PSYCHIATRY & NEUROLOGY

## 2018-10-25 PROCEDURE — 70498 CT ANGIOGRAPHY NECK: CPT | Mod: 26,,, | Performed by: RADIOLOGY

## 2018-10-25 PROCEDURE — 70496 CT ANGIOGRAPHY HEAD: CPT | Mod: 26,,, | Performed by: RADIOLOGY

## 2018-10-25 RX ADMIN — IOHEXOL 100 ML: 350 INJECTION, SOLUTION INTRAVENOUS at 01:10

## 2018-10-29 ENCOUNTER — PATIENT MESSAGE (OUTPATIENT)
Dept: CARDIOLOGY | Facility: CLINIC | Age: 83
End: 2018-10-29

## 2018-10-30 DIAGNOSIS — Z95.810 ICD (IMPLANTABLE CARDIOVERTER-DEFIBRILLATOR) IN PLACE: Primary | ICD-10-CM

## 2018-10-30 DIAGNOSIS — I25.5 ISCHEMIC CARDIOMYOPATHY: ICD-10-CM

## 2018-10-30 DIAGNOSIS — I47.20 V-TACH: ICD-10-CM

## 2018-10-30 RX ORDER — NITROGLYCERIN 0.4 MG/1
TABLET SUBLINGUAL
Qty: 25 TABLET | Refills: 4 | Status: SHIPPED | OUTPATIENT
Start: 2018-10-30 | End: 2021-08-16 | Stop reason: SDUPTHER

## 2018-11-01 ENCOUNTER — LAB VISIT (OUTPATIENT)
Dept: LAB | Facility: HOSPITAL | Age: 83
End: 2018-11-01
Attending: INTERNAL MEDICINE
Payer: MEDICARE

## 2018-11-01 ENCOUNTER — CLINICAL SUPPORT (OUTPATIENT)
Dept: ELECTROPHYSIOLOGY | Facility: CLINIC | Age: 83
End: 2018-11-01
Payer: MEDICARE

## 2018-11-01 DIAGNOSIS — N18.30 CKD (CHRONIC KIDNEY DISEASE) STAGE 3, GFR 30-59 ML/MIN: Chronic | ICD-10-CM

## 2018-11-01 DIAGNOSIS — Z95.810 ICD (IMPLANTABLE CARDIOVERTER-DEFIBRILLATOR) IN PLACE: ICD-10-CM

## 2018-11-01 DIAGNOSIS — I10 ESSENTIAL HYPERTENSION: ICD-10-CM

## 2018-11-01 DIAGNOSIS — I47.20 V-TACH: ICD-10-CM

## 2018-11-01 DIAGNOSIS — I25.5 ISCHEMIC CARDIOMYOPATHY: ICD-10-CM

## 2018-11-01 DIAGNOSIS — D69.6 THROMBOCYTOPENIA: ICD-10-CM

## 2018-11-01 LAB
ALBUMIN SERPL BCP-MCNC: 4 G/DL
ALP SERPL-CCNC: 49 U/L
ALT SERPL W/O P-5'-P-CCNC: 22 U/L
ANION GAP SERPL CALC-SCNC: 5 MMOL/L
AST SERPL-CCNC: 24 U/L
BASOPHILS # BLD AUTO: 0.07 K/UL
BASOPHILS NFR BLD: 1 %
BILIRUB SERPL-MCNC: 1.2 MG/DL
BUN SERPL-MCNC: 14 MG/DL
CALCIUM SERPL-MCNC: 10 MG/DL
CHLORIDE SERPL-SCNC: 104 MMOL/L
CO2 SERPL-SCNC: 30 MMOL/L
CREAT SERPL-MCNC: 1.6 MG/DL
DIFFERENTIAL METHOD: ABNORMAL
EOSINOPHIL # BLD AUTO: 0.3 K/UL
EOSINOPHIL NFR BLD: 4.1 %
ERYTHROCYTE [DISTWIDTH] IN BLOOD BY AUTOMATED COUNT: 13.9 %
EST. GFR  (AFRICAN AMERICAN): 45.4 ML/MIN/1.73 M^2
EST. GFR  (NON AFRICAN AMERICAN): 39.3 ML/MIN/1.73 M^2
GLUCOSE SERPL-MCNC: 94 MG/DL
HCT VFR BLD AUTO: 46.1 %
HGB BLD-MCNC: 15.4 G/DL
IMM GRANULOCYTES # BLD AUTO: 0.03 K/UL
IMM GRANULOCYTES NFR BLD AUTO: 0.4 %
LYMPHOCYTES # BLD AUTO: 2.7 K/UL
LYMPHOCYTES NFR BLD: 38 %
MCH RBC QN AUTO: 31.4 PG
MCHC RBC AUTO-ENTMCNC: 33.4 G/DL
MCV RBC AUTO: 94 FL
MONOCYTES # BLD AUTO: 0.9 K/UL
MONOCYTES NFR BLD: 12.5 %
NEUTROPHILS # BLD AUTO: 3.1 K/UL
NEUTROPHILS NFR BLD: 44 %
NRBC BLD-RTO: 0 /100 WBC
PLATELET # BLD AUTO: 91 K/UL
PMV BLD AUTO: 11.9 FL
POTASSIUM SERPL-SCNC: 4.1 MMOL/L
PROT SERPL-MCNC: 6.9 G/DL
RBC # BLD AUTO: 4.9 M/UL
SODIUM SERPL-SCNC: 139 MMOL/L
WBC # BLD AUTO: 7.02 K/UL

## 2018-11-01 PROCEDURE — 80053 COMPREHEN METABOLIC PANEL: CPT

## 2018-11-01 PROCEDURE — 85025 COMPLETE CBC W/AUTO DIFF WBC: CPT

## 2018-11-01 PROCEDURE — 36415 COLL VENOUS BLD VENIPUNCTURE: CPT | Mod: PO

## 2018-11-01 PROCEDURE — 93283 PRGRMG EVAL IMPLANTABLE DFB: CPT | Mod: S$GLB,,, | Performed by: INTERNAL MEDICINE

## 2018-11-05 RX ORDER — CLOPIDOGREL BISULFATE 75 MG/1
TABLET ORAL
Qty: 90 TABLET | Refills: 6 | Status: SHIPPED | OUTPATIENT
Start: 2018-11-05 | End: 2019-11-12 | Stop reason: SDUPTHER

## 2018-11-09 ENCOUNTER — OFFICE VISIT (OUTPATIENT)
Dept: INTERNAL MEDICINE | Facility: CLINIC | Age: 83
End: 2018-11-09
Payer: MEDICARE

## 2018-11-09 VITALS
HEART RATE: 63 BPM | OXYGEN SATURATION: 95 % | DIASTOLIC BLOOD PRESSURE: 62 MMHG | HEIGHT: 73 IN | WEIGHT: 176.56 LBS | BODY MASS INDEX: 23.4 KG/M2 | SYSTOLIC BLOOD PRESSURE: 126 MMHG

## 2018-11-09 DIAGNOSIS — D69.6 THROMBOCYTOPENIA: ICD-10-CM

## 2018-11-09 DIAGNOSIS — R41.82 ALTERED MENTAL STATUS, UNSPECIFIED ALTERED MENTAL STATUS TYPE: ICD-10-CM

## 2018-11-09 DIAGNOSIS — Z12.5 PROSTATE CANCER SCREENING: ICD-10-CM

## 2018-11-09 DIAGNOSIS — Z00.00 PREVENTATIVE HEALTH CARE: ICD-10-CM

## 2018-11-09 DIAGNOSIS — E78.5 HYPERLIPIDEMIA, UNSPECIFIED HYPERLIPIDEMIA TYPE: ICD-10-CM

## 2018-11-09 DIAGNOSIS — I10 ESSENTIAL HYPERTENSION: Primary | ICD-10-CM

## 2018-11-09 DIAGNOSIS — N18.30 CKD (CHRONIC KIDNEY DISEASE) STAGE 3, GFR 30-59 ML/MIN: Chronic | ICD-10-CM

## 2018-11-09 DIAGNOSIS — I25.10 CORONARY ARTERY DISEASE INVOLVING NATIVE CORONARY ARTERY OF NATIVE HEART WITHOUT ANGINA PECTORIS: ICD-10-CM

## 2018-11-09 PROCEDURE — 99214 OFFICE O/P EST MOD 30 MIN: CPT | Mod: S$GLB,,, | Performed by: INTERNAL MEDICINE

## 2018-11-09 PROCEDURE — 99999 PR PBB SHADOW E&M-EST. PATIENT-LVL III: CPT | Mod: PBBFAC,,, | Performed by: INTERNAL MEDICINE

## 2018-11-09 PROCEDURE — 1101F PT FALLS ASSESS-DOCD LE1/YR: CPT | Mod: CPTII,S$GLB,, | Performed by: INTERNAL MEDICINE

## 2018-11-09 PROCEDURE — 3078F DIAST BP <80 MM HG: CPT | Mod: CPTII,S$GLB,, | Performed by: INTERNAL MEDICINE

## 2018-11-09 PROCEDURE — 3074F SYST BP LT 130 MM HG: CPT | Mod: CPTII,S$GLB,, | Performed by: INTERNAL MEDICINE

## 2018-11-09 NOTE — PROGRESS NOTES
Answers for HPI/ROS submitted by the patient on 11/7/2018   activity change: No  unexpected weight change: No  rhinorrhea: No  trouble swallowing: No  visual disturbance: No  chest tightness: No  polyuria: No  difficulty urinating: No  joint swelling: No  arthralgias: No  confusion: No  dysphoric mood: No  Pt. ID: Nelson GIBBONS Parent is a 83 y.o. male      Chief complaint:   Chief Complaint   Patient presents with    Follow-up    Hypertension       HPI: Pt. Here for f/u for HTN and AMS; pt. Wife is with the pt.; I reviewed labs dated 11/1/18; platelets are low but stable; kidney fxn is elevated but stable and he sees renal; pt. Has f/u neurology for AMS and CTA  No significant vascular stenosis was noted; suspected remote infarct was noted and pt. Will f/u neurology; he is scheduled to see cardiology for CAD/AICD    Review of Systems   HENT: Negative for hearing loss.    Eyes: Negative for discharge.   Respiratory: Negative for wheezing.    Cardiovascular: Negative for chest pain and palpitations.   Gastrointestinal: Negative for blood in stool, constipation, diarrhea and vomiting.   Genitourinary: Negative for hematuria and urgency.   Musculoskeletal: Negative for neck pain.   Neurological: Negative for weakness and headaches.   Endo/Heme/Allergies: Negative for polydipsia.         Objective:    Physical Exam   Constitutional: He is oriented to person, place, and time.   Eyes: EOM are normal.   Neck: Normal range of motion.   Cardiovascular: Normal rate, regular rhythm and normal heart sounds.   Pulmonary/Chest: Effort normal and breath sounds normal.   Abdominal: Soft. There is no tenderness. There is no rebound and no guarding.   Musculoskeletal: Normal range of motion.   Neurological: He is alert and oriented to person, place, and time.   Skin: No rash noted.   Vitals reviewed.        Health Maintenance   Topic Date Due    Lipid Panel  11/10/2022    Colonoscopy  05/07/2023    TETANUS VACCINE  11/22/2023     Pneumococcal (65+)  Completed    Influenza Vaccine  Completed    Zoster Vaccine  Addressed         Assessment:     1. Essential hypertension Well controlled   2. Thrombocytopenia Stable   3. CKD (chronic kidney disease) stage 3, GFR 30-59 ml/min Stable   4. Altered mental status, unspecified altered mental status type Well controlled   5. Coronary artery disease involving native coronary artery of native heart without angina pectoris Well controlled   6. Hyperlipidemia, unspecified hyperlipidemia type Active   7. Preventative health care Active   8. Prostate cancer screening Active         Plan: Essential hypertension  Comments:  continue current regimen and encouraged low Na diet   Orders:  -     CBC auto differential; Future; Expected date: 02/09/2019  -     Comprehensive metabolic panel; Future; Expected date: 02/09/2019  -     Urinalysis; Future; Expected date: 02/09/2019    Thrombocytopenia  Comments:  monitor   Orders:  -     CBC auto differential; Future; Expected date: 02/09/2019    CKD (chronic kidney disease) stage 3, GFR 30-59 ml/min  Comments:  monitor and avoid NSAIDs; f/u renal who is managing   Orders:  -     Comprehensive metabolic panel; Future; Expected date: 02/09/2019    Altered mental status, unspecified altered mental status type  Comments:  no further episodes; f/u neurology who is evaluating     Coronary artery disease involving native coronary artery of native heart without angina pectoris  Comments:  continue current regimen and f/u cardiology who is managing     Hyperlipidemia, unspecified hyperlipidemia type  Comments:  continue current regimen and encouraged diet modification   Orders:  -     Lipid panel; Future; Expected date: 02/09/2019    Preventative health care  -     CBC auto differential; Future; Expected date: 02/09/2019  -     Comprehensive metabolic panel; Future; Expected date: 02/09/2019  -     Lipid panel; Future; Expected date: 02/09/2019  -     Urinalysis; Future;  Expected date: 02/09/2019    Prostate cancer screening  -     PSA, Screening; Future; Expected date: 02/09/2019        Problem List Items Addressed This Visit        Neuro    Altered mental status       Cardiac/Vascular    CAD (coronary artery disease)    Hypertension - Primary    Relevant Orders    CBC auto differential    Comprehensive metabolic panel    Urinalysis    Hyperlipidemia    Relevant Orders    Lipid panel       Renal/    CKD (chronic kidney disease) stage 3, GFR 30-59 ml/min (Chronic)    Relevant Orders    Comprehensive metabolic panel       Hematology    Thrombocytopenia    Relevant Orders    CBC auto differential       Other    Preventative health care    Relevant Orders    CBC auto differential    Comprehensive metabolic panel    Lipid panel    Urinalysis

## 2018-11-14 ENCOUNTER — PATIENT MESSAGE (OUTPATIENT)
Dept: NEUROLOGY | Facility: CLINIC | Age: 83
End: 2018-11-14

## 2018-11-18 ENCOUNTER — PATIENT MESSAGE (OUTPATIENT)
Dept: INTERNAL MEDICINE | Facility: CLINIC | Age: 83
End: 2018-11-18

## 2018-12-26 ENCOUNTER — HOSPITAL ENCOUNTER (EMERGENCY)
Facility: HOSPITAL | Age: 83
Discharge: HOME | End: 2018-12-26
Attending: EMERGENCY MEDICINE
Payer: MEDICARE

## 2018-12-26 VITALS
OXYGEN SATURATION: 96 % | RESPIRATION RATE: 16 BRPM | DIASTOLIC BLOOD PRESSURE: 62 MMHG | SYSTOLIC BLOOD PRESSURE: 132 MMHG | HEIGHT: 73 IN | WEIGHT: 176 LBS | TEMPERATURE: 98 F | HEART RATE: 61 BPM | BODY MASS INDEX: 23.33 KG/M2

## 2018-12-26 DIAGNOSIS — R19.4 DECREASED FREQUENCY OF BOWEL MOVEMENTS: ICD-10-CM

## 2018-12-26 DIAGNOSIS — K59.00 CONSTIPATION: Primary | ICD-10-CM

## 2018-12-26 PROCEDURE — 99283 EMERGENCY DEPT VISIT LOW MDM: CPT | Mod: 25,HCNC

## 2018-12-27 NOTE — ED PROVIDER NOTES
"Encounter Date: 12/26/2018    SCRIBE #1 NOTE: I, Luke Fam, am scribing for, and in the presence of,  . I have scribed the entire note.       History     Chief Complaint   Patient presents with    Constipation     pt to triage ambulatory and reports has constipation and gets this frequently; pt ate prunes and took 2 laxatives today and no bm and starting to feel pressure; last bm 1-2 days ago; pt reports usually needs an enema         Pt is an 82 yo WM with pmhx of chronic constipation who presents to the ED with the complaint of constipation. Pt states that he has not had a BM in the past "36 - 48 hours." He denies any abdominal pain, chest pain, fever, chills, nausea or vomiting when asked.       The history is provided by the patient.     Review of patient's allergies indicates:   Allergen Reactions    Fluress [fluorescein-benoxinate]     Mydriacyl [tropicamide]     Pcn  [penicillins]      Other reaction(s): Unknown    Clindamycin Rash     Past Medical History:   Diagnosis Date    AICD (automatic cardioverter/defibrillator) present 7/17/2012    Cardiomyopathy     CKD (chronic kidney disease) stage 3, GFR 30-59 ml/min 7/17/2012    Coronary artery disease     GERD (gastroesophageal reflux disease)     Tovar's; Dr. Vu    Hyperlipidemia 7/17/2012    Hypertension 7/17/2012    Ischemic cardiomyopathy 7/17/2012    Thrombocytopenia 7/17/2012    Ventricular tachycardia     VT (ventricular tachycardia) 7/17/2012     Past Surgical History:   Procedure Laterality Date    ABDOMINAL AORTIC ANEURYSM REPAIR      CARDIAC DEFIBRILLATOR PLACEMENT      CORONARY ANGIOPLASTY      HERNIA REPAIR      x2     Family History   Problem Relation Age of Onset    Cancer Mother         Lymphoma    Lymphoma Mother     Coronary artery disease Mother     Heart disease Mother     Heart disease Father     Heart attacks under age 50 Father     No Known Problems Brother     Heart disease Sister  "     Social History     Tobacco Use    Smoking status: Never Smoker    Smokeless tobacco: Never Used   Substance Use Topics    Alcohol use: No    Drug use: No     Review of Systems   Constitutional: Negative for chills, diaphoresis and fever.   HENT: Negative for congestion, facial swelling and trouble swallowing.    Eyes: Negative for redness.   Respiratory: Negative for shortness of breath.    Cardiovascular: Negative for chest pain.   Gastrointestinal: Positive for constipation. Negative for abdominal pain, anal bleeding, blood in stool, diarrhea, nausea, rectal pain and vomiting.   Genitourinary: Negative for dysuria, flank pain and hematuria.   Musculoskeletal: Negative for arthralgias and gait problem.   Skin: Negative for rash.   Neurological: Negative for dizziness, facial asymmetry, speech difficulty and numbness.   Psychiatric/Behavioral: Negative for agitation and confusion.       Physical Exam     Initial Vitals [12/26/18 2020]   BP Pulse Resp Temp SpO2   (!) 159/73 65 20 97.7 °F (36.5 °C) 96 %      MAP       --         Physical Exam    Nursing note and vitals reviewed.  Constitutional: He appears well-developed and well-nourished.   HENT:   Head: Normocephalic and atraumatic.   Right Ear: External ear normal.   Left Ear: External ear normal.   Nose: Nose normal.   Mouth/Throat: Oropharynx is clear and moist.   Eyes: Conjunctivae and EOM are normal. Pupils are equal, round, and reactive to light.   Neck: Normal range of motion. Neck supple.   Cardiovascular: Normal rate, regular rhythm, normal heart sounds and intact distal pulses. Exam reveals no gallop and no friction rub.    No murmur heard.  Pulmonary/Chest: Breath sounds normal. He has no wheezes. He has no rhonchi. He has no rales.   Abdominal: Soft. Bowel sounds are normal. He exhibits distension. He exhibits no shifting dullness, no pulsatile liver, no fluid wave, no abdominal bruit, no ascites, no pulsatile midline mass and no mass. There is  "no hepatosplenomegaly. There is no tenderness. There is no rigidity, no rebound, no guarding, no CVA tenderness, no tenderness at McBurney's point and negative Sosa's sign.   Musculoskeletal: Normal range of motion.   Neurological: He is alert and oriented to person, place, and time. He has normal strength.   Skin: Skin is warm. Capillary refill takes less than 2 seconds. No rash noted. No erythema.   Psychiatric: He has a normal mood and affect. Thought content normal.         ED Course   Procedures  Labs Reviewed - No data to display       Imaging Results          X-Ray Abdomen Flat And Erect (Final result)  Result time 12/26/18 20:44:59    Final result by Jhony Baxter MD (12/26/18 20:44:59)                 Impression:      Nonobstructive bowel gas pattern.    Moderate fecal loading in the colon which may reflect constipation in the correct clinical setting.      Electronically signed by: Jhony Baxter MD  Date:    12/26/2018  Time:    20:44             Narrative:    EXAMINATION:  XR ABDOMEN FLAT AND ERECT    CLINICAL HISTORY:  Constipation, unspecified.    TECHNIQUE:  Flat and erect frontal views of the abdomen were performed.    COMPARISON:  None.    FINDINGS:  Bowel gas pattern is nonobstructive.  No evidence of pneumoperitoneum.  Moderate volume fecal loading in the colon.  Aorto bi-iliac stent is noted.  Bones show degenerative change but no acute abnormality.                                 Medical Decision Making:   Clinical Tests:   Radiological Study: Reviewed and Ordered  ED Management:  - Physical exam unremarkable other than mild distension  - AXR demonstrates moderate fecal loading in the colon; no air fluid levels; no dilated loops of bowel; aortic stent in place  - Pt administered enema in ED with substantial bowel movement  - Pt reports feeling "much better" after aforementioned BM  - No emergent medical condition and/or further intervention is indicated at this time after having taken " into account the patient's history, physical exam findings, and empirical and objective data obtained during the patient's emergency department workup.   - The patient is at low risk for an emergent medical condition at this time, and I am of the belief that that it is safe to discharge the patient from the emergency department.   - The patient is instructed to follow up as outpatient as indicated on the discharge paperwork.    - I have discussed the specifics of the workup with the patient and the patient has verbalized understanding of the details of the workup, the diagnosis, the treatment plan, and the need for outpatient follow-up.    - Although the patient has no emergent etiology today this does not preclude the development of an emergent condition so, in addition, I have advised the patient that they can return to the ED and/or activate EMS at any time with worsening of their symptoms, change of their symptoms, or with any other medical complaint.    - The patient remained comfortable and stable during their visit in the ED.    - Discharge and follow-up instructions discussed with the patient who expressed understanding and willingness to comply with my recommendations.  - Results of all emergency department tests  discussed thoroughly with patient; all patient questions answered; pt in agreement with plan  - Pt instructed to follow up with PCP in one week for recheck of today's complaints  - Pt given strict emergency department return precautions for any new or worsening of symptoms  - Pt discharged from the emergency department in stable condition; pt hemodynamically stable on discharge from the emergency department                         Clinical Impression:     1. Constipation    2. Decreased frequency of bowel movements               IJuan,  personally performed the services described in this documentation. All medical record entries made by the scribe were at my direction and in my  presence.  I have reviewed the chart and agree that the record reflects my personal performance and is accurate and complete. Juan oLcke M.D. 12:38 PM12/28/2018                 Juan Locke MD  12/28/18 [id number]

## 2018-12-27 NOTE — ED TRIAGE NOTES
Pt. To the ER with c/o abdominal fullness and constipation. Pt. Denies c/o pain or discomfort. Pt. Consumed prunes and miralax around 5 pm today.

## 2019-01-07 NOTE — PROGRESS NOTES
Subjective:   Patient ID:  Nelson GIBBONS Parent is a 83 y.o. male who presents for follow-up of CVD    HPI: The patient is here for CAD/ICM/VT and ICD.     The patient has no chest pain, SOB, TIA, palpitations, syncope or pre-syncope.Patient currently exercises many times per week.Most BPs 120s.        Review of Systems   Constitution: Negative for chills, decreased appetite, diaphoresis, fever, weakness, malaise/fatigue, night sweats, weight gain and weight loss.   HENT: Negative for congestion, hoarse voice, nosebleeds, sore throat and tinnitus.    Eyes: Negative for blurred vision, double vision, vision loss in left eye, vision loss in right eye, visual disturbance and visual halos.   Cardiovascular: Negative for chest pain, claudication, cyanosis, dyspnea on exertion, irregular heartbeat, leg swelling, near-syncope, orthopnea, palpitations, paroxysmal nocturnal dyspnea and syncope.   Respiratory: Negative for cough, hemoptysis, shortness of breath, sleep disturbances due to breathing, snoring, sputum production and wheezing.    Endocrine: Negative for cold intolerance, heat intolerance, polydipsia, polyphagia and polyuria.   Hematologic/Lymphatic: Negative for adenopathy and bleeding problem. Does not bruise/bleed easily.   Skin: Negative for color change, dry skin, flushing, itching, nail changes, poor wound healing, rash, skin cancer, suspicious lesions and unusual hair distribution.   Musculoskeletal: Negative for arthritis, back pain, falls, gout, joint pain, joint swelling, muscle cramps, muscle weakness, myalgias and stiffness.   Gastrointestinal: Negative for abdominal pain, anorexia, change in bowel habit, constipation, diarrhea, dysphagia, heartburn, hematemesis, hematochezia, melena and vomiting.   Genitourinary: Negative for decreased libido, dysuria, hematuria, hesitancy and urgency.   Neurological: Negative for excessive daytime sleepiness, dizziness, focal weakness, headaches, light-headedness, loss of  "balance, numbness, paresthesias, seizures, sensory change, tremors and vertigo.   Psychiatric/Behavioral: Negative for altered mental status, depression, hallucinations, memory loss, substance abuse and suicidal ideas. The patient does not have insomnia and is not nervous/anxious.    Allergic/Immunologic: Negative for environmental allergies and hives.       Objective: /63 (BP Location: Left arm, Patient Position: Sitting, BP Method: Large (Automatic))   Pulse 65   Ht 6' 1" (1.854 m)   Wt 79.9 kg (176 lb 2.4 oz)   BMI 23.24 kg/m²      Physical Exam   Constitutional: He is oriented to person, place, and time. He appears well-developed and well-nourished. No distress.   HENT:   Head: Normocephalic.   Eyes: EOM are normal. Pupils are equal, round, and reactive to light.   Neck: Normal range of motion. No thyromegaly present.   Cardiovascular: Normal rate, regular rhythm, normal heart sounds and intact distal pulses. Exam reveals no gallop and no friction rub.   No murmur heard.  Pulses:       Carotid pulses are 3+ on the right side, and 3+ on the left side.       Radial pulses are 3+ on the right side, and 3+ on the left side.        Femoral pulses are 3+ on the right side, and 3+ on the left side.       Popliteal pulses are 3+ on the right side, and 3+ on the left side.        Dorsalis pedis pulses are 3+ on the right side, and 3+ on the left side.        Posterior tibial pulses are 3+ on the right side, and 3+ on the left side.   Pulmonary/Chest: Effort normal and breath sounds normal. No respiratory distress. He has no wheezes. He has no rales. He exhibits no tenderness.   Abdominal: Soft. He exhibits no distension and no mass. There is no tenderness.   Musculoskeletal: Normal range of motion.   Lymphadenopathy:     He has no cervical adenopathy.   Neurological: He is alert and oriented to person, place, and time.   Skin: Skin is warm. He is not diaphoretic. No cyanosis. Nails show no clubbing. "   Psychiatric: He has a normal mood and affect. His speech is normal and behavior is normal. Judgment and thought content normal. Cognition and memory are normal.       Assessment:     1. Coronary artery disease involving native coronary artery of native heart without angina pectoris    2. Ischemic cardiomyopathy    3. AICD (automatic cardioverter/defibrillator) present    4. Tovar's esophagus without dysplasia    5. Erectile dysfunction due to arterial insufficiency    6. Essential hypertension    7. Pure hypercholesterolemia    8. Hypercalcemia    9. History of TIA (transient ischemic attack)    10. S/P AAA (abdominal aortic aneurysm) repair    11. VT (ventricular tachycardia)    12. Vitamin D deficiency    13. Syncope and collapse    14. Monoclonal paraproteinemia    15. MGUS (monoclonal gammopathy of unknown significance)    16. Metabolic bone disease    17. Gastroesophageal reflux disease without esophagitis    18. Aortic atherosclerosis    19. Anemia associated with chronic renal failure    20. Abnormal CT of the head        Plan:   Discussed diet , achieving and maintaining ideal body weight, and exercise.   We reviewed meds in detail.  Reassured-discussed goals, options , plan  Change lisinopril to losartan 100 mg  Co Q 10 200 mg per day      Edward was seen today for coronary artery disease.    Diagnoses and all orders for this visit:    Coronary artery disease involving native coronary artery of native heart without angina pectoris  -     losartan (COZAAR) 50 MG tablet; Take 1 tablet (50 mg total) by mouth once daily.  -     Lipid panel; Future; Expected date: 03/08/2020  -     Comprehensive metabolic panel; Future; Expected date: 03/08/2020  -     TSH; Future; Expected date: 03/08/2020  -     PSA, Screening; Future; Expected date: 03/08/2020  -     EKG 12-lead; Future; Expected date: 03/08/2020  -     Transthoracic echo (TTE) complete (Cupid Only); Future; Expected date: 03/08/2020  -     Brain  natriuretic peptide; Future; Expected date: 03/08/2020    Ischemic cardiomyopathy  -     losartan (COZAAR) 50 MG tablet; Take 1 tablet (50 mg total) by mouth once daily.  -     Lipid panel; Future; Expected date: 03/08/2020  -     Comprehensive metabolic panel; Future; Expected date: 03/08/2020  -     TSH; Future; Expected date: 03/08/2020  -     EKG 12-lead; Future; Expected date: 03/08/2020  -     Transthoracic echo (TTE) complete (Cupid Only); Future; Expected date: 03/08/2020  -     Brain natriuretic peptide; Future; Expected date: 03/08/2020  -     CBC auto differential; Future; Expected date: 03/08/2020    AICD (automatic cardioverter/defibrillator) present  -     Comprehensive metabolic panel; Future; Expected date: 03/08/2020  -     EKG 12-lead; Future; Expected date: 03/08/2020  -     Transthoracic echo (TTE) complete (Cupid Only); Future; Expected date: 03/08/2020  -     Brain natriuretic peptide; Future; Expected date: 03/08/2020    Tovar's esophagus without dysplasia    Erectile dysfunction due to arterial insufficiency    Essential hypertension  -     Comprehensive metabolic panel; Future; Expected date: 03/08/2020  -     TSH; Future; Expected date: 03/08/2020  -     Brain natriuretic peptide; Future; Expected date: 03/08/2020    Pure hypercholesterolemia  -     Lipid panel; Future; Expected date: 03/08/2020  -     TSH; Future; Expected date: 03/08/2020    Hypercalcemia  -     Comprehensive metabolic panel; Future; Expected date: 03/08/2020    History of TIA (transient ischemic attack)    S/P AAA (abdominal aortic aneurysm) repair    VT (ventricular tachycardia)  -     Transthoracic echo (TTE) complete (Cupid Only); Future; Expected date: 03/08/2020  -     Brain natriuretic peptide; Future; Expected date: 03/08/2020    Vitamin D deficiency  -     Comprehensive metabolic panel; Future; Expected date: 03/08/2020    Syncope and collapse    Monoclonal paraproteinemia  -     CBC auto differential; Future;  Expected date: 03/08/2020    MGUS (monoclonal gammopathy of unknown significance)  -     CBC auto differential; Future; Expected date: 03/08/2020    Metabolic bone disease  -     CBC auto differential; Future; Expected date: 03/08/2020    Gastroesophageal reflux disease without esophagitis    Aortic atherosclerosis    Anemia associated with chronic renal failure  -     CBC auto differential; Future; Expected date: 03/08/2020    Abnormal CT of the head  -     CBC auto differential; Future; Expected date: 03/08/2020            Follow-up in about 15 months (around 4/8/2020) for with labs, ECG and CFD Osiel Cartagena to read before.

## 2019-01-08 ENCOUNTER — OFFICE VISIT (OUTPATIENT)
Dept: CARDIOLOGY | Facility: CLINIC | Age: 84
End: 2019-01-08
Payer: MEDICARE

## 2019-01-08 ENCOUNTER — HOSPITAL ENCOUNTER (OUTPATIENT)
Dept: CARDIOLOGY | Facility: CLINIC | Age: 84
Discharge: HOME | End: 2019-01-08
Payer: MEDICARE

## 2019-01-08 VITALS
BODY MASS INDEX: 23.34 KG/M2 | SYSTOLIC BLOOD PRESSURE: 133 MMHG | HEART RATE: 65 BPM | HEIGHT: 73 IN | DIASTOLIC BLOOD PRESSURE: 63 MMHG | WEIGHT: 176.13 LBS

## 2019-01-08 DIAGNOSIS — Z86.73 HISTORY OF TIA (TRANSIENT ISCHEMIC ATTACK): Chronic | ICD-10-CM

## 2019-01-08 DIAGNOSIS — Z86.79 S/P AAA (ABDOMINAL AORTIC ANEURYSM) REPAIR: ICD-10-CM

## 2019-01-08 DIAGNOSIS — N52.01 ERECTILE DYSFUNCTION DUE TO ARTERIAL INSUFFICIENCY: ICD-10-CM

## 2019-01-08 DIAGNOSIS — K22.70 BARRETT'S ESOPHAGUS WITHOUT DYSPLASIA: ICD-10-CM

## 2019-01-08 DIAGNOSIS — K21.9 GASTROESOPHAGEAL REFLUX DISEASE WITHOUT ESOPHAGITIS: ICD-10-CM

## 2019-01-08 DIAGNOSIS — I25.5 ISCHEMIC CARDIOMYOPATHY: ICD-10-CM

## 2019-01-08 DIAGNOSIS — Z98.890 S/P AAA (ABDOMINAL AORTIC ANEURYSM) REPAIR: ICD-10-CM

## 2019-01-08 DIAGNOSIS — R93.0 ABNORMAL CT OF THE HEAD: ICD-10-CM

## 2019-01-08 DIAGNOSIS — E55.9 VITAMIN D DEFICIENCY: ICD-10-CM

## 2019-01-08 DIAGNOSIS — N18.9 ANEMIA ASSOCIATED WITH CHRONIC RENAL FAILURE: ICD-10-CM

## 2019-01-08 DIAGNOSIS — D47.2 MGUS (MONOCLONAL GAMMOPATHY OF UNKNOWN SIGNIFICANCE): ICD-10-CM

## 2019-01-08 DIAGNOSIS — I25.10 CORONARY ARTERY DISEASE INVOLVING NATIVE CORONARY ARTERY OF NATIVE HEART WITHOUT ANGINA PECTORIS: Primary | ICD-10-CM

## 2019-01-08 DIAGNOSIS — D63.1 ANEMIA ASSOCIATED WITH CHRONIC RENAL FAILURE: ICD-10-CM

## 2019-01-08 DIAGNOSIS — R55 SYNCOPE AND COLLAPSE: ICD-10-CM

## 2019-01-08 DIAGNOSIS — D47.2 MONOCLONAL PARAPROTEINEMIA: ICD-10-CM

## 2019-01-08 DIAGNOSIS — E78.00 PURE HYPERCHOLESTEROLEMIA: ICD-10-CM

## 2019-01-08 DIAGNOSIS — Z95.810 AICD (AUTOMATIC CARDIOVERTER/DEFIBRILLATOR) PRESENT: ICD-10-CM

## 2019-01-08 DIAGNOSIS — I10 ESSENTIAL HYPERTENSION: ICD-10-CM

## 2019-01-08 DIAGNOSIS — I47.20 VT (VENTRICULAR TACHYCARDIA): ICD-10-CM

## 2019-01-08 DIAGNOSIS — M89.8X9 METABOLIC BONE DISEASE: ICD-10-CM

## 2019-01-08 DIAGNOSIS — I25.10 CORONARY ARTERY DISEASE INVOLVING NATIVE CORONARY ARTERY OF NATIVE HEART WITHOUT ANGINA PECTORIS: ICD-10-CM

## 2019-01-08 DIAGNOSIS — E83.52 HYPERCALCEMIA: ICD-10-CM

## 2019-01-08 DIAGNOSIS — Z95.810 AICD (AUTOMATIC CARDIOVERTER/DEFIBRILLATOR) PRESENT: Chronic | ICD-10-CM

## 2019-01-08 DIAGNOSIS — I70.0 AORTIC ATHEROSCLEROSIS: ICD-10-CM

## 2019-01-08 PROCEDURE — 3075F PR MOST RECENT SYSTOLIC BLOOD PRESS GE 130-139MM HG: ICD-10-PCS | Mod: CPTII,HCNC,S$GLB, | Performed by: INTERNAL MEDICINE

## 2019-01-08 PROCEDURE — 99215 OFFICE O/P EST HI 40 MIN: CPT | Mod: HCNC,S$GLB,, | Performed by: INTERNAL MEDICINE

## 2019-01-08 PROCEDURE — 1101F PR PT FALLS ASSESS DOC 0-1 FALLS W/OUT INJ PAST YR: ICD-10-PCS | Mod: CPTII,HCNC,S$GLB, | Performed by: INTERNAL MEDICINE

## 2019-01-08 PROCEDURE — 3078F PR MOST RECENT DIASTOLIC BLOOD PRESSURE < 80 MM HG: ICD-10-PCS | Mod: CPTII,HCNC,S$GLB, | Performed by: INTERNAL MEDICINE

## 2019-01-08 PROCEDURE — 93010 ELECTROCARDIOGRAM REPORT: CPT | Mod: HCNC,S$GLB,, | Performed by: INTERNAL MEDICINE

## 2019-01-08 PROCEDURE — 93010 EKG 12-LEAD: ICD-10-PCS | Mod: HCNC,S$GLB,, | Performed by: INTERNAL MEDICINE

## 2019-01-08 PROCEDURE — 99499 RISK ADDL DX/OHS AUDIT: ICD-10-PCS | Mod: HCNC,S$GLB,, | Performed by: INTERNAL MEDICINE

## 2019-01-08 PROCEDURE — 1101F PT FALLS ASSESS-DOCD LE1/YR: CPT | Mod: CPTII,HCNC,S$GLB, | Performed by: INTERNAL MEDICINE

## 2019-01-08 PROCEDURE — 99999 PR PBB SHADOW E&M-EST. PATIENT-LVL V: ICD-10-PCS | Mod: PBBFAC,HCNC,, | Performed by: INTERNAL MEDICINE

## 2019-01-08 PROCEDURE — 3075F SYST BP GE 130 - 139MM HG: CPT | Mod: CPTII,HCNC,S$GLB, | Performed by: INTERNAL MEDICINE

## 2019-01-08 PROCEDURE — 99999 PR PBB SHADOW E&M-EST. PATIENT-LVL V: CPT | Mod: PBBFAC,HCNC,, | Performed by: INTERNAL MEDICINE

## 2019-01-08 PROCEDURE — 93005 ELECTROCARDIOGRAM TRACING: CPT | Mod: HCNC,S$GLB,, | Performed by: INTERNAL MEDICINE

## 2019-01-08 PROCEDURE — 99499 UNLISTED E&M SERVICE: CPT | Mod: HCNC,S$GLB,, | Performed by: INTERNAL MEDICINE

## 2019-01-08 PROCEDURE — 93005 EKG 12-LEAD: ICD-10-PCS | Mod: HCNC,S$GLB,, | Performed by: INTERNAL MEDICINE

## 2019-01-08 PROCEDURE — 3078F DIAST BP <80 MM HG: CPT | Mod: CPTII,HCNC,S$GLB, | Performed by: INTERNAL MEDICINE

## 2019-01-08 PROCEDURE — 99215 PR OFFICE/OUTPT VISIT, EST, LEVL V, 40-54 MIN: ICD-10-PCS | Mod: HCNC,S$GLB,, | Performed by: INTERNAL MEDICINE

## 2019-01-08 RX ORDER — LOSARTAN POTASSIUM 50 MG/1
50 TABLET ORAL DAILY
Qty: 90 TABLET | Refills: 3 | Status: SHIPPED | OUTPATIENT
Start: 2019-01-08 | End: 2019-04-17 | Stop reason: SDUPTHER

## 2019-01-08 NOTE — PATIENT INSTRUCTIONS
Discussed diet , achieving and maintaining ideal body weight, and exercise.   We reviewed meds in detail.  Reassured-discussed goals, options , plan  Change lisinopril to losartan 100 mg  Co Q 10 200 mg per day

## 2019-01-09 ENCOUNTER — PATIENT MESSAGE (OUTPATIENT)
Dept: CARDIOLOGY | Facility: CLINIC | Age: 84
End: 2019-01-09

## 2019-01-25 ENCOUNTER — PATIENT MESSAGE (OUTPATIENT)
Dept: HEMATOLOGY/ONCOLOGY | Facility: CLINIC | Age: 84
End: 2019-01-25

## 2019-02-04 ENCOUNTER — CLINICAL SUPPORT (OUTPATIENT)
Dept: CARDIOLOGY | Facility: HOSPITAL | Age: 84
End: 2019-02-04
Attending: INTERNAL MEDICINE
Payer: MEDICARE

## 2019-02-04 DIAGNOSIS — Z95.810 ICD (IMPLANTABLE CARDIOVERTER-DEFIBRILLATOR) IN PLACE: ICD-10-CM

## 2019-02-04 DIAGNOSIS — I47.20 VT (VENTRICULAR TACHYCARDIA): ICD-10-CM

## 2019-02-04 PROCEDURE — 93283 PRGRMG EVAL IMPLANTABLE DFB: CPT | Mod: HCNC

## 2019-02-07 RX ORDER — PRAVASTATIN SODIUM 40 MG/1
TABLET ORAL
Qty: 78 TABLET | Refills: 3 | Status: SHIPPED | OUTPATIENT
Start: 2019-02-07 | End: 2019-08-28 | Stop reason: SDUPTHER

## 2019-02-08 ENCOUNTER — PES CALL (OUTPATIENT)
Dept: ADMINISTRATIVE | Facility: CLINIC | Age: 84
End: 2019-02-08

## 2019-02-11 PROBLEM — Z00.00 PREVENTATIVE HEALTH CARE: Status: RESOLVED | Noted: 2017-05-17 | Resolved: 2019-02-11

## 2019-02-28 ENCOUNTER — PATIENT MESSAGE (OUTPATIENT)
Dept: INTERNAL MEDICINE | Facility: CLINIC | Age: 84
End: 2019-02-28

## 2019-03-12 ENCOUNTER — TELEPHONE (OUTPATIENT)
Dept: INTERNAL MEDICINE | Facility: CLINIC | Age: 84
End: 2019-03-12

## 2019-03-12 ENCOUNTER — PATIENT MESSAGE (OUTPATIENT)
Dept: INTERNAL MEDICINE | Facility: CLINIC | Age: 84
End: 2019-03-12

## 2019-03-13 ENCOUNTER — PATIENT MESSAGE (OUTPATIENT)
Dept: INTERNAL MEDICINE | Facility: CLINIC | Age: 84
End: 2019-03-13

## 2019-03-13 ENCOUNTER — TELEPHONE (OUTPATIENT)
Dept: FAMILY MEDICINE | Facility: CLINIC | Age: 84
End: 2019-03-13

## 2019-03-13 DIAGNOSIS — I10 ESSENTIAL HYPERTENSION: Primary | ICD-10-CM

## 2019-03-13 DIAGNOSIS — E78.5 HYPERLIPIDEMIA, UNSPECIFIED HYPERLIPIDEMIA TYPE: ICD-10-CM

## 2019-03-15 ENCOUNTER — LAB VISIT (OUTPATIENT)
Dept: LAB | Facility: HOSPITAL | Age: 84
End: 2019-03-15
Attending: INTERNAL MEDICINE
Payer: MEDICARE

## 2019-03-15 DIAGNOSIS — E78.5 HYPERLIPIDEMIA, UNSPECIFIED HYPERLIPIDEMIA TYPE: ICD-10-CM

## 2019-03-15 DIAGNOSIS — I10 ESSENTIAL HYPERTENSION: ICD-10-CM

## 2019-03-15 LAB
ALBUMIN SERPL BCP-MCNC: 4 G/DL
ALP SERPL-CCNC: 55 U/L
ALT SERPL W/O P-5'-P-CCNC: 26 U/L
ANION GAP SERPL CALC-SCNC: 6 MMOL/L
AST SERPL-CCNC: 28 U/L
BASOPHILS # BLD AUTO: 0.07 K/UL
BASOPHILS NFR BLD: 1 %
BILIRUB SERPL-MCNC: 1.1 MG/DL
BUN SERPL-MCNC: 17 MG/DL
CALCIUM SERPL-MCNC: 10.3 MG/DL
CHLORIDE SERPL-SCNC: 105 MMOL/L
CHOLEST SERPL-MCNC: 121 MG/DL
CHOLEST/HDLC SERPL: 1.9 {RATIO}
CO2 SERPL-SCNC: 29 MMOL/L
CREAT SERPL-MCNC: 1.4 MG/DL
DIFFERENTIAL METHOD: ABNORMAL
EOSINOPHIL # BLD AUTO: 0.4 K/UL
EOSINOPHIL NFR BLD: 5.5 %
ERYTHROCYTE [DISTWIDTH] IN BLOOD BY AUTOMATED COUNT: 13.4 %
EST. GFR  (AFRICAN AMERICAN): 53.3 ML/MIN/1.73 M^2
EST. GFR  (NON AFRICAN AMERICAN): 46.1 ML/MIN/1.73 M^2
GLUCOSE SERPL-MCNC: 88 MG/DL
HCT VFR BLD AUTO: 44.6 %
HDLC SERPL-MCNC: 63 MG/DL
HDLC SERPL: 52.1 %
HGB BLD-MCNC: 15.4 G/DL
IMM GRANULOCYTES # BLD AUTO: 0.03 K/UL
IMM GRANULOCYTES NFR BLD AUTO: 0.4 %
LDLC SERPL CALC-MCNC: 49.8 MG/DL
LYMPHOCYTES # BLD AUTO: 2.4 K/UL
LYMPHOCYTES NFR BLD: 35.7 %
MCH RBC QN AUTO: 31.4 PG
MCHC RBC AUTO-ENTMCNC: 34.5 G/DL
MCV RBC AUTO: 91 FL
MONOCYTES # BLD AUTO: 0.9 K/UL
MONOCYTES NFR BLD: 13.5 %
NEUTROPHILS # BLD AUTO: 2.9 K/UL
NEUTROPHILS NFR BLD: 43.9 %
NONHDLC SERPL-MCNC: 58 MG/DL
NRBC BLD-RTO: 0 /100 WBC
PLATELET # BLD AUTO: 94 K/UL
PMV BLD AUTO: 12.4 FL
POTASSIUM SERPL-SCNC: 3.9 MMOL/L
PROT SERPL-MCNC: 6.9 G/DL
RBC # BLD AUTO: 4.9 M/UL
SODIUM SERPL-SCNC: 140 MMOL/L
TRIGL SERPL-MCNC: 41 MG/DL
WBC # BLD AUTO: 6.72 K/UL

## 2019-03-15 PROCEDURE — 80053 COMPREHEN METABOLIC PANEL: CPT | Mod: HCNC

## 2019-03-15 PROCEDURE — 80061 LIPID PANEL: CPT | Mod: HCNC

## 2019-03-15 PROCEDURE — 36415 COLL VENOUS BLD VENIPUNCTURE: CPT | Mod: HCNC,PO

## 2019-03-15 PROCEDURE — 85025 COMPLETE CBC W/AUTO DIFF WBC: CPT | Mod: HCNC

## 2019-03-20 ENCOUNTER — OFFICE VISIT (OUTPATIENT)
Dept: INTERNAL MEDICINE | Facility: CLINIC | Age: 84
End: 2019-03-20
Payer: MEDICARE

## 2019-03-20 VITALS
SYSTOLIC BLOOD PRESSURE: 134 MMHG | HEART RATE: 66 BPM | BODY MASS INDEX: 23.84 KG/M2 | HEIGHT: 73 IN | DIASTOLIC BLOOD PRESSURE: 60 MMHG | OXYGEN SATURATION: 96 % | WEIGHT: 179.88 LBS

## 2019-03-20 DIAGNOSIS — Z00.00 ROUTINE GENERAL MEDICAL EXAMINATION AT A HEALTH CARE FACILITY: ICD-10-CM

## 2019-03-20 DIAGNOSIS — I25.10 CORONARY ARTERY DISEASE INVOLVING NATIVE CORONARY ARTERY OF NATIVE HEART WITHOUT ANGINA PECTORIS: ICD-10-CM

## 2019-03-20 DIAGNOSIS — D47.2 MGUS (MONOCLONAL GAMMOPATHY OF UNKNOWN SIGNIFICANCE): ICD-10-CM

## 2019-03-20 DIAGNOSIS — D69.6 THROMBOCYTOPENIA: ICD-10-CM

## 2019-03-20 DIAGNOSIS — R31.9 HEMATURIA, UNSPECIFIED TYPE: ICD-10-CM

## 2019-03-20 DIAGNOSIS — N18.30 CKD (CHRONIC KIDNEY DISEASE) STAGE 3, GFR 30-59 ML/MIN: Chronic | ICD-10-CM

## 2019-03-20 DIAGNOSIS — I10 ESSENTIAL HYPERTENSION: Primary | ICD-10-CM

## 2019-03-20 DIAGNOSIS — E78.5 HYPERLIPIDEMIA, UNSPECIFIED HYPERLIPIDEMIA TYPE: ICD-10-CM

## 2019-03-20 DIAGNOSIS — Z95.810 AICD (AUTOMATIC CARDIOVERTER/DEFIBRILLATOR) PRESENT: Chronic | ICD-10-CM

## 2019-03-20 PROCEDURE — 99999 PR PBB SHADOW E&M-EST. PATIENT-LVL IV: ICD-10-PCS | Mod: PBBFAC,HCNC,, | Performed by: INTERNAL MEDICINE

## 2019-03-20 PROCEDURE — 3078F DIAST BP <80 MM HG: CPT | Mod: HCNC,CPTII,S$GLB, | Performed by: INTERNAL MEDICINE

## 2019-03-20 PROCEDURE — 99499 UNLISTED E&M SERVICE: CPT | Mod: HCNC,S$GLB,, | Performed by: INTERNAL MEDICINE

## 2019-03-20 PROCEDURE — 99397 PR PREVENTIVE VISIT,EST,65 & OVER: ICD-10-PCS | Mod: HCNC,S$GLB,, | Performed by: INTERNAL MEDICINE

## 2019-03-20 PROCEDURE — 3078F PR MOST RECENT DIASTOLIC BLOOD PRESSURE < 80 MM HG: ICD-10-PCS | Mod: HCNC,CPTII,S$GLB, | Performed by: INTERNAL MEDICINE

## 2019-03-20 PROCEDURE — 99397 PER PM REEVAL EST PAT 65+ YR: CPT | Mod: HCNC,S$GLB,, | Performed by: INTERNAL MEDICINE

## 2019-03-20 PROCEDURE — 3075F PR MOST RECENT SYSTOLIC BLOOD PRESS GE 130-139MM HG: ICD-10-PCS | Mod: HCNC,CPTII,S$GLB, | Performed by: INTERNAL MEDICINE

## 2019-03-20 PROCEDURE — 99499 RISK ADDL DX/OHS AUDIT: ICD-10-PCS | Mod: HCNC,S$GLB,, | Performed by: INTERNAL MEDICINE

## 2019-03-20 PROCEDURE — 99999 PR PBB SHADOW E&M-EST. PATIENT-LVL IV: CPT | Mod: PBBFAC,HCNC,, | Performed by: INTERNAL MEDICINE

## 2019-03-20 PROCEDURE — 3075F SYST BP GE 130 - 139MM HG: CPT | Mod: HCNC,CPTII,S$GLB, | Performed by: INTERNAL MEDICINE

## 2019-03-20 NOTE — PROGRESS NOTES
Pt. ID: Nelson GIBBONS Parent is a 83 y.o. male      Chief complaint:   Chief Complaint   Patient presents with    Follow-up     HTN       HPI: Pt. Here for f/u for HTN; Pt. Wife is with pt.; he is compliant with meds and takes asa 81; I reviewed labs dated 3/6/19; repeat U/A is negative for trace blood; platelets are low but stable; kidney is elevated but stable; cholesterol is WNL; of note, pt. Is seeing cardiology for CAD/AICD and hematology for MGUS      Review of Systems   HENT: Negative for hearing loss.    Eyes: Negative for discharge.   Respiratory: Negative for wheezing.    Cardiovascular: Negative for chest pain and palpitations.   Gastrointestinal: Negative for blood in stool, constipation, diarrhea and vomiting.   Genitourinary: Negative for hematuria and urgency.   Musculoskeletal: Negative for neck pain.   Neurological: Negative for weakness and headaches.   Endo/Heme/Allergies: Negative for polydipsia.         Objective:    Physical Exam   Constitutional: He is oriented to person, place, and time. He appears well-developed.   Eyes: EOM are normal.   Neck: Normal range of motion.   Cardiovascular: Normal rate, regular rhythm and normal heart sounds.   Pulmonary/Chest: Effort normal and breath sounds normal.   Abdominal: Soft. There is no tenderness. There is no rebound and no guarding.   Musculoskeletal: Normal range of motion.   Neurological: He is alert and oriented to person, place, and time.   Skin: No rash noted.   Answers for HPI/ROS submitted by the patient on 3/14/2019   activity change: No  unexpected weight change: No  rhinorrhea: No  trouble swallowing: No  visual disturbance: No  chest tightness: No  polyuria: No  difficulty urinating: No  joint swelling: No  arthralgias: No  confusion: No  dysphoric mood: No        Health Maintenance   Topic Date Due    Colonoscopy  05/07/2023    TETANUS VACCINE  11/22/2023    Lipid Panel  03/15/2024    Pneumococcal Vaccine (65+ High/Highest Risk)   Completed    Influenza Vaccine  Completed    Zoster Vaccine  Addressed         Assessment:     1. Essential hypertension Well controlled   2. Hematuria, unspecified type Well controlled   3. Thrombocytopenia Stable   4. CKD (chronic kidney disease) stage 3, GFR 30-59 ml/min Stable   5. Hyperlipidemia, unspecified hyperlipidemia type Well controlled   6. MGUS (monoclonal gammopathy of unknown significance) Active   7. Coronary artery disease involving native coronary artery of native heart without angina pectoris Well controlled   8. AICD (automatic cardioverter/defibrillator) present Active   9. Routine general medical examination at a health care facility Active         Plan: Essential hypertension  Comments:  continue current regimen and encouraged low Na diet     Hematuria, unspecified type  Comments:  repeat U/A is negative for trace blood     Thrombocytopenia  Comments:  monitor     CKD (chronic kidney disease) stage 3, GFR 30-59 ml/min  Comments:  monitor and avoid NSAIDs     Hyperlipidemia, unspecified hyperlipidemia type  Comments:  continue current regimen and encouraged diet modification     MGUS (monoclonal gammopathy of unknown significance)  Comments:  f/u hematology who is managing     Coronary artery disease involving native coronary artery of native heart without angina pectoris  Comments:  continue current regimen and f/u cardiology who is managing     AICD (automatic cardioverter/defibrillator) present  Comments:  f/u cardiology who is managing     Routine general medical examination at a health care facility        Problem List Items Addressed This Visit        Cardiac/Vascular    CAD (coronary artery disease)    AICD (automatic cardioverter/defibrillator) present (Chronic)    Hyperlipidemia    Essential hypertension - Primary       Renal/    CKD (chronic kidney disease) stage 3, GFR 30-59 ml/min (Chronic)    Hematuria       Hematology    Thrombocytopenia       Oncology    MGUS (monoclonal  gammopathy of unknown significance)       Other    Routine general medical examination at a health care facility        Answers for HPI/ROS submitted by the patient on 3/14/2019   activity change: No  unexpected weight change: No  rhinorrhea: No  trouble swallowing: No  visual disturbance: No  chest tightness: No  polyuria: No  difficulty urinating: No  joint swelling: No  arthralgias: No  confusion: No  dysphoric mood: No    Answers for HPI/ROS submitted by the patient on 3/14/2019   activity change: No  unexpected weight change: No  rhinorrhea: No  trouble swallowing: No  visual disturbance: No  chest tightness: No  polyuria: No  difficulty urinating: No  joint swelling: No  arthralgias: No  confusion: No  dysphoric mood: No

## 2019-03-28 ENCOUNTER — LAB VISIT (OUTPATIENT)
Dept: LAB | Facility: HOSPITAL | Age: 84
End: 2019-03-28
Attending: INTERNAL MEDICINE
Payer: MEDICARE

## 2019-03-28 ENCOUNTER — OFFICE VISIT (OUTPATIENT)
Dept: HEMATOLOGY/ONCOLOGY | Facility: CLINIC | Age: 84
End: 2019-03-28
Payer: MEDICARE

## 2019-03-28 VITALS
OXYGEN SATURATION: 96 % | BODY MASS INDEX: 23.84 KG/M2 | HEIGHT: 73 IN | WEIGHT: 179.88 LBS | HEART RATE: 60 BPM | DIASTOLIC BLOOD PRESSURE: 63 MMHG | SYSTOLIC BLOOD PRESSURE: 141 MMHG | TEMPERATURE: 98 F

## 2019-03-28 DIAGNOSIS — D69.6 THROMBOCYTOPENIA: ICD-10-CM

## 2019-03-28 DIAGNOSIS — D47.2 MGUS (MONOCLONAL GAMMOPATHY OF UNKNOWN SIGNIFICANCE): Primary | ICD-10-CM

## 2019-03-28 DIAGNOSIS — D47.2 MGUS (MONOCLONAL GAMMOPATHY OF UNKNOWN SIGNIFICANCE): ICD-10-CM

## 2019-03-28 LAB
ALBUMIN SERPL BCP-MCNC: 3.4 G/DL (ref 3.5–5.2)
ALP SERPL-CCNC: 74 U/L (ref 55–135)
ALT SERPL W/O P-5'-P-CCNC: 31 U/L (ref 10–44)
ANION GAP SERPL CALC-SCNC: 9 MMOL/L (ref 8–16)
AST SERPL-CCNC: 25 U/L (ref 10–40)
BASOPHILS # BLD AUTO: 0.11 K/UL (ref 0–0.2)
BASOPHILS NFR BLD: 1.5 % (ref 0–1.9)
BILIRUB SERPL-MCNC: 0.6 MG/DL (ref 0.1–1)
BUN SERPL-MCNC: 14 MG/DL (ref 8–23)
CALCIUM SERPL-MCNC: 10.2 MG/DL (ref 8.7–10.5)
CHLORIDE SERPL-SCNC: 105 MMOL/L (ref 95–110)
CO2 SERPL-SCNC: 26 MMOL/L (ref 23–29)
CREAT SERPL-MCNC: 1.5 MG/DL (ref 0.5–1.4)
DIFFERENTIAL METHOD: ABNORMAL
EOSINOPHIL # BLD AUTO: 0.4 K/UL (ref 0–0.5)
EOSINOPHIL NFR BLD: 4.9 % (ref 0–8)
ERYTHROCYTE [DISTWIDTH] IN BLOOD BY AUTOMATED COUNT: 13 % (ref 11.5–14.5)
EST. GFR  (AFRICAN AMERICAN): 49.1 ML/MIN/1.73 M^2
EST. GFR  (NON AFRICAN AMERICAN): 42.4 ML/MIN/1.73 M^2
GLUCOSE SERPL-MCNC: 116 MG/DL (ref 70–110)
HCT VFR BLD AUTO: 42.6 % (ref 40–54)
HGB BLD-MCNC: 14.8 G/DL (ref 14–18)
IMM GRANULOCYTES # BLD AUTO: 0.06 K/UL (ref 0–0.04)
IMM GRANULOCYTES NFR BLD AUTO: 0.8 % (ref 0–0.5)
LYMPHOCYTES # BLD AUTO: 2 K/UL (ref 1–4.8)
LYMPHOCYTES NFR BLD: 26.3 % (ref 18–48)
MCH RBC QN AUTO: 31.4 PG (ref 27–31)
MCHC RBC AUTO-ENTMCNC: 34.7 G/DL (ref 32–36)
MCV RBC AUTO: 90 FL (ref 82–98)
MONOCYTES # BLD AUTO: 0.9 K/UL (ref 0.3–1)
MONOCYTES NFR BLD: 12.1 % (ref 4–15)
NEUTROPHILS # BLD AUTO: 4.1 K/UL (ref 1.8–7.7)
NEUTROPHILS NFR BLD: 54.4 % (ref 38–73)
NRBC BLD-RTO: 0 /100 WBC
PLATELET # BLD AUTO: 154 K/UL (ref 150–350)
PMV BLD AUTO: 10.7 FL (ref 9.2–12.9)
POTASSIUM SERPL-SCNC: 4.1 MMOL/L (ref 3.5–5.1)
PROT SERPL-MCNC: 6.9 G/DL (ref 6–8.4)
RBC # BLD AUTO: 4.72 M/UL (ref 4.6–6.2)
SODIUM SERPL-SCNC: 140 MMOL/L (ref 136–145)
WBC # BLD AUTO: 7.58 K/UL (ref 3.9–12.7)

## 2019-03-28 PROCEDURE — 1101F PR PT FALLS ASSESS DOC 0-1 FALLS W/OUT INJ PAST YR: ICD-10-PCS | Mod: HCNC,CPTII,S$GLB, | Performed by: INTERNAL MEDICINE

## 2019-03-28 PROCEDURE — 84165 PATHOLOGIST INTERPRETATION SPE: ICD-10-PCS | Mod: 26,HCNC,, | Performed by: PATHOLOGY

## 2019-03-28 PROCEDURE — 84165 PROTEIN E-PHORESIS SERUM: CPT | Mod: HCNC

## 2019-03-28 PROCEDURE — 86334 IMMUNOFIX E-PHORESIS SERUM: CPT | Mod: 26,HCNC,, | Performed by: PATHOLOGY

## 2019-03-28 PROCEDURE — 3078F PR MOST RECENT DIASTOLIC BLOOD PRESSURE < 80 MM HG: ICD-10-PCS | Mod: HCNC,CPTII,S$GLB, | Performed by: INTERNAL MEDICINE

## 2019-03-28 PROCEDURE — 99215 PR OFFICE/OUTPT VISIT, EST, LEVL V, 40-54 MIN: ICD-10-PCS | Mod: HCNC,S$GLB,, | Performed by: INTERNAL MEDICINE

## 2019-03-28 PROCEDURE — 36415 COLL VENOUS BLD VENIPUNCTURE: CPT | Mod: HCNC

## 2019-03-28 PROCEDURE — 3078F DIAST BP <80 MM HG: CPT | Mod: HCNC,CPTII,S$GLB, | Performed by: INTERNAL MEDICINE

## 2019-03-28 PROCEDURE — 84165 PROTEIN E-PHORESIS SERUM: CPT | Mod: 26,HCNC,, | Performed by: PATHOLOGY

## 2019-03-28 PROCEDURE — 1101F PT FALLS ASSESS-DOCD LE1/YR: CPT | Mod: HCNC,CPTII,S$GLB, | Performed by: INTERNAL MEDICINE

## 2019-03-28 PROCEDURE — 86334 PATHOLOGIST INTERPRETATION IFE: ICD-10-PCS | Mod: 26,HCNC,, | Performed by: PATHOLOGY

## 2019-03-28 PROCEDURE — 3077F SYST BP >= 140 MM HG: CPT | Mod: HCNC,CPTII,S$GLB, | Performed by: INTERNAL MEDICINE

## 2019-03-28 PROCEDURE — 85025 COMPLETE CBC W/AUTO DIFF WBC: CPT | Mod: HCNC

## 2019-03-28 PROCEDURE — 83520 IMMUNOASSAY QUANT NOS NONAB: CPT | Mod: 59,HCNC

## 2019-03-28 PROCEDURE — 86334 IMMUNOFIX E-PHORESIS SERUM: CPT | Mod: HCNC

## 2019-03-28 PROCEDURE — 3077F PR MOST RECENT SYSTOLIC BLOOD PRESSURE >= 140 MM HG: ICD-10-PCS | Mod: HCNC,CPTII,S$GLB, | Performed by: INTERNAL MEDICINE

## 2019-03-28 PROCEDURE — 99999 PR PBB SHADOW E&M-EST. PATIENT-LVL III: ICD-10-PCS | Mod: PBBFAC,HCNC,, | Performed by: INTERNAL MEDICINE

## 2019-03-28 PROCEDURE — 99999 PR PBB SHADOW E&M-EST. PATIENT-LVL III: CPT | Mod: PBBFAC,HCNC,, | Performed by: INTERNAL MEDICINE

## 2019-03-28 PROCEDURE — 99215 OFFICE O/P EST HI 40 MIN: CPT | Mod: HCNC,S$GLB,, | Performed by: INTERNAL MEDICINE

## 2019-03-28 PROCEDURE — 80053 COMPREHEN METABOLIC PANEL: CPT | Mod: HCNC

## 2019-03-28 RX ORDER — AA/PROT/LYSINE/METHIO/VIT C/B6 50-12.5 MG
10 TABLET ORAL DAILY
COMMUNITY
End: 2023-03-16 | Stop reason: SDUPTHER

## 2019-03-28 NOTE — PROGRESS NOTES
Subjective:       Patient ID: Nelson GIBBONS Parent is a 83 y.o. male.    Chief Complaint: MGUS (monoclonal gammopathy of unknown significance)    Nelson presents with his wife for evaluation of small IgM kappa monoclonal protein found during renal evaluation after kidney injury during antibiotic therapy with Bactrim.   The monoclonal protein was measured at 0.27 grams. The patient does not meet any CRAB criteria. He appears well today. He does not have constitutional B symptoms.  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    Follow-up 9/5/17  Return visit with his wife for evaluation of MGUS. Renal function is stable. CBC is stable with moderate, stable thrombocytopenia.  Total protein remains normal with paraprotein levels pending.    Follow-up 3/20/18  Return visit for MGUS. Renal function remains stable. CBC remains stable- mild, asymptomatic thrombocytopenia.  Total protein is normal. Paraprotein was normal 9/2017. Free light chains pending.  ROS is negative.    Follow-up 3/28/19  Return visit for MGUS. Renal function, calcium, and CBC remain stable.  Total protein is normal with paraprotein studies pending.  ROS is negative.    HPI  Review of Systems   Constitutional: Negative.    HENT: Negative.    Eyes: Negative.    Respiratory: Negative.    Cardiovascular: Negative.    Gastrointestinal: Negative.    Endocrine: Negative.    Genitourinary: Negative.    Musculoskeletal: Negative.    Skin: Negative.    Allergic/Immunologic: Negative for environmental allergies, food allergies and immunocompromised state.   Neurological: Negative.    Hematological: Negative for adenopathy. Does not bruise/bleed easily.   Psychiatric/Behavioral: Negative.        Objective:      Physical Exam   Constitutional: He is oriented to person, place, and time. He appears well-developed and well-nourished.   HENT:   Head: Normocephalic and atraumatic.   Eyes: Conjunctivae are normal. No scleral icterus.    Neck: Normal range of motion.   Cardiovascular: Normal rate and intact distal pulses.   Pulmonary/Chest: Effort normal. No respiratory distress.   Musculoskeletal: Normal range of motion.   Neurological: He is alert and oriented to person, place, and time.   Skin: Skin is warm and dry.   Psychiatric: He has a normal mood and affect. His behavior is normal.   Nursing note and vitals reviewed.      Assessment:       1. MGUS (monoclonal gammopathy of unknown significance)    2. Thrombocytopenia        Plan:       MGUS stable. Continue to monitor.  Thrombocytopenia. Continue to monitor  Repeat monoclonal protein markers in 12 months

## 2019-03-29 LAB
ALBUMIN SERPL ELPH-MCNC: 3.52 G/DL (ref 3.35–5.55)
ALPHA1 GLOB SERPL ELPH-MCNC: 0.4 G/DL (ref 0.17–0.41)
ALPHA2 GLOB SERPL ELPH-MCNC: 0.92 G/DL (ref 0.43–0.99)
B-GLOBULIN SERPL ELPH-MCNC: 0.76 G/DL (ref 0.5–1.1)
GAMMA GLOB SERPL ELPH-MCNC: 1 G/DL (ref 0.67–1.58)
KAPPA LC SER QL IA: 3.77 MG/DL (ref 0.33–1.94)
KAPPA LC/LAMBDA SER IA: 0.74 (ref 0.26–1.65)
LAMBDA LC SER QL IA: 5.07 MG/DL (ref 0.57–2.63)
PROT SERPL-MCNC: 6.6 G/DL (ref 6–8.4)

## 2019-04-01 ENCOUNTER — PATIENT MESSAGE (OUTPATIENT)
Dept: HEMATOLOGY/ONCOLOGY | Facility: CLINIC | Age: 84
End: 2019-04-01

## 2019-04-01 ENCOUNTER — PATIENT MESSAGE (OUTPATIENT)
Dept: CARDIOLOGY | Facility: CLINIC | Age: 84
End: 2019-04-01

## 2019-04-01 LAB
INTERPRETATION SERPL IFE-IMP: NORMAL
PATHOLOGIST INTERPRETATION IFE: NORMAL

## 2019-04-02 LAB — PATHOLOGIST INTERPRETATION SPE: NORMAL

## 2019-04-15 DIAGNOSIS — I25.10 CORONARY ARTERY DISEASE INVOLVING NATIVE CORONARY ARTERY WITHOUT ANGINA PECTORIS, UNSPECIFIED WHETHER NATIVE OR TRANSPLANTED HEART: ICD-10-CM

## 2019-04-15 DIAGNOSIS — I10 ESSENTIAL HYPERTENSION: ICD-10-CM

## 2019-04-16 RX ORDER — AMLODIPINE BESYLATE 10 MG/1
TABLET ORAL
Qty: 90 TABLET | Refills: 3 | Status: SHIPPED | OUTPATIENT
Start: 2019-04-16 | End: 2019-08-28 | Stop reason: SDUPTHER

## 2019-04-17 ENCOUNTER — PATIENT MESSAGE (OUTPATIENT)
Dept: CARDIOLOGY | Facility: CLINIC | Age: 84
End: 2019-04-17

## 2019-04-17 DIAGNOSIS — I25.5 ISCHEMIC CARDIOMYOPATHY: ICD-10-CM

## 2019-04-17 DIAGNOSIS — I25.10 CORONARY ARTERY DISEASE INVOLVING NATIVE CORONARY ARTERY OF NATIVE HEART WITHOUT ANGINA PECTORIS: ICD-10-CM

## 2019-04-17 RX ORDER — LOSARTAN POTASSIUM 50 MG/1
TABLET ORAL
Qty: 180 TABLET | Refills: 3 | Status: SHIPPED | OUTPATIENT
Start: 2019-04-17 | End: 2019-08-28 | Stop reason: SDUPTHER

## 2019-05-01 RX ORDER — METOPROLOL SUCCINATE 25 MG/1
TABLET, EXTENDED RELEASE ORAL
Qty: 180 TABLET | Refills: 3 | Status: SHIPPED | OUTPATIENT
Start: 2019-05-01 | End: 2020-01-30 | Stop reason: SDUPTHER

## 2019-05-09 ENCOUNTER — ANCILLARY PROCEDURE (OUTPATIENT)
Dept: CARDIOLOGY | Facility: HOSPITAL | Age: 84
End: 2019-05-09
Attending: INTERNAL MEDICINE
Payer: MEDICARE

## 2019-05-09 ENCOUNTER — TELEPHONE (OUTPATIENT)
Dept: ELECTROPHYSIOLOGY | Facility: CLINIC | Age: 84
End: 2019-05-09

## 2019-05-09 DIAGNOSIS — Z95.810 ICD (IMPLANTABLE CARDIOVERTER-DEFIBRILLATOR) IN PLACE: ICD-10-CM

## 2019-05-09 DIAGNOSIS — I47.20 VT (VENTRICULAR TACHYCARDIA): ICD-10-CM

## 2019-05-09 PROCEDURE — 93283 PRGRMG EVAL IMPLANTABLE DFB: CPT | Mod: HCNC

## 2019-05-09 NOTE — TELEPHONE ENCOUNTER
Pt is electing to stay [place] [facility] and transferring care from Dr. Denny to Dr. Woodard

## 2019-05-10 DIAGNOSIS — Z95.810 ICD (IMPLANTABLE CARDIOVERTER-DEFIBRILLATOR) IN PLACE: Primary | ICD-10-CM

## 2019-05-10 DIAGNOSIS — I47.20 VT (VENTRICULAR TACHYCARDIA): ICD-10-CM

## 2019-06-14 RX ORDER — SOTALOL HYDROCHLORIDE 120 MG/1
TABLET ORAL
Qty: 180 TABLET | Refills: 3 | Status: SHIPPED | OUTPATIENT
Start: 2019-06-14 | End: 2020-03-30

## 2019-06-24 PROBLEM — Z00.00 ROUTINE GENERAL MEDICAL EXAMINATION AT A HEALTH CARE FACILITY: Status: RESOLVED | Noted: 2017-05-17 | Resolved: 2019-06-24

## 2019-06-27 ENCOUNTER — OFFICE VISIT (OUTPATIENT)
Dept: FAMILY MEDICINE | Facility: CLINIC | Age: 84
End: 2019-06-27
Payer: MEDICARE

## 2019-06-27 VITALS
DIASTOLIC BLOOD PRESSURE: 72 MMHG | HEART RATE: 68 BPM | HEIGHT: 73 IN | OXYGEN SATURATION: 97 % | BODY MASS INDEX: 23.03 KG/M2 | WEIGHT: 173.75 LBS | SYSTOLIC BLOOD PRESSURE: 124 MMHG

## 2019-06-27 DIAGNOSIS — N18.30 CKD (CHRONIC KIDNEY DISEASE) STAGE 3, GFR 30-59 ML/MIN: Chronic | ICD-10-CM

## 2019-06-27 DIAGNOSIS — I10 ESSENTIAL HYPERTENSION: ICD-10-CM

## 2019-06-27 DIAGNOSIS — K21.9 GASTROESOPHAGEAL REFLUX DISEASE WITHOUT ESOPHAGITIS: ICD-10-CM

## 2019-06-27 DIAGNOSIS — N25.81 SECONDARY HYPERPARATHYROIDISM OF RENAL ORIGIN: ICD-10-CM

## 2019-06-27 DIAGNOSIS — E78.00 PURE HYPERCHOLESTEROLEMIA: ICD-10-CM

## 2019-06-27 DIAGNOSIS — E55.9 VITAMIN D DEFICIENCY: ICD-10-CM

## 2019-06-27 DIAGNOSIS — Z00.00 ENCOUNTER FOR PREVENTIVE HEALTH EXAMINATION: Primary | ICD-10-CM

## 2019-06-27 DIAGNOSIS — I70.0 AORTIC ATHEROSCLEROSIS: ICD-10-CM

## 2019-06-27 PROCEDURE — 3078F PR MOST RECENT DIASTOLIC BLOOD PRESSURE < 80 MM HG: ICD-10-PCS | Mod: CPTII,S$GLB,, | Performed by: NURSE PRACTITIONER

## 2019-06-27 PROCEDURE — 3074F SYST BP LT 130 MM HG: CPT | Mod: CPTII,S$GLB,, | Performed by: NURSE PRACTITIONER

## 2019-06-27 PROCEDURE — G0439 PPPS, SUBSEQ VISIT: HCPCS | Mod: S$GLB,,, | Performed by: NURSE PRACTITIONER

## 2019-06-27 PROCEDURE — 99499 RISK ADDL DX/OHS AUDIT: ICD-10-PCS | Mod: HCNC,S$GLB,, | Performed by: NURSE PRACTITIONER

## 2019-06-27 PROCEDURE — 99999 PR PBB SHADOW E&M-EST. PATIENT-LVL V: ICD-10-PCS | Mod: PBBFAC,,, | Performed by: NURSE PRACTITIONER

## 2019-06-27 PROCEDURE — 99499 UNLISTED E&M SERVICE: CPT | Mod: HCNC,S$GLB,, | Performed by: NURSE PRACTITIONER

## 2019-06-27 PROCEDURE — 3074F PR MOST RECENT SYSTOLIC BLOOD PRESSURE < 130 MM HG: ICD-10-PCS | Mod: CPTII,S$GLB,, | Performed by: NURSE PRACTITIONER

## 2019-06-27 PROCEDURE — 99999 PR PBB SHADOW E&M-EST. PATIENT-LVL V: CPT | Mod: PBBFAC,,, | Performed by: NURSE PRACTITIONER

## 2019-06-27 PROCEDURE — 3078F DIAST BP <80 MM HG: CPT | Mod: CPTII,S$GLB,, | Performed by: NURSE PRACTITIONER

## 2019-06-27 PROCEDURE — G0439 PR MEDICARE ANNUAL WELLNESS SUBSEQUENT VISIT: ICD-10-PCS | Mod: S$GLB,,, | Performed by: NURSE PRACTITIONER

## 2019-06-27 NOTE — PROGRESS NOTES
"Nelson Parent presented for a  Medicare AWV and comprehensive Health Risk Assessment today. The following components were reviewed and updated:    · Medical history  · Family History  · Social history  · Allergies and Current Medications  · Health Risk Assessment  · Health Maintenance  · Care Team     ** See Completed Assessments for Annual Wellness Visit within the encounter summary.**       The following assessments were completed:  · Living Situation  · CAGE  · Depression Screening  · Timed Get Up and Go  · Whisper Test  · Cognitive Function Screening  · Nutrition Screening  · ADL Screening  · PAQ Screening    Vitals:    06/27/19 1454   BP: 124/72   BP Location: Right arm   Patient Position: Sitting   BP Method: Medium (Manual)   Pulse: 68   SpO2: 97%   Weight: 78.8 kg (173 lb 11.6 oz)   Height: 6' 1" (1.854 m)     Body mass index is 22.92 kg/m².     Physical Exam   Constitutional: He is oriented to person, place, and time. He appears well-developed and well-nourished. No distress.   HENT:   Head: Normocephalic and atraumatic.   Eyes: Pupils are equal, round, and reactive to light. EOM are normal.   Neck: Neck supple. No JVD present. No tracheal deviation present.   Cardiovascular: Normal rate, regular rhythm, normal heart sounds and intact distal pulses.   No murmur heard.  Pulmonary/Chest: Effort normal and breath sounds normal. No respiratory distress. He has no wheezes. He has no rales.   Abdominal: Soft. Bowel sounds are normal. He exhibits no distension and no mass. There is no tenderness.   Musculoskeletal: Normal range of motion. He exhibits no edema or tenderness.   Neurological: He is alert and oriented to person, place, and time. Coordination normal.   Skin: Skin is warm and dry. No erythema. No pallor.   Psychiatric: He has a normal mood and affect. His behavior is normal. Judgment and thought content normal. Cognition and memory are normal. He expresses no homicidal and no suicidal ideation. "   Nursing note and vitals reviewed.        Diagnoses and health risks identified today and associated recommendations/orders:    1. Encounter for preventive health examination    2. Essential hypertension  Chronic; stable on medication.  Followed by Cardiology.  - CBC auto differential; Future  - Comprehensive metabolic panel; Future    3. Pure hypercholesterolemia  Chronic; stable on medication.  Followed by Cardiology.  - Lipid panel; Future    4. Aortic atherosclerosis  Chronic; stable.  Followed by Cardiology.    5. CKD (chronic kidney disease) stage 3, GFR 30-59 ml/min  Chronic; stable.  Followed by Nephrology.    6. Secondary hyperparathyroidism of renal origin  Chronic; stable.  Followed by Nephrology.    7. Vitamin D deficiency  Chronic; stable on medication.  Followed by PCP.    8. Gastroesophageal reflux disease without esophagitis  Chronic; stable on medication.  Followed by PCP.    9. Body mass index (BMI) 22.0-22.9, adult      Provided Edward with a 5-10 year written screening schedule and personal prevention plan. Recommendations were developed using the USPSTF age appropriate recommendations. Education, counseling, and referrals were provided as needed. After Visit Summary printed and given to patient which includes a list of additional screenings\tests needed.    Follow up in 2 months (on 8/28/2019) for follow-up with PCP, Annual Wellness Visit in 1 year.    Yue Marie NP         I offered to discuss end of life issues, including information on how to make advance directives that the patient could use to name someone who would make medical decisions on their behalf if they became too ill to make themselves.    ___Patient declined  _X_Patient is interested, I provided paper work and offered to discuss.

## 2019-06-27 NOTE — PATIENT INSTRUCTIONS
Counseling and Referral of Other Preventative  (Italic type indicates deductible and co-insurance are waived)    Patient Name: Nelson Huntley  Today's Date: 6/27/2019    Health Maintenance       Date Due Completion Date    Shingles Vaccine (1 of 2) 07/09/1985 ---    Influenza Vaccine 08/01/2019 10/8/2018    Override on 5/7/2018: Done (No cause for positive cologuard test noted)    Aspirin/Antiplatelet Therapy 03/28/2020 3/28/2019    Colonoscopy 05/07/2023 5/7/2018    Override on 5/7/2018: Done    TETANUS VACCINE 11/22/2023 11/22/2013    Lipid Panel 03/15/2024 3/15/2019        No orders of the defined types were placed in this encounter.    The following information is provided to all patients.  This information is to help you find resources for any of the problems found today that may be affecting your health:                Living healthy guide: www.[facility].[place].[facility]      Understanding Diabetes: www.diabetes.org      Eating healthy: www.cdc.gov/healthyweight      [facility] home safety checklist: www.cdc.gov/steadi/patient.html      Agency on Aging: www.goea.[place].[facility]      Alcoholics anonymous (AA): www.aa.org      Physical Activity: www.blas.nih.gov/uy7yaqh      Tobacco use: www.quitwithusla.org

## 2019-07-02 PROBLEM — N25.81 SECONDARY HYPERPARATHYROIDISM OF RENAL ORIGIN: Status: ACTIVE | Noted: 2019-07-02

## 2019-07-02 PROBLEM — R80.9 PROTEINURIA: Status: RESOLVED | Noted: 2017-07-06 | Resolved: 2019-07-02

## 2019-07-02 PROBLEM — R31.9 HEMATURIA: Status: RESOLVED | Noted: 2019-03-20 | Resolved: 2019-07-02

## 2019-07-02 PROBLEM — Z86.79 HISTORY OF VENTRICULAR TACHYCARDIA: Status: ACTIVE | Noted: 2019-07-02

## 2019-07-02 PROBLEM — Z86.73 CEREBRAL INFARCTION, REMOTE, RESOLVED: Status: ACTIVE | Noted: 2018-10-25

## 2019-07-02 PROBLEM — N18.4 CHRONIC RENAL IMPAIRMENT, STAGE 4 (SEVERE): Status: RESOLVED | Noted: 2017-01-27 | Resolved: 2019-07-02

## 2019-08-13 ENCOUNTER — HOSPITAL ENCOUNTER (OUTPATIENT)
Dept: CARDIOLOGY | Facility: CLINIC | Age: 84
Discharge: HOME | End: 2019-08-13
Payer: MEDICARE

## 2019-08-13 ENCOUNTER — CLINICAL SUPPORT (OUTPATIENT)
Dept: CARDIOLOGY | Facility: HOSPITAL | Age: 84
End: 2019-08-13
Attending: INTERNAL MEDICINE
Payer: MEDICARE

## 2019-08-13 ENCOUNTER — OFFICE VISIT (OUTPATIENT)
Dept: ELECTROPHYSIOLOGY | Facility: CLINIC | Age: 84
End: 2019-08-13
Payer: MEDICARE

## 2019-08-13 VITALS
WEIGHT: 170.88 LBS | DIASTOLIC BLOOD PRESSURE: 78 MMHG | HEIGHT: 73 IN | HEART RATE: 59 BPM | SYSTOLIC BLOOD PRESSURE: 128 MMHG | BODY MASS INDEX: 22.65 KG/M2

## 2019-08-13 DIAGNOSIS — I25.5 ISCHEMIC CARDIOMYOPATHY: ICD-10-CM

## 2019-08-13 DIAGNOSIS — R55 SYNCOPE AND COLLAPSE: ICD-10-CM

## 2019-08-13 DIAGNOSIS — I25.10 CORONARY ARTERY DISEASE INVOLVING NATIVE CORONARY ARTERY OF NATIVE HEART WITHOUT ANGINA PECTORIS: ICD-10-CM

## 2019-08-13 DIAGNOSIS — Z95.810 AICD (AUTOMATIC CARDIOVERTER/DEFIBRILLATOR) PRESENT: Chronic | ICD-10-CM

## 2019-08-13 DIAGNOSIS — I25.5 ISCHEMIC CARDIOMYOPATHY: Primary | ICD-10-CM

## 2019-08-13 DIAGNOSIS — Z95.810 ICD (IMPLANTABLE CARDIOVERTER-DEFIBRILLATOR) IN PLACE: Primary | ICD-10-CM

## 2019-08-13 DIAGNOSIS — I47.20 VT (VENTRICULAR TACHYCARDIA): ICD-10-CM

## 2019-08-13 DIAGNOSIS — Z95.810 ICD (IMPLANTABLE CARDIOVERTER-DEFIBRILLATOR) IN PLACE: ICD-10-CM

## 2019-08-13 PROCEDURE — 99499 RISK ADDL DX/OHS AUDIT: ICD-10-PCS | Mod: HCNC,S$GLB,, | Performed by: INTERNAL MEDICINE

## 2019-08-13 PROCEDURE — 3078F PR MOST RECENT DIASTOLIC BLOOD PRESSURE < 80 MM HG: ICD-10-PCS | Mod: HCNC,CPTII,S$GLB, | Performed by: INTERNAL MEDICINE

## 2019-08-13 PROCEDURE — 93005 EKG 12-LEAD: ICD-10-PCS | Mod: HCNC,S$GLB,, | Performed by: INTERNAL MEDICINE

## 2019-08-13 PROCEDURE — 93010 ELECTROCARDIOGRAM REPORT: CPT | Mod: HCNC,S$GLB,, | Performed by: INTERNAL MEDICINE

## 2019-08-13 PROCEDURE — 93005 ELECTROCARDIOGRAM TRACING: CPT | Mod: HCNC,S$GLB,, | Performed by: INTERNAL MEDICINE

## 2019-08-13 PROCEDURE — 1101F PT FALLS ASSESS-DOCD LE1/YR: CPT | Mod: HCNC,CPTII,S$GLB, | Performed by: INTERNAL MEDICINE

## 2019-08-13 PROCEDURE — 99999 PR PBB SHADOW E&M-EST. PATIENT-LVL III: ICD-10-PCS | Mod: PBBFAC,HCNC,, | Performed by: INTERNAL MEDICINE

## 2019-08-13 PROCEDURE — 93010 EKG 12-LEAD: ICD-10-PCS | Mod: HCNC,S$GLB,, | Performed by: INTERNAL MEDICINE

## 2019-08-13 PROCEDURE — 3078F DIAST BP <80 MM HG: CPT | Mod: HCNC,CPTII,S$GLB, | Performed by: INTERNAL MEDICINE

## 2019-08-13 PROCEDURE — 3074F PR MOST RECENT SYSTOLIC BLOOD PRESSURE < 130 MM HG: ICD-10-PCS | Mod: HCNC,CPTII,S$GLB, | Performed by: INTERNAL MEDICINE

## 2019-08-13 PROCEDURE — 99999 PR PBB SHADOW E&M-EST. PATIENT-LVL III: CPT | Mod: PBBFAC,HCNC,, | Performed by: INTERNAL MEDICINE

## 2019-08-13 PROCEDURE — 99204 OFFICE O/P NEW MOD 45 MIN: CPT | Mod: HCNC,S$GLB,, | Performed by: INTERNAL MEDICINE

## 2019-08-13 PROCEDURE — 93283 PRGRMG EVAL IMPLANTABLE DFB: CPT | Mod: HCNC

## 2019-08-13 PROCEDURE — 3074F SYST BP LT 130 MM HG: CPT | Mod: HCNC,CPTII,S$GLB, | Performed by: INTERNAL MEDICINE

## 2019-08-13 PROCEDURE — 99499 UNLISTED E&M SERVICE: CPT | Mod: HCNC,S$GLB,, | Performed by: INTERNAL MEDICINE

## 2019-08-13 PROCEDURE — 1101F PR PT FALLS ASSESS DOC 0-1 FALLS W/OUT INJ PAST YR: ICD-10-PCS | Mod: HCNC,CPTII,S$GLB, | Performed by: INTERNAL MEDICINE

## 2019-08-13 PROCEDURE — 99204 PR OFFICE/OUTPT VISIT, NEW, LEVL IV, 45-59 MIN: ICD-10-PCS | Mod: HCNC,S$GLB,, | Performed by: INTERNAL MEDICINE

## 2019-08-13 NOTE — PROGRESS NOTES
Subjective:    Patient ID:  Nelson GIBBONS Parent is a 84 y.o. male who presents for follow-up of Cardiomyopathy      83 yo male with ventricular tachycardia, near syncope, CAD/MI s/p PCIs, ICM, TIA, thrombocytopenia. Former patient of Dr Melissa. He had near syncope, symptomatic VTNS, inducible MVTS (multiple morphs). He had dual chamber Kelli ICD implanted 3/15/12  He is also on sotalol. Interrogation today reveals stable lead function, no significant arrhythmias, 73% atrial pacing, minimal RV pacing    He is feeling great and denies any sob, chest pain, palpitations, syncope, or edema.     Echo 9/18:  CONCLUSIONS     1 - Mildly to moderately depressed left ventricular systolic function (EF 40-45%).     2 - Impaired LV relaxation, normal LAP (grade 1 diastolic dysfunction).     3 - Eccentric hypertrophy.     4 - Normal right ventricular systolic function .     5 - The estimated PA systolic pressure is 19 mmHg.     Past Medical History:  7/17/2012: AICD (automatic cardioverter/defibrillator) present  No date: Cardiomyopathy  7/17/2012: CKD (chronic kidney disease) stage 3, GFR 30-59 ml/min  No date: Coronary artery disease  No date: GERD (gastroesophageal reflux disease)      Comment:  [facility]; Dr. Vu  7/17/2012: Hyperlipidemia  7/17/2012: Hypertension  7/17/2012: Ischemic cardiomyopathy  7/17/2012: Thrombocytopenia  No date: Ventricular tachycardia  7/17/2012: VT (ventricular tachycardia)    Past Surgical History:  No date: ABDOMINAL AORTIC ANEURYSM REPAIR  No date: CARDIAC DEFIBRILLATOR PLACEMENT  2018: CATARACT EXTRACTION, BILATERAL  No date: CORONARY ANGIOPLASTY  No date: EYE SURGERY; Bilateral      Comment:  cataract  No date: HERNIA REPAIR      Comment:  x2    Social History    Socioeconomic History      Marital status:       Spouse name: Not on file      Number of children: Not on file      Years of education: Not on file      Highest education level: Not on file    Occupational History      Not on  file    Social Needs      Financial resource strain: Not on file      Food insecurity:        Worry: Not on file        Inability: Not on file      Transportation needs:        Medical: Not on file        Non-medical: Not on file    Tobacco Use      Smoking status: Never Smoker      Smokeless tobacco: Never Used    Substance and Sexual Activity      Alcohol use: No      Drug use: No      Sexual activity: Yes        Partners: Female        Comment:     Lifestyle      Physical activity:        Days per week: Not on file        Minutes per session: Not on file      Stress: Not on file    Relationships      Social connections:        Talks on phone: Not on file        Gets together: Not on file        Attends Muslim service: Not on file        Active member of club or organization: Not on file        Attends meetings of clubs or organizations: Not on file        Relationship status: Not on file    Other Topics      Concerns:        Not on file    Social History Narrative      Not on file      Review of patient's family history indicates:  Problem: Cancer      Relation: Mother          Age of Onset: (Not Specified)          Comment: Lymphoma  Problem: Lymphoma      Relation: Mother          Age of Onset: (Not Specified)  Problem: Coronary artery disease      Relation: Mother          Age of Onset: (Not Specified)  Problem: Heart disease      Relation: Mother          Age of Onset: (Not Specified)  Problem: Heart disease      Relation: Father          Age of Onset: (Not Specified)  Problem: Heart attacks under age 50      Relation: Father          Age of Onset: (Not Specified)  Problem: No Known Problems      Relation: Brother          Age of Onset: (Not Specified)  Problem: Heart disease      Relation: Sister          Age of Onset: (Not Specified)        Review of Systems   Constitution: Negative.   HENT: Negative.    Eyes: Negative.    Cardiovascular: Negative for chest pain, dyspnea on exertion, leg swelling,  near-syncope, palpitations and syncope.   Respiratory: Negative.  Negative for shortness of breath.    Endocrine: Negative.    Hematologic/Lymphatic: Negative.    Skin: Negative.    Musculoskeletal: Negative.    Gastrointestinal: Negative.    Genitourinary: Negative.    Neurological: Negative.  Negative for dizziness and light-headedness.   Psychiatric/Behavioral: Negative.    Allergic/Immunologic: Negative.         Objective:    Physical Exam   Constitutional: He is oriented to person, place, and time. He appears well-developed and well-nourished. No distress.   HENT:   Head: Normocephalic and atraumatic.   Eyes: Pupils are equal, round, and reactive to light. EOM are normal.   Neck: Normal range of motion. No JVD present. No thyromegaly present.   Cardiovascular: Normal rate, regular rhythm, S1 normal, S2 normal and normal heart sounds. PMI is not displaced. Exam reveals no gallop and no friction rub.   No murmur heard.  Pulmonary/Chest: Effort normal and breath sounds normal. No respiratory distress. He has no wheezes. He has no rales.   Abdominal: Soft. Bowel sounds are normal. He exhibits no distension. There is no tenderness. There is no rebound and no guarding.   Musculoskeletal: Normal range of motion. He exhibits no edema or tenderness.   Neurological: He is alert and oriented to person, place, and time. No cranial nerve deficit.   Skin: Skin is warm and dry. No rash noted. No erythema.   Psychiatric: He has a normal mood and affect. His behavior is normal. Judgment and thought content normal.   Vitals reviewed.    ECG: NSR nl WV, QRS, QTc        Assessment:       1. Ischemic cardiomyopathy    2. VT (ventricular tachycardia)    3. Syncope and collapse    4. AICD (automatic cardioverter/defibrillator) present    5. Coronary artery disease involving native coronary artery of native heart without angina pectoris         Plan:       84 yoM NICM, VT s/p DC ICD here for device management. Normal DC ICD function  with no sustained arrhythmias. I discussed routine device follow up including quarterly to bi-annual device checks for device function as well as yearly follow up in the EP clinic. The patient  was advised to call with any concerns regarding their device. Device clinic follow up as scheduled. RTC 1y

## 2019-08-23 ENCOUNTER — LAB VISIT (OUTPATIENT)
Dept: LAB | Facility: HOSPITAL | Age: 84
End: 2019-08-23
Attending: NURSE PRACTITIONER
Payer: MEDICARE

## 2019-08-23 DIAGNOSIS — I10 ESSENTIAL HYPERTENSION: ICD-10-CM

## 2019-08-23 DIAGNOSIS — E78.00 PURE HYPERCHOLESTEROLEMIA: ICD-10-CM

## 2019-08-23 LAB
ALBUMIN SERPL BCP-MCNC: 4 G/DL (ref 3.5–5.2)
ALP SERPL-CCNC: 72 U/L (ref 55–135)
ALT SERPL W/O P-5'-P-CCNC: 21 U/L (ref 10–44)
ANION GAP SERPL CALC-SCNC: 9 MMOL/L (ref 8–16)
AST SERPL-CCNC: 25 U/L (ref 10–40)
BASOPHILS # BLD AUTO: 0.07 K/UL (ref 0–0.2)
BASOPHILS NFR BLD: 1.1 % (ref 0–1.9)
BILIRUB SERPL-MCNC: 1 MG/DL (ref 0.1–1)
BUN SERPL-MCNC: 15 MG/DL (ref 8–23)
CALCIUM SERPL-MCNC: 10.2 MG/DL (ref 8.7–10.5)
CHLORIDE SERPL-SCNC: 103 MMOL/L (ref 95–110)
CHOLEST SERPL-MCNC: 113 MG/DL (ref 120–199)
CHOLEST/HDLC SERPL: 1.9 {RATIO} (ref 2–5)
CO2 SERPL-SCNC: 28 MMOL/L (ref 23–29)
CREAT SERPL-MCNC: 1.5 MG/DL (ref 0.5–1.4)
DIFFERENTIAL METHOD: ABNORMAL
EOSINOPHIL # BLD AUTO: 0.3 K/UL (ref 0–0.5)
EOSINOPHIL NFR BLD: 4.1 % (ref 0–8)
ERYTHROCYTE [DISTWIDTH] IN BLOOD BY AUTOMATED COUNT: 13.4 % (ref 11.5–14.5)
EST. GFR  (AFRICAN AMERICAN): 48.7 ML/MIN/1.73 M^2
EST. GFR  (NON AFRICAN AMERICAN): 42.1 ML/MIN/1.73 M^2
GLUCOSE SERPL-MCNC: 84 MG/DL (ref 70–110)
HCT VFR BLD AUTO: 45.2 % (ref 40–54)
HDLC SERPL-MCNC: 59 MG/DL (ref 40–75)
HDLC SERPL: 52.2 % (ref 20–50)
HGB BLD-MCNC: 14.8 G/DL (ref 14–18)
IMM GRANULOCYTES # BLD AUTO: 0.02 K/UL (ref 0–0.04)
IMM GRANULOCYTES NFR BLD AUTO: 0.3 % (ref 0–0.5)
LDLC SERPL CALC-MCNC: 46.4 MG/DL (ref 63–159)
LYMPHOCYTES # BLD AUTO: 2.4 K/UL (ref 1–4.8)
LYMPHOCYTES NFR BLD: 37 % (ref 18–48)
MCH RBC QN AUTO: 31 PG (ref 27–31)
MCHC RBC AUTO-ENTMCNC: 32.7 G/DL (ref 32–36)
MCV RBC AUTO: 95 FL (ref 82–98)
MONOCYTES # BLD AUTO: 0.6 K/UL (ref 0.3–1)
MONOCYTES NFR BLD: 9.5 % (ref 4–15)
NEUTROPHILS # BLD AUTO: 3.1 K/UL (ref 1.8–7.7)
NEUTROPHILS NFR BLD: 48 % (ref 38–73)
NONHDLC SERPL-MCNC: 54 MG/DL
NRBC BLD-RTO: 0 /100 WBC
PLATELET # BLD AUTO: 96 K/UL (ref 150–350)
PMV BLD AUTO: 12.2 FL (ref 9.2–12.9)
POTASSIUM SERPL-SCNC: 3.7 MMOL/L (ref 3.5–5.1)
PROT SERPL-MCNC: 7.1 G/DL (ref 6–8.4)
RBC # BLD AUTO: 4.78 M/UL (ref 4.6–6.2)
SODIUM SERPL-SCNC: 140 MMOL/L (ref 136–145)
TRIGL SERPL-MCNC: 38 MG/DL (ref 30–150)
WBC # BLD AUTO: 6.52 K/UL (ref 3.9–12.7)

## 2019-08-23 PROCEDURE — 80053 COMPREHEN METABOLIC PANEL: CPT | Mod: HCNC

## 2019-08-23 PROCEDURE — 80061 LIPID PANEL: CPT | Mod: HCNC

## 2019-08-23 PROCEDURE — 36415 COLL VENOUS BLD VENIPUNCTURE: CPT | Mod: HCNC,PO

## 2019-08-23 PROCEDURE — 85025 COMPLETE CBC W/AUTO DIFF WBC: CPT | Mod: HCNC

## 2019-08-28 ENCOUNTER — OFFICE VISIT (OUTPATIENT)
Dept: INTERNAL MEDICINE | Facility: CLINIC | Age: 84
End: 2019-08-28
Payer: MEDICARE

## 2019-08-28 VITALS
SYSTOLIC BLOOD PRESSURE: 120 MMHG | OXYGEN SATURATION: 96 % | HEIGHT: 73 IN | DIASTOLIC BLOOD PRESSURE: 70 MMHG | WEIGHT: 169.75 LBS | BODY MASS INDEX: 22.5 KG/M2 | HEART RATE: 72 BPM

## 2019-08-28 DIAGNOSIS — I25.10 CORONARY ARTERY DISEASE INVOLVING NATIVE CORONARY ARTERY WITHOUT ANGINA PECTORIS, UNSPECIFIED WHETHER NATIVE OR TRANSPLANTED HEART: ICD-10-CM

## 2019-08-28 DIAGNOSIS — Z95.810 AICD (AUTOMATIC CARDIOVERTER/DEFIBRILLATOR) PRESENT: Chronic | ICD-10-CM

## 2019-08-28 DIAGNOSIS — R21 RASH: ICD-10-CM

## 2019-08-28 DIAGNOSIS — D47.2 MGUS (MONOCLONAL GAMMOPATHY OF UNKNOWN SIGNIFICANCE): ICD-10-CM

## 2019-08-28 DIAGNOSIS — Z86.73 HISTORY OF TIA (TRANSIENT ISCHEMIC ATTACK): Chronic | ICD-10-CM

## 2019-08-28 DIAGNOSIS — D69.6 THROMBOCYTOPENIA: ICD-10-CM

## 2019-08-28 DIAGNOSIS — D22.9 BENIGN MOLE: ICD-10-CM

## 2019-08-28 DIAGNOSIS — E78.5 HYPERLIPIDEMIA, UNSPECIFIED HYPERLIPIDEMIA TYPE: ICD-10-CM

## 2019-08-28 DIAGNOSIS — Z12.5 PROSTATE CANCER SCREENING: ICD-10-CM

## 2019-08-28 DIAGNOSIS — I10 ESSENTIAL HYPERTENSION: Primary | ICD-10-CM

## 2019-08-28 DIAGNOSIS — Z00.00 PREVENTATIVE HEALTH CARE: ICD-10-CM

## 2019-08-28 DIAGNOSIS — N18.30 CKD (CHRONIC KIDNEY DISEASE) STAGE 3, GFR 30-59 ML/MIN: Chronic | ICD-10-CM

## 2019-08-28 PROCEDURE — 99999 PR PBB SHADOW E&M-EST. PATIENT-LVL IV: ICD-10-PCS | Mod: PBBFAC,HCNC,, | Performed by: INTERNAL MEDICINE

## 2019-08-28 PROCEDURE — 1101F PR PT FALLS ASSESS DOC 0-1 FALLS W/OUT INJ PAST YR: ICD-10-PCS | Mod: HCNC,CPTII,S$GLB, | Performed by: INTERNAL MEDICINE

## 2019-08-28 PROCEDURE — 3074F SYST BP LT 130 MM HG: CPT | Mod: HCNC,CPTII,S$GLB, | Performed by: INTERNAL MEDICINE

## 2019-08-28 PROCEDURE — 99999 PR PBB SHADOW E&M-EST. PATIENT-LVL IV: CPT | Mod: PBBFAC,HCNC,, | Performed by: INTERNAL MEDICINE

## 2019-08-28 PROCEDURE — 3078F PR MOST RECENT DIASTOLIC BLOOD PRESSURE < 80 MM HG: ICD-10-PCS | Mod: HCNC,CPTII,S$GLB, | Performed by: INTERNAL MEDICINE

## 2019-08-28 PROCEDURE — 99214 PR OFFICE/OUTPT VISIT, EST, LEVL IV, 30-39 MIN: ICD-10-PCS | Mod: HCNC,S$GLB,, | Performed by: INTERNAL MEDICINE

## 2019-08-28 PROCEDURE — 1101F PT FALLS ASSESS-DOCD LE1/YR: CPT | Mod: HCNC,CPTII,S$GLB, | Performed by: INTERNAL MEDICINE

## 2019-08-28 PROCEDURE — 3078F DIAST BP <80 MM HG: CPT | Mod: HCNC,CPTII,S$GLB, | Performed by: INTERNAL MEDICINE

## 2019-08-28 PROCEDURE — 3074F PR MOST RECENT SYSTOLIC BLOOD PRESSURE < 130 MM HG: ICD-10-PCS | Mod: HCNC,CPTII,S$GLB, | Performed by: INTERNAL MEDICINE

## 2019-08-28 PROCEDURE — 99214 OFFICE O/P EST MOD 30 MIN: CPT | Mod: HCNC,S$GLB,, | Performed by: INTERNAL MEDICINE

## 2019-08-28 RX ORDER — LOSARTAN POTASSIUM 50 MG/1
TABLET ORAL
Qty: 180 TABLET | Refills: 1 | Status: SHIPPED | OUTPATIENT
Start: 2019-08-28 | End: 2020-01-29 | Stop reason: SDUPTHER

## 2019-08-28 RX ORDER — MOMETASONE FUROATE 1 MG/G
CREAM TOPICAL DAILY PRN
Qty: 45 G | Refills: 0 | Status: SHIPPED | OUTPATIENT
Start: 2019-08-28 | End: 2020-05-20

## 2019-08-28 RX ORDER — AMLODIPINE BESYLATE 10 MG/1
10 TABLET ORAL DAILY
Qty: 90 TABLET | Refills: 1 | Status: SHIPPED | OUTPATIENT
Start: 2019-08-28 | End: 2020-01-29 | Stop reason: SDUPTHER

## 2019-08-28 RX ORDER — PRAVASTATIN SODIUM 40 MG/1
TABLET ORAL
Qty: 90 TABLET | Refills: 1 | Status: SHIPPED | OUTPATIENT
Start: 2019-08-28 | End: 2020-01-29 | Stop reason: SDUPTHER

## 2019-08-28 NOTE — PROGRESS NOTES
Pt. ID: Nelson GIBBONS Parent is a 84 y.o. male      Chief complaint:   Chief Complaint   Patient presents with    Follow-up       HPI: Pt. Here for f/u for HTN; pt. Wife is with the pt.;he is compliant with meds and BP is WNL;  I reviewed labs dated 8/23/19; platelets are low but stable; cholesterol is WNL; kidney fxn is elevated but stable; he has recently seen cardiology for  CAD/hx of V-tach/ AICD; he sees hematology for MGUS; pt. Has 2 smal patches of itchy rash to R thigh and upper R back for past few weeks      Review of Systems   Constitutional: Negative for chills and fever.   HENT: Negative for hearing loss.    Eyes: Negative for discharge.   Respiratory: Negative for wheezing.    Cardiovascular: Negative for chest pain and palpitations.   Gastrointestinal: Negative for blood in stool, constipation, diarrhea and vomiting.   Genitourinary: Negative for hematuria and urgency.   Musculoskeletal: Negative for neck pain.   Skin: Positive for rash.        Itchy rash to R thigh and R upper back; discolored mole to top of the scalp    Neurological: Negative for weakness and headaches.   Endo/Heme/Allergies: Negative for polydipsia.         Objective:    Physical Exam   Constitutional: He is oriented to person, place, and time.   Eyes: EOM are normal.   Neck: Normal range of motion.   Cardiovascular: Normal rate, regular rhythm and normal heart sounds.   Pulmonary/Chest: Effort normal and breath sounds normal. No respiratory distress. He has no wheezes. He has no rales.   Abdominal: Soft. There is no tenderness. There is no rebound and no guarding.   Musculoskeletal: Normal range of motion.   Neurological: He is alert and oriented to person, place, and time.   Skin: No rash noted.   Vitals reviewed.        Health Maintenance   Topic Date Due    Aspirin/Antiplatelet Therapy  08/28/2020    Colonoscopy  05/07/2023    TETANUS VACCINE  11/22/2023    Lipid Panel  08/23/2024    Pneumococcal Vaccine (65+ High/Highest Risk)   Completed         Assessment:     1. Essential hypertension Well controlled   2. Thrombocytopenia Stable   3. History of TIA (transient ischemic attack) Stable   4. Hyperlipidemia, unspecified hyperlipidemia type Well controlled   5. MGUS (monoclonal gammopathy of unknown significance) Active   6. CKD (chronic kidney disease) stage 3, GFR 30-59 ml/min Stable   7. Rash Active   8. Coronary artery disease involving native coronary artery without angina pectoris, unspecified whether native or transplanted heart Active   9. AICD (automatic cardioverter/defibrillator) present Active   10. Preventative health care Active   11. Prostate cancer screening Active   12. Benign mole Active         Plan: Essential hypertension  Comments:  continue current regimen and encouraged low Na diet  Orders:  -     losartan (COZAAR) 50 MG tablet; One by mouth twice daily  Dispense: 180 tablet; Refill: 1  -     amLODIPine (NORVASC) 10 MG tablet; Take 1 tablet (10 mg total) by mouth once daily.  Dispense: 90 tablet; Refill: 1  -     CBC auto differential; Future; Expected date: 01/28/2020  -     Comprehensive metabolic panel; Future; Expected date: 01/28/2020  -     Urinalysis; Future; Expected date: 01/28/2020    Thrombocytopenia  Comments:  monitor   Orders:  -     CBC auto differential; Future; Expected date: 01/28/2020    History of TIA (transient ischemic attack)  Comments:  continue current regimen     Hyperlipidemia, unspecified hyperlipidemia type  Comments:  continue current regimen and encouraged diet modification   Orders:  -     pravastatin (PRAVACHOL) 40 MG tablet; 1 tab PO QPM  Dispense: 90 tablet; Refill: 1  -     Lipid panel; Future; Expected date: 01/28/2020    MGUS (monoclonal gammopathy of unknown significance)  Comments:  f/u hematology who is managing     CKD (chronic kidney disease) stage 3, GFR 30-59 ml/min  Comments:  monitor and avoid NSAIDs   Orders:  -     Comprehensive metabolic panel; Future; Expected date:  01/28/2020    Rash  Comments:  start elocon cream prn   Orders:  -     mometasone 0.1% (ELOCON) 0.1 % cream; Apply topically daily as needed.  Dispense: 45 g; Refill: 0    Coronary artery disease involving native coronary artery without angina pectoris, unspecified whether native or transplanted heart  Comments:  continue current regimen and f/u cardiology who is managing     AICD (automatic cardioverter/defibrillator) present  Comments:  f/u cardiology who is managing     Preventative health care  -     CBC auto differential; Future; Expected date: 01/28/2020  -     Comprehensive metabolic panel; Future; Expected date: 01/28/2020  -     Lipid panel; Future; Expected date: 01/28/2020  -     Urinalysis; Future; Expected date: 01/28/2020    Prostate cancer screening  -     PSA, Screening; Future; Expected date: 01/28/2020    Benign mole  Comments:  refer to dermatology   Orders:  -     Ambulatory referral to Dermatology        Problem List Items Addressed This Visit        Neuro    History of TIA (transient ischemic attack) (Chronic)    Overview     10/28/2012            Cardiac/Vascular    AICD (automatic cardioverter/defibrillator) present (Chronic)    Hyperlipidemia    Relevant Medications    pravastatin (PRAVACHOL) 40 MG tablet    Other Relevant Orders    Lipid panel    Essential hypertension - Primary    Relevant Medications    losartan (COZAAR) 50 MG tablet    amLODIPine (NORVASC) 10 MG tablet    Other Relevant Orders    CBC auto differential    Comprehensive metabolic panel    Urinalysis    Coronary artery disease involving native coronary artery without angina pectoris       Renal/    CKD (chronic kidney disease) stage 3, GFR 30-59 ml/min (Chronic)    Relevant Orders    Comprehensive metabolic panel       Hematology    Thrombocytopenia    Relevant Orders    CBC auto differential       Oncology    MGUS (monoclonal gammopathy of unknown significance)    Benign mole    Overview     refer to dermatology           Relevant Orders    Ambulatory referral to Dermatology       Other    Preventative health care    Relevant Orders    CBC auto differential    Comprehensive metabolic panel    Lipid panel    Urinalysis      Other Visit Diagnoses     Rash   (Active)      start elocon cream prn     Relevant Medications    mometasone 0.1% (ELOCON) 0.1 % cream        Answers for HPI/ROS submitted by the patient on 8/22/2019   activity change: No  unexpected weight change: No  rhinorrhea: No  trouble swallowing: No  visual disturbance: No  chest tightness: No  polyuria: No  difficulty urinating: No  joint swelling: No  arthralgias: No  confusion: No  dysphoric mood: No    Answers for HPI/ROS submitted by the patient on 8/22/2019   activity change: No  unexpected weight change: No  rhinorrhea: No  trouble swallowing: No  visual disturbance: No  chest tightness: No  polyuria: No  difficulty urinating: No  joint swelling: No  arthralgias: No  confusion: No  dysphoric mood: No

## 2019-08-29 ENCOUNTER — PATIENT MESSAGE (OUTPATIENT)
Dept: FAMILY MEDICINE | Facility: CLINIC | Age: 84
End: 2019-08-29

## 2019-08-29 ENCOUNTER — PATIENT MESSAGE (OUTPATIENT)
Dept: INTERNAL MEDICINE | Facility: CLINIC | Age: 84
End: 2019-08-29

## 2019-09-18 ENCOUNTER — OFFICE VISIT (OUTPATIENT)
Dept: DERMATOLOGY | Facility: CLINIC | Age: 84
End: 2019-09-18
Payer: MEDICARE

## 2019-09-18 VITALS — WEIGHT: 169 LBS | BODY MASS INDEX: 22.3 KG/M2

## 2019-09-18 DIAGNOSIS — L30.0 NUMMULAR ECZEMATOUS DERMATITIS: ICD-10-CM

## 2019-09-18 DIAGNOSIS — L57.8 SOLAR AGING OF SKIN: ICD-10-CM

## 2019-09-18 DIAGNOSIS — L81.4 LENTIGINES: Primary | ICD-10-CM

## 2019-09-18 PROCEDURE — 99202 PR OFFICE/OUTPT VISIT, NEW, LEVL II, 15-29 MIN: ICD-10-PCS | Mod: HCNC,S$GLB,, | Performed by: DERMATOLOGY

## 2019-09-18 PROCEDURE — 99999 PR PBB SHADOW E&M-EST. PATIENT-LVL III: ICD-10-PCS | Mod: PBBFAC,HCNC,, | Performed by: DERMATOLOGY

## 2019-09-18 PROCEDURE — 1101F PT FALLS ASSESS-DOCD LE1/YR: CPT | Mod: HCNC,CPTII,S$GLB, | Performed by: DERMATOLOGY

## 2019-09-18 PROCEDURE — 99999 PR PBB SHADOW E&M-EST. PATIENT-LVL III: CPT | Mod: PBBFAC,HCNC,, | Performed by: DERMATOLOGY

## 2019-09-18 PROCEDURE — 1101F PR PT FALLS ASSESS DOC 0-1 FALLS W/OUT INJ PAST YR: ICD-10-PCS | Mod: HCNC,CPTII,S$GLB, | Performed by: DERMATOLOGY

## 2019-09-18 PROCEDURE — 99202 OFFICE O/P NEW SF 15 MIN: CPT | Mod: HCNC,S$GLB,, | Performed by: DERMATOLOGY

## 2019-09-18 RX ORDER — FLUOROURACIL 50 MG/G
CREAM TOPICAL
Qty: 40 G | Refills: 3 | Status: SHIPPED | OUTPATIENT
Start: 2019-09-18 | End: 2020-05-20

## 2019-09-18 NOTE — PROGRESS NOTES
Subjective:       Patient ID:  Nelson GIBBONS Parent is a 84 y.o. male who presents for   Chief Complaint   Patient presents with    Spot     scalp,     Skin Check     upper and lower exam      Had spots on scalp, right thigh and right upper back which he was given elocon cream to use on the spot on his scalp resolved, the one on his right thigh has improved and the one on his back which has been present for over a year has not changed much with rx.  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     Spot  - Initial  Affected locations: face, scalp and right lower leg  Signs / symptoms: asymptomatic  Aggravated by: nothing  Relieving factors/Treatments tried: nothing        Review of Systems   Constitutional: Negative for fever.   Skin: Negative for itching and rash.   Hematologic/Lymphatic: Bruises/bleeds easily.        Objective:    Physical Exam   Constitutional: He appears well-developed and well-nourished. No distress.   Neurological: He is alert and oriented to person, place, and time. He is not disoriented.   Psychiatric: He has a normal mood and affect.   Skin:   Areas Examined (abnormalities noted in diagram):   Scalp / Hair Palpated and Inspected  Head / Face Inspection Performed  Neck Inspection Performed  Chest / Axilla Inspection Performed  Back Inspection Performed  RUE Inspected  LUE Inspection Performed              Diagram Legend     Erythematous scaling macule/papule c/w actinic keratosis       Vascular papule c/w angioma      Pigmented verrucoid papule/plaque c/w seborrheic keratosis      Yellow umbilicated papule c/w sebaceous hyperplasia      Irregularly shaped tan macule c/w lentigo     1-2 mm smooth white papules consistent with Milia      Movable subcutaneous cyst with punctum c/w epidermal inclusion cyst      Subcutaneous movable cyst c/w pilar cyst      Firm pink to brown papule c/w dermatofibroma       "Pedunculated fleshy papule(s) c/w skin tag(s)      Evenly pigmented macule c/w junctional nevus     Mildly variegated pigmented, slightly irregular-bordered macule c/w mildly atypical nevus      Flesh colored to evenly pigmented papule c/w intradermal nevus       Pink pearly papule/plaque c/w basal cell carcinoma      Erythematous hyperkeratotic cursted plaque c/w SCC      Surgical scar with no sign of skin cancer recurrence      Open and closed comedones      Inflammatory papules and pustules      Verrucoid papule consistent consistent with wart     Erythematous eczematous patches and plaques     Dystrophic onycholytic nail with subungual debris c/w onychomycosis     Umbilicated papule    Erythematous-base heme-crusted tan verrucoid plaque consistent with inflamed seborrheic keratosis     Erythematous Silvery Scaling Plaque c/w Psoriasis     See annotation      Assessment / Plan:        Lentigines  The "ABCD" rules to observe pigmented lesions were reviewed.      Solar aging of skin right upper back  -     fluorouracil (EFUDEX) 5 % cream; Use hs for 2 weeks  Dispense: 40 g; Refill: 3      Nummular eczematous dermatitis right thigh  Halog cream bid                  Follow up in about 3 months (around 12/18/2019).  "

## 2019-10-05 ENCOUNTER — PATIENT OUTREACH (OUTPATIENT)
Dept: ADMINISTRATIVE | Facility: HOSPITAL | Age: 84
End: 2019-10-05

## 2019-10-18 ENCOUNTER — PATIENT OUTREACH (OUTPATIENT)
Dept: ADMINISTRATIVE | Facility: OTHER | Age: 84
End: 2019-10-18

## 2019-10-22 ENCOUNTER — INITIAL CONSULT (OUTPATIENT)
Dept: DERMATOLOGY | Facility: CLINIC | Age: 84
End: 2019-10-22
Payer: MEDICARE

## 2019-10-22 VITALS — WEIGHT: 169 LBS | BODY MASS INDEX: 22.3 KG/M2

## 2019-10-22 DIAGNOSIS — L57.8 SOLAR AGING OF SKIN: ICD-10-CM

## 2019-10-22 DIAGNOSIS — L81.4 LENTIGINES: Primary | ICD-10-CM

## 2019-10-22 PROCEDURE — 99499 UNLISTED E&M SERVICE: CPT | Mod: HCNC,S$GLB,, | Performed by: DERMATOLOGY

## 2019-10-22 PROCEDURE — 99999 PR PBB SHADOW E&M-EST. PATIENT-LVL II: CPT | Mod: PBBFAC,HCNC,, | Performed by: DERMATOLOGY

## 2019-10-22 PROCEDURE — 99213 OFFICE O/P EST LOW 20 MIN: CPT | Mod: HCNC,S$GLB,, | Performed by: DERMATOLOGY

## 2019-10-22 PROCEDURE — 99213 PR OFFICE/OUTPT VISIT, EST, LEVL III, 20-29 MIN: ICD-10-PCS | Mod: HCNC,S$GLB,, | Performed by: DERMATOLOGY

## 2019-10-22 PROCEDURE — 1101F PT FALLS ASSESS-DOCD LE1/YR: CPT | Mod: HCNC,CPTII,S$GLB, | Performed by: DERMATOLOGY

## 2019-10-22 PROCEDURE — 99499 RISK ADDL DX/OHS AUDIT: ICD-10-PCS | Mod: HCNC,S$GLB,, | Performed by: DERMATOLOGY

## 2019-10-22 PROCEDURE — 1101F PR PT FALLS ASSESS DOC 0-1 FALLS W/OUT INJ PAST YR: ICD-10-PCS | Mod: HCNC,CPTII,S$GLB, | Performed by: DERMATOLOGY

## 2019-10-22 PROCEDURE — 99999 PR PBB SHADOW E&M-EST. PATIENT-LVL II: ICD-10-PCS | Mod: PBBFAC,HCNC,, | Performed by: DERMATOLOGY

## 2019-10-22 NOTE — LETTER
October 22, 2019      Ruddy Skelton MD  [address] [facility]  [place] [address]           [facility]  [address]  [place] [address]  Phone: [phone]  Fax: [phone]          Patient: Nelson Huntley   MR Number: [id number]   YOB: 1935   Date of Visit: 10/22/2019       Dear Dr. Ruddy Skelton:    Thank you for referring Nelson Huntley to me for evaluation. Attached you will find relevant portions of my assessment and plan of care.    If you have questions, please do not hesitate to call me. I look forward to following Nelson Huntley along with you.    Sincerely,    Radha Greene MD    Enclosure  CC:  No Recipients    If you would like to receive this communication electronically, please contact [email or web address] or [phone] to request more information on [employer] Link access.    For providers and/or their staff who would like to refer a patient to [facility], please contact us through our one-stop-shop provider referral line, [facility], at [phone].    If you feel you have received this communication in error or would no longer like to receive these types of communications, please e-mail [email or web address]

## 2019-10-22 NOTE — PROGRESS NOTES
Subjective:       Patient ID:  Nelson GIBBONS Parent is a 84 y.o. male who presents for   Chief Complaint   Patient presents with    Follow-up     right [place]      Follow-up  - Initial  Affected locations: right shoulder and back  Signs / symptoms: asymptomatic  Aggravated by: nothing  Relieving factors/Treatments tried: nothing        Review of Systems   Constitutional: Negative for fever, chills, weight loss, weight gain, fatigue, night sweats and malaise.   Skin: Positive for wears hat. Negative for daily sunscreen use and activity-related sunscreen use.   Hematologic/Lymphatic: Bruises/bleeds easily.        Objective:    Physical Exam   Constitutional: He appears well-developed and well-nourished. No distress.   Neurological: He is alert and oriented to person, place, and time. He is not disoriented.   Psychiatric: He has a normal mood and affect.   Skin:   Areas Examined (abnormalities noted in diagram):   Scalp / Hair Palpated and Inspected  Head / Face Inspection Performed  Neck Inspection Performed  Chest / Axilla Inspection Performed  Back Inspection Performed  RUE Inspected  LUE Inspection Performed              Diagram Legend     Erythematous scaling macule/papule c/w actinic keratosis       Vascular papule c/w angioma      Pigmented verrucoid papule/plaque c/w seborrheic keratosis      Yellow umbilicated papule c/w sebaceous hyperplasia      Irregularly shaped tan macule c/w lentigo     1-2 mm smooth white papules consistent with Milia      Movable subcutaneous cyst with punctum c/w epidermal inclusion cyst      Subcutaneous movable cyst c/w pilar cyst      Firm pink to brown papule c/w dermatofibroma      Pedunculated fleshy papule(s) c/w skin tag(s)      Evenly pigmented macule c/w junctional nevus     Mildly variegated pigmented, slightly irregular-bordered macule c/w mildly atypical nevus      Flesh colored to evenly pigmented papule c/w intradermal nevus       Pink pearly papule/plaque c/w basal  cell carcinoma      Erythematous hyperkeratotic cursted plaque c/w SCC      Surgical scar with no sign of skin cancer recurrence      Open and closed comedones      Inflammatory papules and pustules      Verrucoid papule consistent consistent with wart     Erythematous eczematous patches and plaques     Dystrophic onycholytic nail with subungual debris c/w onychomycosis     Umbilicated papule    Erythematous-base heme-crusted tan verrucoid plaque consistent with inflamed seborrheic keratosis     Erythematous Silvery Scaling Plaque c/w Psoriasis     See annotation      Assessment / Plan:        Lentigines  Sunscreen    Solar aging of skin  Repeat the efudex on his right upper back, much improved only used it for one weed due to a trip    Also the eczema on his thigh has improved with halog             Follow up in about 6 months (around 4/22/2020).

## 2019-10-30 ENCOUNTER — IMMUNIZATION (OUTPATIENT)
Dept: PHARMACY | Facility: CLINIC | Age: 84
End: 2019-10-30
Payer: MEDICARE

## 2019-11-13 RX ORDER — CLOPIDOGREL BISULFATE 75 MG/1
TABLET ORAL
Qty: 90 TABLET | Refills: 0 | Status: SHIPPED | OUTPATIENT
Start: 2019-11-13 | End: 2020-01-15

## 2019-11-19 ENCOUNTER — CLINICAL SUPPORT (OUTPATIENT)
Dept: CARDIOLOGY | Facility: HOSPITAL | Age: 84
End: 2019-11-19
Attending: INTERNAL MEDICINE
Payer: MEDICARE

## 2019-11-19 DIAGNOSIS — Z95.810 ICD (IMPLANTABLE CARDIOVERTER-DEFIBRILLATOR) IN PLACE: ICD-10-CM

## 2019-11-19 DIAGNOSIS — I25.5 ISCHEMIC CARDIOMYOPATHY: ICD-10-CM

## 2019-11-19 PROCEDURE — 93283 PRGRMG EVAL IMPLANTABLE DFB: CPT | Mod: HCNC

## 2019-12-02 PROBLEM — Z00.00 PREVENTATIVE HEALTH CARE: Status: RESOLVED | Noted: 2017-05-17 | Resolved: 2019-12-02

## 2020-01-08 ENCOUNTER — PES CALL (OUTPATIENT)
Dept: ADMINISTRATIVE | Facility: CLINIC | Age: 85
End: 2020-01-08

## 2020-01-15 RX ORDER — CLOPIDOGREL BISULFATE 75 MG/1
TABLET ORAL
Qty: 90 TABLET | Refills: 0 | Status: SHIPPED | OUTPATIENT
Start: 2020-01-15 | End: 2020-01-30 | Stop reason: SDUPTHER

## 2020-01-20 ENCOUNTER — PATIENT MESSAGE (OUTPATIENT)
Dept: INTERNAL MEDICINE | Facility: CLINIC | Age: 85
End: 2020-01-20

## 2020-01-21 ENCOUNTER — OFFICE VISIT (OUTPATIENT)
Dept: DERMATOLOGY | Facility: CLINIC | Age: 85
End: 2020-01-21
Payer: MEDICARE

## 2020-01-21 VITALS — BODY MASS INDEX: 22.3 KG/M2 | WEIGHT: 169 LBS

## 2020-01-21 DIAGNOSIS — L85.3 XEROSIS CUTIS: ICD-10-CM

## 2020-01-21 DIAGNOSIS — L57.8 SOLAR AGING OF SKIN: ICD-10-CM

## 2020-01-21 DIAGNOSIS — L81.4 LENTIGINES: Primary | ICD-10-CM

## 2020-01-21 PROCEDURE — 1126F AMNT PAIN NOTED NONE PRSNT: CPT | Mod: HCNC,S$GLB,, | Performed by: DERMATOLOGY

## 2020-01-21 PROCEDURE — 1126F PR PAIN SEVERITY QUANTIFIED, NO PAIN PRESENT: ICD-10-PCS | Mod: HCNC,S$GLB,, | Performed by: DERMATOLOGY

## 2020-01-21 PROCEDURE — 1159F MED LIST DOCD IN RCRD: CPT | Mod: HCNC,S$GLB,, | Performed by: DERMATOLOGY

## 2020-01-21 PROCEDURE — 99999 PR PBB SHADOW E&M-EST. PATIENT-LVL II: ICD-10-PCS | Mod: PBBFAC,HCNC,, | Performed by: DERMATOLOGY

## 2020-01-21 PROCEDURE — 99213 PR OFFICE/OUTPT VISIT, EST, LEVL III, 20-29 MIN: ICD-10-PCS | Mod: HCNC,S$GLB,, | Performed by: DERMATOLOGY

## 2020-01-21 PROCEDURE — 99213 OFFICE O/P EST LOW 20 MIN: CPT | Mod: HCNC,S$GLB,, | Performed by: DERMATOLOGY

## 2020-01-21 PROCEDURE — 1101F PR PT FALLS ASSESS DOC 0-1 FALLS W/OUT INJ PAST YR: ICD-10-PCS | Mod: HCNC,CPTII,S$GLB, | Performed by: DERMATOLOGY

## 2020-01-21 PROCEDURE — 1101F PT FALLS ASSESS-DOCD LE1/YR: CPT | Mod: HCNC,CPTII,S$GLB, | Performed by: DERMATOLOGY

## 2020-01-21 PROCEDURE — 1159F PR MEDICATION LIST DOCUMENTED IN MEDICAL RECORD: ICD-10-PCS | Mod: HCNC,S$GLB,, | Performed by: DERMATOLOGY

## 2020-01-21 PROCEDURE — 99999 PR PBB SHADOW E&M-EST. PATIENT-LVL II: CPT | Mod: PBBFAC,HCNC,, | Performed by: DERMATOLOGY

## 2020-01-21 NOTE — PROGRESS NOTES
Subjective:       Patient ID:  Nelson GIBBONS Parent is a 84 y.o. male who presents for   Chief Complaint   Patient presents with    Follow-up     right shoulder     See previous note, he used the efudex on his shoulder and it has peeled, much improved.      Follow-up  - Follow-up  Symptom course: improving  Affected locations: right shoulder  Signs / symptoms: asymptomatic        Review of Systems   Constitutional: Negative for fever, chills, weight loss, weight gain, fatigue, night sweats and malaise.   Skin: Positive for wears hat. Negative for daily sunscreen use and activity-related sunscreen use.   Hematologic/Lymphatic: Bruises/bleeds easily.        Objective:    Physical Exam   Constitutional: He appears well-developed and well-nourished. No distress.   Neurological: He is alert and oriented to person, place, and time. He is not disoriented.   Psychiatric: He has a normal mood and affect.   Skin:   Areas Examined (abnormalities noted in diagram):   Scalp / Hair Palpated and Inspected  Head / Face Inspection Performed  Neck Inspection Performed  Chest / Axilla Inspection Performed  Back Inspection Performed  RUE Inspected  LUE Inspection Performed              Diagram Legend     Erythematous scaling macule/papule c/w actinic keratosis       Vascular papule c/w angioma      Pigmented verrucoid papule/plaque c/w seborrheic keratosis      Yellow umbilicated papule c/w sebaceous hyperplasia      Irregularly shaped tan macule c/w lentigo     1-2 mm smooth white papules consistent with Milia      Movable subcutaneous cyst with punctum c/w epidermal inclusion cyst      Subcutaneous movable cyst c/w pilar cyst      Firm pink to brown papule c/w dermatofibroma      Pedunculated fleshy papule(s) c/w skin tag(s)      Evenly pigmented macule c/w junctional nevus     Mildly variegated pigmented, slightly irregular-bordered macule c/w mildly atypical nevus      Flesh colored to evenly pigmented papule c/w intradermal nevus        Pink pearly papule/plaque c/w basal cell carcinoma      Erythematous hyperkeratotic cursted plaque c/w SCC      Surgical scar with no sign of skin cancer recurrence      Open and closed comedones      Inflammatory papules and pustules      Verrucoid papule consistent consistent with wart     Erythematous eczematous patches and plaques     Dystrophic onycholytic nail with subungual debris c/w onychomycosis     Umbilicated papule    Erythematous-base heme-crusted tan verrucoid plaque consistent with inflamed seborrheic keratosis     Erythematous Silvery Scaling Plaque c/w Psoriasis     See annotation      Assessment / Plan:        Lentigines  sunscreen    Solar aging of skin  Will use the efudex for 2 weeks, recheck in 3 months       Xerosis cutis  No hot water  lubriderm lotion             Follow up in about 3 months (around 4/21/2020).

## 2020-01-24 ENCOUNTER — PATIENT MESSAGE (OUTPATIENT)
Dept: DERMATOLOGY | Facility: CLINIC | Age: 85
End: 2020-01-24

## 2020-01-29 ENCOUNTER — OFFICE VISIT (OUTPATIENT)
Dept: INTERNAL MEDICINE | Facility: CLINIC | Age: 85
End: 2020-01-29
Payer: MEDICARE

## 2020-01-29 ENCOUNTER — PATIENT MESSAGE (OUTPATIENT)
Dept: INTERNAL MEDICINE | Facility: CLINIC | Age: 85
End: 2020-01-29

## 2020-01-29 ENCOUNTER — LAB VISIT (OUTPATIENT)
Dept: LAB | Facility: HOSPITAL | Age: 85
End: 2020-01-29
Attending: INTERNAL MEDICINE
Payer: MEDICARE

## 2020-01-29 VITALS
HEART RATE: 75 BPM | DIASTOLIC BLOOD PRESSURE: 78 MMHG | BODY MASS INDEX: 21.99 KG/M2 | SYSTOLIC BLOOD PRESSURE: 140 MMHG | OXYGEN SATURATION: 97 % | WEIGHT: 166.69 LBS | TEMPERATURE: 98 F

## 2020-01-29 DIAGNOSIS — D47.2 MGUS (MONOCLONAL GAMMOPATHY OF UNKNOWN SIGNIFICANCE): ICD-10-CM

## 2020-01-29 DIAGNOSIS — E78.5 HYPERLIPIDEMIA, UNSPECIFIED HYPERLIPIDEMIA TYPE: ICD-10-CM

## 2020-01-29 DIAGNOSIS — N18.30 CHRONIC KIDNEY DISEASE, STAGE III (MODERATE): ICD-10-CM

## 2020-01-29 DIAGNOSIS — I10 ESSENTIAL HYPERTENSION: Primary | ICD-10-CM

## 2020-01-29 LAB
ALBUMIN SERPL BCP-MCNC: 3.9 G/DL (ref 3.5–5.2)
ALP SERPL-CCNC: 72 U/L (ref 55–135)
ALT SERPL W/O P-5'-P-CCNC: 25 U/L (ref 10–44)
ANION GAP SERPL CALC-SCNC: 4 MMOL/L (ref 8–16)
AST SERPL-CCNC: 26 U/L (ref 10–40)
BASOPHILS # BLD AUTO: 0.13 K/UL (ref 0–0.2)
BASOPHILS NFR BLD: 1.9 % (ref 0–1.9)
BILIRUB SERPL-MCNC: 0.9 MG/DL (ref 0.1–1)
BUN SERPL-MCNC: 13 MG/DL (ref 8–23)
CALCIUM SERPL-MCNC: 9.8 MG/DL (ref 8.7–10.5)
CHLORIDE SERPL-SCNC: 106 MMOL/L (ref 95–110)
CHOLEST SERPL-MCNC: 115 MG/DL (ref 120–199)
CHOLEST/HDLC SERPL: 2 {RATIO} (ref 2–5)
CO2 SERPL-SCNC: 32 MMOL/L (ref 23–29)
CREAT SERPL-MCNC: 1.4 MG/DL (ref 0.5–1.4)
DIFFERENTIAL METHOD: ABNORMAL
EOSINOPHIL # BLD AUTO: 0.6 K/UL (ref 0–0.5)
EOSINOPHIL NFR BLD: 8.9 % (ref 0–8)
ERYTHROCYTE [DISTWIDTH] IN BLOOD BY AUTOMATED COUNT: 13.4 % (ref 11.5–14.5)
EST. GFR  (AFRICAN AMERICAN): 53 ML/MIN/1.73 M^2
EST. GFR  (NON AFRICAN AMERICAN): 45.8 ML/MIN/1.73 M^2
GLUCOSE SERPL-MCNC: 91 MG/DL (ref 70–110)
HCT VFR BLD AUTO: 44.6 % (ref 40–54)
HDLC SERPL-MCNC: 58 MG/DL (ref 40–75)
HDLC SERPL: 50.4 % (ref 20–50)
HGB BLD-MCNC: 14.3 G/DL (ref 14–18)
IMM GRANULOCYTES # BLD AUTO: 0.02 K/UL (ref 0–0.04)
IMM GRANULOCYTES NFR BLD AUTO: 0.3 % (ref 0–0.5)
LDLC SERPL CALC-MCNC: 49.8 MG/DL (ref 63–159)
LYMPHOCYTES # BLD AUTO: 2.3 K/UL (ref 1–4.8)
LYMPHOCYTES NFR BLD: 33.5 % (ref 18–48)
MCH RBC QN AUTO: 30.7 PG (ref 27–31)
MCHC RBC AUTO-ENTMCNC: 32.1 G/DL (ref 32–36)
MCV RBC AUTO: 96 FL (ref 82–98)
MONOCYTES # BLD AUTO: 0.7 K/UL (ref 0.3–1)
MONOCYTES NFR BLD: 11 % (ref 4–15)
NEUTROPHILS # BLD AUTO: 3 K/UL (ref 1.8–7.7)
NEUTROPHILS NFR BLD: 44.4 % (ref 38–73)
NONHDLC SERPL-MCNC: 57 MG/DL
NRBC BLD-RTO: 0 /100 WBC
PLATELET # BLD AUTO: 100 K/UL (ref 150–350)
PMV BLD AUTO: 12.5 FL (ref 9.2–12.9)
POTASSIUM SERPL-SCNC: 4.3 MMOL/L (ref 3.5–5.1)
PROT SERPL-MCNC: 6.9 G/DL (ref 6–8.4)
RBC # BLD AUTO: 4.66 M/UL (ref 4.6–6.2)
SODIUM SERPL-SCNC: 142 MMOL/L (ref 136–145)
TRIGL SERPL-MCNC: 36 MG/DL (ref 30–150)
WBC # BLD AUTO: 6.74 K/UL (ref 3.9–12.7)

## 2020-01-29 PROCEDURE — 1101F PT FALLS ASSESS-DOCD LE1/YR: CPT | Mod: HCNC,CPTII,S$GLB, | Performed by: INTERNAL MEDICINE

## 2020-01-29 PROCEDURE — 3077F SYST BP >= 140 MM HG: CPT | Mod: HCNC,CPTII,S$GLB, | Performed by: INTERNAL MEDICINE

## 2020-01-29 PROCEDURE — 85025 COMPLETE CBC W/AUTO DIFF WBC: CPT | Mod: HCNC

## 2020-01-29 PROCEDURE — 80061 LIPID PANEL: CPT | Mod: HCNC

## 2020-01-29 PROCEDURE — 3078F PR MOST RECENT DIASTOLIC BLOOD PRESSURE < 80 MM HG: ICD-10-PCS | Mod: HCNC,CPTII,S$GLB, | Performed by: INTERNAL MEDICINE

## 2020-01-29 PROCEDURE — 99214 PR OFFICE/OUTPT VISIT, EST, LEVL IV, 30-39 MIN: ICD-10-PCS | Mod: HCNC,S$GLB,, | Performed by: INTERNAL MEDICINE

## 2020-01-29 PROCEDURE — 3077F PR MOST RECENT SYSTOLIC BLOOD PRESSURE >= 140 MM HG: ICD-10-PCS | Mod: HCNC,CPTII,S$GLB, | Performed by: INTERNAL MEDICINE

## 2020-01-29 PROCEDURE — 1101F PR PT FALLS ASSESS DOC 0-1 FALLS W/OUT INJ PAST YR: ICD-10-PCS | Mod: HCNC,CPTII,S$GLB, | Performed by: INTERNAL MEDICINE

## 2020-01-29 PROCEDURE — 3078F DIAST BP <80 MM HG: CPT | Mod: HCNC,CPTII,S$GLB, | Performed by: INTERNAL MEDICINE

## 2020-01-29 PROCEDURE — 1126F AMNT PAIN NOTED NONE PRSNT: CPT | Mod: HCNC,S$GLB,, | Performed by: INTERNAL MEDICINE

## 2020-01-29 PROCEDURE — 1159F PR MEDICATION LIST DOCUMENTED IN MEDICAL RECORD: ICD-10-PCS | Mod: HCNC,S$GLB,, | Performed by: INTERNAL MEDICINE

## 2020-01-29 PROCEDURE — 80053 COMPREHEN METABOLIC PANEL: CPT | Mod: HCNC

## 2020-01-29 PROCEDURE — 99999 PR PBB SHADOW E&M-EST. PATIENT-LVL III: CPT | Mod: PBBFAC,HCNC,, | Performed by: INTERNAL MEDICINE

## 2020-01-29 PROCEDURE — 99214 OFFICE O/P EST MOD 30 MIN: CPT | Mod: HCNC,S$GLB,, | Performed by: INTERNAL MEDICINE

## 2020-01-29 PROCEDURE — 99999 PR PBB SHADOW E&M-EST. PATIENT-LVL III: ICD-10-PCS | Mod: PBBFAC,HCNC,, | Performed by: INTERNAL MEDICINE

## 2020-01-29 PROCEDURE — 36415 COLL VENOUS BLD VENIPUNCTURE: CPT | Mod: HCNC,PO

## 2020-01-29 PROCEDURE — 1159F MED LIST DOCD IN RCRD: CPT | Mod: HCNC,S$GLB,, | Performed by: INTERNAL MEDICINE

## 2020-01-29 PROCEDURE — 1126F PR PAIN SEVERITY QUANTIFIED, NO PAIN PRESENT: ICD-10-PCS | Mod: HCNC,S$GLB,, | Performed by: INTERNAL MEDICINE

## 2020-01-29 RX ORDER — LOSARTAN POTASSIUM 50 MG/1
TABLET ORAL
Qty: 180 TABLET | Refills: 1 | Status: SHIPPED | OUTPATIENT
Start: 2020-01-29 | End: 2020-01-31 | Stop reason: SDUPTHER

## 2020-01-29 RX ORDER — PRAVASTATIN SODIUM 40 MG/1
TABLET ORAL
Qty: 90 TABLET | Refills: 1 | Status: SHIPPED | OUTPATIENT
Start: 2020-01-29 | End: 2020-01-30 | Stop reason: SDUPTHER

## 2020-01-29 RX ORDER — AMLODIPINE BESYLATE 10 MG/1
10 TABLET ORAL DAILY
Qty: 90 TABLET | Refills: 1 | Status: SHIPPED | OUTPATIENT
Start: 2020-01-29 | End: 2020-01-30 | Stop reason: SDUPTHER

## 2020-01-29 NOTE — PROGRESS NOTES
Patient ID: Nelson GIBBONS Parent is a 84 y.o. male.    Chief Complaint: Annual Exam    HPI Nelson is a 84 y.o. male with CKD stage 3, hypertension, hyperlipidemia, ischemic cardiomyopathy, CAD, history of ventricular tachycardia, and AICD in place who presents to [facility] care.  He is a former patient of Dr. Skelton.  He follows with  in Cardiology for the issue of ischemic cardiomyopathy, CAD, history of ventricular tachycardia, and AICD.  He has seen Nephrology in the past, but is not currently following with Nephrology.  He is very concerned about his kidney function which is at chronic kidney disease stage 3.  He drinks plenty of water and avoids NSAID medications. Patient would like to have his kidney function monitored every 3-4 months  He is compliant with his medications for treatment of blood pressure which include amlodipine, losartan, metoprolol succinate, and sotalol.  Reports that his systolic blood pressure is typically 120s at home.  He is followed by Hematology/Oncology for the diagnosis of MGUS.    Review of Systems   Constitutional: Negative for activity change and unexpected weight change.   HENT: Negative for hearing loss, rhinorrhea and trouble swallowing.    Eyes: Negative for discharge and visual disturbance.   Respiratory: Negative for chest tightness and wheezing.    Cardiovascular: Negative for chest pain and palpitations.   Gastrointestinal: Negative for blood in stool, constipation, diarrhea and vomiting.   Endocrine: Negative for polydipsia and polyuria.   Genitourinary: Negative for difficulty urinating, hematuria and urgency.   Musculoskeletal: Negative for arthralgias, joint swelling and neck pain.   Neurological: Negative for weakness and headaches.   Psychiatric/Behavioral: Negative for confusion and dysphoric mood.         Objective:     Vitals:    01/29/20 1303   BP: (!) 140/78   Pulse: 75   Temp: 98.1 °F (36.7 °C)        Physical Exam   Constitutional: He is oriented to  person, place, and time. He appears well-developed and well-nourished. No distress.   HENT:   Head: Normocephalic and atraumatic.   Right Ear: External ear normal.   Left Ear: External ear normal.   Nose: Nose normal.   Mouth/Throat: Oropharynx is clear and moist. No oropharyngeal exudate.   Eyes: Conjunctivae and EOM are normal. Right eye exhibits no discharge. Left eye exhibits no discharge.   Neck: Neck supple. No tracheal deviation present. No thyromegaly present.   Cardiovascular: Normal rate, regular rhythm, normal heart sounds and intact distal pulses. Exam reveals no gallop and no friction rub.   No murmur heard.  Pulmonary/Chest: Effort normal and breath sounds normal. No stridor. No respiratory distress. He has no wheezes. He has no rales. He exhibits no tenderness.   Abdominal: Soft. Bowel sounds are normal. He exhibits no distension and no mass. There is no tenderness. There is no rebound and no guarding. No hernia.   Musculoskeletal: He exhibits no edema, tenderness or deformity.   Lymphadenopathy:     He has no cervical adenopathy.   Neurological: He is alert and oriented to person, place, and time. No cranial nerve deficit.   Skin: Skin is warm and dry. No rash noted. He is not diaphoretic. No erythema.   Psychiatric: He has a normal mood and affect. His behavior is normal. Judgment and thought content normal.   Vitals reviewed.      Assessment:       1. Essential hypertension Well controlled   2. Chronic kidney disease, stage III (moderate) Stable   3. MGUS (monoclonal gammopathy of unknown significance) Stable   4. Hyperlipidemia, unspecified hyperlipidemia type Well controlled       Plan:         Essential hypertension  Comments:  Continue current medications  Orders:  -     Comprehensive metabolic panel; Future; Expected date: 01/29/2020  -     amLODIPine (NORVASC) 10 MG tablet; Take 1 tablet (10 mg total) by mouth once daily.  Dispense: 90 tablet; Refill: 1  -     losartan (COZAAR) 50 MG tablet;  One by mouth twice daily  Dispense: 180 tablet; Refill: 1    Chronic kidney disease, stage III (moderate)  Comments:  Avoid NSAIDs and other nephrotoxic medications.  Stay well hydrated.  Will monitor every 4 months.  Orders:  -     Comprehensive metabolic panel; Future; Expected date: 01/29/2020  -     Comprehensive metabolic panel; Future; Expected date: 04/29/2020  -     Comprehensive metabolic panel; Future; Expected date: 07/29/2020  -     Comprehensive metabolic panel; Future; Expected date: 07/29/2020    MGUS (monoclonal gammopathy of unknown significance)  Comments:  Monitored by Hematology/Oncology.  Orders:  -     CBC auto differential; Future; Expected date: 01/29/2020    Hyperlipidemia, unspecified hyperlipidemia type  Comments:  Continue current medication  Orders:  -     Lipid panel; Future; Expected date: 01/29/2020  -     pravastatin (PRAVACHOL) 40 MG tablet; 1 tab PO QPM  Dispense: 90 tablet; Refill: 1        25 minutes spent in room face to face with patient with >50% of time spent in discussion of CKD stage III diagnosis and plan    RTC 6 months    Warning signs discussed, patient to call with any further issues or worsening of symptoms.       Parts of the above note were dictated using a voice dictation software. Please excuse any grammatical or typographical errors.

## 2020-01-30 DIAGNOSIS — I10 ESSENTIAL HYPERTENSION: ICD-10-CM

## 2020-01-30 DIAGNOSIS — E78.5 HYPERLIPIDEMIA, UNSPECIFIED HYPERLIPIDEMIA TYPE: ICD-10-CM

## 2020-01-30 RX ORDER — AMLODIPINE BESYLATE 10 MG/1
10 TABLET ORAL DAILY
Qty: 90 TABLET | Refills: 1 | Status: SHIPPED | OUTPATIENT
Start: 2020-01-30 | End: 2020-03-23 | Stop reason: SDUPTHER

## 2020-01-30 RX ORDER — METOPROLOL SUCCINATE 25 MG/1
25 TABLET, EXTENDED RELEASE ORAL 2 TIMES DAILY
Qty: 180 TABLET | Refills: 3 | Status: SHIPPED | OUTPATIENT
Start: 2020-01-30 | End: 2021-01-04 | Stop reason: SDUPTHER

## 2020-01-30 RX ORDER — PRAVASTATIN SODIUM 40 MG/1
TABLET ORAL
Qty: 90 TABLET | Refills: 1 | Status: SHIPPED | OUTPATIENT
Start: 2020-01-30 | End: 2020-07-20

## 2020-01-30 RX ORDER — CLOPIDOGREL BISULFATE 75 MG/1
75 TABLET ORAL DAILY
Qty: 90 TABLET | Refills: 0 | Status: SHIPPED | OUTPATIENT
Start: 2020-01-30 | End: 2020-05-21

## 2020-01-31 ENCOUNTER — PATIENT MESSAGE (OUTPATIENT)
Dept: INTERNAL MEDICINE | Facility: CLINIC | Age: 85
End: 2020-01-31

## 2020-01-31 DIAGNOSIS — I10 ESSENTIAL HYPERTENSION: ICD-10-CM

## 2020-01-31 NOTE — TELEPHONE ENCOUNTER
Refill requested by the pt. meds were [facility] [place], and he would like it sent to his mail service. Thank you.

## 2020-02-01 RX ORDER — LOSARTAN POTASSIUM 50 MG/1
TABLET ORAL
Qty: 180 TABLET | Refills: 1 | Status: SHIPPED | OUTPATIENT
Start: 2020-02-01 | End: 2020-03-23 | Stop reason: SDUPTHER

## 2020-02-05 DIAGNOSIS — D47.2 MGUS (MONOCLONAL GAMMOPATHY OF UNKNOWN SIGNIFICANCE): Primary | ICD-10-CM

## 2020-02-25 ENCOUNTER — PATIENT MESSAGE (OUTPATIENT)
Dept: CARDIOLOGY | Facility: CLINIC | Age: 85
End: 2020-02-25

## 2020-03-20 ENCOUNTER — PATIENT MESSAGE (OUTPATIENT)
Dept: HEMATOLOGY/ONCOLOGY | Facility: CLINIC | Age: 85
End: 2020-03-20

## 2020-03-20 ENCOUNTER — PATIENT MESSAGE (OUTPATIENT)
Dept: CARDIOLOGY | Facility: CLINIC | Age: 85
End: 2020-03-20

## 2020-03-23 ENCOUNTER — PATIENT MESSAGE (OUTPATIENT)
Dept: HEMATOLOGY/ONCOLOGY | Facility: CLINIC | Age: 85
End: 2020-03-23

## 2020-03-23 DIAGNOSIS — I10 ESSENTIAL HYPERTENSION: ICD-10-CM

## 2020-03-23 RX ORDER — LOSARTAN POTASSIUM 50 MG/1
TABLET ORAL
Qty: 180 TABLET | Refills: 1 | Status: SHIPPED | OUTPATIENT
Start: 2020-03-23 | End: 2020-10-21

## 2020-03-23 RX ORDER — AMLODIPINE BESYLATE 10 MG/1
10 TABLET ORAL DAILY
Qty: 90 TABLET | Refills: 1 | Status: SHIPPED | OUTPATIENT
Start: 2020-03-23 | End: 2020-08-06 | Stop reason: SDUPTHER

## 2020-03-27 ENCOUNTER — PATIENT MESSAGE (OUTPATIENT)
Dept: CARDIOLOGY | Facility: CLINIC | Age: 85
End: 2020-03-27

## 2020-03-27 DIAGNOSIS — R00.2 PALPITATIONS: Primary | ICD-10-CM

## 2020-03-30 RX ORDER — SOTALOL HYDROCHLORIDE 120 MG/1
TABLET ORAL
Qty: 180 TABLET | Refills: 3 | Status: SHIPPED | OUTPATIENT
Start: 2020-03-30 | End: 2021-01-16

## 2020-05-04 ENCOUNTER — LAB VISIT (OUTPATIENT)
Dept: LAB | Facility: HOSPITAL | Age: 85
End: 2020-05-04
Attending: INTERNAL MEDICINE
Payer: MEDICARE

## 2020-05-04 DIAGNOSIS — D63.1 ANEMIA ASSOCIATED WITH CHRONIC RENAL FAILURE: ICD-10-CM

## 2020-05-04 DIAGNOSIS — I25.5 ISCHEMIC CARDIOMYOPATHY: ICD-10-CM

## 2020-05-04 DIAGNOSIS — M89.8X9 METABOLIC BONE DISEASE: ICD-10-CM

## 2020-05-04 DIAGNOSIS — D47.2 MONOCLONAL PARAPROTEINEMIA: ICD-10-CM

## 2020-05-04 DIAGNOSIS — N18.9 ANEMIA ASSOCIATED WITH CHRONIC RENAL FAILURE: ICD-10-CM

## 2020-05-04 DIAGNOSIS — D47.2 MGUS (MONOCLONAL GAMMOPATHY OF UNKNOWN SIGNIFICANCE): ICD-10-CM

## 2020-05-04 DIAGNOSIS — E83.52 HYPERCALCEMIA: ICD-10-CM

## 2020-05-04 DIAGNOSIS — Z95.810 AICD (AUTOMATIC CARDIOVERTER/DEFIBRILLATOR) PRESENT: Chronic | ICD-10-CM

## 2020-05-04 DIAGNOSIS — R93.0 ABNORMAL CT OF THE HEAD: ICD-10-CM

## 2020-05-04 DIAGNOSIS — I10 ESSENTIAL HYPERTENSION: ICD-10-CM

## 2020-05-04 DIAGNOSIS — E55.9 VITAMIN D DEFICIENCY: ICD-10-CM

## 2020-05-04 DIAGNOSIS — I47.20 VT (VENTRICULAR TACHYCARDIA): ICD-10-CM

## 2020-05-04 DIAGNOSIS — D69.6 THROMBOCYTOPENIA: ICD-10-CM

## 2020-05-04 DIAGNOSIS — I25.10 CORONARY ARTERY DISEASE INVOLVING NATIVE CORONARY ARTERY OF NATIVE HEART WITHOUT ANGINA PECTORIS: ICD-10-CM

## 2020-05-04 DIAGNOSIS — E78.00 PURE HYPERCHOLESTEROLEMIA: ICD-10-CM

## 2020-05-04 LAB
ALBUMIN SERPL BCP-MCNC: 3.8 G/DL (ref 3.5–5.2)
ALBUMIN SERPL BCP-MCNC: 3.8 G/DL (ref 3.5–5.2)
ALP SERPL-CCNC: 78 U/L (ref 55–135)
ALP SERPL-CCNC: 78 U/L (ref 55–135)
ALT SERPL W/O P-5'-P-CCNC: 18 U/L (ref 10–44)
ALT SERPL W/O P-5'-P-CCNC: 18 U/L (ref 10–44)
ANION GAP SERPL CALC-SCNC: 9 MMOL/L (ref 8–16)
ANION GAP SERPL CALC-SCNC: 9 MMOL/L (ref 8–16)
AST SERPL-CCNC: 22 U/L (ref 10–40)
AST SERPL-CCNC: 22 U/L (ref 10–40)
BASOPHILS # BLD AUTO: 0.1 K/UL (ref 0–0.2)
BASOPHILS # BLD AUTO: 0.1 K/UL (ref 0–0.2)
BASOPHILS NFR BLD: 1.1 % (ref 0–1.9)
BASOPHILS NFR BLD: 1.1 % (ref 0–1.9)
BILIRUB SERPL-MCNC: 0.6 MG/DL (ref 0.1–1)
BILIRUB SERPL-MCNC: 0.6 MG/DL (ref 0.1–1)
BNP SERPL-MCNC: 108 PG/ML (ref 0–99)
BUN SERPL-MCNC: 17 MG/DL (ref 8–23)
BUN SERPL-MCNC: 17 MG/DL (ref 8–23)
CALCIUM SERPL-MCNC: 9.6 MG/DL (ref 8.7–10.5)
CALCIUM SERPL-MCNC: 9.6 MG/DL (ref 8.7–10.5)
CHLORIDE SERPL-SCNC: 107 MMOL/L (ref 95–110)
CHLORIDE SERPL-SCNC: 107 MMOL/L (ref 95–110)
CHOLEST SERPL-MCNC: 110 MG/DL (ref 120–199)
CHOLEST/HDLC SERPL: 1.9 {RATIO} (ref 2–5)
CO2 SERPL-SCNC: 26 MMOL/L (ref 23–29)
CO2 SERPL-SCNC: 26 MMOL/L (ref 23–29)
COMPLEXED PSA SERPL-MCNC: 0.77 NG/ML (ref 0–4)
CREAT SERPL-MCNC: 1.3 MG/DL (ref 0.5–1.4)
CREAT SERPL-MCNC: 1.3 MG/DL (ref 0.5–1.4)
DIFFERENTIAL METHOD: ABNORMAL
DIFFERENTIAL METHOD: ABNORMAL
EOSINOPHIL # BLD AUTO: 0.6 K/UL (ref 0–0.5)
EOSINOPHIL # BLD AUTO: 0.6 K/UL (ref 0–0.5)
EOSINOPHIL NFR BLD: 6.8 % (ref 0–8)
EOSINOPHIL NFR BLD: 6.8 % (ref 0–8)
ERYTHROCYTE [DISTWIDTH] IN BLOOD BY AUTOMATED COUNT: 13.1 % (ref 11.5–14.5)
ERYTHROCYTE [DISTWIDTH] IN BLOOD BY AUTOMATED COUNT: 13.1 % (ref 11.5–14.5)
EST. GFR  (AFRICAN AMERICAN): 57.9 ML/MIN/1.73 M^2
EST. GFR  (AFRICAN AMERICAN): 57.9 ML/MIN/1.73 M^2
EST. GFR  (NON AFRICAN AMERICAN): 50.1 ML/MIN/1.73 M^2
EST. GFR  (NON AFRICAN AMERICAN): 50.1 ML/MIN/1.73 M^2
GLUCOSE SERPL-MCNC: 94 MG/DL (ref 70–110)
GLUCOSE SERPL-MCNC: 94 MG/DL (ref 70–110)
HCT VFR BLD AUTO: 47.8 % (ref 40–54)
HCT VFR BLD AUTO: 47.8 % (ref 40–54)
HDLC SERPL-MCNC: 57 MG/DL (ref 40–75)
HDLC SERPL: 51.8 % (ref 20–50)
HGB BLD-MCNC: 15.6 G/DL (ref 14–18)
HGB BLD-MCNC: 15.6 G/DL (ref 14–18)
IMM GRANULOCYTES # BLD AUTO: 0.02 K/UL (ref 0–0.04)
IMM GRANULOCYTES # BLD AUTO: 0.02 K/UL (ref 0–0.04)
IMM GRANULOCYTES NFR BLD AUTO: 0.2 % (ref 0–0.5)
IMM GRANULOCYTES NFR BLD AUTO: 0.2 % (ref 0–0.5)
LDLC SERPL CALC-MCNC: 45.2 MG/DL (ref 63–159)
LYMPHOCYTES # BLD AUTO: 2.8 K/UL (ref 1–4.8)
LYMPHOCYTES # BLD AUTO: 2.8 K/UL (ref 1–4.8)
LYMPHOCYTES NFR BLD: 29.9 % (ref 18–48)
LYMPHOCYTES NFR BLD: 29.9 % (ref 18–48)
MCH RBC QN AUTO: 30.7 PG (ref 27–31)
MCH RBC QN AUTO: 30.7 PG (ref 27–31)
MCHC RBC AUTO-ENTMCNC: 32.6 G/DL (ref 32–36)
MCHC RBC AUTO-ENTMCNC: 32.6 G/DL (ref 32–36)
MCV RBC AUTO: 94 FL (ref 82–98)
MCV RBC AUTO: 94 FL (ref 82–98)
MONOCYTES # BLD AUTO: 1 K/UL (ref 0.3–1)
MONOCYTES # BLD AUTO: 1 K/UL (ref 0.3–1)
MONOCYTES NFR BLD: 10.1 % (ref 4–15)
MONOCYTES NFR BLD: 10.1 % (ref 4–15)
NEUTROPHILS # BLD AUTO: 4.9 K/UL (ref 1.8–7.7)
NEUTROPHILS # BLD AUTO: 4.9 K/UL (ref 1.8–7.7)
NEUTROPHILS NFR BLD: 51.9 % (ref 38–73)
NEUTROPHILS NFR BLD: 51.9 % (ref 38–73)
NONHDLC SERPL-MCNC: 53 MG/DL
NRBC BLD-RTO: 0 /100 WBC
NRBC BLD-RTO: 0 /100 WBC
PLATELET # BLD AUTO: 103 K/UL (ref 150–350)
PLATELET # BLD AUTO: 103 K/UL (ref 150–350)
PMV BLD AUTO: 11.8 FL (ref 9.2–12.9)
PMV BLD AUTO: 11.8 FL (ref 9.2–12.9)
POTASSIUM SERPL-SCNC: 3.9 MMOL/L (ref 3.5–5.1)
POTASSIUM SERPL-SCNC: 3.9 MMOL/L (ref 3.5–5.1)
PROT SERPL-MCNC: 7 G/DL (ref 6–8.4)
PROT SERPL-MCNC: 7 G/DL (ref 6–8.4)
RBC # BLD AUTO: 5.08 M/UL (ref 4.6–6.2)
RBC # BLD AUTO: 5.08 M/UL (ref 4.6–6.2)
SODIUM SERPL-SCNC: 142 MMOL/L (ref 136–145)
SODIUM SERPL-SCNC: 142 MMOL/L (ref 136–145)
TRIGL SERPL-MCNC: 39 MG/DL (ref 30–150)
TSH SERPL DL<=0.005 MIU/L-ACNC: 2.29 UIU/ML (ref 0.4–4)
WBC # BLD AUTO: 9.4 K/UL (ref 3.9–12.7)
WBC # BLD AUTO: 9.4 K/UL (ref 3.9–12.7)

## 2020-05-04 PROCEDURE — 83880 ASSAY OF NATRIURETIC PEPTIDE: CPT | Mod: HCNC

## 2020-05-04 PROCEDURE — 86334 IMMUNOFIX E-PHORESIS SERUM: CPT | Mod: HCNC

## 2020-05-04 PROCEDURE — 36415 COLL VENOUS BLD VENIPUNCTURE: CPT | Mod: HCNC,PO

## 2020-05-04 PROCEDURE — 83520 IMMUNOASSAY QUANT NOS NONAB: CPT | Mod: 59,HCNC

## 2020-05-04 PROCEDURE — 84165 PROTEIN E-PHORESIS SERUM: CPT | Mod: 26,HCNC,, | Performed by: PATHOLOGY

## 2020-05-04 PROCEDURE — 84443 ASSAY THYROID STIM HORMONE: CPT | Mod: HCNC

## 2020-05-04 PROCEDURE — 85025 COMPLETE CBC W/AUTO DIFF WBC: CPT | Mod: HCNC

## 2020-05-04 PROCEDURE — 86334 PATHOLOGIST INTERPRETATION IFE: ICD-10-PCS | Mod: 26,HCNC,, | Performed by: PATHOLOGY

## 2020-05-04 PROCEDURE — 80061 LIPID PANEL: CPT | Mod: HCNC

## 2020-05-04 PROCEDURE — 86334 IMMUNOFIX E-PHORESIS SERUM: CPT | Mod: 26,HCNC,, | Performed by: PATHOLOGY

## 2020-05-04 PROCEDURE — 84153 ASSAY OF PSA TOTAL: CPT | Mod: HCNC

## 2020-05-04 PROCEDURE — 80053 COMPREHEN METABOLIC PANEL: CPT | Mod: HCNC

## 2020-05-04 PROCEDURE — 84165 PROTEIN E-PHORESIS SERUM: CPT | Mod: HCNC

## 2020-05-04 PROCEDURE — 84165 PATHOLOGIST INTERPRETATION SPE: ICD-10-PCS | Mod: 26,HCNC,, | Performed by: PATHOLOGY

## 2020-05-05 ENCOUNTER — PATIENT MESSAGE (OUTPATIENT)
Dept: CARDIOLOGY | Facility: CLINIC | Age: 85
End: 2020-05-05

## 2020-05-05 LAB
ALBUMIN SERPL ELPH-MCNC: 3.98 G/DL (ref 3.35–5.55)
ALPHA1 GLOB SERPL ELPH-MCNC: 0.36 G/DL (ref 0.17–0.41)
ALPHA2 GLOB SERPL ELPH-MCNC: 0.77 G/DL (ref 0.43–0.99)
B-GLOBULIN SERPL ELPH-MCNC: 0.82 G/DL (ref 0.5–1.1)
GAMMA GLOB SERPL ELPH-MCNC: 1.17 G/DL (ref 0.67–1.58)
KAPPA LC SER QL IA: 3.11 MG/DL (ref 0.33–1.94)
KAPPA LC/LAMBDA SER IA: 1.18 (ref 0.26–1.65)
LAMBDA LC SER QL IA: 2.63 MG/DL (ref 0.57–2.63)
PATHOLOGIST INTERPRETATION SPE: NORMAL
PROT SERPL-MCNC: 7.1 G/DL (ref 6–8.4)

## 2020-05-06 LAB
INTERPRETATION SERPL IFE-IMP: NORMAL
PATHOLOGIST INTERPRETATION IFE: NORMAL

## 2020-05-11 ENCOUNTER — PATIENT MESSAGE (OUTPATIENT)
Dept: INTERNAL MEDICINE | Facility: CLINIC | Age: 85
End: 2020-05-11

## 2020-05-11 ENCOUNTER — PATIENT MESSAGE (OUTPATIENT)
Dept: ELECTROPHYSIOLOGY | Facility: CLINIC | Age: 85
End: 2020-05-11

## 2020-05-20 ENCOUNTER — OFFICE VISIT (OUTPATIENT)
Dept: FAMILY MEDICINE | Facility: CLINIC | Age: 85
End: 2020-05-20
Payer: MEDICARE

## 2020-05-20 VITALS
DIASTOLIC BLOOD PRESSURE: 72 MMHG | SYSTOLIC BLOOD PRESSURE: 142 MMHG | TEMPERATURE: 97 F | BODY MASS INDEX: 22.48 KG/M2 | HEART RATE: 64 BPM | WEIGHT: 170.44 LBS | OXYGEN SATURATION: 98 %

## 2020-05-20 DIAGNOSIS — I70.0 AORTIC ATHEROSCLEROSIS: ICD-10-CM

## 2020-05-20 DIAGNOSIS — Z00.00 ENCOUNTER FOR PREVENTIVE HEALTH EXAMINATION: Primary | ICD-10-CM

## 2020-05-20 DIAGNOSIS — I10 ESSENTIAL HYPERTENSION: ICD-10-CM

## 2020-05-20 DIAGNOSIS — Z95.810 AICD (AUTOMATIC CARDIOVERTER/DEFIBRILLATOR) PRESENT: Chronic | ICD-10-CM

## 2020-05-20 DIAGNOSIS — N18.30 CKD (CHRONIC KIDNEY DISEASE) STAGE 3, GFR 30-59 ML/MIN: Chronic | ICD-10-CM

## 2020-05-20 DIAGNOSIS — E55.9 VITAMIN D DEFICIENCY: ICD-10-CM

## 2020-05-20 DIAGNOSIS — N25.81 SECONDARY HYPERPARATHYROIDISM OF RENAL ORIGIN: ICD-10-CM

## 2020-05-20 DIAGNOSIS — D69.6 THROMBOCYTOPENIA: ICD-10-CM

## 2020-05-20 DIAGNOSIS — I25.10 CORONARY ARTERY DISEASE INVOLVING NATIVE CORONARY ARTERY OF NATIVE HEART WITHOUT ANGINA PECTORIS: ICD-10-CM

## 2020-05-20 DIAGNOSIS — E78.5 HYPERLIPIDEMIA, UNSPECIFIED HYPERLIPIDEMIA TYPE: ICD-10-CM

## 2020-05-20 PROBLEM — D47.2 MGUS (MONOCLONAL GAMMOPATHY OF UNKNOWN SIGNIFICANCE): Status: RESOLVED | Noted: 2017-03-07 | Resolved: 2020-05-20

## 2020-05-20 PROCEDURE — 3078F DIAST BP <80 MM HG: CPT | Mod: HCNC,CPTII,S$GLB, | Performed by: NURSE PRACTITIONER

## 2020-05-20 PROCEDURE — 99499 UNLISTED E&M SERVICE: CPT | Mod: HCNC,S$GLB,, | Performed by: NURSE PRACTITIONER

## 2020-05-20 PROCEDURE — 3077F SYST BP >= 140 MM HG: CPT | Mod: HCNC,CPTII,S$GLB, | Performed by: NURSE PRACTITIONER

## 2020-05-20 PROCEDURE — 99999 PR PBB SHADOW E&M-EST. PATIENT-LVL V: CPT | Mod: PBBFAC,HCNC,, | Performed by: NURSE PRACTITIONER

## 2020-05-20 PROCEDURE — 3077F PR MOST RECENT SYSTOLIC BLOOD PRESSURE >= 140 MM HG: ICD-10-PCS | Mod: HCNC,CPTII,S$GLB, | Performed by: NURSE PRACTITIONER

## 2020-05-20 PROCEDURE — 3078F PR MOST RECENT DIASTOLIC BLOOD PRESSURE < 80 MM HG: ICD-10-PCS | Mod: HCNC,CPTII,S$GLB, | Performed by: NURSE PRACTITIONER

## 2020-05-20 PROCEDURE — G0439 PPPS, SUBSEQ VISIT: HCPCS | Mod: HCNC,S$GLB,, | Performed by: NURSE PRACTITIONER

## 2020-05-20 PROCEDURE — 99999 PR PBB SHADOW E&M-EST. PATIENT-LVL V: ICD-10-PCS | Mod: PBBFAC,HCNC,, | Performed by: NURSE PRACTITIONER

## 2020-05-20 PROCEDURE — G0439 PR MEDICARE ANNUAL WELLNESS SUBSEQUENT VISIT: ICD-10-PCS | Mod: HCNC,S$GLB,, | Performed by: NURSE PRACTITIONER

## 2020-05-20 PROCEDURE — 99499 RISK ADDL DX/OHS AUDIT: ICD-10-PCS | Mod: HCNC,S$GLB,, | Performed by: NURSE PRACTITIONER

## 2020-05-20 NOTE — PATIENT INSTRUCTIONS
Counseling and Referral of Other Preventative  (Italic type indicates deductible and co-insurance are waived)    Patient Name: Nelson Huntley  Today's Date: 5/20/2020    Health Maintenance       Date Due Completion Date    Aspirin/Antiplatelet Therapy 05/20/2021 5/20/2020    Colonoscopy 05/07/2023 5/7/2018    TETANUS VACCINE 11/22/2023 11/22/2013    Lipid Panel 05/04/2025 5/4/2020        No orders of the defined types were placed in this encounter.    The following information is provided to all patients.  This information is to help you find resources for any of the problems found today that may be affecting your health:                Living healthy guide: www.Count includes the [facility].[place]      Understanding Diabetes: www.diabetes.org      Eating healthy: www.cdc.gov/healthyweight      CDC home safety checklist: www.cdc.gov/steadi/patient.html      Agency on Aging: www.goea.[place].[facility]      Alcoholics anonymous (AA): www.aa.org      Physical Activity: www.blas.nih.gov/nf9baob      Tobacco use: www.quitwithusla.org

## 2020-05-20 NOTE — PROGRESS NOTES
Nelson Huntley presented for a  Medicare AWV and comprehensive Health Risk Assessment today. The following components were reviewed and updated:    · Medical history  · Family History  · Social history  · Allergies and Current Medications - Completed with assistance from his wife  · Health Risk Assessment  · Health Maintenance  · Care Team     ** See Completed Assessments for Annual Wellness Visit within the encounter summary.**       The following assessments were completed:  · Living Situation  · CAGE  · Depression Screening  · Timed Get Up and Go  · Whisper Test  · Cognitive Function Screening          · Nutrition Screening  · ADL Screening  · PAQ Screening    Vitals:    05/20/20 1317 05/20/20 1344   BP: (!) 144/72 (!) 142/72   BP Location: Left arm Left arm   Patient Position: Sitting Sitting   BP Method: Small (Manual)    Pulse: 64    Temp: 97.3 °F (36.3 °C)    TempSrc: Oral    SpO2: 98%    Weight: 77.3 kg (170 lb 6.7 oz)      Body mass index is 22.48 kg/m².     Physical Exam   Constitutional: He is oriented to person, place, and time. Vital signs are normal. He appears well-developed and well-nourished. He is cooperative. No distress.   HENT:   Head: Normocephalic and atraumatic.   Right Ear: Tympanic membrane, external ear and ear canal normal. Decreased hearing is noted.   Left Ear: Hearing, tympanic membrane, external ear and ear canal normal.   Nose: Nose normal.   Mouth/Throat: Uvula is midline, oropharynx is clear and moist and mucous membranes are normal.   Eyes: Pupils are equal, round, and reactive to light. Conjunctivae, EOM and lids are normal.   Neck: Trachea normal, normal range of motion and full passive range of motion without pain. Neck supple. No tracheal deviation present.   Cardiovascular: Normal rate, regular rhythm, normal heart sounds and normal pulses.   Pulmonary/Chest: Effort normal and breath sounds normal. No respiratory distress. He has no wheezes.   Abdominal: Soft. Normal  appearance and bowel sounds are normal. He exhibits no distension.   Musculoskeletal: Normal range of motion.   Neurological: He is alert and oriented to person, place, and time. He has normal strength and normal reflexes. Coordination and gait normal.   Skin: Skin is warm, dry and intact. Capillary refill takes less than 2 seconds. No pallor.   Psychiatric: He has a normal mood and affect. His speech is normal and behavior is normal. Thought content normal. Cognition and memory are normal. Cognition and memory are not impaired.   Vitals reviewed.        Diagnoses and health risks identified today and associated recommendations/orders:    1. Encounter for preventive health examination  Patient seen for AWV. BP slightly elevated after recheck. Denies chest pain, SOB or dyspnea. Up to date with all screenings and immunizations. No issues or complaints today.    2. Essential hypertension  Chronic; stable on medication. Follow up with PCP.    3. CKD (chronic kidney disease) stage 3, GFR 30-59 ml/min  Chronic; stable on medication. Follow up with PCP.    4. Secondary hyperparathyroidism of renal origin  Chronic; stable. Follow up with PCP.    5. Hyperlipidemia, unspecified hyperlipidemia type  Chronic; stable on medication. Follow up with PCP.    6. Aortic atherosclerosis  Chronic; stable. Followed by Cardiology.    7. Coronary artery disease involving native coronary artery of native heart without angina pectoris  Chronic; stable on medication. Follow up with PCP.    8. Thrombocytopenia  Chronic; stable. Follow up with PCP.    9. AICD (automatic cardioverter/defibrillator) present  Chronic; stable. AICD in situ. Followed by Cardiology.    10. Vitamin D deficiency  Chronic; stable on medication. Follow up with PCP.    11. Body mass index (BMI) 22.0-22.9, adult  Patient's BMI is WNL. Encouraged patient to continue to eat a low salt/low fat diet and discussed importance of engaging in physical activity at least 5x/week for  a minimum of 30 min/day.        Provided Edward with a 5-10 year written screening schedule and personal prevention plan. Recommendations were developed using the USPSTF age appropriate recommendations. Education, counseling, and referrals were provided as needed. After Visit Summary printed and given to patient which includes a list of additional screenings/tests needed.    I offered to discuss end of life issues, including information on how to make advance directives that the patient could use to name someone who would make medical decisions on their behalf if they became too ill to make themselves.    ___Patient declined  _X_Patient is interested, I provided paper work and offered to discuss.      Follow up for your next annual wellness visit.       Claudia Laird NP

## 2020-05-21 RX ORDER — CLOPIDOGREL BISULFATE 75 MG/1
TABLET ORAL
Qty: 90 TABLET | Refills: 0 | Status: SHIPPED | OUTPATIENT
Start: 2020-05-21 | End: 2020-07-29

## 2020-06-01 PROBLEM — D22.9 BENIGN MOLE: Status: RESOLVED | Noted: 2019-08-28 | Resolved: 2020-06-01

## 2020-06-02 ENCOUNTER — HOSPITAL ENCOUNTER (OUTPATIENT)
Dept: CARDIOLOGY | Facility: CLINIC | Age: 85
Discharge: HOME | End: 2020-06-02
Attending: INTERNAL MEDICINE
Payer: MEDICARE

## 2020-06-02 VITALS
DIASTOLIC BLOOD PRESSURE: 78 MMHG | BODY MASS INDEX: 23.03 KG/M2 | WEIGHT: 170 LBS | HEART RATE: 58 BPM | SYSTOLIC BLOOD PRESSURE: 130 MMHG | HEIGHT: 72 IN

## 2020-06-02 DIAGNOSIS — I47.20 VT (VENTRICULAR TACHYCARDIA): ICD-10-CM

## 2020-06-02 DIAGNOSIS — I25.10 CORONARY ARTERY DISEASE INVOLVING NATIVE CORONARY ARTERY OF NATIVE HEART WITHOUT ANGINA PECTORIS: ICD-10-CM

## 2020-06-02 DIAGNOSIS — Z95.810 AICD (AUTOMATIC CARDIOVERTER/DEFIBRILLATOR) PRESENT: ICD-10-CM

## 2020-06-02 DIAGNOSIS — I25.5 ISCHEMIC CARDIOMYOPATHY: ICD-10-CM

## 2020-06-02 LAB
ASCENDING AORTA: 3.32 CM
AV INDEX (PROSTH): 0.88
AV MEAN GRADIENT: 2 MMHG
AV PEAK GRADIENT: 4 MMHG
AV VALVE AREA: 3.77 CM2
AV VELOCITY RATIO: 0.84
BSA FOR ECHO PROCEDURE: 1.98 M2
CV ECHO LV RWT: 0.32 CM
DOP CALC AO PEAK VEL: 1.04 M/S
DOP CALC AO VTI: 21.2 CM
DOP CALC LVOT AREA: 4.3 CM2
DOP CALC LVOT DIAMETER: 2.33 CM
DOP CALC LVOT PEAK VEL: 0.87 M/S
DOP CALC LVOT STROKE VOLUME: 79.82 CM3
DOP CALCLVOT PEAK VEL VTI: 18.73 CM
E WAVE DECELERATION TIME: 308.75 MSEC
E/A RATIO: 0.47
E/E' RATIO: 14 M/S
ECHO LV POSTERIOR WALL: 0.76 CM (ref 0.6–1.1)
FRACTIONAL SHORTENING: 14 % (ref 28–44)
INTERVENTRICULAR SEPTUM: 0.94 CM (ref 0.6–1.1)
LA MAJOR: 5.35 CM
LA MINOR: 5.06 CM
LA WIDTH: 3.93 CM
LEFT ATRIUM SIZE: 4.13 CM
LEFT ATRIUM VOLUME INDEX: 36.1 ML/M2
LEFT ATRIUM VOLUME: 71.75 CM3
LEFT INTERNAL DIMENSION IN SYSTOLE: 4.09 CM (ref 2.1–4)
LEFT VENTRICLE DIASTOLIC VOLUME INDEX: 52.86 ML/M2
LEFT VENTRICLE DIASTOLIC VOLUME: 105.1 ML
LEFT VENTRICLE MASS INDEX: 68 G/M2
LEFT VENTRICLE SYSTOLIC VOLUME INDEX: 37 ML/M2
LEFT VENTRICLE SYSTOLIC VOLUME: 73.66 ML
LEFT VENTRICULAR INTERNAL DIMENSION IN DIASTOLE: 4.75 CM (ref 3.5–6)
LEFT VENTRICULAR MASS: 134.69 G
LV LATERAL E/E' RATIO: 14 M/S
LV SEPTAL E/E' RATIO: 14 M/S
MV PEAK A VEL: 0.9 M/S
MV PEAK E VEL: 0.42 M/S
PISA TR MAX VEL: 1.9 M/S
PULM VEIN S/D RATIO: 1.48
PV PEAK D VEL: 0.33 M/S
PV PEAK S VEL: 0.49 M/S
RA MAJOR: 5.14 CM
RA PRESSURE: 3 MMHG
RA WIDTH: 4.02 CM
RIGHT VENTRICULAR END-DIASTOLIC DIMENSION: 3.8 CM
RV TISSUE DOPPLER FREE WALL SYSTOLIC VELOCITY 1 (APICAL 4 CHAMBER VIEW): 11.05 CM/S
SINUS: 3.66 CM
STJ: 2.95 CM
TDI LATERAL: 0.03 M/S
TDI SEPTAL: 0.03 M/S
TDI: 0.03 M/S
TR MAX PG: 14 MMHG
TRICUSPID ANNULAR PLANE SYSTOLIC EXCURSION: 2.3 CM
TV REST PULMONARY ARTERY PRESSURE: 17 MMHG

## 2020-06-02 PROCEDURE — 93306 TTE W/DOPPLER COMPLETE: CPT | Mod: HCNC,S$GLB,, | Performed by: INTERNAL MEDICINE

## 2020-06-02 PROCEDURE — 93306 TRANSTHORACIC ECHO (TTE) COMPLETE (CUPID ONLY): ICD-10-PCS | Mod: HCNC,S$GLB,, | Performed by: INTERNAL MEDICINE

## 2020-06-03 ENCOUNTER — OFFICE VISIT (OUTPATIENT)
Dept: CARDIOLOGY | Facility: CLINIC | Age: 85
End: 2020-06-03
Payer: MEDICARE

## 2020-06-03 ENCOUNTER — HOSPITAL ENCOUNTER (OUTPATIENT)
Dept: CARDIOLOGY | Facility: CLINIC | Age: 85
Discharge: HOME | End: 2020-06-03
Payer: MEDICARE

## 2020-06-03 ENCOUNTER — OFFICE VISIT (OUTPATIENT)
Dept: HEMATOLOGY/ONCOLOGY | Facility: CLINIC | Age: 85
End: 2020-06-03
Payer: MEDICARE

## 2020-06-03 VITALS
HEART RATE: 62 BPM | SYSTOLIC BLOOD PRESSURE: 153 MMHG | HEIGHT: 72 IN | BODY MASS INDEX: 23.32 KG/M2 | WEIGHT: 172.19 LBS | DIASTOLIC BLOOD PRESSURE: 75 MMHG

## 2020-06-03 VITALS
TEMPERATURE: 98 F | RESPIRATION RATE: 16 BRPM | BODY MASS INDEX: 23.29 KG/M2 | DIASTOLIC BLOOD PRESSURE: 59 MMHG | HEIGHT: 72 IN | HEART RATE: 71 BPM | WEIGHT: 171.94 LBS | SYSTOLIC BLOOD PRESSURE: 118 MMHG | OXYGEN SATURATION: 96 %

## 2020-06-03 DIAGNOSIS — I70.0 AORTIC ATHEROSCLEROSIS: ICD-10-CM

## 2020-06-03 DIAGNOSIS — K21.9 GASTROESOPHAGEAL REFLUX DISEASE WITHOUT ESOPHAGITIS: ICD-10-CM

## 2020-06-03 DIAGNOSIS — Z86.73 CEREBRAL INFARCTION, REMOTE, RESOLVED: ICD-10-CM

## 2020-06-03 DIAGNOSIS — N52.01 ERECTILE DYSFUNCTION DUE TO ARTERIAL INSUFFICIENCY: ICD-10-CM

## 2020-06-03 DIAGNOSIS — D47.2 MGUS (MONOCLONAL GAMMOPATHY OF UNKNOWN SIGNIFICANCE): Primary | ICD-10-CM

## 2020-06-03 DIAGNOSIS — I25.10 CORONARY ARTERY DISEASE INVOLVING NATIVE CORONARY ARTERY OF NATIVE HEART WITHOUT ANGINA PECTORIS: Primary | ICD-10-CM

## 2020-06-03 DIAGNOSIS — Z86.79 HISTORY OF VENTRICULAR TACHYCARDIA: ICD-10-CM

## 2020-06-03 DIAGNOSIS — R00.2 PALPITATIONS: ICD-10-CM

## 2020-06-03 DIAGNOSIS — Z95.810 AICD (AUTOMATIC CARDIOVERTER/DEFIBRILLATOR) PRESENT: Chronic | ICD-10-CM

## 2020-06-03 DIAGNOSIS — D69.6 THROMBOCYTOPENIA: ICD-10-CM

## 2020-06-03 DIAGNOSIS — Z86.73 HISTORY OF TIA (TRANSIENT ISCHEMIC ATTACK): Chronic | ICD-10-CM

## 2020-06-03 DIAGNOSIS — N18.30 CKD (CHRONIC KIDNEY DISEASE) STAGE 3, GFR 30-59 ML/MIN: Chronic | ICD-10-CM

## 2020-06-03 DIAGNOSIS — I25.5 ISCHEMIC CARDIOMYOPATHY: ICD-10-CM

## 2020-06-03 DIAGNOSIS — I10 ESSENTIAL HYPERTENSION: ICD-10-CM

## 2020-06-03 DIAGNOSIS — E78.2 MIXED HYPERLIPIDEMIA: ICD-10-CM

## 2020-06-03 PROCEDURE — 99999 PR PBB SHADOW E&M-EST. PATIENT-LVL IV: CPT | Mod: PBBFAC,HCNC,, | Performed by: INTERNAL MEDICINE

## 2020-06-03 PROCEDURE — 1126F AMNT PAIN NOTED NONE PRSNT: CPT | Mod: HCNC,S$GLB,, | Performed by: INTERNAL MEDICINE

## 2020-06-03 PROCEDURE — 3074F SYST BP LT 130 MM HG: CPT | Mod: HCNC,CPTII,S$GLB, | Performed by: INTERNAL MEDICINE

## 2020-06-03 PROCEDURE — 99215 PR OFFICE/OUTPT VISIT, EST, LEVL V, 40-54 MIN: ICD-10-PCS | Mod: HCNC,S$GLB,, | Performed by: INTERNAL MEDICINE

## 2020-06-03 PROCEDURE — 3078F PR MOST RECENT DIASTOLIC BLOOD PRESSURE < 80 MM HG: ICD-10-PCS | Mod: HCNC,CPTII,S$GLB, | Performed by: INTERNAL MEDICINE

## 2020-06-03 PROCEDURE — 1101F PT FALLS ASSESS-DOCD LE1/YR: CPT | Mod: HCNC,CPTII,S$GLB, | Performed by: INTERNAL MEDICINE

## 2020-06-03 PROCEDURE — 93005 ELECTROCARDIOGRAM TRACING: CPT | Mod: HCNC,S$GLB,, | Performed by: INTERNAL MEDICINE

## 2020-06-03 PROCEDURE — 1159F PR MEDICATION LIST DOCUMENTED IN MEDICAL RECORD: ICD-10-PCS | Mod: HCNC,S$GLB,, | Performed by: INTERNAL MEDICINE

## 2020-06-03 PROCEDURE — 1126F PR PAIN SEVERITY QUANTIFIED, NO PAIN PRESENT: ICD-10-PCS | Mod: HCNC,S$GLB,, | Performed by: INTERNAL MEDICINE

## 2020-06-03 PROCEDURE — 99999 PR PBB SHADOW E&M-EST. PATIENT-LVL III: CPT | Mod: PBBFAC,HCNC,, | Performed by: INTERNAL MEDICINE

## 2020-06-03 PROCEDURE — 93010 ELECTROCARDIOGRAM REPORT: CPT | Mod: HCNC,S$GLB,, | Performed by: INTERNAL MEDICINE

## 2020-06-03 PROCEDURE — 99499 RISK ADDL DX/OHS AUDIT: ICD-10-PCS | Mod: HCNC,S$GLB,, | Performed by: INTERNAL MEDICINE

## 2020-06-03 PROCEDURE — 1101F PR PT FALLS ASSESS DOC 0-1 FALLS W/OUT INJ PAST YR: ICD-10-PCS | Mod: HCNC,CPTII,S$GLB, | Performed by: INTERNAL MEDICINE

## 2020-06-03 PROCEDURE — 99999 PR PBB SHADOW E&M-EST. PATIENT-LVL IV: ICD-10-PCS | Mod: PBBFAC,HCNC,, | Performed by: INTERNAL MEDICINE

## 2020-06-03 PROCEDURE — 1159F MED LIST DOCD IN RCRD: CPT | Mod: HCNC,S$GLB,, | Performed by: INTERNAL MEDICINE

## 2020-06-03 PROCEDURE — 99499 UNLISTED E&M SERVICE: CPT | Mod: HCNC,S$GLB,, | Performed by: INTERNAL MEDICINE

## 2020-06-03 PROCEDURE — 93010 EKG 12-LEAD: ICD-10-PCS | Mod: HCNC,S$GLB,, | Performed by: INTERNAL MEDICINE

## 2020-06-03 PROCEDURE — 93005 EKG 12-LEAD: ICD-10-PCS | Mod: HCNC,S$GLB,, | Performed by: INTERNAL MEDICINE

## 2020-06-03 PROCEDURE — 3077F SYST BP >= 140 MM HG: CPT | Mod: HCNC,CPTII,S$GLB, | Performed by: INTERNAL MEDICINE

## 2020-06-03 PROCEDURE — 99999 PR PBB SHADOW E&M-EST. PATIENT-LVL III: ICD-10-PCS | Mod: PBBFAC,HCNC,, | Performed by: INTERNAL MEDICINE

## 2020-06-03 PROCEDURE — 3077F PR MOST RECENT SYSTOLIC BLOOD PRESSURE >= 140 MM HG: ICD-10-PCS | Mod: HCNC,CPTII,S$GLB, | Performed by: INTERNAL MEDICINE

## 2020-06-03 PROCEDURE — 99215 OFFICE O/P EST HI 40 MIN: CPT | Mod: HCNC,S$GLB,, | Performed by: INTERNAL MEDICINE

## 2020-06-03 PROCEDURE — 3078F DIAST BP <80 MM HG: CPT | Mod: HCNC,CPTII,S$GLB, | Performed by: INTERNAL MEDICINE

## 2020-06-03 PROCEDURE — 99214 PR OFFICE/OUTPT VISIT, EST, LEVL IV, 30-39 MIN: ICD-10-PCS | Mod: HCNC,S$GLB,, | Performed by: INTERNAL MEDICINE

## 2020-06-03 PROCEDURE — 99214 OFFICE O/P EST MOD 30 MIN: CPT | Mod: HCNC,S$GLB,, | Performed by: INTERNAL MEDICINE

## 2020-06-03 PROCEDURE — 3074F PR MOST RECENT SYSTOLIC BLOOD PRESSURE < 130 MM HG: ICD-10-PCS | Mod: HCNC,CPTII,S$GLB, | Performed by: INTERNAL MEDICINE

## 2020-06-03 RX ORDER — FAMOTIDINE 20 MG/1
20 TABLET, FILM COATED ORAL
COMMUNITY

## 2020-06-03 RX ORDER — SILDENAFIL 100 MG/1
100 TABLET, FILM COATED ORAL DAILY PRN
Qty: 8 TABLET | Refills: 10 | Status: SHIPPED | OUTPATIENT
Start: 2020-06-03 | End: 2023-10-24

## 2020-06-03 NOTE — PATIENT INSTRUCTIONS
Discussed diet , achieving and maintaining ideal body weight, and exercise.   We reviewed meds in detail.  Reassured-Discussed goals, options , plan  OK to try generic Viagra but no NTG for 24 hours before or after

## 2020-06-05 NOTE — PROGRESS NOTES
Subjective:       Patient ID: Nelson GIBBONS Parent is a 84 y.o. male.    Chief Complaint: No chief complaint on file.    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   The monoclonal protein was measured at 0.27 grams. The patient does not meet any CRAB criteria. He appears well today. He does not have constitutional B symptoms.  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    Follow-up 9/5/17  Return visit with his wife for evaluation of MGUS. Renal function is stable. CBC is stable with moderate, stable thrombocytopenia.  Total protein remains normal with paraprotein levels pending.    Follow-up 3/20/18  Return visit for MGUS. Renal function remains stable. CBC remains stable- mild, asymptomatic thrombocytopenia.  Total protein is normal. Paraprotein was normal 9/2017. Free light chains pending.  ROS is negative.    Follow-up 3/28/19  Return visit for MGUS. Renal function, calcium, and CBC remain stable.  Total protein is normal with paraprotein studies pending.  ROS is negative.    Follow-up 6/3/2020  Return visit for MGUS. Last seen 1 year ago.   Renal function, calcium, and CBC remain stable.  Total protein is normal with paraprotein studies stable.  ROS is negative.    HPI  Review of Systems   Constitutional: Negative.    HENT: Negative.    Eyes: Negative.    Respiratory: Negative.    Cardiovascular: Negative.    Gastrointestinal: Negative.    Endocrine: Negative.    Genitourinary: Negative.    Musculoskeletal: Negative.    Skin: Negative.    Allergic/Immunologic: Negative for environmental allergies, food allergies and immunocompromised state.   Neurological: Negative.    Hematological: Negative for adenopathy. Does not bruise/bleed easily.   Psychiatric/Behavioral: Negative.        Objective:      Physical Exam   Constitutional: He is oriented to  person, place, and time. He appears well-developed and well-nourished.   HENT:   Head: Normocephalic and atraumatic.   Eyes: Conjunctivae are normal. No scleral icterus.   Neck: Normal range of motion.   Cardiovascular: Normal rate and intact distal pulses.   Pulmonary/Chest: Effort normal. No respiratory distress.   Musculoskeletal: Normal range of motion.   Neurological: He is alert and oriented to person, place, and time.   Skin: Skin is warm and dry.   Psychiatric: He has a normal mood and affect. His behavior is normal.   Nursing note and vitals reviewed.      Assessment:       1. MGUS (monoclonal gammopathy of unknown significance)    2. Thrombocytopenia        Plan:       MGUS stable. Continue to monitor.  Thrombocytopenia. Continue to monitor  Repeat monoclonal protein markers in 12 months

## 2020-06-22 ENCOUNTER — TELEPHONE (OUTPATIENT)
Dept: ELECTROPHYSIOLOGY | Facility: CLINIC | Age: 85
End: 2020-06-22

## 2020-06-22 DIAGNOSIS — I42.9 CARDIOMYOPATHY, UNSPECIFIED TYPE: Primary | ICD-10-CM

## 2020-07-02 ENCOUNTER — TELEPHONE (OUTPATIENT)
Dept: ELECTROPHYSIOLOGY | Facility: CLINIC | Age: 85
End: 2020-07-02

## 2020-07-14 NOTE — PROGRESS NOTES
Mr. Huntley is a patient of Dr. Woodard and was last seen in clinic 8/13/2019.      Subjective:   Patient ID:  Nelson Huntley is a 85 y.o. male who presents for follow-up of Pacemaker Check  .     HPI:    Mr. Huntley is a 85 y.o. male with VT, near-syncope, CAD (MI with PCIs), ICM, thrombocytopenia, ICD here for annual follow up.    Background:    Patient with history of ventricular tachycardia, near syncope, CAD/MI s/p PCIs, ICM, TIA, thrombocytopenia. Former patient of Dr Melissa. He had near syncope, symptomatic VTNS, inducible MVTS (multiple morphs). He had dual chamber Kelli ICD implanted 3/15/12  He is also on sotalol. Interrogation today reveals stable lead function, no significant arrhythmias, 73% atrial pacing, minimal RV pacing    Update (07/15/2020):    Today he says he feels great. No cardiac complaints. Mr. Huntley denies chest pain with exertion or at rest, palpitations, SOB, CARBONE, dizziness, or syncope.    He is on DAPT. On sotalol 120mg BID for VT. Creatinine 1.3 on 5/4/2020.    Device Interrogation (7/15/2020) reveals an intrinsic SR with stable lead and device function. No arrhythmias or treated episodes were noted.  He paces 31% in the RA and 0% in the RV. Battery 2.83 (RRT 2.66).    I have personally reviewed the patient's EKG today, which shows APVS at 64bpm. NJ interval is 256. QRS is 102. QTc is 416.    Recent Cardiac Tests:    2D Echo (6/2/2020):  · Moderately decreased left ventricular systolic function. The estimated ejection fraction is 40%.  · Local segmental wall motion abnormalities.  · Grade I (mild) left ventricular diastolic dysfunction consistent with impaired relaxation.  · Mild left atrial enlargement.  · Normal right ventricular systolic function.  · Mild right atrial enlargement.  · Normal central venous pressure (3 mmHg).  · The estimated PA systolic pressure is 17 mmHg.    Current Outpatient Medications   Medication Sig    amLODIPine (NORVASC) 10 MG tablet Take 1 tablet (10 mg  total) by mouth once daily.    aspirin 81 MG chewable tablet Take 1 tablet by mouth Daily. 81  By mouth Every day    clopidogreL (PLAVIX) 75 mg tablet TAKE 1 TABLET EVERY DAY    coenzyme Q10 (CO Q-10) 10 mg capsule Take 10 mg by mouth once daily.    famotidine (PEPCID) 20 MG tablet Take 20 mg by mouth 2 (two) times daily.    losartan (COZAAR) 50 MG tablet One by mouth twice daily    metoprolol succinate (TOPROL-XL) 25 MG 24 hr tablet Take 1 tablet (25 mg total) by mouth 2 (two) times daily.    multivitamin with minerals tablet Take 1 tablet by mouth once daily.     niacin 500 MG tablet Take 3 tablets by mouth Every PM. 3 Tablet Oral Every evening    nitroGLYCERIN (NITROSTAT) 0.4 MG SL tablet Take 1 tab under the tongue for chest pain. May repeat every 5 minutes for a total of 3 doses. If chest pain not relieved go to the ED.    omega-3 fatty acids-vitamin E (FISH OIL) 1,000 mg Cap Take 1 capsule by mouth 2 (two) times daily.    pravastatin (PRAVACHOL) 40 MG tablet 1 tab PO QPM    sildenafiL (VIAGRA) 100 MG tablet Take 1 tablet (100 mg total) by mouth daily as needed for Erectile Dysfunction.    sotaloL (BETAPACE) 120 MG Tab TAKE 1 TABLET TWICE DAILY    vitamin D 1000 units Tab Take 1,000 Units by mouth every Tues, Thurs, Sat.      No current facility-administered medications for this visit.        Review of Systems   Constitution: Negative for malaise/fatigue.   Cardiovascular: Negative for chest pain, dyspnea on exertion, irregular heartbeat, leg swelling and palpitations.   Respiratory: Negative for shortness of breath.    Hematologic/Lymphatic: Negative for bleeding problem.   Skin: Negative for rash.   Musculoskeletal: Negative for myalgias.   Gastrointestinal: Negative for hematemesis, hematochezia and nausea.   Genitourinary: Negative for hematuria.   Neurological: Negative for light-headedness.   Psychiatric/Behavioral: Negative for altered mental status.   Allergic/Immunologic: Negative for  "persistent infections.     Objective:        /72   Pulse 64   Ht 6' 1" (1.854 m)   Wt 76.7 kg (169 lb 1.5 oz)   BMI 22.31 kg/m²     Physical Exam   Constitutional: He is oriented to person, place, and time. He appears well-developed and well-nourished.   HENT:   Head: Normocephalic.   Nose: Nose normal.   Eyes: Pupils are equal, round, and reactive to light.   Cardiovascular: Normal rate, regular rhythm, S1 normal and S2 normal.   No murmur heard.  Pulses:       Radial pulses are 2+ on the right side and 2+ on the left side.   Pulmonary/Chest: Breath sounds normal. No respiratory distress.   Device to LUCW. Pocket and incision in good repair.   Abdominal: Normal appearance.   Musculoskeletal: Normal range of motion.         General: No edema.   Neurological: He is alert and oriented to person, place, and time.   Skin: Skin is warm and dry. No erythema.   Psychiatric: He has a normal mood and affect. His speech is normal and behavior is normal.   Nursing note and vitals reviewed.    Lab Results   Component Value Date     05/04/2020     05/04/2020    K 3.9 05/04/2020    K 3.9 05/04/2020    MG 2.2 03/17/2012    BUN 17 05/04/2020    BUN 17 05/04/2020    CREATININE 1.3 05/04/2020    CREATININE 1.3 05/04/2020    ALT 18 05/04/2020    ALT 18 05/04/2020    AST 22 05/04/2020    AST 22 05/04/2020    HGB 15.6 05/04/2020    HGB 15.6 05/04/2020    HCT 47.8 05/04/2020    HCT 47.8 05/04/2020    TSH 2.288 05/04/2020    LDLCALC 45.2 (L) 05/04/2020       Recent Labs   Lab 09/24/18  1432   INR 1.1       Assessment:     1. AICD (automatic cardioverter/defibrillator) present    2. Ischemic cardiomyopathy    3. History of TIA (transient ischemic attack)    4. History of ventricular tachycardia    5. Essential hypertension      Plan:     In summary, Mr. Huntley is a 85 y.o. male with VT, near-syncope, CAD (MI with PCIs), ICM, thrombocytopenia, ICD here for annual follow up.  Mr. Huntley is doing well from a device " perspective with stable lead and device function. No arrhythmia noted. No RV pacing. No CHF symptoms. On sotalol for VT. EKG with acceptable intervals. He feels great.    Continue current medication regimen and device settings.   Follow up in device clinic as scheduled.   Follow up in EP clinic in 1 year, sooner as needed.     *A copy of this note has been sent to Dr. Woodard*    Follow up in about 1 year (around 7/15/2021).    ------------------------------------------------------------------    RICHIE Lunsford, NP-C  Cardiac Electrophysiology

## 2020-07-15 ENCOUNTER — CLINICAL SUPPORT (OUTPATIENT)
Dept: CARDIOLOGY | Facility: HOSPITAL | Age: 85
End: 2020-07-15
Attending: INTERNAL MEDICINE
Payer: MEDICARE

## 2020-07-15 ENCOUNTER — OFFICE VISIT (OUTPATIENT)
Dept: ELECTROPHYSIOLOGY | Facility: CLINIC | Age: 85
End: 2020-07-15
Attending: INTERNAL MEDICINE
Payer: MEDICARE

## 2020-07-15 ENCOUNTER — HOSPITAL ENCOUNTER (OUTPATIENT)
Dept: CARDIOLOGY | Facility: CLINIC | Age: 85
Discharge: HOME | End: 2020-07-15
Payer: MEDICARE

## 2020-07-15 VITALS
WEIGHT: 169.06 LBS | BODY MASS INDEX: 22.4 KG/M2 | HEIGHT: 73 IN | HEART RATE: 64 BPM | SYSTOLIC BLOOD PRESSURE: 124 MMHG | DIASTOLIC BLOOD PRESSURE: 72 MMHG

## 2020-07-15 DIAGNOSIS — I25.5 ISCHEMIC CARDIOMYOPATHY: ICD-10-CM

## 2020-07-15 DIAGNOSIS — Z86.73 HISTORY OF TIA (TRANSIENT ISCHEMIC ATTACK): Chronic | ICD-10-CM

## 2020-07-15 DIAGNOSIS — I10 ESSENTIAL HYPERTENSION: ICD-10-CM

## 2020-07-15 DIAGNOSIS — Z95.810 AICD (AUTOMATIC CARDIOVERTER/DEFIBRILLATOR) PRESENT: Primary | Chronic | ICD-10-CM

## 2020-07-15 DIAGNOSIS — Z95.810 ICD (IMPLANTABLE CARDIOVERTER-DEFIBRILLATOR) IN PLACE: ICD-10-CM

## 2020-07-15 DIAGNOSIS — I42.9 CARDIOMYOPATHY, UNSPECIFIED TYPE: ICD-10-CM

## 2020-07-15 DIAGNOSIS — Z86.79 HISTORY OF VENTRICULAR TACHYCARDIA: ICD-10-CM

## 2020-07-15 PROCEDURE — 93283 PRGRMG EVAL IMPLANTABLE DFB: CPT | Mod: HCNC

## 2020-07-15 PROCEDURE — 1101F PR PT FALLS ASSESS DOC 0-1 FALLS W/OUT INJ PAST YR: ICD-10-PCS | Mod: HCNC,CPTII,S$GLB, | Performed by: NURSE PRACTITIONER

## 2020-07-15 PROCEDURE — 3074F SYST BP LT 130 MM HG: CPT | Mod: HCNC,CPTII,S$GLB, | Performed by: NURSE PRACTITIONER

## 2020-07-15 PROCEDURE — 93005 RHYTHM STRIP: ICD-10-PCS | Mod: HCNC,S$GLB,, | Performed by: INTERNAL MEDICINE

## 2020-07-15 PROCEDURE — 3078F DIAST BP <80 MM HG: CPT | Mod: HCNC,CPTII,S$GLB, | Performed by: NURSE PRACTITIONER

## 2020-07-15 PROCEDURE — 1101F PT FALLS ASSESS-DOCD LE1/YR: CPT | Mod: HCNC,CPTII,S$GLB, | Performed by: NURSE PRACTITIONER

## 2020-07-15 PROCEDURE — 99499 RISK ADDL DX/OHS AUDIT: ICD-10-PCS | Mod: HCNC,S$GLB,, | Performed by: NURSE PRACTITIONER

## 2020-07-15 PROCEDURE — 99999 PR PBB SHADOW E&M-EST. PATIENT-LVL IV: CPT | Mod: PBBFAC,HCNC,, | Performed by: NURSE PRACTITIONER

## 2020-07-15 PROCEDURE — 93283 PRGRMG EVAL IMPLANTABLE DFB: CPT | Mod: 26,HCNC,, | Performed by: INTERNAL MEDICINE

## 2020-07-15 PROCEDURE — 93010 RHYTHM STRIP: ICD-10-PCS | Mod: HCNC,S$GLB,, | Performed by: INTERNAL MEDICINE

## 2020-07-15 PROCEDURE — 93005 ELECTROCARDIOGRAM TRACING: CPT | Mod: HCNC,S$GLB,, | Performed by: INTERNAL MEDICINE

## 2020-07-15 PROCEDURE — 1159F MED LIST DOCD IN RCRD: CPT | Mod: HCNC,S$GLB,, | Performed by: NURSE PRACTITIONER

## 2020-07-15 PROCEDURE — 99999 PR PBB SHADOW E&M-EST. PATIENT-LVL IV: ICD-10-PCS | Mod: PBBFAC,HCNC,, | Performed by: NURSE PRACTITIONER

## 2020-07-15 PROCEDURE — 1126F AMNT PAIN NOTED NONE PRSNT: CPT | Mod: HCNC,S$GLB,, | Performed by: NURSE PRACTITIONER

## 2020-07-15 PROCEDURE — 99499 UNLISTED E&M SERVICE: CPT | Mod: HCNC,S$GLB,, | Performed by: NURSE PRACTITIONER

## 2020-07-15 PROCEDURE — 99214 PR OFFICE/OUTPT VISIT, EST, LEVL IV, 30-39 MIN: ICD-10-PCS | Mod: HCNC,S$GLB,, | Performed by: NURSE PRACTITIONER

## 2020-07-15 PROCEDURE — 1126F PR PAIN SEVERITY QUANTIFIED, NO PAIN PRESENT: ICD-10-PCS | Mod: HCNC,S$GLB,, | Performed by: NURSE PRACTITIONER

## 2020-07-15 PROCEDURE — 3074F PR MOST RECENT SYSTOLIC BLOOD PRESSURE < 130 MM HG: ICD-10-PCS | Mod: HCNC,CPTII,S$GLB, | Performed by: NURSE PRACTITIONER

## 2020-07-15 PROCEDURE — 99214 OFFICE O/P EST MOD 30 MIN: CPT | Mod: HCNC,S$GLB,, | Performed by: NURSE PRACTITIONER

## 2020-07-15 PROCEDURE — 1159F PR MEDICATION LIST DOCUMENTED IN MEDICAL RECORD: ICD-10-PCS | Mod: HCNC,S$GLB,, | Performed by: NURSE PRACTITIONER

## 2020-07-15 PROCEDURE — 93010 ELECTROCARDIOGRAM REPORT: CPT | Mod: HCNC,S$GLB,, | Performed by: INTERNAL MEDICINE

## 2020-07-15 PROCEDURE — 93283 CARDIAC DEVICE CHECK - IN CLINIC & HOSPITAL: ICD-10-PCS | Mod: 26,HCNC,, | Performed by: INTERNAL MEDICINE

## 2020-07-15 PROCEDURE — 3078F PR MOST RECENT DIASTOLIC BLOOD PRESSURE < 80 MM HG: ICD-10-PCS | Mod: HCNC,CPTII,S$GLB, | Performed by: NURSE PRACTITIONER

## 2020-07-15 NOTE — LETTER
July 15, 2020      Felipe Woodard MD  [address]  [facility] [address]           [facility]  [address]  [facility] [address]  Phone: [phone]  Fax: [phone]          Patient: Nelson Huntley   MR Number: [id number]   YOB: 1935   Date of Visit: 7/15/2020       Dear Dr. Felipe Woodard:    Thank you for referring Nelson Huntley to me for evaluation. Attached you will find relevant portions of my assessment and plan of care.    If you have questions, please do not hesitate to call me. I look forward to following Nelson Huntley along with you.    Sincerely,    Analisa Boykin NP    Enclosure  CC:  No Recipients    If you would like to receive this communication electronically, please contact [email or web address] or [phone] to request more information on "[employer] Link access.    For providers and/or their staff who would like to refer a patient to [facility], please contact us through our one-stop-shop provider referral line, [facility], at [phone].    If you feel you have received this communication in error or would no longer like to receive these types of communications, please e-mail [email or web address]

## 2020-07-30 ENCOUNTER — HOSPITAL ENCOUNTER (OUTPATIENT)
Dept: RADIOLOGY | Facility: HOSPITAL | Age: 85
Discharge: HOME | End: 2020-07-30
Attending: INTERNAL MEDICINE
Payer: MEDICARE

## 2020-07-30 ENCOUNTER — OFFICE VISIT (OUTPATIENT)
Dept: INTERNAL MEDICINE | Facility: CLINIC | Age: 85
End: 2020-07-30
Payer: MEDICARE

## 2020-07-30 VITALS
DIASTOLIC BLOOD PRESSURE: 64 MMHG | HEIGHT: 73 IN | SYSTOLIC BLOOD PRESSURE: 124 MMHG | WEIGHT: 173.5 LBS | BODY MASS INDEX: 22.99 KG/M2 | TEMPERATURE: 98 F | HEART RATE: 64 BPM | OXYGEN SATURATION: 95 %

## 2020-07-30 DIAGNOSIS — I10 ESSENTIAL HYPERTENSION: ICD-10-CM

## 2020-07-30 DIAGNOSIS — D47.2 MGUS (MONOCLONAL GAMMOPATHY OF UNKNOWN SIGNIFICANCE): ICD-10-CM

## 2020-07-30 DIAGNOSIS — I25.10 CORONARY ARTERY DISEASE, ANGINA PRESENCE UNSPECIFIED, UNSPECIFIED VESSEL OR LESION TYPE, UNSPECIFIED WHETHER NATIVE OR TRANSPLANTED HEART: ICD-10-CM

## 2020-07-30 DIAGNOSIS — D69.6 THROMBOCYTOPENIA: ICD-10-CM

## 2020-07-30 DIAGNOSIS — R09.82 POSTNASAL DRIP: ICD-10-CM

## 2020-07-30 DIAGNOSIS — E78.5 HYPERLIPIDEMIA, UNSPECIFIED HYPERLIPIDEMIA TYPE: ICD-10-CM

## 2020-07-30 DIAGNOSIS — K22.70 BARRETT'S ESOPHAGUS WITHOUT DYSPLASIA: ICD-10-CM

## 2020-07-30 DIAGNOSIS — R05.3 CHRONIC COUGH: Primary | ICD-10-CM

## 2020-07-30 DIAGNOSIS — N18.30 CHRONIC KIDNEY DISEASE, STAGE III (MODERATE): ICD-10-CM

## 2020-07-30 DIAGNOSIS — R05.3 CHRONIC COUGH: ICD-10-CM

## 2020-07-30 DIAGNOSIS — K21.9 GASTROESOPHAGEAL REFLUX DISEASE, ESOPHAGITIS PRESENCE NOT SPECIFIED: ICD-10-CM

## 2020-07-30 PROCEDURE — 1101F PT FALLS ASSESS-DOCD LE1/YR: CPT | Mod: HCNC,CPTII,S$GLB, | Performed by: INTERNAL MEDICINE

## 2020-07-30 PROCEDURE — 3074F PR MOST RECENT SYSTOLIC BLOOD PRESSURE < 130 MM HG: ICD-10-PCS | Mod: HCNC,CPTII,S$GLB, | Performed by: INTERNAL MEDICINE

## 2020-07-30 PROCEDURE — 1101F PR PT FALLS ASSESS DOC 0-1 FALLS W/OUT INJ PAST YR: ICD-10-PCS | Mod: HCNC,CPTII,S$GLB, | Performed by: INTERNAL MEDICINE

## 2020-07-30 PROCEDURE — 71046 X-RAY EXAM CHEST 2 VIEWS: CPT | Mod: TC,HCNC,FY,PO

## 2020-07-30 PROCEDURE — 1159F MED LIST DOCD IN RCRD: CPT | Mod: HCNC,S$GLB,, | Performed by: INTERNAL MEDICINE

## 2020-07-30 PROCEDURE — 3078F DIAST BP <80 MM HG: CPT | Mod: HCNC,CPTII,S$GLB, | Performed by: INTERNAL MEDICINE

## 2020-07-30 PROCEDURE — 3078F PR MOST RECENT DIASTOLIC BLOOD PRESSURE < 80 MM HG: ICD-10-PCS | Mod: HCNC,CPTII,S$GLB, | Performed by: INTERNAL MEDICINE

## 2020-07-30 PROCEDURE — 71046 X-RAY EXAM CHEST 2 VIEWS: CPT | Mod: 26,HCNC,, | Performed by: RADIOLOGY

## 2020-07-30 PROCEDURE — 1126F PR PAIN SEVERITY QUANTIFIED, NO PAIN PRESENT: ICD-10-PCS | Mod: HCNC,S$GLB,, | Performed by: INTERNAL MEDICINE

## 2020-07-30 PROCEDURE — 99999 PR PBB SHADOW E&M-EST. PATIENT-LVL V: CPT | Mod: PBBFAC,HCNC,, | Performed by: INTERNAL MEDICINE

## 2020-07-30 PROCEDURE — 99214 OFFICE O/P EST MOD 30 MIN: CPT | Mod: HCNC,S$GLB,, | Performed by: INTERNAL MEDICINE

## 2020-07-30 PROCEDURE — 3074F SYST BP LT 130 MM HG: CPT | Mod: HCNC,CPTII,S$GLB, | Performed by: INTERNAL MEDICINE

## 2020-07-30 PROCEDURE — 1126F AMNT PAIN NOTED NONE PRSNT: CPT | Mod: HCNC,S$GLB,, | Performed by: INTERNAL MEDICINE

## 2020-07-30 PROCEDURE — 99999 PR PBB SHADOW E&M-EST. PATIENT-LVL V: ICD-10-PCS | Mod: PBBFAC,HCNC,, | Performed by: INTERNAL MEDICINE

## 2020-07-30 PROCEDURE — 71046 XR CHEST PA AND LATERAL: ICD-10-PCS | Mod: 26,HCNC,, | Performed by: RADIOLOGY

## 2020-07-30 PROCEDURE — 99214 PR OFFICE/OUTPT VISIT, EST, LEVL IV, 30-39 MIN: ICD-10-PCS | Mod: HCNC,S$GLB,, | Performed by: INTERNAL MEDICINE

## 2020-07-30 PROCEDURE — 1159F PR MEDICATION LIST DOCUMENTED IN MEDICAL RECORD: ICD-10-PCS | Mod: HCNC,S$GLB,, | Performed by: INTERNAL MEDICINE

## 2020-07-30 RX ORDER — FLUTICASONE PROPIONATE 50 MCG
2 SPRAY, SUSPENSION (ML) NASAL DAILY
Qty: 16 G | Refills: 11 | Status: SHIPPED | OUTPATIENT
Start: 2020-07-30 | End: 2020-08-29

## 2020-07-30 NOTE — PROGRESS NOTES
Patient ID: Nelson GIBBONS Parent is a 85 y.o. male.    Chief Complaint: Follow-up    HPI Nelson is a 85 y.o. male with CKD stage 3, hypertension, hyperlipidemia, ischemic cardiomyopathy, CAD, history of ventricular tachycardia, MGUS, Tovar's esophagus, and AICD who presents for routine follow-up of medical conditions.  He continues to follow with Dr. Cartagena in Cardiology for issues of ischemic cardiomyopathy, CAD, history of ventricular tachycardia, and AICD.  He sees hematology every 1 year for the issue of MGUS which has been stable.  We reviewed his recent labs which show stable chronic kidney disease stage 3.  Blood pressure is well controlled at 124/64 today.  He does occasionally check blood pressure at home and is typically in this range.  Also complains today of chronic cough.  Onset of cough was months ago.  It is not constant in duration.  It comes and goes.  Upon questioning, admits to having runny nose and postnasal drip.  He denies any issues with acid reflux, although he does have Tovar's esophagus.  He is no longer taking a PPI due to his chronic kidney disease stage 3.  He was prescribed famotidine, but states that he does not take this daily.  He only uses as needed.    Review of Systems   Constitutional: Negative for activity change and unexpected weight change.   HENT: Negative for hearing loss, rhinorrhea and trouble swallowing.    Eyes: Negative for discharge and visual disturbance.   Respiratory: Negative for chest tightness and wheezing.    Cardiovascular: Negative for chest pain and palpitations.   Gastrointestinal: Negative for blood in stool, constipation, diarrhea and vomiting.   Endocrine: Negative for polydipsia and polyuria.   Genitourinary: Negative for difficulty urinating, hematuria and urgency.   Musculoskeletal: Negative for arthralgias, joint swelling and neck pain.   Neurological: Negative for weakness and headaches.   Psychiatric/Behavioral: Negative for confusion and dysphoric  mood.      Objective:     Vitals:    07/30/20 1357   BP: 124/64   Pulse: 64   Temp: 97.6 °F (36.4 °C)        Physical Exam  Vitals signs reviewed.   Constitutional:       General: He is not in acute distress.     Appearance: Normal appearance. He is well-developed. He is not ill-appearing or diaphoretic.   HENT:      Head: Normocephalic and atraumatic.      Right Ear: External ear normal.      Left Ear: External ear normal.      Nose: Nose normal.   Eyes:      Extraocular Movements: Extraocular movements intact.      Conjunctiva/sclera: Conjunctivae normal.   Cardiovascular:      Rate and Rhythm: Normal rate and regular rhythm.      Heart sounds: Normal heart sounds. No murmur. No friction rub. No gallop.    Pulmonary:      Effort: Pulmonary effort is normal. No respiratory distress.      Breath sounds: Normal breath sounds. No stridor. No wheezing, rhonchi or rales.   Skin:     General: Skin is warm and dry.   Neurological:      Mental Status: He is alert and oriented to person, place, and time. Mental status is at baseline.   Psychiatric:         Mood and Affect: Mood normal.         Behavior: Behavior normal.         Thought Content: Thought content normal.         Judgment: Judgment normal.         Assessment:       1. Chronic cough Active   2. Postnasal drip Active   3. Chronic kidney disease, stage III (moderate) Stable   4. Hyperlipidemia, unspecified hyperlipidemia type Well controlled   5. Thrombocytopenia Chronic   6. Tovar's esophagus without dysplasia Chronic   7. Gastroesophageal reflux disease, esophagitis presence not specified Chronic   8. MGUS (monoclonal gammopathy of unknown significance) Chronic   9. Coronary artery disease, angina presence unspecified, unspecified vessel or lesion type, unspecified whether native or transplanted heart Chronic   10. Essential hypertension Well controlled       Plan:         Chronic cough  Comments:  Likely due to postnasal drip.  Orders:  -     X-Ray Chest PA  And Lateral; Future; Expected date: 07/30/2020    Postnasal drip  -     fluticasone propionate (FLONASE) 50 mcg/actuation nasal spray; 2 sprays (100 mcg total) by Each Nostril route once daily.  Dispense: 16 g; Refill: 11    Chronic kidney disease, stage III (moderate)  Comments:  Stay well hydrated and avoid NSAID medications.  Orders:  -     Comprehensive metabolic panel; Future; Expected date: 01/30/2021    Hyperlipidemia, unspecified hyperlipidemia type  Comments:  Continue current medication  Orders:  -     Lipid Panel; Future; Expected date: 01/30/2021    Thrombocytopenia  Comments:  stable. Monitored by Hematology/Oncology.  Orders:  -     CBC auto differential; Future; Expected date: 01/30/2021    Tovar's esophagus without dysplasia  Comments:  Take famotidine 20 mg p.o. daily.    Gastroesophageal reflux disease, esophagitis presence not specified  Comments:  Take famotidine 20 mg p.o. daily.    MGUS (monoclonal gammopathy of unknown significance)  Comments:  stable. Monitored by Hematology/Oncology.    Coronary artery disease, angina presence unspecified, unspecified vessel or lesion type, unspecified whether native or transplanted heart  Comments:  Continue current medications and follow-up with cardiology.    Essential hypertension  Comments:  Continue current medications        RTC 6 months     Warning signs discussed, patient to call with any further issues or worsening of symptoms.       Parts of the above note were dictated using a voice dictation software. Please excuse any grammatical or typographical errors.

## 2020-10-15 ENCOUNTER — IMMUNIZATION (OUTPATIENT)
Dept: FAMILY MEDICINE | Facility: CLINIC | Age: 85
End: 2020-10-15
Payer: MEDICARE

## 2020-10-15 VITALS — TEMPERATURE: 97 F

## 2020-10-15 DIAGNOSIS — Z23 FLU VACCINE NEED: Primary | ICD-10-CM

## 2020-10-15 PROCEDURE — G0008 FLU VACCINE - QUADRIVALENT - ADJUVANTED: ICD-10-PCS | Mod: HCNC,S$GLB,, | Performed by: INTERNAL MEDICINE

## 2020-10-15 PROCEDURE — 90694 VACC AIIV4 NO PRSRV 0.5ML IM: CPT | Mod: HCNC,S$GLB,, | Performed by: INTERNAL MEDICINE

## 2020-10-15 PROCEDURE — G0008 ADMIN INFLUENZA VIRUS VAC: HCPCS | Mod: HCNC,S$GLB,, | Performed by: INTERNAL MEDICINE

## 2020-10-15 PROCEDURE — 99999 PR PBB SHADOW E&M-EST. PATIENT-LVL II: CPT | Mod: PBBFAC,HCNC,,

## 2020-10-15 PROCEDURE — 99999 PR PBB SHADOW E&M-EST. PATIENT-LVL II: ICD-10-PCS | Mod: PBBFAC,HCNC,,

## 2020-10-15 PROCEDURE — 90694 FLU VACCINE - QUADRIVALENT - ADJUVANTED: ICD-10-PCS | Mod: HCNC,S$GLB,, | Performed by: INTERNAL MEDICINE

## 2020-12-16 NOTE — PROGRESS NOTES
Refill Routing Note   Medication(s) are not appropriate for processing by [facility] for the following reason(s):     - Non-participating provider  [facility] action(s):  Route        Medication reconciliation completed: No   Automatic Epic Generated Protocol Data:        Requested Prescriptions   Pending Prescriptions Disp Refills    clopidogreL (PLAVIX) 75 mg tablet [Pharmacy Med Name: CLOPIDOGREL 75 MG Tablet] 90 tablet 0     Sig: TAKE 1 TABLET EVERY DAY       Hematology: Antiplatelets - clopidogrel Failed - 12/15/2020 10:07 PM        Failed - PLT in normal range and within 360 days     Platelets   Date Value Ref Range Status   05/04/2020 103 (L) 150 - 350 K/uL Final   05/04/2020 103 (L) 150 - 350 K/uL Final   01/29/2020 100 (L) 150 - 350 K/uL Final              Passed - Patient is at least 18 years old        Passed - No contraindicated proton pump inhibitors on active medication list        Passed - Office visit in past 12 months or future 90 days     Recent Outpatient Visits            4 months ago Chronic cough    [place] [facility] Bety Tomlinson MD    5 months ago AICD (automatic cardioverter/defibrillator) present    [id number] Analisa Boykin NP    6 months ago MGUS (monoclonal gammopathy of unknown significance)    [facility] Fl Aye Price MD    6 months ago Coronary artery disease involving native coronary artery of native heart without angina pectoris    [facility] 3rd [facility] Shiraz Cartagena MD    7 months ago Encounter for preventive health examination    [facility] Claudia Laird, ELIZABETH          Future Appointments              Tomorrow HOME MONITOR DEVICE CHECK, [facility] 3rd Fl, [facility]    In 1 month LAB, [facility], [place]    In 1 month Bety Tomlinson MD [place] [facility], [place]                Passed - HCT in normal range and  within 360 days     Hematocrit   Date Value Ref Range Status   05/04/2020 47.8 40.0 - 54.0 % Final   05/04/2020 47.8 40.0 - 54.0 % Final   01/29/2020 44.6 40.0 - 54.0 % Final              Passed - HGB in normal range and within 360 days     Hemoglobin   Date Value Ref Range Status   05/04/2020 15.6 14.0 - 18.0 g/dL Final   05/04/2020 15.6 14.0 - 18.0 g/dL Final   01/29/2020 14.3 14.0 - 18.0 g/dL Final                    Appointments  past 12m or future 3m with PCP    Date Provider   Last Visit   7/30/2020 Bety Tomlinson MD   Next Visit   1/28/2021 Bety Tomlinson MD   ED visits in past 90 days: 0        Note composed:1:50 PM 12/16/2020

## 2020-12-17 RX ORDER — CLOPIDOGREL BISULFATE 75 MG/1
TABLET ORAL
Qty: 90 TABLET | Refills: 0 | Status: SHIPPED | OUTPATIENT
Start: 2020-12-17 | End: 2021-02-22

## 2020-12-23 DIAGNOSIS — I10 ESSENTIAL HYPERTENSION: ICD-10-CM

## 2020-12-24 RX ORDER — AMLODIPINE BESYLATE 10 MG/1
TABLET ORAL
Qty: 90 TABLET | Refills: 0 | Status: SHIPPED | OUTPATIENT
Start: 2020-12-24 | End: 2021-02-24

## 2021-01-04 DIAGNOSIS — I10 ESSENTIAL HYPERTENSION: Primary | ICD-10-CM

## 2021-01-04 RX ORDER — METOPROLOL SUCCINATE 25 MG/1
25 TABLET, EXTENDED RELEASE ORAL 2 TIMES DAILY
Qty: 180 TABLET | Refills: 1 | Status: SHIPPED | OUTPATIENT
Start: 2021-01-04 | End: 2021-05-13

## 2021-01-14 ENCOUNTER — IMMUNIZATION (OUTPATIENT)
Dept: INTERNAL MEDICINE | Facility: CLINIC | Age: 86
End: 2021-01-14
Payer: MEDICARE

## 2021-01-14 DIAGNOSIS — Z23 NEED FOR VACCINATION: ICD-10-CM

## 2021-01-14 PROCEDURE — 91300 COVID-19, MRNA, LNP-S, PF, 30 MCG/0.3 ML DOSE VACCINE: CPT | Mod: PBBFAC | Performed by: FAMILY MEDICINE

## 2021-01-25 ENCOUNTER — LAB VISIT (OUTPATIENT)
Dept: LAB | Facility: HOSPITAL | Age: 86
End: 2021-01-25
Attending: INTERNAL MEDICINE
Payer: MEDICARE

## 2021-01-25 DIAGNOSIS — N18.30 CHRONIC KIDNEY DISEASE, STAGE III (MODERATE): ICD-10-CM

## 2021-01-25 DIAGNOSIS — D69.6 THROMBOCYTOPENIA: ICD-10-CM

## 2021-01-25 DIAGNOSIS — E78.5 HYPERLIPIDEMIA, UNSPECIFIED HYPERLIPIDEMIA TYPE: ICD-10-CM

## 2021-01-25 LAB
ALBUMIN SERPL BCP-MCNC: 3.9 G/DL (ref 3.5–5.2)
ALP SERPL-CCNC: 90 U/L (ref 55–135)
ALT SERPL W/O P-5'-P-CCNC: 37 U/L (ref 10–44)
ANION GAP SERPL CALC-SCNC: 6 MMOL/L (ref 8–16)
AST SERPL-CCNC: 29 U/L (ref 10–40)
BASOPHILS # BLD AUTO: 0.09 K/UL (ref 0–0.2)
BASOPHILS NFR BLD: 1.2 % (ref 0–1.9)
BILIRUB SERPL-MCNC: 1 MG/DL (ref 0.1–1)
BUN SERPL-MCNC: 17 MG/DL (ref 8–23)
CALCIUM SERPL-MCNC: 9.7 MG/DL (ref 8.7–10.5)
CHLORIDE SERPL-SCNC: 104 MMOL/L (ref 95–110)
CHOLEST SERPL-MCNC: 115 MG/DL (ref 120–199)
CHOLEST/HDLC SERPL: 2.3 {RATIO} (ref 2–5)
CO2 SERPL-SCNC: 31 MMOL/L (ref 23–29)
CREAT SERPL-MCNC: 1 MG/DL (ref 0.5–1.4)
DIFFERENTIAL METHOD: ABNORMAL
EOSINOPHIL # BLD AUTO: 0.4 K/UL (ref 0–0.5)
EOSINOPHIL NFR BLD: 5.1 % (ref 0–8)
ERYTHROCYTE [DISTWIDTH] IN BLOOD BY AUTOMATED COUNT: 13.5 % (ref 11.5–14.5)
EST. GFR  (AFRICAN AMERICAN): >60 ML/MIN/1.73 M^2
EST. GFR  (NON AFRICAN AMERICAN): >60 ML/MIN/1.73 M^2
GLUCOSE SERPL-MCNC: 89 MG/DL (ref 70–110)
HCT VFR BLD AUTO: 45.4 % (ref 40–54)
HDLC SERPL-MCNC: 50 MG/DL (ref 40–75)
HDLC SERPL: 43.5 % (ref 20–50)
HGB BLD-MCNC: 15.1 G/DL (ref 14–18)
IMM GRANULOCYTES # BLD AUTO: 0.03 K/UL (ref 0–0.04)
IMM GRANULOCYTES NFR BLD AUTO: 0.4 % (ref 0–0.5)
LDLC SERPL CALC-MCNC: 54.6 MG/DL (ref 63–159)
LYMPHOCYTES # BLD AUTO: 2.8 K/UL (ref 1–4.8)
LYMPHOCYTES NFR BLD: 36.2 % (ref 18–48)
MCH RBC QN AUTO: 30.4 PG (ref 27–31)
MCHC RBC AUTO-ENTMCNC: 33.3 G/DL (ref 32–36)
MCV RBC AUTO: 91 FL (ref 82–98)
MONOCYTES # BLD AUTO: 0.8 K/UL (ref 0.3–1)
MONOCYTES NFR BLD: 10.9 % (ref 4–15)
NEUTROPHILS # BLD AUTO: 3.5 K/UL (ref 1.8–7.7)
NEUTROPHILS NFR BLD: 46.2 % (ref 38–73)
NONHDLC SERPL-MCNC: 65 MG/DL
NRBC BLD-RTO: 0 /100 WBC
PLATELET # BLD AUTO: 132 K/UL (ref 150–350)
PMV BLD AUTO: 11.9 FL (ref 9.2–12.9)
POTASSIUM SERPL-SCNC: 3.8 MMOL/L (ref 3.5–5.1)
PROT SERPL-MCNC: 7.1 G/DL (ref 6–8.4)
RBC # BLD AUTO: 4.97 M/UL (ref 4.6–6.2)
SODIUM SERPL-SCNC: 141 MMOL/L (ref 136–145)
TRIGL SERPL-MCNC: 52 MG/DL (ref 30–150)
WBC # BLD AUTO: 7.63 K/UL (ref 3.9–12.7)

## 2021-01-25 PROCEDURE — 36415 COLL VENOUS BLD VENIPUNCTURE: CPT | Mod: HCNC,PO

## 2021-01-25 PROCEDURE — 80061 LIPID PANEL: CPT | Mod: HCNC

## 2021-01-25 PROCEDURE — 80053 COMPREHEN METABOLIC PANEL: CPT | Mod: HCNC

## 2021-01-25 PROCEDURE — 85025 COMPLETE CBC W/AUTO DIFF WBC: CPT | Mod: HCNC

## 2021-01-28 ENCOUNTER — OFFICE VISIT (OUTPATIENT)
Dept: INTERNAL MEDICINE | Facility: CLINIC | Age: 86
End: 2021-01-28
Payer: MEDICARE

## 2021-01-28 VITALS
BODY MASS INDEX: 23.37 KG/M2 | SYSTOLIC BLOOD PRESSURE: 122 MMHG | HEART RATE: 74 BPM | TEMPERATURE: 97 F | WEIGHT: 176.38 LBS | DIASTOLIC BLOOD PRESSURE: 64 MMHG | OXYGEN SATURATION: 96 % | HEIGHT: 73 IN

## 2021-01-28 DIAGNOSIS — I70.0 AORTIC ATHEROSCLEROSIS: ICD-10-CM

## 2021-01-28 DIAGNOSIS — I10 ESSENTIAL HYPERTENSION: ICD-10-CM

## 2021-01-28 DIAGNOSIS — N18.31 STAGE 3A CHRONIC KIDNEY DISEASE: ICD-10-CM

## 2021-01-28 DIAGNOSIS — D69.3 CHRONIC ITP (IDIOPATHIC THROMBOCYTOPENIA): ICD-10-CM

## 2021-01-28 DIAGNOSIS — H61.21 IMPACTED CERUMEN OF RIGHT EAR: ICD-10-CM

## 2021-01-28 DIAGNOSIS — E78.5 HYPERLIPIDEMIA, UNSPECIFIED HYPERLIPIDEMIA TYPE: ICD-10-CM

## 2021-01-28 DIAGNOSIS — D47.2 MGUS (MONOCLONAL GAMMOPATHY OF UNKNOWN SIGNIFICANCE): ICD-10-CM

## 2021-01-28 PROCEDURE — 1159F PR MEDICATION LIST DOCUMENTED IN MEDICAL RECORD: ICD-10-PCS | Mod: S$GLB,,, | Performed by: INTERNAL MEDICINE

## 2021-01-28 PROCEDURE — 1126F AMNT PAIN NOTED NONE PRSNT: CPT | Mod: S$GLB,,, | Performed by: INTERNAL MEDICINE

## 2021-01-28 PROCEDURE — 99999 PR PBB SHADOW E&M-EST. PATIENT-LVL V: ICD-10-PCS | Mod: PBBFAC,,, | Performed by: INTERNAL MEDICINE

## 2021-01-28 PROCEDURE — 3074F SYST BP LT 130 MM HG: CPT | Mod: CPTII,S$GLB,, | Performed by: INTERNAL MEDICINE

## 2021-01-28 PROCEDURE — 3288F FALL RISK ASSESSMENT DOCD: CPT | Mod: CPTII,S$GLB,, | Performed by: INTERNAL MEDICINE

## 2021-01-28 PROCEDURE — 3078F DIAST BP <80 MM HG: CPT | Mod: CPTII,S$GLB,, | Performed by: INTERNAL MEDICINE

## 2021-01-28 PROCEDURE — 1101F PR PT FALLS ASSESS DOC 0-1 FALLS W/OUT INJ PAST YR: ICD-10-PCS | Mod: CPTII,S$GLB,, | Performed by: INTERNAL MEDICINE

## 2021-01-28 PROCEDURE — 3078F PR MOST RECENT DIASTOLIC BLOOD PRESSURE < 80 MM HG: ICD-10-PCS | Mod: CPTII,S$GLB,, | Performed by: INTERNAL MEDICINE

## 2021-01-28 PROCEDURE — 3288F PR FALLS RISK ASSESSMENT DOCUMENTED: ICD-10-PCS | Mod: CPTII,S$GLB,, | Performed by: INTERNAL MEDICINE

## 2021-01-28 PROCEDURE — 99214 OFFICE O/P EST MOD 30 MIN: CPT | Mod: S$GLB,,, | Performed by: INTERNAL MEDICINE

## 2021-01-28 PROCEDURE — 99999 PR PBB SHADOW E&M-EST. PATIENT-LVL V: CPT | Mod: PBBFAC,,, | Performed by: INTERNAL MEDICINE

## 2021-01-28 PROCEDURE — 3074F PR MOST RECENT SYSTOLIC BLOOD PRESSURE < 130 MM HG: ICD-10-PCS | Mod: CPTII,S$GLB,, | Performed by: INTERNAL MEDICINE

## 2021-01-28 PROCEDURE — 1159F MED LIST DOCD IN RCRD: CPT | Mod: S$GLB,,, | Performed by: INTERNAL MEDICINE

## 2021-01-28 PROCEDURE — 99214 PR OFFICE/OUTPT VISIT, EST, LEVL IV, 30-39 MIN: ICD-10-PCS | Mod: S$GLB,,, | Performed by: INTERNAL MEDICINE

## 2021-01-28 PROCEDURE — 1101F PT FALLS ASSESS-DOCD LE1/YR: CPT | Mod: CPTII,S$GLB,, | Performed by: INTERNAL MEDICINE

## 2021-01-28 PROCEDURE — 1126F PR PAIN SEVERITY QUANTIFIED, NO PAIN PRESENT: ICD-10-PCS | Mod: S$GLB,,, | Performed by: INTERNAL MEDICINE

## 2021-02-06 ENCOUNTER — IMMUNIZATION (OUTPATIENT)
Dept: INTERNAL MEDICINE | Facility: CLINIC | Age: 86
End: 2021-02-06
Payer: MEDICARE

## 2021-02-06 DIAGNOSIS — Z23 NEED FOR VACCINATION: Primary | ICD-10-CM

## 2021-02-06 PROCEDURE — 91300 COVID-19, MRNA, LNP-S, PF, 30 MCG/0.3 ML DOSE VACCINE: CPT | Mod: PBBFAC | Performed by: FAMILY MEDICINE

## 2021-02-06 PROCEDURE — 0002A COVID-19, MRNA, LNP-S, PF, 30 MCG/0.3 ML DOSE VACCINE: CPT | Mod: PBBFAC | Performed by: FAMILY MEDICINE

## 2021-02-26 ENCOUNTER — PES CALL (OUTPATIENT)
Dept: ADMINISTRATIVE | Facility: CLINIC | Age: 86
End: 2021-02-26

## 2021-03-31 ENCOUNTER — OFFICE VISIT (OUTPATIENT)
Dept: FAMILY MEDICINE | Facility: CLINIC | Age: 86
End: 2021-03-31
Payer: MEDICARE

## 2021-03-31 VITALS
OXYGEN SATURATION: 98 % | BODY MASS INDEX: 23.93 KG/M2 | DIASTOLIC BLOOD PRESSURE: 68 MMHG | WEIGHT: 180.56 LBS | SYSTOLIC BLOOD PRESSURE: 136 MMHG | HEART RATE: 66 BPM | HEIGHT: 73 IN

## 2021-03-31 DIAGNOSIS — D69.6 THROMBOCYTOPENIA: ICD-10-CM

## 2021-03-31 DIAGNOSIS — E55.9 VITAMIN D DEFICIENCY: ICD-10-CM

## 2021-03-31 DIAGNOSIS — N25.81 SECONDARY HYPERPARATHYROIDISM OF RENAL ORIGIN: ICD-10-CM

## 2021-03-31 DIAGNOSIS — E78.5 HYPERLIPIDEMIA, UNSPECIFIED HYPERLIPIDEMIA TYPE: ICD-10-CM

## 2021-03-31 DIAGNOSIS — Z00.00 ENCOUNTER FOR PREVENTIVE HEALTH EXAMINATION: Primary | ICD-10-CM

## 2021-03-31 DIAGNOSIS — I10 ESSENTIAL HYPERTENSION: ICD-10-CM

## 2021-03-31 DIAGNOSIS — N18.9 ANEMIA ASSOCIATED WITH CHRONIC RENAL FAILURE: ICD-10-CM

## 2021-03-31 DIAGNOSIS — I25.10 CORONARY ARTERY DISEASE INVOLVING NATIVE CORONARY ARTERY OF NATIVE HEART WITHOUT ANGINA PECTORIS: ICD-10-CM

## 2021-03-31 DIAGNOSIS — D63.1 ANEMIA ASSOCIATED WITH CHRONIC RENAL FAILURE: ICD-10-CM

## 2021-03-31 DIAGNOSIS — N18.30 STAGE 3 CHRONIC KIDNEY DISEASE, UNSPECIFIED WHETHER STAGE 3A OR 3B CKD: Chronic | ICD-10-CM

## 2021-03-31 DIAGNOSIS — I70.0 AORTIC ATHEROSCLEROSIS: ICD-10-CM

## 2021-03-31 DIAGNOSIS — K21.9 GASTROESOPHAGEAL REFLUX DISEASE, UNSPECIFIED WHETHER ESOPHAGITIS PRESENT: ICD-10-CM

## 2021-03-31 PROCEDURE — G0439 PR MEDICARE ANNUAL WELLNESS SUBSEQUENT VISIT: ICD-10-PCS | Mod: S$GLB,,, | Performed by: NURSE PRACTITIONER

## 2021-03-31 PROCEDURE — 99499 RISK ADDL DX/OHS AUDIT: ICD-10-PCS | Mod: S$GLB,,, | Performed by: NURSE PRACTITIONER

## 2021-03-31 PROCEDURE — 3288F FALL RISK ASSESSMENT DOCD: CPT | Mod: CPTII,S$GLB,, | Performed by: NURSE PRACTITIONER

## 2021-03-31 PROCEDURE — 1126F AMNT PAIN NOTED NONE PRSNT: CPT | Mod: S$GLB,,, | Performed by: NURSE PRACTITIONER

## 2021-03-31 PROCEDURE — 3288F PR FALLS RISK ASSESSMENT DOCUMENTED: ICD-10-PCS | Mod: CPTII,S$GLB,, | Performed by: NURSE PRACTITIONER

## 2021-03-31 PROCEDURE — 1101F PR PT FALLS ASSESS DOC 0-1 FALLS W/OUT INJ PAST YR: ICD-10-PCS | Mod: CPTII,S$GLB,, | Performed by: NURSE PRACTITIONER

## 2021-03-31 PROCEDURE — G0439 PPPS, SUBSEQ VISIT: HCPCS | Mod: S$GLB,,, | Performed by: NURSE PRACTITIONER

## 2021-03-31 PROCEDURE — 3075F SYST BP GE 130 - 139MM HG: CPT | Mod: CPTII,S$GLB,, | Performed by: NURSE PRACTITIONER

## 2021-03-31 PROCEDURE — 3078F PR MOST RECENT DIASTOLIC BLOOD PRESSURE < 80 MM HG: ICD-10-PCS | Mod: CPTII,S$GLB,, | Performed by: NURSE PRACTITIONER

## 2021-03-31 PROCEDURE — 1158F ADVNC CARE PLAN TLK DOCD: CPT | Mod: S$GLB,,, | Performed by: NURSE PRACTITIONER

## 2021-03-31 PROCEDURE — 1101F PT FALLS ASSESS-DOCD LE1/YR: CPT | Mod: CPTII,S$GLB,, | Performed by: NURSE PRACTITIONER

## 2021-03-31 PROCEDURE — 99999 PR PBB SHADOW E&M-EST. PATIENT-LVL V: ICD-10-PCS | Mod: PBBFAC,,, | Performed by: NURSE PRACTITIONER

## 2021-03-31 PROCEDURE — 99999 PR PBB SHADOW E&M-EST. PATIENT-LVL V: CPT | Mod: PBBFAC,,, | Performed by: NURSE PRACTITIONER

## 2021-03-31 PROCEDURE — 1158F PR ADVANCE CARE PLANNING DISCUSS DOCUMENTED IN MEDICAL RECORD: ICD-10-PCS | Mod: S$GLB,,, | Performed by: NURSE PRACTITIONER

## 2021-03-31 PROCEDURE — 3075F PR MOST RECENT SYSTOLIC BLOOD PRESS GE 130-139MM HG: ICD-10-PCS | Mod: CPTII,S$GLB,, | Performed by: NURSE PRACTITIONER

## 2021-03-31 PROCEDURE — 99499 UNLISTED E&M SERVICE: CPT | Mod: S$GLB,,, | Performed by: NURSE PRACTITIONER

## 2021-03-31 PROCEDURE — 3078F DIAST BP <80 MM HG: CPT | Mod: CPTII,S$GLB,, | Performed by: NURSE PRACTITIONER

## 2021-03-31 PROCEDURE — 1126F PR PAIN SEVERITY QUANTIFIED, NO PAIN PRESENT: ICD-10-PCS | Mod: S$GLB,,, | Performed by: NURSE PRACTITIONER

## 2021-04-12 ENCOUNTER — PATIENT MESSAGE (OUTPATIENT)
Dept: CARDIOLOGY | Facility: CLINIC | Age: 86
End: 2021-04-12

## 2021-05-13 ENCOUNTER — PATIENT MESSAGE (OUTPATIENT)
Dept: ELECTROPHYSIOLOGY | Facility: CLINIC | Age: 86
End: 2021-05-13

## 2021-05-14 DIAGNOSIS — I49.8 OTHER SPECIFIED CARDIAC ARRHYTHMIAS: Primary | ICD-10-CM

## 2021-07-15 ENCOUNTER — OFFICE VISIT (OUTPATIENT)
Dept: ELECTROPHYSIOLOGY | Facility: CLINIC | Age: 86
End: 2021-07-15
Payer: MEDICARE

## 2021-07-15 ENCOUNTER — PATIENT MESSAGE (OUTPATIENT)
Dept: ELECTROPHYSIOLOGY | Facility: CLINIC | Age: 86
End: 2021-07-15

## 2021-07-15 ENCOUNTER — HOSPITAL ENCOUNTER (OUTPATIENT)
Dept: CARDIOLOGY | Facility: CLINIC | Age: 86
Discharge: HOME | End: 2021-07-15
Payer: MEDICARE

## 2021-07-15 ENCOUNTER — CLINICAL SUPPORT (OUTPATIENT)
Dept: CARDIOLOGY | Facility: HOSPITAL | Age: 86
End: 2021-07-15
Attending: INTERNAL MEDICINE
Payer: MEDICARE

## 2021-07-15 VITALS
HEART RATE: 64 BPM | DIASTOLIC BLOOD PRESSURE: 67 MMHG | BODY MASS INDEX: 23.06 KG/M2 | HEIGHT: 73 IN | WEIGHT: 174 LBS | SYSTOLIC BLOOD PRESSURE: 140 MMHG

## 2021-07-15 DIAGNOSIS — I25.5 ISCHEMIC CARDIOMYOPATHY: ICD-10-CM

## 2021-07-15 DIAGNOSIS — I10 ESSENTIAL HYPERTENSION: ICD-10-CM

## 2021-07-15 DIAGNOSIS — I42.9 CARDIOMYOPATHY, UNSPECIFIED TYPE: ICD-10-CM

## 2021-07-15 DIAGNOSIS — Z95.810 AICD (AUTOMATIC CARDIOVERTER/DEFIBRILLATOR) PRESENT: Primary | Chronic | ICD-10-CM

## 2021-07-15 DIAGNOSIS — Z86.79 HISTORY OF VENTRICULAR TACHYCARDIA: ICD-10-CM

## 2021-07-15 DIAGNOSIS — I49.8 OTHER SPECIFIED CARDIAC ARRHYTHMIAS: ICD-10-CM

## 2021-07-15 PROCEDURE — 1101F PT FALLS ASSESS-DOCD LE1/YR: CPT | Mod: CPTII,S$GLB,, | Performed by: NURSE PRACTITIONER

## 2021-07-15 PROCEDURE — 1126F AMNT PAIN NOTED NONE PRSNT: CPT | Mod: S$GLB,,, | Performed by: NURSE PRACTITIONER

## 2021-07-15 PROCEDURE — 93283 PRGRMG EVAL IMPLANTABLE DFB: CPT | Mod: 26,,, | Performed by: INTERNAL MEDICINE

## 2021-07-15 PROCEDURE — 99214 PR OFFICE/OUTPT VISIT, EST, LEVL IV, 30-39 MIN: ICD-10-PCS | Mod: S$GLB,,, | Performed by: NURSE PRACTITIONER

## 2021-07-15 PROCEDURE — 93283 CARDIAC DEVICE CHECK - IN CLINIC & HOSPITAL: ICD-10-PCS | Mod: 26,,, | Performed by: INTERNAL MEDICINE

## 2021-07-15 PROCEDURE — 93283 PRGRMG EVAL IMPLANTABLE DFB: CPT

## 2021-07-15 PROCEDURE — 93005 ELECTROCARDIOGRAM TRACING: CPT | Mod: S$GLB,,, | Performed by: INTERNAL MEDICINE

## 2021-07-15 PROCEDURE — 1126F PR PAIN SEVERITY QUANTIFIED, NO PAIN PRESENT: ICD-10-PCS | Mod: S$GLB,,, | Performed by: NURSE PRACTITIONER

## 2021-07-15 PROCEDURE — 1101F PR PT FALLS ASSESS DOC 0-1 FALLS W/OUT INJ PAST YR: ICD-10-PCS | Mod: CPTII,S$GLB,, | Performed by: NURSE PRACTITIONER

## 2021-07-15 PROCEDURE — 99999 PR PBB SHADOW E&M-EST. PATIENT-LVL III: CPT | Mod: PBBFAC,,, | Performed by: NURSE PRACTITIONER

## 2021-07-15 PROCEDURE — 93010 ELECTROCARDIOGRAM REPORT: CPT | Mod: S$GLB,,, | Performed by: INTERNAL MEDICINE

## 2021-07-15 PROCEDURE — 1159F MED LIST DOCD IN RCRD: CPT | Mod: S$GLB,,, | Performed by: NURSE PRACTITIONER

## 2021-07-15 PROCEDURE — 93005 RHYTHM STRIP: ICD-10-PCS | Mod: S$GLB,,, | Performed by: INTERNAL MEDICINE

## 2021-07-15 PROCEDURE — 3288F PR FALLS RISK ASSESSMENT DOCUMENTED: ICD-10-PCS | Mod: CPTII,S$GLB,, | Performed by: NURSE PRACTITIONER

## 2021-07-15 PROCEDURE — 99214 OFFICE O/P EST MOD 30 MIN: CPT | Mod: S$GLB,,, | Performed by: NURSE PRACTITIONER

## 2021-07-15 PROCEDURE — 1159F PR MEDICATION LIST DOCUMENTED IN MEDICAL RECORD: ICD-10-PCS | Mod: S$GLB,,, | Performed by: NURSE PRACTITIONER

## 2021-07-15 PROCEDURE — 93010 RHYTHM STRIP: ICD-10-PCS | Mod: S$GLB,,, | Performed by: INTERNAL MEDICINE

## 2021-07-15 PROCEDURE — 3288F FALL RISK ASSESSMENT DOCD: CPT | Mod: CPTII,S$GLB,, | Performed by: NURSE PRACTITIONER

## 2021-07-15 PROCEDURE — 99999 PR PBB SHADOW E&M-EST. PATIENT-LVL III: ICD-10-PCS | Mod: PBBFAC,,, | Performed by: NURSE PRACTITIONER

## 2021-07-19 ENCOUNTER — LAB VISIT (OUTPATIENT)
Dept: LAB | Facility: HOSPITAL | Age: 86
End: 2021-07-19
Attending: INTERNAL MEDICINE
Payer: MEDICARE

## 2021-07-19 DIAGNOSIS — D69.6 THROMBOCYTOPENIA: ICD-10-CM

## 2021-07-19 DIAGNOSIS — E78.5 HYPERLIPIDEMIA, UNSPECIFIED HYPERLIPIDEMIA TYPE: ICD-10-CM

## 2021-07-19 DIAGNOSIS — N18.31 STAGE 3A CHRONIC KIDNEY DISEASE: ICD-10-CM

## 2021-07-19 DIAGNOSIS — D47.2 MGUS (MONOCLONAL GAMMOPATHY OF UNKNOWN SIGNIFICANCE): ICD-10-CM

## 2021-07-19 DIAGNOSIS — D69.3 CHRONIC ITP (IDIOPATHIC THROMBOCYTOPENIA): ICD-10-CM

## 2021-07-19 LAB
ALBUMIN SERPL BCP-MCNC: 3.9 G/DL (ref 3.5–5.2)
ALBUMIN SERPL BCP-MCNC: 3.9 G/DL (ref 3.5–5.2)
ALP SERPL-CCNC: 76 U/L (ref 55–135)
ALP SERPL-CCNC: 76 U/L (ref 55–135)
ALT SERPL W/O P-5'-P-CCNC: 18 U/L (ref 10–44)
ALT SERPL W/O P-5'-P-CCNC: 18 U/L (ref 10–44)
ANION GAP SERPL CALC-SCNC: 8 MMOL/L (ref 8–16)
ANION GAP SERPL CALC-SCNC: 8 MMOL/L (ref 8–16)
AST SERPL-CCNC: 23 U/L (ref 10–40)
AST SERPL-CCNC: 23 U/L (ref 10–40)
BASOPHILS # BLD AUTO: 0.07 K/UL (ref 0–0.2)
BASOPHILS # BLD AUTO: 0.07 K/UL (ref 0–0.2)
BASOPHILS NFR BLD: 0.9 % (ref 0–1.9)
BASOPHILS NFR BLD: 0.9 % (ref 0–1.9)
BILIRUB SERPL-MCNC: 1.1 MG/DL (ref 0.1–1)
BILIRUB SERPL-MCNC: 1.1 MG/DL (ref 0.1–1)
BUN SERPL-MCNC: 14 MG/DL (ref 8–23)
BUN SERPL-MCNC: 14 MG/DL (ref 8–23)
CALCIUM SERPL-MCNC: 9.9 MG/DL (ref 8.7–10.5)
CALCIUM SERPL-MCNC: 9.9 MG/DL (ref 8.7–10.5)
CHLORIDE SERPL-SCNC: 103 MMOL/L (ref 95–110)
CHLORIDE SERPL-SCNC: 103 MMOL/L (ref 95–110)
CHOLEST SERPL-MCNC: 114 MG/DL (ref 120–199)
CHOLEST/HDLC SERPL: 2.3 {RATIO} (ref 2–5)
CO2 SERPL-SCNC: 29 MMOL/L (ref 23–29)
CO2 SERPL-SCNC: 29 MMOL/L (ref 23–29)
CREAT SERPL-MCNC: 1.4 MG/DL (ref 0.5–1.4)
CREAT SERPL-MCNC: 1.4 MG/DL (ref 0.5–1.4)
DIFFERENTIAL METHOD: ABNORMAL
DIFFERENTIAL METHOD: ABNORMAL
EOSINOPHIL # BLD AUTO: 0.5 K/UL (ref 0–0.5)
EOSINOPHIL # BLD AUTO: 0.5 K/UL (ref 0–0.5)
EOSINOPHIL NFR BLD: 6 % (ref 0–8)
EOSINOPHIL NFR BLD: 6 % (ref 0–8)
ERYTHROCYTE [DISTWIDTH] IN BLOOD BY AUTOMATED COUNT: 13.5 % (ref 11.5–14.5)
ERYTHROCYTE [DISTWIDTH] IN BLOOD BY AUTOMATED COUNT: 13.5 % (ref 11.5–14.5)
EST. GFR  (AFRICAN AMERICAN): 52.2 ML/MIN/1.73 M^2
EST. GFR  (AFRICAN AMERICAN): 52.2 ML/MIN/1.73 M^2
EST. GFR  (NON AFRICAN AMERICAN): 45.2 ML/MIN/1.73 M^2
EST. GFR  (NON AFRICAN AMERICAN): 45.2 ML/MIN/1.73 M^2
GLUCOSE SERPL-MCNC: 88 MG/DL (ref 70–110)
GLUCOSE SERPL-MCNC: 88 MG/DL (ref 70–110)
HCT VFR BLD AUTO: 44.6 % (ref 40–54)
HCT VFR BLD AUTO: 44.6 % (ref 40–54)
HDLC SERPL-MCNC: 50 MG/DL (ref 40–75)
HDLC SERPL: 43.9 % (ref 20–50)
HGB BLD-MCNC: 15 G/DL (ref 14–18)
HGB BLD-MCNC: 15 G/DL (ref 14–18)
IMM GRANULOCYTES # BLD AUTO: 0.02 K/UL (ref 0–0.04)
IMM GRANULOCYTES # BLD AUTO: 0.02 K/UL (ref 0–0.04)
IMM GRANULOCYTES NFR BLD AUTO: 0.3 % (ref 0–0.5)
IMM GRANULOCYTES NFR BLD AUTO: 0.3 % (ref 0–0.5)
LDLC SERPL CALC-MCNC: 53.8 MG/DL (ref 63–159)
LYMPHOCYTES # BLD AUTO: 2.8 K/UL (ref 1–4.8)
LYMPHOCYTES # BLD AUTO: 2.8 K/UL (ref 1–4.8)
LYMPHOCYTES NFR BLD: 37.6 % (ref 18–48)
LYMPHOCYTES NFR BLD: 37.6 % (ref 18–48)
MCH RBC QN AUTO: 30.6 PG (ref 27–31)
MCH RBC QN AUTO: 30.6 PG (ref 27–31)
MCHC RBC AUTO-ENTMCNC: 33.6 G/DL (ref 32–36)
MCHC RBC AUTO-ENTMCNC: 33.6 G/DL (ref 32–36)
MCV RBC AUTO: 91 FL (ref 82–98)
MCV RBC AUTO: 91 FL (ref 82–98)
MONOCYTES # BLD AUTO: 0.9 K/UL (ref 0.3–1)
MONOCYTES # BLD AUTO: 0.9 K/UL (ref 0.3–1)
MONOCYTES NFR BLD: 11.9 % (ref 4–15)
MONOCYTES NFR BLD: 11.9 % (ref 4–15)
NEUTROPHILS # BLD AUTO: 3.3 K/UL (ref 1.8–7.7)
NEUTROPHILS # BLD AUTO: 3.3 K/UL (ref 1.8–7.7)
NEUTROPHILS NFR BLD: 43.3 % (ref 38–73)
NEUTROPHILS NFR BLD: 43.3 % (ref 38–73)
NONHDLC SERPL-MCNC: 64 MG/DL
NRBC BLD-RTO: 0 /100 WBC
NRBC BLD-RTO: 0 /100 WBC
PLATELET # BLD AUTO: 107 K/UL (ref 150–450)
PLATELET # BLD AUTO: 107 K/UL (ref 150–450)
PMV BLD AUTO: 12 FL (ref 9.2–12.9)
PMV BLD AUTO: 12 FL (ref 9.2–12.9)
POTASSIUM SERPL-SCNC: 3.9 MMOL/L (ref 3.5–5.1)
POTASSIUM SERPL-SCNC: 3.9 MMOL/L (ref 3.5–5.1)
PROT SERPL-MCNC: 7.2 G/DL (ref 6–8.4)
PROT SERPL-MCNC: 7.2 G/DL (ref 6–8.4)
RBC # BLD AUTO: 4.9 M/UL (ref 4.6–6.2)
RBC # BLD AUTO: 4.9 M/UL (ref 4.6–6.2)
SODIUM SERPL-SCNC: 140 MMOL/L (ref 136–145)
SODIUM SERPL-SCNC: 140 MMOL/L (ref 136–145)
TRIGL SERPL-MCNC: 51 MG/DL (ref 30–150)
WBC # BLD AUTO: 7.55 K/UL (ref 3.9–12.7)
WBC # BLD AUTO: 7.55 K/UL (ref 3.9–12.7)

## 2021-07-19 PROCEDURE — 83520 IMMUNOASSAY QUANT NOS NONAB: CPT | Performed by: INTERNAL MEDICINE

## 2021-07-19 PROCEDURE — 85025 COMPLETE CBC W/AUTO DIFF WBC: CPT | Performed by: INTERNAL MEDICINE

## 2021-07-19 PROCEDURE — 80053 COMPREHEN METABOLIC PANEL: CPT | Performed by: INTERNAL MEDICINE

## 2021-07-19 PROCEDURE — 36415 COLL VENOUS BLD VENIPUNCTURE: CPT | Mod: PO | Performed by: INTERNAL MEDICINE

## 2021-07-19 PROCEDURE — 84165 PROTEIN E-PHORESIS SERUM: CPT | Performed by: INTERNAL MEDICINE

## 2021-07-19 PROCEDURE — 80061 LIPID PANEL: CPT | Performed by: INTERNAL MEDICINE

## 2021-07-19 PROCEDURE — 84165 PROTEIN E-PHORESIS SERUM: CPT | Mod: 26,,, | Performed by: PATHOLOGY

## 2021-07-19 PROCEDURE — 84165 PATHOLOGIST INTERPRETATION SPE: ICD-10-PCS | Mod: 26,,, | Performed by: PATHOLOGY

## 2021-07-20 LAB
ALBUMIN SERPL ELPH-MCNC: 3.97 G/DL (ref 3.35–5.55)
ALPHA1 GLOB SERPL ELPH-MCNC: 0.25 G/DL (ref 0.17–0.41)
ALPHA2 GLOB SERPL ELPH-MCNC: 0.77 G/DL (ref 0.43–0.99)
B-GLOBULIN SERPL ELPH-MCNC: 0.75 G/DL (ref 0.5–1.1)
GAMMA GLOB SERPL ELPH-MCNC: 1.17 G/DL (ref 0.67–1.58)
KAPPA LC SER QL IA: 3.24 MG/DL (ref 0.33–1.94)
KAPPA LC/LAMBDA SER IA: 1.14 (ref 0.26–1.65)
LAMBDA LC SER QL IA: 2.85 MG/DL (ref 0.57–2.63)
PROT SERPL-MCNC: 6.9 G/DL (ref 6–8.4)

## 2021-07-21 ENCOUNTER — OFFICE VISIT (OUTPATIENT)
Dept: CARDIOLOGY | Facility: CLINIC | Age: 86
End: 2021-07-21
Payer: MEDICARE

## 2021-07-21 VITALS
HEART RATE: 60 BPM | DIASTOLIC BLOOD PRESSURE: 59 MMHG | SYSTOLIC BLOOD PRESSURE: 122 MMHG | WEIGHT: 173.75 LBS | HEIGHT: 73 IN | BODY MASS INDEX: 23.03 KG/M2

## 2021-07-21 DIAGNOSIS — I25.2 OLD MYOCARDIAL INFARCTION: ICD-10-CM

## 2021-07-21 DIAGNOSIS — K22.70 BARRETT'S ESOPHAGUS WITHOUT DYSPLASIA: ICD-10-CM

## 2021-07-21 DIAGNOSIS — R77.8 ABNORMAL SERUM PROTEIN ELECTROPHORESIS: ICD-10-CM

## 2021-07-21 DIAGNOSIS — I48.0 PAF (PAROXYSMAL ATRIAL FIBRILLATION): ICD-10-CM

## 2021-07-21 DIAGNOSIS — Z86.73 HISTORY OF TIA (TRANSIENT ISCHEMIC ATTACK): Chronic | ICD-10-CM

## 2021-07-21 DIAGNOSIS — Z95.810 AICD (AUTOMATIC CARDIOVERTER/DEFIBRILLATOR) PRESENT: Chronic | ICD-10-CM

## 2021-07-21 DIAGNOSIS — I25.5 ISCHEMIC CARDIOMYOPATHY: ICD-10-CM

## 2021-07-21 DIAGNOSIS — Z86.79 HISTORY OF VENTRICULAR TACHYCARDIA: ICD-10-CM

## 2021-07-21 DIAGNOSIS — D47.2 MONOCLONAL PARAPROTEINEMIA: ICD-10-CM

## 2021-07-21 DIAGNOSIS — N52.01 ERECTILE DYSFUNCTION DUE TO ARTERIAL INSUFFICIENCY: ICD-10-CM

## 2021-07-21 DIAGNOSIS — N18.30 STAGE 3 CHRONIC KIDNEY DISEASE, UNSPECIFIED WHETHER STAGE 3A OR 3B CKD: Chronic | ICD-10-CM

## 2021-07-21 DIAGNOSIS — I25.10 CORONARY ARTERY DISEASE INVOLVING NATIVE CORONARY ARTERY OF NATIVE HEART WITHOUT ANGINA PECTORIS: Primary | ICD-10-CM

## 2021-07-21 DIAGNOSIS — Z98.890 S/P AAA (ABDOMINAL AORTIC ANEURYSM) REPAIR: ICD-10-CM

## 2021-07-21 DIAGNOSIS — Z86.79 S/P AAA (ABDOMINAL AORTIC ANEURYSM) REPAIR: ICD-10-CM

## 2021-07-21 DIAGNOSIS — Z86.73 CEREBRAL INFARCTION, REMOTE, RESOLVED: ICD-10-CM

## 2021-07-21 DIAGNOSIS — D47.2 MGUS (MONOCLONAL GAMMOPATHY OF UNKNOWN SIGNIFICANCE): ICD-10-CM

## 2021-07-21 DIAGNOSIS — K21.00 GASTROESOPHAGEAL REFLUX DISEASE WITH ESOPHAGITIS WITHOUT HEMORRHAGE: ICD-10-CM

## 2021-07-21 DIAGNOSIS — E55.9 VITAMIN D DEFICIENCY: ICD-10-CM

## 2021-07-21 DIAGNOSIS — D69.6 THROMBOCYTOPENIA: ICD-10-CM

## 2021-07-21 LAB — PATHOLOGIST INTERPRETATION SPE: NORMAL

## 2021-07-21 PROCEDURE — 99999 PR PBB SHADOW E&M-EST. PATIENT-LVL III: CPT | Mod: PBBFAC,,, | Performed by: INTERNAL MEDICINE

## 2021-07-21 PROCEDURE — 99215 PR OFFICE/OUTPT VISIT, EST, LEVL V, 40-54 MIN: ICD-10-PCS | Mod: S$GLB,,, | Performed by: INTERNAL MEDICINE

## 2021-07-21 PROCEDURE — 99999 PR PBB SHADOW E&M-EST. PATIENT-LVL III: ICD-10-PCS | Mod: PBBFAC,,, | Performed by: INTERNAL MEDICINE

## 2021-07-21 PROCEDURE — 1126F PR PAIN SEVERITY QUANTIFIED, NO PAIN PRESENT: ICD-10-PCS | Mod: CPTII,S$GLB,, | Performed by: INTERNAL MEDICINE

## 2021-07-21 PROCEDURE — 1159F PR MEDICATION LIST DOCUMENTED IN MEDICAL RECORD: ICD-10-PCS | Mod: CPTII,S$GLB,, | Performed by: INTERNAL MEDICINE

## 2021-07-21 PROCEDURE — 1159F MED LIST DOCD IN RCRD: CPT | Mod: CPTII,S$GLB,, | Performed by: INTERNAL MEDICINE

## 2021-07-21 PROCEDURE — 1126F AMNT PAIN NOTED NONE PRSNT: CPT | Mod: CPTII,S$GLB,, | Performed by: INTERNAL MEDICINE

## 2021-07-21 PROCEDURE — 99215 OFFICE O/P EST HI 40 MIN: CPT | Mod: S$GLB,,, | Performed by: INTERNAL MEDICINE

## 2021-07-22 ENCOUNTER — PATIENT MESSAGE (OUTPATIENT)
Dept: INTERNAL MEDICINE | Facility: CLINIC | Age: 86
End: 2021-07-22

## 2021-07-22 ENCOUNTER — OFFICE VISIT (OUTPATIENT)
Dept: INTERNAL MEDICINE | Facility: CLINIC | Age: 86
End: 2021-07-22
Payer: MEDICARE

## 2021-07-22 VITALS
HEIGHT: 73 IN | OXYGEN SATURATION: 96 % | HEART RATE: 62 BPM | BODY MASS INDEX: 22.79 KG/M2 | SYSTOLIC BLOOD PRESSURE: 132 MMHG | DIASTOLIC BLOOD PRESSURE: 62 MMHG | WEIGHT: 171.94 LBS

## 2021-07-22 DIAGNOSIS — I25.10 CORONARY ARTERY DISEASE INVOLVING NATIVE CORONARY ARTERY OF NATIVE HEART WITHOUT ANGINA PECTORIS: ICD-10-CM

## 2021-07-22 DIAGNOSIS — I10 ESSENTIAL HYPERTENSION: ICD-10-CM

## 2021-07-22 DIAGNOSIS — K22.70 BARRETT'S ESOPHAGUS WITHOUT DYSPLASIA: ICD-10-CM

## 2021-07-22 DIAGNOSIS — D69.6 THROMBOCYTOPENIA: Primary | ICD-10-CM

## 2021-07-22 DIAGNOSIS — E78.5 HYPERLIPIDEMIA, UNSPECIFIED HYPERLIPIDEMIA TYPE: ICD-10-CM

## 2021-07-22 DIAGNOSIS — N18.31 STAGE 3A CHRONIC KIDNEY DISEASE: ICD-10-CM

## 2021-07-22 DIAGNOSIS — D47.2 MGUS (MONOCLONAL GAMMOPATHY OF UNKNOWN SIGNIFICANCE): ICD-10-CM

## 2021-07-22 DIAGNOSIS — Z95.810 AICD (AUTOMATIC CARDIOVERTER/DEFIBRILLATOR) PRESENT: Chronic | ICD-10-CM

## 2021-07-22 PROCEDURE — 3288F PR FALLS RISK ASSESSMENT DOCUMENTED: ICD-10-PCS | Mod: CPTII,S$GLB,, | Performed by: INTERNAL MEDICINE

## 2021-07-22 PROCEDURE — 1101F PT FALLS ASSESS-DOCD LE1/YR: CPT | Mod: CPTII,S$GLB,, | Performed by: INTERNAL MEDICINE

## 2021-07-22 PROCEDURE — 3288F FALL RISK ASSESSMENT DOCD: CPT | Mod: CPTII,S$GLB,, | Performed by: INTERNAL MEDICINE

## 2021-07-22 PROCEDURE — 99999 PR PBB SHADOW E&M-EST. PATIENT-LVL IV: CPT | Mod: PBBFAC,,, | Performed by: INTERNAL MEDICINE

## 2021-07-22 PROCEDURE — 1159F MED LIST DOCD IN RCRD: CPT | Mod: CPTII,S$GLB,, | Performed by: INTERNAL MEDICINE

## 2021-07-22 PROCEDURE — 1126F AMNT PAIN NOTED NONE PRSNT: CPT | Mod: CPTII,S$GLB,, | Performed by: INTERNAL MEDICINE

## 2021-07-22 PROCEDURE — 99214 PR OFFICE/OUTPT VISIT, EST, LEVL IV, 30-39 MIN: ICD-10-PCS | Mod: S$GLB,,, | Performed by: INTERNAL MEDICINE

## 2021-07-22 PROCEDURE — 99214 OFFICE O/P EST MOD 30 MIN: CPT | Mod: S$GLB,,, | Performed by: INTERNAL MEDICINE

## 2021-07-22 PROCEDURE — 1159F PR MEDICATION LIST DOCUMENTED IN MEDICAL RECORD: ICD-10-PCS | Mod: CPTII,S$GLB,, | Performed by: INTERNAL MEDICINE

## 2021-07-22 PROCEDURE — 1126F PR PAIN SEVERITY QUANTIFIED, NO PAIN PRESENT: ICD-10-PCS | Mod: CPTII,S$GLB,, | Performed by: INTERNAL MEDICINE

## 2021-07-22 PROCEDURE — 1101F PR PT FALLS ASSESS DOC 0-1 FALLS W/OUT INJ PAST YR: ICD-10-PCS | Mod: CPTII,S$GLB,, | Performed by: INTERNAL MEDICINE

## 2021-07-22 PROCEDURE — 99999 PR PBB SHADOW E&M-EST. PATIENT-LVL IV: ICD-10-PCS | Mod: PBBFAC,,, | Performed by: INTERNAL MEDICINE

## 2021-08-12 ENCOUNTER — OFFICE VISIT (OUTPATIENT)
Dept: HEMATOLOGY/ONCOLOGY | Facility: CLINIC | Age: 86
End: 2021-08-12
Payer: MEDICARE

## 2021-08-12 VITALS
BODY MASS INDEX: 23.42 KG/M2 | RESPIRATION RATE: 16 BRPM | DIASTOLIC BLOOD PRESSURE: 63 MMHG | SYSTOLIC BLOOD PRESSURE: 132 MMHG | OXYGEN SATURATION: 95 % | HEART RATE: 67 BPM | TEMPERATURE: 98 F | WEIGHT: 172.94 LBS | HEIGHT: 72 IN

## 2021-08-12 DIAGNOSIS — D47.2 MGUS (MONOCLONAL GAMMOPATHY OF UNKNOWN SIGNIFICANCE): Primary | ICD-10-CM

## 2021-08-12 PROCEDURE — 1160F PR REVIEW ALL MEDS BY PRESCRIBER/CLIN PHARMACIST DOCUMENTED: ICD-10-PCS | Mod: CPTII,S$GLB,, | Performed by: INTERNAL MEDICINE

## 2021-08-12 PROCEDURE — 1159F PR MEDICATION LIST DOCUMENTED IN MEDICAL RECORD: ICD-10-PCS | Mod: CPTII,S$GLB,, | Performed by: INTERNAL MEDICINE

## 2021-08-12 PROCEDURE — 1126F PR PAIN SEVERITY QUANTIFIED, NO PAIN PRESENT: ICD-10-PCS | Mod: CPTII,S$GLB,, | Performed by: INTERNAL MEDICINE

## 2021-08-12 PROCEDURE — 3288F FALL RISK ASSESSMENT DOCD: CPT | Mod: CPTII,S$GLB,, | Performed by: INTERNAL MEDICINE

## 2021-08-12 PROCEDURE — 1159F MED LIST DOCD IN RCRD: CPT | Mod: CPTII,S$GLB,, | Performed by: INTERNAL MEDICINE

## 2021-08-12 PROCEDURE — 1126F AMNT PAIN NOTED NONE PRSNT: CPT | Mod: CPTII,S$GLB,, | Performed by: INTERNAL MEDICINE

## 2021-08-12 PROCEDURE — 99999 PR PBB SHADOW E&M-EST. PATIENT-LVL IV: CPT | Mod: PBBFAC,,, | Performed by: INTERNAL MEDICINE

## 2021-08-12 PROCEDURE — 3288F PR FALLS RISK ASSESSMENT DOCUMENTED: ICD-10-PCS | Mod: CPTII,S$GLB,, | Performed by: INTERNAL MEDICINE

## 2021-08-12 PROCEDURE — 99215 OFFICE O/P EST HI 40 MIN: CPT | Mod: S$GLB,,, | Performed by: INTERNAL MEDICINE

## 2021-08-12 PROCEDURE — 1160F RVW MEDS BY RX/DR IN RCRD: CPT | Mod: CPTII,S$GLB,, | Performed by: INTERNAL MEDICINE

## 2021-08-12 PROCEDURE — 99999 PR PBB SHADOW E&M-EST. PATIENT-LVL IV: ICD-10-PCS | Mod: PBBFAC,,, | Performed by: INTERNAL MEDICINE

## 2021-08-12 PROCEDURE — 99215 PR OFFICE/OUTPT VISIT, EST, LEVL V, 40-54 MIN: ICD-10-PCS | Mod: S$GLB,,, | Performed by: INTERNAL MEDICINE

## 2021-08-12 PROCEDURE — 1101F PT FALLS ASSESS-DOCD LE1/YR: CPT | Mod: CPTII,S$GLB,, | Performed by: INTERNAL MEDICINE

## 2021-08-12 PROCEDURE — 1101F PR PT FALLS ASSESS DOC 0-1 FALLS W/OUT INJ PAST YR: ICD-10-PCS | Mod: CPTII,S$GLB,, | Performed by: INTERNAL MEDICINE

## 2021-08-13 ENCOUNTER — PATIENT MESSAGE (OUTPATIENT)
Dept: CARDIOLOGY | Facility: CLINIC | Age: 86
End: 2021-08-13

## 2021-08-16 RX ORDER — NITROGLYCERIN 0.4 MG/1
TABLET SUBLINGUAL
Qty: 25 TABLET | Refills: 4 | Status: SHIPPED | OUTPATIENT
Start: 2021-08-16 | End: 2022-09-13 | Stop reason: SDUPTHER

## 2021-10-14 ENCOUNTER — IMMUNIZATION (OUTPATIENT)
Dept: INTERNAL MEDICINE | Facility: CLINIC | Age: 86
End: 2021-10-14
Payer: MEDICARE

## 2021-10-14 DIAGNOSIS — Z23 NEED FOR VACCINATION: Primary | ICD-10-CM

## 2021-10-14 PROCEDURE — 91300 COVID-19, MRNA, LNP-S, PF, 30 MCG/0.3 ML DOSE VACCINE: CPT | Mod: HCNC,PBBFAC | Performed by: FAMILY MEDICINE

## 2021-10-14 PROCEDURE — 0003A COVID-19, MRNA, LNP-S, PF, 30 MCG/0.3 ML DOSE VACCINE: CPT | Mod: HCNC,PBBFAC | Performed by: FAMILY MEDICINE

## 2021-10-16 ENCOUNTER — IMMUNIZATION (OUTPATIENT)
Dept: FAMILY MEDICINE | Facility: CLINIC | Age: 86
End: 2021-10-16
Payer: MEDICARE

## 2021-10-16 PROCEDURE — 90694 FLU VACCINE - QUADRIVALENT - ADJUVANTED: ICD-10-PCS | Mod: HCNC,S$GLB,, | Performed by: FAMILY MEDICINE

## 2021-10-16 PROCEDURE — 90694 VACC AIIV4 NO PRSRV 0.5ML IM: CPT | Mod: HCNC,S$GLB,, | Performed by: FAMILY MEDICINE

## 2021-10-16 PROCEDURE — G0008 FLU VACCINE - QUADRIVALENT - ADJUVANTED: ICD-10-PCS | Mod: HCNC,S$GLB,, | Performed by: FAMILY MEDICINE

## 2021-10-16 PROCEDURE — G0008 ADMIN INFLUENZA VIRUS VAC: HCPCS | Mod: HCNC,S$GLB,, | Performed by: FAMILY MEDICINE

## 2021-10-26 ENCOUNTER — CLINICAL SUPPORT (OUTPATIENT)
Dept: CARDIOLOGY | Facility: HOSPITAL | Age: 86
End: 2021-10-26
Attending: INTERNAL MEDICINE
Payer: MEDICARE

## 2021-10-26 DIAGNOSIS — I49.8 OTHER SPECIFIED CARDIAC ARRHYTHMIAS: ICD-10-CM

## 2021-10-26 PROCEDURE — 93283 CARDIAC DEVICE CHECK - IN CLINIC & HOSPITAL: ICD-10-PCS | Mod: 26,HCNC,, | Performed by: INTERNAL MEDICINE

## 2021-10-26 PROCEDURE — 93283 PRGRMG EVAL IMPLANTABLE DFB: CPT | Mod: HCNC

## 2021-10-26 PROCEDURE — 93283 PRGRMG EVAL IMPLANTABLE DFB: CPT | Mod: 26,HCNC,, | Performed by: INTERNAL MEDICINE

## 2021-11-23 ENCOUNTER — CLINICAL SUPPORT (OUTPATIENT)
Dept: CARDIOLOGY | Facility: HOSPITAL | Age: 86
End: 2021-11-23
Attending: INTERNAL MEDICINE
Payer: MEDICARE

## 2021-11-23 DIAGNOSIS — I49.8 OTHER SPECIFIED CARDIAC ARRHYTHMIAS: ICD-10-CM

## 2021-11-23 PROCEDURE — 93283 CARDIAC DEVICE CHECK - IN CLINIC & HOSPITAL: ICD-10-PCS | Mod: 26,HCNC,, | Performed by: INTERNAL MEDICINE

## 2021-11-23 PROCEDURE — 93283 PRGRMG EVAL IMPLANTABLE DFB: CPT | Mod: 26,HCNC,, | Performed by: INTERNAL MEDICINE

## 2021-11-23 PROCEDURE — 93283 PRGRMG EVAL IMPLANTABLE DFB: CPT | Mod: HCNC

## 2021-12-11 ENCOUNTER — PATIENT MESSAGE (OUTPATIENT)
Dept: ELECTROPHYSIOLOGY | Facility: CLINIC | Age: 86
End: 2021-12-11
Payer: MEDICARE

## 2021-12-23 ENCOUNTER — CLINICAL SUPPORT (OUTPATIENT)
Dept: CARDIOLOGY | Facility: HOSPITAL | Age: 86
End: 2021-12-23
Attending: INTERNAL MEDICINE
Payer: MEDICARE

## 2021-12-23 DIAGNOSIS — I49.8 OTHER SPECIFIED CARDIAC ARRHYTHMIAS: ICD-10-CM

## 2021-12-23 PROCEDURE — 93283 PRGRMG EVAL IMPLANTABLE DFB: CPT | Mod: HCNC

## 2021-12-23 PROCEDURE — 93283 CARDIAC DEVICE CHECK - IN CLINIC & HOSPITAL: ICD-10-PCS | Mod: 26,,, | Performed by: INTERNAL MEDICINE

## 2021-12-23 PROCEDURE — 93283 PRGRMG EVAL IMPLANTABLE DFB: CPT | Mod: 26,,, | Performed by: INTERNAL MEDICINE

## 2022-01-21 ENCOUNTER — LAB VISIT (OUTPATIENT)
Dept: LAB | Facility: HOSPITAL | Age: 87
End: 2022-01-21
Attending: INTERNAL MEDICINE
Payer: MEDICARE

## 2022-01-21 DIAGNOSIS — D69.6 THROMBOCYTOPENIA: ICD-10-CM

## 2022-01-21 DIAGNOSIS — N18.31 STAGE 3A CHRONIC KIDNEY DISEASE: ICD-10-CM

## 2022-01-21 DIAGNOSIS — E78.5 HYPERLIPIDEMIA, UNSPECIFIED HYPERLIPIDEMIA TYPE: ICD-10-CM

## 2022-01-21 LAB
ALBUMIN SERPL BCP-MCNC: 3.8 G/DL (ref 3.5–5.2)
ALP SERPL-CCNC: 87 U/L (ref 55–135)
ALT SERPL W/O P-5'-P-CCNC: 18 U/L (ref 10–44)
ANION GAP SERPL CALC-SCNC: 9 MMOL/L (ref 8–16)
AST SERPL-CCNC: 23 U/L (ref 10–40)
BASOPHILS # BLD AUTO: 0.09 K/UL (ref 0–0.2)
BASOPHILS NFR BLD: 1.2 % (ref 0–1.9)
BILIRUB SERPL-MCNC: 1.1 MG/DL (ref 0.1–1)
BUN SERPL-MCNC: 14 MG/DL (ref 8–23)
CALCIUM SERPL-MCNC: 9.6 MG/DL (ref 8.7–10.5)
CHLORIDE SERPL-SCNC: 101 MMOL/L (ref 95–110)
CHOLEST SERPL-MCNC: 106 MG/DL (ref 120–199)
CHOLEST/HDLC SERPL: 2.3 {RATIO} (ref 2–5)
CO2 SERPL-SCNC: 29 MMOL/L (ref 23–29)
CREAT SERPL-MCNC: 1.3 MG/DL (ref 0.5–1.4)
DIFFERENTIAL METHOD: ABNORMAL
EOSINOPHIL # BLD AUTO: 0.3 K/UL (ref 0–0.5)
EOSINOPHIL NFR BLD: 3.9 % (ref 0–8)
ERYTHROCYTE [DISTWIDTH] IN BLOOD BY AUTOMATED COUNT: 13.7 % (ref 11.5–14.5)
EST. GFR  (AFRICAN AMERICAN): 57.1 ML/MIN/1.73 M^2
EST. GFR  (NON AFRICAN AMERICAN): 49.4 ML/MIN/1.73 M^2
GLUCOSE SERPL-MCNC: 96 MG/DL (ref 70–110)
HCT VFR BLD AUTO: 47.9 % (ref 40–54)
HDLC SERPL-MCNC: 47 MG/DL (ref 40–75)
HDLC SERPL: 44.3 % (ref 20–50)
HGB BLD-MCNC: 15.8 G/DL (ref 14–18)
IMM GRANULOCYTES # BLD AUTO: 0.05 K/UL (ref 0–0.04)
IMM GRANULOCYTES NFR BLD AUTO: 0.6 % (ref 0–0.5)
LDLC SERPL CALC-MCNC: 50.6 MG/DL (ref 63–159)
LYMPHOCYTES # BLD AUTO: 2.8 K/UL (ref 1–4.8)
LYMPHOCYTES NFR BLD: 36.4 % (ref 18–48)
MCH RBC QN AUTO: 30 PG (ref 27–31)
MCHC RBC AUTO-ENTMCNC: 33 G/DL (ref 32–36)
MCV RBC AUTO: 91 FL (ref 82–98)
MONOCYTES # BLD AUTO: 1 K/UL (ref 0.3–1)
MONOCYTES NFR BLD: 12.3 % (ref 4–15)
NEUTROPHILS # BLD AUTO: 3.5 K/UL (ref 1.8–7.7)
NEUTROPHILS NFR BLD: 45.6 % (ref 38–73)
NONHDLC SERPL-MCNC: 59 MG/DL
NRBC BLD-RTO: 0 /100 WBC
PLATELET # BLD AUTO: 104 K/UL (ref 150–450)
PMV BLD AUTO: 12 FL (ref 9.2–12.9)
POTASSIUM SERPL-SCNC: 4.1 MMOL/L (ref 3.5–5.1)
PROT SERPL-MCNC: 7 G/DL (ref 6–8.4)
RBC # BLD AUTO: 5.27 M/UL (ref 4.6–6.2)
SODIUM SERPL-SCNC: 139 MMOL/L (ref 136–145)
TRIGL SERPL-MCNC: 42 MG/DL (ref 30–150)
WBC # BLD AUTO: 7.73 K/UL (ref 3.9–12.7)

## 2022-01-21 PROCEDURE — 85025 COMPLETE CBC W/AUTO DIFF WBC: CPT | Mod: HCNC | Performed by: INTERNAL MEDICINE

## 2022-01-21 PROCEDURE — 80053 COMPREHEN METABOLIC PANEL: CPT | Mod: HCNC | Performed by: INTERNAL MEDICINE

## 2022-01-21 PROCEDURE — 80061 LIPID PANEL: CPT | Mod: HCNC | Performed by: INTERNAL MEDICINE

## 2022-01-21 PROCEDURE — 36415 COLL VENOUS BLD VENIPUNCTURE: CPT | Mod: HCNC,PO | Performed by: INTERNAL MEDICINE

## 2022-01-24 ENCOUNTER — OFFICE VISIT (OUTPATIENT)
Dept: INTERNAL MEDICINE | Facility: CLINIC | Age: 87
End: 2022-01-24
Payer: MEDICARE

## 2022-01-24 VITALS
WEIGHT: 173.5 LBS | OXYGEN SATURATION: 94 % | SYSTOLIC BLOOD PRESSURE: 90 MMHG | DIASTOLIC BLOOD PRESSURE: 54 MMHG | BODY MASS INDEX: 23.5 KG/M2 | HEART RATE: 80 BPM | HEIGHT: 72 IN

## 2022-01-24 DIAGNOSIS — N18.31 STAGE 3A CHRONIC KIDNEY DISEASE: ICD-10-CM

## 2022-01-24 DIAGNOSIS — D69.3 IDIOPATHIC THROMBOCYTOPENIC PURPURA (ITP): ICD-10-CM

## 2022-01-24 DIAGNOSIS — E78.5 HYPERLIPIDEMIA, UNSPECIFIED HYPERLIPIDEMIA TYPE: ICD-10-CM

## 2022-01-24 DIAGNOSIS — D47.2 MGUS (MONOCLONAL GAMMOPATHY OF UNKNOWN SIGNIFICANCE): ICD-10-CM

## 2022-01-24 DIAGNOSIS — I10 ESSENTIAL HYPERTENSION: ICD-10-CM

## 2022-01-24 DIAGNOSIS — I25.10 CORONARY ARTERY DISEASE, UNSPECIFIED VESSEL OR LESION TYPE, UNSPECIFIED WHETHER ANGINA PRESENT, UNSPECIFIED WHETHER NATIVE OR TRANSPLANTED HEART: ICD-10-CM

## 2022-01-24 DIAGNOSIS — K22.70 BARRETT'S ESOPHAGUS WITHOUT DYSPLASIA: ICD-10-CM

## 2022-01-24 DIAGNOSIS — I70.0 AORTIC ATHEROSCLEROSIS: ICD-10-CM

## 2022-01-24 DIAGNOSIS — I50.42 CHRONIC COMBINED SYSTOLIC AND DIASTOLIC CONGESTIVE HEART FAILURE: ICD-10-CM

## 2022-01-24 PROBLEM — I48.0 PAF (PAROXYSMAL ATRIAL FIBRILLATION): Status: RESOLVED | Noted: 2021-07-21 | Resolved: 2022-01-24

## 2022-01-24 PROBLEM — N25.81 SECONDARY HYPERPARATHYROIDISM OF RENAL ORIGIN: Status: RESOLVED | Noted: 2019-07-02 | Resolved: 2022-01-24

## 2022-01-24 PROCEDURE — 99214 OFFICE O/P EST MOD 30 MIN: CPT | Mod: HCNC,S$GLB,, | Performed by: INTERNAL MEDICINE

## 2022-01-24 PROCEDURE — 1126F AMNT PAIN NOTED NONE PRSNT: CPT | Mod: HCNC,CPTII,S$GLB, | Performed by: INTERNAL MEDICINE

## 2022-01-24 PROCEDURE — 99999 PR PBB SHADOW E&M-EST. PATIENT-LVL III: ICD-10-PCS | Mod: PBBFAC,HCNC,, | Performed by: INTERNAL MEDICINE

## 2022-01-24 PROCEDURE — 1126F PR PAIN SEVERITY QUANTIFIED, NO PAIN PRESENT: ICD-10-PCS | Mod: HCNC,CPTII,S$GLB, | Performed by: INTERNAL MEDICINE

## 2022-01-24 PROCEDURE — 99499 RISK ADDL DX/OHS AUDIT: ICD-10-PCS | Mod: S$GLB,,, | Performed by: INTERNAL MEDICINE

## 2022-01-24 PROCEDURE — 99999 PR PBB SHADOW E&M-EST. PATIENT-LVL III: CPT | Mod: PBBFAC,HCNC,, | Performed by: INTERNAL MEDICINE

## 2022-01-24 PROCEDURE — 99499 UNLISTED E&M SERVICE: CPT | Mod: S$GLB,,, | Performed by: INTERNAL MEDICINE

## 2022-01-24 PROCEDURE — 99214 PR OFFICE/OUTPT VISIT, EST, LEVL IV, 30-39 MIN: ICD-10-PCS | Mod: HCNC,S$GLB,, | Performed by: INTERNAL MEDICINE

## 2022-01-24 RX ORDER — PRAVASTATIN SODIUM 40 MG/1
40 TABLET ORAL NIGHTLY
Qty: 90 TABLET | Refills: 1 | Status: SHIPPED | OUTPATIENT
Start: 2022-01-24 | End: 2022-07-19

## 2022-01-24 RX ORDER — CLOPIDOGREL BISULFATE 75 MG/1
75 TABLET ORAL DAILY
Qty: 90 TABLET | Refills: 3 | Status: SHIPPED | OUTPATIENT
Start: 2022-01-24 | End: 2022-07-25

## 2022-01-24 RX ORDER — AMLODIPINE BESYLATE 10 MG/1
10 TABLET ORAL DAILY
Qty: 90 TABLET | Refills: 3 | Status: SHIPPED | OUTPATIENT
Start: 2022-01-24 | End: 2022-11-04

## 2022-01-24 RX ORDER — METOPROLOL SUCCINATE 25 MG/1
25 TABLET, EXTENDED RELEASE ORAL 2 TIMES DAILY
Qty: 180 TABLET | Refills: 1 | Status: SHIPPED | OUTPATIENT
Start: 2022-01-24 | End: 2022-07-19

## 2022-01-24 NOTE — PROGRESS NOTES
Patient ID: Nelson GIBBONS Parent is a 86 y.o. male.    Chief Complaint: Hypertension    HPI Nelson is a 86 y.o. male with  chronic kidney disease stage 3, hypertension, hyperlipidemia, CAD, MGUS, Tovar's esophagus, AICD, chronic systolic and diastolic CHF (echo 6/2020) and chronic ITP who presents for routine follow-up on medical conditions.  Blood pressure is low in clinic today.  He was 90/54 and my repeat was 98/55.  Patient admits that he feels a little dizziness/weakness.  He reports that this happens about once per year and lasts for few days and then resolves on his own.  He reports adequate fluid intake today and every day. Usually BP at home is 100-120/60-70s, with HR 50-60s.   Follows every 12-15 months with cardiologist Dr. Cartagena for HLD, CHD and CAD. Has AICD.   Follows with Dr. Kessler in GI. Had EGD last summer. Due for repeat 3 years later.  Follows with Heme-Onc for MGUS.   Reviewed recent labs with him in clinic today.   No further acute complaints or concerns.    Health Maintenance Topics with due status: Not Due       Topic Last Completion Date    TETANUS VACCINE 11/22/2013    Colonoscopy 05/07/2018    Lipid Panel 01/21/2022    Aspirin/Antiplatelet Therapy 01/24/2022     Review of Systems   Neurological: Positive for dizziness (see HPI).   All other systems reviewed and are negative.      Objective:     Vitals:    01/24/22 1306   BP: (!) 90/54   Pulse: 80        Physical Exam  Vitals reviewed.   Constitutional:       General: He is not in acute distress.     Appearance: Normal appearance. He is well-developed. He is not ill-appearing, toxic-appearing or diaphoretic.   HENT:      Head: Normocephalic and atraumatic.      Right Ear: External ear normal.      Left Ear: External ear normal.      Nose: Nose normal.   Eyes:      Extraocular Movements: Extraocular movements intact.      Conjunctiva/sclera: Conjunctivae normal.   Cardiovascular:      Rate and Rhythm: Normal rate and regular rhythm.       Pulses: Normal pulses.      Heart sounds: Normal heart sounds. No murmur heard.  No friction rub. No gallop.    Pulmonary:      Effort: Pulmonary effort is normal. No respiratory distress.      Breath sounds: Normal breath sounds. No stridor. No wheezing, rhonchi or rales.   Abdominal:      General: Bowel sounds are normal. There is no distension.      Palpations: Abdomen is soft. There is no mass.      Tenderness: There is no abdominal tenderness. There is no guarding or rebound.      Hernia: No hernia is present.   Musculoskeletal:      Right lower leg: No edema.      Left lower leg: No edema.   Skin:     General: Skin is warm and dry.   Neurological:      General: No focal deficit present.      Mental Status: He is alert and oriented to person, place, and time. Mental status is at baseline.   Psychiatric:         Mood and Affect: Mood normal.         Behavior: Behavior normal.         Thought Content: Thought content normal.         Judgment: Judgment normal.         Assessment:       1. Essential hypertension Well controlled   2. Hyperlipidemia, unspecified hyperlipidemia type Well controlled   3. Coronary artery disease, unspecified vessel or lesion type, unspecified whether angina present, unspecified whether native or transplanted heart Chronic   4. Idiopathic thrombocytopenic purpura (ITP) Chronic   5. Chronic combined systolic and diastolic congestive heart failure Well controlled   6. Aortic atherosclerosis Chronic   7. Stage 3a chronic kidney disease Chronic   8. MGUS (monoclonal gammopathy of unknown significance) Chronic   9. Tovar's esophagus without dysplasia Chronic       Plan:         Essential hypertension  Comments:  Continue current medications. He will cont to monitor BP at home. If routinely low, will alert me for adjustment of BP medication. Cont increased fluid intake  Orders:  -     amLODIPine (NORVASC) 10 MG tablet; Take 1 tablet (10 mg total) by mouth once daily.  Dispense: 90 tablet;  Refill: 3  -     metoprolol succinate (TOPROL-XL) 25 MG 24 hr tablet; Take 1 tablet (25 mg total) by mouth 2 (two) times daily.  Dispense: 180 tablet; Refill: 1  -     Comprehensive Metabolic Panel; Future; Expected date: 07/24/2022    Hyperlipidemia, unspecified hyperlipidemia type  Comments:  Continue current medication  Orders:  -     pravastatin (PRAVACHOL) 40 MG tablet; Take 1 tablet (40 mg total) by mouth every evening.  Dispense: 90 tablet; Refill: 1  -     Lipid Panel; Future; Expected date: 07/24/2022    Coronary artery disease, unspecified vessel or lesion type, unspecified whether angina present, unspecified whether native or transplanted heart  Comments:  Cont current medication. Cont to follow with cardiology   Orders:  -     clopidogreL (PLAVIX) 75 mg tablet; Take 1 tablet (75 mg total) by mouth once daily.  Dispense: 90 tablet; Refill: 3    Idiopathic thrombocytopenic purpura (ITP)  Comments:  Chronic and stable.  Continue monitor.  Orders:  -     CBC Auto Differential; Future; Expected date: 07/24/2022    Chronic combined systolic and diastolic congestive heart failure  Comments:  Continue current medication.  Continue to follow with Cardiology.    Aortic atherosclerosis  Comments:  Cont aspirin, plavix and statin    Stage 3a chronic kidney disease  Comments:  Continue to monitor    MGUS (monoclonal gammopathy of unknown significance)  Comments:  Being monitored by Hematology/Oncology.    Tovar's esophagus without dysplasia  Comments:  Monitored by outside Gastroenterologist.         RTC 6 months     Warning signs discussed, patient to call with any further issues or worsening of symptoms.       Parts of the above note were dictated using a voice dictation software. Please excuse any grammatical or typographical errors.

## 2022-01-27 ENCOUNTER — TELEPHONE (OUTPATIENT)
Dept: CARDIOLOGY | Facility: HOSPITAL | Age: 87
End: 2022-01-27
Payer: MEDICARE

## 2022-01-27 ENCOUNTER — TELEPHONE (OUTPATIENT)
Dept: ELECTROPHYSIOLOGY | Facility: CLINIC | Age: 87
End: 2022-01-27
Payer: MEDICARE

## 2022-01-27 ENCOUNTER — CLINICAL SUPPORT (OUTPATIENT)
Dept: CARDIOLOGY | Facility: HOSPITAL | Age: 87
End: 2022-01-27
Attending: INTERNAL MEDICINE
Payer: MEDICARE

## 2022-01-27 DIAGNOSIS — I49.8 OTHER SPECIFIED CARDIAC ARRHYTHMIAS: ICD-10-CM

## 2022-01-27 PROCEDURE — 93283 PRGRMG EVAL IMPLANTABLE DFB: CPT | Mod: 26,HCNC,, | Performed by: INTERNAL MEDICINE

## 2022-01-27 PROCEDURE — 93283 CARDIAC DEVICE CHECK - IN CLINIC & HOSPITAL: ICD-10-PCS | Mod: 26,HCNC,, | Performed by: INTERNAL MEDICINE

## 2022-01-27 PROCEDURE — 93283 PRGRMG EVAL IMPLANTABLE DFB: CPT | Mod: HCNC

## 2022-01-27 NOTE — TELEPHONE ENCOUNTER
Patient seen in device clinic today for monthly battery assessment appointment. .      The patients' CIED has reached ELECTIVE REPLACEMENT.   Current Device  and Model:ANUJ Mcclure DR  Date of SEFERINO, if known: 1/20/2022  Is this replacement indicator early or unexpected due to an advisory:  No  Battery Voltage (if available): 2.65 V  Pacemaker Dependent: No  Anticoagulation Status: None  Last EF: 40% (6/2/2020)  Model of Leads: [employer] 1888TC (RA) & [employer] Linox Smart S65 (RV)  Any known lead recalls:  No  Leads MRI compatible:  RA lead is NOT compatible, and RV lead IS compatible.       *RN coordinator notified for scheduling; device coordinator notified to contact patient.

## 2022-01-27 NOTE — TELEPHONE ENCOUNTER
Spoke with patient and informed him that his device has reached RRT.  I informed him that Dr. Bell Nurse will be reaching out to him in the next week or so to schedule his device change out.  Patient stated understanding.

## 2022-02-11 ENCOUNTER — TELEPHONE (OUTPATIENT)
Dept: CARDIOLOGY | Facility: HOSPITAL | Age: 87
End: 2022-02-11
Payer: MEDICARE

## 2022-02-11 NOTE — TELEPHONE ENCOUNTER
Patient contacted office to see when the nurse was going to call him to schedule his device replacement. I explained to him that I will message Dr. Vance Herring's nurse to call him back on 2/14/2022. He verbalized understanding.

## 2022-02-28 DIAGNOSIS — Z45.02 IMPLANTABLE CARDIOVERTER-DEFIBRILLATOR (ICD) AT END OF BATTERY LIFE: Primary | ICD-10-CM

## 2022-02-28 DIAGNOSIS — I49.9 CARDIAC ARRHYTHMIA, UNSPECIFIED CARDIAC ARRHYTHMIA TYPE: ICD-10-CM

## 2022-02-28 DIAGNOSIS — I25.5 ISCHEMIC CARDIOMYOPATHY: ICD-10-CM

## 2022-02-28 DIAGNOSIS — Z86.79 HISTORY OF VENTRICULAR TACHYCARDIA: ICD-10-CM

## 2022-03-03 ENCOUNTER — PATIENT MESSAGE (OUTPATIENT)
Dept: ELECTROPHYSIOLOGY | Facility: CLINIC | Age: 87
End: 2022-03-03
Payer: MEDICARE

## 2022-03-14 ENCOUNTER — TELEPHONE (OUTPATIENT)
Dept: FAMILY MEDICINE | Facility: CLINIC | Age: 87
End: 2022-03-14
Payer: MEDICARE

## 2022-03-15 ENCOUNTER — LAB VISIT (OUTPATIENT)
Dept: LAB | Facility: HOSPITAL | Age: 87
End: 2022-03-15
Attending: INTERNAL MEDICINE
Payer: MEDICARE

## 2022-03-15 DIAGNOSIS — I25.5 ISCHEMIC CARDIOMYOPATHY: ICD-10-CM

## 2022-03-15 DIAGNOSIS — Z86.79 HISTORY OF VENTRICULAR TACHYCARDIA: ICD-10-CM

## 2022-03-15 DIAGNOSIS — I49.9 CARDIAC ARRHYTHMIA, UNSPECIFIED CARDIAC ARRHYTHMIA TYPE: ICD-10-CM

## 2022-03-15 DIAGNOSIS — Z45.02 IMPLANTABLE CARDIOVERTER-DEFIBRILLATOR (ICD) AT END OF BATTERY LIFE: ICD-10-CM

## 2022-03-15 LAB
ANION GAP SERPL CALC-SCNC: 5 MMOL/L (ref 8–16)
APTT BLDCRRT: 25.3 SEC (ref 21–32)
BUN SERPL-MCNC: 18 MG/DL (ref 8–23)
CALCIUM SERPL-MCNC: 9.3 MG/DL (ref 8.7–10.5)
CHLORIDE SERPL-SCNC: 108 MMOL/L (ref 95–110)
CO2 SERPL-SCNC: 28 MMOL/L (ref 23–29)
CREAT SERPL-MCNC: 1.6 MG/DL (ref 0.5–1.4)
ERYTHROCYTE [DISTWIDTH] IN BLOOD BY AUTOMATED COUNT: 13.7 % (ref 11.5–14.5)
EST. GFR  (AFRICAN AMERICAN): 44 ML/MIN/1.73 M^2
EST. GFR  (NON AFRICAN AMERICAN): 38 ML/MIN/1.73 M^2
GLUCOSE SERPL-MCNC: 132 MG/DL (ref 70–110)
HCT VFR BLD AUTO: 41 % (ref 40–54)
HGB BLD-MCNC: 14 G/DL (ref 14–18)
INR PPP: 1.1 (ref 0.8–1.2)
MCH RBC QN AUTO: 30.8 PG (ref 27–31)
MCHC RBC AUTO-ENTMCNC: 34.1 G/DL (ref 32–36)
MCV RBC AUTO: 90 FL (ref 82–98)
PLATELET # BLD AUTO: 104 K/UL (ref 150–450)
PMV BLD AUTO: 11.8 FL (ref 9.2–12.9)
POTASSIUM SERPL-SCNC: 3.5 MMOL/L (ref 3.5–5.1)
PROTHROMBIN TIME: 11.2 SEC (ref 9–12.5)
RBC # BLD AUTO: 4.55 M/UL (ref 4.6–6.2)
SODIUM SERPL-SCNC: 141 MMOL/L (ref 136–145)
WBC # BLD AUTO: 8 K/UL (ref 3.9–12.7)

## 2022-03-15 PROCEDURE — 85610 PROTHROMBIN TIME: CPT | Performed by: INTERNAL MEDICINE

## 2022-03-15 PROCEDURE — 85027 COMPLETE CBC AUTOMATED: CPT | Performed by: INTERNAL MEDICINE

## 2022-03-15 PROCEDURE — 36415 COLL VENOUS BLD VENIPUNCTURE: CPT | Performed by: INTERNAL MEDICINE

## 2022-03-15 PROCEDURE — 85730 THROMBOPLASTIN TIME PARTIAL: CPT | Performed by: INTERNAL MEDICINE

## 2022-03-15 PROCEDURE — 80048 BASIC METABOLIC PNL TOTAL CA: CPT | Performed by: INTERNAL MEDICINE

## 2022-03-16 RX ORDER — CEFAZOLIN SODIUM/WATER 2 G/20 ML
2 SYRINGE (ML) INTRAVENOUS
Status: CANCELLED | OUTPATIENT
Start: 2022-03-16

## 2022-03-17 ENCOUNTER — TELEPHONE (OUTPATIENT)
Dept: ELECTROPHYSIOLOGY | Facility: CLINIC | Age: 87
End: 2022-03-17
Payer: MEDICARE

## 2022-03-17 NOTE — TELEPHONE ENCOUNTER
Spoke to Mr. Parent    CONFIRMED procedure arrival time of 9am    Reiterated instructions including:  -Directions to check in desk  -NPO after midnight night prior to procedure   -Pre-procedure LABS done, no alerts  -COVID test n/a, vacc  -Confirmed no fever, cough, or shortness of breath in the past 30 days  -Bathe night prior and morning prior to procedure with Hibiclens solution or an antibacterial soap      Patient verbalizes understanding of above and appreciates call.

## 2022-03-18 ENCOUNTER — ANESTHESIA (OUTPATIENT)
Dept: MEDSURG UNIT | Facility: HOSPITAL | Age: 87
End: 2022-03-18
Payer: MEDICARE

## 2022-03-18 ENCOUNTER — HOSPITAL ENCOUNTER (OUTPATIENT)
Facility: HOSPITAL | Age: 87
Discharge: HOME | End: 2022-03-18
Attending: INTERNAL MEDICINE | Admitting: INTERNAL MEDICINE
Payer: MEDICARE

## 2022-03-18 ENCOUNTER — ANESTHESIA EVENT (OUTPATIENT)
Dept: MEDSURG UNIT | Facility: HOSPITAL | Age: 87
End: 2022-03-18
Payer: MEDICARE

## 2022-03-18 VITALS
WEIGHT: 175 LBS | BODY MASS INDEX: 23.7 KG/M2 | HEART RATE: 65 BPM | OXYGEN SATURATION: 96 % | SYSTOLIC BLOOD PRESSURE: 121 MMHG | DIASTOLIC BLOOD PRESSURE: 72 MMHG | RESPIRATION RATE: 18 BRPM | HEIGHT: 72 IN | TEMPERATURE: 98 F

## 2022-03-18 DIAGNOSIS — Z45.02 IMPLANTABLE CARDIOVERTER-DEFIBRILLATOR (ICD) AT END OF BATTERY LIFE: ICD-10-CM

## 2022-03-18 DIAGNOSIS — I49.9 ARRHYTHMIA: ICD-10-CM

## 2022-03-18 DIAGNOSIS — Z95.9 CARDIAC DEVICE IN SITU: ICD-10-CM

## 2022-03-18 DIAGNOSIS — I25.5 ISCHEMIC CARDIOMYOPATHY: ICD-10-CM

## 2022-03-18 DIAGNOSIS — Z86.79 HISTORY OF VENTRICULAR TACHYCARDIA: ICD-10-CM

## 2022-03-18 PROCEDURE — 93010 EKG 12-LEAD: ICD-10-PCS | Mod: ,,, | Performed by: INTERNAL MEDICINE

## 2022-03-18 PROCEDURE — D9220A PRA ANESTHESIA: Mod: CRNA,,, | Performed by: NURSE ANESTHETIST, CERTIFIED REGISTERED

## 2022-03-18 PROCEDURE — 93005 ELECTROCARDIOGRAM TRACING: CPT | Mod: 59

## 2022-03-18 PROCEDURE — 37000008 HC ANESTHESIA 1ST 15 MINUTES: Performed by: INTERNAL MEDICINE

## 2022-03-18 PROCEDURE — 93010 ELECTROCARDIOGRAM REPORT: CPT | Mod: 76,,, | Performed by: INTERNAL MEDICINE

## 2022-03-18 PROCEDURE — 63600175 PHARM REV CODE 636 W HCPCS: Performed by: INTERNAL MEDICINE

## 2022-03-18 PROCEDURE — 93010 ELECTROCARDIOGRAM REPORT: CPT | Mod: ,,, | Performed by: INTERNAL MEDICINE

## 2022-03-18 PROCEDURE — D9220A PRA ANESTHESIA: ICD-10-PCS | Mod: CRNA,,, | Performed by: NURSE ANESTHETIST, CERTIFIED REGISTERED

## 2022-03-18 PROCEDURE — 63600175 PHARM REV CODE 636 W HCPCS: Performed by: NURSE ANESTHETIST, CERTIFIED REGISTERED

## 2022-03-18 PROCEDURE — D9220A PRA ANESTHESIA: Mod: ANES,,, | Performed by: ANESTHESIOLOGY

## 2022-03-18 PROCEDURE — 63600175 PHARM REV CODE 636 W HCPCS: Performed by: NURSE PRACTITIONER

## 2022-03-18 PROCEDURE — 27201423 OPTIME MED/SURG SUP & DEVICES STERILE SUPPLY: Performed by: INTERNAL MEDICINE

## 2022-03-18 PROCEDURE — 25000003 PHARM REV CODE 250: Performed by: INTERNAL MEDICINE

## 2022-03-18 PROCEDURE — 25000003 PHARM REV CODE 250: Performed by: NURSE ANESTHETIST, CERTIFIED REGISTERED

## 2022-03-18 PROCEDURE — 93005 ELECTROCARDIOGRAM TRACING: CPT

## 2022-03-18 PROCEDURE — D9220A PRA ANESTHESIA: ICD-10-PCS | Mod: ANES,,, | Performed by: ANESTHESIOLOGY

## 2022-03-18 PROCEDURE — 37000009 HC ANESTHESIA EA ADD 15 MINS: Performed by: INTERNAL MEDICINE

## 2022-03-18 PROCEDURE — 33263 RMVL & RPLCMT DFB GEN 2 LEAD: CPT | Performed by: INTERNAL MEDICINE

## 2022-03-18 PROCEDURE — 27201037 HC PRESSURE MONITORING SET UP

## 2022-03-18 PROCEDURE — 33263 RMVL & RPLCMT DFB GEN 2 LEAD: CPT | Mod: ,,, | Performed by: INTERNAL MEDICINE

## 2022-03-18 PROCEDURE — C1721 AICD, DUAL CHAMBER: HCPCS | Performed by: INTERNAL MEDICINE

## 2022-03-18 PROCEDURE — 33263 PR REMV&REPLC CVD GEN DUAL LEAD: ICD-10-PCS | Mod: ,,, | Performed by: INTERNAL MEDICINE

## 2022-03-18 DEVICE — TIERED-THERAPY CARDIOVERTER/DEFIBRILLATOR VVEV VVIR
Type: IMPLANTABLE DEVICE | Site: CHEST | Status: FUNCTIONAL
Brand: FORTIFY ASSURA™

## 2022-03-18 DEVICE — DF-1 (3.2 MM) RECEPTACLE PLUG
Type: IMPLANTABLE DEVICE | Site: CHEST | Status: FUNCTIONAL
Brand: SJM™

## 2022-03-18 RX ORDER — HYDROMORPHONE HYDROCHLORIDE 1 MG/ML
0.2 INJECTION, SOLUTION INTRAMUSCULAR; INTRAVENOUS; SUBCUTANEOUS EVERY 5 MIN PRN
Status: DISCONTINUED | OUTPATIENT
Start: 2022-03-18 | End: 2022-03-18 | Stop reason: HOSPADM

## 2022-03-18 RX ORDER — VANCOMYCIN HYDROCHLORIDE 1 G/20ML
INJECTION, POWDER, LYOPHILIZED, FOR SOLUTION INTRAVENOUS
Status: DISCONTINUED | OUTPATIENT
Start: 2022-03-18 | End: 2022-03-18 | Stop reason: HOSPADM

## 2022-03-18 RX ORDER — BUPIVACAINE HYDROCHLORIDE 2.5 MG/ML
INJECTION, SOLUTION EPIDURAL; INFILTRATION; INTRACAUDAL
Status: DISCONTINUED | OUTPATIENT
Start: 2022-03-18 | End: 2022-03-18 | Stop reason: HOSPADM

## 2022-03-18 RX ORDER — LIDOCAINE HYDROCHLORIDE 20 MG/ML
INJECTION, SOLUTION INFILTRATION; PERINEURAL
Status: DISCONTINUED | OUTPATIENT
Start: 2022-03-18 | End: 2022-03-18 | Stop reason: HOSPADM

## 2022-03-18 RX ORDER — PROPOFOL 10 MG/ML
VIAL (ML) INTRAVENOUS CONTINUOUS PRN
Status: DISCONTINUED | OUTPATIENT
Start: 2022-03-18 | End: 2022-03-18

## 2022-03-18 RX ORDER — HALOPERIDOL 5 MG/ML
0.5 INJECTION INTRAMUSCULAR EVERY 10 MIN PRN
Status: DISCONTINUED | OUTPATIENT
Start: 2022-03-18 | End: 2022-03-18 | Stop reason: HOSPADM

## 2022-03-18 RX ORDER — LIDOCAINE HYDROCHLORIDE 20 MG/ML
INJECTION INTRAVENOUS
Status: DISCONTINUED | OUTPATIENT
Start: 2022-03-18 | End: 2022-03-18

## 2022-03-18 RX ORDER — SODIUM CHLORIDE 0.9 % (FLUSH) 0.9 %
3 SYRINGE (ML) INJECTION
Status: DISCONTINUED | OUTPATIENT
Start: 2022-03-18 | End: 2022-03-18 | Stop reason: HOSPADM

## 2022-03-18 RX ORDER — PROPOFOL 10 MG/ML
VIAL (ML) INTRAVENOUS
Status: DISCONTINUED | OUTPATIENT
Start: 2022-03-18 | End: 2022-03-18

## 2022-03-18 RX ORDER — SODIUM CHLORIDE 0.9 G/100ML
IRRIGANT IRRIGATION
Status: DISCONTINUED | OUTPATIENT
Start: 2022-03-18 | End: 2022-03-18 | Stop reason: HOSPADM

## 2022-03-18 RX ORDER — DIPHENHYDRAMINE HYDROCHLORIDE 50 MG/ML
25 INJECTION INTRAMUSCULAR; INTRAVENOUS EVERY 6 HOURS PRN
Status: DISCONTINUED | OUTPATIENT
Start: 2022-03-18 | End: 2022-03-18 | Stop reason: HOSPADM

## 2022-03-18 RX ORDER — DOXYCYCLINE 100 MG/1
100 CAPSULE ORAL EVERY 12 HOURS
Qty: 10 CAPSULE | Refills: 0 | Status: SHIPPED | OUTPATIENT
Start: 2022-03-18 | End: 2022-03-23

## 2022-03-18 RX ORDER — VANCOMYCIN HCL IN 5 % DEXTROSE 1G/250ML
1000 PLASTIC BAG, INJECTION (ML) INTRAVENOUS
Status: DISPENSED | OUTPATIENT
Start: 2022-03-18

## 2022-03-18 RX ORDER — ACETAMINOPHEN 325 MG/1
650 TABLET ORAL EVERY 4 HOURS PRN
Status: CANCELLED | OUTPATIENT
Start: 2022-03-18

## 2022-03-18 RX ORDER — FENTANYL CITRATE 50 UG/ML
25 INJECTION, SOLUTION INTRAMUSCULAR; INTRAVENOUS EVERY 5 MIN PRN
Status: DISCONTINUED | OUTPATIENT
Start: 2022-03-18 | End: 2022-03-18 | Stop reason: HOSPADM

## 2022-03-18 RX ORDER — PROCHLORPERAZINE EDISYLATE 5 MG/ML
5 INJECTION INTRAMUSCULAR; INTRAVENOUS EVERY 30 MIN PRN
Status: DISCONTINUED | OUTPATIENT
Start: 2022-03-18 | End: 2022-03-18 | Stop reason: HOSPADM

## 2022-03-18 RX ADMIN — Medication 125 MCG/KG/MIN: at 12:03

## 2022-03-18 RX ADMIN — LIDOCAINE HYDROCHLORIDE 80 MG: 20 INJECTION, SOLUTION INTRAVENOUS at 12:03

## 2022-03-18 RX ADMIN — VANCOMYCIN HYDROCHLORIDE 1000 MG: 1 INJECTION, POWDER, LYOPHILIZED, FOR SOLUTION INTRAVENOUS at 12:03

## 2022-03-18 RX ADMIN — PROPOFOL 40 MG: 10 INJECTION, EMULSION INTRAVENOUS at 12:03

## 2022-03-18 RX ADMIN — SODIUM CHLORIDE: 9 INJECTION, SOLUTION INTRAVENOUS at 12:03

## 2022-03-18 NOTE — PLAN OF CARE
Received report from Juan David. Patient s/p Gen change, AAOx3. VSS, no c/o pain or discomfort at this time, resp even and unlabored. Aquacel dressing to L upper chest wall is CDI. No active bleeding. No hematoma noted. Post procedure protocol reviewed with patient and patient's family. Understanding verbalized. Family members at bedside. Nurse call bell within reach. Will continue to monitor per post procedure protocol.

## 2022-03-18 NOTE — PROGRESS NOTES
Pt received from EP lab, arousable but sleepy. Incision  open to air red area around incision, dressing to be applied. Vs stable.

## 2022-03-18 NOTE — NURSING TRANSFER
Nursing Transfer Note      3/18/2022     Reason patient is being transferred: pt released from anesth by angeli    Transfer To: s[facility] 3    Transfer via stretcher    Transfer with cardiac monitoring    Transported by PORFIRIO Gleason RN    Medicines sent: none    Any special needs or follow-up needed:     Chart send with patient: Yes    Notified: spouse    Patient reassessed at: 1430 3/18/2022    Upon arrival to floor: cardiac monitor applied, patient oriented to room, call bell in reach and bed in lowest position

## 2022-03-18 NOTE — TRANSFER OF CARE
Anesthesia Transfer of Care Note    Patient: Nelson GIBBONS Parent    Procedure(s) Performed: Procedure(s) (LRB):  REPLACEMENT, ICD GENERATOR (Left)    Patient location: PACU    Anesthesia Type: general    Transport from OR: Transported from OR on 6-10 L/min O2 by face mask with adequate spontaneous ventilation    Post pain: adequate analgesia    Post assessment: no apparent anesthetic complications and tolerated procedure well    Post vital signs: stable    Level of consciousness: sedated and responds to stimulation    Nausea/Vomiting: no nausea/vomiting    Complications: none    Transfer of care protocol was followed      Last vitals:   Visit Vitals  BP (!) 157/72 (BP Location: Right arm, Patient Position: Lying)   Pulse 61   Temp 36.7 °C (98.1 °F) (Temporal)   Resp 18   Ht 6' (1.829 m)   Wt 79.4 kg (175 lb)   SpO2 95%   BMI 23.73 kg/m²

## 2022-03-18 NOTE — HOSPITAL COURSE
Nelson Huntley is a 86 y.o. male with NICM, VT s/p DC ICD here for generator change. His history includes ventricular tachycardia, near syncope, CAD/MI s/p PCIs, ICM, TIA, thrombocytopenia. He had near syncope, symptomatic VTNS, inducible MVTS (multiple morphs). He had dual chamber Kelli ICD implanted 3/15/12. Normal DC ICD function with no sustained arrhythmias per last interrogation. He is clinically stable today. I spoke to him about the procedure involved in generator exchange. Also discussed potential risks of the procedure. He expressed understanding and consented to the planned procedure.He is accompanied by his wife.         ECG today shows  sinus bradycardia with prolonged NE interval.      After consenting to the procedure, Mr. Huntley was brought to the EP lab where he had successful generator change. He tolerated the procedure well and was discharged home in good condition.       Please remember:    Start Doxycycline 100 mg oral twice a day for 5 days at discharge  LEAVE DRESSING UNTIL YOUR WOUND CARE CHECK IN 1 WEEK  Patient may shower in 24 hours, do not let beam of shower hit site directly and no scrubbing in area  Follow up in device clinic in 1 week and with Dr. Vance means 4 months.

## 2022-03-18 NOTE — PLAN OF CARE
Pt awake and alert, vs stable, complains of slight pressure at site but doesn't want medication.  Dressing intact with ice pack at site. Wife notified of impending transfer.

## 2022-03-18 NOTE — Clinical Note
A generator pocket was opened at the left chest with blunt dissection, electrocautery and sharp dissection.

## 2022-03-18 NOTE — DISCHARGE SUMMARY
[facility] - [facility]iology  Cardiac Electrophysiology  Discharge Summary      Patient Name: Nelson Huntley  MRN: [id number]  Admission Date: 3/18/2022  Hospital Length of Stay: 0 days  Discharge Date and Time:  03/18/2022 1:35 PM  Attending Physician: Felipe Woodard MD    Discharging Provider: Ashutosh Ruth MD  Primary Care Physician: Bety Tomlinson MD      No notes on file    Procedure(s) (LRB):  REPLACEMENT, ICD GENERATOR (Left)     Indwelling Lines/Drains at time of discharge:  Lines/Drains/Airways     None                 Hospital Course:  Nelson Huntley is a 86 y.o. male with NICM, VT s/p DC ICD here for generator change. His history includes ventricular tachycardia, near syncope, CAD/MI s/p PCIs, ICM, TIA, thrombocytopenia. He had near syncope, symptomatic VTNS, inducible MVTS (multiple morphs). He had dual chamber Kelli ICD implanted 3/15/12. Normal DC ICD function with no sustained arrhythmias per last interrogation. He is clinically stable today. I spoke to him about the procedure involved in generator exchange. Also discussed potential risks of the procedure. He expressed understanding and consented to the planned procedure.He is accompanied by his wife.         ECG today shows  sinus bradycardia with prolonged PA interval.      After consenting to the procedure, Mr. Huntley was brought to the EP lab where he had successful generator change. He tolerated the procedure well and was discharged home in good condition.       Please remember:    Start Doxycycline 100 mg oral twice a day for 5 days at discharge  LEAVE DRESSING UNTIL YOUR WOUND CARE CHECK IN 1 WEEK  Patient may shower in 24 hours, do not let beam of shower hit site directly and no scrubbing in area  Follow up in device clinic in 1 week and with Dr. Woodard n 4 months.      Goals of Care Treatment Preferences:  Code Status: Full Code    Health care agent: Aleah BAUTISTA [facility]  Health care agent number: [phone]                   Consults: none      Significant Diagnostic Studies: Labs: BMP: No results for input(s): GLU, NA, K, CL, CO2, BUN, CREATININE, CALCIUM, MG in the last 48 hours.    Pending Diagnostic Studies:     Procedure Component Value Units Date/Time    EKG 12-lead [[id number]]     Order Status: Sent Lab Status: No result           There are no hospital problems to display for this patient.    No new Assessment & Plan notes have been filed under this hospital service since the last note was generated.  Service: Arrhythmia      Discharged Condition: good    Disposition: Home     Follow Up:   Follow-up Information     [facility] - Electrophysiology 3rd Fl Follow up in 1 week(s).    Specialty: Electrophysiology  Why: For wound re-check  Contact information:  [facility] [facility] [address] [phone]  Additional information:  [facility] Clin[facility] - Main Building, [facility] 3rd Floor   Please park in [facility] and use [facility] elevator           Felipe Woodard MD Follow up in 1 month(s).    Specialties: Electrophysiology, Cardiovascular Disease  Contact information:  [address] [facility] [id number] [phone]                       Patient Instructions:   No discharge procedures on file.  Medications:  Reconciled Home Medications:      Medication List      START taking these medications    doxycycline 100 MG Cap  Commonly known as: VIBRAMYCIN  Take 1 capsule (100 mg total) by mouth every 12 (twelve) hours. for 5 days        CONTINUE taking these medications    amLODIPine 10 MG tablet  Commonly known as: NORVASC  Take 1 tablet (10 mg total) by mouth once daily.     aspirin 81 MG Chew  Take 1 tablet by mouth Daily. 81  By mouth Every day     clopidogreL 75 mg tablet  Commonly known as: PLAVIX  Take 1 tablet (75 mg total) by mouth once daily.     coenzyme Q10 10 mg capsule  Take 10 mg by mouth once daily.     famotidine 20 MG tablet  Commonly known as: PEPCID  Take 20 mg by mouth 2 (two) times daily.      losartan 50 MG tablet  Commonly known as: COZAAR  TAKE 1 TABLET TWICE DAILY     metoprolol succinate 25 MG 24 hr tablet  Commonly known as: TOPROL-XL  Take 1 tablet (25 mg total) by mouth 2 (two) times daily.     multivitamin with minerals tablet  Take 1 tablet by mouth once daily.     niacin 500 MG Tab  Take 3 tablets by mouth Every PM. 3 Tablet Oral Every evening     nitroGLYCERIN 0.4 MG SL tablet  Commonly known as: NITROSTAT  Take 1 tab under the tongue for chest pain. May repeat every 5 minutes for a total of 3 doses. If chest pain not relieved go to the ED.     omega-3 fatty acids-vitamin E 1,000 mg Cap  Take 1 capsule by mouth 2 (two) times daily.     pravastatin 40 MG tablet  Commonly known as: PRAVACHOL  Take 1 tablet (40 mg total) by mouth every evening.     sildenafiL 100 MG tablet  Commonly known as: VIAGRA  Take 1 tablet (100 mg total) by mouth daily as needed for Erectile Dysfunction.     sotaloL 120 MG Tab  Commonly known as: BETAPACE  TAKE 1 TABLET TWICE DAILY     vitamin D 1000 units Tab  Commonly known as: VITAMIN D3  Take 1,000 Units by mouth every Tues, Thurs, Sat.            Time spent on the discharge of patient: 20 minutes    Ashutosh Ruth MD  Cardiac Electrophysiology  [facility] - Cardiology

## 2022-03-18 NOTE — DISCHARGE INSTRUCTIONS
Start Doxycycline 100 mg oral twice a day for 5 days at discharge  LEAVE DRESSING UNTIL YOUR WOUND CARE CHECK IN 1 WEEK  Patient may shower in 24 hours, do not let beam of shower hit site directly and no scrubbing in area  Follow up in device clinic in 1 week and with Dr. Vance means 4 months.

## 2022-03-18 NOTE — Clinical Note
There was an existing generator. The generator was removed. The leads were disconnected. The generator was returned to . The generator was returned due to SEFERINO/EOL

## 2022-03-18 NOTE — ANESTHESIA PREPROCEDURE EVALUATION
03/18/2022  Pre-operative evaluation for Procedure(s) (LRB):  REPLACEMENT, ICD GENERATOR (Left)    Nelson GIBBONS Parent is a 86 y.o. male here for ICD gen change. Hx of ischemic cardiomyopathy, last echo reviewed: LVEF 40%, normal RV systolic function     Patient Active Problem List   Diagnosis    Thrombocytopenia    CKD (chronic kidney disease) stage 3, GFR 30-59 ml/min    AICD (automatic cardioverter/defibrillator) present    Essential hypertension    Hyperlipidemia    Anemia associated with chronic renal failure    Coronary artery disease involving native coronary artery without angina pectoris    Old myocardial infarction    S/P AAA (abdominal aortic aneurysm) repair    Diverticulosis    Nuclear sclerosis    Vasculogenic erectile dysfunction    Tovar's esophagus without dysplasia    Monoclonal paraproteinemia    Aortic atherosclerosis    Ischemic cardiomyopathy    Seasonal allergic rhinitis    Body mass index (BMI) of 23.0 to 23.9 in adult    Positive ELYSIA (antinuclear antibody)    Abnormal serum protein electrophoresis    Vitamin D deficiency    MGUS (monoclonal gammopathy of unknown significance)    Gastroesophageal reflux disease    History of TIA (transient ischemic attack)    Hypercalcemia    Metabolic bone disease    Abnormal CT of the head    Altered mental status    Cerebral infarction, remote, resolved    History of ventricular tachycardia    Chronic combined systolic and diastolic congestive heart failure       Review of patient's allergies indicates:   Allergen Reactions    Fluress [fluorescein-benoxinate]     Mydriacyl [tropicamide]     Pcn  [penicillins]      Other reaction(s): Unknown    Clindamycin Rash       No current facility-administered medications on file prior to encounter.     Current Outpatient Medications on File Prior to Encounter   Medication Sig  Dispense Refill    amLODIPine (NORVASC) 10 MG tablet Take 1 tablet (10 mg total) by mouth once daily. 90 tablet 3    aspirin 81 MG chewable tablet Take 1 tablet by mouth Daily. 81  By mouth Every day      coenzyme Q10 10 mg capsule Take 10 mg by mouth once daily.      famotidine (PEPCID) 20 MG tablet Take 20 mg by mouth 2 (two) times daily.      losartan (COZAAR) 50 MG tablet TAKE 1 TABLET TWICE DAILY 180 tablet 1    metoprolol succinate (TOPROL-XL) 25 MG 24 hr tablet Take 1 tablet (25 mg total) by mouth 2 (two) times daily. 180 tablet 1    multivitamin with minerals tablet Take 1 tablet by mouth once daily.       niacin 500 MG tablet Take 3 tablets by mouth Every PM. 3 Tablet Oral Every evening      omega-3 fatty acids-vitamin E 1,000 mg Cap Take 1 capsule by mouth 2 (two) times daily.      pravastatin (PRAVACHOL) 40 MG tablet Take 1 tablet (40 mg total) by mouth every evening. 90 tablet 1    sotaloL (BETAPACE) 120 MG Tab TAKE 1 TABLET TWICE DAILY 180 tablet 3    clopidogreL (PLAVIX) 75 mg tablet Take 1 tablet (75 mg total) by mouth once daily. 90 tablet 3    nitroGLYCERIN (NITROSTAT) 0.4 MG SL tablet Take 1 tab under the tongue for chest pain. May repeat every 5 minutes for a total of 3 doses. If chest pain not relieved go to the ED. 25 tablet 4    sildenafiL (VIAGRA) 100 MG tablet Take 1 tablet (100 mg total) by mouth daily as needed for Erectile Dysfunction. 8 tablet 10    vitamin D 1000 units Tab Take 1,000 Units by mouth every Tues, Thurs, Sat.          Past Surgical History:   Procedure Laterality Date    ABDOMINAL AORTIC ANEURYSM REPAIR      CARDIAC DEFIBRILLATOR PLACEMENT      CATARACT EXTRACTION, BILATERAL  2018    CORONARY ANGIOPLASTY      EYE SURGERY Bilateral     cataract    HERNIA REPAIR      x2       Social History     Socioeconomic History    Marital status:    Tobacco Use    Smoking status: Never Smoker    Smokeless tobacco: Never Used   Substance and Sexual Activity     Alcohol use: No    Drug use: No    Sexual activity: Yes     Partners: Female     Comment:      Social Determinants of Health     Financial Resource Strain: Low Risk     Difficulty of Paying Living Expenses: Not hard at all   Food Insecurity: No Food Insecurity    Worried About Running Out of Food in the Last Year: Never true    Ran Out of Food in the Last Year: Never true   Transportation Needs: No Transportation Needs    Lack of Transportation (Medical): No    Lack of Transportation (Non-Medical): No   Physical Activity: Sufficiently Active    Days of Exercise per Week: 5 days    Minutes of Exercise per Session: 40 min   Stress: No Stress Concern Present    Feeling of Stress : Not at all   Social Connections: Unknown    Frequency of Communication with Friends and Family: Three times a week    Frequency of Social Gatherings with Friends and Family: Three times a week    Active Member of Clubs or Organizations: No    Attends Club or Organization Meetings: Never    Marital Status:    Housing Stability: Low Risk     Unable to Pay for Housing in the Last Year: No    Number of Places Lived in the Last Year: 1    Unstable Housing in the Last Year: No         CBC:   Recent Labs     03/15/22  1111   WBC 8.00   RBC 4.55*   HGB 14.0   HCT 41.0   *   MCV 90   MCH 30.8   MCHC 34.1       CMP:   Recent Labs     03/15/22  1111      K 3.5      CO2 28   BUN 18   CREATININE 1.6*   *   CALCIUM 9.3       INR  Recent Labs     03/15/22  1111   INR 1.1   APTT 25.3                 2D Echo:  Results for orders placed or performed during the hospital encounter of 09/24/18   2D echo with color flow doppler   Result Value Ref Range    EF + QEF 40 (A) 55 - 65    Diastolic Dysfunction Yes (A)     Est. PA Systolic Pressure 18.84     Pericardial Effusion NONE            Pre-op Assessment    I have reviewed the Patient Summary Reports.     I have reviewed the Nursing Notes. I have  reviewed the NPO Status.      Review of Systems  Anesthesia Hx:  No problems with previous Anesthesia    Cardiovascular:   Hypertension Past MI CAD   CHF    Renal/:   Chronic Renal Disease, CRI    Hepatic/GI:   GERD        Physical Exam  General: Well nourished    Airway:  Mallampati: II   Mouth Opening: Normal  Tongue: Normal    Chest/Lungs:  Normal Respiratory Rate    Heart:  Rate: Normal        Anesthesia Plan  Type of Anesthesia, risks & benefits discussed:    Anesthesia Type: Gen Natural Airway  Intra-op Monitoring Plan: Standard ASA Monitors  Post Op Pain Control Plan: multimodal analgesia  Informed Consent: Informed consent signed with the Patient and all parties understand the risks and agree with anesthesia plan.  All questions answered.   ASA Score: 3    Ready For Surgery From Anesthesia Perspective.     .

## 2022-03-18 NOTE — PROGRESS NOTES
Pt DC'd per MD order. Discharge instructions given including activity, wound care, home health info, future appointments, and when to call MD. Medications reviewed including when to take next dose. Prescriptions given to patient. Telemetry and PIV DC'd, catheter tip intact. Pt left unit via wheelchair with transport and family.

## 2022-03-18 NOTE — H&P
Electrophysiology Pre Procedure H&P  Reason for visit: Elective generator change     HPI:   Nelson GIBBONS Parent is a 86 y.o. male with NICM, VT s/p DC ICD here for generator change. His history includes ventricular tachycardia, near syncope, CAD/MI s/p PCIs, ICM, TIA, thrombocytopenia. He had near syncope, symptomatic VTNS, inducible MVTS (multiple morphs). He had dual chamber Kelli ICD implanted 3/15/12. Normal DC ICD function with no sustained arrhythmias per last interrogation. He is clinically stable today. I spoke to him about the procedure involved in generator exchange. Also discussed potential risks of the procedure. He expressed understanding and consented to the planned procedure.He is accompanied by his wife.       ECG today shows  sinus bradycardia with prolonged MA interval.     Past Medical History:   Diagnosis Date    AICD (automatic cardioverter/defibrillator) present 7/17/2012    Cardiomyopathy     CKD (chronic kidney disease) stage 3, GFR 30-59 ml/min 7/17/2012    Coronary artery disease     GERD (gastroesophageal reflux disease)     Tovar's; Dr. Vu    Hyperlipidemia 7/17/2012    Hypertension 7/17/2012    Ischemic cardiomyopathy 7/17/2012    MGUS (monoclonal gammopathy of unknown significance)     Thrombocytopenia 7/17/2012    Ventricular tachycardia     VT (ventricular tachycardia) 7/17/2012        Social History     Socioeconomic History    Marital status:    Tobacco Use    Smoking status: Never Smoker    Smokeless tobacco: Never Used   Substance and Sexual Activity    Alcohol use: No    Drug use: No    Sexual activity: Yes     Partners: Female     Comment:      Social Determinants of Health     Financial Resource Strain: Low Risk     Difficulty of Paying Living Expenses: Not hard at all   Food Insecurity: No Food Insecurity    Worried About Running Out of Food in the Last Year: Never true    Ran Out of Food in the Last Year: Never true   Transportation Needs:  No Transportation Needs    Lack of Transportation (Medical): No    Lack of Transportation (Non-Medical): No   Physical Activity: Sufficiently Active    Days of Exercise per Week: 5 days    Minutes of Exercise per Session: 40 min   Stress: No Stress Concern Present    Feeling of Stress : Not at all   Social Connections: Unknown    Frequency of Communication with Friends and Family: Three times a week    Frequency of Social Gatherings with Friends and Family: Three times a week    Active Member of Clubs or Organizations: No    Attends Club or Organization Meetings: Never    Marital Status:    Housing Stability: Low Risk     Unable to Pay for Housing in the Last Year: No    Number of Places Lived in the Last Year: 1    Unstable Housing in the Last Year: No        Family History   Problem Relation Age of Onset    Cancer Mother         Lymphoma    Lymphoma Mother     Coronary artery disease Mother     Heart disease Mother     Heart disease Father     Heart attacks under age 50 Father     No Known Problems Brother     Heart disease Sister         Current Facility-Administered Medications   Medication Dose Route Frequency Provider Last Rate Last Admin    sodium chloride 0.9% bolus 1,000 mL  1,000 mL Intravenous Once Violette Delgado NP        vancomycin in dextrose 5 % 1 gram/250 mL IVPB 1,000 mg  1,000 mg Intravenous On Call Procedure Violette Delgado NP          ROS:  Constitutional: (-)fevers, (-)chills, (-)night sweats  HEENT: (-) headaches (-) lightheadedness (-) blurry vision  Cardiovascular: (-)chest pain (-)paroxysmal nocturnal dyspnea (-)orthopnea  Respiratory: (-)shortness of breath, (+)dyspnea on exertion (-)hemoptysis  Gastrointestinal: (-)abdominal pain (-)nausea (-)vomiting (-)tarry stools  Musculoskeletal: (-)arthralgias (-)limited range of motion  Neurologic: (-)parasthesias (-)mood disorder (-)anxiety  Endo: (-)polyuria (-)polydipsia (-)heat/cold intolerance  Skin: (-)rash      Vitals:    03/18/22 0935 03/18/22 0936   BP: (!) 161/56 (!) 157/72   BP Location: Left arm Right arm   Patient Position: Lying Lying   Pulse: 61    Resp: 18    Temp: 98.1 °F (36.7 °C)    TempSrc: Temporal    SpO2: 95%    Weight: 79.4 kg (175 lb)    Height: 6' (1.829 m)      Physical Exam:   Gen: no acute distress, pleasant patient answering questions appropriately  HEENT: extra-ocular muscles intact, normocephalic-atraumatic  Neck: no jugular venous distension, no lymphadenopathy, no thyromegaly, no carotid bruits  CVS: regular rate and rhythm, S1S2 normal, no murmurs/rubs/gallops  CHEST: + ICD generator and left chest, clear to auscultation bilaterally, no wheezes/rales/ronchi  ABD: Soft, non-tender, nondistended, bowel sounds present  EXT: No Edema, 2+ pulses bilaterally   NEURO: awake, alert, and oriented   Skin: No rash     Review of Labs:   Lab Results   Component Value Date    WBC 8.00 03/15/2022    HGB 14.0 03/15/2022    HCT 41.0 03/15/2022    MCV 90 03/15/2022     (L) 03/15/2022       CMP  Sodium   Date Value Ref Range Status   03/15/2022 141 136 - 145 mmol/L Final     Potassium   Date Value Ref Range Status   03/15/2022 3.5 3.5 - 5.1 mmol/L Final     Chloride   Date Value Ref Range Status   03/15/2022 108 95 - 110 mmol/L Final     CO2   Date Value Ref Range Status   03/15/2022 28 23 - 29 mmol/L Final     Glucose   Date Value Ref Range Status   03/15/2022 132 (H) 70 - 110 mg/dL Final     BUN   Date Value Ref Range Status   03/15/2022 18 8 - 23 mg/dL Final     Creatinine   Date Value Ref Range Status   03/15/2022 1.6 (H) 0.5 - 1.4 mg/dL Final     Calcium   Date Value Ref Range Status   03/15/2022 9.3 8.7 - 10.5 mg/dL Final     Total Protein   Date Value Ref Range Status   01/21/2022 7.0 6.0 - 8.4 g/dL Final     Albumin   Date Value Ref Range Status   01/21/2022 3.8 3.5 - 5.2 g/dL Final     Total Bilirubin   Date Value Ref Range Status   01/21/2022 1.1 (H) 0.1 - 1.0 mg/dL Final     Comment:     For  infants and newborns, interpretation of results should be based  on gestational age, weight and in agreement with clinical  observations.    Premature Infant recommended reference ranges:  Up to 24 hours.............<8.0 mg/dL  Up to 48 hours............<12.0 mg/dL  3-5 days..................<15.0 mg/dL  6-29 days.................<15.0 mg/dL       Alkaline Phosphatase   Date Value Ref Range Status   01/21/2022 87 55 - 135 U/L Final     AST   Date Value Ref Range Status   01/21/2022 23 10 - 40 U/L Final     ALT   Date Value Ref Range Status   01/21/2022 18 10 - 44 U/L Final     Anion Gap   Date Value Ref Range Status   03/15/2022 5 (L) 8 - 16 mmol/L Final     eGFR if    Date Value Ref Range Status   03/15/2022 44 (A) >60 mL/min/1.73 m^2 Final     eGFR if non    Date Value Ref Range Status   03/15/2022 38 (A) >60 mL/min/1.73 m^2 Final     Comment:     Calculation used to obtain the estimated glomerular filtration  rate (eGFR) is the CKD-EPI equation.          Lab Results   Component Value Date    WBC 8.00 03/15/2022    HGB 14.0 03/15/2022    HCT 41.0 03/15/2022    MCV 90 03/15/2022     (L) 03/15/2022     Results for orders placed during the hospital encounter of 06/02/20    Transthoracic echo (TTE) complete (Cupid Only)    Interpretation Summary  · Moderately decreased left ventricular systolic function. The estimated ejection fraction is 40%.  · Local segmental wall motion abnormalities.  · Grade I (mild) left ventricular diastolic dysfunction consistent with impaired relaxation.  · Mild left atrial enlargement.  · Normal right ventricular systolic function.  · Mild right atrial enlargement.  · Normal central venous pressure (3 mmHg).  · The estimated PA systolic pressure is 17 mmHg.      Most recent ECG  Sinus bradycardia .     Summary:  Nelson GIBBONS Parent is a 86 y.o. male with  here for ICD gen change. Hx of ischemic cardiomyopathy, last echo reviewed: LVEF 40%, normal RV  systolic function. Procedure for generator change was discussed with him and he expressed understanding and consented to the planned procedure.     Plan:  Proceed to the planned generator change

## 2022-03-24 ENCOUNTER — PATIENT MESSAGE (OUTPATIENT)
Dept: CARDIOLOGY | Facility: HOSPITAL | Age: 87
End: 2022-03-24
Payer: MEDICARE

## 2022-03-25 ENCOUNTER — CLINICAL SUPPORT (OUTPATIENT)
Dept: CARDIOLOGY | Facility: HOSPITAL | Age: 87
End: 2022-03-25
Attending: INTERNAL MEDICINE
Payer: MEDICARE

## 2022-03-25 ENCOUNTER — TELEPHONE (OUTPATIENT)
Dept: ELECTROPHYSIOLOGY | Facility: CLINIC | Age: 87
End: 2022-03-25
Payer: MEDICARE

## 2022-03-25 DIAGNOSIS — I25.5 ISCHEMIC CARDIOMYOPATHY: ICD-10-CM

## 2022-03-25 DIAGNOSIS — Z86.79 HISTORY OF VENTRICULAR TACHYCARDIA: ICD-10-CM

## 2022-03-25 DIAGNOSIS — Z45.02 IMPLANTABLE CARDIOVERTER-DEFIBRILLATOR (ICD) AT END OF BATTERY LIFE: ICD-10-CM

## 2022-03-25 PROCEDURE — 93283 PRGRMG EVAL IMPLANTABLE DFB: CPT | Mod: 26,,, | Performed by: INTERNAL MEDICINE

## 2022-03-25 PROCEDURE — 93283 CARDIAC DEVICE CHECK - IN CLINIC & HOSPITAL: ICD-10-PCS | Mod: 26,,, | Performed by: INTERNAL MEDICINE

## 2022-03-25 PROCEDURE — 93283 PRGRMG EVAL IMPLANTABLE DFB: CPT

## 2022-03-25 NOTE — TELEPHONE ENCOUNTER
Called patient to follow up after his generator change.  No answer, left message. Called to inquire if he is having any symptoms, complications post op, any questions.  Call back number provided

## 2022-03-29 ENCOUNTER — TELEPHONE (OUTPATIENT)
Dept: ELECTROPHYSIOLOGY | Facility: CLINIC | Age: 87
End: 2022-03-29
Payer: MEDICARE

## 2022-03-29 NOTE — TELEPHONE ENCOUNTER
Spoke to: Nelson Parent    Does the patient have any concerns, questions about post op instructions? NO    Does the patient have any concerns, questions about wound site?NO had wound check appt 3/25/22.  No new changes ie redness, drainage.   Site looks Ok to patient.       Has the patient resumed medications and/or picked up new prescriptions ordered at discharge? Yes completed his antibiotics     Does the patient have any other questions regarding their procedure? NO he is recovering well , reports a little soreness remains but improving every day.    Patient verbalized understanding of his instructions and appreciated the call.

## 2022-03-30 ENCOUNTER — PATIENT MESSAGE (OUTPATIENT)
Dept: CARDIOLOGY | Facility: CLINIC | Age: 87
End: 2022-03-30
Payer: MEDICARE

## 2022-03-31 ENCOUNTER — OFFICE VISIT (OUTPATIENT)
Dept: FAMILY MEDICINE | Facility: CLINIC | Age: 87
End: 2022-03-31
Payer: MEDICARE

## 2022-03-31 VITALS
WEIGHT: 176.13 LBS | HEART RATE: 54 BPM | OXYGEN SATURATION: 96 % | BODY MASS INDEX: 23.86 KG/M2 | DIASTOLIC BLOOD PRESSURE: 68 MMHG | HEIGHT: 72 IN | SYSTOLIC BLOOD PRESSURE: 130 MMHG

## 2022-03-31 DIAGNOSIS — D63.1 ANEMIA ASSOCIATED WITH CHRONIC RENAL FAILURE: ICD-10-CM

## 2022-03-31 DIAGNOSIS — D69.6 THROMBOCYTOPENIA: ICD-10-CM

## 2022-03-31 DIAGNOSIS — R00.0 TACHYCARDIA: Primary | ICD-10-CM

## 2022-03-31 DIAGNOSIS — E78.5 HYPERLIPIDEMIA, UNSPECIFIED HYPERLIPIDEMIA TYPE: ICD-10-CM

## 2022-03-31 DIAGNOSIS — N18.30 STAGE 3 CHRONIC KIDNEY DISEASE, UNSPECIFIED WHETHER STAGE 3A OR 3B CKD: Chronic | ICD-10-CM

## 2022-03-31 DIAGNOSIS — I25.10 CORONARY ARTERY DISEASE INVOLVING NATIVE CORONARY ARTERY OF NATIVE HEART WITHOUT ANGINA PECTORIS: ICD-10-CM

## 2022-03-31 DIAGNOSIS — K21.9 GASTROESOPHAGEAL REFLUX DISEASE, UNSPECIFIED WHETHER ESOPHAGITIS PRESENT: ICD-10-CM

## 2022-03-31 DIAGNOSIS — N18.9 ANEMIA ASSOCIATED WITH CHRONIC RENAL FAILURE: ICD-10-CM

## 2022-03-31 DIAGNOSIS — E55.9 VITAMIN D DEFICIENCY: ICD-10-CM

## 2022-03-31 DIAGNOSIS — I70.0 AORTIC ATHEROSCLEROSIS: ICD-10-CM

## 2022-03-31 DIAGNOSIS — Z95.810 AICD (AUTOMATIC CARDIOVERTER/DEFIBRILLATOR) PRESENT: Chronic | ICD-10-CM

## 2022-03-31 DIAGNOSIS — I10 ESSENTIAL HYPERTENSION: ICD-10-CM

## 2022-03-31 DIAGNOSIS — I50.42 CHRONIC COMBINED SYSTOLIC AND DIASTOLIC CONGESTIVE HEART FAILURE: ICD-10-CM

## 2022-03-31 DIAGNOSIS — N52.9 VASCULOGENIC ERECTILE DYSFUNCTION, UNSPECIFIED VASCULOGENIC ERECTILE DYSFUNCTION TYPE: ICD-10-CM

## 2022-03-31 DIAGNOSIS — Z00.00 ENCOUNTER FOR PREVENTIVE HEALTH EXAMINATION: Primary | ICD-10-CM

## 2022-03-31 DIAGNOSIS — D69.3 IDIOPATHIC THROMBOCYTOPENIC PURPURA: ICD-10-CM

## 2022-03-31 PROCEDURE — G0439 PR MEDICARE ANNUAL WELLNESS SUBSEQUENT VISIT: ICD-10-PCS | Mod: S$GLB,,, | Performed by: NURSE PRACTITIONER

## 2022-03-31 PROCEDURE — 1101F PT FALLS ASSESS-DOCD LE1/YR: CPT | Mod: CPTII,S$GLB,, | Performed by: NURSE PRACTITIONER

## 2022-03-31 PROCEDURE — 1101F PR PT FALLS ASSESS DOC 0-1 FALLS W/OUT INJ PAST YR: ICD-10-PCS | Mod: CPTII,S$GLB,, | Performed by: NURSE PRACTITIONER

## 2022-03-31 PROCEDURE — 1160F RVW MEDS BY RX/DR IN RCRD: CPT | Mod: CPTII,S$GLB,, | Performed by: NURSE PRACTITIONER

## 2022-03-31 PROCEDURE — 1159F MED LIST DOCD IN RCRD: CPT | Mod: CPTII,S$GLB,, | Performed by: NURSE PRACTITIONER

## 2022-03-31 PROCEDURE — 3288F FALL RISK ASSESSMENT DOCD: CPT | Mod: CPTII,S$GLB,, | Performed by: NURSE PRACTITIONER

## 2022-03-31 PROCEDURE — 1170F PR FUNCTIONAL STATUS ASSESSED: ICD-10-PCS | Mod: CPTII,S$GLB,, | Performed by: NURSE PRACTITIONER

## 2022-03-31 PROCEDURE — 1160F PR REVIEW ALL MEDS BY PRESCRIBER/CLIN PHARMACIST DOCUMENTED: ICD-10-PCS | Mod: CPTII,S$GLB,, | Performed by: NURSE PRACTITIONER

## 2022-03-31 PROCEDURE — 99999 PR PBB SHADOW E&M-EST. PATIENT-LVL IV: ICD-10-PCS | Mod: PBBFAC,,, | Performed by: NURSE PRACTITIONER

## 2022-03-31 PROCEDURE — 1159F PR MEDICATION LIST DOCUMENTED IN MEDICAL RECORD: ICD-10-PCS | Mod: CPTII,S$GLB,, | Performed by: NURSE PRACTITIONER

## 2022-03-31 PROCEDURE — G0439 PPPS, SUBSEQ VISIT: HCPCS | Mod: S$GLB,,, | Performed by: NURSE PRACTITIONER

## 2022-03-31 PROCEDURE — 1126F PR PAIN SEVERITY QUANTIFIED, NO PAIN PRESENT: ICD-10-PCS | Mod: CPTII,S$GLB,, | Performed by: NURSE PRACTITIONER

## 2022-03-31 PROCEDURE — 1126F AMNT PAIN NOTED NONE PRSNT: CPT | Mod: CPTII,S$GLB,, | Performed by: NURSE PRACTITIONER

## 2022-03-31 PROCEDURE — 1170F FXNL STATUS ASSESSED: CPT | Mod: CPTII,S$GLB,, | Performed by: NURSE PRACTITIONER

## 2022-03-31 PROCEDURE — 3288F PR FALLS RISK ASSESSMENT DOCUMENTED: ICD-10-PCS | Mod: CPTII,S$GLB,, | Performed by: NURSE PRACTITIONER

## 2022-03-31 PROCEDURE — 99999 PR PBB SHADOW E&M-EST. PATIENT-LVL IV: CPT | Mod: PBBFAC,,, | Performed by: NURSE PRACTITIONER

## 2022-03-31 NOTE — PATIENT INSTRUCTIONS
Counseling and Referral of Other Preventative  (Italic type indicates deductible and co-insurance are waived)    Patient Name: Nelson Parent  Today's Date: 3/31/2022    Health Maintenance       Date Due Completion Date    COVID-19 Vaccine (4 - Booster for Pfizer series) 03/14/2022 10/14/2021    Aspirin/Antiplatelet Therapy 03/18/2023 3/18/2022    Colonoscopy 05/07/2023 5/7/2018    TETANUS VACCINE 11/22/2023 11/22/2013    Lipid Panel 01/21/2027 1/21/2022        No orders of the defined types were placed in this encounter.    The following information is provided to all patients.  This information is to help you find resources for any of the problems found today that may be affecting your health:                Living healthy guide: www.[facility].[place].gov      Understanding Diabetes: www.diabetes.org      Eating healthy: www.cdc.gov/healthyweight      [facility] home safety checklist: www.cdc.gov/steadi/patient.html      Agency on Aging: www.goea.[place].gov      Alcoholics anonymous (AA): www.aa.org      Physical Activity: www.blas.nih.gov/qg9rghs      Tobacco use: www.quitwithusla.org

## 2022-03-31 NOTE — PROGRESS NOTES
Nelson Huntley presented for a  Medicare AWV and comprehensive Health Risk Assessment today. The following components were reviewed and updated:    · Medical history  · Family History  · Social history  · Allergies and Current Medications  · Health Risk Assessment  · Health Maintenance  · Care Team         ** See Completed Assessments for Annual Wellness Visit within the encounter summary.**         The following assessments were completed:  · Living Situation  · CAGE  · Depression Screening  · Timed Get Up and Go  · Whisper Test  · Cognitive Function Screening      · Nutrition Screening  · ADL Screening  · PAQ Screening        Vitals:    03/31/22 1248   BP: 130/68   BP Location: Right arm   Patient Position: Sitting   BP Method: Medium (Manual)   Pulse: (!) 54   SpO2: 96%   Weight: 79.9 kg (176 lb 2.4 oz)   Height: 6' (1.829 m)     Body mass index is 23.89 kg/m².     Physical Exam  Vitals reviewed.   Constitutional:       General: He is awake. He is not in acute distress.     Appearance: Normal appearance. He is well-developed, well-groomed and normal weight.   HENT:      Head: Normocephalic.      Ears:      Comments: Decreased hearing  Eyes:      General:         Right eye: No discharge.         Left eye: No discharge.   Cardiovascular:      Rate and Rhythm: Regular rhythm. Bradycardia present.      Pulses:           Radial pulses are 2+ on the right side and 2+ on the left side.        Dorsalis pedis pulses are 2+ on the right side and 2+ on the left side.      Comments: LWC ICD in place  Pulmonary:      Effort: Pulmonary effort is normal. No respiratory distress.   Abdominal:      General: There is no distension.   Musculoskeletal:      Right lower leg: No edema.      Left lower leg: No edema.   Skin:     General: Skin is warm and dry.      Coloration: Skin is not pale.   Neurological:      Mental Status: He is alert and oriented to person, place, and time.      Coordination: Coordination normal.      Gait:  Gait normal.   Psychiatric:         Attention and Perception: Attention normal.         Mood and Affect: Mood and affect normal.         Speech: Speech normal.         Behavior: Behavior normal. Behavior is cooperative.         Thought Content: Thought content normal.         Cognition and Memory: Cognition and memory normal.             Diagnoses and health risks identified today and associated recommendations/orders:    1. Encounter for preventive health examination    2. Essential hypertension  Chronic; stable on medication. Followed by Cardiology.    3. Aortic atherosclerosis  Chronic; stable on medication. Followed by Cardiology.    4. Chronic combined systolic and diastolic congestive heart failure  Chronic; stable on medication. Followed by Cardiology.    5. Coronary artery disease involving native coronary artery of native heart without angina pectoris  Chronic; stable on medication. Followed by Cardiology.    6. Idiopathic thrombocytopenic purpura  Chronic; stable. Follow up with PCP.    7. Stage 3 chronic kidney disease, unspecified whether stage 3a or 3b CKD  Chronic; stable on medication. Follow up with PCP.    8. AICD (automatic cardioverter/defibrillator) present  Hx of ventricular tachycardia with AICD in place; stable. Followed by Electrophysiology.    9. Thrombocytopenia  Chronic; stable. Follow up with PCP.    10. Hyperlipidemia, unspecified hyperlipidemia type  Chronic; stable on medication. Followed by Cardiology.    11. Vasculogenic erectile dysfunction, unspecified vasculogenic erectile dysfunction type  Chronic; stable on medication. Follow up with PCP.    12. Anemia associated with chronic renal failure  Chronic; stable on medication. Follow up with PCP.    13. Vitamin D deficiency  Chronic; stable on medication. Follow up with PCP.    14. Gastroesophageal reflux disease, unspecified whether esophagitis present  Chronic; stable on medication. Follow up with PCP.    15. Body mass index (BMI)  of 23.0 to 23.9 in adult  Patient's BMI is WNL. Encouraged patient to continue to eat a low salt/low fat diet and discussed importance of engaging in physical activity at least 5x/week for a minimum of 30 min/day.      Provided Edward with a 5-10 year written screening schedule and personal prevention plan. Recommendations were developed using the USPSTF age appropriate recommendations. Education, counseling, and referrals were provided as needed. After Visit Summary printed and given to patient which includes a list of additional screenings/tests needed.    Follow up for your next annual wellness visit.    Claudia Laird NP    Advance Care Planning     I offered to discuss advanced care planning, including how to pick a person who would make decisions for you if you were unable to make them for yourself, called a health care power of , and what kind of decisions you might make such as use of life sustaining treatments such as ventilators and tube feeding when faced with a life limiting illness recorded on a living will that they will need to know. (How you want to be cared for as you near the end of your natural life)     X Patient is interested in learning more about how to make advanced directives.  I provided them paperwork and offered to discuss this with them.

## 2022-04-26 ENCOUNTER — PATIENT MESSAGE (OUTPATIENT)
Dept: CARDIOLOGY | Facility: CLINIC | Age: 87
End: 2022-04-26
Payer: MEDICARE

## 2022-05-09 DIAGNOSIS — I10 ESSENTIAL HYPERTENSION: ICD-10-CM

## 2022-05-09 RX ORDER — LOSARTAN POTASSIUM 50 MG/1
TABLET ORAL
Qty: 180 TABLET | Refills: 0 | Status: SHIPPED | OUTPATIENT
Start: 2022-05-09 | End: 2022-07-25 | Stop reason: SDUPTHER

## 2022-05-09 NOTE — TELEPHONE ENCOUNTER
No new care gaps identified.  [facility] Embedded Care Gaps. Reference number: [id number]. 5/09/2022   2:27:06 PM CDT

## 2022-05-09 NOTE — TELEPHONE ENCOUNTER
Refill Routing Note   Medication(s) are not appropriate for processing by [facility] for the following reason(s):      - Required laboratory values are abnormal  - Patient has been seen in the ED/Hospital since the last PCP visit    [facility] action(s):  Defer          Medication reconciliation completed: No     Appointments  past 12m or future 3m with PCP    Date Provider   Last Visit   1/24/2022 Bety Tomlinson MD   Next Visit   7/25/2022 Bety Tomlinson MD   ED visits in past 90 days: 0        Note composed:4:34 PM 05/09/2022

## 2022-06-16 ENCOUNTER — CLINICAL SUPPORT (OUTPATIENT)
Dept: CARDIOLOGY | Facility: HOSPITAL | Age: 87
End: 2022-06-16
Payer: MEDICARE

## 2022-06-16 DIAGNOSIS — Z95.810 PRESENCE OF AUTOMATIC (IMPLANTABLE) CARDIAC DEFIBRILLATOR: ICD-10-CM

## 2022-06-16 DIAGNOSIS — I47.20 VENTRICULAR TACHYCARDIA: ICD-10-CM

## 2022-06-16 DIAGNOSIS — I25.5 ISCHEMIC CARDIOMYOPATHY: ICD-10-CM

## 2022-06-16 PROCEDURE — 93296 REM INTERROG EVL PM/IDS: CPT | Performed by: INTERNAL MEDICINE

## 2022-06-16 PROCEDURE — 93295 DEV INTERROG REMOTE 1/2/MLT: CPT | Mod: ,,, | Performed by: INTERNAL MEDICINE

## 2022-06-16 PROCEDURE — 93295 CARDIAC DEVICE CHECK - REMOTE: ICD-10-PCS | Mod: ,,, | Performed by: INTERNAL MEDICINE

## 2022-06-21 ENCOUNTER — PATIENT MESSAGE (OUTPATIENT)
Dept: INTERNAL MEDICINE | Facility: CLINIC | Age: 87
End: 2022-06-21
Payer: MEDICARE

## 2022-06-23 ENCOUNTER — PATIENT OUTREACH (OUTPATIENT)
Dept: ADMINISTRATIVE | Facility: HOSPITAL | Age: 87
End: 2022-06-23
Payer: MEDICARE

## 2022-06-23 NOTE — PROGRESS NOTES
2022 Care Everywhere updates requested and reviewed.  Immunizations reconciled. Media reports reviewed.  Duplicate HM overrides and  orders removed.  Overdue HM topic chart audit and/or requested.  Overdue lab testing linked to upcoming lab appointments if applies. Eye exam upload to media     Health Maintenance Due   Topic Date Due    COVID-19 Vaccine (4 - Booster for Pfizer series) 2022

## 2022-06-30 ENCOUNTER — LAB VISIT (OUTPATIENT)
Dept: LAB | Facility: HOSPITAL | Age: 87
End: 2022-06-30
Attending: INTERNAL MEDICINE
Payer: MEDICARE

## 2022-06-30 ENCOUNTER — OFFICE VISIT (OUTPATIENT)
Dept: INTERNAL MEDICINE | Facility: CLINIC | Age: 87
End: 2022-06-30
Payer: MEDICARE

## 2022-06-30 VITALS
SYSTOLIC BLOOD PRESSURE: 130 MMHG | BODY MASS INDEX: 23.59 KG/M2 | DIASTOLIC BLOOD PRESSURE: 70 MMHG | HEIGHT: 72 IN | OXYGEN SATURATION: 95 % | HEART RATE: 66 BPM | WEIGHT: 174.19 LBS

## 2022-06-30 DIAGNOSIS — E53.8 VITAMIN B12 DEFICIENCY: ICD-10-CM

## 2022-06-30 DIAGNOSIS — Z12.5 PROSTATE CANCER SCREENING: ICD-10-CM

## 2022-06-30 DIAGNOSIS — D51.8 VITAMIN B12 DEFICIENCY (DIETARY) ANEMIA: ICD-10-CM

## 2022-06-30 DIAGNOSIS — E78.5 HYPERLIPIDEMIA, UNSPECIFIED HYPERLIPIDEMIA TYPE: ICD-10-CM

## 2022-06-30 DIAGNOSIS — R53.83 FATIGUE, UNSPECIFIED TYPE: ICD-10-CM

## 2022-06-30 DIAGNOSIS — R19.7 DIARRHEA, UNSPECIFIED TYPE: ICD-10-CM

## 2022-06-30 DIAGNOSIS — K22.70 BARRETT'S ESOPHAGUS WITHOUT DYSPLASIA: ICD-10-CM

## 2022-06-30 LAB
ALBUMIN SERPL BCP-MCNC: 3.8 G/DL (ref 3.5–5.2)
ALP SERPL-CCNC: 112 U/L (ref 55–135)
ALT SERPL W/O P-5'-P-CCNC: 27 U/L (ref 10–44)
ANION GAP SERPL CALC-SCNC: 7 MMOL/L (ref 8–16)
AST SERPL-CCNC: 30 U/L (ref 10–40)
BASOPHILS # BLD AUTO: 0.11 K/UL (ref 0–0.2)
BASOPHILS NFR BLD: 1.3 % (ref 0–1.9)
BILIRUB SERPL-MCNC: 0.8 MG/DL (ref 0.1–1)
BUN SERPL-MCNC: 12 MG/DL (ref 8–23)
CALCIUM SERPL-MCNC: 10 MG/DL (ref 8.7–10.5)
CHLORIDE SERPL-SCNC: 106 MMOL/L (ref 95–110)
CHOLEST SERPL-MCNC: 107 MG/DL (ref 120–199)
CHOLEST/HDLC SERPL: 2.5 {RATIO} (ref 2–5)
CO2 SERPL-SCNC: 29 MMOL/L (ref 23–29)
COMPLEXED PSA SERPL-MCNC: 1.4 NG/ML (ref 0–4)
CREAT SERPL-MCNC: 1.4 MG/DL (ref 0.5–1.4)
DIFFERENTIAL METHOD: ABNORMAL
EOSINOPHIL # BLD AUTO: 0.3 K/UL (ref 0–0.5)
EOSINOPHIL NFR BLD: 4 % (ref 0–8)
ERYTHROCYTE [DISTWIDTH] IN BLOOD BY AUTOMATED COUNT: 13.2 % (ref 11.5–14.5)
EST. GFR  (AFRICAN AMERICAN): 52.2 ML/MIN/1.73 M^2
EST. GFR  (NON AFRICAN AMERICAN): 45.2 ML/MIN/1.73 M^2
GLUCOSE SERPL-MCNC: 94 MG/DL (ref 70–110)
HCT VFR BLD AUTO: 43.1 % (ref 40–54)
HDLC SERPL-MCNC: 43 MG/DL (ref 40–75)
HDLC SERPL: 40.2 % (ref 20–50)
HGB BLD-MCNC: 14.1 G/DL (ref 14–18)
IMM GRANULOCYTES # BLD AUTO: 0.05 K/UL (ref 0–0.04)
IMM GRANULOCYTES NFR BLD AUTO: 0.6 % (ref 0–0.5)
IRON SERPL-MCNC: 74 UG/DL (ref 45–160)
LDLC SERPL CALC-MCNC: 53 MG/DL (ref 63–159)
LYMPHOCYTES # BLD AUTO: 2.7 K/UL (ref 1–4.8)
LYMPHOCYTES NFR BLD: 32 % (ref 18–48)
MCH RBC QN AUTO: 30.1 PG (ref 27–31)
MCHC RBC AUTO-ENTMCNC: 32.7 G/DL (ref 32–36)
MCV RBC AUTO: 92 FL (ref 82–98)
MONOCYTES # BLD AUTO: 0.9 K/UL (ref 0.3–1)
MONOCYTES NFR BLD: 10.1 % (ref 4–15)
NEUTROPHILS # BLD AUTO: 4.5 K/UL (ref 1.8–7.7)
NEUTROPHILS NFR BLD: 52 % (ref 38–73)
NONHDLC SERPL-MCNC: 64 MG/DL
NRBC BLD-RTO: 0 /100 WBC
PLATELET # BLD AUTO: 190 K/UL (ref 150–450)
PMV BLD AUTO: 11 FL (ref 9.2–12.9)
POTASSIUM SERPL-SCNC: 3.9 MMOL/L (ref 3.5–5.1)
PROT SERPL-MCNC: 7.1 G/DL (ref 6–8.4)
RBC # BLD AUTO: 4.69 M/UL (ref 4.6–6.2)
SATURATED IRON: 24 % (ref 20–50)
SODIUM SERPL-SCNC: 142 MMOL/L (ref 136–145)
TOTAL IRON BINDING CAPACITY: 314 UG/DL (ref 250–450)
TRANSFERRIN SERPL-MCNC: 212 MG/DL (ref 200–375)
TRIGL SERPL-MCNC: 55 MG/DL (ref 30–150)
TSH SERPL DL<=0.005 MIU/L-ACNC: 1.87 UIU/ML (ref 0.4–4)
VIT B12 SERPL-MCNC: 1194 PG/ML (ref 210–950)
WBC # BLD AUTO: 8.54 K/UL (ref 3.9–12.7)

## 2022-06-30 PROCEDURE — 1101F PT FALLS ASSESS-DOCD LE1/YR: CPT | Mod: CPTII,S$GLB,, | Performed by: INTERNAL MEDICINE

## 2022-06-30 PROCEDURE — 99999 PR PBB SHADOW E&M-EST. PATIENT-LVL V: CPT | Mod: PBBFAC,,, | Performed by: INTERNAL MEDICINE

## 2022-06-30 PROCEDURE — 84466 ASSAY OF TRANSFERRIN: CPT | Performed by: INTERNAL MEDICINE

## 2022-06-30 PROCEDURE — 80053 COMPREHEN METABOLIC PANEL: CPT | Performed by: INTERNAL MEDICINE

## 2022-06-30 PROCEDURE — 1126F AMNT PAIN NOTED NONE PRSNT: CPT | Mod: CPTII,S$GLB,, | Performed by: INTERNAL MEDICINE

## 2022-06-30 PROCEDURE — 99214 PR OFFICE/OUTPT VISIT, EST, LEVL IV, 30-39 MIN: ICD-10-PCS | Mod: S$GLB,,, | Performed by: INTERNAL MEDICINE

## 2022-06-30 PROCEDURE — 85025 COMPLETE CBC W/AUTO DIFF WBC: CPT | Performed by: INTERNAL MEDICINE

## 2022-06-30 PROCEDURE — 1101F PR PT FALLS ASSESS DOC 0-1 FALLS W/OUT INJ PAST YR: ICD-10-PCS | Mod: CPTII,S$GLB,, | Performed by: INTERNAL MEDICINE

## 2022-06-30 PROCEDURE — 1159F MED LIST DOCD IN RCRD: CPT | Mod: CPTII,S$GLB,, | Performed by: INTERNAL MEDICINE

## 2022-06-30 PROCEDURE — 84153 ASSAY OF PSA TOTAL: CPT | Performed by: INTERNAL MEDICINE

## 2022-06-30 PROCEDURE — 99999 PR PBB SHADOW E&M-EST. PATIENT-LVL V: ICD-10-PCS | Mod: PBBFAC,,, | Performed by: INTERNAL MEDICINE

## 2022-06-30 PROCEDURE — 1126F PR PAIN SEVERITY QUANTIFIED, NO PAIN PRESENT: ICD-10-PCS | Mod: CPTII,S$GLB,, | Performed by: INTERNAL MEDICINE

## 2022-06-30 PROCEDURE — 1159F PR MEDICATION LIST DOCUMENTED IN MEDICAL RECORD: ICD-10-PCS | Mod: CPTII,S$GLB,, | Performed by: INTERNAL MEDICINE

## 2022-06-30 PROCEDURE — 36415 COLL VENOUS BLD VENIPUNCTURE: CPT | Mod: PO | Performed by: INTERNAL MEDICINE

## 2022-06-30 PROCEDURE — 99214 OFFICE O/P EST MOD 30 MIN: CPT | Mod: S$GLB,,, | Performed by: INTERNAL MEDICINE

## 2022-06-30 PROCEDURE — 3288F FALL RISK ASSESSMENT DOCD: CPT | Mod: CPTII,S$GLB,, | Performed by: INTERNAL MEDICINE

## 2022-06-30 PROCEDURE — 84443 ASSAY THYROID STIM HORMONE: CPT | Performed by: INTERNAL MEDICINE

## 2022-06-30 PROCEDURE — 80061 LIPID PANEL: CPT | Performed by: INTERNAL MEDICINE

## 2022-06-30 PROCEDURE — 3288F PR FALLS RISK ASSESSMENT DOCUMENTED: ICD-10-PCS | Mod: CPTII,S$GLB,, | Performed by: INTERNAL MEDICINE

## 2022-06-30 PROCEDURE — 82607 VITAMIN B-12: CPT | Performed by: INTERNAL MEDICINE

## 2022-06-30 NOTE — PROGRESS NOTES
Patient ID: Nelson GIBBONS Parent is a 86 y.o. male.    Chief Complaint: Follow-up, Hypertension, Fatigue, Nausea, and Bloated    HPI Nelson is a 86 y.o. male with   chronic kidney disease stage 3, hypertension, hyperlipidemia, CAD, MGUS, Tovar's esophagus, AICD, chronic systolic and diastolic CHF (echo 6/2020) and chronic ITP who present with multiple complaints today. He presents with his wife.   1. He complains today of fatigue.  Reports he does not feel like used to. He exercises at the gym.  Finds he is more tired after exercise than he has been in the past.  His sleeping habits have not changed.  He gets up at night multiple times to urinate but this has not changed over the years.  He does not snore.  He does not have excessive daytime sleepiness.  He denies symptoms of anxiety or depression.  He denies associated symptoms of shortness of breath or chest pain during exercise or immediately after.  2. After eating, will have nausea and sour taste in mouth. Denies sensation of acid reflux. Denies chest pain or arm pain during this time.   3. He is concerned about his stool. Specifically concerned he has an issue with his pancreas. He denies any episodes of pancreatitis. He does not drink alcohol or have gallbladder issues. Problem is intermittent in duration. When it occurs stool is soft and lighter in color, some floats. Denies extra fiber intake. Nothing makes it better or worse. No associated symptoms such as abdominal pain. Exact onset unknown.  Of note, has history of Tovar's esophagus and needs new GI doctor.     Review of Systems   Constitutional: Positive for fatigue.   Gastrointestinal:        See HPI   All other systems reviewed and are negative.      Objective:     Vitals:    06/30/22 0904   BP: 130/70   Pulse: 66        Physical Exam  Vitals reviewed.   Constitutional:       General: He is not in acute distress.     Appearance: Normal appearance. He is well-developed. He is not ill-appearing,  toxic-appearing or diaphoretic.   HENT:      Head: Normocephalic and atraumatic.      Right Ear: External ear normal.      Left Ear: External ear normal.      Nose: Nose normal.   Eyes:      Extraocular Movements: Extraocular movements intact.      Conjunctiva/sclera: Conjunctivae normal.   Cardiovascular:      Rate and Rhythm: Normal rate and regular rhythm.      Pulses: Normal pulses.      Heart sounds: Normal heart sounds. No murmur heard.    No friction rub. No gallop.   Pulmonary:      Effort: Pulmonary effort is normal. No respiratory distress.      Breath sounds: Normal breath sounds. No stridor. No wheezing, rhonchi or rales.   Abdominal:      General: Bowel sounds are normal. There is no distension.      Palpations: Abdomen is soft. There is no mass.      Tenderness: There is no abdominal tenderness. There is no guarding or rebound.      Hernia: No hernia is present.   Musculoskeletal:      Right lower leg: No edema.      Left lower leg: No edema.   Skin:     General: Skin is warm and dry.   Neurological:      General: No focal deficit present.      Mental Status: He is alert and oriented to person, place, and time. Mental status is at baseline.   Psychiatric:         Mood and Affect: Mood normal.         Behavior: Behavior normal.         Thought Content: Thought content normal.         Judgment: Judgment normal.         Assessment:       1. Diarrhea, unspecified type Active   2. Prostate cancer screening    3. Fatigue, unspecified type Active   4. Tovar's esophagus without dysplasia Chronic   5. Hyperlipidemia, unspecified hyperlipidemia type Chronic   6. Vitamin B12 deficiency (dietary) anemia     7. Vitamin B12 deficiency         Plan:     Etiology unclear. Work up as below. Follow up with GI for nausea, sour taste, diarrhea and history of Barretts esophagus.     Diarrhea, unspecified type  -     WBC, Stool; Future; Expected date: 06/30/2022  -     Stool culture; Future; Expected date: 06/30/2022  -      Calprotectin, Stool; Future; Expected date: 06/30/2022  -     Lactoferrin, fecal, quantitative; Future; Expected date: 06/30/2022  -     Stool Exam-Ova,Cysts,Parasites; Future; Expected date: 06/30/2022    Prostate cancer screening  -     PSA, Screening; Future; Expected date: 06/30/2022    Fatigue, unspecified type  -     CBC Auto Differential; Future; Expected date: 06/30/2022  -     Comprehensive Metabolic Panel; Future; Expected date: 06/30/2022  -     TSH; Future; Expected date: 06/30/2022  -     Iron and TIBC; Future; Expected date: 06/30/2022  -     Vitamin B12; Future; Expected date: 06/30/2022    Tovar's esophagus without dysplasia  -     Ambulatory referral/consult to Gastroenterology; Future; Expected date: 07/07/2022    Hyperlipidemia, unspecified hyperlipidemia type  -     Lipid Panel; Future; Expected date: 06/30/2022    Vitamin B12 deficiency (dietary) anemia   -     Iron and TIBC; Future; Expected date: 06/30/2022    Vitamin B12 deficiency   -     Vitamin B12; Future; Expected date: 06/30/2022        RTC one month     Warning signs discussed, patient to call with any further issues or worsening of symptoms.       Parts of the above note were dictated using a voice dictation software. Please excuse any grammatical or typographical errors.

## 2022-07-01 ENCOUNTER — OFFICE VISIT (OUTPATIENT)
Dept: GASTROENTEROLOGY | Facility: CLINIC | Age: 87
End: 2022-07-01
Payer: MEDICARE

## 2022-07-01 VITALS — HEIGHT: 72 IN | WEIGHT: 173.06 LBS | BODY MASS INDEX: 23.44 KG/M2

## 2022-07-01 DIAGNOSIS — K22.70 BARRETT'S ESOPHAGUS WITHOUT DYSPLASIA: ICD-10-CM

## 2022-07-01 DIAGNOSIS — Z86.010 PERSONAL HISTORY OF COLONIC POLYPS: ICD-10-CM

## 2022-07-01 DIAGNOSIS — R19.4 CHANGE IN BOWEL HABITS: Primary | ICD-10-CM

## 2022-07-01 PROCEDURE — 1159F MED LIST DOCD IN RCRD: CPT | Mod: CPTII,S$GLB,, | Performed by: INTERNAL MEDICINE

## 2022-07-01 PROCEDURE — 99999 PR PBB SHADOW E&M-EST. PATIENT-LVL IV: ICD-10-PCS | Mod: PBBFAC,,, | Performed by: INTERNAL MEDICINE

## 2022-07-01 PROCEDURE — 1126F AMNT PAIN NOTED NONE PRSNT: CPT | Mod: CPTII,S$GLB,, | Performed by: INTERNAL MEDICINE

## 2022-07-01 PROCEDURE — 1101F PT FALLS ASSESS-DOCD LE1/YR: CPT | Mod: CPTII,S$GLB,, | Performed by: INTERNAL MEDICINE

## 2022-07-01 PROCEDURE — 3288F FALL RISK ASSESSMENT DOCD: CPT | Mod: CPTII,S$GLB,, | Performed by: INTERNAL MEDICINE

## 2022-07-01 PROCEDURE — 99204 PR OFFICE/OUTPT VISIT, NEW, LEVL IV, 45-59 MIN: ICD-10-PCS | Mod: S$GLB,,, | Performed by: INTERNAL MEDICINE

## 2022-07-01 PROCEDURE — 1126F PR PAIN SEVERITY QUANTIFIED, NO PAIN PRESENT: ICD-10-PCS | Mod: CPTII,S$GLB,, | Performed by: INTERNAL MEDICINE

## 2022-07-01 PROCEDURE — 1101F PR PT FALLS ASSESS DOC 0-1 FALLS W/OUT INJ PAST YR: ICD-10-PCS | Mod: CPTII,S$GLB,, | Performed by: INTERNAL MEDICINE

## 2022-07-01 PROCEDURE — 99204 OFFICE O/P NEW MOD 45 MIN: CPT | Mod: S$GLB,,, | Performed by: INTERNAL MEDICINE

## 2022-07-01 PROCEDURE — 1159F PR MEDICATION LIST DOCUMENTED IN MEDICAL RECORD: ICD-10-PCS | Mod: CPTII,S$GLB,, | Performed by: INTERNAL MEDICINE

## 2022-07-01 PROCEDURE — 3288F PR FALLS RISK ASSESSMENT DOCUMENTED: ICD-10-PCS | Mod: CPTII,S$GLB,, | Performed by: INTERNAL MEDICINE

## 2022-07-01 PROCEDURE — 99999 PR PBB SHADOW E&M-EST. PATIENT-LVL IV: CPT | Mod: PBBFAC,,, | Performed by: INTERNAL MEDICINE

## 2022-07-01 NOTE — PROGRESS NOTES
GASTROENTEROLOGY CLINIC NOTE    Reason for visit: The primary encounter diagnosis was Change in bowel habits. Diagnoses of Tovar's esophagus without dysplasia and Personal history of colonic polyps were also pertinent to this visit.  Referring provider/PCP: Bety Tomlinson MD    HPI:  Nelson Huntley is a 86 y.o. male here today for changes in bowels and barretts. New patient.  Previously seeing dr ray.  Hx of barretts.    He has recently over the past 30-60 days noticed some changes in his bowel habits, describing somewhat looser stool although there is no diarrhea.  He does mention concerns about pancreatitis although there is no oily or fatty stools.  There is no weight loss.  He declines having a CAT scan done of his pancreas although I offered that to him.  They also has had some indigestion at times.  He has a known medium-sized hiatal hernia.  There is no blood in the stool.  He mentions the stool looks a little bit more yellow in color than 8 than usual.  He does note he used to take Metamucil many months ago but he stopped this recently.  He is also on famotidine for reflux but he was previously on omeprazole and Protonix but he stopped these because of kidney issues and he is not interested in resuming these at this time.  Bowel habit changes worse with ice cream, brisotol 3-5    Prior Endoscopy:  EGD:  Hx of barretts.  Medium HH    Colon:  2018  For + cologuard  Couple polyps    (Portions of this note were dictated using voice recognition software and may contain dictation related errors in spelling/grammar/syntax not found on text review)    Review of Systems   Constitutional: Negative for fever and malaise/fatigue.   Respiratory: Negative for cough and shortness of breath.    Cardiovascular: Negative for chest pain and palpitations.   Gastrointestinal: Negative for abdominal pain, blood in stool, nausea and vomiting.   Neurological: Negative for dizziness and headaches.       Past  Medical History: has a past medical history of AICD (automatic cardioverter/defibrillator) present, Cardiomyopathy, CKD (chronic kidney disease) stage 3, GFR 30-59 ml/min, Coronary artery disease, GERD (gastroesophageal reflux disease), Hyperlipidemia, Hypertension, Ischemic cardiomyopathy, MGUS (monoclonal gammopathy of unknown significance), Thrombocytopenia, Ventricular tachycardia, and VT (ventricular tachycardia).    Past Surgical History: has a past surgical history that includes Coronary angioplasty; Cardiac defibrillator placement; Hernia repair; Abdominal aortic aneurysm repair; Eye surgery (Bilateral); Cataract extraction, bilateral (2018); and replacement of implantable cardioverter-defibrillator (icd) generator (Left, 3/18/2022).    Family History:family history includes Cancer in his brother and mother; Coronary artery disease in his mother; Heart attacks under age 50 in his father; Heart disease in his father, mother, and sister; Lymphoma in his mother.    Allergies:   Review of patient's allergies indicates:   Allergen Reactions    Fluress [fluorescein-benoxinate]     Mydriacyl [tropicamide]     Pcn  [penicillins]      Other reaction(s): Unknown    Clindamycin Rash       Social History: reports that he has never smoked. He has never used smokeless tobacco. He reports that he does not drink alcohol and does not use drugs.    Home medications:   Current Outpatient Medications on File Prior to Visit   Medication Sig Dispense Refill    amLODIPine (NORVASC) 10 MG tablet Take 1 tablet (10 mg total) by mouth once daily. 90 tablet 3    aspirin 81 MG chewable tablet Take 1 tablet by mouth Daily. 81  By mouth Every day      beta-carotene,A,-vits C,E/mins (OCUVITE ORAL) Take by mouth.      clopidogreL (PLAVIX) 75 mg tablet Take 1 tablet (75 mg total) by mouth once daily. 90 tablet 3    coenzyme Q10 10 mg capsule Take 10 mg by mouth once daily.      famotidine (PEPCID) 20 MG tablet Take 20 mg by mouth  before dinner.      losartan (COZAAR) 50 MG tablet TAKE 1 TABLET TWICE DAILY 180 tablet 0    metoprolol succinate (TOPROL-XL) 25 MG 24 hr tablet Take 1 tablet (25 mg total) by mouth 2 (two) times daily. 180 tablet 1    multivitamin with minerals tablet Take 1 tablet by mouth once daily.       niacin 500 MG tablet Take 500 mg by mouth Every PM.      nitroGLYCERIN (NITROSTAT) 0.4 MG SL tablet Take 1 tab under the tongue for chest pain. May repeat every 5 minutes for a total of 3 doses. If chest pain not relieved go to the ED. 25 tablet 4    omega-3 fatty acids-vitamin E 1,000 mg Cap Take 1 capsule by mouth 2 (two) times daily.      pravastatin (PRAVACHOL) 40 MG tablet Take 1 tablet (40 mg total) by mouth every evening. 90 tablet 1    sildenafiL (VIAGRA) 100 MG tablet Take 1 tablet (100 mg total) by mouth daily as needed for Erectile Dysfunction. 8 tablet 10    sotaloL (BETAPACE) 120 MG Tab TAKE 1 TABLET TWICE DAILY 180 tablet 3    vitamin D 1000 units Tab Take 1,000 Units by mouth every Tues, Thurs, Sat.        Current Facility-Administered Medications on File Prior to Visit   Medication Dose Route Frequency Provider Last Rate Last Admin    sodium chloride 0.9% bolus 1,000 mL  1,000 mL Intravenous Once Violette Delgado NP        vancomycin in dextrose 5 % 1 gram/250 mL IVPB 1,000 mg  1,000 mg Intravenous On Call Procedure Violette Delgado NP   1,000 mg at 03/18/22 1232       Vital signs:  Ht 6' (1.829 m)   Wt 78.5 kg (173 lb 1 oz)   BMI 23.47 kg/m²     Physical Exam  Vitals reviewed.   Constitutional:       Appearance: He is not toxic-appearing.   HENT:      Head: Normocephalic and atraumatic.   Eyes:      General: No scleral icterus.     Conjunctiva/sclera: Conjunctivae normal.   Neurological:      Mental Status: He is alert and oriented to person, place, and time.      Gait: Gait normal.   Psychiatric:         Mood and Affect: Mood normal.         Behavior: Behavior normal.         I have reviewed prior  labs, imaging, notes from last month    Assessment:  1. Change in bowel habits    2. Tovar's esophagus without dysplasia Chronic   3. Personal history of colonic polyps      Unclear etiology  I think we check stool studies  Add back metamucil and see how he does  I offered a CT of the pancreas and he wants to wait on that    Plan:  Orders Placed This Encounter    Pancreatic elastase, fecal     Check stool studies (Some already ordered by pcp)    Back on metamucil    Increase famotidine to twice a day    RTC See back in 8 weeks for follow up and determine next steps  Consider EGD + / - colon depending on symptoms, for barretts, bowel changes etc.  Consider CT of pancreas depending.      Terry Ulrich MD  [facility] [place]    A total of 46minutes were spent during this encounter including reviewing records, interviewing, examining patient, and counseling / coordination of care.

## 2022-07-01 NOTE — PATIENT INSTRUCTIONS
Get the stool tests    Go back on metamucil    Increase famotidine to twice a day    See you back in about 6 - 8 weeks and decide on upper endoscopy with colonoscopy or what next steps will be.

## 2022-07-06 ENCOUNTER — LAB VISIT (OUTPATIENT)
Dept: LAB | Facility: HOSPITAL | Age: 87
End: 2022-07-06
Attending: INTERNAL MEDICINE
Payer: MEDICARE

## 2022-07-06 DIAGNOSIS — R19.4 CHANGE IN BOWEL HABITS: ICD-10-CM

## 2022-07-06 DIAGNOSIS — R19.7 DIARRHEA, UNSPECIFIED TYPE: ICD-10-CM

## 2022-07-06 LAB — WBC #/AREA STL HPF: NORMAL /[HPF]

## 2022-07-06 PROCEDURE — 83993 ASSAY FOR CALPROTECTIN FECAL: CPT | Performed by: INTERNAL MEDICINE

## 2022-07-06 PROCEDURE — 83630 LACTOFERRIN FECAL (QUAL): CPT | Performed by: INTERNAL MEDICINE

## 2022-07-06 PROCEDURE — 87046 STOOL CULTR AEROBIC BACT EA: CPT | Performed by: INTERNAL MEDICINE

## 2022-07-06 PROCEDURE — 87177 OVA AND PARASITES SMEARS: CPT | Performed by: INTERNAL MEDICINE

## 2022-07-06 PROCEDURE — 89055 LEUKOCYTE ASSESSMENT FECAL: CPT | Performed by: INTERNAL MEDICINE

## 2022-07-06 PROCEDURE — 82656 EL-1 FECAL QUAL/SEMIQ: CPT | Performed by: INTERNAL MEDICINE

## 2022-07-06 PROCEDURE — 87045 FECES CULTURE AEROBIC BACT: CPT | Performed by: INTERNAL MEDICINE

## 2022-07-06 PROCEDURE — 87209 SMEAR COMPLEX STAIN: CPT | Performed by: INTERNAL MEDICINE

## 2022-07-06 PROCEDURE — 87427 SHIGA-LIKE TOXIN AG IA: CPT | Mod: 59 | Performed by: INTERNAL MEDICINE

## 2022-07-07 LAB
E COLI SXT1 STL QL IA: NEGATIVE
E COLI SXT2 STL QL IA: NEGATIVE

## 2022-07-08 ENCOUNTER — PATIENT MESSAGE (OUTPATIENT)
Dept: ELECTROPHYSIOLOGY | Facility: CLINIC | Age: 87
End: 2022-07-08
Payer: MEDICARE

## 2022-07-09 LAB
ELASTASE 1, FECAL: 307 MCG/G
LACTOFERRIN STL QL IA: NEGATIVE

## 2022-07-11 LAB — BACTERIA STL CULT: NORMAL

## 2022-07-13 LAB — CALPROTECTIN STL-MCNT: 42.4 MCG/G

## 2022-07-14 LAB — O+P STL MICRO: NORMAL

## 2022-07-25 ENCOUNTER — OFFICE VISIT (OUTPATIENT)
Dept: INTERNAL MEDICINE | Facility: CLINIC | Age: 87
End: 2022-07-25
Payer: MEDICARE

## 2022-07-25 VITALS
BODY MASS INDEX: 23.71 KG/M2 | OXYGEN SATURATION: 95 % | WEIGHT: 175.06 LBS | HEART RATE: 60 BPM | HEIGHT: 72 IN | SYSTOLIC BLOOD PRESSURE: 118 MMHG | DIASTOLIC BLOOD PRESSURE: 60 MMHG

## 2022-07-25 DIAGNOSIS — D69.3 IDIOPATHIC THROMBOCYTOPENIC PURPURA: ICD-10-CM

## 2022-07-25 DIAGNOSIS — D47.2 MGUS (MONOCLONAL GAMMOPATHY OF UNKNOWN SIGNIFICANCE): ICD-10-CM

## 2022-07-25 DIAGNOSIS — N18.31 STAGE 3A CHRONIC KIDNEY DISEASE: Chronic | ICD-10-CM

## 2022-07-25 DIAGNOSIS — I10 ESSENTIAL HYPERTENSION: ICD-10-CM

## 2022-07-25 DIAGNOSIS — K22.70 BARRETT'S ESOPHAGUS WITHOUT DYSPLASIA: ICD-10-CM

## 2022-07-25 DIAGNOSIS — E78.5 HYPERLIPIDEMIA, UNSPECIFIED HYPERLIPIDEMIA TYPE: ICD-10-CM

## 2022-07-25 DIAGNOSIS — D69.6 THROMBOCYTOPENIA: ICD-10-CM

## 2022-07-25 DIAGNOSIS — I25.10 CORONARY ARTERY DISEASE INVOLVING NATIVE CORONARY ARTERY OF NATIVE HEART WITHOUT ANGINA PECTORIS: ICD-10-CM

## 2022-07-25 PROCEDURE — 1126F AMNT PAIN NOTED NONE PRSNT: CPT | Mod: CPTII,S$GLB,, | Performed by: INTERNAL MEDICINE

## 2022-07-25 PROCEDURE — 99214 PR OFFICE/OUTPT VISIT, EST, LEVL IV, 30-39 MIN: ICD-10-PCS | Mod: S$GLB,,, | Performed by: INTERNAL MEDICINE

## 2022-07-25 PROCEDURE — 3288F FALL RISK ASSESSMENT DOCD: CPT | Mod: CPTII,S$GLB,, | Performed by: INTERNAL MEDICINE

## 2022-07-25 PROCEDURE — 1101F PT FALLS ASSESS-DOCD LE1/YR: CPT | Mod: CPTII,S$GLB,, | Performed by: INTERNAL MEDICINE

## 2022-07-25 PROCEDURE — 99999 PR PBB SHADOW E&M-EST. PATIENT-LVL IV: ICD-10-PCS | Mod: PBBFAC,,, | Performed by: INTERNAL MEDICINE

## 2022-07-25 PROCEDURE — 1126F PR PAIN SEVERITY QUANTIFIED, NO PAIN PRESENT: ICD-10-PCS | Mod: CPTII,S$GLB,, | Performed by: INTERNAL MEDICINE

## 2022-07-25 PROCEDURE — 99214 OFFICE O/P EST MOD 30 MIN: CPT | Mod: S$GLB,,, | Performed by: INTERNAL MEDICINE

## 2022-07-25 PROCEDURE — 3288F PR FALLS RISK ASSESSMENT DOCUMENTED: ICD-10-PCS | Mod: CPTII,S$GLB,, | Performed by: INTERNAL MEDICINE

## 2022-07-25 PROCEDURE — 1101F PR PT FALLS ASSESS DOC 0-1 FALLS W/OUT INJ PAST YR: ICD-10-PCS | Mod: CPTII,S$GLB,, | Performed by: INTERNAL MEDICINE

## 2022-07-25 PROCEDURE — 99999 PR PBB SHADOW E&M-EST. PATIENT-LVL IV: CPT | Mod: PBBFAC,,, | Performed by: INTERNAL MEDICINE

## 2022-07-25 PROCEDURE — 1159F PR MEDICATION LIST DOCUMENTED IN MEDICAL RECORD: ICD-10-PCS | Mod: CPTII,S$GLB,, | Performed by: INTERNAL MEDICINE

## 2022-07-25 PROCEDURE — 1159F MED LIST DOCD IN RCRD: CPT | Mod: CPTII,S$GLB,, | Performed by: INTERNAL MEDICINE

## 2022-07-25 RX ORDER — LOSARTAN POTASSIUM 50 MG/1
50 TABLET ORAL 2 TIMES DAILY
Qty: 180 TABLET | Refills: 3 | Status: SHIPPED | OUTPATIENT
Start: 2022-07-25 | End: 2023-03-13 | Stop reason: SDUPTHER

## 2022-07-25 RX ORDER — CLOPIDOGREL BISULFATE 75 MG/1
75 TABLET ORAL
COMMUNITY
End: 2022-11-04

## 2022-07-25 NOTE — PROGRESS NOTES
Patient ID: Nelson GIBBONS Parent is a 87 y.o. male.    Chief Complaint: Follow-up and Hypertension    HPI Nelson is a 87 y.o. 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  He presents with his wife today.  He has no acute complaints today.  He is feeling better than he was at last visit.  He followed up with Gastroenterology for issue of diarrhea.  He reports that his fatigue and diarrhea have improved.  He plans to follow-up with Gastroenterology again soon for EGD for diagnosis of Tovar's esophagus.  Reviewed lab results from 07/06/2022 and 6/30/22 with him today.    Health Maintenance Topics with due status: Not Due       Topic Last Completion Date    TETANUS VACCINE 11/22/2013    Colonoscopy 05/07/2018    Influenza Vaccine 10/16/2021    Lipid Panel 06/30/2022    Aspirin/Antiplatelet Therapy 07/25/2022       Review of Systems   All other systems reviewed and are negative.     Objective:     Vitals:    07/25/22 1314   BP: 118/60   Pulse: 60        Physical Exam  Vitals reviewed.   Constitutional:       General: He is not in acute distress.     Appearance: Normal appearance. He is well-developed. He is not ill-appearing, toxic-appearing or diaphoretic.   HENT:      Head: Normocephalic and atraumatic.      Right Ear: External ear normal.      Left Ear: External ear normal.      Nose: Nose normal.   Eyes:      Extraocular Movements: Extraocular movements intact.      Conjunctiva/sclera: Conjunctivae normal.   Cardiovascular:      Rate and Rhythm: Normal rate and regular rhythm.      Pulses: Normal pulses.      Heart sounds: Normal heart sounds. No murmur heard.    No friction rub. No gallop.   Pulmonary:      Effort: Pulmonary effort is normal. No respiratory distress.      Breath sounds: Normal breath sounds. No stridor. No  wheezing, rhonchi or rales.   Musculoskeletal:      Right lower leg: No edema.      Left lower leg: No edema.   Skin:     General: Skin is warm and dry.   Neurological:      General: No focal deficit present.      Mental Status: He is alert and oriented to person, place, and time. Mental status is at baseline.   Psychiatric:         Mood and Affect: Mood normal.         Behavior: Behavior normal.         Thought Content: Thought content normal.         Judgment: Judgment normal.         Assessment:       1. Essential hypertension Well controlled   2. Hyperlipidemia, unspecified hyperlipidemia type Well controlled   3. Coronary artery disease involving native coronary artery of native heart without angina pectoris Chronic   4. Stage 3a chronic kidney disease Chronic   5. Idiopathic thrombocytopenic purpura Chronic   6. Thrombocytopenia Chronic   7. Tovar's esophagus without dysplasia Chronic   8. MGUS (monoclonal gammopathy of unknown significance) Chronic       Plan:         Essential hypertension  Comments:  Continue current medications  Orders:  -     losartan (COZAAR) 50 MG tablet; Take 1 tablet (50 mg total) by mouth 2 (two) times daily.  Dispense: 180 tablet; Refill: 3    Hyperlipidemia, unspecified hyperlipidemia type  Comments:  Continue current medication    Coronary artery disease involving native coronary artery of native heart without angina pectoris  Comments:  Cont aspirin and statin. F/u with cardiology  Orders:  -     Lipid Panel; Future; Expected date: 01/01/2023    Stage 3a chronic kidney disease  Comments:  Stay well hydrated, avoid NSAIDs, continue to monitor.  Orders:  -     Comprehensive Metabolic Panel; Future; Expected date: 01/01/2023    Idiopathic thrombocytopenic purpura    Thrombocytopenia  Comments:  Most recent platelet count normal but will continue to monitor  Orders:  -     CBC Auto Differential; Future; Expected date: 01/01/2023    Tovar's esophagus without  dysplasia  Comments:  Has established with GI at [facility]. Will see them again soon for EGD.     MGUS (monoclonal gammopathy of unknown significance)  Comments:  Due to see heme-onc again next month.         RTC 6 months     Warning signs discussed, patient to call with any further issues or worsening of symptoms.       Parts of the above note were dictated using a voice dictation software. Please excuse any grammatical or typographical errors.

## 2022-07-28 ENCOUNTER — IMMUNIZATION (OUTPATIENT)
Dept: INTERNAL MEDICINE | Facility: CLINIC | Age: 87
End: 2022-07-28
Payer: MEDICARE

## 2022-07-28 DIAGNOSIS — Z23 NEED FOR VACCINATION: Primary | ICD-10-CM

## 2022-07-28 PROCEDURE — 0054A COVID-19, MRNA, LNP-S, PF, 30 MCG/0.3 ML DOSE VACCINE (PFIZER): CPT | Mod: PBBFAC | Performed by: FAMILY MEDICINE

## 2022-08-04 ENCOUNTER — LAB VISIT (OUTPATIENT)
Dept: LAB | Facility: HOSPITAL | Age: 87
End: 2022-08-04
Attending: INTERNAL MEDICINE
Payer: MEDICARE

## 2022-08-04 ENCOUNTER — OFFICE VISIT (OUTPATIENT)
Dept: HEMATOLOGY/ONCOLOGY | Facility: CLINIC | Age: 87
End: 2022-08-04
Payer: MEDICARE

## 2022-08-04 VITALS
OXYGEN SATURATION: 95 % | RESPIRATION RATE: 17 BRPM | SYSTOLIC BLOOD PRESSURE: 140 MMHG | DIASTOLIC BLOOD PRESSURE: 68 MMHG | TEMPERATURE: 98 F | HEART RATE: 54 BPM | WEIGHT: 175.38 LBS | HEIGHT: 72 IN | BODY MASS INDEX: 23.75 KG/M2

## 2022-08-04 DIAGNOSIS — D47.2 MGUS (MONOCLONAL GAMMOPATHY OF UNKNOWN SIGNIFICANCE): ICD-10-CM

## 2022-08-04 DIAGNOSIS — D47.2 MGUS (MONOCLONAL GAMMOPATHY OF UNKNOWN SIGNIFICANCE): Primary | ICD-10-CM

## 2022-08-04 LAB
ALBUMIN SERPL BCP-MCNC: 3.7 G/DL (ref 3.5–5.2)
ALP SERPL-CCNC: 106 U/L (ref 55–135)
ALT SERPL W/O P-5'-P-CCNC: 26 U/L (ref 10–44)
ANION GAP SERPL CALC-SCNC: 5 MMOL/L (ref 8–16)
ANISOCYTOSIS BLD QL SMEAR: SLIGHT
AST SERPL-CCNC: 27 U/L (ref 10–40)
BASOPHILS # BLD AUTO: 0.08 K/UL (ref 0–0.2)
BASOPHILS NFR BLD: 0.9 % (ref 0–1.9)
BILIRUB SERPL-MCNC: 1.1 MG/DL (ref 0.1–1)
BUN SERPL-MCNC: 13 MG/DL (ref 8–23)
BURR CELLS BLD QL SMEAR: ABNORMAL
CALCIUM SERPL-MCNC: 9.6 MG/DL (ref 8.7–10.5)
CHLORIDE SERPL-SCNC: 108 MMOL/L (ref 95–110)
CO2 SERPL-SCNC: 29 MMOL/L (ref 23–29)
CREAT SERPL-MCNC: 1.3 MG/DL (ref 0.5–1.4)
DIFFERENTIAL METHOD: ABNORMAL
EOSINOPHIL # BLD AUTO: 0.4 K/UL (ref 0–0.5)
EOSINOPHIL NFR BLD: 4.3 % (ref 0–8)
ERYTHROCYTE [DISTWIDTH] IN BLOOD BY AUTOMATED COUNT: 13.9 % (ref 11.5–14.5)
EST. GFR  (NO RACE VARIABLE): 53.2 ML/MIN/1.73 M^2
GLUCOSE SERPL-MCNC: 80 MG/DL (ref 70–110)
HCT VFR BLD AUTO: 42.4 % (ref 40–54)
HGB BLD-MCNC: 14.8 G/DL (ref 14–18)
HYPOCHROMIA BLD QL SMEAR: ABNORMAL
IMM GRANULOCYTES # BLD AUTO: 0.03 K/UL (ref 0–0.04)
IMM GRANULOCYTES NFR BLD AUTO: 0.3 % (ref 0–0.5)
LYMPHOCYTES # BLD AUTO: 2.7 K/UL (ref 1–4.8)
LYMPHOCYTES NFR BLD: 30.2 % (ref 18–48)
MCH RBC QN AUTO: 31 PG (ref 27–31)
MCHC RBC AUTO-ENTMCNC: 34.9 G/DL (ref 32–36)
MCV RBC AUTO: 89 FL (ref 82–98)
MONOCYTES # BLD AUTO: 0.9 K/UL (ref 0.3–1)
MONOCYTES NFR BLD: 10.5 % (ref 4–15)
NEUTROPHILS # BLD AUTO: 4.7 K/UL (ref 1.8–7.7)
NEUTROPHILS NFR BLD: 53.8 % (ref 38–73)
NRBC BLD-RTO: 0 /100 WBC
OVALOCYTES BLD QL SMEAR: ABNORMAL
PLATELET # BLD AUTO: 119 K/UL (ref 150–450)
PMV BLD AUTO: 11.1 FL (ref 9.2–12.9)
POIKILOCYTOSIS BLD QL SMEAR: SLIGHT
POLYCHROMASIA BLD QL SMEAR: ABNORMAL
POTASSIUM SERPL-SCNC: 4 MMOL/L (ref 3.5–5.1)
PROT SERPL-MCNC: 7.1 G/DL (ref 6–8.4)
RBC # BLD AUTO: 4.77 M/UL (ref 4.6–6.2)
SODIUM SERPL-SCNC: 142 MMOL/L (ref 136–145)
WBC # BLD AUTO: 8.77 K/UL (ref 3.9–12.7)

## 2022-08-04 PROCEDURE — 84165 PATHOLOGIST INTERPRETATION SPE: ICD-10-PCS | Mod: 26,,, | Performed by: PATHOLOGY

## 2022-08-04 PROCEDURE — 1159F PR MEDICATION LIST DOCUMENTED IN MEDICAL RECORD: ICD-10-PCS | Mod: CPTII,S$GLB,, | Performed by: INTERNAL MEDICINE

## 2022-08-04 PROCEDURE — 36415 COLL VENOUS BLD VENIPUNCTURE: CPT | Performed by: INTERNAL MEDICINE

## 2022-08-04 PROCEDURE — 1126F PR PAIN SEVERITY QUANTIFIED, NO PAIN PRESENT: ICD-10-PCS | Mod: CPTII,S$GLB,, | Performed by: INTERNAL MEDICINE

## 2022-08-04 PROCEDURE — 99999 PR PBB SHADOW E&M-EST. PATIENT-LVL IV: ICD-10-PCS | Mod: PBBFAC,,, | Performed by: INTERNAL MEDICINE

## 2022-08-04 PROCEDURE — 1101F PT FALLS ASSESS-DOCD LE1/YR: CPT | Mod: CPTII,S$GLB,, | Performed by: INTERNAL MEDICINE

## 2022-08-04 PROCEDURE — 1126F AMNT PAIN NOTED NONE PRSNT: CPT | Mod: CPTII,S$GLB,, | Performed by: INTERNAL MEDICINE

## 2022-08-04 PROCEDURE — 1101F PR PT FALLS ASSESS DOC 0-1 FALLS W/OUT INJ PAST YR: ICD-10-PCS | Mod: CPTII,S$GLB,, | Performed by: INTERNAL MEDICINE

## 2022-08-04 PROCEDURE — 85025 COMPLETE CBC W/AUTO DIFF WBC: CPT | Performed by: INTERNAL MEDICINE

## 2022-08-04 PROCEDURE — 1159F MED LIST DOCD IN RCRD: CPT | Mod: CPTII,S$GLB,, | Performed by: INTERNAL MEDICINE

## 2022-08-04 PROCEDURE — 3288F PR FALLS RISK ASSESSMENT DOCUMENTED: ICD-10-PCS | Mod: CPTII,S$GLB,, | Performed by: INTERNAL MEDICINE

## 2022-08-04 PROCEDURE — 84165 PROTEIN E-PHORESIS SERUM: CPT | Performed by: INTERNAL MEDICINE

## 2022-08-04 PROCEDURE — 99999 PR PBB SHADOW E&M-EST. PATIENT-LVL IV: CPT | Mod: PBBFAC,,, | Performed by: INTERNAL MEDICINE

## 2022-08-04 PROCEDURE — 84165 PROTEIN E-PHORESIS SERUM: CPT | Mod: 26,,, | Performed by: PATHOLOGY

## 2022-08-04 PROCEDURE — 80053 COMPREHEN METABOLIC PANEL: CPT | Performed by: INTERNAL MEDICINE

## 2022-08-04 PROCEDURE — 83520 IMMUNOASSAY QUANT NOS NONAB: CPT | Performed by: INTERNAL MEDICINE

## 2022-08-04 PROCEDURE — 99215 OFFICE O/P EST HI 40 MIN: CPT | Mod: S$GLB,,, | Performed by: INTERNAL MEDICINE

## 2022-08-04 PROCEDURE — 3288F FALL RISK ASSESSMENT DOCD: CPT | Mod: CPTII,S$GLB,, | Performed by: INTERNAL MEDICINE

## 2022-08-04 PROCEDURE — 99215 PR OFFICE/OUTPT VISIT, EST, LEVL V, 40-54 MIN: ICD-10-PCS | Mod: S$GLB,,, | Performed by: INTERNAL MEDICINE

## 2022-08-04 NOTE — PROGRESS NOTES
Route Chart for Scheduling    BMT Chart Routing      Follow up with physician 1 year.   Follow up with [facility]    Infusion scheduling note    Injection scheduling note    Labs CBC, CMP, free light chains and SPEP   Lab interval:     Imaging    Pharmacy appointment    Other referrals               Subjective:       Patient ID: Nelson GIBBONS Parent is a 87 y.o. male.    Chief Complaint: No chief complaint on file.    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   The monoclonal protein was measured at 0.27 grams. The patient does not meet any CRAB criteria. He appears well today. He does not have constitutional B symptoms.  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    Follow-up 9/5/17  Return visit with his wife for evaluation of MGUS. Renal function is stable. CBC is stable with moderate, stable thrombocytopenia.  Total protein remains normal with paraprotein levels pending.    Follow-up 3/20/18  Return visit for MGUS. Renal function remains stable. CBC remains stable- mild, asymptomatic thrombocytopenia.  Total protein is normal. Paraprotein was normal 9/2017. Free light chains pending.  ROS is negative.    Follow-up 3/28/19  Return visit for MGUS. Renal function, calcium, and CBC remain stable.  Total protein is normal with paraprotein studies pending.  ROS is negative.    Follow-up 6/3/2020  Return visit for MGUS. Last seen 1 year ago.   Renal function, calcium, and CBC remain stable.  Total protein is normal with paraprotein studies stable.  ROS is negative.    Follow-up 8/12/2021  Return visit for MGUS, annual visit  CBC and CMP remain stable  Paraprotein studies remain stable.  ROS is negative  No acute interval events    Follow-up 8/4/2022  Return visit for MGUS, annual visit  CBC and CMP remain stable  Paraprotein levels pending  ROS remains  negative  No acute interval events    HPI  Review of Systems   Constitutional: Negative.    HENT: Negative.    Eyes: Negative.    Respiratory: Negative.    Cardiovascular: Negative.    Gastrointestinal: Negative.    Endocrine: Negative.    Genitourinary: Negative.    Musculoskeletal: Negative.    Skin: Negative.    Allergic/Immunologic: Negative for environmental allergies, food allergies and immunocompromised state.   Neurological: Negative.    Hematological: Negative for adenopathy. Does not bruise/bleed easily.   Psychiatric/Behavioral: Negative.        Objective:      Physical Exam  Vitals and nursing note reviewed.   Constitutional:       Appearance: He is well-developed.   HENT:      Head: Normocephalic and atraumatic.   Eyes:      General: No scleral icterus.     Conjunctiva/sclera: Conjunctivae normal.   Cardiovascular:      Rate and Rhythm: Normal rate.   Pulmonary:      Effort: Pulmonary effort is normal. No respiratory distress.   Musculoskeletal:         General: Normal range of motion.      Cervical back: Normal range of motion.   Skin:     General: Skin is warm and dry.   Neurological:      Mental Status: He is alert and oriented to person, place, and time.   Psychiatric:         Behavior: Behavior normal.         Assessment:       1. MGUS (monoclonal gammopathy of unknown significance)        Plan:       MGUS stable. Continue to monitor.  Repeat monoclonal protein markers in 12 months     A total of 20 minutes was spent in pre-visit chart review, personal interpretation of labs and imaging, and medication review. Total visit time 20 minutes, >50 % counseling.

## 2022-08-05 LAB
ALBUMIN SERPL ELPH-MCNC: 3.84 G/DL (ref 3.35–5.55)
ALPHA1 GLOB SERPL ELPH-MCNC: 0.31 G/DL (ref 0.17–0.41)
ALPHA2 GLOB SERPL ELPH-MCNC: 0.88 G/DL (ref 0.43–0.99)
B-GLOBULIN SERPL ELPH-MCNC: 0.8 G/DL (ref 0.5–1.1)
GAMMA GLOB SERPL ELPH-MCNC: 1.27 G/DL (ref 0.67–1.58)
KAPPA LC SER QL IA: 4.12 MG/DL (ref 0.33–1.94)
KAPPA LC/LAMBDA SER IA: 1.27 (ref 0.26–1.65)
LAMBDA LC SER QL IA: 3.25 MG/DL (ref 0.57–2.63)
PATHOLOGIST INTERPRETATION SPE: NORMAL
PROT SERPL-MCNC: 7.1 G/DL (ref 6–8.4)

## 2022-08-26 ENCOUNTER — OFFICE VISIT (OUTPATIENT)
Dept: GASTROENTEROLOGY | Facility: CLINIC | Age: 87
End: 2022-08-26
Payer: MEDICARE

## 2022-08-26 VITALS — BODY MASS INDEX: 23.86 KG/M2 | HEIGHT: 72 IN | WEIGHT: 176.13 LBS

## 2022-08-26 DIAGNOSIS — K22.70 BARRETT'S ESOPHAGUS WITHOUT DYSPLASIA: Primary | ICD-10-CM

## 2022-08-26 DIAGNOSIS — Z86.010 PERSONAL HISTORY OF COLONIC POLYPS: ICD-10-CM

## 2022-08-26 PROCEDURE — 3288F FALL RISK ASSESSMENT DOCD: CPT | Mod: CPTII,S$GLB,, | Performed by: INTERNAL MEDICINE

## 2022-08-26 PROCEDURE — 99999 PR PBB SHADOW E&M-EST. PATIENT-LVL III: ICD-10-PCS | Mod: PBBFAC,,, | Performed by: INTERNAL MEDICINE

## 2022-08-26 PROCEDURE — 1101F PR PT FALLS ASSESS DOC 0-1 FALLS W/OUT INJ PAST YR: ICD-10-PCS | Mod: CPTII,S$GLB,, | Performed by: INTERNAL MEDICINE

## 2022-08-26 PROCEDURE — 99214 PR OFFICE/OUTPT VISIT, EST, LEVL IV, 30-39 MIN: ICD-10-PCS | Mod: S$GLB,,, | Performed by: INTERNAL MEDICINE

## 2022-08-26 PROCEDURE — 1159F MED LIST DOCD IN RCRD: CPT | Mod: CPTII,S$GLB,, | Performed by: INTERNAL MEDICINE

## 2022-08-26 PROCEDURE — 99999 PR PBB SHADOW E&M-EST. PATIENT-LVL III: CPT | Mod: PBBFAC,,, | Performed by: INTERNAL MEDICINE

## 2022-08-26 PROCEDURE — 1126F AMNT PAIN NOTED NONE PRSNT: CPT | Mod: CPTII,S$GLB,, | Performed by: INTERNAL MEDICINE

## 2022-08-26 PROCEDURE — 1159F PR MEDICATION LIST DOCUMENTED IN MEDICAL RECORD: ICD-10-PCS | Mod: CPTII,S$GLB,, | Performed by: INTERNAL MEDICINE

## 2022-08-26 PROCEDURE — 1126F PR PAIN SEVERITY QUANTIFIED, NO PAIN PRESENT: ICD-10-PCS | Mod: CPTII,S$GLB,, | Performed by: INTERNAL MEDICINE

## 2022-08-26 PROCEDURE — 3288F PR FALLS RISK ASSESSMENT DOCUMENTED: ICD-10-PCS | Mod: CPTII,S$GLB,, | Performed by: INTERNAL MEDICINE

## 2022-08-26 PROCEDURE — 99214 OFFICE O/P EST MOD 30 MIN: CPT | Mod: S$GLB,,, | Performed by: INTERNAL MEDICINE

## 2022-08-26 PROCEDURE — 1101F PT FALLS ASSESS-DOCD LE1/YR: CPT | Mod: CPTII,S$GLB,, | Performed by: INTERNAL MEDICINE

## 2022-08-26 NOTE — PROGRESS NOTES
GASTROENTEROLOGY CLINIC NOTE    Reason for visit: The primary encounter diagnosis was Tovar's esophagus without dysplasia. A diagnosis of Personal history of colonic polyps was also pertinent to this visit.  Referring provider/PCP: Bety Tomlinson MD    HPI:  Nelson Huntley is a 87 y.o. male here today for follow up  Previously seeing dr ray.  Hx of barretts.    Interval  Bowels now normal. Using metamucil every few days. No pain. No vomting.  Doing well    ========================  Initial visit with me  He has recently over the past 30-60 days noticed some changes in his bowel habits, describing somewhat looser stool although there is no diarrhea.  He does mention concerns about pancreatitis although there is no oily or fatty stools.  There is no weight loss.  He declines having a CAT scan done of his pancreas although I offered that to him.  They also has had some indigestion at times.  He has a known medium-sized hiatal hernia.  There is no blood in the stool.  He mentions the stool looks a little bit more yellow in color than 8 than usual.  He does note he used to take Metamucil many months ago but he stopped this recently.  He is also on famotidine for reflux but he was previously on omeprazole and Protonix but he stopped these because of kidney issues and he is not interested in resuming these at this time.  Bowel habit changes worse with ice cream, brisotol 3-5    Prior Endoscopy:  EGD:  Hx of barretts.  Medium HH    Colon:  2018  For + cologuard  Couple polyps    (Portions of this note were dictated using voice recognition software and may contain dictation related errors in spelling/grammar/syntax not found on text review)    Review of Systems   Constitutional: Negative for fever and malaise/fatigue.   Respiratory: Negative for cough and shortness of breath.    Cardiovascular: Negative for chest pain and palpitations.   Gastrointestinal: Negative for abdominal pain, blood in stool, nausea  and vomiting.   Neurological: Negative for dizziness and headaches.       Past Medical History: has a past medical history of AICD (automatic cardioverter/defibrillator) present, Cardiomyopathy, CKD (chronic kidney disease) stage 3, GFR 30-59 ml/min, Coronary artery disease, GERD (gastroesophageal reflux disease), Hyperlipidemia, Hypertension, Ischemic cardiomyopathy, MGUS (monoclonal gammopathy of unknown significance), Thrombocytopenia, Ventricular tachycardia, and VT (ventricular tachycardia).    Past Surgical History: has a past surgical history that includes Coronary angioplasty; Cardiac defibrillator placement; Hernia repair; Abdominal aortic aneurysm repair; Eye surgery (Bilateral); Cataract extraction, bilateral (2018); and replacement of implantable cardioverter-defibrillator (icd) generator (Left, 3/18/2022).    Family History:family history includes Cancer in his brother and mother; Coronary artery disease in his mother; Heart attacks under age 50 in his father; Heart disease in his father, mother, and sister; Lymphoma in his mother.    Allergies:   Review of patient's allergies indicates:   Allergen Reactions    Fluress [fluorescein-benoxinate]     Mydriacyl [tropicamide]     Pcn  [penicillins]      Other reaction(s): Unknown    Clindamycin Rash       Social History: reports that he has never smoked. He has never used smokeless tobacco. He reports that he does not drink alcohol and does not use drugs.    Home medications:   Current Outpatient Medications on File Prior to Visit   Medication Sig Dispense Refill    amLODIPine (NORVASC) 10 MG tablet Take 1 tablet (10 mg total) by mouth once daily. 90 tablet 3    aspirin 81 MG chewable tablet Take 1 tablet by mouth Daily. 81  By mouth Every day      beta-carotene,A,-vits C,E/mins (OCUVITE ORAL) Take by mouth.      clopidogreL (PLAVIX) 75 mg tablet Take 75 mg by mouth 3 (three) times a week. Takes Tuesday, Thursday and Saturday      coenzyme Q10 10 mg  capsule Take 10 mg by mouth once daily.      famotidine (PEPCID) 20 MG tablet Take 20 mg by mouth before dinner.      losartan (COZAAR) 50 MG tablet Take 1 tablet (50 mg total) by mouth 2 (two) times daily. 180 tablet 3    metoprolol succinate (TOPROL-XL) 25 MG 24 hr tablet Take 1 tablet (25 mg total) by mouth 2 (two) times daily. 180 tablet 3    multivitamin with minerals tablet Take 1 tablet by mouth once daily.       niacin 500 MG tablet Take 500 mg by mouth Every PM.      nitroGLYCERIN (NITROSTAT) 0.4 MG SL tablet Take 1 tab under the tongue for chest pain. May repeat every 5 minutes for a total of 3 doses. If chest pain not relieved go to the ED. 25 tablet 4    omega-3 fatty acids-vitamin E 1,000 mg Cap Take 1 capsule by mouth 2 (two) times daily.      pravastatin (PRAVACHOL) 40 MG tablet Take 1 tablet (40 mg total) by mouth every evening. 90 tablet 3    sotaloL (BETAPACE) 120 MG Tab TAKE 1 TABLET TWICE DAILY 180 tablet 3    vitamin D 1000 units Tab Take 1,000 Units by mouth every Tues, Thurs, Sat.       sildenafiL (VIAGRA) 100 MG tablet Take 1 tablet (100 mg total) by mouth daily as needed for Erectile Dysfunction. (Patient not taking: Reported on 7/25/2022) 8 tablet 10     Current Facility-Administered Medications on File Prior to Visit   Medication Dose Route Frequency Provider Last Rate Last Admin    sodium chloride 0.9% bolus 1,000 mL  1,000 mL Intravenous Once Violette Delgado NP        vancomycin in dextrose 5 % 1 gram/250 mL IVPB 1,000 mg  1,000 mg Intravenous On Call Procedure Violette Delgado NP   1,000 mg at 03/18/22 1232       Vital signs:  Ht 6' (1.829 m)   Wt 79.9 kg (176 lb 2.4 oz)   BMI 23.89 kg/m²     Physical Exam  Vitals reviewed.   Constitutional:       Appearance: He is not toxic-appearing.   HENT:      Head: Normocephalic and atraumatic.   Eyes:      General: No scleral icterus.     Conjunctiva/sclera: Conjunctivae normal.   Neurological:      Mental Status: He is alert and  oriented to person, place, and time.      Gait: Gait normal.   Psychiatric:         Mood and Affect: Mood normal.         Behavior: Behavior normal.         I have reviewed prior labs, imaging, notes from last month    Assessment:  1. Tovar's esophagus without dysplasia    2. Personal history of colonic polyps        Doing well.    Plan:       Doing great no complaints  Stool tests normal    We discussed and he wants to defer further barretts surveillance at this time, as he was told by last GI doctor it looked 'better'    I agree with this based on age and no high risk features    Happy to rediscuss in future or if symptoms change , he knows to monitor for symptom changes...    rtc prn      Terry Ulrich MD  [facility] [place]    A total of 32minutes were spent during this encounter including reviewing records, interviewing, examining patient, and counseling / coordination of care.

## 2022-09-11 NOTE — PROGRESS NOTES
Subjective:   Patient ID:  Nelson GIBBONS Parent is a 87 y.o. male who presents for follow-up of CVD    HPI: The patient is here for CAD/ICM/ICD.   The patient has no chest pain, SOB, TIA, palpitations, syncope or pre-syncope.Patient currently exercises many times per week.        Review of Systems   Constitutional: Negative for chills, decreased appetite, diaphoresis, fever, malaise/fatigue, night sweats, weight gain and weight loss.   HENT:  Negative for congestion, hoarse voice, nosebleeds, sore throat and tinnitus.    Eyes:  Negative for blurred vision, double vision, vision loss in left eye, vision loss in right eye, visual disturbance and visual halos.   Cardiovascular:  Negative for chest pain, claudication, cyanosis, dyspnea on exertion, irregular heartbeat, leg swelling, near-syncope, orthopnea, palpitations, paroxysmal nocturnal dyspnea and syncope.   Respiratory:  Negative for cough, hemoptysis, shortness of breath, sleep disturbances due to breathing, snoring, sputum production and wheezing.    Endocrine: Negative for cold intolerance, heat intolerance, polydipsia, polyphagia and polyuria.   Hematologic/Lymphatic: Negative for adenopathy and bleeding problem. Does not bruise/bleed easily.   Skin:  Negative for color change, dry skin, flushing, itching, nail changes, poor wound healing, rash, skin cancer, suspicious lesions and unusual hair distribution.   Musculoskeletal:  Negative for arthritis, back pain, falls, gout, joint pain, joint swelling, muscle cramps, muscle weakness, myalgias and stiffness.   Gastrointestinal:  Negative for abdominal pain, anorexia, change in bowel habit, constipation, diarrhea, dysphagia, heartburn, hematemesis, hematochezia, melena and vomiting.   Genitourinary:  Negative for decreased libido, dysuria, hematuria, hesitancy and urgency.   Neurological:  Negative for excessive daytime sleepiness, dizziness, focal weakness, headaches, light-headedness, loss of balance, numbness,  paresthesias, seizures, sensory change, tremors, vertigo and weakness.   Psychiatric/Behavioral:  Negative for altered mental status, depression, hallucinations, memory loss, substance abuse and suicidal ideas. The patient does not have insomnia and is not nervous/anxious.    Allergic/Immunologic: Negative for environmental allergies and hives.     Objective: BP (!) 156/68 (BP Location: Left arm, Patient Position: Sitting, BP Method: Medium (Automatic))   Pulse (!) 52   Ht 6' (1.829 m)   Wt 79.7 kg (175 lb 11.3 oz)   BMI 23.83 kg/m²      Physical Exam  Constitutional:       General: He is not in acute distress.     Appearance: He is well-developed. He is not diaphoretic.   HENT:      Head: Normocephalic.   Eyes:      Pupils: Pupils are equal, round, and reactive to light.   Neck:      Thyroid: No thyromegaly.   Cardiovascular:      Rate and Rhythm: Normal rate and regular rhythm.      Pulses: Intact distal pulses.           Carotid pulses are 3+ on the right side and 3+ on the left side.       Radial pulses are 3+ on the right side and 3+ on the left side.        Femoral pulses are 3+ on the right side and 3+ on the left side.       Popliteal pulses are 3+ on the right side and 3+ on the left side.        Dorsalis pedis pulses are 3+ on the right side and 3+ on the left side.        Posterior tibial pulses are 3+ on the right side and 3+ on the left side.      Heart sounds: Murmur heard.   Harsh midsystolic murmur is present with a grade of 1/6 at the upper right sternal border radiating to the neck.     No friction rub. No gallop.   Pulmonary:      Effort: Pulmonary effort is normal. No respiratory distress.      Breath sounds: Normal breath sounds. No wheezing or rales.   Chest:      Chest wall: No tenderness.   Abdominal:      General: There is no distension.      Palpations: Abdomen is soft. There is no mass.      Tenderness: There is no abdominal tenderness.   Musculoskeletal:         General: Normal range  of motion.      Cervical back: Normal range of motion.   Lymphadenopathy:      Cervical: No cervical adenopathy.   Skin:     General: Skin is warm.      Nails: There is no clubbing.   Neurological:      Mental Status: He is alert and oriented to person, place, and time.   Psychiatric:         Speech: Speech normal.         Behavior: Behavior normal.         Thought Content: Thought content normal.         Judgment: Judgment normal.       Assessment:     1. Coronary artery disease involving native coronary artery of native heart without angina pectoris    2. S/P AAA (abdominal aortic aneurysm) repair    3. Erectile dysfunction due to arterial insufficiency    4. Tovar's esophagus without dysplasia    5. Vitamin D deficiency    6. MGUS (monoclonal gammopathy of unknown significance)    7. Cerebral infarction, remote, resolved    8. History of ventricular tachycardia    9. Chronic combined systolic and diastolic congestive heart failure    10. Idiopathic thrombocytopenic purpura    11. Stage 3 chronic kidney disease, unspecified whether stage 3a or 3b CKD    12. AICD (automatic cardioverter/defibrillator) present    13. History of TIA (transient ischemic attack)    14. Essential hypertension    15. Mixed hyperlipidemia        Plan:   Discussed diet , achieving and maintaining ideal body weight, and exercise.   We reviewed meds in detail.  Reassured-Discussed goals, options, plan.  Omega-3 > 800 mg/d combined EPA/DHA.  Goal BP< 135/85.  Goal LDL < 70.  NTG should be refilled every 8-9 months.  Co Q 10 200 mg/d        Nelosn was seen today for annual exam.    Diagnoses and all orders for this visit:    Coronary artery disease involving native coronary artery of native heart without angina pectoris  -     Lipid Panel; Future; Expected date: 09/12/2023  -     Comprehensive Metabolic Panel; Future; Expected date: 09/12/2023  -     TSH; Future; Expected date: 09/12/2023  -     CBC Auto Differential; Future; Expected date:  09/12/2023  -     EKG 12-lead; Future; Expected date: 09/12/2023    S/P AAA (abdominal aortic aneurysm) repair    Erectile dysfunction due to arterial insufficiency    Tovar's esophagus without dysplasia  -     CBC Auto Differential; Future; Expected date: 09/12/2023    Vitamin D deficiency    MGUS (monoclonal gammopathy of unknown significance)  -     CBC Auto Differential; Future; Expected date: 09/12/2023    Cerebral infarction, remote, resolved    History of ventricular tachycardia    Chronic combined systolic and diastolic congestive heart failure  -     EKG 12-lead; Future; Expected date: 09/12/2023  -     BNP; Future; Expected date: 09/12/2023  -     Echo; Future; Expected date: 09/13/2022    Idiopathic thrombocytopenic purpura    Stage 3 chronic kidney disease, unspecified whether stage 3a or 3b CKD    AICD (automatic cardioverter/defibrillator) present  -     EKG 12-lead; Future; Expected date: 09/12/2023  -     Echo; Future; Expected date: 09/13/2022    History of TIA (transient ischemic attack)    Essential hypertension  -     Lipid Panel; Future; Expected date: 09/12/2023  -     Comprehensive Metabolic Panel; Future; Expected date: 09/12/2023  -     TSH; Future; Expected date: 09/12/2023  -     EKG 12-lead; Future; Expected date: 09/12/2023  -     Echo; Future; Expected date: 09/13/2022    Mixed hyperlipidemia  -     Lipid Panel; Future; Expected date: 09/12/2023  -     Comprehensive Metabolic Panel; Future; Expected date: 09/12/2023  -     TSH; Future; Expected date: 09/12/2023          Follow up in about 1 year (around 9/12/2023) for with labs and ECG; CFD Osiel Cartagena to read soon.

## 2022-09-12 ENCOUNTER — PATIENT MESSAGE (OUTPATIENT)
Dept: CARDIOLOGY | Facility: CLINIC | Age: 87
End: 2022-09-12

## 2022-09-12 ENCOUNTER — OFFICE VISIT (OUTPATIENT)
Dept: CARDIOLOGY | Facility: CLINIC | Age: 87
End: 2022-09-12
Payer: MEDICARE

## 2022-09-12 VITALS
HEART RATE: 52 BPM | SYSTOLIC BLOOD PRESSURE: 156 MMHG | WEIGHT: 175.69 LBS | BODY MASS INDEX: 23.8 KG/M2 | HEIGHT: 72 IN | DIASTOLIC BLOOD PRESSURE: 68 MMHG

## 2022-09-12 DIAGNOSIS — Z98.890 S/P AAA (ABDOMINAL AORTIC ANEURYSM) REPAIR: ICD-10-CM

## 2022-09-12 DIAGNOSIS — I50.42 CHRONIC COMBINED SYSTOLIC AND DIASTOLIC CONGESTIVE HEART FAILURE: ICD-10-CM

## 2022-09-12 DIAGNOSIS — E78.2 MIXED HYPERLIPIDEMIA: ICD-10-CM

## 2022-09-12 DIAGNOSIS — E55.9 VITAMIN D DEFICIENCY: ICD-10-CM

## 2022-09-12 DIAGNOSIS — Z86.79 HISTORY OF VENTRICULAR TACHYCARDIA: ICD-10-CM

## 2022-09-12 DIAGNOSIS — N18.30 STAGE 3 CHRONIC KIDNEY DISEASE, UNSPECIFIED WHETHER STAGE 3A OR 3B CKD: Chronic | ICD-10-CM

## 2022-09-12 DIAGNOSIS — D47.2 MGUS (MONOCLONAL GAMMOPATHY OF UNKNOWN SIGNIFICANCE): ICD-10-CM

## 2022-09-12 DIAGNOSIS — I10 ESSENTIAL HYPERTENSION: ICD-10-CM

## 2022-09-12 DIAGNOSIS — I25.10 CORONARY ARTERY DISEASE INVOLVING NATIVE CORONARY ARTERY OF NATIVE HEART WITHOUT ANGINA PECTORIS: Primary | ICD-10-CM

## 2022-09-12 DIAGNOSIS — N52.01 ERECTILE DYSFUNCTION DUE TO ARTERIAL INSUFFICIENCY: ICD-10-CM

## 2022-09-12 DIAGNOSIS — Z95.810 AICD (AUTOMATIC CARDIOVERTER/DEFIBRILLATOR) PRESENT: Chronic | ICD-10-CM

## 2022-09-12 DIAGNOSIS — K22.70 BARRETT'S ESOPHAGUS WITHOUT DYSPLASIA: ICD-10-CM

## 2022-09-12 DIAGNOSIS — Z86.73 HISTORY OF TIA (TRANSIENT ISCHEMIC ATTACK): Chronic | ICD-10-CM

## 2022-09-12 DIAGNOSIS — Z86.73 CEREBRAL INFARCTION, REMOTE, RESOLVED: ICD-10-CM

## 2022-09-12 DIAGNOSIS — D69.3 IDIOPATHIC THROMBOCYTOPENIC PURPURA: ICD-10-CM

## 2022-09-12 DIAGNOSIS — Z86.79 S/P AAA (ABDOMINAL AORTIC ANEURYSM) REPAIR: ICD-10-CM

## 2022-09-12 PROCEDURE — 99499 UNLISTED E&M SERVICE: CPT | Mod: HCNC,S$GLB,, | Performed by: INTERNAL MEDICINE

## 2022-09-12 PROCEDURE — 3288F PR FALLS RISK ASSESSMENT DOCUMENTED: ICD-10-PCS | Mod: CPTII,S$GLB,, | Performed by: INTERNAL MEDICINE

## 2022-09-12 PROCEDURE — 1126F PR PAIN SEVERITY QUANTIFIED, NO PAIN PRESENT: ICD-10-PCS | Mod: CPTII,S$GLB,, | Performed by: INTERNAL MEDICINE

## 2022-09-12 PROCEDURE — 1101F PT FALLS ASSESS-DOCD LE1/YR: CPT | Mod: CPTII,S$GLB,, | Performed by: INTERNAL MEDICINE

## 2022-09-12 PROCEDURE — 99999 PR PBB SHADOW E&M-EST. PATIENT-LVL IV: CPT | Mod: PBBFAC,,, | Performed by: INTERNAL MEDICINE

## 2022-09-12 PROCEDURE — 99215 PR OFFICE/OUTPT VISIT, EST, LEVL V, 40-54 MIN: ICD-10-PCS | Mod: S$GLB,,, | Performed by: INTERNAL MEDICINE

## 2022-09-12 PROCEDURE — 1126F AMNT PAIN NOTED NONE PRSNT: CPT | Mod: CPTII,S$GLB,, | Performed by: INTERNAL MEDICINE

## 2022-09-12 PROCEDURE — 99499 RISK ADDL DX/OHS AUDIT: ICD-10-PCS | Mod: HCNC,S$GLB,, | Performed by: INTERNAL MEDICINE

## 2022-09-12 PROCEDURE — 1159F PR MEDICATION LIST DOCUMENTED IN MEDICAL RECORD: ICD-10-PCS | Mod: CPTII,S$GLB,, | Performed by: INTERNAL MEDICINE

## 2022-09-12 PROCEDURE — 3288F FALL RISK ASSESSMENT DOCD: CPT | Mod: CPTII,S$GLB,, | Performed by: INTERNAL MEDICINE

## 2022-09-12 PROCEDURE — 99999 PR PBB SHADOW E&M-EST. PATIENT-LVL IV: ICD-10-PCS | Mod: PBBFAC,,, | Performed by: INTERNAL MEDICINE

## 2022-09-12 PROCEDURE — 1101F PR PT FALLS ASSESS DOC 0-1 FALLS W/OUT INJ PAST YR: ICD-10-PCS | Mod: CPTII,S$GLB,, | Performed by: INTERNAL MEDICINE

## 2022-09-12 PROCEDURE — 99215 OFFICE O/P EST HI 40 MIN: CPT | Mod: S$GLB,,, | Performed by: INTERNAL MEDICINE

## 2022-09-12 PROCEDURE — 1159F MED LIST DOCD IN RCRD: CPT | Mod: CPTII,S$GLB,, | Performed by: INTERNAL MEDICINE

## 2022-09-12 NOTE — PATIENT INSTRUCTIONS
Discussed diet , achieving and maintaining ideal body weight, and exercise.   We reviewed meds in detail.  Reassured-Discussed goals, options, plan.  Omega-3 > 800 mg/d combined EPA/DHA.  Goal BP< 135/85.  Goal LDL < 70.  NTG should be refilled every 8-9 months.  Co Q 10 200 mg/d

## 2022-09-13 RX ORDER — NITROGLYCERIN 0.4 MG/1
TABLET SUBLINGUAL
Qty: 25 TABLET | Refills: 4 | Status: SHIPPED | OUTPATIENT
Start: 2022-09-13

## 2022-09-14 ENCOUNTER — CLINICAL SUPPORT (OUTPATIENT)
Dept: CARDIOLOGY | Facility: HOSPITAL | Age: 87
End: 2022-09-14
Payer: MEDICARE

## 2022-09-14 DIAGNOSIS — I25.5 ISCHEMIC CARDIOMYOPATHY: ICD-10-CM

## 2022-09-14 DIAGNOSIS — Z95.810 PRESENCE OF AUTOMATIC (IMPLANTABLE) CARDIAC DEFIBRILLATOR: ICD-10-CM

## 2022-09-14 PROCEDURE — 93296 REM INTERROG EVL PM/IDS: CPT | Performed by: INTERNAL MEDICINE

## 2022-09-16 ENCOUNTER — HOSPITAL ENCOUNTER (OUTPATIENT)
Dept: CARDIOLOGY | Facility: HOSPITAL | Age: 87
Discharge: HOME | End: 2022-09-16
Attending: INTERNAL MEDICINE
Payer: MEDICARE

## 2022-09-16 VITALS
DIASTOLIC BLOOD PRESSURE: 68 MMHG | HEIGHT: 72 IN | HEART RATE: 70 BPM | BODY MASS INDEX: 23.7 KG/M2 | WEIGHT: 175 LBS | SYSTOLIC BLOOD PRESSURE: 156 MMHG

## 2022-09-16 DIAGNOSIS — I10 ESSENTIAL HYPERTENSION: ICD-10-CM

## 2022-09-16 DIAGNOSIS — Z95.810 AICD (AUTOMATIC CARDIOVERTER/DEFIBRILLATOR) PRESENT: ICD-10-CM

## 2022-09-16 DIAGNOSIS — I50.42 CHRONIC COMBINED SYSTOLIC AND DIASTOLIC CONGESTIVE HEART FAILURE: ICD-10-CM

## 2022-09-16 LAB
ASCENDING AORTA: 3.83 CM
AV INDEX (PROSTH): 0.72
AV MEAN GRADIENT: 2 MMHG
AV PEAK GRADIENT: 4 MMHG
AV VALVE AREA: 3.14 CM2
AV VELOCITY RATIO: 0.69
BSA FOR ECHO PROCEDURE: 2.01 M2
CV ECHO LV RWT: 0.3 CM
DOP CALC AO PEAK VEL: 1.02 M/S
DOP CALC AO VTI: 24.97 CM
DOP CALC LVOT AREA: 4.4 CM2
DOP CALC LVOT DIAMETER: 2.36 CM
DOP CALC LVOT PEAK VEL: 0.7 M/S
DOP CALC LVOT STROKE VOLUME: 78.48 CM3
DOP CALCLVOT PEAK VEL VTI: 17.95 CM
E WAVE DECELERATION TIME: 401.16 MSEC
E/A RATIO: 0.53
E/E' RATIO: 6.36 M/S
ECHO LV POSTERIOR WALL: 0.79 CM (ref 0.6–1.1)
EJECTION FRACTION: 38 %
FRACTIONAL SHORTENING: 28 % (ref 28–44)
INTERVENTRICULAR SEPTUM: 1.1 CM (ref 0.6–1.1)
IVRT: 144.62 MSEC
LA MAJOR: 5.61 CM
LA MINOR: 5.57 CM
LA WIDTH: 3.67 CM
LEFT ATRIUM SIZE: 3.85 CM
LEFT ATRIUM VOLUME INDEX MOD: 25.9 ML/M2
LEFT ATRIUM VOLUME INDEX: 33.4 ML/M2
LEFT ATRIUM VOLUME MOD: 52.13 CM3
LEFT ATRIUM VOLUME: 67.14 CM3
LEFT INTERNAL DIMENSION IN SYSTOLE: 3.84 CM (ref 2.1–4)
LEFT VENTRICLE DIASTOLIC VOLUME INDEX: 67.7 ML/M2
LEFT VENTRICLE DIASTOLIC VOLUME: 136.07 ML
LEFT VENTRICLE MASS INDEX: 93 G/M2
LEFT VENTRICLE SYSTOLIC VOLUME INDEX: 31.6 ML/M2
LEFT VENTRICLE SYSTOLIC VOLUME: 63.49 ML
LEFT VENTRICULAR INTERNAL DIMENSION IN DIASTOLE: 5.31 CM (ref 3.5–6)
LEFT VENTRICULAR MASS: 186.57 G
LV LATERAL E/E' RATIO: 5.83 M/S
LV SEPTAL E/E' RATIO: 7 M/S
MV A" WAVE DURATION": 22.07 MSEC
MV PEAK A VEL: 0.66 M/S
MV PEAK E VEL: 0.35 M/S
MV STENOSIS PRESSURE HALF TIME: 116.34 MS
MV VALVE AREA P 1/2 METHOD: 1.89 CM2
PISA TR MAX VEL: 2.05 M/S
PULM VEIN S/D RATIO: 1.05
PV PEAK D VEL: 0.4 M/S
PV PEAK S VEL: 0.42 M/S
RA MAJOR: 4.5 CM
RA PRESSURE: 8 MMHG
RA WIDTH: 3.09 CM
RIGHT VENTRICULAR END-DIASTOLIC DIMENSION: 3.91 CM
RV TISSUE DOPPLER FREE WALL SYSTOLIC VELOCITY 1 (APICAL 4 CHAMBER VIEW): 11.44 CM/S
SINUS: 3.68 CM
STJ: 3.27 CM
TDI LATERAL: 0.06 M/S
TDI SEPTAL: 0.05 M/S
TDI: 0.06 M/S
TR MAX PG: 17 MMHG
TRICUSPID ANNULAR PLANE SYSTOLIC EXCURSION: 2.16 CM
TV REST PULMONARY ARTERY PRESSURE: 25 MMHG

## 2022-09-16 PROCEDURE — 93306 TTE W/DOPPLER COMPLETE: CPT

## 2022-09-16 PROCEDURE — 93306 ECHO (CUPID ONLY): ICD-10-PCS | Mod: 26,,, | Performed by: INTERNAL MEDICINE

## 2022-09-16 PROCEDURE — 93306 TTE W/DOPPLER COMPLETE: CPT | Mod: 26,,, | Performed by: INTERNAL MEDICINE

## 2022-09-21 ENCOUNTER — PATIENT MESSAGE (OUTPATIENT)
Dept: CARDIOLOGY | Facility: CLINIC | Age: 87
End: 2022-09-21
Payer: MEDICARE

## 2022-09-21 ENCOUNTER — PATIENT MESSAGE (OUTPATIENT)
Dept: INTERNAL MEDICINE | Facility: CLINIC | Age: 87
End: 2022-09-21
Payer: MEDICARE

## 2022-09-24 ENCOUNTER — IMMUNIZATION (OUTPATIENT)
Dept: INTERNAL MEDICINE | Facility: CLINIC | Age: 87
End: 2022-09-24
Payer: MEDICARE

## 2022-09-24 PROCEDURE — G0008 ADMIN INFLUENZA VIRUS VAC: HCPCS | Mod: S$GLB,,, | Performed by: INTERNAL MEDICINE

## 2022-09-24 PROCEDURE — 90694 VACC AIIV4 NO PRSRV 0.5ML IM: CPT | Mod: S$GLB,,, | Performed by: INTERNAL MEDICINE

## 2022-09-24 PROCEDURE — G0008 FLU VACCINE - QUADRIVALENT - ADJUVANTED: ICD-10-PCS | Mod: S$GLB,,, | Performed by: INTERNAL MEDICINE

## 2022-09-24 PROCEDURE — 90694 FLU VACCINE - QUADRIVALENT - ADJUVANTED: ICD-10-PCS | Mod: S$GLB,,, | Performed by: INTERNAL MEDICINE

## 2022-11-09 NOTE — PROGRESS NOTES
Subjective:    Patient ID:  Nelson GIBBONS Parent is a 87 y.o. male who presents for follow-up of Cardiomyopathy      86 yo male with ventricular tachycardia, near syncope, CAD/MI s/p PCIs, ICM, TIA, thrombocytopenia.     8/19: Former patient of Dr Melissa. He had near syncope, symptomatic VTNS, inducible MVTS (multiple morphs). He had dual chamber Anuj ICD implanted 3/15/12  He is also on sotalol. Interrogation today reveals stable lead function, no significant arrhythmias, 73% atrial pacing, minimal RV pacing    Interval history: DC ICD generator change (ANUJ to Abbott) 3/22. Normal function with no sustained arrhythmias    Echo 9/22:  · The left ventricle is normal in size with moderately decreased systolic function.  · The estimated ejection fraction is 38% ( probably upper 30s to at most low 40s).  · There are segmental left ventricular wall motion abnormalities.  · Grade I left ventricular diastolic dysfunction.  · Normal right ventricular size with normal right ventricular systolic function.  · Mild mitral regurgitation.  · Intermediate central venous pressure (8 mmHg).  · The estimated PA systolic pressure is 25 mmHg.  · The ascending aorta is mildly dilated.      Echo 9/18:  CONCLUSIONS     1 - Mildly to moderately depressed left ventricular systolic function (EF 40-45%).     2 - Impaired LV relaxation, normal LAP (grade 1 diastolic dysfunction).     3 - Eccentric hypertrophy.     4 - Normal right ventricular systolic function .     5 - The estimated PA systolic pressure is 19 mmHg.     Past Medical History:  7/17/2012: AICD (automatic cardioverter/defibrillator) present  No date: Cardiomyopathy  7/17/2012: CKD (chronic kidney disease) stage 3, GFR 30-59 ml/min  No date: Coronary artery disease  No date: GERD (gastroesophageal reflux disease)      Comment:  Tovar's; Dr. Vu  7/17/2012: Hyperlipidemia  7/17/2012: Hypertension  7/17/2012: Ischemic cardiomyopathy  No date: MGUS (monoclonal gammopathy of unknown  significance)  7/17/2012: Thrombocytopenia  No date: Ventricular tachycardia  7/17/2012: VT (ventricular tachycardia)    Past Surgical History:  No date: ABDOMINAL AORTIC ANEURYSM REPAIR  No date: CARDIAC DEFIBRILLATOR PLACEMENT  2018: CATARACT EXTRACTION, BILATERAL  No date: CORONARY ANGIOPLASTY  No date: EYE SURGERY; Bilateral      Comment:  cataract  No date: HERNIA REPAIR      Comment:  x2  3/18/2022: REPLACEMENT OF IMPLANTABLE CARDIOVERTER-DEFIBRILLATOR   (ICD) GENERATOR; Left      Comment:  Procedure: REPLACEMENT, ICD GENERATOR;  Surgeon: Felipe Woodard MD;  Location: [facility] EP LAB;  Service:                Cardiology;  Laterality: Left;  SEFERINO, ICD gen chg, SJM,                anes, MB, 3prep*ANUJ ICD insitu*     Social History    Socioeconomic History      Marital status:     Tobacco Use      Smoking status: Never      Smokeless tobacco: Never    Substance and Sexual Activity      Alcohol use: No      Drug use: No      Sexual activity: Yes        Partners: Female        Comment:     Social Determinants of Health  Financial Resource Strain: Low Risk       Difficulty of Paying Living Expenses: Not hard at all  Food Insecurity: No Food Insecurity      Worried About Running Out of Food in the Last Year: Never true      Ran Out of Food in the Last Year: Never true  Transportation Needs: No Transportation Needs      Lack of Transportation (Medical): No      Lack of Transportation (Non-Medical): No  Physical Activity: Sufficiently Active      Days of Exercise per Week: 6 days      Minutes of Exercise per Session: 40 min  Stress: No Stress Concern Present      Feeling of Stress : Not at all  Social Connections: Unknown      Frequency of Communication with Friends and Family: Three times a week      Frequency of Social Gatherings with Friends and Family: Three times a week      Active Member of Clubs or Organizations: No      Attends Club or Organization Meetings: Never      Marital  Status:   Housing Stability: Low Risk       Unable to Pay for Housing in the Last Year: No      Number of Places Lived in the Last Year: 1      Unstable Housing in the Last Year: No    Review of patient's family history indicates:      Current Outpatient Medications:  amLODIPine (NORVASC) 10 MG tablet, TAKE 1 TABLET EVERY DAY, Disp: 90 tablet, Rfl: 0  aspirin 81 MG chewable tablet, Take 1 tablet by mouth Daily. 81  By mouth Every day, Disp: , Rfl:   beta-carotene,A,-vits C,E/mins (OCUVITE ORAL), Take by mouth., Disp: , Rfl:    clopidogreL (PLAVIX) 75 mg tablet, TAKE 1 TABLET EVERY DAY, Disp: 90 tablet, Rfl: 0  coenzyme Q10 10 mg capsule, Take 10 mg by mouth once daily., Disp: , Rfl:   famotidine (PEPCID) 20 MG tablet, Take 20 mg by mouth before dinner., Disp: , Rfl:   losartan (COZAAR) 50 MG tablet, Take 1 tablet (50 mg total) by mouth 2 (two) times daily., Disp: 180 tablet, Rfl: 3  metoprolol succinate (TOPROL-XL) 25 MG 24 hr tablet, Take 1 tablet (25 mg total) by mouth 2 (two) times daily., Disp: 180 tablet, Rfl: 3  multivitamin with minerals tablet, Take 1 tablet by mouth once daily. , Disp: , Rfl:   niacin 500 MG tablet, Take 500 mg by mouth Every PM., Disp: , Rfl:   nitroGLYCERIN (NITROSTAT) 0.4 MG SL tablet, Take 1 tab under the tongue for chest pain. May repeat every 5 minutes for a total of 3 doses. If chest pain not relieved go to the ED., Disp: 25 tablet, Rfl: 4  omega-3 fatty acids-vitamin E 1,000 mg Cap, Take 1 capsule by mouth 2 (two) times daily., Disp: , Rfl:   pravastatin (PRAVACHOL) 40 MG tablet, Take 1 tablet (40 mg total) by mouth every evening., Disp: 90 tablet, Rfl: 3  sildenafiL (VIAGRA) 100 MG tablet, Take 1 tablet (100 mg total) by mouth daily as needed for Erectile Dysfunction. (Patient not taking: Reported on 7/25/2022), Disp: 8 tablet, Rfl: 10  sotaloL (BETAPACE) 120 MG Tab, TAKE 1 TABLET TWICE DAILY, Disp: 180 tablet, Rfl: 3  vitamin D 1000 units Tab, Take 1,000 Units by mouth  every Tues, Thurs, Sat. , Disp: , Rfl:     No current facility-administered medications for this visit.  Facility-Administered Medications Ordered in Other Visits:  sodium chloride 0.9% bolus 1,000 mL, 1,000 mL, Intravenous, Once, Violette Delgado NP  vancomycin in dextrose 5 % 1 gram/250 mL IVPB 1,000 mg, 1,000 mg, Intravenous, On Call Procedure, Violette Delgado NP, 1,000 mg at 03/18/22 1232              Review of Systems   Constitutional: Negative for malaise/fatigue.   Cardiovascular:  Negative for chest pain, dyspnea on exertion, irregular heartbeat, leg swelling and palpitations.   Respiratory:  Negative for shortness of breath.    Hematologic/Lymphatic: Negative for bleeding problem.   Skin:  Negative for rash.   Musculoskeletal:  Negative for myalgias.   Gastrointestinal:  Negative for hematemesis, hematochezia and nausea.   Genitourinary:  Negative for hematuria.   Neurological:  Negative for light-headedness.   Psychiatric/Behavioral:  Negative for altered mental status.    Allergic/Immunologic: Negative for persistent infections.      Objective:    Physical Exam  Vitals and nursing note reviewed.   Constitutional:       Appearance: Normal appearance. He is well-developed.   HENT:      Head: Normocephalic.      Nose: Nose normal.   Eyes:      Pupils: Pupils are equal, round, and reactive to light.   Cardiovascular:      Rate and Rhythm: Normal rate and regular rhythm.   Pulmonary:      Effort: No respiratory distress.      Breath sounds: Normal breath sounds.   Chest:      Comments: Device to LUCW. Incision and pocket in good repair.  Musculoskeletal:         General: Normal range of motion.   Skin:     General: Skin is warm and dry.      Findings: No erythema.   Neurological:      Mental Status: He is alert and oriented to person, place, and time.   Psychiatric:         Speech: Speech normal.         Behavior: Behavior normal.         Assessment:       1. AICD (automatic cardioverter/defibrillator) present     2. Ischemic cardiomyopathy         Plan:       87 yoM here for ICD management. Normal DC ICd function with no sustained arrhythmias. I discussed routine device follow up including quarterly to bi-annual device checks for device function as well as yearly follow up in the EP clinic. The patient  was advised to call with any concerns regarding their device. Device clinic follow up as scheduled. RTC 1y

## 2022-11-10 ENCOUNTER — HOSPITAL ENCOUNTER (OUTPATIENT)
Dept: CARDIOLOGY | Facility: CLINIC | Age: 87
Discharge: HOME | End: 2022-11-10
Payer: MEDICARE

## 2022-11-10 ENCOUNTER — CLINICAL SUPPORT (OUTPATIENT)
Dept: CARDIOLOGY | Facility: HOSPITAL | Age: 87
End: 2022-11-10
Attending: INTERNAL MEDICINE
Payer: MEDICARE

## 2022-11-10 ENCOUNTER — OFFICE VISIT (OUTPATIENT)
Dept: ELECTROPHYSIOLOGY | Facility: CLINIC | Age: 87
End: 2022-11-10
Payer: MEDICARE

## 2022-11-10 VITALS
DIASTOLIC BLOOD PRESSURE: 60 MMHG | HEIGHT: 72 IN | HEART RATE: 57 BPM | BODY MASS INDEX: 24.01 KG/M2 | SYSTOLIC BLOOD PRESSURE: 162 MMHG | WEIGHT: 177.25 LBS

## 2022-11-10 DIAGNOSIS — Z95.810 AICD (AUTOMATIC CARDIOVERTER/DEFIBRILLATOR) PRESENT: Primary | Chronic | ICD-10-CM

## 2022-11-10 DIAGNOSIS — I25.5 ISCHEMIC CARDIOMYOPATHY: ICD-10-CM

## 2022-11-10 DIAGNOSIS — R00.0 TACHYCARDIA: ICD-10-CM

## 2022-11-10 DIAGNOSIS — Z86.79 HISTORY OF VENTRICULAR TACHYCARDIA: ICD-10-CM

## 2022-11-10 DIAGNOSIS — Z45.02 IMPLANTABLE CARDIOVERTER-DEFIBRILLATOR (ICD) AT END OF BATTERY LIFE: ICD-10-CM

## 2022-11-10 PROCEDURE — 99999 PR PBB SHADOW E&M-EST. PATIENT-LVL III: ICD-10-PCS | Mod: PBBFAC,,, | Performed by: INTERNAL MEDICINE

## 2022-11-10 PROCEDURE — 3288F PR FALLS RISK ASSESSMENT DOCUMENTED: ICD-10-PCS | Mod: CPTII,S$GLB,, | Performed by: INTERNAL MEDICINE

## 2022-11-10 PROCEDURE — 1101F PT FALLS ASSESS-DOCD LE1/YR: CPT | Mod: CPTII,S$GLB,, | Performed by: INTERNAL MEDICINE

## 2022-11-10 PROCEDURE — 99214 PR OFFICE/OUTPT VISIT, EST, LEVL IV, 30-39 MIN: ICD-10-PCS | Mod: S$GLB,,, | Performed by: INTERNAL MEDICINE

## 2022-11-10 PROCEDURE — 93283 PRGRMG EVAL IMPLANTABLE DFB: CPT | Mod: 26,,, | Performed by: INTERNAL MEDICINE

## 2022-11-10 PROCEDURE — 1160F PR REVIEW ALL MEDS BY PRESCRIBER/CLIN PHARMACIST DOCUMENTED: ICD-10-PCS | Mod: CPTII,S$GLB,, | Performed by: INTERNAL MEDICINE

## 2022-11-10 PROCEDURE — 99214 OFFICE O/P EST MOD 30 MIN: CPT | Mod: S$GLB,,, | Performed by: INTERNAL MEDICINE

## 2022-11-10 PROCEDURE — 1126F AMNT PAIN NOTED NONE PRSNT: CPT | Mod: CPTII,S$GLB,, | Performed by: INTERNAL MEDICINE

## 2022-11-10 PROCEDURE — 93283 CARDIAC DEVICE CHECK - IN CLINIC & HOSPITAL: ICD-10-PCS | Mod: 26,,, | Performed by: INTERNAL MEDICINE

## 2022-11-10 PROCEDURE — 1160F RVW MEDS BY RX/DR IN RCRD: CPT | Mod: CPTII,S$GLB,, | Performed by: INTERNAL MEDICINE

## 2022-11-10 PROCEDURE — 1159F MED LIST DOCD IN RCRD: CPT | Mod: CPTII,S$GLB,, | Performed by: INTERNAL MEDICINE

## 2022-11-10 PROCEDURE — 1101F PR PT FALLS ASSESS DOC 0-1 FALLS W/OUT INJ PAST YR: ICD-10-PCS | Mod: CPTII,S$GLB,, | Performed by: INTERNAL MEDICINE

## 2022-11-10 PROCEDURE — 99999 PR PBB SHADOW E&M-EST. PATIENT-LVL III: CPT | Mod: PBBFAC,,, | Performed by: INTERNAL MEDICINE

## 2022-11-10 PROCEDURE — 93010 ELECTROCARDIOGRAM REPORT: CPT | Mod: S$GLB,,, | Performed by: INTERNAL MEDICINE

## 2022-11-10 PROCEDURE — 1126F PR PAIN SEVERITY QUANTIFIED, NO PAIN PRESENT: ICD-10-PCS | Mod: CPTII,S$GLB,, | Performed by: INTERNAL MEDICINE

## 2022-11-10 PROCEDURE — 3288F FALL RISK ASSESSMENT DOCD: CPT | Mod: CPTII,S$GLB,, | Performed by: INTERNAL MEDICINE

## 2022-11-10 PROCEDURE — 93005 ELECTROCARDIOGRAM TRACING: CPT | Mod: S$GLB,,, | Performed by: INTERNAL MEDICINE

## 2022-11-10 PROCEDURE — 93283 PRGRMG EVAL IMPLANTABLE DFB: CPT

## 2022-11-10 PROCEDURE — 93005 RHYTHM STRIP: ICD-10-PCS | Mod: S$GLB,,, | Performed by: INTERNAL MEDICINE

## 2022-11-10 PROCEDURE — 93010 RHYTHM STRIP: ICD-10-PCS | Mod: S$GLB,,, | Performed by: INTERNAL MEDICINE

## 2022-11-10 PROCEDURE — 1159F PR MEDICATION LIST DOCUMENTED IN MEDICAL RECORD: ICD-10-PCS | Mod: CPTII,S$GLB,, | Performed by: INTERNAL MEDICINE

## 2022-12-13 ENCOUNTER — CLINICAL SUPPORT (OUTPATIENT)
Dept: CARDIOLOGY | Facility: HOSPITAL | Age: 87
End: 2022-12-13
Payer: MEDICARE

## 2022-12-13 DIAGNOSIS — Z95.810 PRESENCE OF AUTOMATIC (IMPLANTABLE) CARDIAC DEFIBRILLATOR: ICD-10-CM

## 2022-12-13 DIAGNOSIS — I25.5 ISCHEMIC CARDIOMYOPATHY: ICD-10-CM

## 2022-12-13 PROCEDURE — 93296 REM INTERROG EVL PM/IDS: CPT | Performed by: INTERNAL MEDICINE

## 2022-12-13 PROCEDURE — 93295 CARDIAC DEVICE CHECK - REMOTE: ICD-10-PCS | Mod: ,,, | Performed by: INTERNAL MEDICINE

## 2022-12-13 PROCEDURE — 93295 DEV INTERROG REMOTE 1/2/MLT: CPT | Mod: ,,, | Performed by: INTERNAL MEDICINE

## 2023-01-17 ENCOUNTER — PATIENT MESSAGE (OUTPATIENT)
Dept: CARDIOLOGY | Facility: CLINIC | Age: 88
End: 2023-01-17
Payer: MEDICARE

## 2023-01-18 ENCOUNTER — LAB VISIT (OUTPATIENT)
Dept: LAB | Facility: HOSPITAL | Age: 88
End: 2023-01-18
Attending: INTERNAL MEDICINE
Payer: MEDICARE

## 2023-01-18 DIAGNOSIS — I25.10 CORONARY ARTERY DISEASE INVOLVING NATIVE CORONARY ARTERY OF NATIVE HEART WITHOUT ANGINA PECTORIS: ICD-10-CM

## 2023-01-18 DIAGNOSIS — N18.31 STAGE 3A CHRONIC KIDNEY DISEASE: Chronic | ICD-10-CM

## 2023-01-18 DIAGNOSIS — D69.6 THROMBOCYTOPENIA: ICD-10-CM

## 2023-01-18 LAB
ALBUMIN SERPL BCP-MCNC: 3.8 G/DL (ref 3.5–5.2)
ALP SERPL-CCNC: 100 U/L (ref 55–135)
ALT SERPL W/O P-5'-P-CCNC: 18 U/L (ref 10–44)
ANION GAP SERPL CALC-SCNC: 8 MMOL/L (ref 8–16)
AST SERPL-CCNC: 23 U/L (ref 10–40)
BASOPHILS # BLD AUTO: 0.09 K/UL (ref 0–0.2)
BASOPHILS NFR BLD: 1.1 % (ref 0–1.9)
BILIRUB SERPL-MCNC: 1.1 MG/DL (ref 0.1–1)
BUN SERPL-MCNC: 14 MG/DL (ref 8–23)
CALCIUM SERPL-MCNC: 9.8 MG/DL (ref 8.7–10.5)
CHLORIDE SERPL-SCNC: 109 MMOL/L (ref 95–110)
CHOLEST SERPL-MCNC: 104 MG/DL (ref 120–199)
CHOLEST/HDLC SERPL: 2.2 {RATIO} (ref 2–5)
CO2 SERPL-SCNC: 25 MMOL/L (ref 23–29)
CREAT SERPL-MCNC: 1.4 MG/DL (ref 0.5–1.4)
DIFFERENTIAL METHOD: ABNORMAL
EOSINOPHIL # BLD AUTO: 0.4 K/UL (ref 0–0.5)
EOSINOPHIL NFR BLD: 5 % (ref 0–8)
ERYTHROCYTE [DISTWIDTH] IN BLOOD BY AUTOMATED COUNT: 14.3 % (ref 11.5–14.5)
EST. GFR  (NO RACE VARIABLE): 48.6 ML/MIN/1.73 M^2
GLUCOSE SERPL-MCNC: 90 MG/DL (ref 70–110)
HCT VFR BLD AUTO: 42.4 % (ref 40–54)
HDLC SERPL-MCNC: 48 MG/DL (ref 40–75)
HDLC SERPL: 46.2 % (ref 20–50)
HGB BLD-MCNC: 13.8 G/DL (ref 14–18)
IMM GRANULOCYTES # BLD AUTO: 0.02 K/UL (ref 0–0.04)
IMM GRANULOCYTES NFR BLD AUTO: 0.3 % (ref 0–0.5)
LDLC SERPL CALC-MCNC: 45.8 MG/DL (ref 63–159)
LYMPHOCYTES # BLD AUTO: 3.3 K/UL (ref 1–4.8)
LYMPHOCYTES NFR BLD: 41.3 % (ref 18–48)
MCH RBC QN AUTO: 29.7 PG (ref 27–31)
MCHC RBC AUTO-ENTMCNC: 32.5 G/DL (ref 32–36)
MCV RBC AUTO: 91 FL (ref 82–98)
MONOCYTES # BLD AUTO: 0.8 K/UL (ref 0.3–1)
MONOCYTES NFR BLD: 9.8 % (ref 4–15)
NEUTROPHILS # BLD AUTO: 3.4 K/UL (ref 1.8–7.7)
NEUTROPHILS NFR BLD: 42.5 % (ref 38–73)
NONHDLC SERPL-MCNC: 56 MG/DL
NRBC BLD-RTO: 0 /100 WBC
PLATELET # BLD AUTO: 103 K/UL (ref 150–450)
PMV BLD AUTO: 12.2 FL (ref 9.2–12.9)
POTASSIUM SERPL-SCNC: 3.5 MMOL/L (ref 3.5–5.1)
PROT SERPL-MCNC: 7.2 G/DL (ref 6–8.4)
RBC # BLD AUTO: 4.64 M/UL (ref 4.6–6.2)
SODIUM SERPL-SCNC: 142 MMOL/L (ref 136–145)
TRIGL SERPL-MCNC: 51 MG/DL (ref 30–150)
WBC # BLD AUTO: 7.99 K/UL (ref 3.9–12.7)

## 2023-01-18 PROCEDURE — 80061 LIPID PANEL: CPT | Mod: HCNC | Performed by: INTERNAL MEDICINE

## 2023-01-18 PROCEDURE — 36415 COLL VENOUS BLD VENIPUNCTURE: CPT | Mod: HCNC,PO | Performed by: INTERNAL MEDICINE

## 2023-01-18 PROCEDURE — 85025 COMPLETE CBC W/AUTO DIFF WBC: CPT | Mod: HCNC | Performed by: INTERNAL MEDICINE

## 2023-01-18 PROCEDURE — 80053 COMPREHEN METABOLIC PANEL: CPT | Mod: HCNC | Performed by: INTERNAL MEDICINE

## 2023-01-20 ENCOUNTER — TELEPHONE (OUTPATIENT)
Dept: FAMILY MEDICINE | Facility: CLINIC | Age: 88
End: 2023-01-20
Payer: MEDICARE

## 2023-01-20 ENCOUNTER — HOSPITAL ENCOUNTER (EMERGENCY)
Facility: HOSPITAL | Age: 88
Discharge: HOME | End: 2023-01-20
Attending: EMERGENCY MEDICINE
Payer: MEDICARE

## 2023-01-20 VITALS
RESPIRATION RATE: 15 BRPM | WEIGHT: 175 LBS | BODY MASS INDEX: 23.7 KG/M2 | HEIGHT: 72 IN | HEART RATE: 57 BPM | SYSTOLIC BLOOD PRESSURE: 162 MMHG | DIASTOLIC BLOOD PRESSURE: 76 MMHG | OXYGEN SATURATION: 95 % | TEMPERATURE: 99 F

## 2023-01-20 DIAGNOSIS — R42 DIZZINESS: ICD-10-CM

## 2023-01-20 DIAGNOSIS — N18.30 STAGE 3 CHRONIC KIDNEY DISEASE, UNSPECIFIED WHETHER STAGE 3A OR 3B CKD: ICD-10-CM

## 2023-01-20 DIAGNOSIS — I10 ESSENTIAL HYPERTENSION: ICD-10-CM

## 2023-01-20 DIAGNOSIS — I25.5 ISCHEMIC CARDIOMYOPATHY: Primary | ICD-10-CM

## 2023-01-20 LAB
ALBUMIN SERPL BCP-MCNC: 4.1 G/DL (ref 3.5–5.2)
ALP SERPL-CCNC: 109 U/L (ref 55–135)
ALT SERPL W/O P-5'-P-CCNC: 22 U/L (ref 10–44)
ANION GAP SERPL CALC-SCNC: 8 MMOL/L (ref 8–16)
AST SERPL-CCNC: 25 U/L (ref 10–40)
BASOPHILS # BLD AUTO: 0.1 K/UL (ref 0–0.2)
BASOPHILS NFR BLD: 1 % (ref 0–1.9)
BILIRUB SERPL-MCNC: 1.2 MG/DL (ref 0.1–1)
BILIRUB UR QL STRIP: NEGATIVE
BUN SERPL-MCNC: 14 MG/DL (ref 8–23)
CALCIUM SERPL-MCNC: 9.8 MG/DL (ref 8.7–10.5)
CHLORIDE SERPL-SCNC: 107 MMOL/L (ref 95–110)
CLARITY UR: CLEAR
CO2 SERPL-SCNC: 29 MMOL/L (ref 23–29)
COLOR UR: COLORLESS
CREAT SERPL-MCNC: 1.4 MG/DL (ref 0.5–1.4)
DIFFERENTIAL METHOD: ABNORMAL
EOSINOPHIL # BLD AUTO: 0.4 K/UL (ref 0–0.5)
EOSINOPHIL NFR BLD: 4.4 % (ref 0–8)
ERYTHROCYTE [DISTWIDTH] IN BLOOD BY AUTOMATED COUNT: 13.9 % (ref 11.5–14.5)
EST. GFR  (NO RACE VARIABLE): 49 ML/MIN/1.73 M^2
GLUCOSE SERPL-MCNC: 102 MG/DL (ref 70–110)
GLUCOSE UR QL STRIP: NEGATIVE
HCT VFR BLD AUTO: 45.1 % (ref 40–54)
HGB BLD-MCNC: 15.3 G/DL (ref 14–18)
HGB UR QL STRIP: ABNORMAL
IMM GRANULOCYTES # BLD AUTO: 0.03 K/UL (ref 0–0.04)
IMM GRANULOCYTES NFR BLD AUTO: 0.3 % (ref 0–0.5)
KETONES UR QL STRIP: NEGATIVE
LEUKOCYTE ESTERASE UR QL STRIP: NEGATIVE
LYMPHOCYTES # BLD AUTO: 2.7 K/UL (ref 1–4.8)
LYMPHOCYTES NFR BLD: 28.6 % (ref 18–48)
MAGNESIUM SERPL-MCNC: 2 MG/DL (ref 1.6–2.6)
MCH RBC QN AUTO: 30.1 PG (ref 27–31)
MCHC RBC AUTO-ENTMCNC: 33.9 G/DL (ref 32–36)
MCV RBC AUTO: 89 FL (ref 82–98)
MONOCYTES # BLD AUTO: 1 K/UL (ref 0.3–1)
MONOCYTES NFR BLD: 10.2 % (ref 4–15)
NEUTROPHILS # BLD AUTO: 5.3 K/UL (ref 1.8–7.7)
NEUTROPHILS NFR BLD: 55.5 % (ref 38–73)
NITRITE UR QL STRIP: NEGATIVE
NRBC BLD-RTO: 0 /100 WBC
PH UR STRIP: 8 [PH] (ref 5–8)
PLATELET # BLD AUTO: 99 K/UL (ref 150–450)
PMV BLD AUTO: 11.8 FL (ref 9.2–12.9)
POTASSIUM SERPL-SCNC: 3.6 MMOL/L (ref 3.5–5.1)
PROT SERPL-MCNC: 7.5 G/DL (ref 6–8.4)
PROT UR QL STRIP: ABNORMAL
RBC # BLD AUTO: 5.08 M/UL (ref 4.6–6.2)
SODIUM SERPL-SCNC: 144 MMOL/L (ref 136–145)
SP GR UR STRIP: 1.01 (ref 1–1.03)
TROPONIN I SERPL DL<=0.01 NG/ML-MCNC: 0.01 NG/ML (ref 0–0.03)
TSH SERPL DL<=0.005 MIU/L-ACNC: 2.12 UIU/ML (ref 0.4–4)
URN SPEC COLLECT METH UR: ABNORMAL
UROBILINOGEN UR STRIP-ACNC: NEGATIVE EU/DL
WBC # BLD AUTO: 9.59 K/UL (ref 3.9–12.7)

## 2023-01-20 PROCEDURE — 84443 ASSAY THYROID STIM HORMONE: CPT | Mod: HCNC | Performed by: EMERGENCY MEDICINE

## 2023-01-20 PROCEDURE — 80053 COMPREHEN METABOLIC PANEL: CPT | Mod: HCNC | Performed by: EMERGENCY MEDICINE

## 2023-01-20 PROCEDURE — 81003 URINALYSIS AUTO W/O SCOPE: CPT | Mod: HCNC | Performed by: EMERGENCY MEDICINE

## 2023-01-20 PROCEDURE — 99285 EMERGENCY DEPT VISIT HI MDM: CPT | Mod: 25,HCNC

## 2023-01-20 PROCEDURE — 84484 ASSAY OF TROPONIN QUANT: CPT | Mod: HCNC | Performed by: EMERGENCY MEDICINE

## 2023-01-20 PROCEDURE — 93005 ELECTROCARDIOGRAM TRACING: CPT | Mod: HCNC

## 2023-01-20 PROCEDURE — 93010 EKG 12-LEAD: ICD-10-PCS | Mod: HCNC,,, | Performed by: INTERNAL MEDICINE

## 2023-01-20 PROCEDURE — 85025 COMPLETE CBC W/AUTO DIFF WBC: CPT | Mod: HCNC | Performed by: EMERGENCY MEDICINE

## 2023-01-20 PROCEDURE — 93010 ELECTROCARDIOGRAM REPORT: CPT | Mod: HCNC,,, | Performed by: INTERNAL MEDICINE

## 2023-01-20 PROCEDURE — 83735 ASSAY OF MAGNESIUM: CPT | Mod: HCNC | Performed by: EMERGENCY MEDICINE

## 2023-01-20 NOTE — TELEPHONE ENCOUNTER
Called patient to reschedule upcoming appt due to Dr. Tomlinson being out of the clinic. Left message asking them to call us back or to reschedule online.

## 2023-01-20 NOTE — ED PROVIDER NOTES
Encounter Date: 1/20/2023       History     Chief Complaint   Patient presents with    Dizziness     Patient reports dizziness when waking, which resolved once laying supine on stretcher.  Patient reports his blood pressure was 187/87 prior to EMS arrival, which concerned the patient enough to call EMS.     Patient is an 87-year-old male who reports feeling lightheaded when he woke up this morning.  He said he checked his blood pressure and it was 187/87.  He denied having any headache, chest pain or shortness of breath.  No syncope.  Patient called EMS and by the time they arrived his symptoms had resolved.  He then had one very brief episode feeling lightheaded while in the ambulance.  He has no prior history of this symptoms.  He is currently asymptomatic.    Review of patient's allergies indicates:   Allergen Reactions    Fluress [fluorescein-benoxinate]     Mydriacyl [tropicamide]     Pcn  [penicillins]      Other reaction(s): Unknown    Clindamycin Rash     Past Medical History:   Diagnosis Date    AICD (automatic cardioverter/defibrillator) present 7/17/2012    Cardiomyopathy     CKD (chronic kidney disease) stage 3, GFR 30-59 ml/min 7/17/2012    Coronary artery disease     GERD (gastroesophageal reflux disease)     Tovar's; Dr. Vu    Hyperlipidemia 7/17/2012    Hypertension 7/17/2012    Ischemic cardiomyopathy 7/17/2012    MGUS (monoclonal gammopathy of unknown significance)     Thrombocytopenia 7/17/2012    Ventricular tachycardia     VT (ventricular tachycardia) 7/17/2012     Past Surgical History:   Procedure Laterality Date    ABDOMINAL AORTIC ANEURYSM REPAIR      CARDIAC DEFIBRILLATOR PLACEMENT      CATARACT EXTRACTION, BILATERAL  2018    CORONARY ANGIOPLASTY      EYE SURGERY Bilateral     cataract    HERNIA REPAIR      x2    REPLACEMENT OF IMPLANTABLE CARDIOVERTER-DEFIBRILLATOR (ICD) GENERATOR Left 3/18/2022    Procedure: REPLACEMENT, ICD GENERATOR;  Surgeon: Felipe Woodard MD;  Location:  [facility] EP LAB;  Service: Cardiology;  Laterality: Left;  SEFERINO, ICD gen chg, SJM, anes, MB, 3prep*ANUJ ICD insitu*      Family History   Problem Relation Age of Onset    Cancer Mother         Lymphoma    Lymphoma Mother     Coronary artery disease Mother     Heart disease Mother     Heart disease Father     Heart attacks under age 50 Father     Heart disease Sister     Cancer Brother         lymphoma     Social History     Tobacco Use    Smoking status: Never    Smokeless tobacco: Never   Substance Use Topics    Alcohol use: No    Drug use: No     Review of Systems   Constitutional:  Negative for chills and fever.   HENT:  Negative for congestion.    Respiratory:  Negative for cough and shortness of breath.    Cardiovascular:  Negative for chest pain.   Gastrointestinal:  Negative for abdominal pain, nausea and vomiting.   Musculoskeletal:  Negative for back pain.   Neurological:  Positive for dizziness and light-headedness. Negative for syncope.     Physical Exam     Initial Vitals [01/20/23 0923]   BP Pulse Resp Temp SpO2   (!) 152/75 65 17 98.5 °F (36.9 °C) 98 %      MAP       --         Physical Exam    Nursing note and vitals reviewed.  Constitutional: No distress.   HENT:   Head: Normocephalic and atraumatic.   Eyes: EOM are normal. Pupils are equal, round, and reactive to light.   Neck: Neck supple.   Normal range of motion.  Cardiovascular:  Normal rate and regular rhythm.           Pulmonary/Chest: Breath sounds normal.   Abdominal: Abdomen is soft. There is no rebound and no guarding.   Musculoskeletal:         General: Normal range of motion.      Cervical back: Normal range of motion and neck supple.     Neurological: He is alert and oriented to person, place, and time. No cranial nerve deficit.   Skin: Skin is warm and dry.   Psychiatric: He has a normal mood and affect.       ED Course   Procedures  Labs Reviewed   CBC W/ AUTO DIFFERENTIAL - Abnormal; Notable for the following components:       Result Value  "   Platelets 99 (*)     All other components within normal limits   COMPREHENSIVE METABOLIC PANEL - Abnormal; Notable for the following components:    Total Bilirubin 1.2 (*)     eGFR 49 (*)     All other components within normal limits   URINALYSIS - Abnormal; Notable for the following components:    Color, UA Colorless (*)     Protein, UA Trace (*)     Occult Blood UA Trace (*)     All other components within normal limits   TROPONIN I   TSH   MAGNESIUM     EKG Readings: (Independently Interpreted)   Initial Reading: No STEMI. Rhythm: Normal Sinus Rhythm. Heart Rate: 63. ST Segments: Normal ST Segments. Axis: Left Axis Deviation.   ECG Results              EKG 12-lead (In process)  Result time 01/20/23 11:22:28      In process by Interface, Lab In [facility] (01/20/23 11:22:28)                   Narrative:    Test Reason : R42,    Vent. Rate : 063 BPM     Atrial Rate : 063 BPM     P-R Int : 250 ms          QRS Dur : 096 ms      QT Int : 438 ms       P-R-T Axes : 076 -33 062 degrees     QTc Int : 448 ms    Sinus rhythm with 1st degree A-V block  Left axis deviation  Inferior infarct (cited on or before 18-MAR-2022)  Abnormal ECG  When compared with ECG of 10-NOV-2022 11:59,  Borderline criteria for Lateral infarct are no longer Present  T wave inversion no longer evident in Inferior leads  Nonspecific T wave abnormality has replaced inverted T waves in Lateral  leads    Referred By: [email or web address]   SELF           Confirmed By:                                   Imaging Results              X-Ray Chest 1 View (Final result)  Result time 01/20/23 11:46:40      Final result by Jhony Baxter MD (01/20/23 11:46:40)                   Impression:      Borderline cardiomegaly and possible minimal interstitial edema.      Electronically signed by: Jhony Baxter MD  Date:    01/20/2023  Time:    11:46               Narrative:    EXAMINATION:  XR CHEST 1 VIEW    CLINICAL HISTORY:  Provided history is "dizziness;  " "".    TECHNIQUE:  One view of the chest.    COMPARISON:  07/30/2020.    FINDINGS:  Cardiac wires overlie the chest.  Cardiomediastinal silhouette is stable in size.  Left chest wall AICD is present with transvenous leads overlying the heart.  AICD appears to have been exchanged when compared with the prior study in 2020.  Atherosclerotic calcifications overlie the aortic arch.  There are coarsened interstitial lung markings but no large focal area of consolidation.  No large pleural effusion.  No pneumothorax.                                       Medications - No data to display  Medical Decision Making:   History:   Old Medical Records: I decided to obtain old medical records.  Initial Assessment:   87-year-old male who experienced 2 brief episodes of lightheadedness this morning.  EKG and lab work will be checked.  Differential Diagnosis:   Vertigo, labyrinthitis, Meniere's disease, dehydration, cardiac arrhythmia, CVA.  Independently Interpreted Test(s):   I have ordered and independently interpreted X-rays - see summary below.       <> Summary of X-Ray Reading(s): I agree with radiologist's reading of borderline cardiomegaly.  Clinical Tests:   Lab Tests: Ordered and Reviewed  The following lab test(s) were unremarkable: CBC, CMP and Urinalysis       <> Summary of Lab: All labs unremarkable.  Radiological Study: Ordered and Reviewed  ED Management:  Entire workup done here in the ED is unremarkable.  Patient has remained completely asymptomatic during his entire ED stay.  Pacemaker was interrogated showing no signs of any arrhythmia.  I feel the patient may be safely discharged to home, with no detectable emergency medical condition identified.  He should follow up with his primary physician when able but may return to the emergency department for any recurrence or any other urgent concerns.                        Clinical Impression:   Final diagnoses:  [R42] Dizziness        ED Disposition Condition    " Discharge Stable          ED Prescriptions    None       Follow-up Information       Follow up With Specialties Details Why Contact Info    Bety Tomlinson MD Internal Medicine  As needed [address] [facility] [address]  [phone]      [facility] Emergency Dept Emergency Medicine  If symptoms worsen [address] [facility] [id number] [phone]             Jhonny Calderon MD  01/20/23 [id number]

## 2023-01-22 RX ORDER — TRIAMTERENE/HYDROCHLOROTHIAZID 37.5-25 MG
0.5 TABLET ORAL DAILY
Qty: 45 TABLET | Refills: 3 | Status: SHIPPED | OUTPATIENT
Start: 2023-01-22 | End: 2023-09-29

## 2023-01-27 ENCOUNTER — HOSPITAL ENCOUNTER (OUTPATIENT)
Dept: CARDIOLOGY | Facility: HOSPITAL | Age: 88
Discharge: HOME | End: 2023-01-27
Attending: INTERNAL MEDICINE
Payer: MEDICARE

## 2023-01-27 DIAGNOSIS — N18.30 STAGE 3 CHRONIC KIDNEY DISEASE, UNSPECIFIED WHETHER STAGE 3A OR 3B CKD: ICD-10-CM

## 2023-01-27 DIAGNOSIS — I10 ESSENTIAL HYPERTENSION: ICD-10-CM

## 2023-01-27 DIAGNOSIS — I25.5 ISCHEMIC CARDIOMYOPATHY: ICD-10-CM

## 2023-01-27 LAB
ABDOMINAL AORTA MID PSV: 71 CM/S
LEFT RENAL DIST DIAS: 13 CM/S
LEFT RENAL DIST SYS: 135 CM/S
LEFT RENAL ORIGIN DIAS: 18 CM/S
LEFT RENAL ORIGIN SYS: 92 CM/S
LEFT RENAL PROX DIAS: 18 CM/S
LEFT RENAL PROX SYS: 129 CM/S
LEFT RENAL ULTRASOUND ACCELERATION TIME MEASUREMENT 1: 26 MS
LEFT RENAL ULTRASOUND ACCELERATION TIME MEASUREMENT 2: 26 MS
LEFT RENAL ULTRASOUND ACCELERATION TIME MEASUREMENT 3: 26 MS
LEFT RENAL ULTRASOUND ACCELERATION TIME MEASUREMENT AVERAGE: 26 MS
LEFT RENAL ULTRASOUND KIDNEY SIZE MEASUREMENT 1: 10.3 CM
LEFT RENAL ULTRASOUND KIDNEY SIZE MEASUREMENT AVERAGE: 10.3 CM
LEFT RENAL ULTRASOUND RESISTIVE INDEX MEASUREMENT 1: 0.63
LEFT RENAL ULTRASOUND RESISTIVE INDEX MEASUREMENT 2: 0.67
LEFT RENAL ULTRASOUND RESISTIVE INDEX MEASUREMENT 3: 0.77
LEFT RENAL ULTRASOUND RESISTIVE INDEX MEASUREMENT AVERAGE: 0.77
OHS CV LEFT RENAL RAR: 1.9
OHS CV RIGHT RENAL RAR: 1.96
OHS CV US LEFT RENAL HIGHEST EDV: 18
OHS CV US LEFT RENAL HIGHEST PSV: 135
OHS CV US RIGHT RENAL HIGHEST EDV: 27
OHS CV US RIGHT RENAL HIGHEST PSV: 139
RIGHT RENAL DIST DIAS: 0 CM/S
RIGHT RENAL DIST SYS: 48 CM/S
RIGHT RENAL ORIGIN DIAS: 27 CM/S
RIGHT RENAL ORIGIN SYS: 129 CM/S
RIGHT RENAL PROX DIAS: 20 CM/S
RIGHT RENAL PROX SYS: 139 CM/S
RIGHT RENAL ULTRASOUND ACCELERATION TIME MEASUREMENT 1: 46 MS
RIGHT RENAL ULTRASOUND ACCELERATION TIME MEASUREMENT 2: 34 MS
RIGHT RENAL ULTRASOUND ACCELERATION TIME MEASUREMENT 3: 29 MS
RIGHT RENAL ULTRASOUND ACCELERATION TIME MEASUREMENT AVERAGE: 46 MS
RIGHT RENAL ULTRASOUND KIDNEY SIZE MEASUREMENT 1: 11.2 CM
RIGHT RENAL ULTRASOUND KIDNEY SIZE MEASUREMENT AVERAGE: 11.2 CM
RIGHT RENAL ULTRASOUND RESISTIVE INDEX MEASUREMENT 1: 0.77
RIGHT RENAL ULTRASOUND RESISTIVE INDEX MEASUREMENT 2: 0.77
RIGHT RENAL ULTRASOUND RESISTIVE INDEX MEASUREMENT 3: 0.75
RIGHT RENAL ULTRASOUND RESISTIVE INDEX MEASUREMENT AVERAGE: 0.77

## 2023-01-27 PROCEDURE — 93975 VASCULAR STUDY: CPT | Mod: HCNC

## 2023-01-27 PROCEDURE — 93975 VASCULAR STUDY: CPT | Mod: 26,HCNC,, | Performed by: INTERNAL MEDICINE

## 2023-01-27 PROCEDURE — 93975 CV US RENAL ARTERY STENOSIS HYPERTENSION COMPLETE (CUPID ONLY): ICD-10-PCS | Mod: 26,HCNC,, | Performed by: INTERNAL MEDICINE

## 2023-02-07 ENCOUNTER — PATIENT MESSAGE (OUTPATIENT)
Dept: FAMILY MEDICINE | Facility: CLINIC | Age: 88
End: 2023-02-07
Payer: MEDICARE

## 2023-02-07 DIAGNOSIS — Z00.00 ENCOUNTER FOR MEDICARE ANNUAL WELLNESS EXAM: ICD-10-CM

## 2023-02-14 NOTE — TELEPHONE ENCOUNTER
----- Message from Rosalva Centeno sent at 1/5/2017 10:28 AM CST -----  Contact: [phone]  Patient  States he has a sore on his back and would like to be seen asap   Called pt, no answer. LM asking to call our office to discuss medication. Rx completed on 02/01/23 #90 with 1 refill.

## 2023-02-26 ENCOUNTER — PATIENT MESSAGE (OUTPATIENT)
Dept: CARDIOLOGY | Facility: CLINIC | Age: 88
End: 2023-02-26
Payer: MEDICARE

## 2023-02-28 ENCOUNTER — LAB VISIT (OUTPATIENT)
Dept: LAB | Facility: HOSPITAL | Age: 88
End: 2023-02-28
Attending: INTERNAL MEDICINE
Payer: MEDICARE

## 2023-02-28 DIAGNOSIS — I10 ESSENTIAL HYPERTENSION: ICD-10-CM

## 2023-02-28 DIAGNOSIS — N18.30 STAGE 3 CHRONIC KIDNEY DISEASE, UNSPECIFIED WHETHER STAGE 3A OR 3B CKD: ICD-10-CM

## 2023-02-28 DIAGNOSIS — I25.5 ISCHEMIC CARDIOMYOPATHY: ICD-10-CM

## 2023-02-28 LAB
ANION GAP SERPL CALC-SCNC: 9 MMOL/L (ref 8–16)
BUN SERPL-MCNC: 14 MG/DL (ref 8–23)
CALCIUM SERPL-MCNC: 10 MG/DL (ref 8.7–10.5)
CHLORIDE SERPL-SCNC: 105 MMOL/L (ref 95–110)
CO2 SERPL-SCNC: 29 MMOL/L (ref 23–29)
CREAT SERPL-MCNC: 1.5 MG/DL (ref 0.5–1.4)
EST. GFR  (NO RACE VARIABLE): 44.8 ML/MIN/1.73 M^2
GLUCOSE SERPL-MCNC: 98 MG/DL (ref 70–110)
POTASSIUM SERPL-SCNC: 4.1 MMOL/L (ref 3.5–5.1)
SODIUM SERPL-SCNC: 143 MMOL/L (ref 136–145)

## 2023-02-28 PROCEDURE — 80048 BASIC METABOLIC PNL TOTAL CA: CPT | Mod: HCNC | Performed by: INTERNAL MEDICINE

## 2023-02-28 PROCEDURE — 36415 COLL VENOUS BLD VENIPUNCTURE: CPT | Mod: HCNC,PO | Performed by: INTERNAL MEDICINE

## 2023-03-10 ENCOUNTER — PATIENT MESSAGE (OUTPATIENT)
Dept: INTERNAL MEDICINE | Facility: CLINIC | Age: 88
End: 2023-03-10
Payer: MEDICARE

## 2023-03-13 ENCOUNTER — CLINICAL SUPPORT (OUTPATIENT)
Dept: CARDIOLOGY | Facility: HOSPITAL | Age: 88
End: 2023-03-13
Payer: MEDICARE

## 2023-03-13 DIAGNOSIS — I25.5 ISCHEMIC CARDIOMYOPATHY: ICD-10-CM

## 2023-03-13 DIAGNOSIS — I10 ESSENTIAL HYPERTENSION: ICD-10-CM

## 2023-03-13 DIAGNOSIS — E78.5 HYPERLIPIDEMIA, UNSPECIFIED HYPERLIPIDEMIA TYPE: ICD-10-CM

## 2023-03-13 DIAGNOSIS — Z95.810 PRESENCE OF AUTOMATIC (IMPLANTABLE) CARDIAC DEFIBRILLATOR: ICD-10-CM

## 2023-03-13 PROCEDURE — 93296 REM INTERROG EVL PM/IDS: CPT | Mod: HCNC | Performed by: INTERNAL MEDICINE

## 2023-03-13 NOTE — TELEPHONE ENCOUNTER
Refill Routing Note   Medication(s) are not appropriate for processing by [facility] for the following reason(s):         Required vitals abnormal  ED/Hospital Visit since last OV with PCP    [facility] action(s):  Defer         Appointments  past 12m or future 3m with PCP    Date Provider   Last Visit   7/25/2022 Bety Tomlinson MD   Next Visit   3/27/2023 Bety Tomlinson MD   ED visits in past 90 days: 1        Note composed:6:14 PM 03/13/2023

## 2023-03-13 NOTE — TELEPHONE ENCOUNTER
No new care gaps identified.  [facility] Embedded Care Gaps. Reference number: [id number]. 3/13/2023   3:58:50 PM CDT

## 2023-03-14 RX ORDER — LOSARTAN POTASSIUM 50 MG/1
50 TABLET ORAL 2 TIMES DAILY
Qty: 180 TABLET | Refills: 1 | Status: SHIPPED | OUTPATIENT
Start: 2023-03-14 | End: 2023-03-15 | Stop reason: ALTCHOICE

## 2023-03-14 RX ORDER — PRAVASTATIN SODIUM 40 MG/1
40 TABLET ORAL NIGHTLY
Qty: 90 TABLET | Refills: 3 | Status: SHIPPED | OUTPATIENT
Start: 2023-03-14 | End: 2023-07-06

## 2023-03-14 RX ORDER — METOPROLOL SUCCINATE 25 MG/1
25 TABLET, EXTENDED RELEASE ORAL 2 TIMES DAILY
Qty: 180 TABLET | Refills: 1 | Status: SHIPPED | OUTPATIENT
Start: 2023-03-14 | End: 2023-07-06

## 2023-03-15 ENCOUNTER — TELEPHONE (OUTPATIENT)
Dept: FAMILY MEDICINE | Facility: CLINIC | Age: 88
End: 2023-03-15
Payer: MEDICARE

## 2023-03-15 NOTE — TELEPHONE ENCOUNTER
----- Message from Patience Strickland sent at 3/14/2023  5:05 PM CDT -----  Type:  Pharmacy Calling to Clarify an RX    Name of Caller: Marcy   Pharmacy Name:[facility]   Prescription Name:sacubitriL-valsartan (ENTRESTO)  mg per tablet &(COZAAR) 50 MG tablet   What do they need to clarify?:patient is supposed to be on both medications or just one  Best Call Back Number:[phone]  Additional Information: Patients pharmacy called on behalf of the patient. Marcy indicates the patient is prescribed losartan (COZAAR) 50 MG tablet today, 3/14/23. Patient was also prescribed sacubitriL-valsartan (ENTRESTO)  mg per tablet previously. Marcy would like to clarify that the patient will or will not being taking both medications at the same time. Please call back with further assistance and more information

## 2023-03-15 NOTE — TELEPHONE ENCOUNTER
Per dr Cartagena-cardiology losartan has been discontinued since he is on [facility] informed to discontinue the med

## 2023-03-16 ENCOUNTER — OFFICE VISIT (OUTPATIENT)
Dept: FAMILY MEDICINE | Facility: CLINIC | Age: 88
End: 2023-03-16
Payer: MEDICARE

## 2023-03-16 VITALS
HEIGHT: 72 IN | BODY MASS INDEX: 23.83 KG/M2 | OXYGEN SATURATION: 96 % | DIASTOLIC BLOOD PRESSURE: 68 MMHG | TEMPERATURE: 97 F | WEIGHT: 175.94 LBS | HEART RATE: 63 BPM | SYSTOLIC BLOOD PRESSURE: 102 MMHG

## 2023-03-16 DIAGNOSIS — I50.42 CHRONIC COMBINED SYSTOLIC AND DIASTOLIC CONGESTIVE HEART FAILURE: ICD-10-CM

## 2023-03-16 DIAGNOSIS — I10 ESSENTIAL HYPERTENSION: ICD-10-CM

## 2023-03-16 DIAGNOSIS — Z00.00 ENCOUNTER FOR PREVENTIVE HEALTH EXAMINATION: Primary | ICD-10-CM

## 2023-03-16 DIAGNOSIS — Z00.00 ENCOUNTER FOR MEDICARE ANNUAL WELLNESS EXAM: ICD-10-CM

## 2023-03-16 DIAGNOSIS — E78.5 HYPERLIPIDEMIA, UNSPECIFIED HYPERLIPIDEMIA TYPE: ICD-10-CM

## 2023-03-16 DIAGNOSIS — J06.9 VIRAL UPPER RESPIRATORY ILLNESS: ICD-10-CM

## 2023-03-16 DIAGNOSIS — I25.118 CORONARY ARTERY DISEASE OF NATIVE ARTERY OF NATIVE HEART WITH STABLE ANGINA PECTORIS: ICD-10-CM

## 2023-03-16 DIAGNOSIS — I70.0 AORTIC ATHEROSCLEROSIS: ICD-10-CM

## 2023-03-16 DIAGNOSIS — N18.30 STAGE 3 CHRONIC KIDNEY DISEASE, UNSPECIFIED WHETHER STAGE 3A OR 3B CKD: ICD-10-CM

## 2023-03-16 DIAGNOSIS — R68.89 FLU-LIKE SYMPTOMS: ICD-10-CM

## 2023-03-16 DIAGNOSIS — D69.3 IDIOPATHIC THROMBOCYTOPENIC PURPURA: ICD-10-CM

## 2023-03-16 PROBLEM — R41.82 ALTERED MENTAL STATUS: Status: RESOLVED | Noted: 2018-10-08 | Resolved: 2023-03-16

## 2023-03-16 PROBLEM — H90.3 SENSORINEURAL HEARING LOSS, BILATERAL: Status: ACTIVE | Noted: 2023-03-16

## 2023-03-16 LAB
CTP QC/QA: YES
POC MOLECULAR INFLUENZA A AGN: NEGATIVE
POC MOLECULAR INFLUENZA B AGN: NEGATIVE

## 2023-03-16 PROCEDURE — 99213 OFFICE O/P EST LOW 20 MIN: CPT | Mod: HCNC,25,S$GLB, | Performed by: NURSE PRACTITIONER

## 2023-03-16 PROCEDURE — 1160F RVW MEDS BY RX/DR IN RCRD: CPT | Mod: HCNC,CPTII,S$GLB, | Performed by: NURSE PRACTITIONER

## 2023-03-16 PROCEDURE — 87502 INFLUENZA DNA AMP PROBE: CPT | Mod: QW,HCNC,S$GLB, | Performed by: NURSE PRACTITIONER

## 2023-03-16 PROCEDURE — 3288F FALL RISK ASSESSMENT DOCD: CPT | Mod: HCNC,CPTII,S$GLB, | Performed by: NURSE PRACTITIONER

## 2023-03-16 PROCEDURE — 87502 POCT INFLUENZA A/B MOLECULAR: ICD-10-PCS | Mod: QW,HCNC,S$GLB, | Performed by: NURSE PRACTITIONER

## 2023-03-16 PROCEDURE — 99999 PR PBB SHADOW E&M-EST. PATIENT-LVL V: ICD-10-PCS | Mod: PBBFAC,HCNC,, | Performed by: NURSE PRACTITIONER

## 2023-03-16 PROCEDURE — 3288F PR FALLS RISK ASSESSMENT DOCUMENTED: ICD-10-PCS | Mod: HCNC,CPTII,S$GLB, | Performed by: NURSE PRACTITIONER

## 2023-03-16 PROCEDURE — 1170F FXNL STATUS ASSESSED: CPT | Mod: HCNC,CPTII,S$GLB, | Performed by: NURSE PRACTITIONER

## 2023-03-16 PROCEDURE — G0439 PR MEDICARE ANNUAL WELLNESS SUBSEQUENT VISIT: ICD-10-PCS | Mod: HCNC,S$GLB,, | Performed by: NURSE PRACTITIONER

## 2023-03-16 PROCEDURE — 1160F PR REVIEW ALL MEDS BY PRESCRIBER/CLIN PHARMACIST DOCUMENTED: ICD-10-PCS | Mod: HCNC,CPTII,S$GLB, | Performed by: NURSE PRACTITIONER

## 2023-03-16 PROCEDURE — 1126F PR PAIN SEVERITY QUANTIFIED, NO PAIN PRESENT: ICD-10-PCS | Mod: HCNC,CPTII,S$GLB, | Performed by: NURSE PRACTITIONER

## 2023-03-16 PROCEDURE — 1159F PR MEDICATION LIST DOCUMENTED IN MEDICAL RECORD: ICD-10-PCS | Mod: HCNC,CPTII,S$GLB, | Performed by: NURSE PRACTITIONER

## 2023-03-16 PROCEDURE — 1170F PR FUNCTIONAL STATUS ASSESSED: ICD-10-PCS | Mod: HCNC,CPTII,S$GLB, | Performed by: NURSE PRACTITIONER

## 2023-03-16 PROCEDURE — 1101F PT FALLS ASSESS-DOCD LE1/YR: CPT | Mod: HCNC,CPTII,S$GLB, | Performed by: NURSE PRACTITIONER

## 2023-03-16 PROCEDURE — G0439 PPPS, SUBSEQ VISIT: HCPCS | Mod: HCNC,S$GLB,, | Performed by: NURSE PRACTITIONER

## 2023-03-16 PROCEDURE — 99999 PR PBB SHADOW E&M-EST. PATIENT-LVL V: CPT | Mod: PBBFAC,HCNC,, | Performed by: NURSE PRACTITIONER

## 2023-03-16 PROCEDURE — 1101F PR PT FALLS ASSESS DOC 0-1 FALLS W/OUT INJ PAST YR: ICD-10-PCS | Mod: HCNC,CPTII,S$GLB, | Performed by: NURSE PRACTITIONER

## 2023-03-16 PROCEDURE — 1126F AMNT PAIN NOTED NONE PRSNT: CPT | Mod: HCNC,CPTII,S$GLB, | Performed by: NURSE PRACTITIONER

## 2023-03-16 PROCEDURE — 1159F MED LIST DOCD IN RCRD: CPT | Mod: HCNC,CPTII,S$GLB, | Performed by: NURSE PRACTITIONER

## 2023-03-16 PROCEDURE — 99213 PR OFFICE/OUTPT VISIT, EST, LEVL III, 20-29 MIN: ICD-10-PCS | Mod: HCNC,25,S$GLB, | Performed by: NURSE PRACTITIONER

## 2023-03-16 RX ORDER — GUAIFENESIN 1200 MG/1
1200 TABLET, EXTENDED RELEASE ORAL 2 TIMES DAILY
Qty: 14 TABLET | Refills: 1 | Status: SHIPPED | OUTPATIENT
Start: 2023-03-16 | End: 2023-03-26

## 2023-03-16 RX ORDER — FLUTICASONE PROPIONATE 50 MCG
1 SPRAY, SUSPENSION (ML) NASAL DAILY
Qty: 16 G | Refills: 1 | Status: SHIPPED | OUTPATIENT
Start: 2023-03-16 | End: 2023-10-24 | Stop reason: SDUPTHER

## 2023-03-16 NOTE — PROGRESS NOTES
Nelson Huntley presented for a  Medicare AWV and comprehensive Health Risk Assessment today. The following components were reviewed and updated:    Medical history  Family History  Social history  Allergies and Current Medications  Health Risk Assessment  Health Maintenance  Care Team         ** See Completed Assessments for Annual Wellness Visit within the encounter summary.**         The following assessments were completed:  Living Situation  CAGE  Depression Screening  Timed Get Up and Go  Whisper Test  Cognitive Function Screening    Nutrition Screening  ADL Screening  PAQ Screening        Vitals:    03/16/23 1303   BP: 102/68   BP Location: Right arm   Patient Position: Sitting   BP Method: Large (Manual)   Pulse: 63   Temp: 97.1 °F (36.2 °C)   TempSrc: Axillary   SpO2: 96%   Weight: 79.8 kg (175 lb 14.8 oz)   Height: 6' (1.829 m)     Body mass index is 23.86 kg/m².    Physical Exam See PE above          Diagnoses and health risks identified today and associated recommendations/orders:    1. Encounter for preventive health examination    2. Chronic combined systolic and diastolic congestive heart failure  Chronic; stable on medication. Followed by Cardiology.    3. Aortic atherosclerosis  Chronic; stable on medication. Followed by Cardiology.    4. Idiopathic thrombocytopenic purpura  Chronic; stable. Follow up with PCP.    5. Coronary artery disease of native artery of native heart with stable angina pectoris  Chronic; stable on medication. Followed by Cardiology.    6. Stage 3 chronic kidney disease, unspecified whether stage 3a or 3b CKD  Chronic; stable on medication. Follow up with PCP.    7. Essential hypertension  Chronic; stable on medication. Follow up with PCP.    8. Hyperlipidemia, unspecified hyperlipidemia type  Chronic; stable on medication. Follow up with PCP.    9. Viral upper respiratory illness  See other note  - guaiFENesin 1,200 mg Ta12; Take 1,200 mg ( 1 tablet ) by mouth 2 (two) times  daily.  Dispense: 14 tablet; Refill: 1  - fluticasone propionate (FLONASE) 50 mcg/actuation nasal spray; 1 spray (50 mcg total) by Each Nostril route once daily.  Dispense: 16 g; Refill: 1    10. Flu-like symptoms  See other note  - POCT Influenza A/B Molecular    11. Body mass index (BMI) of 23.0 to 23.9 in adult  Patient's BMI is WNL. Continue to eat a low salt/low fat diet and discussed importance of engaging in physical activity at least 5x/week for a minimum of 30 min/day.    12. Encounter for Medicare annual wellness exam  - Ambulatory Referral/Consult to Enhanced Annual Wellness Visit (eAWV)      Provided Edward with a 5-10 year written screening schedule and personal prevention plan. Recommendations were developed using the USPSTF age appropriate recommendations. Education, counseling, and referrals were provided as needed. After Visit Summary printed and given to patient which includes a list of additional screenings/tests needed.    Follow up if symptoms worsen or fail to improve, for your next annual wellness visit.    Claudia Laird NP      Advance Care Planning     I offered to discuss advanced care planning, including how to pick a person who would make decisions for you if you were unable to make them for yourself, called a health care power of , and what kind of decisions you might make such as use of life sustaining treatments such as ventilators and tube feeding when faced with a life limiting illness recorded on a living will that they will need to know. (How you want to be cared for as you near the end of your natural life)     X  Patient has advanced directives forms and agrees to provide copies to the institution once completed.

## 2023-03-16 NOTE — PROGRESS NOTES
Subjective:       Patient ID: Nelson GIBBONS Parent is a 87 y.o. male.    Chief Complaint: Health Risk Assessment and Cough    This is a pleasant 88 yo male patient of Dr. Tomlinson who is known to me. He presents today accompanied by his wife for his Annual Wellness Visit but is c/o URI symptoms. PMH includes    Patient Active Problem List:     Thrombocytopenia     CKD (chronic kidney disease) stage 3, GFR 30-59 ml/min     AICD (automatic cardioverter/defibrillator) present     Essential hypertension     Hyperlipidemia     Anemia associated with chronic renal failure     Coronary artery disease involving native coronary artery without angina pectoris     Old myocardial infarction     S/P AAA (abdominal aortic aneurysm) repair     Diverticulosis     Nuclear sclerosis     Vasculogenic erectile dysfunction     Tovar's esophagus without dysplasia     Monoclonal paraproteinemia     Aortic atherosclerosis     Ischemic cardiomyopathy     Seasonal allergic rhinitis     Body mass index (BMI) of 23.0 to 23.9 in adult     Positive ELYSIA (antinuclear antibody)     Abnormal serum protein electrophoresis     Vitamin D deficiency     MGUS (monoclonal gammopathy of unknown significance)     Gastroesophageal reflux disease     History of TIA (transient ischemic attack)     Hypercalcemia     Metabolic bone disease     Abnormal CT of the head     Altered mental status     Cerebral infarction, remote, resolved     History of ventricular tachycardia     Chronic combined systolic and diastolic congestive heart failure     Idiopathic thrombocytopenic purpura     Sensorineural hearing loss, bilateral    VSS today. Reports he has been experiencing symptoms listed below for the past 9 days, has slightly improved since onset. No known sick contacts or recent travel. Wife started developing symptoms a few days later. Denies fever, CP, SOB, dyspnea, wheezing, body aches, sore throat, ear pain or N/V/D. Tolerating fluids and meals. Has not tried  taking any home treatments, typically likes to avoid medications if possible. Has been drinking at least 70 oz of water daily, including hot tea. Did not test self for COVID.    Review of Systems   Constitutional:  Positive for fatigue. Negative for appetite change, chills and fever.   HENT:  Positive for nasal congestion, sneezing and sore throat (mild). Negative for ear pain, postnasal drip, rhinorrhea, sinus pressure/congestion and trouble swallowing.    Respiratory:  Positive for cough (productive). Negative for chest tightness, shortness of breath and wheezing.    Cardiovascular:  Negative for chest pain.   Gastrointestinal:  Negative for abdominal pain, diarrhea, nausea and vomiting.   Neurological:  Negative for headaches.   Hematological:  Negative for adenopathy.       Objective:      Physical Exam  Vitals reviewed.   Constitutional:       General: He is awake. He is not in acute distress.     Appearance: Normal appearance. He is well-developed, well-groomed and normal weight.   HENT:      Head: Normocephalic.      Right Ear: Tympanic membrane, ear canal and external ear normal. Decreased hearing noted.      Left Ear: Tympanic membrane, ear canal and external ear normal. Decreased hearing noted.      Nose: Rhinorrhea present. Rhinorrhea is clear.      Right Turbinates: Enlarged.      Left Turbinates: Enlarged.      Comments: Likely polyp noted to right side, discuss further with PCP     Mouth/Throat:      Lips: Pink.      Mouth: Mucous membranes are moist.      Pharynx: Uvula midline. Posterior oropharyngeal erythema (slight) present.      Comments: Postnasal drip visualized  Eyes:      General:         Right eye: No discharge.         Left eye: No discharge.   Neck:      Vascular: No carotid bruit.   Cardiovascular:      Rate and Rhythm: Normal rate and regular rhythm.      Pulses:           Radial pulses are 2+ on the right side and 2+ on the left side.        Dorsalis pedis pulses are 2+ on the right  side and 2+ on the left side.      Heart sounds: No murmur heard.     Comments: Defibrillator to LCW  Pulmonary:      Effort: Pulmonary effort is normal. No respiratory distress.      Breath sounds: Normal breath sounds. No wheezing or rhonchi.   Abdominal:      General: There is no distension.      Palpations: Abdomen is soft.      Tenderness: There is no abdominal tenderness. There is no guarding.   Musculoskeletal:      Right lower leg: No edema.      Left lower leg: No edema.   Lymphadenopathy:      Cervical: No cervical adenopathy.   Skin:     General: Skin is warm and dry.      Coloration: Skin is not pale.   Neurological:      Mental Status: He is alert and oriented to person, place, and time.      Coordination: Coordination normal.      Gait: Gait normal.   Psychiatric:         Attention and Perception: Attention normal.         Mood and Affect: Mood and affect normal.         Speech: Speech normal.         Behavior: Behavior normal. Behavior is cooperative.         Cognition and Memory: Cognition and memory normal.       Assessment:       Problem List Items Addressed This Visit          Cardiac/Vascular    Essential hypertension    Hyperlipidemia    Coronary artery disease of native artery with stable angina pectoris    Aortic atherosclerosis    Chronic combined systolic and diastolic congestive heart failure       Renal/    CKD (chronic kidney disease) stage 3, GFR 30-59 ml/min (Chronic)       Hematology    Idiopathic thrombocytopenic purpura       Endocrine    Body mass index (BMI) of 23.0 to 23.9 in adult     Other Visit Diagnoses       Encounter for preventive health examination    -  Primary    Viral upper respiratory illness        Relevant Medications    guaiFENesin 1,200 mg Ta12    fluticasone propionate (FLONASE) 50 mcg/actuation nasal spray    Flu-like symptoms        Relevant Orders    POCT Influenza A/B Molecular    Encounter for Medicare annual wellness exam                  Plan:        Nelson was seen today for health risk assessment and cough.    Diagnoses and all orders for this visit:      Viral upper respiratory illness  -     guaiFENesin 1,200 mg Ta12; Take 1,200 mg ( 1 tablet ) by mouth 2 (two) times daily.  -     fluticasone propionate (FLONASE) 50 mcg/actuation nasal spray; 1 spray (50 mcg total) by Each Nostril route once daily.    Flu-like symptoms  -     POCT Influenza A/B Molecular        - Flu testing done today: negative  - Unable to test for COVID d/t lack of testing kits  - Likely viral URI, reassured as PE is stable and symptoms are improving  - Take Mucinex BID, use Flonase daily  - Continue with symptomatic relief treatments  - Drink plenty fluids and eat as tolerated  - Hot fluids and humidifier may help  - Warning signs discussed  - RTC in 2 weeks for follow-up with PCP, or sooner if needed          I spent a total of 20 minutes on the day of the visit for this complaint.This includes face to face time and non-face to face time preparing to see the patient (eg, review of tests), obtaining and/or reviewing separately obtained history, documenting clinical information in the electronic or other health record, independently interpreting results and communicating results to the patient/family/caregiver, or care coordinator.

## 2023-03-16 NOTE — PATIENT INSTRUCTIONS
Counseling and Referral of Other Preventative  (Italic type indicates deductible and co-insurance are waived)    Patient Name: Nelson Parent  Today's Date: 3/16/2023    Health Maintenance       Date Due Completion Date    COVID-19 Vaccine (6 - Booster for Pfizer series) 09/22/2022 7/28/2022    Colonoscopy 05/07/2023 5/7/2018    Aspirin/Antiplatelet Therapy 11/10/2023 11/10/2022    TETANUS VACCINE 11/22/2023 11/22/2013    Lipid Panel 01/18/2028 1/18/2023        No orders of the defined types were placed in this encounter.      The following information is provided to all patients.  This information is to help you find resources for any of the problems found today that may be affecting your health:                Living healthy guide: www.[facility].[place].gov      Understanding Diabetes: www.diabetes.org      Eating healthy: www.cdc.gov/healthyweight      [facility] home safety checklist: www.cdc.gov/steadi/patient.html      Agency on Aging: www.goea.[place].gov      Alcoholics anonymous (AA): www.aa.org      Physical Activity: www.blas.nih.gov/md4htve      Tobacco use: www.quitwithusla.org

## 2023-03-17 ENCOUNTER — PATIENT MESSAGE (OUTPATIENT)
Dept: CARDIOLOGY | Facility: CLINIC | Age: 88
End: 2023-03-17
Payer: MEDICARE

## 2023-03-27 ENCOUNTER — OFFICE VISIT (OUTPATIENT)
Dept: INTERNAL MEDICINE | Facility: CLINIC | Age: 88
End: 2023-03-27
Payer: MEDICARE

## 2023-03-27 VITALS
DIASTOLIC BLOOD PRESSURE: 58 MMHG | BODY MASS INDEX: 23.86 KG/M2 | HEART RATE: 58 BPM | OXYGEN SATURATION: 96 % | WEIGHT: 176.13 LBS | SYSTOLIC BLOOD PRESSURE: 110 MMHG | HEIGHT: 72 IN

## 2023-03-27 DIAGNOSIS — Z86.73 HISTORY OF TIA (TRANSIENT ISCHEMIC ATTACK): Chronic | ICD-10-CM

## 2023-03-27 DIAGNOSIS — I10 ESSENTIAL HYPERTENSION: ICD-10-CM

## 2023-03-27 DIAGNOSIS — N18.9 ANEMIA ASSOCIATED WITH CHRONIC RENAL FAILURE: ICD-10-CM

## 2023-03-27 DIAGNOSIS — D63.1 ANEMIA ASSOCIATED WITH CHRONIC RENAL FAILURE: ICD-10-CM

## 2023-03-27 DIAGNOSIS — B96.89 ACUTE BACTERIAL SINUSITIS: Primary | ICD-10-CM

## 2023-03-27 DIAGNOSIS — N18.31 STAGE 3A CHRONIC KIDNEY DISEASE: Chronic | ICD-10-CM

## 2023-03-27 DIAGNOSIS — D69.6 THROMBOCYTOPENIA: ICD-10-CM

## 2023-03-27 DIAGNOSIS — D47.2 MGUS (MONOCLONAL GAMMOPATHY OF UNKNOWN SIGNIFICANCE): ICD-10-CM

## 2023-03-27 DIAGNOSIS — I50.42 CHRONIC COMBINED SYSTOLIC AND DIASTOLIC CONGESTIVE HEART FAILURE: ICD-10-CM

## 2023-03-27 DIAGNOSIS — Z12.5 PROSTATE CANCER SCREENING: ICD-10-CM

## 2023-03-27 DIAGNOSIS — E78.5 HYPERLIPIDEMIA, UNSPECIFIED HYPERLIPIDEMIA TYPE: ICD-10-CM

## 2023-03-27 DIAGNOSIS — J01.90 ACUTE BACTERIAL SINUSITIS: Primary | ICD-10-CM

## 2023-03-27 PROCEDURE — 3288F PR FALLS RISK ASSESSMENT DOCUMENTED: ICD-10-PCS | Mod: HCNC,CPTII,S$GLB, | Performed by: INTERNAL MEDICINE

## 2023-03-27 PROCEDURE — 3288F FALL RISK ASSESSMENT DOCD: CPT | Mod: HCNC,CPTII,S$GLB, | Performed by: INTERNAL MEDICINE

## 2023-03-27 PROCEDURE — 99999 PR PBB SHADOW E&M-EST. PATIENT-LVL V: ICD-10-PCS | Mod: PBBFAC,HCNC,, | Performed by: INTERNAL MEDICINE

## 2023-03-27 PROCEDURE — 1160F PR REVIEW ALL MEDS BY PRESCRIBER/CLIN PHARMACIST DOCUMENTED: ICD-10-PCS | Mod: HCNC,CPTII,S$GLB, | Performed by: INTERNAL MEDICINE

## 2023-03-27 PROCEDURE — 1159F PR MEDICATION LIST DOCUMENTED IN MEDICAL RECORD: ICD-10-PCS | Mod: HCNC,CPTII,S$GLB, | Performed by: INTERNAL MEDICINE

## 2023-03-27 PROCEDURE — 99999 PR PBB SHADOW E&M-EST. PATIENT-LVL V: CPT | Mod: PBBFAC,HCNC,, | Performed by: INTERNAL MEDICINE

## 2023-03-27 PROCEDURE — 1159F MED LIST DOCD IN RCRD: CPT | Mod: HCNC,CPTII,S$GLB, | Performed by: INTERNAL MEDICINE

## 2023-03-27 PROCEDURE — 1101F PT FALLS ASSESS-DOCD LE1/YR: CPT | Mod: HCNC,CPTII,S$GLB, | Performed by: INTERNAL MEDICINE

## 2023-03-27 PROCEDURE — 99214 OFFICE O/P EST MOD 30 MIN: CPT | Mod: HCNC,S$GLB,, | Performed by: INTERNAL MEDICINE

## 2023-03-27 PROCEDURE — 1126F PR PAIN SEVERITY QUANTIFIED, NO PAIN PRESENT: ICD-10-PCS | Mod: HCNC,CPTII,S$GLB, | Performed by: INTERNAL MEDICINE

## 2023-03-27 PROCEDURE — 99214 PR OFFICE/OUTPT VISIT, EST, LEVL IV, 30-39 MIN: ICD-10-PCS | Mod: HCNC,S$GLB,, | Performed by: INTERNAL MEDICINE

## 2023-03-27 PROCEDURE — 1126F AMNT PAIN NOTED NONE PRSNT: CPT | Mod: HCNC,CPTII,S$GLB, | Performed by: INTERNAL MEDICINE

## 2023-03-27 PROCEDURE — 1101F PR PT FALLS ASSESS DOC 0-1 FALLS W/OUT INJ PAST YR: ICD-10-PCS | Mod: HCNC,CPTII,S$GLB, | Performed by: INTERNAL MEDICINE

## 2023-03-27 PROCEDURE — 1160F RVW MEDS BY RX/DR IN RCRD: CPT | Mod: HCNC,CPTII,S$GLB, | Performed by: INTERNAL MEDICINE

## 2023-03-27 RX ORDER — DOXYCYCLINE HYCLATE 100 MG
100 TABLET ORAL 2 TIMES DAILY
Qty: 20 TABLET | Refills: 0 | Status: SHIPPED | OUTPATIENT
Start: 2023-03-27 | End: 2023-04-06

## 2023-03-27 NOTE — PROGRESS NOTES
Patient ID: Nelson GIBBONS Parent is a 87 y.o. male.    Chief Complaint: Hypertension    HPI Nelson is a 87 y.o. male with  chronic kidney disease stage 3, hypertension, hyperlipidemia, CAD, MGUS, Tovar's esophagus, AICD, chronic systolic and diastolic CHF (echo 6/2020) and chronic ITP who presents  for routine follow-up of medical conditions. He presents with his wife today. He was treated on 3/16 for viral URI. Onset of symptoms was 3 weeks ago. Still having lots of PND, tired and coughing. Does not feel better. No sinus pressure or fever or ear pain. No further acute complaints.   Reviewed lab results from 1/18/23.    Health Maintenance Topics with due status: Not Due       Topic Last Completion Date    TETANUS VACCINE 11/22/2013    Lipid Panel 01/18/2023    Aspirin/Antiplatelet Therapy 03/27/2023      Review of Systems   Constitutional:  Positive for fatigue.   HENT:  Positive for postnasal drip.    Respiratory:  Positive for cough.    All other systems reviewed and are negative.      Objective:     Vitals:    03/27/23 1328   BP: (!) 110/58   Pulse:         Physical Exam  Vitals reviewed.   Constitutional:       General: He is not in acute distress.     Appearance: Normal appearance. He is well-developed. He is not ill-appearing, toxic-appearing or diaphoretic.   HENT:      Head: Normocephalic and atraumatic.      Right Ear: Tympanic membrane, ear canal and external ear normal. There is no impacted cerumen.      Left Ear: Tympanic membrane, ear canal and external ear normal. There is no impacted cerumen.      Nose: Nose normal.      Mouth/Throat:      Pharynx: Posterior oropharyngeal erythema (mild, posterior oropharynx) present. No oropharyngeal exudate.   Eyes:      Extraocular Movements: Extraocular movements intact.      Conjunctiva/sclera: Conjunctivae normal.   Cardiovascular:      Rate and Rhythm: Normal rate and regular rhythm.      Pulses: Normal pulses.      Heart sounds: Normal heart sounds. No murmur  heard.    No friction rub. No gallop.   Pulmonary:      Effort: Pulmonary effort is normal. No respiratory distress.      Breath sounds: Normal breath sounds. No stridor. No wheezing, rhonchi or rales.   Musculoskeletal:      Right lower leg: No edema.      Left lower leg: No edema.   Skin:     General: Skin is warm and dry.   Neurological:      General: No focal deficit present.      Mental Status: He is alert and oriented to person, place, and time. Mental status is at baseline.   Psychiatric:         Mood and Affect: Mood normal.         Behavior: Behavior normal.         Thought Content: Thought content normal.         Judgment: Judgment normal.       Assessment:       1. Acute bacterial sinusitis Active   2. Essential hypertension Well controlled   3. Hyperlipidemia, unspecified hyperlipidemia type Well controlled   4. Stage 3a chronic kidney disease Chronic   5. MGUS (monoclonal gammopathy of unknown significance) Chronic   6. Prostate cancer screening    7. History of TIA (transient ischemic attack) Chronic   8. Chronic combined systolic and diastolic congestive heart failure Well controlled   9. Anemia associated with chronic renal failure Inactive   10. Thrombocytopenia Chronic         Plan:         Acute bacterial sinusitis  -     doxycycline (VIBRA-TABS) 100 MG tablet; Take 1 tablet (100 mg total) by mouth 2 (two) times daily. for 10 days  Dispense: 20 tablet; Refill: 0    Essential hypertension  Comments:  Continue current medication    Hyperlipidemia, unspecified hyperlipidemia type  Comments:  Continue current medication  Orders:  -     Lipid Panel; Future; Expected date: 08/01/2023    Stage 3a chronic kidney disease  Comments:  Chronic and stable. Continue to monitor   Orders:  -     Comprehensive Metabolic Panel; Future; Expected date: 08/01/2023    MGUS (monoclonal gammopathy of unknown significance)  Comments:  Continue to follow with heme-onc for monitoring   Orders:  -     CBC Auto Differential;  Future; Expected date: 08/01/2023    Prostate cancer screening  -     PSA, Screening; Future; Expected date: 08/01/2023    History of TIA (transient ischemic attack)  Comments:  continue aspirin, statin and plavix. Continue BP control.     Chronic combined systolic and diastolic congestive heart failure  Comments:  Continue current medications. Continue to follow with cardiology     Anemia associated with chronic renal failure  Comments:  Continue to monitor     Thrombocytopenia  Comments:  Due to ITP. Continueu to monitor         RTC 6 months     Warning signs discussed, patient to call with any further issues or worsening of symptoms.       Parts of the above note were dictated using a voice dictation software. Please excuse any grammatical or typographical errors.

## 2023-05-06 DIAGNOSIS — I25.10 CORONARY ARTERY DISEASE INVOLVING NATIVE CORONARY ARTERY OF NATIVE HEART WITHOUT ANGINA PECTORIS: ICD-10-CM

## 2023-05-06 DIAGNOSIS — I10 ESSENTIAL HYPERTENSION: ICD-10-CM

## 2023-05-06 RX ORDER — AMLODIPINE BESYLATE 10 MG/1
TABLET ORAL
Qty: 90 TABLET | Refills: 3 | Status: SHIPPED | OUTPATIENT
Start: 2023-05-06

## 2023-05-06 NOTE — TELEPHONE ENCOUNTER
Refill Routing Note   Medication(s) are not appropriate for processing by [facility] for the following reason(s):      Required labs abnormal    [facility] action(s):  Defer  Approve            Appointments  past 12m or future 3m with PCP    Date Provider   Last Visit   3/27/2023 Bety Tomlinson MD   Next Visit   10/9/2023 Bety Tomlinson MD   ED visits in past 90 days: 0        Note composed:6:25 PM 05/06/2023

## 2023-05-06 NOTE — TELEPHONE ENCOUNTER
No care due was identified.  [facility] Embedded Care Due Messages. Reference number: [id number].   5/06/2023 1:42:59 AM CDT

## 2023-05-08 RX ORDER — CLOPIDOGREL BISULFATE 75 MG/1
TABLET ORAL
Qty: 90 TABLET | Refills: 0 | Status: SHIPPED | OUTPATIENT
Start: 2023-05-08 | End: 2023-10-02

## 2023-06-04 ENCOUNTER — PATIENT MESSAGE (OUTPATIENT)
Dept: INTERNAL MEDICINE | Facility: CLINIC | Age: 88
End: 2023-06-04
Payer: MEDICARE

## 2023-06-11 ENCOUNTER — CLINICAL SUPPORT (OUTPATIENT)
Dept: CARDIOLOGY | Facility: HOSPITAL | Age: 88
End: 2023-06-11
Payer: MEDICARE

## 2023-06-11 DIAGNOSIS — Z95.810 PRESENCE OF AUTOMATIC (IMPLANTABLE) CARDIAC DEFIBRILLATOR: ICD-10-CM

## 2023-06-11 PROCEDURE — 93295 CARDIAC DEVICE CHECK - REMOTE: ICD-10-PCS | Mod: ,,, | Performed by: INTERNAL MEDICINE

## 2023-06-11 PROCEDURE — 93296 REM INTERROG EVL PM/IDS: CPT | Performed by: INTERNAL MEDICINE

## 2023-06-11 PROCEDURE — 93295 DEV INTERROG REMOTE 1/2/MLT: CPT | Mod: ,,, | Performed by: INTERNAL MEDICINE

## 2023-06-14 RX ORDER — SOTALOL HYDROCHLORIDE 120 MG/1
TABLET ORAL
Qty: 180 TABLET | Refills: 3 | Status: SHIPPED | OUTPATIENT
Start: 2023-06-14

## 2023-06-23 ENCOUNTER — TELEPHONE (OUTPATIENT)
Dept: INTERNAL MEDICINE | Facility: CLINIC | Age: 88
End: 2023-06-23
Payer: MEDICARE

## 2023-06-23 ENCOUNTER — HOSPITAL ENCOUNTER (OUTPATIENT)
Dept: RADIOLOGY | Facility: HOSPITAL | Age: 88
Discharge: HOME | End: 2023-06-23
Attending: INTERNAL MEDICINE
Payer: MEDICARE

## 2023-06-23 DIAGNOSIS — G89.29 CHRONIC BILATERAL LOW BACK PAIN WITHOUT SCIATICA: Primary | ICD-10-CM

## 2023-06-23 DIAGNOSIS — G89.29 CHRONIC BILATERAL LOW BACK PAIN WITHOUT SCIATICA: ICD-10-CM

## 2023-06-23 DIAGNOSIS — M54.50 CHRONIC BILATERAL LOW BACK PAIN WITHOUT SCIATICA: Primary | ICD-10-CM

## 2023-06-23 DIAGNOSIS — M54.50 CHRONIC BILATERAL LOW BACK PAIN WITHOUT SCIATICA: ICD-10-CM

## 2023-06-23 PROCEDURE — 72110 X-RAY EXAM L-2 SPINE 4/>VWS: CPT | Mod: 26,,, | Performed by: RADIOLOGY

## 2023-06-23 PROCEDURE — 72110 X-RAY EXAM L-2 SPINE 4/>VWS: CPT | Mod: TC,FY

## 2023-06-23 PROCEDURE — 72110 XR LUMBAR SPINE AP AND LAT WITH FLEX/EXT: ICD-10-PCS | Mod: 26,,, | Performed by: RADIOLOGY

## 2023-06-23 NOTE — TELEPHONE ENCOUNTER
"Patient presented to clinic today with his wife.  (His wife had a visit with me today but he did not).  He reports that he is distressed by his back "going out".  It has happened 3 times in recent years. When it happened most recently, he was stooped over and could not walk upright. He wants to know what he can do to prevent this from continuing to happen.   Assessment: Chronic back pain  Plan: Obtain lumbar xrays. If no significant findings, will set him up with [facility]   "

## 2023-06-26 DIAGNOSIS — M54.50 CHRONIC LOW BACK PAIN, UNSPECIFIED BACK PAIN LATERALITY, UNSPECIFIED WHETHER SCIATICA PRESENT: Primary | ICD-10-CM

## 2023-06-26 DIAGNOSIS — G89.29 CHRONIC LOW BACK PAIN, UNSPECIFIED BACK PAIN LATERALITY, UNSPECIFIED WHETHER SCIATICA PRESENT: Primary | ICD-10-CM

## 2023-07-05 DIAGNOSIS — I10 ESSENTIAL HYPERTENSION: ICD-10-CM

## 2023-07-05 DIAGNOSIS — E78.5 HYPERLIPIDEMIA, UNSPECIFIED HYPERLIPIDEMIA TYPE: ICD-10-CM

## 2023-07-06 RX ORDER — METOPROLOL SUCCINATE 25 MG/1
TABLET, EXTENDED RELEASE ORAL
Qty: 180 TABLET | Refills: 1 | Status: SHIPPED | OUTPATIENT
Start: 2023-07-06 | End: 2024-03-20

## 2023-07-06 RX ORDER — PRAVASTATIN SODIUM 40 MG/1
TABLET ORAL
Qty: 90 TABLET | Refills: 1 | Status: SHIPPED | OUTPATIENT
Start: 2023-07-06 | End: 2024-03-20

## 2023-07-06 NOTE — TELEPHONE ENCOUNTER
No care due was identified.  [facility] Embedded Care Due Messages. Reference number: [id number].   7/05/2023 7:37:00 PM CDT

## 2023-07-06 NOTE — TELEPHONE ENCOUNTER
Refill Routing Note   Medication(s) are not appropriate for processing by [facility] for the following reason(s):      Drug-drug interaction    [facility] action(s):  Defer  Approve None identified     Medication Therapy Plan: sotaloL, metoprolol succinate      Appointments  past 12m or future 3m with PCP    Date Provider   Last Visit   3/27/2023 Bety Tomlinson MD   Next Visit   10/9/2023 Bety Tomlinson MD   ED visits in past 90 days: 0        Note composed:8:06 AM 07/06/2023

## 2023-07-25 ENCOUNTER — TELEPHONE (OUTPATIENT)
Dept: CARDIOLOGY | Facility: CLINIC | Age: 88
End: 2023-07-25
Payer: MEDICARE

## 2023-07-26 NOTE — TELEPHONE ENCOUNTER
MD Bety Plummer MD; P Osiel ESCOBAR Staff  If significant bleeding is expected can hold Plavix 3-5 days before and best to avoid Epi unless absolutely needed. No premed needed,CJL           Previous Messages       ----- Message -----   From: Bety Tomlinson MD   Sent: 7/25/2023   1:55 PM CDT   To: Shiraz Cartagena MD, Osiel ESCOBAR Staff     Hi patient's dentist has form he would like filled out regarding patient's use of plavix and if you recommend pre-medication with antibiotics prior to procedure given his cardiac history. Can we fax this to your office?     Dr. Tomlinson

## 2023-07-26 NOTE — TELEPHONE ENCOUNTER
----- Message from Shiraz Cartagena MD sent at 7/25/2023  4:49 PM CDT -----  If significant bleeding is expected can hold Plavix 3-5 days before and best to avoid Epi unless absolutely needed. No premed needed,CJL  ----- Message -----  From: Bety Tomlinson MD  Sent: 7/25/2023   1:55 PM CDT  To: Shiraz Cartagena MD, Osiel ESCOBAR [facility] patient's dentist has form he would like filled out regarding patient's use of plavix and if you recommend pre-medication with antibiotics prior to procedure given his cardiac history. Can we fax this to your office?    Dr. Tomlinson

## 2023-07-31 DIAGNOSIS — I48.3 TYPICAL ATRIAL FLUTTER: ICD-10-CM

## 2023-07-31 DIAGNOSIS — I48.0 PAROXYSMAL ATRIAL FIBRILLATION: ICD-10-CM

## 2023-07-31 DIAGNOSIS — I25.5 ISCHEMIC CARDIOMYOPATHY: ICD-10-CM

## 2023-07-31 DIAGNOSIS — Z95.810 AICD (AUTOMATIC CARDIOVERTER/DEFIBRILLATOR) PRESENT: Primary | ICD-10-CM

## 2023-08-07 ENCOUNTER — TELEPHONE (OUTPATIENT)
Dept: INTERNAL MEDICINE | Facility: CLINIC | Age: 88
End: 2023-08-07
Payer: MEDICARE

## 2023-08-07 NOTE — TELEPHONE ENCOUNTER
Called patient and informed him that paper work has yong faxed out. Patient verbalized understanding

## 2023-08-07 NOTE — TELEPHONE ENCOUNTER
----- Message from Bety Tomlinson MD sent at 8/7/2023 12:27 PM CDT -----  Regarding: FW: Neisha/Maite [facility]  Contact: [facility]  We did this a long time ago. Please let everyone know we did this a long time ago. Thanks    ----- Message -----  From: Eileen Oakes  Sent: 8/7/2023  10:13 AM CDT  To: Davi Albert Staff  Subject: Neisha[facility]ite [facility]                 [facility] is requesting confirmation the office received the faxed medical clearance form. Please complete and send back. Thanks

## 2023-08-23 ENCOUNTER — CLINICAL SUPPORT (OUTPATIENT)
Dept: REHABILITATION | Facility: HOSPITAL | Age: 88
End: 2023-08-23
Attending: INTERNAL MEDICINE
Payer: MEDICARE

## 2023-08-23 DIAGNOSIS — M54.50 ACUTE BILATERAL LOW BACK PAIN WITHOUT SCIATICA: Primary | ICD-10-CM

## 2023-08-23 PROCEDURE — 97161 PT EVAL LOW COMPLEX 20 MIN: CPT | Mod: HCNC,PN

## 2023-08-23 PROCEDURE — 97530 THERAPEUTIC ACTIVITIES: CPT | Mod: HCNC,PN

## 2023-08-23 NOTE — PLAN OF CARE
[facility]   Physical Therapy Initial Evaluation      Name: Nelson Huntley  Clinic Number: [id number]    Therapy Diagnosis:   Encounter Diagnosis   Name Primary?    Acute bilateral low back pain without sciatica Yes        Physician: Bety Tomlinson MD    Physician Orders: PT Eval and Treat   Medical Diagnosis from Referral: Chronic low back pain, unspecified back pain laterality, unspecified whether sciatica present [M54.50, G89.29]  Evaluation Date: 8/23/2023  Authorization Period Expiration: 6/25/2024  Plan of Care Expiration: 10/6/2023  Visit # / Visits authorized: 1/1   FOTO: 1/3    Precautions: Standard; hypertension; history of tachycardia     Time In: 1407  Time Out: 1439  Total Billable Time: 32 minutes    Subjective     Date of onset: June    History of current condition - Nelson reports: reaching for an item in his trunk followed by back pain affecting ability to perform sit<>stands and walk. High amounts of pain persisted for 3 days and decreased the week afterwards. Patient has not experienced pain since. Patient denies associated lower extremity pain or paresthesias. Patient reports similar episodes of pain have occurred around the same time in 2021 and 2022. Patient interested in exercises to add to his current regimen and to discuss his imaging findings.    Falls: None    Imaging: X-Ray Lumbar Spine    Prior Therapy: Not for current complaint  Social History: Patient lives with his wife. Patient and his wife go to the gym 5 times a week. Patient walks on the treadmill for 30 minutes and performs various upper and lower body exercises with weights or on machines  Occupation: Retired  Prior Level of Function: Independent. Pain limiting walking and sit<>stands  Current Level of Function: Independent. No pain.l Attending gym 5x/week. Patient reports leaning onto countertop while brushing teeth feels good, but that standing upright doesn't increase  pain.    Pain:  None    Patients goals: Add exercises to current routine to prevent return in pain     Medical History:   Past Medical History:   Diagnosis Date    AICD (automatic cardioverter/defibrillator) present 7/17/2012    Cardiomyopathy     CKD (chronic kidney disease) stage 3, GFR 30-59 ml/min 7/17/2012    Coronary artery disease     GERD (gastroesophageal reflux disease)     Tovar's; Dr. Vu    Hyperlipidemia 7/17/2012    Hypertension 7/17/2012    Ischemic cardiomyopathy 7/17/2012    MGUS (monoclonal gammopathy of unknown significance)     Thrombocytopenia 7/17/2012    Ventricular tachycardia     VT (ventricular tachycardia) 7/17/2012     Surgical History:   Nelson GIBBONS Parent  has a past surgical history that includes Coronary angioplasty; Cardiac defibrillator placement; Hernia repair; Abdominal aortic aneurysm repair; Eye surgery (Bilateral); Cataract extraction, bilateral (2018); and replacement of implantable cardioverter-defibrillator (icd) generator (Left, 3/18/2022).    Medications:   Nelson has a current medication list which includes the following prescription(s): amlodipine, aspirin, beta-carotene(a)-vits c,e/mins, clopidogrel, famotidine, fluticasone propionate, metoprolol succinate, multivitamin with minerals, niacin, nitroglycerin, omega-3 fatty acids-vitamin e, pravastatin, sacubitril-valsartan, sildenafil, sotalol, triamterene-hydrochlorothiazide 37.5-25 mg, ubidecarenone, and vitamin d, and the following Facility-Administered Medications: sodium chloride 0.9% and vancomycin in dextrose 5 %.    Allergies:   Review of patient's allergies indicates:   Allergen Reactions    Fluress [fluorescein-benoxinate]     Mydriacyl [tropicamide]     Pcn  [penicillins]      Other reaction(s): Unknown    Clindamycin Rash        Objective      Posture: Decreased lumbar lordosis  Palpation: No tenderness to palpation    Lumbar Active range of motion  Pain/dysfunction with movement:   Flexion Normal  Bilateral knee flexion at end range   Extension 75%    Right side bending 75% Combined with flexion   Left side bending 75% Combined with flexion   Right rotation Normal    Left rotation Normal      Hip active range of motion: Within functional limits bilateral   Knee active range of motion: Within normal limits bilateral     Lower extremity manual muscle tests  Right Left Pain/dysfunction with movement   Hip flexion 4+/5 4/5    Hip internal rotation 4+/5 4+/5    Hip external rotation 4+/5 4+/5    Knee flexion 5/5 5/5    Knee extension 5/5 5/5    Ankle dorsiflexion 5/5 4+/5    Ankle plantarflexion 5/5 5/5      Gross movement: Squat: Decreased hip hinge; improves with cues  Gait analysis: Decreased trunk rotation    Intake Outcome Measure for FOTO Lumbar Spine Survey    Therapist reviewed FOTO scores for Nelson Huntley on 8/23/2023.   FOTO report - see Media section or FOTO account episode details.    Intake Score: 28%     Treatment     Total Treatment time (time-based codes) separate from Evaluation: 25 minutes     Nelson received the treatments listed below:      therapeutic activities to improve functional performance for 25 minutes, including:    Free Motion machine  Chops: 7 lbs x3  Lifts: 7 lbs x3  Straight arm pull downs: 7 lbs x10  Pallof press: 7 lbs x5    Goblet squats: 3 kg ball x10  Cybex leg press: Demonstrated for home exercise program   Standing transverse abdominus activation: Reviewed for home exercise program    Patient Education and Home Exercises     Education provided:   - home exercise program  - imaging findings  - will contact patient in a month and discharge if he remains pain free    Written Home Exercises Provided: yes. Exercises were reviewed and Nelson was able to demonstrate them prior to the end of the session.  Nelson demonstrated good  understanding of the education provided. See EMR under Patient Instructions for exercises provided during therapy sessions.    Assessment     Edward  is a 88 y.o. male referred to outpatient Physical Therapy with a medical diagnosis of chronic low back pain. Patient presents with resolved bilateral low back pain after reaching in trunk. Similar episodes of 1-2 week low back pain once a year for the last two years. Visit focused on core and posterior chain exercises to incorporate into current gym regimen. Will schedule a follow up if pain returns over the next month or so.     Patient prognosis is Excellent.   Patient will benefit from skilled outpatient Physical Therapy to address the deficits stated above and in the chart below, provide patient /family education, and to maximize patientt's level of independence.     Plan of care discussed with patient: Yes  Patient's spiritual, cultural and educational needs considered and patient is agreeable to the plan of care and goals as stated below:     Anticipated Barriers for therapy: None    Medical Necessity is demonstrated by the following  History  Co-morbidities and personal factors that may impact the plan of care [] LOW: no personal factors / co-morbidities  [x] MODERATE: 1-2 personal factors / co-morbidities  [] HIGH: 3+ personal factors / co-morbidities    Moderate / High Support Documentation:   Co-morbidities affecting plan of care: Congestive Heart Failure or Heart Disease, Gastrointestinal Disease, Heart Attack, High  Blood Pressure, Kidney, Bladder, Prostate or Urination Problems, Pacemaker, Prosthesis / Implants, Stroke or TIA      Personal Factors:   no deficits     Examination  Body Structures and Functions, activity limitations and participation restrictions that may impact the plan of care [x] LOW: addressing 1-2 elements  [] MODERATE: 3+ elements  [] HIGH: 4+ elements (please support below)    Moderate / High Support Documentation: Above     Clinical Presentation [x] LOW: stable  [] MODERATE: Evolving  [] HIGH: Unstable     Decision Making/ Complexity Score: low       Short = Long Term Goals: 6  weeks   1. Patient will be compliant with home exercise program to supplement therapy in promoting functional mobility.  2. Patient will improve FOTO score to </= 10% limited to decrease perceived limitation with maintaining/changing body position.     Plan     Plan of care Certification: 8/23/2023 to 10/6/2023.    Outpatient Physical Therapy 3 times in 6 weeks to include the following interventions: Gait Training, Manual Therapy, Moist Heat/ Ice, Neuromuscular Re-ed, Patient Education, Self Care, Therapeutic Activities, and Therapeutic Exercise.     Bridget Wilkerson, PT, DPT, OCS        Physician's Signature: _________________________________________ Date: ________________

## 2023-09-09 ENCOUNTER — CLINICAL SUPPORT (OUTPATIENT)
Dept: CARDIOLOGY | Facility: HOSPITAL | Age: 88
End: 2023-09-09
Payer: MEDICARE

## 2023-09-09 DIAGNOSIS — Z95.810 PRESENCE OF AUTOMATIC (IMPLANTABLE) CARDIAC DEFIBRILLATOR: ICD-10-CM

## 2023-09-09 DIAGNOSIS — I25.5 ISCHEMIC CARDIOMYOPATHY: ICD-10-CM

## 2023-09-09 PROCEDURE — 93296 REM INTERROG EVL PM/IDS: CPT | Performed by: INTERNAL MEDICINE

## 2023-09-21 ENCOUNTER — TELEPHONE (OUTPATIENT)
Dept: REHABILITATION | Facility: HOSPITAL | Age: 88
End: 2023-09-21
Payer: MEDICARE

## 2023-09-21 PROBLEM — M54.50 ACUTE BILATERAL LOW BACK PAIN WITHOUT SCIATICA: Status: RESOLVED | Noted: 2023-08-23 | Resolved: 2023-09-21

## 2023-09-21 NOTE — TELEPHONE ENCOUNTER
Patient was evaluated on 8/23/2023 and was seen 1 times for physical therapy. Patient has not attended physical therapy since evaluation and opted for home exercise program since he was pain free. Patient to be discharged at this time.

## 2023-09-22 ENCOUNTER — LAB VISIT (OUTPATIENT)
Dept: LAB | Facility: HOSPITAL | Age: 88
End: 2023-09-22
Attending: INTERNAL MEDICINE
Payer: MEDICARE

## 2023-09-22 DIAGNOSIS — E78.2 MIXED HYPERLIPIDEMIA: ICD-10-CM

## 2023-09-22 DIAGNOSIS — I10 ESSENTIAL HYPERTENSION: ICD-10-CM

## 2023-09-22 DIAGNOSIS — D47.2 MGUS (MONOCLONAL GAMMOPATHY OF UNKNOWN SIGNIFICANCE): ICD-10-CM

## 2023-09-22 DIAGNOSIS — I25.10 CORONARY ARTERY DISEASE INVOLVING NATIVE CORONARY ARTERY OF NATIVE HEART WITHOUT ANGINA PECTORIS: ICD-10-CM

## 2023-09-22 DIAGNOSIS — Z12.5 PROSTATE CANCER SCREENING: ICD-10-CM

## 2023-09-22 DIAGNOSIS — I50.42 CHRONIC COMBINED SYSTOLIC AND DIASTOLIC CONGESTIVE HEART FAILURE: ICD-10-CM

## 2023-09-22 DIAGNOSIS — K22.70 BARRETT'S ESOPHAGUS WITHOUT DYSPLASIA: ICD-10-CM

## 2023-09-22 LAB
ALBUMIN SERPL BCP-MCNC: 3.7 G/DL (ref 3.5–5.2)
ALP SERPL-CCNC: 84 U/L (ref 55–135)
ALT SERPL W/O P-5'-P-CCNC: 11 U/L (ref 10–44)
ANION GAP SERPL CALC-SCNC: 8 MMOL/L (ref 8–16)
AST SERPL-CCNC: 21 U/L (ref 10–40)
BASOPHILS # BLD AUTO: 0.06 K/UL (ref 0–0.2)
BASOPHILS NFR BLD: 0.7 % (ref 0–1.9)
BILIRUB SERPL-MCNC: 1 MG/DL (ref 0.1–1)
BNP SERPL-MCNC: 400 PG/ML (ref 0–99)
BUN SERPL-MCNC: 13 MG/DL (ref 8–23)
CALCIUM SERPL-MCNC: 9.7 MG/DL (ref 8.7–10.5)
CHLORIDE SERPL-SCNC: 107 MMOL/L (ref 95–110)
CHOLEST SERPL-MCNC: 117 MG/DL (ref 120–199)
CHOLEST/HDLC SERPL: 2.3 {RATIO} (ref 2–5)
CO2 SERPL-SCNC: 27 MMOL/L (ref 23–29)
COMPLEXED PSA SERPL-MCNC: 1.1 NG/ML (ref 0–4)
CREAT SERPL-MCNC: 1.4 MG/DL (ref 0.5–1.4)
DIFFERENTIAL METHOD: ABNORMAL
EOSINOPHIL # BLD AUTO: 0.3 K/UL (ref 0–0.5)
EOSINOPHIL NFR BLD: 3.5 % (ref 0–8)
ERYTHROCYTE [DISTWIDTH] IN BLOOD BY AUTOMATED COUNT: 14.7 % (ref 11.5–14.5)
EST. GFR  (NO RACE VARIABLE): 48.3 ML/MIN/1.73 M^2
GLUCOSE SERPL-MCNC: 89 MG/DL (ref 70–110)
HCT VFR BLD AUTO: 42.1 % (ref 40–54)
HDLC SERPL-MCNC: 50 MG/DL (ref 40–75)
HDLC SERPL: 42.7 % (ref 20–50)
HGB BLD-MCNC: 14.2 G/DL (ref 14–18)
IMM GRANULOCYTES # BLD AUTO: 0.03 K/UL (ref 0–0.04)
IMM GRANULOCYTES NFR BLD AUTO: 0.3 % (ref 0–0.5)
LDLC SERPL CALC-MCNC: 55.2 MG/DL (ref 63–159)
LYMPHOCYTES # BLD AUTO: 3.3 K/UL (ref 1–4.8)
LYMPHOCYTES NFR BLD: 38.8 % (ref 18–48)
MCH RBC QN AUTO: 30.1 PG (ref 27–31)
MCHC RBC AUTO-ENTMCNC: 33.7 G/DL (ref 32–36)
MCV RBC AUTO: 89 FL (ref 82–98)
MONOCYTES # BLD AUTO: 0.9 K/UL (ref 0.3–1)
MONOCYTES NFR BLD: 10.2 % (ref 4–15)
NEUTROPHILS # BLD AUTO: 4 K/UL (ref 1.8–7.7)
NEUTROPHILS NFR BLD: 46.5 % (ref 38–73)
NONHDLC SERPL-MCNC: 67 MG/DL
NRBC BLD-RTO: 0 /100 WBC
PLATELET # BLD AUTO: 111 K/UL (ref 150–450)
PMV BLD AUTO: 12.1 FL (ref 9.2–12.9)
POTASSIUM SERPL-SCNC: 3.8 MMOL/L (ref 3.5–5.1)
PROT SERPL-MCNC: 7.2 G/DL (ref 6–8.4)
RBC # BLD AUTO: 4.71 M/UL (ref 4.6–6.2)
SODIUM SERPL-SCNC: 142 MMOL/L (ref 136–145)
TRIGL SERPL-MCNC: 59 MG/DL (ref 30–150)
TSH SERPL DL<=0.005 MIU/L-ACNC: 2.61 UIU/ML (ref 0.4–4)
WBC # BLD AUTO: 8.61 K/UL (ref 3.9–12.7)

## 2023-09-22 PROCEDURE — 80061 LIPID PANEL: CPT | Mod: HCNC | Performed by: INTERNAL MEDICINE

## 2023-09-22 PROCEDURE — 83880 ASSAY OF NATRIURETIC PEPTIDE: CPT | Mod: HCNC | Performed by: INTERNAL MEDICINE

## 2023-09-22 PROCEDURE — 36415 COLL VENOUS BLD VENIPUNCTURE: CPT | Mod: HCNC,PO | Performed by: INTERNAL MEDICINE

## 2023-09-22 PROCEDURE — 80053 COMPREHEN METABOLIC PANEL: CPT | Mod: HCNC | Performed by: INTERNAL MEDICINE

## 2023-09-22 PROCEDURE — 85025 COMPLETE CBC W/AUTO DIFF WBC: CPT | Mod: HCNC | Performed by: INTERNAL MEDICINE

## 2023-09-22 PROCEDURE — 84153 ASSAY OF PSA TOTAL: CPT | Mod: HCNC | Performed by: INTERNAL MEDICINE

## 2023-09-22 PROCEDURE — 84443 ASSAY THYROID STIM HORMONE: CPT | Mod: HCNC | Performed by: INTERNAL MEDICINE

## 2023-09-24 ENCOUNTER — PATIENT MESSAGE (OUTPATIENT)
Dept: CARDIOLOGY | Facility: CLINIC | Age: 88
End: 2023-09-24
Payer: MEDICARE

## 2023-09-24 NOTE — PROGRESS NOTES
Your results show BNP high ( but loses accuracy with Entresto)- stop the HCTZ  and start Furosimide 20 mg for 3 straight days and then every other day; KCL 10 meq 2 pills daily.Get chem 7 and pro BNP in 8 weeks    Please contact me if you have any additional concerns.    Sincerely,    Shiraz Cartagena

## 2023-09-29 ENCOUNTER — OFFICE VISIT (OUTPATIENT)
Dept: CARDIOLOGY | Facility: CLINIC | Age: 88
End: 2023-09-29
Payer: MEDICARE

## 2023-09-29 ENCOUNTER — HOSPITAL ENCOUNTER (OUTPATIENT)
Dept: CARDIOLOGY | Facility: CLINIC | Age: 88
Discharge: HOME | End: 2023-09-29
Payer: MEDICARE

## 2023-09-29 VITALS
OXYGEN SATURATION: 97 % | SYSTOLIC BLOOD PRESSURE: 134 MMHG | HEART RATE: 57 BPM | HEIGHT: 72 IN | WEIGHT: 168.44 LBS | BODY MASS INDEX: 22.81 KG/M2 | DIASTOLIC BLOOD PRESSURE: 62 MMHG

## 2023-09-29 DIAGNOSIS — I25.5 ISCHEMIC CARDIOMYOPATHY: ICD-10-CM

## 2023-09-29 DIAGNOSIS — E78.5 HYPERLIPIDEMIA, UNSPECIFIED HYPERLIPIDEMIA TYPE: ICD-10-CM

## 2023-09-29 DIAGNOSIS — I25.10 CORONARY ARTERY DISEASE INVOLVING NATIVE CORONARY ARTERY OF NATIVE HEART WITHOUT ANGINA PECTORIS: ICD-10-CM

## 2023-09-29 DIAGNOSIS — I10 ESSENTIAL HYPERTENSION: ICD-10-CM

## 2023-09-29 DIAGNOSIS — Z95.810 AICD (AUTOMATIC CARDIOVERTER/DEFIBRILLATOR) PRESENT: Chronic | ICD-10-CM

## 2023-09-29 DIAGNOSIS — I25.118 CORONARY ARTERY DISEASE OF NATIVE ARTERY OF NATIVE HEART WITH STABLE ANGINA PECTORIS: Primary | ICD-10-CM

## 2023-09-29 DIAGNOSIS — I50.42 CHRONIC COMBINED SYSTOLIC AND DIASTOLIC CONGESTIVE HEART FAILURE: ICD-10-CM

## 2023-09-29 DIAGNOSIS — I70.0 AORTIC ATHEROSCLEROSIS: ICD-10-CM

## 2023-09-29 PROCEDURE — 99999 PR PBB SHADOW E&M-EST. PATIENT-LVL V: ICD-10-PCS | Mod: PBBFAC,HCNC,, | Performed by: PHYSICIAN ASSISTANT

## 2023-09-29 PROCEDURE — 93005 EKG 12-LEAD: ICD-10-PCS | Mod: HCNC,S$GLB,, | Performed by: INTERNAL MEDICINE

## 2023-09-29 PROCEDURE — 99499 RISK ADDL DX/OHS AUDIT: ICD-10-PCS | Mod: HCNC,S$GLB,, | Performed by: PHYSICIAN ASSISTANT

## 2023-09-29 PROCEDURE — 93005 ELECTROCARDIOGRAM TRACING: CPT | Mod: HCNC,S$GLB,, | Performed by: INTERNAL MEDICINE

## 2023-09-29 PROCEDURE — 1159F PR MEDICATION LIST DOCUMENTED IN MEDICAL RECORD: ICD-10-PCS | Mod: HCNC,CPTII,S$GLB, | Performed by: PHYSICIAN ASSISTANT

## 2023-09-29 PROCEDURE — 93010 EKG 12-LEAD: ICD-10-PCS | Mod: HCNC,S$GLB,, | Performed by: INTERNAL MEDICINE

## 2023-09-29 PROCEDURE — 1126F PR PAIN SEVERITY QUANTIFIED, NO PAIN PRESENT: ICD-10-PCS | Mod: HCNC,CPTII,S$GLB, | Performed by: PHYSICIAN ASSISTANT

## 2023-09-29 PROCEDURE — 99499 UNLISTED E&M SERVICE: CPT | Mod: HCNC,S$GLB,, | Performed by: PHYSICIAN ASSISTANT

## 2023-09-29 PROCEDURE — 1126F AMNT PAIN NOTED NONE PRSNT: CPT | Mod: HCNC,CPTII,S$GLB, | Performed by: PHYSICIAN ASSISTANT

## 2023-09-29 PROCEDURE — 99214 PR OFFICE/OUTPT VISIT, EST, LEVL IV, 30-39 MIN: ICD-10-PCS | Mod: HCNC,25,S$GLB, | Performed by: PHYSICIAN ASSISTANT

## 2023-09-29 PROCEDURE — 99214 OFFICE O/P EST MOD 30 MIN: CPT | Mod: HCNC,25,S$GLB, | Performed by: PHYSICIAN ASSISTANT

## 2023-09-29 PROCEDURE — 99999 PR PBB SHADOW E&M-EST. PATIENT-LVL V: CPT | Mod: PBBFAC,HCNC,, | Performed by: PHYSICIAN ASSISTANT

## 2023-09-29 PROCEDURE — 1159F MED LIST DOCD IN RCRD: CPT | Mod: HCNC,CPTII,S$GLB, | Performed by: PHYSICIAN ASSISTANT

## 2023-09-29 PROCEDURE — 93010 ELECTROCARDIOGRAM REPORT: CPT | Mod: HCNC,S$GLB,, | Performed by: INTERNAL MEDICINE

## 2023-09-29 NOTE — PROGRESS NOTES
Cardiology Clinic Note  Reason for Visit: Annual visit, [facility]  LOV w/ Dr. Cartagena: 9/12/2022    HPI:     PMHx:  CAD/MI, s/p PCIs  ICM  - s/p ICD (Dr. Woodard)  HTN  HLD  S/p AAA repair   H/o TIA  MGUS  Idiopathic thrombocytopenic purpura  CKD    Nelson GIBBONS Parent is a 88 y.o. M, who presents for annual visit regarding ischemic cardiomyopathy, CAD, hypertension and hyperlipidemia.  He has been feeling well over the past year and denies any acute complaints.  He exercises at the gym with his wife regularly.  At least 3 days of cardio and 2 days of weight training.  His last labs reflected an elevation in BNP from his baseline now 400.  However he is now taking Entresto, and therefore has been recommended to repeat proBNP in 2 weeks.  In Jan he was prescribed Maxzide, which he took for several days, but caused very frequent urination, therefore he is not been taking Maxzide in the interim.  He is also not taking furosemide or potassium supplement.  Will await the results of his next set of labs to determine necessity.  He would like to avoid if possible.        ROS:    Pertinent ROS included in HPI and below.  PMH:     Past Medical History:   Diagnosis Date    AICD (automatic cardioverter/defibrillator) present 7/17/2012    Cardiomyopathy     CKD (chronic kidney disease) stage 3, GFR 30-59 ml/min 7/17/2012    Coronary artery disease     GERD (gastroesophageal reflux disease)     Guy[facility]; Dr. Vu    Hyperlipidemia 7/17/2012    Hypertension 7/17/2012    Ischemic cardiomyopathy 7/17/2012    MGUS (monoclonal gammopathy of unknown significance)     Thrombocytopenia 7/17/2012    Ventricular tachycardia     VT (ventricular tachycardia) 7/17/2012     Past Surgical History:   Procedure Laterality Date    ABDOMINAL AORTIC ANEURYSM REPAIR      CARDIAC DEFIBRILLATOR PLACEMENT      CATARACT EXTRACTION, BILATERAL  2018    CORONARY ANGIOPLASTY      EYE SURGERY Bilateral     cataract    HERNIA REPAIR      x2    REPLACEMENT OF  IMPLANTABLE CARDIOVERTER-DEFIBRILLATOR (ICD) GENERATOR Left 3/18/2022    Procedure: REPLACEMENT, ICD GENERATOR;  Surgeon: Felipe Woodard MD;  Location: [facility];  Service: Cardiology;  Laterality: Left;  SEFERINO, ICD gen chg, SJM, anes, MB, 3prep*ANUJ ICD insitu*      Allergies:     Review of patient's allergies indicates:   Allergen Reactions    Fluress [fluorescein-benoxinate]     Mydriacyl [tropicamide]     Pcn  [penicillins]      Other reaction(s): Unknown    Clindamycin Rash     Medications:     Current Outpatient Medications on File Prior to Visit   Medication Sig Dispense Refill    amLODIPine (NORVASC) 10 MG tablet TAKE 1 TABLET EVERY DAY 90 tablet 3    aspirin 81 MG chewable tablet Take 1 tablet by mouth Daily. 81  By mouth Every day      beta-carotene,A,-vits C,E/mins (OCUVITE ORAL) Take by mouth.      clopidogreL (PLAVIX) 75 mg tablet TAKE 1 TABLET EVERY DAY 90 tablet 0    famotidine (PEPCID) 20 MG tablet Take 20 mg by mouth before dinner.      fluticasone propionate (FLONASE) 50 mcg/actuation nasal spray 1 spray (50 mcg total) by Each Nostril route once daily. 16 g 1    metoprolol succinate (TOPROL-XL) 25 MG 24 hr tablet TAKE 1 TABLET TWICE DAILY 180 tablet 1    multivitamin with minerals tablet Take 1 tablet by mouth once daily.       niacin 500 MG tablet Take 500 mg by mouth Every PM.      nitroGLYCERIN (NITROSTAT) 0.4 MG SL tablet Take 1 tab under the tongue for chest pain. May repeat every 5 minutes for a total of 3 doses. If chest pain not relieved go to the ED. 25 tablet 4    omega-3 fatty acids-vitamin E 1,000 mg Cap Take 1 capsule by mouth 2 (two) times daily.      pravastatin (PRAVACHOL) 40 MG tablet TAKE 1 TABLET EVERY EVENING 90 tablet 1    sacubitriL-valsartan (ENTRESTO)  mg per tablet Take 1 tablet by mouth 2 (two) times daily. 60 tablet 11    sotaloL (BETAPACE) 120 MG Tab TAKE 1 TABLET TWICE DAILY 180 tablet 3    ubidecarenone (COENZYME Q10 ORAL) Take 1 tablet by mouth once daily.       vitamin D 1000 units Tab Take 1,000 Units by mouth every Tues, Thurs, Sat.       sildenafiL (VIAGRA) 100 MG tablet Take 1 tablet (100 mg total) by mouth daily as needed for Erectile Dysfunction. (Patient not taking: Reported on 7/25/2022) 8 tablet 10    [DISCONTINUED] triamterene-hydrochlorothiazide 37.5-25 mg (MAXZIDE-25) 37.5-25 mg per tablet Take half a tablet by mouth once daily. (Patient not taking: Reported on 9/29/2023) 45 tablet 3     Current Facility-Administered Medications on File Prior to Visit   Medication Dose Route Frequency Provider Last Rate Last Admin    sodium chloride 0.9% bolus 1,000 mL  1,000 mL Intravenous Once Violette Delgado NP        vancomycin in dextrose 5 % 1 gram/250 mL IVPB 1,000 mg  1,000 mg Intravenous On Call Procedure Violette Delgado NP   1,000 mg at 03/18/22 1232     Social History:     Social History     Tobacco Use    Smoking status: Never    Smokeless tobacco: Never   Substance Use Topics    Alcohol use: No     Family History:     Family History   Problem Relation Age of Onset    Cancer Mother         Lymphoma    Lymphoma Mother     Coronary artery disease Mother     Heart disease Mother     Heart disease Father     Heart attacks under age 50 Father     Heart disease Sister     Cancer Brother         lymphoma     Physical Exam:   /62   Pulse (!) 57   Ht 6' (1.829 m)   Wt 76.4 kg (168 lb 6.9 oz)   SpO2 97%   BMI 22.84 kg/m²      Physical Exam  Vitals and nursing note reviewed.   Constitutional:       Appearance: Normal appearance.   HENT:      Head: Normocephalic and atraumatic.   Neck:      Vascular: No carotid bruit or hepatojugular reflux.   Cardiovascular:      Rate and Rhythm: Normal rate and regular rhythm.      Pulses:           Radial pulses are 2+ on the right side and 2+ on the left side.        Dorsalis pedis pulses are 2+ on the right side and 2+ on the left side.        Posterior tibial pulses are 2+ on the right side and 2+ on the left side.       Heart sounds: S1 normal and S2 normal. Murmur heard.      Systolic murmur is present.   Pulmonary:      Effort: Pulmonary effort is normal.      Breath sounds: Normal breath sounds.   Abdominal:      General: Bowel sounds are normal.      Palpations: Abdomen is soft.      Tenderness: There is no abdominal tenderness.   Feet:      Right foot:      Skin integrity: Skin integrity normal.      Left foot:      Skin integrity: Skin integrity normal.   Neurological:      Mental Status: He is alert.   Psychiatric:         Behavior: Behavior is cooperative.          Labs:     Blood Tests:  Lab Results   Component Value Date     (H) 09/22/2023     09/22/2023    K 3.8 09/22/2023     09/22/2023    CO2 27 09/22/2023    BUN 13 09/22/2023    CREATININE 1.4 09/22/2023    GLU 89 09/22/2023    HGBA1C 4.8 10/26/2007    MG 2.0 01/20/2023    AST 21 09/22/2023    ALT 11 09/22/2023    ALBUMIN 3.7 09/22/2023    PROT 7.2 09/22/2023    BILITOT 1.0 09/22/2023    WBC 8.61 09/22/2023    HGB 14.2 09/22/2023    HCT 42.1 09/22/2023    MCV 89 09/22/2023     (L) 09/22/2023    INR 1.1 03/15/2022    TSH 2.613 09/22/2023       Lab Results   Component Value Date    CHOL 117 (L) 09/22/2023    HDL 50 09/22/2023    TRIG 59 09/22/2023       Lab Results   Component Value Date    LDLCALC 55.2 (L) 09/22/2023         Imaging:     Echocardiogram  TTE 9/16/2022  The left ventricle is normal in size with moderately decreased systolic function.  The estimated ejection fraction is 38% ( probably upper 30s to at most low 40s).  There are segmental left ventricular wall motion abnormalities.  Grade I left ventricular diastolic dysfunction.  Normal right ventricular size with normal right ventricular systolic function.  Mild mitral regurgitation.  Intermediate central venous pressure (8 mmHg).  The estimated PA systolic pressure is 25 mmHg.  The ascending aorta is mildly dilated.    Stress testing  None    Cath Lab  None    Other  None    EKG:    9/29/2023  Sinus bradycardia with 1st degree A-V block   Left axis deviation   Possible Lateral infarct ,age undetermined   Inferior infarct (cited on or before 18-MAR-2022)   Abnormal ECG   When compared with ECG of 20-JAN-2023 09:34,   Borderline criteria for Lateral infarct are now Present   Nonspecific T wave abnormality now evident in Inferior leads     Assessment:     1. Coronary artery disease of native artery of native heart with stable angina pectoris    2. Aortic atherosclerosis    3. Ischemic cardiomyopathy    4. AICD (automatic cardioverter/defibrillator) present    5. Essential hypertension    6. Hyperlipidemia, unspecified hyperlipidemia type        Plan:     Coronary artery disease of native artery of native heart with stable angina pectoris  Aortic atherosclerosis  Continue aspirin 81 mg, Plavix 75 mg, and pravastatin 40 mg q.d.    Ischemic cardiomyopathy  AICD (automatic cardioverter/defibrillator) present  Continue metoprolol succinate 25 mg q.d. and Entresto  mg b.i.d.  The patient is asymptomatic, NYHA class 1  We will wait to determine the necessity of diuretic based on upcoming labs    Essential hypertension  Stable, continue to monitor at home    Hyperlipidemia, unspecified hyperlipidemia type  LDL 55.2 on September 22, 2023  Repeat annually  Continue pravastatin      Signed:  Solange Moon PA-C  Cardiology     9/29/2023 12:42 PM    Follow-up:     Future Appointments   Date Time Provider Department Center   10/9/2023  1:00 PM Bety Tomlinson MD [facility]   11/13/2023 12:15 PM EKG, APPT [facility] EKG Isma [facility]   11/13/2023 12:40 PM COORDINATED DEVICE CHECK [facility] PATRICK Ashby zari   11/13/2023  1:30 PM Analisa Boykin NP [facility] ARRHYTH Isma [facility]   12/2/2023 10:00 AM HOME MONITOR DEVICE CHECK, [facility] PATRICK Ashby zari   12/8/2023 10:00 AM HOME MONITOR DEVICE CHECK, [facility] PATRICK Dillon

## 2023-10-01 DIAGNOSIS — I25.10 CORONARY ARTERY DISEASE INVOLVING NATIVE CORONARY ARTERY OF NATIVE HEART WITHOUT ANGINA PECTORIS: ICD-10-CM

## 2023-10-01 NOTE — TELEPHONE ENCOUNTER
No care due was identified.  [facility] Embedded Care Due Messages. Reference number: [id number].   10/01/2023 7:57:06 AM CDT

## 2023-10-02 RX ORDER — CLOPIDOGREL BISULFATE 75 MG/1
TABLET ORAL
Qty: 90 TABLET | Refills: 1 | Status: SHIPPED | OUTPATIENT
Start: 2023-10-02

## 2023-10-02 NOTE — TELEPHONE ENCOUNTER
Refill Routing Note   Medication(s) are not appropriate for processing by [facility] for the following reason(s):      Required labs abnormal    [facility] action(s):  Defer Care Due:  None identified          Appointments  past 12m or future 3m with PCP    Date Provider   Last Visit   3/27/2023 Bety Tomlinson MD   Next Visit   10/9/2023 Bety Tomlinson MD   ED visits in past 90 days: 0        Note composed:8:01 PM 10/01/2023

## 2023-10-03 ENCOUNTER — TELEPHONE (OUTPATIENT)
Dept: CARDIOLOGY | Facility: CLINIC | Age: 88
End: 2023-10-03
Payer: MEDICARE

## 2023-10-03 DIAGNOSIS — I50.42 CHRONIC COMBINED SYSTOLIC AND DIASTOLIC CONGESTIVE HEART FAILURE: ICD-10-CM

## 2023-10-03 DIAGNOSIS — I10 ESSENTIAL HYPERTENSION: Primary | ICD-10-CM

## 2023-10-24 ENCOUNTER — HOSPITAL ENCOUNTER (OUTPATIENT)
Dept: RADIOLOGY | Facility: HOSPITAL | Age: 88
Discharge: HOME | End: 2023-10-24
Attending: INTERNAL MEDICINE
Payer: MEDICARE

## 2023-10-24 ENCOUNTER — OFFICE VISIT (OUTPATIENT)
Dept: INTERNAL MEDICINE | Facility: CLINIC | Age: 88
End: 2023-10-24
Payer: MEDICARE

## 2023-10-24 VITALS
BODY MASS INDEX: 22.81 KG/M2 | SYSTOLIC BLOOD PRESSURE: 120 MMHG | WEIGHT: 168.44 LBS | HEIGHT: 72 IN | OXYGEN SATURATION: 97 % | HEART RATE: 57 BPM | DIASTOLIC BLOOD PRESSURE: 54 MMHG

## 2023-10-24 DIAGNOSIS — D69.3 IDIOPATHIC THROMBOCYTOPENIC PURPURA: ICD-10-CM

## 2023-10-24 DIAGNOSIS — I50.42 CHRONIC COMBINED SYSTOLIC AND DIASTOLIC CONGESTIVE HEART FAILURE: ICD-10-CM

## 2023-10-24 DIAGNOSIS — N18.31 CHRONIC KIDNEY DISEASE, STAGE 3A: ICD-10-CM

## 2023-10-24 DIAGNOSIS — R05.3 CHRONIC COUGH: ICD-10-CM

## 2023-10-24 DIAGNOSIS — Z23 NEEDS FLU SHOT: ICD-10-CM

## 2023-10-24 DIAGNOSIS — E78.5 HYPERLIPIDEMIA, UNSPECIFIED HYPERLIPIDEMIA TYPE: ICD-10-CM

## 2023-10-24 DIAGNOSIS — J32.9 CHRONIC SINUSITIS, UNSPECIFIED LOCATION: ICD-10-CM

## 2023-10-24 DIAGNOSIS — D47.2 MGUS (MONOCLONAL GAMMOPATHY OF UNKNOWN SIGNIFICANCE): ICD-10-CM

## 2023-10-24 DIAGNOSIS — I10 ESSENTIAL HYPERTENSION: ICD-10-CM

## 2023-10-24 PROCEDURE — 71046 XR CHEST PA AND LATERAL: ICD-10-PCS | Mod: 26,,, | Performed by: RADIOLOGY

## 2023-10-24 PROCEDURE — 3288F PR FALLS RISK ASSESSMENT DOCUMENTED: ICD-10-PCS | Mod: CPTII,S$GLB,, | Performed by: INTERNAL MEDICINE

## 2023-10-24 PROCEDURE — G0008 FLU VACCINE - QUADRIVALENT - ADJUVANTED: ICD-10-PCS | Mod: S$GLB,,, | Performed by: INTERNAL MEDICINE

## 2023-10-24 PROCEDURE — 1160F PR REVIEW ALL MEDS BY PRESCRIBER/CLIN PHARMACIST DOCUMENTED: ICD-10-PCS | Mod: CPTII,S$GLB,, | Performed by: INTERNAL MEDICINE

## 2023-10-24 PROCEDURE — 1159F MED LIST DOCD IN RCRD: CPT | Mod: CPTII,S$GLB,, | Performed by: INTERNAL MEDICINE

## 2023-10-24 PROCEDURE — 1160F RVW MEDS BY RX/DR IN RCRD: CPT | Mod: CPTII,S$GLB,, | Performed by: INTERNAL MEDICINE

## 2023-10-24 PROCEDURE — 1126F AMNT PAIN NOTED NONE PRSNT: CPT | Mod: CPTII,S$GLB,, | Performed by: INTERNAL MEDICINE

## 2023-10-24 PROCEDURE — 1101F PT FALLS ASSESS-DOCD LE1/YR: CPT | Mod: CPTII,S$GLB,, | Performed by: INTERNAL MEDICINE

## 2023-10-24 PROCEDURE — 99215 OFFICE O/P EST HI 40 MIN: CPT | Mod: S$GLB,,, | Performed by: INTERNAL MEDICINE

## 2023-10-24 PROCEDURE — 90694 VACC AIIV4 NO PRSRV 0.5ML IM: CPT | Mod: S$GLB,,, | Performed by: INTERNAL MEDICINE

## 2023-10-24 PROCEDURE — 99999 PR PBB SHADOW E&M-EST. PATIENT-LVL V: CPT | Mod: PBBFAC,,, | Performed by: INTERNAL MEDICINE

## 2023-10-24 PROCEDURE — 99999 PR PBB SHADOW E&M-EST. PATIENT-LVL V: ICD-10-PCS | Mod: PBBFAC,,, | Performed by: INTERNAL MEDICINE

## 2023-10-24 PROCEDURE — 1126F PR PAIN SEVERITY QUANTIFIED, NO PAIN PRESENT: ICD-10-PCS | Mod: CPTII,S$GLB,, | Performed by: INTERNAL MEDICINE

## 2023-10-24 PROCEDURE — 90694 FLU VACCINE - QUADRIVALENT - ADJUVANTED: ICD-10-PCS | Mod: S$GLB,,, | Performed by: INTERNAL MEDICINE

## 2023-10-24 PROCEDURE — 3288F FALL RISK ASSESSMENT DOCD: CPT | Mod: CPTII,S$GLB,, | Performed by: INTERNAL MEDICINE

## 2023-10-24 PROCEDURE — 71046 X-RAY EXAM CHEST 2 VIEWS: CPT | Mod: 26,,, | Performed by: RADIOLOGY

## 2023-10-24 PROCEDURE — 1101F PR PT FALLS ASSESS DOC 0-1 FALLS W/OUT INJ PAST YR: ICD-10-PCS | Mod: CPTII,S$GLB,, | Performed by: INTERNAL MEDICINE

## 2023-10-24 PROCEDURE — 1159F PR MEDICATION LIST DOCUMENTED IN MEDICAL RECORD: ICD-10-PCS | Mod: CPTII,S$GLB,, | Performed by: INTERNAL MEDICINE

## 2023-10-24 PROCEDURE — G0008 ADMIN INFLUENZA VIRUS VAC: HCPCS | Mod: S$GLB,,, | Performed by: INTERNAL MEDICINE

## 2023-10-24 PROCEDURE — 99215 PR OFFICE/OUTPT VISIT, EST, LEVL V, 40-54 MIN: ICD-10-PCS | Mod: S$GLB,,, | Performed by: INTERNAL MEDICINE

## 2023-10-24 PROCEDURE — 71046 X-RAY EXAM CHEST 2 VIEWS: CPT | Mod: TC,FY

## 2023-10-24 RX ORDER — DOXYCYCLINE 100 MG/1
100 CAPSULE ORAL 2 TIMES DAILY
Qty: 20 CAPSULE | Refills: 0 | Status: SHIPPED | OUTPATIENT
Start: 2023-10-24 | End: 2023-11-03

## 2023-10-24 RX ORDER — FLUTICASONE PROPIONATE 50 MCG
1 SPRAY, SUSPENSION (ML) NASAL DAILY
Qty: 16 G | Refills: 11 | Status: SHIPPED | OUTPATIENT
Start: 2023-10-24

## 2023-10-24 RX ORDER — AZELASTINE 1 MG/ML
1 SPRAY, METERED NASAL 2 TIMES DAILY
Qty: 30 ML | Refills: 11 | Status: SHIPPED | OUTPATIENT
Start: 2023-10-24 | End: 2024-10-23

## 2023-10-24 NOTE — PROGRESS NOTES
Patient ID: Nelson GIBBONS Parent is a 88 y.o. male.    Chief Complaint: Hypertension    HPI Nelson is a 88 y.o. male with  chronic kidney disease stage 3, hypertension, hyperlipidemia, CAD, MGUS, Tovar's esophagus, AICD, chronic systolic and diastolic CHF (echo 6/2020) and chronic ITP who presents  for routine follow-up of medical conditions. He presents with his wife today.    He complains of cough. Onset was >6 months ago. Constant in duration. Associated with symptom of postnasal drip. No associated sinus pain, ear pain, sore throat, wheezing, shortness of breath, chest pain, or swelling. Nothing improving symptom.     Reviewed lab results from 9/22/23.    Of note, cardiology tried to start the patient on diuretic medication but he stopped it due to issues with urinary frequency.     Health Maintenance Topics with due status: Not Due       Topic Last Completion Date    TETANUS VACCINE 11/22/2013    Lipid Panel 09/22/2023    Aspirin/Antiplatelet Therapy 10/24/2023      Review of Systems   HENT:  Positive for postnasal drip.    Respiratory:  Positive for cough.    All other systems reviewed and are negative.     Objective:     Vitals:    10/24/23 1353   BP: (!) 120/54   Pulse: (!) 57        Physical Exam  Vitals reviewed.   Constitutional:       General: He is not in acute distress.     Appearance: Normal appearance. He is well-developed. He is not ill-appearing, toxic-appearing or diaphoretic.   HENT:      Head: Normocephalic and atraumatic.      Right Ear: External ear normal.      Left Ear: External ear normal.      Nose: Nose normal.   Eyes:      Extraocular Movements: Extraocular movements intact.      Conjunctiva/sclera: Conjunctivae normal.   Cardiovascular:      Rate and Rhythm: Normal rate and regular rhythm.      Pulses: Normal pulses.      Heart sounds: Normal heart sounds. No murmur heard.     No friction rub. No gallop.   Pulmonary:      Effort: Pulmonary effort is normal. No respiratory distress.       Breath sounds: No stridor. Rales (in bilateral bases) present. No wheezing or rhonchi.   Musculoskeletal:      Right lower leg: No edema.      Left lower leg: No edema.   Skin:     General: Skin is warm and dry.   Neurological:      General: No focal deficit present.      Mental Status: He is alert and oriented to person, place, and time. Mental status is at baseline.   Psychiatric:         Mood and Affect: Mood normal.         Behavior: Behavior normal.         Thought Content: Thought content normal.         Judgment: Judgment normal.         Assessment:       1. Chronic cough Chronic   2. Chronic sinusitis, unspecified location Chronic   3. Chronic kidney disease, stage 3a Chronic   4. Chronic combined systolic and diastolic congestive heart failure Chronic   5. Hyperlipidemia, unspecified hyperlipidemia type Well controlled   6. Essential hypertension Well controlled   7. Idiopathic thrombocytopenic purpura Chronic   8. MGUS (monoclonal gammopathy of unknown significance) Chronic   9. Needs flu shot        Plan:         Chronic cough  Comments:  Due to sinusitis vs CHF vs Entresto. Treating for sinusitis. If no relief, consider ENT referral. CXR today  Orders:  -     X-Ray Chest PA And Lateral; Future; Expected date: 10/24/2023    Chronic sinusitis, unspecified location  -     azelastine (ASTELIN) 137 mcg (0.1 %) nasal spray; 1 spray (137 mcg total) by Nasal route 2 (two) times daily.  Dispense: 30 mL; Refill: 11  -     doxycycline (MONODOX) 100 MG capsule; Take 1 capsule (100 mg total) by mouth 2 (two) times daily. for 10 days  Dispense: 20 capsule; Refill: 0  -     fluticasone propionate (FLONASE) 50 mcg/actuation nasal spray; 1 spray (50 mcg total) by Each Nostril route once daily.  Dispense: 16 g; Refill: 11    Chronic kidney disease, stage 3a  Comments:  Continue to monitor   Orders:  -     CBC Auto Differential; Future; Expected date: 04/24/2024  -     Comprehensive Metabolic Panel; Future; Expected date:  04/24/2024    Chronic combined systolic and diastolic congestive heart failure  Comments:  Continue current medication. Continue to follow with cardiology     Hyperlipidemia, unspecified hyperlipidemia type  Comments:  Continue current medication    Essential hypertension  Comments:  Continue current medication    Idiopathic thrombocytopenic purpura  Comments:  Continue to monitor     MGUS (monoclonal gammopathy of unknown significance)  Comments:  Continue to follow with Heme-Onc    Needs flu shot  -     Influenza (FLUAD) - Quadrivalent (Adjuvanted) *Preferred* (65+) (PF)        RTC 6 months      I spent a total of 40 minutes on the day of the visit.This includes face to face time and non-face to face time preparing to see the patient (eg, review of tests), obtaining and/or reviewing separately obtained history, documenting clinical information in the electronic or other health record, independently interpreting results and communicating results to the patient/family/caregiver, or care coordinator.     Warning signs discussed, patient to call with any further issues or worsening of symptoms.       Parts of the above note were dictated using a voice dictation software. Please excuse any grammatical or typographical errors.

## 2023-10-24 NOTE — PATIENT INSTRUCTIONS
Please let me know if you have not had improvement in postnasal drip and cough after completing your antibiotics. If this is the case, we will get you in with ENT.

## 2023-10-25 DIAGNOSIS — J18.9 ATYPICAL PNEUMONIA: Primary | ICD-10-CM

## 2023-11-02 ENCOUNTER — TELEPHONE (OUTPATIENT)
Dept: INTERNAL MEDICINE | Facility: CLINIC | Age: 88
End: 2023-11-02
Payer: MEDICARE

## 2023-11-02 NOTE — TELEPHONE ENCOUNTER
Left voicemail with appt time and date and informed patient that they could call us to reschedule or that they can reschedule through the [facility] portal if the date or time does not work with their schedule.

## 2023-11-02 NOTE — TELEPHONE ENCOUNTER
----- Message from Bety Tomlinson MD sent at 10/25/2023  1:02 PM CDT -----  Please schedule patient for CXR in 6 weeks or more. Thanks

## 2023-11-06 ENCOUNTER — TELEPHONE (OUTPATIENT)
Dept: INTERNAL MEDICINE | Facility: CLINIC | Age: 88
End: 2023-11-06
Payer: MEDICARE

## 2023-11-06 ENCOUNTER — HOSPITAL ENCOUNTER (EMERGENCY)
Facility: HOSPITAL | Age: 88
Discharge: HOME | End: 2023-11-06
Attending: EMERGENCY MEDICINE
Payer: MEDICARE

## 2023-11-06 VITALS
OXYGEN SATURATION: 95 % | BODY MASS INDEX: 22.84 KG/M2 | DIASTOLIC BLOOD PRESSURE: 57 MMHG | WEIGHT: 168.44 LBS | HEART RATE: 60 BPM | RESPIRATION RATE: 18 BRPM | SYSTOLIC BLOOD PRESSURE: 124 MMHG | TEMPERATURE: 98 F

## 2023-11-06 DIAGNOSIS — R05.9 COUGH, UNSPECIFIED TYPE: Primary | ICD-10-CM

## 2023-11-06 LAB
ALBUMIN SERPL BCP-MCNC: 3.5 G/DL (ref 3.5–5.2)
ALP SERPL-CCNC: 93 U/L (ref 55–135)
ALT SERPL W/O P-5'-P-CCNC: 12 U/L (ref 10–44)
ANION GAP SERPL CALC-SCNC: 6 MMOL/L (ref 8–16)
AST SERPL-CCNC: 20 U/L (ref 10–40)
BASOPHILS # BLD AUTO: 0.07 K/UL (ref 0–0.2)
BASOPHILS NFR BLD: 0.8 % (ref 0–1.9)
BILIRUB SERPL-MCNC: 0.8 MG/DL (ref 0.1–1)
BNP SERPL-MCNC: 384 PG/ML (ref 0–99)
BUN SERPL-MCNC: 19 MG/DL (ref 8–23)
CALCIUM SERPL-MCNC: 10 MG/DL (ref 8.7–10.5)
CHLORIDE SERPL-SCNC: 106 MMOL/L (ref 95–110)
CO2 SERPL-SCNC: 29 MMOL/L (ref 23–29)
CREAT SERPL-MCNC: 1.3 MG/DL (ref 0.5–1.4)
DIFFERENTIAL METHOD: ABNORMAL
EOSINOPHIL # BLD AUTO: 0.3 K/UL (ref 0–0.5)
EOSINOPHIL NFR BLD: 3.1 % (ref 0–8)
ERYTHROCYTE [DISTWIDTH] IN BLOOD BY AUTOMATED COUNT: 13.9 % (ref 11.5–14.5)
EST. GFR  (NO RACE VARIABLE): 52.8 ML/MIN/1.73 M^2
GLUCOSE SERPL-MCNC: 81 MG/DL (ref 70–110)
HCT VFR BLD AUTO: 44.5 % (ref 40–54)
HGB BLD-MCNC: 14.6 G/DL (ref 14–18)
IMM GRANULOCYTES # BLD AUTO: 0.03 K/UL (ref 0–0.04)
IMM GRANULOCYTES NFR BLD AUTO: 0.3 % (ref 0–0.5)
LYMPHOCYTES # BLD AUTO: 2.6 K/UL (ref 1–4.8)
LYMPHOCYTES NFR BLD: 28.6 % (ref 18–48)
MCH RBC QN AUTO: 29.8 PG (ref 27–31)
MCHC RBC AUTO-ENTMCNC: 32.8 G/DL (ref 32–36)
MCV RBC AUTO: 91 FL (ref 82–98)
MONOCYTES # BLD AUTO: 1.1 K/UL (ref 0.3–1)
MONOCYTES NFR BLD: 12.5 % (ref 4–15)
NEUTROPHILS # BLD AUTO: 5 K/UL (ref 1.8–7.7)
NEUTROPHILS NFR BLD: 54.7 % (ref 38–73)
NRBC BLD-RTO: 0 /100 WBC
PLATELET # BLD AUTO: 117 K/UL (ref 150–450)
PMV BLD AUTO: 11.7 FL (ref 9.2–12.9)
POC CARDIAC TROPONIN I: 0 NG/ML (ref 0–0.08)
POC CARDIAC TROPONIN I: 0.01 NG/ML (ref 0–0.08)
POTASSIUM SERPL-SCNC: 4.2 MMOL/L (ref 3.5–5.1)
PROCALCITONIN SERPL IA-MCNC: 0.02 NG/ML
PROT SERPL-MCNC: 7.1 G/DL (ref 6–8.4)
RBC # BLD AUTO: 4.9 M/UL (ref 4.6–6.2)
SAMPLE: NORMAL
SAMPLE: NORMAL
SODIUM SERPL-SCNC: 141 MMOL/L (ref 136–145)
TROPONIN I SERPL DL<=0.01 NG/ML-MCNC: 0.01 NG/ML (ref 0–0.03)
TROPONIN I SERPL DL<=0.01 NG/ML-MCNC: 0.01 NG/ML (ref 0–0.03)
WBC # BLD AUTO: 9.15 K/UL (ref 3.9–12.7)

## 2023-11-06 PROCEDURE — 83880 ASSAY OF NATRIURETIC PEPTIDE: CPT | Performed by: EMERGENCY MEDICINE

## 2023-11-06 PROCEDURE — 84145 PROCALCITONIN (PCT): CPT | Performed by: EMERGENCY MEDICINE

## 2023-11-06 PROCEDURE — 99285 EMERGENCY DEPT VISIT HI MDM: CPT | Mod: 25

## 2023-11-06 PROCEDURE — 85025 COMPLETE CBC W/AUTO DIFF WBC: CPT | Performed by: EMERGENCY MEDICINE

## 2023-11-06 PROCEDURE — 80053 COMPREHEN METABOLIC PANEL: CPT | Performed by: EMERGENCY MEDICINE

## 2023-11-06 PROCEDURE — 84484 ASSAY OF TROPONIN QUANT: CPT | Mod: 91

## 2023-11-06 PROCEDURE — 84484 ASSAY OF TROPONIN QUANT: CPT | Mod: 91 | Performed by: EMERGENCY MEDICINE

## 2023-11-06 PROCEDURE — 93010 EKG 12-LEAD: ICD-10-PCS | Mod: ,,, | Performed by: INTERNAL MEDICINE

## 2023-11-06 PROCEDURE — 93005 ELECTROCARDIOGRAM TRACING: CPT

## 2023-11-06 PROCEDURE — 93010 ELECTROCARDIOGRAM REPORT: CPT | Mod: ,,, | Performed by: INTERNAL MEDICINE

## 2023-11-06 NOTE — DISCHARGE INSTRUCTIONS
Your labs, chest x-ray, physical exam, history of illness did not show any signs of immediately dangerous medical conditions.    It is possible that the coughing irritated your throat causing a little bit of bleeding while coughing.  Please follow-up with your primary care doctor for continued evaluation within 1 week.    If you develop new coughing with large amounts of blood, feel dizzy or weak, develop trouble breathing, or any other new, worsening worrisome symptoms please return to emergency department for evaluation.

## 2023-11-06 NOTE — FIRST PROVIDER EVALUATION
Medical screening examination initiated.  I have conducted a focused provider triage encounter, findings are as follows:    Brief history of present illness: PMH TIA, AAA s/p repair, CAD, AICD, CKD. PMD tx'ing for atypical PNA. +Coughed  up blood yesterday. Nonproductive  cough today. No fever. No SOB. +Chest  discomfort today, mild, prompted ER visit.     There were no vitals filed for this visit.    Pertinent physical exam:  AF, VSS, No hypoxia.    Brief workup plan:  EKG, labs, CXR.     Preliminary workup initiated; this workup will be continued and followed by the physician or advanced practice provider that is assigned to the patient when roomed.

## 2023-11-06 NOTE — TELEPHONE ENCOUNTER
----- Message from Violette Santiago sent at 11/6/2023  9:07 AM CST -----  Type:  Needs Medical Advice    Who Called: pt  Would the patient rather a call back or a response via [facility]? call  Best Call Back Number: [phone]  Additional Information: pt states cough has greatly reduced but Sunday he was coughing up bright red blood for about 15 20 minutes wanted to inform bari thought she should know

## 2023-11-06 NOTE — ED PROVIDER NOTES
Encounter Date: 11/6/2023       History     Chief Complaint   Patient presents with    Cough     Recently treated for pneumonia, still coughing, had an episode of hemoptysis. Chest tenderness.      HPI  88-year-old male with past medical history as noted below, coming in because of coughing up blood yesterday.  Of note he has been having several months of nonproductive cough, he went to see his primary care doctor who prescribed him doxycycline, steroid nasal spray and antihistamine medication.  He completed the course and felt that his symptoms were improved except that yesterday started having a coughing fit lasted 10 or 15 minutes and was productive of bloody sputum.  Hard to characterize the amount. Since then he has had some cough but no more bloody sputum.  In the ED with me he denies any symptoms and says he feels normal.  He denies chest pain or shortness of breath.  He says from his coughing standpoint all around he is feeling much better from prior to the antibiotics.  No fevers.  No dizziness or weakness.  No abdominal pain, he is taking Plavix and aspirin.    He called his primary care doctor and told him about the bloody sputum told him to come be evaluated in the emergency room.    Review of patient's allergies indicates:   Allergen Reactions    Fluress [fluorescein-benoxinate]     Mydriacyl [tropicamide]     Pcn  [penicillins]      Other reaction(s): Unknown    Clindamycin Rash     Past Medical History:   Diagnosis Date    AICD (automatic cardioverter/defibrillator) present 7/17/2012    Cardiomyopathy     CKD (chronic kidney disease) stage 3, GFR 30-59 ml/min 7/17/2012    Coronary artery disease     GERD (gastroesophageal reflux disease)     Tovar's; Dr. Vu    Hyperlipidemia 7/17/2012    Hypertension 7/17/2012    Ischemic cardiomyopathy 7/17/2012    MGUS (monoclonal gammopathy of unknown significance)     Thrombocytopenia 7/17/2012    Ventricular tachycardia     VT (ventricular tachycardia)  7/17/2012     Past Surgical History:   Procedure Laterality Date    ABDOMINAL AORTIC ANEURYSM REPAIR      CARDIAC DEFIBRILLATOR PLACEMENT      CATARACT EXTRACTION, BILATERAL  2018    CORONARY ANGIOPLASTY      EYE SURGERY Bilateral     cataract    HERNIA REPAIR      x2    REPLACEMENT OF IMPLANTABLE CARDIOVERTER-DEFIBRILLATOR (ICD) GENERATOR Left 3/18/2022    Procedure: REPLACEMENT, ICD GENERATOR;  Surgeon: Felipe Woodard MD;  Location: [facility] EP LAB;  Service: Cardiology;  Laterality: Left;  SEFERINO, ICD gen chg, SJM, anes, MB, 3prep*ANUJ ICD insitu*      Family History   Problem Relation Age of Onset    Cancer Mother         Lymphoma    Lymphoma Mother     Coronary artery disease Mother     Heart disease Mother     Heart disease Father     Heart attacks under age 50 Father     Heart disease Sister     Cancer Brother         lymphoma     Social History     Tobacco Use    Smoking status: Never    Smokeless tobacco: Never   Substance Use Topics    Alcohol use: No    Drug use: No     Review of Systems    Physical Exam     Initial Vitals [11/06/23 1154]   BP Pulse Resp Temp SpO2   124/60 70 18 98.1 °F (36.7 °C) 97 %      MAP       --         Physical Exam    Physical Exam:  CONSTITUTIONAL: Well developed, well nourished, in no acute distress.  HENT: Normocephalic, atraumatic, oropharynx is benign with no blood in the posterior oropharynx, there is some small capillaries that are prominent in the back throat.    EYES: Sclerae anicteric   NECK: Supple, no thyroid enlargement  CARDIOVASCULAR: Regular rate and rhythm without any murmurs, gallops, rubs.  Lower extremity swelling or tenderness to palpation.  RESPIRATORY: Speaking in full sentences. Breathing comfortably. Auscultation of the lungs revealed normal breath sounds b/l, no wheezing, no rales, no rhonchi.  ABDOMEN: Soft and nontender, no masses, no rebound or guarding   NEUROLOGIC: Alert, interacting normally. No facial droop.   MSK: Moving all four extremities.  Skin:  Warm and dry. No visible rash on exposed areas of skin.    Psych: Mood and affect normal.       ED Course   Procedures  Labs Reviewed   CBC W/ AUTO DIFFERENTIAL - Abnormal; Notable for the following components:       Result Value    Platelets 117 (*)     Mono # 1.1 (*)     All other components within normal limits   COMPREHENSIVE METABOLIC PANEL - Abnormal; Notable for the following components:    eGFR 52.8 (*)     Anion Gap 6 (*)     All other components within normal limits   B-TYPE NATRIURETIC PEPTIDE - Abnormal; Notable for the following components:     (*)     All other components within normal limits   TROPONIN I   TROPONIN I   PROCALCITONIN   TROPONIN ISTAT   TROPONIN ISTAT        ECG Results              EKG 12-lead (Final result)  Result time 11/06/23 13:58:21      Final result by Interface, Lab In [facility] (11/06/23 13:58:21)                   Narrative:    Test Reason : R07.9,    Vent. Rate : 057 BPM     Atrial Rate : 057 BPM     P-R Int : 278 ms          QRS Dur : 092 ms      QT Int : 440 ms       P-R-T Axes : 085 -46 019 degrees     QTc Int : 428 ms    Sinus bradycardia with 1st degree A-V block  Left axis deviation  Minimal voltage criteria for LVH, may be normal variant  Cannot completely exclude  Inferior infarct (cited on or before but doubt  very tiny Rs  Abnormal ECG  When compared with ECG of 29-SEP-2023 13:06,  Questionable change in initial forces of Inferior leads  Confirmed by Shiraz MASCORRO MD ([id number]) on 11/6/2023 1:58:04 PM    Referred By: [email or web address]   SELF           Confirmed By:Shiraz MASCORRO MD                                  Imaging Results              X-Ray Chest AP Portable (Final result)  Result time 11/06/23 13:50:05      Final result by Edis Chatterjee MD (11/06/23 13:50:05)                   Impression:      See above      Electronically signed by: Edis Chatterjee MD  Date:    11/06/2023  Time:    13:50               Narrative:    EXAMINATION:  XR CHEST AP PORTABLE    CLINICAL  HISTORY:  Chest Pain;    TECHNIQUE:  Single frontal view of the chest was performed.    COMPARISON:  No 10/24/2023 ne    FINDINGS:  Pacemaker identified.  Heart size normal.  Small ill-defined apical annular changes noted in the right upper lobe as before.  No significant airspace consolidation or pleural effusion identified.                                       Medications - No data to display  Medical Decision Making  Amount and/or Complexity of Data Reviewed  External Data Reviewed: notes.     Details: Reviewed x-ray from recent primary care visit  Labs: ordered.  Radiology: ordered.  ECG/medicine tests: ordered and independent interpretation performed.      88-year-old male coming in with episode of hematemesis, now resolved, in the ED with me says he is asymptomatic.  Noted some chest discomfort at triage.  Exam is entirely benign.  He is on aspirin Plavix putting been high risk for bleeding.    Suspect throat or upper airway irritation the setting of coughing fit however will obtain x-ray to look for any new or worsening infiltrates, labs look for any metabolic hematologic abnormalities.  Continue to monitor in the emergency department.  If ED workup is unremarkable anticipate likely discharge home with continued outpatient follow-up and ED return precautions.    Update:  He continues to be asymptomatic in the emergency department.    No breathing issues, airway issues, no further episodes of hemoptysis.  Chest x-ray as noted, no acute findings, labs are unremarkable.  Profile is low, BNP within his wandering baseline, not clinically consistent with fluid overload at this time.    This time not consistent with acutely dangerous pathology.  Safe for outpatient follow-up with his primary doctor for continued outpatient workup.  ED return precautions for any trouble breathing, chest pain, any concerning amount of hemoptysis, or any other new, worse or worrisome symptoms.    Findings of ED work up and return  precautions verbally explained to patient. Patient agrees with discharge plan, return instructions and verbalizes understanding of return precautions.       EKG, independently interpreted, sinus bradycardia rate of 57 with first-degree AV block, left axis deviation, nonspecific ST abnormalities.                               Clinical Impression:   Final diagnoses:  [R05.9] Cough, unspecified type (Primary)        ED Disposition Condition    Discharge Stable          ED Prescriptions    None       Follow-up Information       Follow up With Specialties Details Why Contact Info    Bety Tomlinson MD Internal Medicine In 1 week  [address] [facility] [id number] [phone]               Balbir Hood MD  11/07/23 [id number]

## 2023-11-06 NOTE — ED TRIAGE NOTES
"89 yo M presents to the ED c/o cough with 1 episode of hemoptysis on Sunday with chest "tightness" that began after the coughing episode. Pt  finished course of atbx for pneumonia on Saturday. Pt reports relief of  symptoms  at this time. PMH MI with defib implanted for vent tachycardia, HTN.  "

## 2023-11-06 NOTE — TELEPHONE ENCOUNTER
Asked patient to go to emergency room because patient informed me that he only coughed up blood for that 20 minute spell but is currently feeling tightness in the chest. Patient verbalized understanding and said he would head over to the emergency room

## 2023-11-07 ENCOUNTER — PATIENT MESSAGE (OUTPATIENT)
Dept: INTERNAL MEDICINE | Facility: CLINIC | Age: 88
End: 2023-11-07
Payer: MEDICARE

## 2023-11-07 ENCOUNTER — PATIENT OUTREACH (OUTPATIENT)
Dept: EMERGENCY MEDICINE | Facility: HOSPITAL | Age: 88
End: 2023-11-07
Payer: MEDICARE

## 2023-11-07 NOTE — PROGRESS NOTES
Patient was seen in the ED on 11/6/23. Phoned patient to assist with Post ED Discharge Navigation.   Patient declined assistance scheduling follow-up within 7 days.  Luis Marie

## 2023-11-08 NOTE — PROGRESS NOTES
Mr. Huntley is a patient of Dr. Woodard and was last seen in clinic 11/10/2022.      Subjective:   Patient ID:  Nelson Huntley is an 88 y.o. male who presents for follow up of ICD  .     HPI:    Mr. Huntley is an 88 y.o. male with ventricular tachycardia, near syncope, CAD/MI s/p PCIs, ICM, TIA, thrombocytopenia, VT (on sotalol) here for annual follow up.     Background:    8/19: Former patient of Dr Melissa. He had near syncope, symptomatic VTNS, inducible MVTS (multiple morphs). He had dual chamber Anuj ICD implanted 3/15/12  He is also on sotalol. Interrogation today reveals stable lead function, no significant arrhythmias, 73% atrial pacing, minimal RV pacing    11/2022: DC ICD generator change (ANUJ to [employer]) 3/22. Normal function with no sustained arrhythmias  87 yoM here for ICD management. Normal DC ICd function with no sustained arrhythmias. I discussed routine device follow up including quarterly to bi-annual device checks for device function as well as yearly follow up in the EP clinic. The patient  was advised to call with any concerns regarding their device. Device clinic follow up as scheduled. RTC 1y    Update (11/13/2023):    Today he says he is feeling great. No cardiac complaints. Mr. Huntley reports no chest pain with exertion or at rest, palpitations, SOB, CARBONE, dizziness, or syncope.  ED with cough 11/2023 - now resolved. Follows with Dr. Cartagena in general cardiology.    On sotalol 120mg BID for VT. Creatine 1.3 on 11/6/2023.    Device Interrogation (11/13/2023) reveals an intrinsic SR with 1st deg AVB. RA autocap changed from on to monitor due to significant difference in manual vs autocap. RA output set to 2.5V. No arrhythmias or treated episodes were noted.  He paces 2.96% in the RA and <1% in the RV. Estimated battery longevity 6.5-7.5 years.     I have personally reviewed the patient's EKG today, which shows sinus rhythm with 1st deg AVB at 62bpm. OK interval is 254. QRS is 90. QTc is  430.    Relevant Cardiac Test Results:    2D Echo (9/16/2022):  The left ventricle is normal in size with moderately decreased systolic function.  The estimated ejection fraction is 38% ( probably upper 30s to at most low 40s).  There are segmental left ventricular wall motion abnormalities.  Grade I left ventricular diastolic dysfunction.  Normal right ventricular size with normal right ventricular systolic function.  Mild mitral regurgitation.  Intermediate central venous pressure (8 mmHg).  The estimated PA systolic pressure is 25 mmHg.  The ascending aorta is mildly dilated.    Current Outpatient Medications   Medication Sig    amLODIPine (NORVASC) 10 MG tablet TAKE 1 TABLET EVERY DAY    aspirin 81 MG chewable tablet Take 1 tablet by mouth Daily. 81  By mouth Every day    azelastine (ASTELIN) 137 mcg (0.1 %) nasal spray 1 spray (137 mcg total) by Nasal route 2 (two) times daily.    beta-carotene,A,-vits C,E/mins (OCUVITE ORAL) Take by mouth.    clopidogreL (PLAVIX) 75 mg tablet TAKE 1 TABLET EVERY DAY    famotidine (PEPCID) 20 MG tablet Take 20 mg by mouth before dinner.    fluticasone propionate (FLONASE) 50 mcg/actuation nasal spray 1 spray (50 mcg total) by Each Nostril route once daily.    metoprolol succinate (TOPROL-XL) 25 MG 24 hr tablet TAKE 1 TABLET TWICE DAILY    multivitamin with minerals tablet Take 1 tablet by mouth once daily.     niacin 500 MG tablet Take 500 mg by mouth Every PM.    nitroGLYCERIN (NITROSTAT) 0.4 MG SL tablet Take 1 tab under the tongue for chest pain. May repeat every 5 minutes for a total of 3 doses. If chest pain not relieved go to the ED.    omega-3 fatty acids-vitamin E 1,000 mg Cap Take 1 capsule by mouth 2 (two) times daily.    pravastatin (PRAVACHOL) 40 MG tablet TAKE 1 TABLET EVERY EVENING    sacubitriL-valsartan (ENTRESTO)  mg per tablet Take 1 tablet by mouth 2 (two) times daily.    sotaloL (BETAPACE) 120 MG Tab TAKE 1 TABLET TWICE DAILY    ubidecarenone (COENZYME  Q10 ORAL) Take 1 tablet by mouth once daily.    vitamin D 1000 units Tab Take 1,000 Units by mouth every Tues, Thurs, Sat.      No current facility-administered medications for this visit.     Facility-Administered Medications Ordered in Other Visits   Medication    sodium chloride 0.9% bolus 1,000 mL    vancomycin in dextrose 5 % 1 gram/250 mL IVPB 1,000 mg       Review of Systems   Constitutional: Negative for malaise/fatigue.   Cardiovascular:  Negative for chest pain, dyspnea on exertion, irregular heartbeat, leg swelling and palpitations.   Respiratory:  Negative for shortness of breath.    Hematologic/Lymphatic: Negative for bleeding problem.   Skin:  Negative for rash.   Musculoskeletal:  Negative for myalgias.   Gastrointestinal:  Negative for hematemesis, hematochezia and nausea.   Genitourinary:  Negative for hematuria.   Neurological:  Negative for light-headedness.   Psychiatric/Behavioral:  Negative for altered mental status.    Allergic/Immunologic: Negative for persistent infections.       Objective:          BP (!) 126/58   Pulse 62   Ht 6' (1.829 m)   Wt 76.9 kg (169 lb 8.5 oz)   BMI 22.99 kg/m²     Physical Exam  Vitals and nursing note reviewed.   Constitutional:       Appearance: Normal appearance. He is well-developed.   HENT:      Head: Normocephalic.      Nose: Nose normal.   Eyes:      Pupils: Pupils are equal, round, and reactive to light.   Cardiovascular:      Rate and Rhythm: Normal rate and regular rhythm.   Pulmonary:      Effort: No respiratory distress.      Breath sounds: Normal breath sounds.   Chest:      Comments: Device to LUCW. Incision and pocket in good repair.    Musculoskeletal:         General: Normal range of motion.   Skin:     General: Skin is warm and dry.      Findings: No erythema.   Neurological:      Mental Status: He is alert and oriented to person, place, and time.   Psychiatric:         Speech: Speech normal.         Behavior: Behavior normal.           Lab  Results   Component Value Date     11/06/2023    K 4.2 11/06/2023    MG 2.0 01/20/2023    BUN 19 11/06/2023    CREATININE 1.3 11/06/2023    ALT 12 11/06/2023    AST 20 11/06/2023    HGB 14.6 11/06/2023    HCT 44.5 11/06/2023    TSH 2.613 09/22/2023    LDLCALC 55.2 (L) 09/22/2023       Recent Labs   Lab 03/15/22  1111   INR 1.1       Assessment:     1. AICD (automatic cardioverter/defibrillator) present    2. Chronic combined systolic and diastolic congestive heart failure    3. Essential hypertension    4. Ischemic cardiomyopathy      Plan:     In summary, Mr. Huntley is an 88 y.o. male with ventricular tachycardia, near syncope, CAD/MI s/p PCIs, ICM, TIA, thrombocytopenia, VT (on sotalol) here for annual follow up.   Mr. Huntley is doing well from a device perspective with stable lead and device function. No arrhythmia noted. On sotalol for VT. ECG with stable intervals. No treated episodes. No RV pacing. RA autocap changed from on to monitor due to difference in in clinic measure today. Will recheck in 6 mo to confirm stability. Patient feels great. No recent CHF symptoms. Follows with Dr. Cartagena in general cardiology.    Continue current medication regimen and device settings.   Follow up in device clinic as scheduled.   Follow up in EP clinic in 6 mo, sooner as needed.     *A copy of this note has been sent to Dr. Woodard*    Follow up in about 6 months (around 5/13/2024).    ------------------------------------------------------------------    RICHIE Lunsford, NP-C  Cardiac Electrophysiology

## 2023-11-10 ENCOUNTER — TELEPHONE (OUTPATIENT)
Dept: ELECTROPHYSIOLOGY | Facility: CLINIC | Age: 88
End: 2023-11-10
Payer: MEDICARE

## 2023-11-13 ENCOUNTER — OFFICE VISIT (OUTPATIENT)
Dept: ELECTROPHYSIOLOGY | Facility: CLINIC | Age: 88
End: 2023-11-13
Payer: MEDICARE

## 2023-11-13 ENCOUNTER — HOSPITAL ENCOUNTER (OUTPATIENT)
Dept: CARDIOLOGY | Facility: CLINIC | Age: 88
Discharge: HOME | End: 2023-11-13
Payer: MEDICARE

## 2023-11-13 ENCOUNTER — CLINICAL SUPPORT (OUTPATIENT)
Dept: CARDIOLOGY | Facility: HOSPITAL | Age: 88
End: 2023-11-13
Attending: INTERNAL MEDICINE
Payer: MEDICARE

## 2023-11-13 VITALS
WEIGHT: 169.56 LBS | HEIGHT: 72 IN | SYSTOLIC BLOOD PRESSURE: 126 MMHG | DIASTOLIC BLOOD PRESSURE: 58 MMHG | HEART RATE: 62 BPM | BODY MASS INDEX: 22.97 KG/M2

## 2023-11-13 DIAGNOSIS — I25.5 ISCHEMIC CARDIOMYOPATHY: ICD-10-CM

## 2023-11-13 DIAGNOSIS — Z95.810 AICD (AUTOMATIC CARDIOVERTER/DEFIBRILLATOR) PRESENT: Primary | Chronic | ICD-10-CM

## 2023-11-13 DIAGNOSIS — I48.3 TYPICAL ATRIAL FLUTTER: ICD-10-CM

## 2023-11-13 DIAGNOSIS — Z95.810 AICD (AUTOMATIC CARDIOVERTER/DEFIBRILLATOR) PRESENT: ICD-10-CM

## 2023-11-13 DIAGNOSIS — Z95.810 PRESENCE OF AUTOMATIC (IMPLANTABLE) CARDIAC DEFIBRILLATOR: ICD-10-CM

## 2023-11-13 DIAGNOSIS — I48.0 PAROXYSMAL ATRIAL FIBRILLATION: ICD-10-CM

## 2023-11-13 DIAGNOSIS — I50.42 CHRONIC COMBINED SYSTOLIC AND DIASTOLIC CONGESTIVE HEART FAILURE: ICD-10-CM

## 2023-11-13 DIAGNOSIS — I10 ESSENTIAL HYPERTENSION: ICD-10-CM

## 2023-11-13 PROCEDURE — 99214 PR OFFICE/OUTPT VISIT, EST, LEVL IV, 30-39 MIN: ICD-10-PCS | Mod: S$GLB,,, | Performed by: NURSE PRACTITIONER

## 2023-11-13 PROCEDURE — 93010 EKG 12-LEAD: ICD-10-PCS | Mod: S$GLB,,, | Performed by: INTERNAL MEDICINE

## 2023-11-13 PROCEDURE — 1126F AMNT PAIN NOTED NONE PRSNT: CPT | Mod: CPTII,S$GLB,, | Performed by: NURSE PRACTITIONER

## 2023-11-13 PROCEDURE — 1126F PR PAIN SEVERITY QUANTIFIED, NO PAIN PRESENT: ICD-10-PCS | Mod: CPTII,S$GLB,, | Performed by: NURSE PRACTITIONER

## 2023-11-13 PROCEDURE — 3288F FALL RISK ASSESSMENT DOCD: CPT | Mod: CPTII,S$GLB,, | Performed by: NURSE PRACTITIONER

## 2023-11-13 PROCEDURE — 93010 ELECTROCARDIOGRAM REPORT: CPT | Mod: S$GLB,,, | Performed by: INTERNAL MEDICINE

## 2023-11-13 PROCEDURE — 1101F PT FALLS ASSESS-DOCD LE1/YR: CPT | Mod: CPTII,S$GLB,, | Performed by: NURSE PRACTITIONER

## 2023-11-13 PROCEDURE — 99999 PR PBB SHADOW E&M-EST. PATIENT-LVL IV: CPT | Mod: PBBFAC,,, | Performed by: NURSE PRACTITIONER

## 2023-11-13 PROCEDURE — 1160F RVW MEDS BY RX/DR IN RCRD: CPT | Mod: CPTII,S$GLB,, | Performed by: NURSE PRACTITIONER

## 2023-11-13 PROCEDURE — 99214 OFFICE O/P EST MOD 30 MIN: CPT | Mod: S$GLB,,, | Performed by: NURSE PRACTITIONER

## 2023-11-13 PROCEDURE — 93005 EKG 12-LEAD: ICD-10-PCS | Mod: S$GLB,,, | Performed by: INTERNAL MEDICINE

## 2023-11-13 PROCEDURE — 93283 CARDIAC DEVICE CHECK - IN CLINIC & HOSPITAL: ICD-10-PCS | Mod: 26,HCNC,, | Performed by: INTERNAL MEDICINE

## 2023-11-13 PROCEDURE — 3288F PR FALLS RISK ASSESSMENT DOCUMENTED: ICD-10-PCS | Mod: CPTII,S$GLB,, | Performed by: NURSE PRACTITIONER

## 2023-11-13 PROCEDURE — 93005 ELECTROCARDIOGRAM TRACING: CPT | Mod: S$GLB,,, | Performed by: INTERNAL MEDICINE

## 2023-11-13 PROCEDURE — 93283 PRGRMG EVAL IMPLANTABLE DFB: CPT

## 2023-11-13 PROCEDURE — 1159F PR MEDICATION LIST DOCUMENTED IN MEDICAL RECORD: ICD-10-PCS | Mod: CPTII,S$GLB,, | Performed by: NURSE PRACTITIONER

## 2023-11-13 PROCEDURE — 1160F PR REVIEW ALL MEDS BY PRESCRIBER/CLIN PHARMACIST DOCUMENTED: ICD-10-PCS | Mod: CPTII,S$GLB,, | Performed by: NURSE PRACTITIONER

## 2023-11-13 PROCEDURE — 93283 PRGRMG EVAL IMPLANTABLE DFB: CPT | Mod: 26,HCNC,, | Performed by: INTERNAL MEDICINE

## 2023-11-13 PROCEDURE — 1159F MED LIST DOCD IN RCRD: CPT | Mod: CPTII,S$GLB,, | Performed by: NURSE PRACTITIONER

## 2023-11-13 PROCEDURE — 99999 PR PBB SHADOW E&M-EST. PATIENT-LVL IV: ICD-10-PCS | Mod: PBBFAC,,, | Performed by: NURSE PRACTITIONER

## 2023-11-13 PROCEDURE — 1101F PR PT FALLS ASSESS DOC 0-1 FALLS W/OUT INJ PAST YR: ICD-10-PCS | Mod: CPTII,S$GLB,, | Performed by: NURSE PRACTITIONER

## 2023-11-22 ENCOUNTER — HOSPITAL ENCOUNTER (EMERGENCY)
Facility: HOSPITAL | Age: 88
Discharge: HOME | End: 2023-11-22
Attending: EMERGENCY MEDICINE
Payer: MEDICARE

## 2023-11-22 ENCOUNTER — TELEPHONE (OUTPATIENT)
Dept: INTERNAL MEDICINE | Facility: CLINIC | Age: 88
End: 2023-11-22
Payer: MEDICARE

## 2023-11-22 VITALS
SYSTOLIC BLOOD PRESSURE: 127 MMHG | TEMPERATURE: 98 F | OXYGEN SATURATION: 100 % | WEIGHT: 168 LBS | HEART RATE: 68 BPM | BODY MASS INDEX: 22.75 KG/M2 | DIASTOLIC BLOOD PRESSURE: 58 MMHG | HEIGHT: 72 IN | RESPIRATION RATE: 16 BRPM

## 2023-11-22 DIAGNOSIS — R06.02 SHORTNESS OF BREATH: ICD-10-CM

## 2023-11-22 DIAGNOSIS — J18.9 PNEUMONIA OF BOTH LUNGS DUE TO INFECTIOUS ORGANISM, UNSPECIFIED PART OF LUNG: Primary | ICD-10-CM

## 2023-11-22 DIAGNOSIS — R04.2 HEMOPTYSIS: ICD-10-CM

## 2023-11-22 LAB
ALBUMIN SERPL BCP-MCNC: 3.5 G/DL (ref 3.5–5.2)
ALP SERPL-CCNC: 99 U/L (ref 55–135)
ALT SERPL W/O P-5'-P-CCNC: 15 U/L (ref 10–44)
ANION GAP SERPL CALC-SCNC: 9 MMOL/L (ref 8–16)
AST SERPL-CCNC: 21 U/L (ref 10–40)
BASOPHILS # BLD AUTO: 0.08 K/UL (ref 0–0.2)
BASOPHILS NFR BLD: 0.8 % (ref 0–1.9)
BILIRUB SERPL-MCNC: 0.8 MG/DL (ref 0.1–1)
BNP SERPL-MCNC: 336 PG/ML (ref 0–99)
BUN SERPL-MCNC: 16 MG/DL (ref 8–23)
CALCIUM SERPL-MCNC: 9.7 MG/DL (ref 8.7–10.5)
CHLORIDE SERPL-SCNC: 104 MMOL/L (ref 95–110)
CO2 SERPL-SCNC: 27 MMOL/L (ref 23–29)
CREAT SERPL-MCNC: 1.3 MG/DL (ref 0.5–1.4)
D DIMER PPP IA.FEU-MCNC: 19.13 MG/L FEU
DIFFERENTIAL METHOD: ABNORMAL
EOSINOPHIL # BLD AUTO: 0.2 K/UL (ref 0–0.5)
EOSINOPHIL NFR BLD: 2.2 % (ref 0–8)
ERYTHROCYTE [DISTWIDTH] IN BLOOD BY AUTOMATED COUNT: 14 % (ref 11.5–14.5)
EST. GFR  (NO RACE VARIABLE): 52.8 ML/MIN/1.73 M^2
GLUCOSE SERPL-MCNC: 92 MG/DL (ref 70–110)
HCT VFR BLD AUTO: 40.2 % (ref 40–54)
HGB BLD-MCNC: 13.8 G/DL (ref 14–18)
IMM GRANULOCYTES # BLD AUTO: 0.05 K/UL (ref 0–0.04)
IMM GRANULOCYTES NFR BLD AUTO: 0.5 % (ref 0–0.5)
LYMPHOCYTES # BLD AUTO: 2.5 K/UL (ref 1–4.8)
LYMPHOCYTES NFR BLD: 24.9 % (ref 18–48)
MCH RBC QN AUTO: 30.3 PG (ref 27–31)
MCHC RBC AUTO-ENTMCNC: 34.3 G/DL (ref 32–36)
MCV RBC AUTO: 88 FL (ref 82–98)
MONOCYTES # BLD AUTO: 1 K/UL (ref 0.3–1)
MONOCYTES NFR BLD: 10.4 % (ref 4–15)
NEUTROPHILS # BLD AUTO: 6.1 K/UL (ref 1.8–7.7)
NEUTROPHILS NFR BLD: 61.2 % (ref 38–73)
NRBC BLD-RTO: 0 /100 WBC
PLATELET # BLD AUTO: 119 K/UL (ref 150–450)
PMV BLD AUTO: 11.2 FL (ref 9.2–12.9)
POTASSIUM SERPL-SCNC: 4 MMOL/L (ref 3.5–5.1)
PROT SERPL-MCNC: 7.2 G/DL (ref 6–8.4)
RBC # BLD AUTO: 4.55 M/UL (ref 4.6–6.2)
SODIUM SERPL-SCNC: 140 MMOL/L (ref 136–145)
TROPONIN I SERPL DL<=0.01 NG/ML-MCNC: 0.01 NG/ML (ref 0–0.03)
WBC # BLD AUTO: 9.96 K/UL (ref 3.9–12.7)

## 2023-11-22 PROCEDURE — 85379 FIBRIN DEGRADATION QUANT: CPT | Mod: HCNC | Performed by: EMERGENCY MEDICINE

## 2023-11-22 PROCEDURE — 99285 EMERGENCY DEPT VISIT HI MDM: CPT | Mod: 25,HCNC

## 2023-11-22 PROCEDURE — 80053 COMPREHEN METABOLIC PANEL: CPT | Mod: HCNC | Performed by: EMERGENCY MEDICINE

## 2023-11-22 PROCEDURE — 85025 COMPLETE CBC W/AUTO DIFF WBC: CPT | Mod: HCNC | Performed by: EMERGENCY MEDICINE

## 2023-11-22 PROCEDURE — 93005 ELECTROCARDIOGRAM TRACING: CPT | Mod: HCNC

## 2023-11-22 PROCEDURE — 83880 ASSAY OF NATRIURETIC PEPTIDE: CPT | Mod: HCNC | Performed by: EMERGENCY MEDICINE

## 2023-11-22 PROCEDURE — 63700000 PHARM REV CODE 250 ALT 637 W/O HCPCS: Mod: HCNC | Performed by: EMERGENCY MEDICINE

## 2023-11-22 PROCEDURE — 25000003 PHARM REV CODE 250: Mod: HCNC | Performed by: EMERGENCY MEDICINE

## 2023-11-22 PROCEDURE — 84484 ASSAY OF TROPONIN QUANT: CPT | Mod: HCNC | Performed by: EMERGENCY MEDICINE

## 2023-11-22 PROCEDURE — 93010 ELECTROCARDIOGRAM REPORT: CPT | Mod: HCNC,,, | Performed by: INTERNAL MEDICINE

## 2023-11-22 PROCEDURE — 25500020 PHARM REV CODE 255: Mod: HCNC | Performed by: EMERGENCY MEDICINE

## 2023-11-22 PROCEDURE — 93010 EKG 12-LEAD: ICD-10-PCS | Mod: HCNC,,, | Performed by: INTERNAL MEDICINE

## 2023-11-22 RX ORDER — AZITHROMYCIN 250 MG/1
500 TABLET, FILM COATED ORAL
Status: COMPLETED | OUTPATIENT
Start: 2023-11-22 | End: 2023-11-22

## 2023-11-22 RX ORDER — AZITHROMYCIN 250 MG/1
250 TABLET, FILM COATED ORAL DAILY
Qty: 6 TABLET | Refills: 0 | Status: SHIPPED | OUTPATIENT
Start: 2023-11-22 | End: 2023-11-22 | Stop reason: SDUPTHER

## 2023-11-22 RX ORDER — CEFPODOXIME PROXETIL 100 MG/1
200 TABLET, FILM COATED ORAL 2 TIMES DAILY
Qty: 20 TABLET | Refills: 0 | Status: SHIPPED | OUTPATIENT
Start: 2023-11-22 | End: 2023-11-27

## 2023-11-22 RX ORDER — AZITHROMYCIN 250 MG/1
250 TABLET, FILM COATED ORAL DAILY
Qty: 4 TABLET | Refills: 0 | Status: SHIPPED | OUTPATIENT
Start: 2023-11-23 | End: 2023-11-27

## 2023-11-22 RX ORDER — CEFPODOXIME PROXETIL 200 MG/1
200 TABLET, FILM COATED ORAL
Status: COMPLETED | OUTPATIENT
Start: 2023-11-22 | End: 2023-11-22

## 2023-11-22 RX ADMIN — IOHEXOL 100 ML: 350 INJECTION, SOLUTION INTRAVENOUS at 03:11

## 2023-11-22 RX ADMIN — CEFPODOXIME PROXETIL 200 MG: 200 TABLET, FILM COATED ORAL at 05:11

## 2023-11-22 RX ADMIN — AZITHROMYCIN 500 MG: 250 TABLET, FILM COATED ORAL at 05:11

## 2023-11-22 NOTE — DISCHARGE INSTRUCTIONS
You also have some lung nodules that will need monitoring.  Please see your primary doctor to discuss repeat imaging in 3-6 months.    New medications:  Azithromycin 250mg daily for 4 days starting Thursday  Cefpodoxime 200mg twice daily for 5 days starting tomorrow

## 2023-11-22 NOTE — ED PROVIDER NOTES
Encounter Date: 11/22/2023       History     Chief Complaint   Patient presents with    Hemoptysis     Occurred 2 weeks ago, had viral pneumonia, was getting better, cough started again progressively worsening over last week     HPI  88-year-old male who presents with hemoptysis.  Past medical history includes CKD, coronary artery disease, cardiomyopathy with a defibrillator, hypertension and hyperlipidemia, MGUS, V-tach.  He had a persistent cough a few weeks ago.  Was treated for a viral pneumonia.  Had 1 episode of hemoptysis on 11/06.  Presented to the emergency department and a reassuring workup including blood work and a chest x-ray.  As it was a single episode and a small amount, he was discharged home.  He followed up with his PCP and plan was that, if he had any further episodes he will follow up with ENT.  He did not have any further episodes until today.  He reports a mild increase in cough over the last few days.  He would several forceful cough today and then coughed up bright red blood.  There is a saturated napkin and 1 saturated napkin at home.  No clots.  No other episodes.  Did not feel up a cup.  Denies feeling lightheaded, fatigued, presyncopal, chest pain, short of breath.  No pain with inspiration.  No leg swelling, history of DVT or PE, recent travel or surgery.  He is on Plavix.  Denies any other bleeding including black or bloody stools.  The patient states I feel great, I would be here if it were not for this blood.      Review of patient's allergies indicates:   Allergen Reactions    Fluress [fluorescein-benoxinate]     Mydriacyl [tropicamide]     Pcn  [penicillins]      Other reaction(s): Unknown    Clindamycin Rash     Past Medical History:   Diagnosis Date    AICD (automatic cardioverter/defibrillator) present 7/17/2012    Cardiomyopathy     CKD (chronic kidney disease) stage 3, GFR 30-59 ml/min 7/17/2012    Coronary artery disease     GERD (gastroesophageal reflux disease)      Tovar's; Dr. Vu    Hyperlipidemia 7/17/2012    Hypertension 7/17/2012    Ischemic cardiomyopathy 7/17/2012    MGUS (monoclonal gammopathy of unknown significance)     Thrombocytopenia 7/17/2012    Ventricular tachycardia     VT (ventricular tachycardia) 7/17/2012     Past Surgical History:   Procedure Laterality Date    ABDOMINAL AORTIC ANEURYSM REPAIR      CARDIAC DEFIBRILLATOR PLACEMENT      CATARACT EXTRACTION, BILATERAL  2018    CORONARY ANGIOPLASTY      EYE SURGERY Bilateral     cataract    HERNIA REPAIR      x2    REPLACEMENT OF IMPLANTABLE CARDIOVERTER-DEFIBRILLATOR (ICD) GENERATOR Left 3/18/2022    Procedure: REPLACEMENT, ICD GENERATOR;  Surgeon: Felipe Woodard MD;  Location: [facility];  Service: Cardiology;  Laterality: Left;  SEFERINO, ICD gen chg, SJM, anes, MB, 3prep*ANUJ ICD insitu*      Family History   Problem Relation Age of Onset    Cancer Mother         Lymphoma    Lymphoma Mother     Coronary artery disease Mother     Heart disease Mother     Heart disease Father     Heart attacks under age 50 Father     Heart disease Sister     Cancer Brother         lymphoma     Social History     Tobacco Use    Smoking status: Never    Smokeless tobacco: Never   Substance Use Topics    Alcohol use: No    Drug use: No     Review of Systems    Physical Exam     Initial Vitals [11/22/23 1310]   BP Pulse Resp Temp SpO2   111/65 (!) 59 18 97.9 °F (36.6 °C) 96 %      MAP       --         Physical Exam    Nursing note and vitals reviewed.  Constitutional: He appears well-developed and well-nourished. No distress.   HENT:   Head: Normocephalic and atraumatic.   Nose: Nose normal.   Eyes: Conjunctivae and EOM are normal. Pupils are equal, round, and reactive to light.   Neck:   Normal range of motion.  Cardiovascular:  Normal rate, regular rhythm and normal heart sounds.     Exam reveals no gallop and no friction rub.       No murmur heard.  Pulmonary/Chest: Breath sounds normal. No respiratory distress. He has  no wheezes. He has no rhonchi. He has no rales.   Abdominal: Abdomen is soft. He exhibits no distension. There is no abdominal tenderness. There is no rebound and no guarding.   Musculoskeletal:         General: No tenderness or edema. Normal range of motion.      Cervical back: Normal range of motion.     Neurological: He is alert and oriented to person, place, and time. He has normal strength.   Skin: Skin is warm and dry. Capillary refill takes less than 2 seconds.   Psychiatric: He has a normal mood and affect.         ED Course   Procedures  Labs Reviewed   CBC W/ AUTO DIFFERENTIAL - Abnormal; Notable for the following components:       Result Value    RBC 4.55 (*)     Hemoglobin 13.8 (*)     Platelets 119 (*)     Immature Grans (Abs) 0.05 (*)     All other components within normal limits   COMPREHENSIVE METABOLIC PANEL - Abnormal; Notable for the following components:    eGFR 52.8 (*)     All other components within normal limits   B-TYPE NATRIURETIC PEPTIDE - Abnormal; Notable for the following components:     (*)     All other components within normal limits   D DIMER, QUANTITATIVE - Abnormal; Notable for the following components:    D-Dimer 19.13 (*)     All other components within normal limits   TROPONIN I          Imaging Results               CTA Chest Non-Coronary (PE Studies) (Final result)  Result time 11/22/23 16:45:34      Final result by Kumar Sanchez MD (11/22/23 16:45:34)                   Impression:      This report was flagged in Epic as abnormal.    1. Allowing for limitations above, no convincing pulmonary thromboembolism.  2. Multifocal patchy regions of consolidation/tree-in-bud type nodularity.  Findings are concerning for developing infectious or inflammatory process.  Given the nodular configuration of several of these foci, discrete pulmonary nodule is not excluded, short-term follow-up is recommended, no greater than 3 months, to ensure resolution and to further assess  for the presence of malignancy.  3. Bilateral pulmonary emphysematous changes noting scattered bronchiectasis.  Mosaic attenuation of the pulmonary parenchyma suggests sequela of small airways disease/small vessel disease.  Peribronchial thickening involving several airways to the left lower lobe may reflect superimposed bronchial airway inflammation or infection.  4. Intermediate attenuating lesions within the left kidney, may reflect high protein content or hemorrhagic cysts however nonemergent ultrasound is recommended to confirm cystic nature.  5. Please see above for several additional findings.      Electronically signed by: Kumar Sanchez MD  Date:    11/22/2023  Time:    16:45               Narrative:    EXAMINATION:  CTA CHEST NON CORONARY (PE STUDIES)    CLINICAL HISTORY:  Pulmonary embolism (PE) suspected, positive D-dimer;    TECHNIQUE:  Low dose axial images, sagittal and coronal reformations were obtained from the thoracic inlet to the lung bases following the IV administration of 100 mL of Omnipaque 350.  Contrast timing was optimized to evaluate the pulmonary arteries.  MIP images were performed.    COMPARISON:  11/06/2023 radiograph    FINDINGS:  The structures at the base of the neck are unremarkable.  No significant mediastinal lymphadenopathy.  Pacer wires noted.  The heart is not enlarged.  No pericardial effusion.  The thoracic aorta tapers normally noting atherosclerotic calcification along its course.  There is a partially visualized infrarenal abdominal aortic stent graft.  Several low attenuating and high attenuating lesions arise from the kidneys bilaterally, the larger of the low attenuating lesions measure attenuation suggesting cysts, several of the smaller are too small for characterization.  The high attenuating lesions are indeterminate.  May reflect high protein content or hemorrhagic cysts.  Nonemergent ultrasound recommended for further evaluation.  The spleen, adrenal glands and  liver are grossly unremarkable.  There is diffuse fatty atrophy of the visualized portions of the pancreas.  The stomach is decompressed without wall thickening.  There is a small hiatal hernia.  Splenule noted.    Motion artifact limits evaluation of the airways and pulmonary parenchyma.  Allowing for this, the airways are patent noting bilateral scattered bronchiectasis.  There is peribronchial thickening of several distal airways to the left lower lobe.  There is biapical atelectasis or scarring.  There is bilateral pulmonary emphysematous change.  There are a few scattered tree-in-bud type nodules along the periphery of the right upper lobe.  Additional tree-in-bud type nodules are noted along the posterior aspect of the right upper lobe, near the fissure.  Nodules measure up to 1 cm.  There are a few additional patchy foci of tree-in-bud type nodules throughout the right upper lobe and right middle lobe, particularly along the medial aspect of the right middle lobe.  There are a few scattered consolidative foci within the right upper lobe and right middle lobe, as well as within the right lower lobe.  There is atelectasis/fibrotic change along the periphery of the left lower lobe.  There is a focus of consolidation versus nodule within the left lower lobe measuring 1 cm.  Mosaic attenuation of the pulmonary parenchyma suggests underlying small airways disease or small vessel disease.  There is a nodular focus within the left upper lobe measuring 7-8 mm adjacent to a larger nodular focus measuring up to 1 cm.  There are a few scattered tree-in-bud type nodules throughout the left upper lobe and a few additional scattered consolidative foci throughout the right upper lobe and most significantly involving the lingula.  Similar consolidative foci are noted within the left lower lobe.  No pneumothorax.  No pleural effusion.    Bolus timing is adequate for evaluation of pulmonary thromboembolism.  Allowing for motion  artifact, no convincing pulmonary arterial filling defects to the level of the segmental branches bilaterally to suggest pulmonary thromboembolism.    There is osteopenia.  There are degenerative changes of the spine.  No significant axillary lymphadenopathy.  There is bilateral gynecomastia.                                       Medications   iohexoL (OMNIPAQUE 350) injection 100 mL (100 mLs Intravenous Given 11/22/23 8807)     Medical Decision Making  88-year-old male who is on Plavix who presents after an episode of hemoptysis.  He was treated for viral pneumonia a few weeks ago and had 1 episode of hemoptysis on 11/06.  Has not had any further episodes until today.  Not on any other blood thinners.  He feels very well.  He does not have any symptoms concerning for anemia.  Really does not have any symptoms concerning for new cancer or PE.  However, certainly with episodes of hemoptysis we will need to be ruled out for new onset cancer versus PE.  We will get labs to rule out worsening anemia, basic labs, troponin and BNP, and a D-dimer as overall he actually is low risk.  If D-dimer positive we will get a CTA.  If negative, we will get a CT chest without to rule out a mass.  Does not have any infectious symptoms including fevers or other productive cough.      Blood work reassuring.  CT does not show a PE.  It does show multifocal patchy areas that could be developing infectious or inflammatory process.  The patient does report that his cough improved with doxycycline but has started to slowly get slightly worse.  He does not have an elevated white count or a fever and appears very well so I do not think we need to start him on Levaquin but we will start him on a dose of azithromycin.  Unfortunately he is allergic to penicillins. Spoke with out pharmacist and combo of azithro + cefpodoxime will provide coverage. First doses given here.     Amount and/or Complexity of Data Reviewed  Labs: ordered.  Radiology:  ordered.    Risk  Prescription drug management.                                   Clinical Impression:  Final diagnoses:  [R06.02] Shortness of breath  [J18.9] Pneumonia of both lungs due to infectious organism, unspecified part of lung (Primary)  [R04.2] Hemoptysis          ED Disposition Condition    Discharge Stable          ED Prescriptions       Medication Sig Dispense Start Date End Date Auth. Provider    azithromycin (Z-JAGRUTI) 250 MG tablet Take 1 tablet (250 mg total) by mouth once daily. Take first 2 tablets together, then 1 every day until finished. 6 tablet 11/22/2023 -- Jessica Hu MD          Follow-up Information       Follow up With Specialties Details Why Contact Info Additional Information    [facility] - Pulmonology Pulmonology Schedule an appointment as soon as possible for a visit   [address]  [address]  [facility] [address] [phone] Please park in [address] or [address] and enter building by using [address] entrance. Check in at central registration near [facility] in [facility]. Proceed to [address] waiting area once checked in.    Bety Tomlinson MD Internal Medicine Schedule an appointment as soon as possible for a visit in 1 week Or call sooner if you get sicker [address] [facility] [address] [phone]       [facility] - Emergency Dept Emergency Medicine  If you have high fevers or worsening hemoptysis [address]  [facility] [address] [phone]              Jessica Hu MD  11/22/23 9080

## 2023-11-22 NOTE — TELEPHONE ENCOUNTER
----- Message from Bety Tomlinson MD sent at 11/22/2023 11:00 AM CST -----    ----- Message -----  From: Anna Marie Carson  Sent: 11/22/2023  10:04 AM CST  To: [facility] Staff    Type:  Needs Medical Advice    Who Called: pt    Would the patient rather a call back or a response via [facility]? call  Best Call Back Number: [phone]  Additional Information: pt requesting to have a call back to discuss previous problem

## 2023-11-22 NOTE — TELEPHONE ENCOUNTER
"Asked patient to go to ER because patient stated he is coughing up "a lot of blood" and was having "just a little chest pressure". Patient verbalized understanding. I called [facility] ER to let them know patient was on his way  "

## 2023-11-22 NOTE — ED NOTES
C/C:  Pt states he's been having a persistent cough for a few months, was diagnosed with viral pneumonia but antibiotics unsuccesful now coughing up bright red blood.     APPEARANCE: awake and alert in NAD. PAIN  o/10  SKIN: warm, dry and intact. No breakdown or bruising.  MUSCULOSKELETAL: Patient moving all extremities spontaneously, no obvious swelling or deformities noted. Ambulates independently.  RESPIRATORY: Denies shortness of breath.Respirations unlabored. Hemoptysis   CARDIAC: Denies CP, 2+ distal pulses; no peripheral edema  ABDOMEN: Abdomen soft, non tender.   : voids spontaneously, denies difficulty  Neurologic: AAO x 4; follows commands equal strength in all extremities; denies numbness/tingling. Denies dizziness  denies weakness

## 2023-11-24 ENCOUNTER — TELEPHONE (OUTPATIENT)
Dept: INTERNAL MEDICINE | Facility: CLINIC | Age: 88
End: 2023-11-24
Payer: MEDICARE

## 2023-11-24 DIAGNOSIS — N28.1 RENAL CYST: Primary | ICD-10-CM

## 2023-11-24 DIAGNOSIS — J18.9 RECURRENT PNEUMONIA: Primary | ICD-10-CM

## 2023-11-29 ENCOUNTER — LAB VISIT (OUTPATIENT)
Dept: LAB | Facility: HOSPITAL | Age: 88
End: 2023-11-29
Attending: INTERNAL MEDICINE
Payer: MEDICARE

## 2023-11-29 ENCOUNTER — OFFICE VISIT (OUTPATIENT)
Dept: INTERNAL MEDICINE | Facility: CLINIC | Age: 88
End: 2023-11-29
Payer: MEDICARE

## 2023-11-29 VITALS
SYSTOLIC BLOOD PRESSURE: 124 MMHG | WEIGHT: 169.06 LBS | BODY MASS INDEX: 22.9 KG/M2 | HEIGHT: 72 IN | DIASTOLIC BLOOD PRESSURE: 62 MMHG | HEART RATE: 66 BPM | OXYGEN SATURATION: 96 %

## 2023-11-29 DIAGNOSIS — I50.42 CHRONIC COMBINED SYSTOLIC AND DIASTOLIC CONGESTIVE HEART FAILURE: ICD-10-CM

## 2023-11-29 DIAGNOSIS — I10 ESSENTIAL HYPERTENSION: ICD-10-CM

## 2023-11-29 DIAGNOSIS — N18.31 CHRONIC KIDNEY DISEASE, STAGE 3A: ICD-10-CM

## 2023-11-29 DIAGNOSIS — N28.9 KIDNEY LESION, NATIVE, LEFT: ICD-10-CM

## 2023-11-29 DIAGNOSIS — J18.9 RECURRENT PNEUMONIA: Primary | ICD-10-CM

## 2023-11-29 LAB
ALBUMIN SERPL BCP-MCNC: 3.7 G/DL (ref 3.5–5.2)
ALP SERPL-CCNC: 99 U/L (ref 55–135)
ALT SERPL W/O P-5'-P-CCNC: 12 U/L (ref 10–44)
ANION GAP SERPL CALC-SCNC: 9 MMOL/L (ref 8–16)
AST SERPL-CCNC: 21 U/L (ref 10–40)
BILIRUB SERPL-MCNC: 0.8 MG/DL (ref 0.1–1)
BUN SERPL-MCNC: 14 MG/DL (ref 8–23)
CALCIUM SERPL-MCNC: 9.5 MG/DL (ref 8.7–10.5)
CHLORIDE SERPL-SCNC: 104 MMOL/L (ref 95–110)
CO2 SERPL-SCNC: 28 MMOL/L (ref 23–29)
CREAT SERPL-MCNC: 1.3 MG/DL (ref 0.5–1.4)
EST. GFR  (NO RACE VARIABLE): 52.8 ML/MIN/1.73 M^2
GLUCOSE SERPL-MCNC: 97 MG/DL (ref 70–110)
POTASSIUM SERPL-SCNC: 3.8 MMOL/L (ref 3.5–5.1)
PROT SERPL-MCNC: 7.2 G/DL (ref 6–8.4)
SODIUM SERPL-SCNC: 141 MMOL/L (ref 136–145)

## 2023-11-29 PROCEDURE — 99214 PR OFFICE/OUTPT VISIT, EST, LEVL IV, 30-39 MIN: ICD-10-PCS | Mod: HCNC,S$GLB,, | Performed by: INTERNAL MEDICINE

## 2023-11-29 PROCEDURE — 1126F PR PAIN SEVERITY QUANTIFIED, NO PAIN PRESENT: ICD-10-PCS | Mod: HCNC,CPTII,S$GLB, | Performed by: INTERNAL MEDICINE

## 2023-11-29 PROCEDURE — 99999 PR PBB SHADOW E&M-EST. PATIENT-LVL IV: CPT | Mod: PBBFAC,HCNC,, | Performed by: INTERNAL MEDICINE

## 2023-11-29 PROCEDURE — 1126F AMNT PAIN NOTED NONE PRSNT: CPT | Mod: HCNC,CPTII,S$GLB, | Performed by: INTERNAL MEDICINE

## 2023-11-29 PROCEDURE — 3288F PR FALLS RISK ASSESSMENT DOCUMENTED: ICD-10-PCS | Mod: HCNC,CPTII,S$GLB, | Performed by: INTERNAL MEDICINE

## 2023-11-29 PROCEDURE — 1160F RVW MEDS BY RX/DR IN RCRD: CPT | Mod: HCNC,CPTII,S$GLB, | Performed by: INTERNAL MEDICINE

## 2023-11-29 PROCEDURE — 83880 ASSAY OF NATRIURETIC PEPTIDE: CPT | Mod: HCNC | Performed by: INTERNAL MEDICINE

## 2023-11-29 PROCEDURE — 1159F MED LIST DOCD IN RCRD: CPT | Mod: HCNC,CPTII,S$GLB, | Performed by: INTERNAL MEDICINE

## 2023-11-29 PROCEDURE — 3288F FALL RISK ASSESSMENT DOCD: CPT | Mod: HCNC,CPTII,S$GLB, | Performed by: INTERNAL MEDICINE

## 2023-11-29 PROCEDURE — 99214 OFFICE O/P EST MOD 30 MIN: CPT | Mod: HCNC,S$GLB,, | Performed by: INTERNAL MEDICINE

## 2023-11-29 PROCEDURE — 80053 COMPREHEN METABOLIC PANEL: CPT | Mod: HCNC | Performed by: INTERNAL MEDICINE

## 2023-11-29 PROCEDURE — 99999 PR PBB SHADOW E&M-EST. PATIENT-LVL IV: ICD-10-PCS | Mod: PBBFAC,HCNC,, | Performed by: INTERNAL MEDICINE

## 2023-11-29 PROCEDURE — 1159F PR MEDICATION LIST DOCUMENTED IN MEDICAL RECORD: ICD-10-PCS | Mod: HCNC,CPTII,S$GLB, | Performed by: INTERNAL MEDICINE

## 2023-11-29 PROCEDURE — 1101F PR PT FALLS ASSESS DOC 0-1 FALLS W/OUT INJ PAST YR: ICD-10-PCS | Mod: HCNC,CPTII,S$GLB, | Performed by: INTERNAL MEDICINE

## 2023-11-29 PROCEDURE — 1160F PR REVIEW ALL MEDS BY PRESCRIBER/CLIN PHARMACIST DOCUMENTED: ICD-10-PCS | Mod: HCNC,CPTII,S$GLB, | Performed by: INTERNAL MEDICINE

## 2023-11-29 PROCEDURE — 1101F PT FALLS ASSESS-DOCD LE1/YR: CPT | Mod: HCNC,CPTII,S$GLB, | Performed by: INTERNAL MEDICINE

## 2023-11-29 NOTE — PROGRESS NOTES
Patient ID: Nelson GIBBONS Parent is a 88 y.o. male.    Chief Complaint: Follow-up    HPI Nelson is a 88 y.o. 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  He presents with his wife today.  Patient had 2 recent visits to the ER for hemoptysis.  At his 2nd visit, CTA of the chest was obtained.  It showed bilateral patchy opacities, pulmonary nodules, bronchiectasis, and a left kidney lesion (possible cyst).  His antibiotics were changed.  He completed his antibiotic regimen on Monday.  He reports that he is feeling well at this time.  He still has occasional coughing but it has decreased in frequency and intensity.  He is no longer coughing up blood.  He has an upcoming appointment with pulmonology.    We reviewed lab results from 11/22/2023.    Review of Systems   Respiratory:  Positive for cough (improved).    All other systems reviewed and are negative.      Objective:     Vitals:    11/29/23 1018   BP: 124/62   Pulse: 66        Physical Exam  Vitals reviewed.   Constitutional:       General: He is not in acute distress.     Appearance: Normal appearance. He is well-developed. He is not ill-appearing, toxic-appearing or diaphoretic.   HENT:      Head: Normocephalic and atraumatic.      Right Ear: External ear normal.      Left Ear: External ear normal.      Nose: Nose normal.   Eyes:      Extraocular Movements: Extraocular movements intact.      Conjunctiva/sclera: Conjunctivae normal.   Cardiovascular:      Rate and Rhythm: Normal rate and regular rhythm.      Pulses: Normal pulses.      Heart sounds: Normal heart sounds. No murmur heard.     No friction rub. No gallop.   Pulmonary:      Effort: Pulmonary effort is normal. No respiratory distress.      Breath sounds: Normal breath sounds. No stridor. No wheezing, rhonchi or rales.    Musculoskeletal:      Right lower leg: No edema.      Left lower leg: No edema.   Skin:     General: Skin is warm and dry.   Neurological:      General: No focal deficit present.      Mental Status: He is alert and oriented to person, place, and time. Mental status is at baseline.   Psychiatric:         Mood and Affect: Mood normal.         Behavior: Behavior normal.         Thought Content: Thought content normal.         Judgment: Judgment normal.         Assessment:       1. Recurrent pneumonia Inactive   2. Kidney lesion, native, left Active   3. Chronic kidney disease, stage 3a Chronic   4. Essential hypertension Well controlled       Plan:         Recurrent pneumonia  Comments:  Pt much improved. No further tx at this time. Keep f/u appt with Pulm. Will need repeat CT scan    Kidney lesion, native, left  Comments:  Suspect benign cyst. Going for US tomorrow    Chronic kidney disease, stage 3a  Comments:  Chronic and stable.  Continue to monitor.    Essential hypertension  Comments:  Continue current medication        RTC PRN      Warning signs discussed, patient to call with any further issues or worsening of symptoms.       Parts of the above note were dictated using a voice dictation software. Please excuse any grammatical or typographical errors.

## 2023-11-30 ENCOUNTER — HOSPITAL ENCOUNTER (OUTPATIENT)
Dept: RADIOLOGY | Facility: HOSPITAL | Age: 88
Discharge: HOME | End: 2023-11-30
Attending: INTERNAL MEDICINE
Payer: MEDICARE

## 2023-11-30 DIAGNOSIS — N28.1 RENAL CYST: ICD-10-CM

## 2023-11-30 PROCEDURE — 76770 US EXAM ABDO BACK WALL COMP: CPT | Mod: TC,HCNC

## 2023-11-30 PROCEDURE — 76770 US EXAM ABDO BACK WALL COMP: CPT | Mod: 26,HCNC,, | Performed by: RADIOLOGY

## 2023-11-30 PROCEDURE — 76770 US RETROPERITONEAL COMPLETE: ICD-10-PCS | Mod: 26,HCNC,, | Performed by: RADIOLOGY

## 2023-12-01 ENCOUNTER — PATIENT MESSAGE (OUTPATIENT)
Dept: CARDIOLOGY | Facility: CLINIC | Age: 88
End: 2023-12-01
Payer: MEDICARE

## 2023-12-01 LAB — NT-PROBNP SERPL IA-MCNC: 956 PG/ML

## 2023-12-01 RX ORDER — POTASSIUM CHLORIDE 750 MG/1
10 TABLET, EXTENDED RELEASE ORAL DAILY
COMMUNITY
End: 2023-12-01 | Stop reason: SDUPTHER

## 2023-12-01 RX ORDER — FUROSEMIDE 20 MG/1
20 TABLET ORAL 2 TIMES DAILY
COMMUNITY
End: 2023-12-01 | Stop reason: SDUPTHER

## 2023-12-01 RX ORDER — POTASSIUM CHLORIDE 750 MG/1
10 TABLET, EXTENDED RELEASE ORAL DAILY
Qty: 30 TABLET | Refills: 11 | Status: SHIPPED | OUTPATIENT
Start: 2023-12-01

## 2023-12-01 RX ORDER — FUROSEMIDE 20 MG/1
20 TABLET ORAL 2 TIMES DAILY
Qty: 30 TABLET | Refills: 11 | Status: SHIPPED | OUTPATIENT
Start: 2023-12-01

## 2023-12-01 NOTE — PROGRESS NOTES
Your results show HF blood test mild up so Furosimide 20 mg every other day or 3 per week but KCL 10 meq daily ( K was below ideal to start with at 3.8)- get chem 7 and Pro-BNP in 10 weeks.    Please contact me if you have any additional concerns.    Sincerely,    Shiraz Cartagena

## 2023-12-09 ENCOUNTER — CLINICAL SUPPORT (OUTPATIENT)
Dept: CARDIOLOGY | Facility: HOSPITAL | Age: 88
End: 2023-12-09
Attending: INTERNAL MEDICINE
Payer: MEDICARE

## 2023-12-09 DIAGNOSIS — I25.5 ISCHEMIC CARDIOMYOPATHY: ICD-10-CM

## 2023-12-09 DIAGNOSIS — Z95.810 PRESENCE OF AUTOMATIC (IMPLANTABLE) CARDIAC DEFIBRILLATOR: ICD-10-CM

## 2023-12-09 PROCEDURE — 93296 REM INTERROG EVL PM/IDS: CPT | Mod: HCNC | Performed by: INTERNAL MEDICINE

## 2023-12-09 PROCEDURE — 93295 DEV INTERROG REMOTE 1/2/MLT: CPT | Mod: HCNC,,, | Performed by: INTERNAL MEDICINE

## 2023-12-09 PROCEDURE — 93295 CARDIAC DEVICE CHECK CHECK - REMOTE ALERT: ICD-10-PCS | Mod: HCNC,,, | Performed by: INTERNAL MEDICINE

## 2023-12-15 ENCOUNTER — CLINICAL SUPPORT (OUTPATIENT)
Dept: CARDIOLOGY | Facility: HOSPITAL | Age: 88
End: 2023-12-15
Attending: INTERNAL MEDICINE
Payer: MEDICARE

## 2023-12-15 ENCOUNTER — OFFICE VISIT (OUTPATIENT)
Dept: PULMONOLOGY | Facility: CLINIC | Age: 88
End: 2023-12-15
Payer: MEDICARE

## 2023-12-15 VITALS
SYSTOLIC BLOOD PRESSURE: 118 MMHG | WEIGHT: 169.56 LBS | OXYGEN SATURATION: 97 % | BODY MASS INDEX: 22.97 KG/M2 | HEIGHT: 72 IN | HEART RATE: 66 BPM | DIASTOLIC BLOOD PRESSURE: 62 MMHG

## 2023-12-15 DIAGNOSIS — I25.5 ISCHEMIC CARDIOMYOPATHY: ICD-10-CM

## 2023-12-15 DIAGNOSIS — Z95.810 AICD (AUTOMATIC CARDIOVERTER/DEFIBRILLATOR) PRESENT: ICD-10-CM

## 2023-12-15 DIAGNOSIS — I48.0 PAROXYSMAL ATRIAL FIBRILLATION: ICD-10-CM

## 2023-12-15 DIAGNOSIS — J18.9 RECURRENT PNEUMONIA: ICD-10-CM

## 2023-12-15 DIAGNOSIS — J47.9 BRONCHIECTASIS WITHOUT COMPLICATION: ICD-10-CM

## 2023-12-15 DIAGNOSIS — R05.3 CHRONIC COUGH: Primary | ICD-10-CM

## 2023-12-15 DIAGNOSIS — R04.2 HEMOPTYSIS: ICD-10-CM

## 2023-12-15 DIAGNOSIS — I48.3 TYPICAL ATRIAL FLUTTER: ICD-10-CM

## 2023-12-15 LAB
OHS CV AF BURDEN PERCENT: < 1
OHS CV DC REMOTE DEVICE TYPE: NORMAL
OHS CV ICD SHOCK: NO
OHS CV RV PACING PERCENT: 1 %

## 2023-12-15 PROCEDURE — 99204 PR OFFICE/OUTPT VISIT, NEW, LEVL IV, 45-59 MIN: ICD-10-PCS | Mod: HCNC,S$GLB,, | Performed by: INTERNAL MEDICINE

## 2023-12-15 PROCEDURE — 99999 PR PBB SHADOW E&M-EST. PATIENT-LVL IV: ICD-10-PCS | Mod: PBBFAC,HCNC,, | Performed by: STUDENT IN AN ORGANIZED HEALTH CARE EDUCATION/TRAINING PROGRAM

## 2023-12-15 PROCEDURE — 99999 PR PBB SHADOW E&M-EST. PATIENT-LVL IV: CPT | Mod: PBBFAC,HCNC,, | Performed by: STUDENT IN AN ORGANIZED HEALTH CARE EDUCATION/TRAINING PROGRAM

## 2023-12-15 PROCEDURE — 93283 PRGRMG EVAL IMPLANTABLE DFB: CPT | Mod: 26,HCNC,, | Performed by: INTERNAL MEDICINE

## 2023-12-15 PROCEDURE — 1159F MED LIST DOCD IN RCRD: CPT | Mod: HCNC,CPTII,S$GLB, | Performed by: INTERNAL MEDICINE

## 2023-12-15 PROCEDURE — 1126F PR PAIN SEVERITY QUANTIFIED, NO PAIN PRESENT: ICD-10-PCS | Mod: HCNC,CPTII,S$GLB, | Performed by: INTERNAL MEDICINE

## 2023-12-15 PROCEDURE — 1101F PT FALLS ASSESS-DOCD LE1/YR: CPT | Mod: HCNC,CPTII,S$GLB, | Performed by: INTERNAL MEDICINE

## 2023-12-15 PROCEDURE — 93283 CARDIAC DEVICE CHECK - IN CLINIC & HOSPITAL: ICD-10-PCS | Mod: 26,HCNC,, | Performed by: INTERNAL MEDICINE

## 2023-12-15 PROCEDURE — 99204 OFFICE O/P NEW MOD 45 MIN: CPT | Mod: HCNC,S$GLB,, | Performed by: INTERNAL MEDICINE

## 2023-12-15 PROCEDURE — 1126F AMNT PAIN NOTED NONE PRSNT: CPT | Mod: HCNC,CPTII,S$GLB, | Performed by: INTERNAL MEDICINE

## 2023-12-15 PROCEDURE — 1159F PR MEDICATION LIST DOCUMENTED IN MEDICAL RECORD: ICD-10-PCS | Mod: HCNC,CPTII,S$GLB, | Performed by: INTERNAL MEDICINE

## 2023-12-15 PROCEDURE — 3288F FALL RISK ASSESSMENT DOCD: CPT | Mod: HCNC,CPTII,S$GLB, | Performed by: INTERNAL MEDICINE

## 2023-12-15 PROCEDURE — 3288F PR FALLS RISK ASSESSMENT DOCUMENTED: ICD-10-PCS | Mod: HCNC,CPTII,S$GLB, | Performed by: INTERNAL MEDICINE

## 2023-12-15 PROCEDURE — 1101F PR PT FALLS ASSESS DOC 0-1 FALLS W/OUT INJ PAST YR: ICD-10-PCS | Mod: HCNC,CPTII,S$GLB, | Performed by: INTERNAL MEDICINE

## 2023-12-15 PROCEDURE — 93283 PRGRMG EVAL IMPLANTABLE DFB: CPT | Mod: HCNC

## 2023-12-15 NOTE — PROGRESS NOTES
[facility]    SUBJECTIVE:     Chief Complaint: Cough    History of Present Illness:  Nelson Huntley is a 88 y.o. male who presents for initial evaluation of cough. The patient has not been seen in pulmonary clinic before. He has a pst medical history significant for CHF, CAD, MGUS, and GERD. The patient is on plavix and ASA.    He initially presented to his PCP in 10/2023 with complaints of cough for several months. He was treated conservatively as there was significant post-nasal drip. He was treated with doxycyline and antihistamines with some improvement in his cough. He subsequently had an episode of hemoptysis and chest tightness on 11/7 and presented to the ED for evaluation. X-ray was performed and had no acute abnormalities, he was at his baseline with monitoring and no return of hemoptysis so he was discharged. He presented again to the ED on 11/22 with return of cough and hemoptysis. CT at that time with multifocal tree-in-bud opacities, emphysematous changes, and bronchiectasis. He was started on cefpodoxime and azithromycin.    Today, patient reports no further episodes of hemoptysis. He felt he improved with his antibiotic course. After completing it he reports he does feel his cough is a bit worse than when he was on it, but overall much better than it was. He remains on plavix and aspirin. He denies any fever or chills. He lost 8lbs over the past few months, on purpose with diet and exercise. He denies night sweats. He is able to exercise on the treadmill without any shortness of breath.    Smoking: Never  Other substances: None  Inhalers: None   Travel: None recently  Incarceration/homelessness: No  Occupational/environmental exposures: Retired, previously. [place] last in 1/2002[place]   Pets/hobbies: None in the past 5 years  B-Symptoms: 8 lbs (on purpose), no night sweats, no fevers  Autoimmune symptoms/features:  None  Family Hx: None      Review of patient's allergies indicates:   Allergen Reactions    Fluress [fluorescein-benoxinate]     Mydriacyl [tropicamide]     Pcn  [penicillins]      Other reaction(s): Unknown    Clindamycin Rash       Past Medical History:   Diagnosis Date    AICD (automatic cardioverter/defibrillator) present 7/17/2012    Cardiomyopathy     CKD (chronic kidney disease) stage 3, GFR 30-59 ml/min 7/17/2012    Coronary artery disease     GERD (gastroesophageal reflux disease)     Tovar's; Dr. Vu    Hyperlipidemia 7/17/2012    Hypertension 7/17/2012    Ischemic cardiomyopathy 7/17/2012    MGUS (monoclonal gammopathy of unknown significance)     Thrombocytopenia 7/17/2012    Ventricular tachycardia     VT (ventricular tachycardia) 7/17/2012     Past Surgical History:   Procedure Laterality Date    ABDOMINAL AORTIC ANEURYSM REPAIR      CARDIAC DEFIBRILLATOR PLACEMENT      CATARACT EXTRACTION, BILATERAL  2018    CORONARY ANGIOPLASTY      EYE SURGERY Bilateral     cataract    HERNIA REPAIR      x2    REPLACEMENT OF IMPLANTABLE CARDIOVERTER-DEFIBRILLATOR (ICD) GENERATOR Left 3/18/2022    Procedure: REPLACEMENT, ICD GENERATOR;  Surgeon: Felipe Woodard MD;  Location: [facility];  Service: Cardiology;  Laterality: Left;  SEFERINO, ICD gen chg, SJM, anes, MB, 3prep*ANUJ ICD insitu*      Family History   Problem Relation Age of Onset    Cancer Mother         Lymphoma    Lymphoma Mother     Coronary artery disease Mother     Heart disease Mother     Heart disease Father     Heart attacks under age 50 Father     Heart disease Sister     Cancer Brother         lymphoma     Social History     Socioeconomic History    Marital status:    Tobacco Use    Smoking status: Never    Smokeless tobacco: Never   Substance and Sexual Activity    Alcohol use: No    Drug use: No    Sexual activity: Yes     Partners: Female     Comment:      Social Determinants of Health     Financial Resource Strain: Low Risk   (11/24/2023)    Overall Financial Resource Strain (CARDIA)     Difficulty of Paying Living Expenses: Not hard at all   Food Insecurity: No Food Insecurity (11/24/2023)    Hunger Vital Sign     Worried About Running Out of Food in the Last Year: Never true     Ran Out of Food in the Last Year: Never true   Transportation Needs: No Transportation Needs (11/24/2023)    PRAPARE - Transportation     Lack of Transportation (Medical): No     Lack of Transportation (Non-Medical): No   Physical Activity: Sufficiently Active (11/24/2023)    Exercise Vital Sign     Days of Exercise per Week: 4 days     Minutes of Exercise per Session: 50 min   Stress: No Stress Concern Present (11/24/2023)    [employer] - Occupational Stress Questionnaire     Feeling of Stress : Not at all   Social Connections: Unknown (11/24/2023)    Social Connection and Isolation Panel [NHANES]     Frequency of Communication with Friends and Family: Three times a week     Frequency of Social Gatherings with Friends and Family: Three times a week     Active Member of Clubs or Organizations: No     Attends Club or Organization Meetings: Never     Marital Status:    Housing Stability: Low Risk  (11/24/2023)    Housing Stability Vital Sign     Unable to Pay for Housing in the Last Year: No     Number of Places Lived in the Last Year: 1     Unstable Housing in the Last Year: No       Review of Systems:  ROS  CV: no syncope  ENT: no sore throat  Resp: per hpi  Eyes: no eye pain  Gastrointestinal: no nausea or vomiting  Integument/Breast: no rash  Musculoskeletal: no arthralgias  Neurological: no headaches  Behavioral/Psych: no confusion or depression  Heme: no bleeding      OBJECTIVE:     Vital Signs  Vitals:    12/15/23 1300   BP: 118/62   BP Location: Left arm   Patient Position: Sitting   BP Method: Medium (Manual)   Pulse: 66   SpO2: 97%   Weight: 76.9 kg (169 lb 8.5 oz)   Height: 6' (1.829 m)     Body mass index is 22.99  kg/m².    Physical Exam:  Physical Exam  General: no distress  Eyes:  conjunctivae/corneas clear  Nose: no discharge  Neck: trachea midline with no masses appreciated  Lungs:  normal respiratory effort, no wheezes, no rales  Heart: regular rate and rhythm and no murmur  Abdomen: non-distended  Extremities: no cyanosis, no edema, no clubbing  Skin: No rashes or lesions. good skin turgor  Neurologic: alert, oriented, thought content appropriate    Laboratory:  CBC  Lab Results   Component Value Date    WBC 9.96 11/22/2023    HGB 13.8 (L) 11/22/2023    HCT 40.2 11/22/2023     (L) 11/22/2023    MCV 88 11/22/2023    RDW 14.0 11/22/2023     BMP  Lab Results   Component Value Date     11/29/2023    K 3.8 11/29/2023     11/29/2023    CO2 28 11/29/2023    BUN 14 11/29/2023    CREATININE 1.3 11/29/2023    GLU 97 11/29/2023    CALCIUM 9.5 11/29/2023    MG 2.0 01/20/2023    PHOS 3.2 06/29/2018     LFTs  Lab Results   Component Value Date    PROT 7.2 11/29/2023    ALBUMIN 3.7 11/29/2023    BILITOT 0.8 11/29/2023    AST 21 11/29/2023    ALKPHOS 99 11/29/2023    ALT 12 11/29/2023       Chest Imaging, My Impression:   CTA Chest 11/2023:  No large, central pulmonary embolus. Bilateral nodular tree-in-bud opacities seemingly predominant in upper lung zones. Bronchiectasis noted bilaterally. No focal consolidation, no significant mediastinal adenopathy.      Diagnostic Results:  Rheumatologic workup in 2017:  ELYSIA: Positive (negative on repeat)  ANCA negative  Complements normal    ASSESSMENT/PLAN:       Problem List Items Addressed This Visit          Pulmonary    Chronic cough - Primary    Current Assessment & Plan     Patient with cough for the majority of this past year that has persisted despite multiple rounds of antibiotics. Has had two episodes of hemoptysis as well that self resolved, both instances more related to vigorous cough. Patient without any shortness of breath. CT imaging with nodular densities,  some tree-in-bud and what could be early reticulation or honeycombing at the lung base. CT also with bronchiectasis. Has previously had a full rheumatologic workup in 2017 that was negative.    Patient feels well overall. Imaging does raise the question of chronic pulmonary infection like NTM. Also early fibrosis may be present.    - Will check AFBs, provided sample cups in clinic  - Rheumatologic workup re-sent  - CFTR and Immunoglobulins sent for bronchiectasis workup  - Will repeat a CT with a high res study in 8 weeks and follow up in clinic at that time  - No significant mucous production, may need to consider bronchoscopy if unable to produce sputum         Relevant Orders    AFB CULTURE & SMEAR    Culture, Respiratory with Gram Stain    AFB CULTURE & SMEAR    AFB CULTURE & SMEAR    Hemoptysis    Relevant Orders    CT Chest High Resolution Without Contrast    ELYSIA    RHEUMATOID FACTOR    CYCLIC CITRUL PEPTIDE ANTIBODY, IGG    ANTI-NEUTROPHILIC CYTOPLASMIC ANTIBODY    GLOMERULAR BASEMENT MEMBRANE ANTIBODIES    Bronchiectasis without complication    Relevant Orders    AFB CULTURE & SMEAR    IMMUNOGLOBULINS (IGG, IGA, IGM) QUANTITATIVE    CT Chest High Resolution Without Contrast    CFTR Gene, Full Gene Analysis    Recurrent pneumonia       Follow up in 8 weeks.    Naveed Kowalski M.D.  [facility] Pulmonary & Critical Care Fellow

## 2023-12-15 NOTE — ASSESSMENT & PLAN NOTE
Patient with cough for the majority of this past year that has persisted despite multiple rounds of antibiotics. Has had two episodes of hemoptysis as well that self resolved, both instances more related to vigorous cough. Patient without any shortness of breath. CT imaging with nodular densities, some tree-in-bud and what could be early reticulation or honeycombing at the lung base. CT also with bronchiectasis. Has previously had a full rheumatologic workup in 2017 that was negative.    Patient feels well overall. Imaging does raise the question of chronic pulmonary infection like NTM. Also early fibrosis may be present.    - Will check AFBs, provided sample cups in clinic  - Rheumatologic workup re-sent  - CFTR and Immunoglobulins sent for bronchiectasis workup  - Will repeat a CT with a high res study in 8 weeks and follow up in clinic at that time  - No significant mucous production, may need to consider bronchoscopy if unable to produce sputum

## 2023-12-18 ENCOUNTER — PATIENT MESSAGE (OUTPATIENT)
Dept: CARDIOLOGY | Facility: CLINIC | Age: 88
End: 2023-12-18
Payer: MEDICARE

## 2023-12-18 ENCOUNTER — LAB VISIT (OUTPATIENT)
Dept: LAB | Facility: HOSPITAL | Age: 88
End: 2023-12-18
Attending: STUDENT IN AN ORGANIZED HEALTH CARE EDUCATION/TRAINING PROGRAM
Payer: MEDICARE

## 2023-12-18 DIAGNOSIS — J47.9 BRONCHIECTASIS WITHOUT COMPLICATION: ICD-10-CM

## 2023-12-18 DIAGNOSIS — R04.2 HEMOPTYSIS: ICD-10-CM

## 2023-12-18 LAB
CCP AB SER IA-ACNC: <0.5 U/ML
IGA SERPL-MCNC: 207 MG/DL (ref 40–350)
IGG SERPL-MCNC: 1520 MG/DL (ref 650–1600)
IGM SERPL-MCNC: 73 MG/DL (ref 50–300)
RHEUMATOID FACT SERPL-ACNC: <13 IU/ML (ref 0–15)

## 2023-12-18 PROCEDURE — 87186 SC STD MICRODIL/AGAR DIL: CPT

## 2023-12-18 PROCEDURE — 86038 ANTINUCLEAR ANTIBODIES: CPT | Mod: HCNC | Performed by: STUDENT IN AN ORGANIZED HEALTH CARE EDUCATION/TRAINING PROGRAM

## 2023-12-18 PROCEDURE — 83516 IMMUNOASSAY NONANTIBODY: CPT | Mod: HCNC | Performed by: STUDENT IN AN ORGANIZED HEALTH CARE EDUCATION/TRAINING PROGRAM

## 2023-12-18 PROCEDURE — 87118 MYCOBACTERIC IDENTIFICATION: CPT

## 2023-12-18 PROCEDURE — 36415 COLL VENOUS BLD VENIPUNCTURE: CPT | Mod: HCNC,PO | Performed by: STUDENT IN AN ORGANIZED HEALTH CARE EDUCATION/TRAINING PROGRAM

## 2023-12-18 PROCEDURE — 82784 ASSAY IGA/IGD/IGG/IGM EACH: CPT | Mod: 59,HCNC | Performed by: STUDENT IN AN ORGANIZED HEALTH CARE EDUCATION/TRAINING PROGRAM

## 2023-12-18 PROCEDURE — 86200 CCP ANTIBODY: CPT | Mod: HCNC | Performed by: STUDENT IN AN ORGANIZED HEALTH CARE EDUCATION/TRAINING PROGRAM

## 2023-12-18 PROCEDURE — 86431 RHEUMATOID FACTOR QUANT: CPT | Mod: HCNC | Performed by: STUDENT IN AN ORGANIZED HEALTH CARE EDUCATION/TRAINING PROGRAM

## 2023-12-18 PROCEDURE — 86036 ANCA SCREEN EACH ANTIBODY: CPT | Mod: HCNC | Performed by: STUDENT IN AN ORGANIZED HEALTH CARE EDUCATION/TRAINING PROGRAM

## 2023-12-18 PROCEDURE — 87150 DNA/RNA AMPLIFIED PROBE: CPT

## 2023-12-19 ENCOUNTER — TELEPHONE (OUTPATIENT)
Dept: PULMONOLOGY | Facility: CLINIC | Age: 88
End: 2023-12-19
Payer: MEDICARE

## 2023-12-19 ENCOUNTER — TELEPHONE (OUTPATIENT)
Dept: CRITICAL CARE MEDICINE | Facility: HOSPITAL | Age: 88
End: 2023-12-19
Payer: MEDICARE

## 2023-12-19 LAB — ANA SER QL IF: NORMAL

## 2023-12-19 NOTE — PROGRESS NOTES
I have reviewed the notes, assessments, and/or procedures performed by Dr. Kowalski, I concur with her/his documentation of Nelson Huntley.  Date of Service: 12/15/2023

## 2023-12-20 LAB — BM IGG SER-ACNC: <0.2 U

## 2023-12-20 NOTE — TELEPHONE ENCOUNTER
Called patient to inform of positive AFB smear, culture in process. He reports he is continuing to feel significantly better and reports that today he was feeling his cough has almost completely resolved. 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 Patient understanding and all questions answered. At this time he feels well and would likely not wish to treat NTM.     Will follow up cultures, patient has appointment with me scheduled. Encouraged patient to reach out if there are any concerns or new/worsening symptoms.    Naveed Kowalski M.D.  [facility] Pulmonary & Critical Care Fellow

## 2023-12-21 LAB
ANCA AB TITR SER IF: NORMAL TITER
P-ANCA TITR SER IF: NORMAL TITER

## 2024-01-02 ENCOUNTER — LAB VISIT (OUTPATIENT)
Dept: LAB | Facility: HOSPITAL | Age: 89
End: 2024-01-02
Attending: STUDENT IN AN ORGANIZED HEALTH CARE EDUCATION/TRAINING PROGRAM
Payer: MEDICARE

## 2024-01-02 DIAGNOSIS — R05.3 CHRONIC COUGH: ICD-10-CM

## 2024-01-02 DIAGNOSIS — J47.9 BRONCHIECTASIS WITHOUT COMPLICATION: ICD-10-CM

## 2024-01-02 PROCEDURE — 87118 MYCOBACTERIC IDENTIFICATION: CPT | Mod: HCNC | Performed by: STUDENT IN AN ORGANIZED HEALTH CARE EDUCATION/TRAINING PROGRAM

## 2024-01-02 PROCEDURE — 87077 CULTURE AEROBIC IDENTIFY: CPT | Mod: HCNC | Performed by: STUDENT IN AN ORGANIZED HEALTH CARE EDUCATION/TRAINING PROGRAM

## 2024-01-02 PROCEDURE — 87186 SC STD MICRODIL/AGAR DIL: CPT

## 2024-01-02 PROCEDURE — 87186 SC STD MICRODIL/AGAR DIL: CPT | Mod: HCNC | Performed by: STUDENT IN AN ORGANIZED HEALTH CARE EDUCATION/TRAINING PROGRAM

## 2024-01-02 PROCEDURE — 87206 SMEAR FLUORESCENT/ACID STAI: CPT | Mod: HCNC | Performed by: STUDENT IN AN ORGANIZED HEALTH CARE EDUCATION/TRAINING PROGRAM

## 2024-01-02 PROCEDURE — 87070 CULTURE OTHR SPECIMN AEROBIC: CPT | Mod: HCNC | Performed by: STUDENT IN AN ORGANIZED HEALTH CARE EDUCATION/TRAINING PROGRAM

## 2024-01-02 PROCEDURE — 87205 SMEAR GRAM STAIN: CPT | Mod: HCNC | Performed by: STUDENT IN AN ORGANIZED HEALTH CARE EDUCATION/TRAINING PROGRAM

## 2024-01-02 PROCEDURE — 87116 MYCOBACTERIA CULTURE: CPT | Mod: HCNC | Performed by: STUDENT IN AN ORGANIZED HEALTH CARE EDUCATION/TRAINING PROGRAM

## 2024-01-02 PROCEDURE — 87118 MYCOBACTERIC IDENTIFICATION: CPT

## 2024-01-02 PROCEDURE — 87015 SPECIMEN INFECT AGNT CONCNTJ: CPT | Mod: HCNC | Performed by: STUDENT IN AN ORGANIZED HEALTH CARE EDUCATION/TRAINING PROGRAM

## 2024-01-05 LAB
OHS CV AF BURDEN PERCENT: < 1
OHS CV DC REMOTE DEVICE TYPE: NORMAL
OHS CV RV PACING PERCENT: 1 %

## 2024-01-06 LAB
BACTERIA SPEC AEROBE CULT: ABNORMAL
BACTERIA SPEC AEROBE CULT: ABNORMAL
GRAM STN SPEC: ABNORMAL

## 2024-01-16 ENCOUNTER — TELEPHONE (OUTPATIENT)
Dept: PULMONOLOGY | Facility: CLINIC | Age: 89
End: 2024-01-16
Payer: MEDICARE

## 2024-01-16 NOTE — TELEPHONE ENCOUNTER
----- Message from Emilee Mejia sent at 1/16/2024  1:14 PM CST -----  Regarding: Call Back  Contact: Pt @ [phone]  Message is for lexie Gil missed your call and asking for a call back to scheduled CT

## 2024-01-16 NOTE — TELEPHONE ENCOUNTER
Left message on pt voicemail, informing him that I'm contacting him in regards to his message.  Advised pt that if he has any questions or concerns, he may contact the office.

## 2024-01-23 ENCOUNTER — HOSPITAL ENCOUNTER (OUTPATIENT)
Dept: RADIOLOGY | Facility: HOSPITAL | Age: 89
Discharge: HOME | End: 2024-01-23
Attending: STUDENT IN AN ORGANIZED HEALTH CARE EDUCATION/TRAINING PROGRAM
Payer: MEDICARE

## 2024-01-23 DIAGNOSIS — R04.2 HEMOPTYSIS: ICD-10-CM

## 2024-01-23 DIAGNOSIS — J47.9 BRONCHIECTASIS WITHOUT COMPLICATION: ICD-10-CM

## 2024-01-23 PROCEDURE — 71250 CT THORAX DX C-: CPT | Mod: 26,HCNC,, | Performed by: RADIOLOGY

## 2024-01-23 PROCEDURE — 71250 CT THORAX DX C-: CPT | Mod: TC,HCNC

## 2024-01-26 DIAGNOSIS — D47.2 MGUS (MONOCLONAL GAMMOPATHY OF UNKNOWN SIGNIFICANCE): Primary | ICD-10-CM

## 2024-02-01 ENCOUNTER — OFFICE VISIT (OUTPATIENT)
Dept: HEMATOLOGY/ONCOLOGY | Facility: CLINIC | Age: 89
End: 2024-02-01
Payer: MEDICARE

## 2024-02-01 ENCOUNTER — LAB VISIT (OUTPATIENT)
Dept: LAB | Facility: HOSPITAL | Age: 89
End: 2024-02-01
Payer: MEDICARE

## 2024-02-01 ENCOUNTER — TELEPHONE (OUTPATIENT)
Dept: PULMONOLOGY | Facility: CLINIC | Age: 89
End: 2024-02-01
Payer: MEDICARE

## 2024-02-01 VITALS
SYSTOLIC BLOOD PRESSURE: 111 MMHG | WEIGHT: 169.56 LBS | RESPIRATION RATE: 18 BRPM | OXYGEN SATURATION: 96 % | HEART RATE: 56 BPM | TEMPERATURE: 98 F | HEIGHT: 72 IN | DIASTOLIC BLOOD PRESSURE: 53 MMHG | BODY MASS INDEX: 22.97 KG/M2

## 2024-02-01 DIAGNOSIS — D47.2 MGUS (MONOCLONAL GAMMOPATHY OF UNKNOWN SIGNIFICANCE): Primary | ICD-10-CM

## 2024-02-01 DIAGNOSIS — D47.2 MGUS (MONOCLONAL GAMMOPATHY OF UNKNOWN SIGNIFICANCE): ICD-10-CM

## 2024-02-01 LAB
ALBUMIN SERPL BCP-MCNC: 3.5 G/DL (ref 3.5–5.2)
ALP SERPL-CCNC: 100 U/L (ref 55–135)
ALT SERPL W/O P-5'-P-CCNC: 13 U/L (ref 10–44)
ANION GAP SERPL CALC-SCNC: 7 MMOL/L (ref 8–16)
AST SERPL-CCNC: 20 U/L (ref 10–40)
BASOPHILS # BLD AUTO: 0.06 K/UL (ref 0–0.2)
BASOPHILS NFR BLD: 0.7 % (ref 0–1.9)
BILIRUB SERPL-MCNC: 0.8 MG/DL (ref 0.1–1)
BUN SERPL-MCNC: 15 MG/DL (ref 8–23)
CALCIUM SERPL-MCNC: 9.9 MG/DL (ref 8.7–10.5)
CHLORIDE SERPL-SCNC: 108 MMOL/L (ref 95–110)
CO2 SERPL-SCNC: 28 MMOL/L (ref 23–29)
CREAT SERPL-MCNC: 1.4 MG/DL (ref 0.5–1.4)
DIFFERENTIAL METHOD BLD: ABNORMAL
EOSINOPHIL # BLD AUTO: 0.2 K/UL (ref 0–0.5)
EOSINOPHIL NFR BLD: 2.2 % (ref 0–8)
ERYTHROCYTE [DISTWIDTH] IN BLOOD BY AUTOMATED COUNT: 14.1 % (ref 11.5–14.5)
EST. GFR  (NO RACE VARIABLE): 48.3 ML/MIN/1.73 M^2
GLUCOSE SERPL-MCNC: 84 MG/DL (ref 70–110)
HCT VFR BLD AUTO: 43.7 % (ref 40–54)
HGB BLD-MCNC: 14.3 G/DL (ref 14–18)
IMM GRANULOCYTES # BLD AUTO: 0.04 K/UL (ref 0–0.04)
IMM GRANULOCYTES NFR BLD AUTO: 0.5 % (ref 0–0.5)
LYMPHOCYTES # BLD AUTO: 2.2 K/UL (ref 1–4.8)
LYMPHOCYTES NFR BLD: 25.3 % (ref 18–48)
MCH RBC QN AUTO: 29.9 PG (ref 27–31)
MCHC RBC AUTO-ENTMCNC: 32.7 G/DL (ref 32–36)
MCV RBC AUTO: 91 FL (ref 82–98)
MONOCYTES # BLD AUTO: 0.9 K/UL (ref 0.3–1)
MONOCYTES NFR BLD: 10.8 % (ref 4–15)
NEUTROPHILS # BLD AUTO: 5.1 K/UL (ref 1.8–7.7)
NEUTROPHILS NFR BLD: 60.5 % (ref 38–73)
NRBC BLD-RTO: 0 /100 WBC
PLATELET # BLD AUTO: 98 K/UL (ref 150–450)
PMV BLD AUTO: 11.3 FL (ref 9.2–12.9)
POTASSIUM SERPL-SCNC: 4.2 MMOL/L (ref 3.5–5.1)
PROT SERPL-MCNC: 7.1 G/DL (ref 6–8.4)
RBC # BLD AUTO: 4.78 M/UL (ref 4.6–6.2)
SODIUM SERPL-SCNC: 143 MMOL/L (ref 136–145)
WBC # BLD AUTO: 8.5 K/UL (ref 3.9–12.7)

## 2024-02-01 PROCEDURE — 36415 COLL VENOUS BLD VENIPUNCTURE: CPT | Mod: HCNC | Performed by: INTERNAL MEDICINE

## 2024-02-01 PROCEDURE — 1126F AMNT PAIN NOTED NONE PRSNT: CPT | Mod: HCNC,CPTII,S$GLB, | Performed by: INTERNAL MEDICINE

## 2024-02-01 PROCEDURE — 84165 PROTEIN E-PHORESIS SERUM: CPT | Mod: HCNC | Performed by: INTERNAL MEDICINE

## 2024-02-01 PROCEDURE — 99999 PR PBB SHADOW E&M-EST. PATIENT-LVL IV: CPT | Mod: PBBFAC,HCNC,, | Performed by: INTERNAL MEDICINE

## 2024-02-01 PROCEDURE — 3288F FALL RISK ASSESSMENT DOCD: CPT | Mod: HCNC,CPTII,S$GLB, | Performed by: INTERNAL MEDICINE

## 2024-02-01 PROCEDURE — 1101F PT FALLS ASSESS-DOCD LE1/YR: CPT | Mod: HCNC,CPTII,S$GLB, | Performed by: INTERNAL MEDICINE

## 2024-02-01 PROCEDURE — 83521 IG LIGHT CHAINS FREE EACH: CPT | Mod: 59,HCNC | Performed by: INTERNAL MEDICINE

## 2024-02-01 PROCEDURE — 1159F MED LIST DOCD IN RCRD: CPT | Mod: HCNC,CPTII,S$GLB, | Performed by: INTERNAL MEDICINE

## 2024-02-01 PROCEDURE — 80053 COMPREHEN METABOLIC PANEL: CPT | Mod: HCNC | Performed by: INTERNAL MEDICINE

## 2024-02-01 PROCEDURE — 84165 PROTEIN E-PHORESIS SERUM: CPT | Mod: 26,HCNC,, | Performed by: PATHOLOGY

## 2024-02-01 PROCEDURE — 85025 COMPLETE CBC W/AUTO DIFF WBC: CPT | Mod: HCNC | Performed by: INTERNAL MEDICINE

## 2024-02-01 PROCEDURE — 99215 OFFICE O/P EST HI 40 MIN: CPT | Mod: HCNC,S$GLB,, | Performed by: INTERNAL MEDICINE

## 2024-02-01 NOTE — TELEPHONE ENCOUNTER
Brigida from [facility] called about a positive AFB Culture from Sputum on Mr Parent. Dr Kowalski notified. Pippa Romano

## 2024-02-01 NOTE — PROGRESS NOTES
Route Chart for Scheduling    BMT Chart Routing      Follow up with physician 1 year.   Follow up with [facility]    Provider visit type Benign hem   Infusion scheduling note    Injection scheduling note    Labs CBC, CMP, free light chains and SPEP   Scheduling:  Preferred lab:  Lab interval:     Imaging    Pharmacy appointment    Other referrals                       Subjective:       Patient ID: Nelson GIBBONS Parent is a 88 y.o. male.    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   The monoclonal protein was measured at 0.27 grams. The patient does not meet any CRAB criteria. He appears well today. He does not have constitutional B symptoms.  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    Follow-up 9/5/17  Return visit with his wife for evaluation of MGUS. Renal function is stable. CBC is stable with moderate, stable thrombocytopenia.  Total protein remains normal with paraprotein levels pending.    Follow-up 3/20/18  Return visit for MGUS. Renal function remains stable. CBC remains stable- mild, asymptomatic thrombocytopenia.  Total protein is normal. Paraprotein was normal 9/2017. Free light chains pending.  ROS is negative.    Follow-up 3/28/19  Return visit for MGUS. Renal function, calcium, and CBC remain stable.  Total protein is normal with paraprotein studies pending.  ROS is negative.    Follow-up 6/3/2020  Return visit for MGUS. Last seen 1 year ago.   Renal function, calcium, and CBC remain stable.  Total protein is normal with paraprotein studies stable.  ROS is negative.    Follow-up 8/12/2021  Return visit for MGUS, annual visit  CBC and CMP remain stable  Paraprotein studies remain stable.  ROS is negative  No acute interval events    Follow-up 8/4/2022  Return visit for MGUS, annual visit  CBC and CMP remain  stable  Paraprotein levels remain stable  ROS remains negative  No acute interval events    Follow-up 2/1/2024  Return visit for MGUS, annual visit  CBC and CMP remain stable  Paraprotein albs pending  ROS remains negative  No acute interval events    HPI  Review of Systems   Constitutional: Negative.    HENT: Negative.     Eyes: Negative.    Respiratory: Negative.     Cardiovascular: Negative.    Gastrointestinal: Negative.    Endocrine: Negative.    Genitourinary: Negative.    Musculoskeletal: Negative.    Skin: Negative.    Allergic/Immunologic: Negative for environmental allergies, food allergies and immunocompromised state.   Neurological: Negative.    Hematological:  Negative for adenopathy. Does not bruise/bleed easily.   Psychiatric/Behavioral: Negative.         Objective:      Physical Exam  Vitals and nursing note reviewed.   Constitutional:       Appearance: He is well-developed.   HENT:      Head: Normocephalic and atraumatic.   Eyes:      General: No scleral icterus.     Conjunctiva/sclera: Conjunctivae normal.   Cardiovascular:      Rate and Rhythm: Normal rate.   Pulmonary:      Effort: Pulmonary effort is normal. No respiratory distress.   Musculoskeletal:         General: Normal range of motion.      Cervical back: Normal range of motion.   Skin:     General: Skin is warm and dry.   Neurological:      Mental Status: He is alert and oriented to person, place, and time.   Psychiatric:         Behavior: Behavior normal.         Assessment:       1. MGUS (monoclonal gammopathy of unknown significance)          Plan:       MGUS stable. Continue to monitor.  Repeat monoclonal protein markers in 12 months     A total of 20 minutes was spent in pre-visit chart review, personal interpretation of labs and imaging, and medication review. Total visit time 40 minutes, >50 % counseling.

## 2024-02-02 LAB
ALBUMIN SERPL ELPH-MCNC: 3.54 G/DL (ref 3.35–5.55)
ALPHA1 GLOB SERPL ELPH-MCNC: 0.33 G/DL (ref 0.17–0.41)
ALPHA2 GLOB SERPL ELPH-MCNC: 0.79 G/DL (ref 0.43–0.99)
B-GLOBULIN SERPL ELPH-MCNC: 0.78 G/DL (ref 0.5–1.1)
GAMMA GLOB SERPL ELPH-MCNC: 1.17 G/DL (ref 0.67–1.58)
KAPPA LC SER QL IA: 5.04 MG/DL (ref 0.33–1.94)
KAPPA LC/LAMBDA SER IA: 1.55 (ref 0.26–1.65)
LAMBDA LC SER QL IA: 3.25 MG/DL (ref 0.57–2.63)
PROT SERPL-MCNC: 6.6 G/DL (ref 6–8.4)

## 2024-02-05 LAB — PATHOLOGIST INTERPRETATION SPE: NORMAL

## 2024-02-08 ENCOUNTER — PATIENT MESSAGE (OUTPATIENT)
Dept: PULMONOLOGY | Facility: CLINIC | Age: 89
End: 2024-02-08
Payer: MEDICARE

## 2024-02-08 ENCOUNTER — LAB VISIT (OUTPATIENT)
Dept: LAB | Facility: HOSPITAL | Age: 89
End: 2024-02-08
Attending: STUDENT IN AN ORGANIZED HEALTH CARE EDUCATION/TRAINING PROGRAM
Payer: MEDICARE

## 2024-02-08 DIAGNOSIS — J47.9 BRONCHIECTASIS WITHOUT COMPLICATION: Primary | ICD-10-CM

## 2024-02-08 DIAGNOSIS — J47.9 BRONCHIECTASIS WITHOUT COMPLICATION: ICD-10-CM

## 2024-02-08 PROCEDURE — 87118 MYCOBACTERIC IDENTIFICATION: CPT | Mod: 91,HCNC | Performed by: STUDENT IN AN ORGANIZED HEALTH CARE EDUCATION/TRAINING PROGRAM

## 2024-02-08 PROCEDURE — 87186 SC STD MICRODIL/AGAR DIL: CPT

## 2024-02-08 PROCEDURE — 87118 MYCOBACTERIC IDENTIFICATION: CPT | Mod: HCNC | Performed by: STUDENT IN AN ORGANIZED HEALTH CARE EDUCATION/TRAINING PROGRAM

## 2024-02-08 PROCEDURE — 87206 SMEAR FLUORESCENT/ACID STAI: CPT | Mod: HCNC | Performed by: STUDENT IN AN ORGANIZED HEALTH CARE EDUCATION/TRAINING PROGRAM

## 2024-02-08 PROCEDURE — 87118 MYCOBACTERIC IDENTIFICATION: CPT

## 2024-02-08 PROCEDURE — 87186 SC STD MICRODIL/AGAR DIL: CPT | Mod: HCNC | Performed by: STUDENT IN AN ORGANIZED HEALTH CARE EDUCATION/TRAINING PROGRAM

## 2024-02-08 PROCEDURE — 87070 CULTURE OTHR SPECIMN AEROBIC: CPT | Mod: HCNC | Performed by: STUDENT IN AN ORGANIZED HEALTH CARE EDUCATION/TRAINING PROGRAM

## 2024-02-08 PROCEDURE — 87150 DNA/RNA AMPLIFIED PROBE: CPT

## 2024-02-08 PROCEDURE — 87077 CULTURE AEROBIC IDENTIFY: CPT | Mod: HCNC | Performed by: STUDENT IN AN ORGANIZED HEALTH CARE EDUCATION/TRAINING PROGRAM

## 2024-02-08 PROCEDURE — 87205 SMEAR GRAM STAIN: CPT | Mod: HCNC | Performed by: STUDENT IN AN ORGANIZED HEALTH CARE EDUCATION/TRAINING PROGRAM

## 2024-02-08 PROCEDURE — 87116 MYCOBACTERIA CULTURE: CPT | Mod: HCNC | Performed by: STUDENT IN AN ORGANIZED HEALTH CARE EDUCATION/TRAINING PROGRAM

## 2024-02-08 PROCEDURE — 87015 SPECIMEN INFECT AGNT CONCNTJ: CPT | Mod: HCNC | Performed by: STUDENT IN AN ORGANIZED HEALTH CARE EDUCATION/TRAINING PROGRAM

## 2024-02-12 LAB
BACTERIA SPEC AEROBE CULT: ABNORMAL
BACTERIA SPEC AEROBE CULT: ABNORMAL
GRAM STN SPEC: ABNORMAL

## 2024-02-19 ENCOUNTER — OFFICE VISIT (OUTPATIENT)
Dept: INTERNAL MEDICINE | Facility: CLINIC | Age: 89
End: 2024-02-19
Payer: MEDICARE

## 2024-02-19 ENCOUNTER — TELEPHONE (OUTPATIENT)
Dept: INTERNAL MEDICINE | Facility: CLINIC | Age: 89
End: 2024-02-19
Payer: MEDICARE

## 2024-02-19 VITALS
OXYGEN SATURATION: 95 % | WEIGHT: 168.19 LBS | HEART RATE: 67 BPM | HEIGHT: 72 IN | BODY MASS INDEX: 22.78 KG/M2 | DIASTOLIC BLOOD PRESSURE: 44 MMHG | SYSTOLIC BLOOD PRESSURE: 108 MMHG

## 2024-02-19 DIAGNOSIS — A31.0 MYCOBACTERIUM AVIUM INFECTION: Primary | ICD-10-CM

## 2024-02-19 DIAGNOSIS — E86.0 DEHYDRATION: ICD-10-CM

## 2024-02-19 DIAGNOSIS — J15.1 PNEUMONIA OF BOTH LUNGS DUE TO PSEUDOMONAS SPECIES, UNSPECIFIED PART OF LUNG: ICD-10-CM

## 2024-02-19 PROCEDURE — 1126F AMNT PAIN NOTED NONE PRSNT: CPT | Mod: HCNC,CPTII,S$GLB, | Performed by: INTERNAL MEDICINE

## 2024-02-19 PROCEDURE — 1160F RVW MEDS BY RX/DR IN RCRD: CPT | Mod: HCNC,CPTII,S$GLB, | Performed by: INTERNAL MEDICINE

## 2024-02-19 PROCEDURE — 1159F MED LIST DOCD IN RCRD: CPT | Mod: HCNC,CPTII,S$GLB, | Performed by: INTERNAL MEDICINE

## 2024-02-19 PROCEDURE — 99999 PR PBB SHADOW E&M-EST. PATIENT-LVL V: CPT | Mod: PBBFAC,HCNC,, | Performed by: INTERNAL MEDICINE

## 2024-02-19 PROCEDURE — 99214 OFFICE O/P EST MOD 30 MIN: CPT | Mod: HCNC,S$GLB,, | Performed by: INTERNAL MEDICINE

## 2024-02-19 PROCEDURE — 1101F PT FALLS ASSESS-DOCD LE1/YR: CPT | Mod: HCNC,CPTII,S$GLB, | Performed by: INTERNAL MEDICINE

## 2024-02-19 PROCEDURE — 3288F FALL RISK ASSESSMENT DOCD: CPT | Mod: HCNC,CPTII,S$GLB, | Performed by: INTERNAL MEDICINE

## 2024-02-19 NOTE — PROGRESS NOTES
Patient ID: Nelson GIBBONS Parent is a 88 y.o. male.    Chief Complaint: Cough    HPI Nelson is a 88 y.o. 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 Cough is chronic and constant in duration.. Recently worsened when he got a cold. Following with pulmonology. Has had sputum cultures positive for mycobacterium and pseudomonas. He has not yet been treated for either of these infections.   He has also felt weak. BP on lower side today. Has dark urine. No further complaints.     Review of Systems   All other systems reviewed and are negative.     Objective:     Vitals:    02/19/24 1331   BP: (!) 108/44   Pulse: 67        Physical Exam  Vitals reviewed.   Constitutional:       General: He is not in acute distress.     Appearance: Normal appearance. He is well-developed. He is not ill-appearing, toxic-appearing or diaphoretic.   HENT:      Head: Normocephalic and atraumatic.      Right Ear: External ear normal.      Left Ear: External ear normal.      Nose: Nose normal.   Eyes:      Extraocular Movements: Extraocular movements intact.      Conjunctiva/sclera: Conjunctivae normal.   Cardiovascular:      Rate and Rhythm: Normal rate and regular rhythm.      Pulses: Normal pulses.      Heart sounds: Normal heart sounds. No murmur heard.     No friction rub. No gallop.   Pulmonary:      Effort: Pulmonary effort is normal. No respiratory distress.      Breath sounds: Normal breath sounds. No stridor. No wheezing, rhonchi or rales.   Musculoskeletal:      Right lower leg: No edema.      Left lower leg: No edema.   Skin:     General: Skin is warm and dry.   Neurological:      General: No focal deficit present.      Mental Status: He is alert and oriented to person, place, and time. Mental status is at baseline.   Psychiatric:         Mood  and Affect: Mood normal.         Behavior: Behavior normal.         Thought Content: Thought content normal.         Judgment: Judgment normal.         Assessment:       1. Mycobacterium avium infection Chronic   2. Pneumonia of both lungs due to Pseudomonas species, unspecified part of lung Chronic   3. Dehydration Active       Plan:         Mycobacterium avium infection  Comments:  Needs treatment  Orders:  -     Ambulatory referral/consult to Infectious Disease; Future; Expected date: 02/26/2024    Pneumonia of both lungs due to Pseudomonas species, unspecified part of lung  Comments:  Needs treatment. I am not prescribing ciiprofloxacin or levaquin due to his cardiac history  Orders:  -     Ambulatory referral/consult to Infectious Disease; Future; Expected date: 02/26/2024    Dehydration  Comments:  Increase fluid intake. Ok to hold one dose of lasix this week        RTC PRN    Warning signs discussed, patient to call with any further issues or worsening of symptoms.       Parts of the above note were dictated using a voice dictation software. Please excuse any grammatical or typographical errors.

## 2024-02-19 NOTE — TELEPHONE ENCOUNTER
----- Message from Analisa Dorman sent at 2/19/2024  8:36 AM CST -----  Needs advice from nurse:      Who Called:pt  Regarding:pt needs to be seen today for cough/congestion/only wants to see Dr Tomlinson  Would the patient rather a call back or [facility]IA [facility]?  Best Call Back number:[phone]  Additional Info:

## 2024-02-22 ENCOUNTER — PATIENT MESSAGE (OUTPATIENT)
Dept: INTERNAL MEDICINE | Facility: CLINIC | Age: 89
End: 2024-02-22
Payer: MEDICARE

## 2024-02-22 ENCOUNTER — TELEPHONE (OUTPATIENT)
Dept: PULMONOLOGY | Facility: CLINIC | Age: 89
End: 2024-02-22
Payer: MEDICARE

## 2024-02-22 NOTE — TELEPHONE ENCOUNTER
----- Message from Roselia Bradford MD sent at 2/21/2024  4:28 PM CST -----  Regarding: RE:  Yes. Natalia can you please add on as an extra patient next week.    Thanks,  Roselia  ----- Message -----  From: Bety Tomlinson MD  Sent: 2/21/2024   3:43 PM CST  To: Roselia Bradford MD; Sandi Dotson; #    Hi Dr. Bradford  This is the patient I discussed with Snehal. Would you be able to see him? If so, I will ask our  Sandi to assist in making the appointment.     Thanks so much!  Dr. Tomlinson

## 2024-02-27 ENCOUNTER — OFFICE VISIT (OUTPATIENT)
Dept: PULMONOLOGY | Facility: CLINIC | Age: 89
End: 2024-02-27
Payer: MEDICARE

## 2024-02-27 VITALS
WEIGHT: 167.56 LBS | DIASTOLIC BLOOD PRESSURE: 57 MMHG | BODY MASS INDEX: 22.69 KG/M2 | HEART RATE: 74 BPM | HEIGHT: 72 IN | OXYGEN SATURATION: 93 % | SYSTOLIC BLOOD PRESSURE: 113 MMHG

## 2024-02-27 DIAGNOSIS — J47.9 BRONCHIECTASIS WITHOUT COMPLICATION: Primary | ICD-10-CM

## 2024-02-27 PROCEDURE — 1160F RVW MEDS BY RX/DR IN RCRD: CPT | Mod: HCNC,CPTII,S$GLB, | Performed by: INTERNAL MEDICINE

## 2024-02-27 PROCEDURE — 99214 OFFICE O/P EST MOD 30 MIN: CPT | Mod: HCNC,S$GLB,, | Performed by: INTERNAL MEDICINE

## 2024-02-27 PROCEDURE — 1126F AMNT PAIN NOTED NONE PRSNT: CPT | Mod: HCNC,CPTII,S$GLB, | Performed by: INTERNAL MEDICINE

## 2024-02-27 PROCEDURE — 99999 PR PBB SHADOW E&M-EST. PATIENT-LVL IV: CPT | Mod: PBBFAC,HCNC,, | Performed by: INTERNAL MEDICINE

## 2024-02-27 PROCEDURE — 1101F PT FALLS ASSESS-DOCD LE1/YR: CPT | Mod: HCNC,CPTII,S$GLB, | Performed by: INTERNAL MEDICINE

## 2024-02-27 PROCEDURE — 1159F MED LIST DOCD IN RCRD: CPT | Mod: HCNC,CPTII,S$GLB, | Performed by: INTERNAL MEDICINE

## 2024-02-27 PROCEDURE — 3288F FALL RISK ASSESSMENT DOCD: CPT | Mod: HCNC,CPTII,S$GLB, | Performed by: INTERNAL MEDICINE

## 2024-02-27 NOTE — PROGRESS NOTES
Subjective:      Patient ID: Nelson Huntley is a 88 y.o. male.    Chief Complaint: No chief complaint on file.    Nelson Huntley is a 88 y.o. male who presents for initial evaluation of cough. The patient has not been seen in pulmonary clinic before. He has a pst medical history significant for CHF, CAD, MGUS, and GERD. The patient is on plavix and ASA.     He initially presented to his PCP in 10/2023 with complaints of cough for several months. He was treated conservatively as there was significant post-nasal drip. He was treated with doxycyline and antihistamines with some improvement in his cough. He subsequently had an episode of hemoptysis and chest tightness on 11/7 and presented to the ED for evaluation. X-ray was performed and had no acute abnormalities, he was at his baseline with monitoring and no return of hemoptysis so he was discharged. He presented again to the ED on 11/22 with return of cough and hemoptysis. CT at that time with multifocal tree-in-bud opacities, emphysematous changes, and bronchiectasis. He was started on cefpodoxime and azithromycin.     Today, patient reports no further episodes of hemoptysis. He felt he improved with his antibiotic course. After completing it he reports he does feel his cough is a bit worse than when he was on it, but overall much better than it was. He remains on plavix and aspirin. He denies any fever or chills. He lost 8lbs over the past few months, on purpose with diet and exercise. He denies night sweats. He is able to exercise on the treadmill without any shortness of breath.    Interval hx 2/27/2024: ID appointment tomorrow.   Denies fever or chills.         Review of Systems   Respiratory:  Positive for shortness of breath. Negative for wheezing and dyspnea on extertion.      Objective:     Physical Exam   Constitutional: He is oriented to person, place, and time. He appears well-developed and well-nourished.   Cardiovascular: Normal rate.    Pulmonary/Chest: Normal expansion and symmetric chest wall expansion. He has no rhonchi. He has no rales.   Neurological: He is alert and oriented to person, place, and time.   Skin: Skin is warm.   Nursing note and vitals reviewed.    Personal Diagnostic Review  none pertinent      2/27/2024     2:32 PM 2/19/2024     1:31 PM 2/1/2024     2:12 PM 12/15/2023     1:00 PM 11/29/2023    10:18 AM 11/22/2023     5:39 PM 11/22/2023     4:10 PM   Pulmonary Function Tests   SpO2 93 % 95 % 96 % 97 % 96 % 100 % 95 %   Height 6' (1.829 m) 6' (1.829 m) 6' (1.829 m) 6' (1.829 m) 6' (1.829 m)     Weight 76 kg (167 lb 8.8 oz) 76.3 kg (168 lb 3.4 oz) 76.9 kg (169 lb 8.5 oz) 76.9 kg (169 lb 8.5 oz) 76.7 kg (169 lb 1.5 oz)     BMI (Calculated) 22.7 22.8 23 23 22.9          Assessment:     No diagnosis found.     Outpatient Encounter Medications as of 2/27/2024   Medication Sig Dispense Refill    amLODIPine (NORVASC) 10 MG tablet TAKE 1 TABLET EVERY DAY 90 tablet 3    aspirin 81 MG chewable tablet Take 1 tablet by mouth Daily. 81  By mouth Every day      azelastine (ASTELIN) 137 mcg (0.1 %) nasal spray 1 spray (137 mcg total) by Nasal route 2 (two) times daily. 30 mL 11    beta-carotene,A,-vits C,E/mins (OCUVITE ORAL) Take by mouth.      clopidogreL (PLAVIX) 75 mg tablet TAKE 1 TABLET EVERY DAY 90 tablet 1    famotidine (PEPCID) 20 MG tablet Take 20 mg by mouth before dinner.      fluticasone propionate (FLONASE) 50 mcg/actuation nasal spray 1 spray (50 mcg total) by Each Nostril route once daily. 16 g 11    furosemide (LASIX) 20 MG tablet Take 1 tablet (20 mg total) by mouth 2 (two) times daily. Take one tablet every other day orbas directed 30 tablet 11    metoprolol succinate (TOPROL-XL) 25 MG 24 hr tablet TAKE 1 TABLET TWICE DAILY 180 tablet 1    multivitamin with minerals tablet Take 1 tablet by mouth once daily.       niacin 500 MG tablet Take 500 mg by mouth Every PM.      nitroGLYCERIN (NITROSTAT) 0.4 MG SL tablet Take 1 tab  under the tongue for chest pain. May repeat every 5 minutes for a total of 3 doses. If chest pain not relieved go to the ED. 25 tablet 4    omega-3 fatty acids-vitamin E 1,000 mg Cap Take 1 capsule by mouth 2 (two) times daily.      potassium chloride SA (K-DUR,KLOR-CON M) 10 MEQ tablet Take 1 tablet (10 mEq total) by mouth once daily. 30 tablet 11    pravastatin (PRAVACHOL) 40 MG tablet TAKE 1 TABLET EVERY EVENING 90 tablet 1    sacubitriL-valsartan (ENTRESTO)  mg per tablet Take 1 tablet by mouth 2 (two) times daily. 60 tablet 11    sotaloL (BETAPACE) 120 MG Tab TAKE 1 TABLET TWICE DAILY 180 tablet 3    ubidecarenone (COENZYME Q10 ORAL) Take 1 tablet by mouth once daily.      vitamin D 1000 units Tab Take 1,000 Units by mouth every Tues, Thurs, Sat.        Facility-Administered Encounter Medications as of 2/27/2024   Medication Dose Route Frequency Provider Last Rate Last Admin    sodium chloride 0.9% bolus 1,000 mL  1,000 mL Intravenous Once Violette Delgado NP        vancomycin in dextrose 5 % 1 gram/250 mL IVPB 1,000 mg  1,000 mg Intravenous On Call Procedure Violette Delgado NP   1,000 mg at 03/18/22 1232     No orders of the defined types were placed in this encounter.      Plan:      1. Bronchiectasis without complication  Assessment & Plan:  Patient with M. Abscessus growing in her AFB cultures. He also has pseudomonas growing but seems to be colonization. No severe infectious symptoms.   Will defer to Dr. Rueda.         Roselia Bradford MD

## 2024-02-27 NOTE — ASSESSMENT & PLAN NOTE
Patient with M. Abscessus growing in her AFB cultures. He also has pseudomonas growing but seems to be colonization. No severe infectious symptoms.   Will defer to Dr. Rueda.

## 2024-02-28 ENCOUNTER — OFFICE VISIT (OUTPATIENT)
Dept: INFECTIOUS DISEASES | Facility: CLINIC | Age: 89
End: 2024-02-28
Payer: MEDICARE

## 2024-02-28 VITALS
SYSTOLIC BLOOD PRESSURE: 114 MMHG | WEIGHT: 168.63 LBS | BODY MASS INDEX: 22.87 KG/M2 | TEMPERATURE: 98 F | HEART RATE: 66 BPM | DIASTOLIC BLOOD PRESSURE: 57 MMHG

## 2024-02-28 DIAGNOSIS — A31.0 MYCOBACTERIUM AVIUM INFECTION: ICD-10-CM

## 2024-02-28 DIAGNOSIS — J15.1 PNEUMONIA OF BOTH LUNGS DUE TO PSEUDOMONAS SPECIES, UNSPECIFIED PART OF LUNG: ICD-10-CM

## 2024-02-28 PROCEDURE — 1101F PT FALLS ASSESS-DOCD LE1/YR: CPT | Mod: HCNC,CPTII,S$GLB, | Performed by: INTERNAL MEDICINE

## 2024-02-28 PROCEDURE — 99204 OFFICE O/P NEW MOD 45 MIN: CPT | Mod: HCNC,S$GLB,, | Performed by: INTERNAL MEDICINE

## 2024-02-28 PROCEDURE — 3288F FALL RISK ASSESSMENT DOCD: CPT | Mod: HCNC,CPTII,S$GLB, | Performed by: INTERNAL MEDICINE

## 2024-02-28 PROCEDURE — 99999 PR PBB SHADOW E&M-EST. PATIENT-LVL IV: CPT | Mod: PBBFAC,HCNC,, | Performed by: INTERNAL MEDICINE

## 2024-02-28 RX ORDER — NEBULIZER AND COMPRESSOR
1 EACH MISCELLANEOUS 2 TIMES DAILY
Qty: 1 EACH | Refills: 0 | Status: SHIPPED | OUTPATIENT
Start: 2024-02-28 | End: 2024-04-25 | Stop reason: SDUPTHER

## 2024-02-28 RX ORDER — SODIUM CHLORIDE FOR INHALATION 3 %
4 VIAL, NEBULIZER (ML) INHALATION 2 TIMES DAILY
Qty: 240 ML | Refills: 5 | Status: SHIPPED | OUTPATIENT
Start: 2024-02-28 | End: 2025-02-27

## 2024-02-28 RX ORDER — CIPROFLOXACIN 500 MG/1
500 TABLET ORAL EVERY 12 HOURS
Qty: 28 TABLET | Refills: 0 | Status: SHIPPED | OUTPATIENT
Start: 2024-02-28 | End: 2024-03-13

## 2024-02-28 NOTE — PROGRESS NOTES
INFECTIOUS DISEASE CLINIC  2/28/24     Subjective:      Chief Complaint:   Chief Complaint   Patient presents with    MAC       History of Present Illness:    Patient Nelson GIBBONS Parent is a 88 y.o. male who presents today for +MAC cultures    Hacking a lot, yellow x 6 months  Not feeling well  Poor sleep  Never smoker     exercise at gym regularly    [employer], 2002    [place] travel    Respiratory Culture  Abnormal   PSEUDOMONAS AERUGINOSA  Moderate  Normal respiratory anel also present    Gram Stain (Respiratory) <10 epithelial cells per low power field.   Gram Stain (Respiratory) Rare WBC's   Gram Stain (Respiratory) Many Gram negative rods   Gram Stain (Respiratory) Rare Gram positive cocci   Resulting Agency [facility]        Susceptibility     Pseudomonas aeruginosa     CULTURE, RESPIRATORY     Amikacin <=16 mcg/mL Sensitive     Cefepime <=2 mcg/mL Sensitive     Ciprofloxacin <=1 mcg/mL Sensitive     Gentamicin <=4 mcg/mL Sensitive     Levofloxacin <=2 mcg/mL Sensitive     Meropenem <=1 mcg/mL Sensitive     Piperacillin/Tazo <=16 mcg/mL Sensitive     Tobramycin <=4 mcg/mL Sensitive                         Afb cultures 1/2  MYCOBACTERIUM SPECIES  Identification and susceptibility pending      Organism     MYCOBACTERIUM ABSCESSUS COMPLEX   Antibiotic    BLAKE (mcg/mL)  Interpretation   ----------------------------------------------------------   Cefoxitin              32       I   Imipenem                 8       I   Clofazimine           0.25   Ciprofloxacin           >4       R   Moxifloxacin            >4       R   Clarithromycin           8       R   Amikacin                16       S   Tobramycin              16       R   Doxycycline             >8       R   Tigecycline           0.25   TMP/SMX              >4/76       R   Linezolid               16       I       barrets    Sleeeps in chair                  Review of Symptoms:  Constitutional: Denies fevers, chills, or weakness.  ENT: Denies  dysphagia, nasal discharge, ear pain or discharge.  Cardiovascular: Denies chest pain, palpitations, orthopnea, or claudication.  Respiratory: Denies shortness of breath, cough, hemoptysis, or wheezing.  GI: Denies nausea/vomitting, hematochezia, melena, abd pain, or changes in appetite.  : Denies dysuria, incontinence, or hematuria.  Musculoskeletal: Denies joint pain or myalgias.  Skin/breast: Denies rashes, lumps, lesions, or discharge.  Neurologic: Denies headache, dizziness, vertigo, or paresthesias.    Past Medical History:   Diagnosis Date    AICD (automatic cardioverter/defibrillator) present 7/17/2012    Cardiomyopathy     CKD (chronic kidney disease) stage 3, GFR 30-59 ml/min 7/17/2012    Coronary artery disease     GERD (gastroesophageal reflux disease)     Tovar's; Dr. Vu    Hyperlipidemia 7/17/2012    Hypertension 7/17/2012    Ischemic cardiomyopathy 7/17/2012    MGUS (monoclonal gammopathy of unknown significance)     Thrombocytopenia 7/17/2012    Ventricular tachycardia     VT (ventricular tachycardia) 7/17/2012       Past Surgical History:   Procedure Laterality Date    ABDOMINAL AORTIC ANEURYSM REPAIR      CARDIAC DEFIBRILLATOR PLACEMENT      CATARACT EXTRACTION, BILATERAL  2018    CORONARY ANGIOPLASTY      EYE SURGERY Bilateral     cataract    HERNIA REPAIR      x2    REPLACEMENT OF IMPLANTABLE CARDIOVERTER-DEFIBRILLATOR (ICD) GENERATOR Left 3/18/2022    Procedure: REPLACEMENT, ICD GENERATOR;  Surgeon: Felipe Woodard MD;  Location: [facility] EP LAB;  Service: Cardiology;  Laterality: Left;  SEFERINO, ICD gen chg, SJM, anes, MB, 3prep*ANUJ ICD insitu*        Family History   Problem Relation Age of Onset    Cancer Mother         Lymphoma    Lymphoma Mother     Coronary artery disease Mother     Heart disease Mother     Heart disease Father     Heart attacks under age 50 Father     Heart disease Sister     Cancer Brother         lymphoma       Social History     Socioeconomic History    Marital  status:    Tobacco Use    Smoking status: Never    Smokeless tobacco: Never   Substance and Sexual Activity    Alcohol use: No    Drug use: No    Sexual activity: Yes     Partners: Female     Comment:      Social Determinants of Health     Financial Resource Strain: Low Risk  (11/24/2023)    Overall Financial Resource Strain (CARDIA)     Difficulty of Paying Living Expenses: Not hard at all   Food Insecurity: No Food Insecurity (11/24/2023)    Hunger Vital Sign     Worried About Running Out of Food in the Last Year: Never true     Ran Out of Food in the Last Year: Never true   Transportation Needs: No Transportation Needs (11/24/2023)    PRAPARE - Transportation     Lack of Transportation (Medical): No     Lack of Transportation (Non-Medical): No   Physical Activity: Sufficiently Active (11/24/2023)    Exercise Vital Sign     Days of Exercise per Week: 4 days     Minutes of Exercise per Session: 50 min   Stress: No Stress Concern Present (11/24/2023)    [employer] - Occupational Stress Questionnaire     Feeling of Stress : Not at all   Social Connections: Unknown (11/24/2023)    Social Connection and Isolation Panel [NHANES]     Frequency of Communication with Friends and Family: Three times a week     Frequency of Social Gatherings with Friends and Family: Three times a week     Active Member of Clubs or Organizations: No     Attends Club or Organization Meetings: Never     Marital Status:    Housing Stability: Low Risk  (11/24/2023)    Housing Stability Vital Sign     Unable to Pay for Housing in the Last Year: No     Number of Places Lived in the Last Year: 1     Unstable Housing in the Last Year: No       Review of patient's allergies indicates:   Allergen Reactions    Fluorescein-benoxinate Other (See Comments)    Pcn  [penicillins]      Other reaction(s): Unknown    Tropicamide Other (See Comments)    Clindamycin Rash         Objective:   VS (24h):   Vitals:     02/28/24 1329   BP: (!) 114/57   Pulse: 66   Temp: 97.6 °F (36.4 °C)     [unfilled]  General: Afebrile, alert, comfortable, no acute distress.   HEENT: DIANA. EOMI, no scleral icterus. No sinus tenderness. MMM.  Pulmonary: Non labored,clear to auscultation A/P/L. No wheezing, crackles, or rhonchi.  Cardiac: normal S1 & S2 w/o rubs/murmurs/gallops. No JVD.   Abdominal: Non-tender, non-distended.Bowel sounds present x 4. No appreciable hepatosplenomegaly. No guarding or rebound tenderness  Extremities: Moves all extremities x 4. No peripheral edema. 2+ pulses.  Skin: No jaundice, rashes, or visible lesions.   Neurological:  Alert and oriented x 4.     Labs:    Glucose   Date Value Ref Range Status   02/01/2024 84 70 - 110 mg/dL Final   11/29/2023 97 70 - 110 mg/dL Final   11/22/2023 92 70 - 110 mg/dL Final       Calcium   Date Value Ref Range Status   02/01/2024 9.9 8.7 - 10.5 mg/dL Final   11/29/2023 9.5 8.7 - 10.5 mg/dL Final   11/22/2023 9.7 8.7 - 10.5 mg/dL Final       Albumin   Date Value Ref Range Status   02/01/2024 3.5 3.5 - 5.2 g/dL Final   11/29/2023 3.7 3.5 - 5.2 g/dL Final   11/22/2023 3.5 3.5 - 5.2 g/dL Final       Total Protein   Date Value Ref Range Status   02/01/2024 7.1 6.0 - 8.4 g/dL Final   11/29/2023 7.2 6.0 - 8.4 g/dL Final   11/22/2023 7.2 6.0 - 8.4 g/dL Final       Sodium   Date Value Ref Range Status   02/01/2024 143 136 - 145 mmol/L Final   11/29/2023 141 136 - 145 mmol/L Final   11/22/2023 140 136 - 145 mmol/L Final       Potassium   Date Value Ref Range Status   02/01/2024 4.2 3.5 - 5.1 mmol/L Final   11/29/2023 3.8 3.5 - 5.1 mmol/L Final   11/22/2023 4.0 3.5 - 5.1 mmol/L Final       CO2   Date Value Ref Range Status   02/01/2024 28 23 - 29 mmol/L Final   11/29/2023 28 23 - 29 mmol/L Final   11/22/2023 27 23 - 29 mmol/L Final       Chloride   Date Value Ref Range Status   02/01/2024 108 95 - 110 mmol/L Final   11/29/2023 104 95 - 110 mmol/L Final   11/22/2023 104 95 - 110 mmol/L Final        BUN   Date Value Ref Range Status   02/01/2024 15 8 - 23 mg/dL Final   11/29/2023 14 8 - 23 mg/dL Final   11/22/2023 16 8 - 23 mg/dL Final       Creatinine   Date Value Ref Range Status   02/01/2024 1.4 0.5 - 1.4 mg/dL Final   11/29/2023 1.3 0.5 - 1.4 mg/dL Final   11/22/2023 1.3 0.5 - 1.4 mg/dL Final       Alkaline Phosphatase   Date Value Ref Range Status   02/01/2024 100 55 - 135 U/L Final   11/29/2023 99 55 - 135 U/L Final   11/22/2023 99 55 - 135 U/L Final       ALT   Date Value Ref Range Status   02/01/2024 13 10 - 44 U/L Final   11/29/2023 12 10 - 44 U/L Final   11/22/2023 15 10 - 44 U/L Final       AST   Date Value Ref Range Status   02/01/2024 20 10 - 40 U/L Final   11/29/2023 21 10 - 40 U/L Final   11/22/2023 21 10 - 40 U/L Final       Total Bilirubin   Date Value Ref Range Status   02/01/2024 0.8 0.1 - 1.0 mg/dL Final     Comment:     For infants and newborns, interpretation of results should be based  on gestational age, weight and in agreement with clinical  observations.    Premature Infant recommended reference ranges:  Up to 24 hours.............<8.0 mg/dL  Up to 48 hours............<12.0 mg/dL  3-5 days..................<15.0 mg/dL  6-29 days.................<15.0 mg/dL     11/29/2023 0.8 0.1 - 1.0 mg/dL Final     Comment:     For infants and newborns, interpretation of results should be based  on gestational age, weight and in agreement with clinical  observations.    Premature Infant recommended reference ranges:  Up to 24 hours.............<8.0 mg/dL  Up to 48 hours............<12.0 mg/dL  3-5 days..................<15.0 mg/dL  6-29 days.................<15.0 mg/dL     11/22/2023 0.8 0.1 - 1.0 mg/dL Final     Comment:     For infants and newborns, interpretation of results should be based  on gestational age, weight and in agreement with clinical  observations.    Premature Infant recommended reference ranges:  Up to 24 hours.............<8.0 mg/dL  Up to 48 hours............<12.0  "mg/dL  3-5 days..................<15.0 mg/dL  6-29 days.................<15.0 mg/dL         WBC   Date Value Ref Range Status   02/01/2024 8.50 3.90 - 12.70 K/uL Final   11/22/2023 9.96 3.90 - 12.70 K/uL Final   11/06/2023 9.15 3.90 - 12.70 K/uL Final       Hemoglobin   Date Value Ref Range Status   02/01/2024 14.3 14.0 - 18.0 g/dL Final   11/22/2023 13.8 (L) 14.0 - 18.0 g/dL Final   11/06/2023 14.6 14.0 - 18.0 g/dL Final       Hematocrit   Date Value Ref Range Status   02/01/2024 43.7 40.0 - 54.0 % Final   11/22/2023 40.2 40.0 - 54.0 % Final   11/06/2023 44.5 40.0 - 54.0 % Final       MCV   Date Value Ref Range Status   02/01/2024 91 82 - 98 fL Final   11/22/2023 88 82 - 98 fL Final   11/06/2023 91 82 - 98 fL Final       Platelets   Date Value Ref Range Status   02/01/2024 98 (L) 150 - 450 K/uL Final   11/22/2023 119 (L) 150 - 450 K/uL Final   11/06/2023 117 (L) 150 - 450 K/uL Final       Lab Results   Component Value Date    CHOL 117 (L) 09/22/2023    CHOL 104 (L) 01/18/2023    CHOL 107 (L) 06/30/2022       Lab Results   Component Value Date    HDL 50 09/22/2023    HDL 48 01/18/2023    HDL 43 06/30/2022       Lab Results   Component Value Date    LDLCALC 55.2 (L) 09/22/2023    LDLCALC 45.8 (L) 01/18/2023    LDLCALC 53.0 (L) 06/30/2022       Lab Results   Component Value Date    TRIG 59 09/22/2023    TRIG 51 01/18/2023    TRIG 55 06/30/2022       Lab Results   Component Value Date    CHOLHDL 42.7 09/22/2023    CHOLHDL 46.2 01/18/2023    CHOLHDL 40.2 06/30/2022     No results found for: "RPR"  No results found for: "QUANTIFERON"    Medications:  Current Outpatient Medications on File Prior to Visit   Medication Sig Dispense Refill    amLODIPine (NORVASC) 10 MG tablet TAKE 1 TABLET EVERY DAY 90 tablet 3    aspirin 81 MG chewable tablet Take 1 tablet by mouth Daily. 81  By mouth Every day      azelastine (ASTELIN) 137 mcg (0.1 %) nasal spray 1 spray (137 mcg total) by Nasal route 2 (two) times daily. 30 mL 11    " beta-carotene,A,-vits C,E/mins (OCUVITE ORAL) Take by mouth.      clopidogreL (PLAVIX) 75 mg tablet TAKE 1 TABLET EVERY DAY 90 tablet 1    famotidine (PEPCID) 20 MG tablet Take 20 mg by mouth before dinner.      fluticasone propionate (FLONASE) 50 mcg/actuation nasal spray 1 spray (50 mcg total) by Each Nostril route once daily. 16 g 11    furosemide (LASIX) 20 MG tablet Take 1 tablet (20 mg total) by mouth 2 (two) times daily. Take one tablet every other day orbas directed 30 tablet 11    metoprolol succinate (TOPROL-XL) 25 MG 24 hr tablet TAKE 1 TABLET TWICE DAILY 180 tablet 1    multivitamin with minerals tablet Take 1 tablet by mouth once daily.       niacin 500 MG tablet Take 500 mg by mouth Every PM.      nitroGLYCERIN (NITROSTAT) 0.4 MG SL tablet Take 1 tab under the tongue for chest pain. May repeat every 5 minutes for a total of 3 doses. If chest pain not relieved go to the ED. 25 tablet 4    omega-3 fatty acids-vitamin E 1,000 mg Cap Take 1 capsule by mouth 2 (two) times daily.      potassium chloride SA (K-DUR,KLOR-CON M) 10 MEQ tablet Take 1 tablet (10 mEq total) by mouth once daily. 30 tablet 11    pravastatin (PRAVACHOL) 40 MG tablet TAKE 1 TABLET EVERY EVENING 90 tablet 1    sacubitriL-valsartan (ENTRESTO)  mg per tablet Take 1 tablet by mouth 2 (two) times daily. 60 tablet 11    sotaloL (BETAPACE) 120 MG Tab TAKE 1 TABLET TWICE DAILY 180 tablet 3    ubidecarenone (COENZYME Q10 ORAL) Take 1 tablet by mouth once daily.      vitamin D 1000 units Tab Take 1,000 Units by mouth every Tues, Thurs, Sat.        Current Facility-Administered Medications on File Prior to Visit   Medication Dose Route Frequency Provider Last Rate Last Admin    sodium chloride 0.9% bolus 1,000 mL  1,000 mL Intravenous Once Violette Delgado NP        vancomycin in dextrose 5 % 1 gram/250 mL IVPB 1,000 mg  1,000 mg Intravenous On Call Procedure Violette Delgado NP   1,000 mg at 03/18/22 1232       Antibiotics:   Antibiotics  "(From admission, onward)      None            HIV: No components found for: "HIV 1/2 AG/AB"  Hepatitis C IgG: No components found for: "HEPATITIS C"  Syphilis: No results found for: "RPR"    Hepatitis A IgG: No components found for: "HEPATITIS A IGG"  Hepatitis Bc IgG: No components found for: "HEPATITIS B CORE IGG"  Hepatitis Bs IgG:  Quantiferon: No results found for: "QUANTIFERON"  VZV IgG: No components found for: "VARICELLA IGG"    No components found for: "SEDIMENTATION RATE"  No components found for: "C-REACTIVE PROTEIN"      Microbiology x 7d:   Microbiology Results (last 7 days)       ** No results found for the last 168 hours. **            Immunization History   Administered Date(s) Administered    COVID-19, MRNA, LN-S, PF (Pfizer) (Gray Cap) 07/28/2022    COVID-19, MRNA, LN-S, PF (Pfizer) (Purple Cap) 01/14/2021, 02/06/2021, 10/14/2021, 07/28/2022    Influenza 10/18/2007, 10/28/2008, 10/14/2009, 10/28/2010, 10/18/2011, 10/25/2012, 10/08/2018    Influenza (FLUAD) - Quadrivalent - Adjuvanted - PF *Preferred* (65+) 10/15/2020, 10/16/2021, 09/24/2022, 10/24/2023    Influenza - High Dose - PF (65 years and older) 10/08/2013, 10/09/2014, 11/03/2015, 10/25/2016, 10/10/2017, 10/08/2018, 10/29/2019    Influenza - Quadrivalent - PF *Preferred* (6 months and older) 10/25/2012    Influenza - Trivalent (ADULT) 10/18/2007, 10/28/2008, 10/14/2009, 10/28/2010, 10/18/2011, 10/25/2012    Influenza Split 10/25/2012    Pneumococcal Conjugate - 13 Valent 02/01/2017, 02/24/2017    Pneumococcal Polysaccharide - 23 Valent 08/23/2012    Td (ADULT) 10/18/2007    Tdap 11/22/2013         Reviewed records today as well as relevant labs, cultures, and imaging    Assessment:       Pseudomonas + resp culture 1/2/24  MAC +resp culture 1/2/24  M.abscessus + resp culture 12/18/23    No toxicities from antimicrobials  Extremely medically complex  Patient is high risk for infectious complications given drug resistant infections    Plan: "     Will treat pseudomonas and hopefully he'll have improvement in symtoms.    Unclear significance of the MAC and m.abscessus, which has each grown once. Update sputum afb cultures. May need treatment if organism continues to be present in f/u cultures.     Discussed importance of airway clearance. Encouraged use of Aerobika and nebulized hypertonic saline twice a day.     Prevent reflux- avoid stomach sleeping. Sleep inclined. Famotidine/tums preferrable to PPI if needed. Stop liquids 3hr before bedtime.     Encourage probiotics    Monitor afb cultures    Discussed with patient side effects of quinolones (including but not limited to qtc prolongation, hypoglycemia, achilles tendon rupture, worsening of aneurysms, c.diff) and these are acceptable risks and preferable to IV medication and risk of picc line complications. Avoid milk products within 2 hours      - The following labs were ordered:    Orders Placed This Encounter   Procedures    AFB Culture & Smear     Standing Status:   Standing     Number of Occurrences:   11     Standing Expiration Date:   5/28/2025       The total time for evaluation and management services performed on 2/28/24 was greater than 45 minutes.  This includes face to face time and non-face to face time preparing to see the patient (eg, review of tests), obtaining and/or reviewing separately obtained history, documenting clinical information in the electronic or other health record, independently interpreting results, and communicating results to the patient/family/caregiver, or care coordination.             Eliza Rueda MD, MPH  Infectious Disease

## 2024-02-29 ENCOUNTER — PATIENT MESSAGE (OUTPATIENT)
Dept: CARDIOLOGY | Facility: CLINIC | Age: 89
End: 2024-02-29
Payer: MEDICARE

## 2024-03-01 ENCOUNTER — PATIENT MESSAGE (OUTPATIENT)
Dept: INFECTIOUS DISEASES | Facility: CLINIC | Age: 89
End: 2024-03-01
Payer: MEDICARE

## 2024-03-01 ENCOUNTER — OFFICE VISIT (OUTPATIENT)
Dept: FAMILY MEDICINE | Facility: CLINIC | Age: 89
End: 2024-03-01
Payer: MEDICARE

## 2024-03-01 VITALS
HEART RATE: 60 BPM | RESPIRATION RATE: 20 BRPM | HEIGHT: 72 IN | BODY MASS INDEX: 22.75 KG/M2 | SYSTOLIC BLOOD PRESSURE: 102 MMHG | DIASTOLIC BLOOD PRESSURE: 60 MMHG | OXYGEN SATURATION: 97 % | WEIGHT: 168 LBS

## 2024-03-01 DIAGNOSIS — I70.0 AORTIC ATHEROSCLEROSIS: ICD-10-CM

## 2024-03-01 DIAGNOSIS — A31.0 MYCOBACTERIUM AVIUM INFECTION: ICD-10-CM

## 2024-03-01 DIAGNOSIS — D69.3 IDIOPATHIC THROMBOCYTOPENIC PURPURA: ICD-10-CM

## 2024-03-01 DIAGNOSIS — I50.42 CHRONIC COMBINED SYSTOLIC AND DIASTOLIC CONGESTIVE HEART FAILURE: ICD-10-CM

## 2024-03-01 DIAGNOSIS — J15.1 PNEUMONIA OF BOTH LUNGS DUE TO PSEUDOMONAS SPECIES, UNSPECIFIED PART OF LUNG: ICD-10-CM

## 2024-03-01 DIAGNOSIS — Z00.00 ENCOUNTER FOR PREVENTIVE HEALTH EXAMINATION: Primary | ICD-10-CM

## 2024-03-01 DIAGNOSIS — I25.118 CORONARY ARTERY DISEASE OF NATIVE ARTERY OF NATIVE HEART WITH STABLE ANGINA PECTORIS: ICD-10-CM

## 2024-03-01 DIAGNOSIS — N18.31 CHRONIC KIDNEY DISEASE, STAGE 3A: ICD-10-CM

## 2024-03-01 DIAGNOSIS — J47.9 BRONCHIECTASIS WITHOUT COMPLICATION: ICD-10-CM

## 2024-03-01 PROCEDURE — 99999 PR PBB SHADOW E&M-EST. PATIENT-LVL V: CPT | Mod: PBBFAC,HCNC,, | Performed by: NURSE PRACTITIONER

## 2024-03-01 PROCEDURE — 1101F PT FALLS ASSESS-DOCD LE1/YR: CPT | Mod: HCNC,CPTII,S$GLB, | Performed by: NURSE PRACTITIONER

## 2024-03-01 PROCEDURE — G0439 PPPS, SUBSEQ VISIT: HCPCS | Mod: HCNC,S$GLB,, | Performed by: NURSE PRACTITIONER

## 2024-03-01 PROCEDURE — 3288F FALL RISK ASSESSMENT DOCD: CPT | Mod: HCNC,CPTII,S$GLB, | Performed by: NURSE PRACTITIONER

## 2024-03-01 PROCEDURE — 1126F AMNT PAIN NOTED NONE PRSNT: CPT | Mod: HCNC,CPTII,S$GLB, | Performed by: NURSE PRACTITIONER

## 2024-03-01 PROCEDURE — 1159F MED LIST DOCD IN RCRD: CPT | Mod: HCNC,CPTII,S$GLB, | Performed by: NURSE PRACTITIONER

## 2024-03-01 PROCEDURE — 1160F RVW MEDS BY RX/DR IN RCRD: CPT | Mod: HCNC,CPTII,S$GLB, | Performed by: NURSE PRACTITIONER

## 2024-03-01 NOTE — PROGRESS NOTES
Nelson Huntley presented for a  Medicare AWV and comprehensive Health Risk Assessment today. The following components were reviewed and updated:    Medical history  Family History  Social history  Allergies and Current Medications  Health Risk Assessment  Health Maintenance  Care Team         ** See Completed Assessments for Annual Wellness Visit within the encounter summary.**         The following assessments were completed:  Living Situation  CAGE  Depression Screening  Timed Get Up and Go  Whisper Test  Cognitive Function Screening      Nutrition Screening  ADL Screening  PAQ Screening      Opioid documentation:      Patient does not have a current opioid prescription.        Vitals:    03/01/24 1313   BP: 102/60   BP Location: Left arm   Patient Position: Sitting   Pulse: 60   Resp: 20   SpO2: 97%   Weight: 76.2 kg (168 lb)   Height: 6' (1.829 m)     Body mass index is 22.78 kg/m².    Physical Exam  Vitals reviewed.   Constitutional:       General: He is not in acute distress.     Appearance: Normal appearance. He is well-developed, well-groomed and normal weight.   HENT:      Head: Normocephalic.   Cardiovascular:      Rate and Rhythm: Normal rate.   Pulmonary:      Effort: Pulmonary effort is normal. No respiratory distress.   Skin:     Coloration: Skin is not pale.   Neurological:      Mental Status: He is alert and oriented to person, place, and time.      Coordination: Coordination normal.   Psychiatric:         Attention and Perception: Attention normal.         Mood and Affect: Mood and affect normal.         Speech: Speech normal.         Behavior: Behavior normal. Behavior is cooperative.         Thought Content: Thought content normal.               Diagnoses and health risks identified today and associated recommendations/orders:    1. Encounter for preventive health examination    2. Pneumonia of both lungs due to Pseudomonas species, unspecified part of lung  Chronic; stable on medication.  Followed by Pulmonology and ID.    3. Mycobacterium avium infection  Chronic; stable on medication. Followed by Pulmonology and ID.    4. Chronic combined systolic and diastolic congestive heart failure  Chronic; stable on medication. Followed by Cardiology.    5. Aortic atherosclerosis  Chronic; stable on medication. Followed by Cardiology.    6. Bronchiectasis without complication  Chronic; stable on medication. Followed by Pulmonology.    7. Idiopathic thrombocytopenic purpura  Chronic; stable. Follow up with PCP.    8. Coronary artery disease of native artery of native heart with stable angina pectoris  Chronic; stable on medication. Followed by Cardiology.    9. Chronic kidney disease, stage 3a  Chronic; stable on medication. Follow up with PCP.    10. Body mass index (BMI) of 22.0 to 22.9 in adult  Patient's BMI is WNL. Eat a low salt/low fat diet and discussed importance of engaging in physical activity at least 5x/week for a minimum of 30 min/day.      Provided Edward with a 5-10 year written screening schedule and personal prevention plan. Recommendations were developed using the USPSTF age appropriate recommendations. Education, counseling, and referrals were provided as needed. After Visit Summary given to patient which includes a list of additional screenings/tests needed.    Follow up for your next annual wellness visit.    Claudia Laird NP      Advance Care Planning     I offered to discuss advanced care planning, including how to pick a person who would make decisions for you if you were unable to make them for yourself, called a health care power of , and what kind of decisions you might make such as use of life sustaining treatments such as ventilators and tube feeding when faced with a life limiting illness recorded on a living will that they will need to know. (How you want to be cared for as you near the end of your natural life)     X  Patient has advanced directives written and  provided copies to the institution.

## 2024-03-01 NOTE — PATIENT INSTRUCTIONS
Counseling and Referral of Other Preventative  (Italic type indicates deductible and co-insurance are waived)    Patient Name: Nelson Parent  Today's Date: 3/1/2024    Health Maintenance       Date Due Completion Date    RSV Vaccine (Age 60+ and Pregnant patients) (1 - 1-dose 60+ series) Never done ---    COVID-19 Vaccine (5 - 2023-24 season) 09/01/2023 7/28/2022    TETANUS VACCINE 11/22/2023 11/22/2013    Aspirin/Antiplatelet Therapy 02/28/2025 2/28/2024    Lipid Panel 09/22/2028 9/22/2023        No orders of the defined types were placed in this encounter.      The following information is provided to all patients.  This information is to help you find resources for any of the problems found today that may be affecting your health:                  Living healthy guide: www.[facility].[place]      Understanding Diabetes: www.diabetes.org      Eating healthy: www.cdc.gov/healthyweight      [facility] home safety checklist: www.cdc.gov/steadi/patient.html      Agency on Aging: www.goea.[place]      Alcoholics anonymous (AA): www.aa.org      Physical Activity: www.blas.nih.gov/pm0idvl      Tobacco use: www.quitwithusla.org

## 2024-03-04 ENCOUNTER — LAB VISIT (OUTPATIENT)
Dept: LAB | Facility: HOSPITAL | Age: 89
End: 2024-03-04
Attending: INTERNAL MEDICINE
Payer: MEDICARE

## 2024-03-04 ENCOUNTER — PATIENT MESSAGE (OUTPATIENT)
Dept: INFECTIOUS DISEASES | Facility: CLINIC | Age: 89
End: 2024-03-04
Payer: MEDICARE

## 2024-03-04 DIAGNOSIS — J15.1 PNEUMONIA OF BOTH LUNGS DUE TO PSEUDOMONAS SPECIES, UNSPECIFIED PART OF LUNG: ICD-10-CM

## 2024-03-04 PROCEDURE — 87015 SPECIMEN INFECT AGNT CONCNTJ: CPT | Mod: HCNC | Performed by: INTERNAL MEDICINE

## 2024-03-04 PROCEDURE — 87116 MYCOBACTERIA CULTURE: CPT | Mod: HCNC | Performed by: INTERNAL MEDICINE

## 2024-03-04 PROCEDURE — 87206 SMEAR FLUORESCENT/ACID STAI: CPT | Mod: HCNC | Performed by: INTERNAL MEDICINE

## 2024-03-04 PROCEDURE — 87150 DNA/RNA AMPLIFIED PROBE: CPT

## 2024-03-04 PROCEDURE — 87186 SC STD MICRODIL/AGAR DIL: CPT

## 2024-03-04 PROCEDURE — 87118 MYCOBACTERIC IDENTIFICATION: CPT | Mod: 91,HCNC | Performed by: INTERNAL MEDICINE

## 2024-03-04 PROCEDURE — 87118 MYCOBACTERIC IDENTIFICATION: CPT | Mod: HCNC | Performed by: INTERNAL MEDICINE

## 2024-03-04 PROCEDURE — 87118 MYCOBACTERIC IDENTIFICATION: CPT

## 2024-03-05 PROBLEM — J15.1 PNEUMONIA OF BOTH LUNGS DUE TO PSEUDOMONAS SPECIES: Status: ACTIVE | Noted: 2024-03-05

## 2024-03-05 PROBLEM — I42.9 CARDIOMYOPATHY, UNSPECIFIED: Status: ACTIVE | Noted: 2024-03-05

## 2024-03-05 PROBLEM — A31.0 MYCOBACTERIUM AVIUM INFECTION: Status: ACTIVE | Noted: 2024-03-05

## 2024-03-06 LAB — MYCOBACTERIUM SPEC CULT: ABNORMAL

## 2024-03-09 ENCOUNTER — CLINICAL SUPPORT (OUTPATIENT)
Dept: CARDIOLOGY | Facility: HOSPITAL | Age: 89
End: 2024-03-09
Attending: INTERNAL MEDICINE
Payer: MEDICARE

## 2024-03-09 ENCOUNTER — CLINICAL SUPPORT (OUTPATIENT)
Dept: CARDIOLOGY | Facility: HOSPITAL | Age: 89
End: 2024-03-09
Payer: MEDICARE

## 2024-03-09 DIAGNOSIS — I25.5 ISCHEMIC CARDIOMYOPATHY: ICD-10-CM

## 2024-03-09 DIAGNOSIS — Z95.810 PRESENCE OF AUTOMATIC (IMPLANTABLE) CARDIAC DEFIBRILLATOR: ICD-10-CM

## 2024-03-09 PROCEDURE — 93296 REM INTERROG EVL PM/IDS: CPT | Mod: HCNC | Performed by: INTERNAL MEDICINE

## 2024-03-09 PROCEDURE — 93295 DEV INTERROG REMOTE 1/2/MLT: CPT | Mod: HCNC,,, | Performed by: INTERNAL MEDICINE

## 2024-03-15 LAB
OHS CV AF BURDEN PERCENT: < 1
OHS CV DC REMOTE DEVICE TYPE: NORMAL
OHS CV RV PACING PERCENT: 1 %

## 2024-03-18 PROBLEM — J18.9 RECURRENT PNEUMONIA: Status: RESOLVED | Noted: 2023-12-15 | Resolved: 2024-03-18

## 2024-03-19 DIAGNOSIS — I10 ESSENTIAL HYPERTENSION: ICD-10-CM

## 2024-03-19 DIAGNOSIS — E78.5 HYPERLIPIDEMIA, UNSPECIFIED HYPERLIPIDEMIA TYPE: ICD-10-CM

## 2024-03-19 LAB — MYCOBACTERIUM SPEC CULT: ABNORMAL

## 2024-03-19 NOTE — TELEPHONE ENCOUNTER
No care due was identified.  Health [facility] Embedded Care Due Messages. Reference number: [id number].   3/19/2024 12:24:24 PM CDT

## 2024-03-20 RX ORDER — PRAVASTATIN SODIUM 40 MG/1
TABLET ORAL
Qty: 90 TABLET | Refills: 1 | Status: SHIPPED | OUTPATIENT
Start: 2024-03-20

## 2024-03-20 RX ORDER — METOPROLOL SUCCINATE 25 MG/1
TABLET, EXTENDED RELEASE ORAL
Qty: 180 TABLET | Refills: 3 | Status: SHIPPED | OUTPATIENT
Start: 2024-03-20

## 2024-03-20 NOTE — TELEPHONE ENCOUNTER
Refill Decision Note   Edward Parent  is requesting a refill authorization.  Brief Assessment and Rationale for Refill:  Approve     Medication Therapy Plan:         Comments:     Note composed:3:10 AM 03/20/2024

## 2024-03-27 ENCOUNTER — TELEPHONE (OUTPATIENT)
Dept: GASTROENTEROLOGY | Facility: CLINIC | Age: 89
End: 2024-03-27
Payer: MEDICARE

## 2024-04-01 LAB
ACID FAST MOD KINY STN SPEC: ABNORMAL
MYCOBACTERIUM SPEC QL CULT: ABNORMAL
MYCOBACTERIUM SPEC QL CULT: ABNORMAL

## 2024-04-12 LAB — MYCOBACTERIUM SPEC CULT: ABNORMAL

## 2024-04-17 ENCOUNTER — LAB VISIT (OUTPATIENT)
Dept: LAB | Facility: HOSPITAL | Age: 89
End: 2024-04-17
Attending: INTERNAL MEDICINE
Payer: MEDICARE

## 2024-04-17 DIAGNOSIS — J15.1 PNEUMONIA OF BOTH LUNGS DUE TO PSEUDOMONAS SPECIES, UNSPECIFIED PART OF LUNG: ICD-10-CM

## 2024-04-17 PROCEDURE — 87206 SMEAR FLUORESCENT/ACID STAI: CPT | Mod: HCNC | Performed by: INTERNAL MEDICINE

## 2024-04-17 PROCEDURE — 87116 MYCOBACTERIA CULTURE: CPT | Mod: HCNC | Performed by: INTERNAL MEDICINE

## 2024-04-17 PROCEDURE — 87015 SPECIMEN INFECT AGNT CONCNTJ: CPT | Mod: HCNC | Performed by: INTERNAL MEDICINE

## 2024-04-18 ENCOUNTER — LAB VISIT (OUTPATIENT)
Dept: LAB | Facility: HOSPITAL | Age: 89
End: 2024-04-18
Attending: INTERNAL MEDICINE
Payer: MEDICARE

## 2024-04-18 DIAGNOSIS — N18.31 CHRONIC KIDNEY DISEASE, STAGE 3A: ICD-10-CM

## 2024-04-18 LAB
ALBUMIN SERPL BCP-MCNC: 3.4 G/DL (ref 3.5–5.2)
ALP SERPL-CCNC: 115 U/L (ref 55–135)
ALT SERPL W/O P-5'-P-CCNC: 18 U/L (ref 10–44)
ANION GAP SERPL CALC-SCNC: 12 MMOL/L (ref 8–16)
AST SERPL-CCNC: 20 U/L (ref 10–40)
BASOPHILS # BLD AUTO: 0.07 K/UL (ref 0–0.2)
BASOPHILS NFR BLD: 0.8 % (ref 0–1.9)
BILIRUB SERPL-MCNC: 0.8 MG/DL (ref 0.1–1)
BUN SERPL-MCNC: 13 MG/DL (ref 8–23)
CALCIUM SERPL-MCNC: 9.7 MG/DL (ref 8.7–10.5)
CHLORIDE SERPL-SCNC: 107 MMOL/L (ref 95–110)
CO2 SERPL-SCNC: 24 MMOL/L (ref 23–29)
CREAT SERPL-MCNC: 1.2 MG/DL (ref 0.5–1.4)
DIFFERENTIAL METHOD BLD: ABNORMAL
EOSINOPHIL # BLD AUTO: 0.3 K/UL (ref 0–0.5)
EOSINOPHIL NFR BLD: 3.1 % (ref 0–8)
ERYTHROCYTE [DISTWIDTH] IN BLOOD BY AUTOMATED COUNT: 14.9 % (ref 11.5–14.5)
EST. GFR  (NO RACE VARIABLE): 58.2 ML/MIN/1.73 M^2
GLUCOSE SERPL-MCNC: 84 MG/DL (ref 70–110)
HCT VFR BLD AUTO: 43.1 % (ref 40–54)
HGB BLD-MCNC: 13.6 G/DL (ref 14–18)
IMM GRANULOCYTES # BLD AUTO: 0.03 K/UL (ref 0–0.04)
IMM GRANULOCYTES NFR BLD AUTO: 0.4 % (ref 0–0.5)
LYMPHOCYTES # BLD AUTO: 2.9 K/UL (ref 1–4.8)
LYMPHOCYTES NFR BLD: 34.3 % (ref 18–48)
MCH RBC QN AUTO: 29.6 PG (ref 27–31)
MCHC RBC AUTO-ENTMCNC: 31.6 G/DL (ref 32–36)
MCV RBC AUTO: 94 FL (ref 82–98)
MONOCYTES # BLD AUTO: 0.9 K/UL (ref 0.3–1)
MONOCYTES NFR BLD: 10.5 % (ref 4–15)
NEUTROPHILS # BLD AUTO: 4.2 K/UL (ref 1.8–7.7)
NEUTROPHILS NFR BLD: 50.9 % (ref 38–73)
NRBC BLD-RTO: 0 /100 WBC
PLATELET # BLD AUTO: 140 K/UL (ref 150–450)
PMV BLD AUTO: 12.3 FL (ref 9.2–12.9)
POTASSIUM SERPL-SCNC: 3.6 MMOL/L (ref 3.5–5.1)
PROT SERPL-MCNC: 6.9 G/DL (ref 6–8.4)
RBC # BLD AUTO: 4.59 M/UL (ref 4.6–6.2)
SODIUM SERPL-SCNC: 143 MMOL/L (ref 136–145)
WBC # BLD AUTO: 8.3 K/UL (ref 3.9–12.7)

## 2024-04-18 PROCEDURE — 80053 COMPREHEN METABOLIC PANEL: CPT | Mod: HCNC | Performed by: INTERNAL MEDICINE

## 2024-04-18 PROCEDURE — 85025 COMPLETE CBC W/AUTO DIFF WBC: CPT | Mod: HCNC | Performed by: INTERNAL MEDICINE

## 2024-04-18 PROCEDURE — 36415 COLL VENOUS BLD VENIPUNCTURE: CPT | Mod: HCNC,PO | Performed by: INTERNAL MEDICINE

## 2024-04-22 ENCOUNTER — OFFICE VISIT (OUTPATIENT)
Dept: INFECTIOUS DISEASES | Facility: CLINIC | Age: 89
End: 2024-04-22
Payer: MEDICARE

## 2024-04-22 VITALS
HEIGHT: 72 IN | WEIGHT: 165.38 LBS | HEART RATE: 62 BPM | DIASTOLIC BLOOD PRESSURE: 64 MMHG | SYSTOLIC BLOOD PRESSURE: 132 MMHG | BODY MASS INDEX: 22.4 KG/M2 | TEMPERATURE: 98 F

## 2024-04-22 DIAGNOSIS — A31.0 MYCOBACTERIUM AVIUM INFECTION: Primary | ICD-10-CM

## 2024-04-22 PROCEDURE — 99214 OFFICE O/P EST MOD 30 MIN: CPT | Mod: HCNC,S$GLB,, | Performed by: INTERNAL MEDICINE

## 2024-04-22 PROCEDURE — 1101F PT FALLS ASSESS-DOCD LE1/YR: CPT | Mod: HCNC,CPTII,S$GLB, | Performed by: INTERNAL MEDICINE

## 2024-04-22 PROCEDURE — 99999 PR PBB SHADOW E&M-EST. PATIENT-LVL IV: CPT | Mod: PBBFAC,HCNC,, | Performed by: INTERNAL MEDICINE

## 2024-04-22 PROCEDURE — 1157F ADVNC CARE PLAN IN RCRD: CPT | Mod: HCNC,CPTII,S$GLB, | Performed by: INTERNAL MEDICINE

## 2024-04-22 PROCEDURE — 1126F AMNT PAIN NOTED NONE PRSNT: CPT | Mod: HCNC,CPTII,S$GLB, | Performed by: INTERNAL MEDICINE

## 2024-04-22 PROCEDURE — 3288F FALL RISK ASSESSMENT DOCD: CPT | Mod: HCNC,CPTII,S$GLB, | Performed by: INTERNAL MEDICINE

## 2024-04-22 NOTE — PROGRESS NOTES
INFECTIOUS DISEASE CLINIC  4/22/24     Subjective:      Chief Complaint:   Chief Complaint   Patient presents with    Follow-up       History of Present Illness:    Patient Nelson GIBBONS Parent is a 88 y.o. male who presents today for 2 month follow up of AFB cultures. At last appt, treated for pseudomonas and started airway clearance. Says cough almost stopped after cipro    Still with some productive phlegm  Little cough  Low energy  Some solid stuf coming up  Feel pretty good  Cough mostly at night    Reluctant to start antibiotics because he's feeling well and nervous about side effects    Never smoker  H/o kendall's esophagus  Sleeps in chair  exercise at gym regularly      Reviewed micro  4/17/2024- NGTD  3/4/24- M.abscessus  2/8/24- MAC, macrolide susceptible  1/2/24- MAC  12/18/23- M.abscessus       Last CT scan 1/23/24  Multifocal patchy areas of predominantly tree-in-bud type nodularity, likely infectious or inflammatory etiology. Scattered pulmonary nodules, without worrisome change. Mild bronchiectasis.\ no significant detrimental change from 11/22/2023.             [employer], 2002    [employer] travel                    Review of Symptoms:  Constitutional: Denies fevers, chills, or weakness.  ENT: Denies dysphagia, nasal discharge, ear pain or discharge.  Cardiovascular: Denies chest pain, palpitations, orthopnea, or claudication.  Respiratory: Denies shortness of breath, cough, hemoptysis, or wheezing.  GI: Denies nausea/vomitting, hematochezia, melena, abd pain, or changes in appetite.  : Denies dysuria, incontinence, or hematuria.  Musculoskeletal: Denies joint pain or myalgias.  Skin/breast: Denies rashes, lumps, lesions, or discharge.  Neurologic: Denies headache, dizziness, vertigo, or paresthesias.    Past Medical History:   Diagnosis Date    AICD (automatic cardioverter/defibrillator) present 7/17/2012    Cardiomyopathy     CKD (chronic kidney disease) stage 3, GFR 30-59 ml/min  7/17/2012    Coronary artery disease     GERD (gastroesophageal reflux disease)     Tovar's; Dr. Vu    Hyperlipidemia 7/17/2012    Hypertension 7/17/2012    Ischemic cardiomyopathy 7/17/2012    MGUS (monoclonal gammopathy of unknown significance)     Thrombocytopenia 7/17/2012    Ventricular tachycardia     VT (ventricular tachycardia) 7/17/2012       Past Surgical History:   Procedure Laterality Date    ABDOMINAL AORTIC ANEURYSM REPAIR      CARDIAC DEFIBRILLATOR PLACEMENT      CATARACT EXTRACTION, BILATERAL  2018    CORONARY ANGIOPLASTY      EYE SURGERY Bilateral     cataract    HERNIA REPAIR      x2    REPLACEMENT OF IMPLANTABLE CARDIOVERTER-DEFIBRILLATOR (ICD) GENERATOR Left 3/18/2022    Procedure: REPLACEMENT, ICD GENERATOR;  Surgeon: Felipe Woodard MD;  [facility] EP LAB;  Service: Cardiology;  Laterality: Left;  SEFERINO, ICD gen chg, SJM, anes, MB, 3prep*ANUJ ICD insitu*        Family History   Problem Relation Name Age of Onset    Cancer Mother          Lymphoma    Lymphoma Mother      Coronary artery disease Mother      Heart disease Mother      Heart disease Father      Heart attacks under age 50 Father      Heart disease Sister      Cancer Brother          lymphoma       Social History     Socioeconomic History    Marital status:    Tobacco Use    Smoking status: Never    Smokeless tobacco: Never   Substance and Sexual Activity    Alcohol use: No    Drug use: No    Sexual activity: Yes     Partners: Female     Comment:      Social Determinants of Health     Financial Resource Strain: Low Risk  (4/22/2024)    Overall Financial Resource Strain (CARDIA)     Difficulty of Paying Living Expenses: Not hard at all   Food Insecurity: No Food Insecurity (4/22/2024)    Hunger Vital Sign     Worried About Running Out of Food in the Last Year: Never true     Ran Out of Food in the Last Year: Never true   Transportation Needs: No Transportation Needs (4/22/2024)    PRAPARE - Transportation      Lack of Transportation (Medical): No     Lack of Transportation (Non-Medical): No   Physical Activity: Sufficiently Active (4/22/2024)    Exercise Vital Sign     Days of Exercise per Week: 4 days     Minutes of Exercise per Session: 40 min   Stress: No Stress Concern Present (4/22/2024)    [employer] - Occupational Stress Questionnaire     Feeling of Stress : Not at all   Social Connections: Unknown (4/22/2024)    Social Connection and Isolation Panel [NHANES]     Frequency of Communication with Friends and Family: Three times a week     Frequency of Social Gatherings with Friends and Family: Three times a week     Active Member of Clubs or Organizations: Yes     Attends Club or Organization Meetings: 1 to 4 times per year     Marital Status:    Housing Stability: Low Risk  (11/24/2023)    Housing Stability Vital Sign     Unable to Pay for Housing in the Last Year: No     Number of Places Lived in the Last Year: 1     Unstable Housing in the Last Year: No       Review of patient's allergies indicates:   Allergen Reactions    Fluorescein-benoxinate Other (See Comments)    Pcn  [penicillins]      Other reaction(s): Unknown    Tropicamide Other (See Comments)    Clindamycin Rash         Objective:   VS (24h):   Vitals:    04/22/24 1354   BP: 132/64   Pulse: 62   Temp: 97.9 °F (36.6 °C)     [unfilled]  General: Afebrile, alert, comfortable, no acute distress.   HEENT: DIANA. EOMI, no scleral icterus.  Pulmonary: Non labored,clear to auscultation A/P/L. No wheezing, crackles, or rhonchi.  Cardiac: normal S1 & S2 w/o rubs/murmurs/gallops.   Abdominal: Non-tender, non-distended.  Extremities: Moves all extremities x 4.   Skin: No jaundice, rashes, or visible lesions.   Neurological:  Alert and oriented x 4.     Labs:    Glucose   Date Value Ref Range Status   04/18/2024 84 70 - 110 mg/dL Final   02/01/2024 84 70 - 110 mg/dL Final   11/29/2023 97 70 - 110 mg/dL Final       Calcium    Date Value Ref Range Status   04/18/2024 9.7 8.7 - 10.5 mg/dL Final   02/01/2024 9.9 8.7 - 10.5 mg/dL Final   11/29/2023 9.5 8.7 - 10.5 mg/dL Final       Albumin   Date Value Ref Range Status   04/18/2024 3.4 (L) 3.5 - 5.2 g/dL Final   02/01/2024 3.5 3.5 - 5.2 g/dL Final   11/29/2023 3.7 3.5 - 5.2 g/dL Final       Total Protein   Date Value Ref Range Status   04/18/2024 6.9 6.0 - 8.4 g/dL Final   02/01/2024 7.1 6.0 - 8.4 g/dL Final   11/29/2023 7.2 6.0 - 8.4 g/dL Final       Sodium   Date Value Ref Range Status   04/18/2024 143 136 - 145 mmol/L Final   02/01/2024 143 136 - 145 mmol/L Final   11/29/2023 141 136 - 145 mmol/L Final       Potassium   Date Value Ref Range Status   04/18/2024 3.6 3.5 - 5.1 mmol/L Final   02/01/2024 4.2 3.5 - 5.1 mmol/L Final   11/29/2023 3.8 3.5 - 5.1 mmol/L Final       CO2   Date Value Ref Range Status   04/18/2024 24 23 - 29 mmol/L Final   02/01/2024 28 23 - 29 mmol/L Final   11/29/2023 28 23 - 29 mmol/L Final       Chloride   Date Value Ref Range Status   04/18/2024 107 95 - 110 mmol/L Final   02/01/2024 108 95 - 110 mmol/L Final   11/29/2023 104 95 - 110 mmol/L Final       BUN   Date Value Ref Range Status   04/18/2024 13 8 - 23 mg/dL Final   02/01/2024 15 8 - 23 mg/dL Final   11/29/2023 14 8 - 23 mg/dL Final       Creatinine   Date Value Ref Range Status   04/18/2024 1.2 0.5 - 1.4 mg/dL Final   02/01/2024 1.4 0.5 - 1.4 mg/dL Final   11/29/2023 1.3 0.5 - 1.4 mg/dL Final       Alkaline Phosphatase   Date Value Ref Range Status   04/18/2024 115 55 - 135 U/L Final   02/01/2024 100 55 - 135 U/L Final   11/29/2023 99 55 - 135 U/L Final       ALT   Date Value Ref Range Status   04/18/2024 18 10 - 44 U/L Final   02/01/2024 13 10 - 44 U/L Final   11/29/2023 12 10 - 44 U/L Final       AST   Date Value Ref Range Status   04/18/2024 20 10 - 40 U/L Final   02/01/2024 20 10 - 40 U/L Final   11/29/2023 21 10 - 40 U/L Final       Total Bilirubin   Date Value Ref Range Status   04/18/2024 0.8 0.1 -  1.0 mg/dL Final     Comment:     For infants and newborns, interpretation of results should be based  on gestational age, weight and in agreement with clinical  observations.    Premature Infant recommended reference ranges:  Up to 24 hours.............<8.0 mg/dL  Up to 48 hours............<12.0 mg/dL  3-5 days..................<15.0 mg/dL  6-29 days.................<15.0 mg/dL     02/01/2024 0.8 0.1 - 1.0 mg/dL Final     Comment:     For infants and newborns, interpretation of results should be based  on gestational age, weight and in agreement with clinical  observations.    Premature Infant recommended reference ranges:  Up to 24 hours.............<8.0 mg/dL  Up to 48 hours............<12.0 mg/dL  3-5 days..................<15.0 mg/dL  6-29 days.................<15.0 mg/dL     11/29/2023 0.8 0.1 - 1.0 mg/dL Final     Comment:     For infants and newborns, interpretation of results should be based  on gestational age, weight and in agreement with clinical  observations.    Premature Infant recommended reference ranges:  Up to 24 hours.............<8.0 mg/dL  Up to 48 hours............<12.0 mg/dL  3-5 days..................<15.0 mg/dL  6-29 days.................<15.0 mg/dL         WBC   Date Value Ref Range Status   04/18/2024 8.30 3.90 - 12.70 K/uL Final   02/01/2024 8.50 3.90 - 12.70 K/uL Final   11/22/2023 9.96 3.90 - 12.70 K/uL Final       Hemoglobin   Date Value Ref Range Status   04/18/2024 13.6 (L) 14.0 - 18.0 g/dL Final   02/01/2024 14.3 14.0 - 18.0 g/dL Final   11/22/2023 13.8 (L) 14.0 - 18.0 g/dL Final       Hematocrit   Date Value Ref Range Status   04/18/2024 43.1 40.0 - 54.0 % Final   02/01/2024 43.7 40.0 - 54.0 % Final   11/22/2023 40.2 40.0 - 54.0 % Final       MCV   Date Value Ref Range Status   04/18/2024 94 82 - 98 fL Final   02/01/2024 91 82 - 98 fL Final   11/22/2023 88 82 - 98 fL Final       Platelets   Date Value Ref Range Status   04/18/2024 140 (L) 150 - 450 K/uL Final   02/01/2024 98 (L)  "150 - 450 K/uL Final   11/22/2023 119 (L) 150 - 450 K/uL Final       Lab Results   Component Value Date    CHOL 117 (L) 09/22/2023    CHOL 104 (L) 01/18/2023    CHOL 107 (L) 06/30/2022       Lab Results   Component Value Date    HDL 50 09/22/2023    HDL 48 01/18/2023    HDL 43 06/30/2022       Lab Results   Component Value Date    LDLCALC 55.2 (L) 09/22/2023    LDLCALC 45.8 (L) 01/18/2023    LDLCALC 53.0 (L) 06/30/2022       Lab Results   Component Value Date    TRIG 59 09/22/2023    TRIG 51 01/18/2023    TRIG 55 06/30/2022       Lab Results   Component Value Date    CHOLHDL 42.7 09/22/2023    CHOLHDL 46.2 01/18/2023    CHOLHDL 40.2 06/30/2022     No results found for: "RPR"  No results found for: "QUANTIFERON"    Medications:  Current Outpatient Medications on File Prior to Visit   Medication Sig Dispense Refill    amLODIPine (NORVASC) 10 MG tablet TAKE 1 TABLET EVERY DAY 90 tablet 3    aspirin 81 MG chewable tablet Take 1 tablet by mouth Daily. 81  By mouth Every day      azelastine (ASTELIN) 137 mcg (0.1 %) nasal spray 1 spray (137 mcg total) by Nasal route 2 (two) times daily. 30 mL 11    beta-carotene,A,-vits C,E/mins (OCUVITE ORAL) Take by mouth.      clopidogreL (PLAVIX) 75 mg tablet TAKE 1 TABLET EVERY DAY 90 tablet 1    famotidine (PEPCID) 20 MG tablet Take 20 mg by mouth before dinner.      fluticasone propionate (FLONASE) 50 mcg/actuation nasal spray 1 spray (50 mcg total) by Each Nostril route once daily. 16 g 11    furosemide (LASIX) 20 MG tablet Take 1 tablet (20 mg total) by mouth 2 (two) times daily. Take one tablet every other day orbas directed 30 tablet 11    metoprolol succinate (TOPROL-XL) 25 MG 24 hr tablet TAKE 1 TABLET TWICE DAILY 180 tablet 3    multivitamin with minerals tablet Take 1 tablet by mouth once daily.       nebulizer and compressor Archana 1 Device by Misc.(Non-Drug; Combo Route) route 2 (two) times a day. 1 each 0    niacin 500 MG tablet Take 500 mg by mouth Every PM.      " "nitroGLYCERIN (NITROSTAT) 0.4 MG SL tablet Take 1 tab under the tongue for chest pain. May repeat every 5 minutes for a total of 3 doses. If chest pain not relieved go to the ED. 25 tablet 4    omega-3 fatty acids-vitamin E 1,000 mg Cap Take 1 capsule by mouth 2 (two) times daily.      potassium chloride SA (K-DUR,KLOR-CON M) 10 MEQ tablet Take 1 tablet (10 mEq total) by mouth once daily. 30 tablet 11    pravastatin (PRAVACHOL) 40 MG tablet TAKE 1 TABLET EVERY EVENING 90 tablet 1    sacubitriL-valsartan (ENTRESTO)  mg per tablet Take 1 tablet by mouth 2 (two) times daily. 60 tablet 11    sodium chloride 3% 3 % nebulizer solution Take 4 mLs by nebulization 2 (two) times a day. 240 mL 5    sotaloL (BETAPACE) 120 MG Tab TAKE 1 TABLET TWICE DAILY 180 tablet 3    ubidecarenone (COENZYME Q10 ORAL) Take 1 tablet by mouth once daily.      vitamin D 1000 units Tab Take 1,000 Units by mouth every Tues, Thurs, Sat.        Current Facility-Administered Medications on File Prior to Visit   Medication Dose Route Frequency Provider Last Rate Last Admin    sodium chloride 0.9% bolus 1,000 mL  1,000 mL Intravenous Once Violette Delgado NP        vancomycin in dextrose 5 % 1 gram/250 mL IVPB 1,000 mg  1,000 mg Intravenous On Call Procedure Violette Delgado NP   1,000 mg at 03/18/22 1232       Antibiotics:   Antibiotics (From admission, onward)      None            HIV: No components found for: "HIV 1/2 AG/AB"  Hepatitis C IgG: No components found for: "HEPATITIS C"  Syphilis: No results found for: "RPR"    Hepatitis A IgG: No components found for: "HEPATITIS A IGG"  Hepatitis Bc IgG: No components found for: "HEPATITIS B CORE IGG"  Hepatitis Bs IgG:  Quantiferon: No results found for: "QUANTIFERON"  VZV IgG: No components found for: "VARICELLA IGG"    No components found for: "SEDIMENTATION RATE"  No components found for: "C-REACTIVE PROTEIN"      Microbiology x 7d:   Microbiology Results (last 7 days)       ** No results found " for the last 168 hours. **            Immunization History   Administered Date(s) Administered    COVID-19, MRNA, LN-S, PF (Pfizer) (Gray Cap) 07/28/2022    COVID-19, MRNA, LN-S, PF (Pfizer) (Purple Cap) 01/14/2021, 02/06/2021, 10/14/2021, 07/28/2022    Influenza 10/18/2007, 10/28/2008, 10/14/2009, 10/28/2010, 10/18/2011, 10/25/2012, 10/08/2018    Influenza (FLUAD) - Quadrivalent - Adjuvanted - PF *Preferred* (65+) 10/15/2020, 10/16/2021, 09/24/2022, 10/24/2023    Influenza - High Dose - PF (65 years and older) 10/08/2013, 10/09/2014, 11/03/2015, 10/25/2016, 10/10/2017, 10/08/2018, 10/29/2019    Influenza - Quadrivalent - PF *Preferred* (6 months and older) 10/25/2012    Influenza - Trivalent (ADULT) 10/18/2007, 10/28/2008, 10/14/2009, 10/28/2010, 10/18/2011, 10/25/2012    Influenza Split 10/25/2012    Pneumococcal Conjugate - 13 Valent 02/01/2017, 02/24/2017    Pneumococcal Polysaccharide - 23 Valent 08/23/2012    Td (ADULT) 10/18/2007    Tdap 11/22/2013         Reviewed records today as well as relevant labs, cultures, and imaging    Assessment:       Pseudomonas + resp culture 1/2/24 s/p course of cipro  MAC +resp culture 1/2/24, 2/8/24  M.abscessus + resp culture 12/18/23, 3/4/24      Symptoms improved with pseudomonas treatment. Has had several positive sputum cultures for NTM, but not growing same organism consistently and symptoms mild and no radiographic progression of infection. Pt prefers watchful waiting to starting antibiotics for NTM    No toxicities from antimicrobials  Extremely medically complex  Patient is high risk for infectious complications given drug resistant infections    Plan:     Continue afb sputum cultures surveillance q1-2 months    Repeat ct 6 months    Discussed importance of airway clearance. Encouraged use of Aerobika and nebulized hypertonic saline twice a day.     Prevent reflux- avoid stomach sleeping. Sleep inclined. Famotidine/tums preferrable to PPI if needed. Stop liquids 3hr  before bedtime.     Pt to meet with GI re: h/o barett's esophagus, which may be risk factor for ntm infection    Encourage probiotics    - The following labs were ordered:    Orders Placed This Encounter   Procedures    AFB Culture & Smear     Standing Status:   Future     Standing Expiration Date:   6/21/2025    CT Chest Without Contrast     Standing Status:   Future     Standing Expiration Date:   4/22/2025     Order Specific Question:   May the Radiologist modify the order per protocol to meet the clinical needs of the patient?     Answer:   Yes         The total time for evaluation and management services performed on 4/22/24 was greater than 30 minutes.  This includes face to face time and non-face to face time preparing to see the patient (eg, review of tests), obtaining and/or reviewing separately obtained history, documenting clinical information in the electronic or other health record, independently interpreting results, and communicating results to the patient/family/caregiver, or care coordination.             Eliza Rueda MD, MPH  Infectious Disease

## 2024-04-25 ENCOUNTER — OFFICE VISIT (OUTPATIENT)
Dept: INTERNAL MEDICINE | Facility: CLINIC | Age: 89
End: 2024-04-25
Payer: MEDICARE

## 2024-04-25 VITALS
DIASTOLIC BLOOD PRESSURE: 50 MMHG | WEIGHT: 166.44 LBS | HEIGHT: 72 IN | BODY MASS INDEX: 22.54 KG/M2 | SYSTOLIC BLOOD PRESSURE: 106 MMHG | OXYGEN SATURATION: 96 % | HEART RATE: 60 BPM

## 2024-04-25 DIAGNOSIS — J47.9 BRONCHIECTASIS WITHOUT COMPLICATION: Primary | ICD-10-CM

## 2024-04-25 DIAGNOSIS — A31.0 MYCOBACTERIUM AVIUM INFECTION: ICD-10-CM

## 2024-04-25 DIAGNOSIS — N18.31 CHRONIC KIDNEY DISEASE, STAGE 3A: ICD-10-CM

## 2024-04-25 DIAGNOSIS — D69.3 IDIOPATHIC THROMBOCYTOPENIC PURPURA: ICD-10-CM

## 2024-04-25 DIAGNOSIS — I50.42 CHRONIC COMBINED SYSTOLIC AND DIASTOLIC CONGESTIVE HEART FAILURE: ICD-10-CM

## 2024-04-25 DIAGNOSIS — E78.5 HYPERLIPIDEMIA, UNSPECIFIED HYPERLIPIDEMIA TYPE: ICD-10-CM

## 2024-04-25 DIAGNOSIS — I10 ESSENTIAL HYPERTENSION: ICD-10-CM

## 2024-04-25 DIAGNOSIS — I20.9 CARDIAC ANGINA: ICD-10-CM

## 2024-04-25 PROCEDURE — 99214 OFFICE O/P EST MOD 30 MIN: CPT | Mod: HCNC,S$GLB,, | Performed by: INTERNAL MEDICINE

## 2024-04-25 PROCEDURE — 99999 PR PBB SHADOW E&M-EST. PATIENT-LVL V: CPT | Mod: PBBFAC,HCNC,, | Performed by: INTERNAL MEDICINE

## 2024-04-25 PROCEDURE — 1159F MED LIST DOCD IN RCRD: CPT | Mod: HCNC,CPTII,S$GLB, | Performed by: INTERNAL MEDICINE

## 2024-04-25 PROCEDURE — 1160F RVW MEDS BY RX/DR IN RCRD: CPT | Mod: HCNC,CPTII,S$GLB, | Performed by: INTERNAL MEDICINE

## 2024-04-25 PROCEDURE — 1101F PT FALLS ASSESS-DOCD LE1/YR: CPT | Mod: HCNC,CPTII,S$GLB, | Performed by: INTERNAL MEDICINE

## 2024-04-25 PROCEDURE — 1157F ADVNC CARE PLAN IN RCRD: CPT | Mod: HCNC,CPTII,S$GLB, | Performed by: INTERNAL MEDICINE

## 2024-04-25 PROCEDURE — 3288F FALL RISK ASSESSMENT DOCD: CPT | Mod: HCNC,CPTII,S$GLB, | Performed by: INTERNAL MEDICINE

## 2024-04-25 PROCEDURE — 1126F AMNT PAIN NOTED NONE PRSNT: CPT | Mod: HCNC,CPTII,S$GLB, | Performed by: INTERNAL MEDICINE

## 2024-04-25 RX ORDER — NEBULIZER AND COMPRESSOR
1 EACH MISCELLANEOUS 2 TIMES DAILY
Qty: 1 EACH | Refills: 0 | Status: SHIPPED | OUTPATIENT
Start: 2024-04-25 | End: 2024-04-25 | Stop reason: SDUPTHER

## 2024-04-25 RX ORDER — NITROGLYCERIN 0.4 MG/1
TABLET SUBLINGUAL
Qty: 25 TABLET | Refills: 4 | Status: SHIPPED | OUTPATIENT
Start: 2024-04-25

## 2024-04-25 RX ORDER — NEBULIZER AND COMPRESSOR
1 EACH MISCELLANEOUS 2 TIMES DAILY
Qty: 1 EACH | Refills: 0 | Status: SHIPPED | OUTPATIENT
Start: 2024-04-25

## 2024-04-25 NOTE — PROGRESS NOTES
Patient ID: Nelson GIBBONS Parent is a 88 y.o. male.    Chief Complaint: Hypertension    HPI Nelson is a 88 y.o. male with  chronic kidney disease stage 3, hypertension, hyperlipidemia, CAD, MGUS, Tovar's esophagus, AICD, chronic systolic and diastolic CHF (echo 6/2020),  chronic ITP, recurrent pneumonias and MAC infection who presents with his wife for routine follow up of medical conditions. Since last visit with me, he has established care with pulmonologist Dr. Bradford and with infectious disease Dr. Rueda. Dr. Rueda treated his Pseudomonas lung infection with ciprofloxacin.  Since that time, he has been feeling well.  Per chart review, she plans to obtain more sputum samples to see if he needs treatment for MAC. Reviewed lab results from 4/18/24.    Health Maintenance Topics with due status: Not Due       Topic Last Completion Date    Lipid Panel 09/22/2023    Aspirin/Antiplatelet Therapy 04/25/2024      Review of Systems   All other systems reviewed and are negative.      Objective:     Vitals:    04/25/24 1245   BP: (!) 106/50   Pulse: 60        Physical Exam  Vitals reviewed.   Constitutional:       General: He is not in acute distress.     Appearance: Normal appearance. He is well-developed. He is not ill-appearing, toxic-appearing or diaphoretic.   HENT:      Head: Normocephalic and atraumatic.      Right Ear: External ear normal.      Left Ear: External ear normal.      Nose: Nose normal.   Eyes:      Extraocular Movements: Extraocular movements intact.      Conjunctiva/sclera: Conjunctivae normal.   Cardiovascular:      Rate and Rhythm: Normal rate and regular rhythm.      Pulses: Normal pulses.      Heart sounds: Normal heart sounds. No murmur heard.     No friction rub. No gallop.   Pulmonary:      Effort: Pulmonary effort is normal. No respiratory distress.      Breath sounds: Normal breath sounds. No stridor. No wheezing, rhonchi or rales.   Musculoskeletal:      Right lower leg: No edema.      Left  lower leg: No edema.   Skin:     General: Skin is warm and dry.   Neurological:      General: No focal deficit present.      Mental Status: He is alert and oriented to person, place, and time. Mental status is at baseline.   Psychiatric:         Mood and Affect: Mood normal.         Behavior: Behavior normal.         Thought Content: Thought content normal.         Judgment: Judgment normal.         Assessment:       1. Bronchiectasis without complication Chronic   2. Chronic combined systolic and diastolic congestive heart failure Well controlled   3. Essential hypertension Well controlled   4. Hyperlipidemia, unspecified hyperlipidemia type Chronic   5. Chronic kidney disease, stage 3a Chronic   6. Mycobacterium avium infection Chronic   7. Idiopathic thrombocytopenic purpura Chronic   8. Cardiac angina Inactive       Plan:         Bronchiectasis without complication  Comments:  continue to follow with pulmonology  Orders:  -     Discontinue: nebulizer and compressor Archana; 1 Device by Misc.(Non-Drug; Combo Route) route 2 (two) times a day.  Dispense: 1 each; Refill: 0  -     Discontinue: nebulizer and compressor Archana; 1 Device by Misc.(Non-Drug; Combo Route) route 2 (two) times a day.  Dispense: 1 each; Refill: 0  -     Discontinue: nebulizer and compressor Archana; 1 Device by Misc.(Non-Drug; Combo Route) route 2 (two) times a day.  Dispense: 1 each; Refill: 0  -     nebulizer and compressor Archana; 1 Device by Misc.(Non-Drug; Combo Route) route 2 (two) times a day.  Dispense: 1 each; Refill: 0    Chronic combined systolic and diastolic congestive heart failure  Comments:  Continue current medication.  Continue to follow with Cardiology.    Essential hypertension  Comments:  continue current medication    Hyperlipidemia, unspecified hyperlipidemia type  Comments:  continue current medication    Chronic kidney disease, stage 3a  Comments:  avoid NSAIDs. Continue to monitor    Mycobacterium avium  infection  Comments:  continue to follow with Pulm and ID    Idiopathic thrombocytopenic purpura  Comments:  continue to monitor    Cardiac angina  -     nitroGLYCERIN (NITROSTAT) 0.4 MG SL tablet; Take 1 tab under the tongue for chest pain. May repeat every 5 minutes for a total of 3 doses. If chest pain not relieved go to the ED.  Dispense: 25 tablet; Refill: 4        RTC summer, establish care     Warning signs discussed, patient to call with any further issues or worsening of symptoms.       Parts of the above note were dictated using a voice dictation software. Please excuse any grammatical or typographical errors.

## 2024-05-07 ENCOUNTER — OFFICE VISIT (OUTPATIENT)
Dept: PULMONOLOGY | Facility: CLINIC | Age: 89
End: 2024-05-07
Payer: MEDICARE

## 2024-05-07 VITALS
WEIGHT: 164.38 LBS | HEART RATE: 55 BPM | SYSTOLIC BLOOD PRESSURE: 117 MMHG | BODY MASS INDEX: 22.26 KG/M2 | DIASTOLIC BLOOD PRESSURE: 53 MMHG | HEIGHT: 72 IN

## 2024-05-07 DIAGNOSIS — R06.09 DOE (DYSPNEA ON EXERTION): ICD-10-CM

## 2024-05-07 DIAGNOSIS — J47.9 BRONCHIECTASIS WITHOUT COMPLICATION: Primary | ICD-10-CM

## 2024-05-07 PROCEDURE — 99214 OFFICE O/P EST MOD 30 MIN: CPT | Mod: S$GLB,,, | Performed by: INTERNAL MEDICINE

## 2024-05-07 PROCEDURE — 1126F AMNT PAIN NOTED NONE PRSNT: CPT | Mod: CPTII,S$GLB,, | Performed by: INTERNAL MEDICINE

## 2024-05-07 PROCEDURE — 3288F FALL RISK ASSESSMENT DOCD: CPT | Mod: CPTII,S$GLB,, | Performed by: INTERNAL MEDICINE

## 2024-05-07 PROCEDURE — 1101F PT FALLS ASSESS-DOCD LE1/YR: CPT | Mod: CPTII,S$GLB,, | Performed by: INTERNAL MEDICINE

## 2024-05-07 PROCEDURE — 99999 PR PBB SHADOW E&M-EST. PATIENT-LVL IV: CPT | Mod: PBBFAC,,, | Performed by: INTERNAL MEDICINE

## 2024-05-07 PROCEDURE — 1159F MED LIST DOCD IN RCRD: CPT | Mod: CPTII,S$GLB,, | Performed by: INTERNAL MEDICINE

## 2024-05-07 PROCEDURE — 1157F ADVNC CARE PLAN IN RCRD: CPT | Mod: CPTII,S$GLB,, | Performed by: INTERNAL MEDICINE

## 2024-05-07 NOTE — PROGRESS NOTES
Subjective:      Patient ID: Nelson Huntley is a 88 y.o. male.    Chief Complaint: No chief complaint on file.    Nelson Huntley is a 88 y.o. male who presents for initial evaluation of cough. The patient has not been seen in pulmonary clinic before. He has a pst medical history significant for CHF, CAD, MGUS, and GERD. The patient is on plavix and ASA.     He initially presented to his PCP in 10/2023 with complaints of cough for several months. He was treated conservatively as there was significant post-nasal drip. He was treated with doxycyline and antihistamines with some improvement in his cough. He subsequently had an episode of hemoptysis and chest tightness on 11/7 and presented to the ED for evaluation. X-ray was performed and had no acute abnormalities, he was at his baseline with monitoring and no return of hemoptysis so he was discharged. He presented again to the ED on 11/22 with return of cough and hemoptysis. CT at that time with multifocal tree-in-bud opacities, emphysematous changes, and bronchiectasis. He was started on cefpodoxime and azithromycin.     Today, patient reports no further episodes of hemoptysis. He felt he improved with his antibiotic course. After completing it he reports he does feel his cough is a bit worse than when he was on it, but overall much better than it was. He remains on plavix and aspirin. He denies any fever or chills. He lost 8lbs over the past few months, on purpose with diet and exercise. He denies night sweats. He is able to exercise on the treadmill without any shortness of breath.     Interval hx 2/27/2024: ID appointment tomorrow.   Denies fever or chills.      Interval hx 5/7/2024: Patient is extremely fatigue with minimal improvement.       Review of Systems   Respiratory:  Positive for cough, shortness of breath and dyspnea on extertion.      Objective:     Physical Exam   Constitutional: He is oriented to person, place, and time. He appears well-developed  and well-nourished. No distress.   Cardiovascular: Normal rate and regular rhythm. Exam reveals no friction rub.   Pulmonary/Chest: Normal expansion and symmetric chest wall expansion. He has no rhonchi. He has no rales.   Neurological: He is alert and oriented to person, place, and time.   Skin: Skin is warm and dry. He is not diaphoretic.   Nursing note and vitals reviewed.    Personal Diagnostic Review  none pertinent      5/7/2024     2:10 PM 4/25/2024    12:45 PM 4/22/2024     1:54 PM 3/1/2024     1:13 PM 2/28/2024     1:29 PM 2/27/2024     2:32 PM 2/19/2024     1:31 PM   Pulmonary Function Tests   SpO2  96 %  97 %  93 % 95 %   Height 6' (1.829 m) 6' (1.829 m) 6' (1.829 m) 6' (1.829 m)  6' (1.829 m) 6' (1.829 m)   Weight 74.6 kg (164 lb 5.7 oz) 75.5 kg (166 lb 7.2 oz) 75 kg (165 lb 5.5 oz) 76.2 kg (168 lb) 76.5 kg (168 lb 10.4 oz) 76 kg (167 lb 8.8 oz) 76.3 kg (168 lb 3.4 oz)   BMI (Calculated) 22.3 22.6 22.4 22.8 22.9 22.7 22.8        Assessment:     1. Bronchiectasis without complication    2. CARBONE (dyspnea on exertion)         Outpatient Encounter Medications as of 5/7/2024   Medication Sig Dispense Refill    aspirin 81 MG chewable tablet Take 1 tablet by mouth Daily. 81  By mouth Every day      azelastine (ASTELIN) 137 mcg (0.1 %) nasal spray 1 spray (137 mcg total) by Nasal route 2 (two) times daily. 30 mL 11    beta-carotene,A,-vits C,E/mins (OCUVITE ORAL) Take by mouth.      famotidine (PEPCID) 20 MG tablet Take 20 mg by mouth before dinner.      fluticasone propionate (FLONASE) 50 mcg/actuation nasal spray 1 spray (50 mcg total) by Each Nostril route once daily. 16 g 11    furosemide (LASIX) 20 MG tablet Take 1 tablet (20 mg total) by mouth 2 (two) times daily. Take one tablet every other day orbas directed 30 tablet 11    metoprolol succinate (TOPROL-XL) 25 MG 24 hr tablet TAKE 1 TABLET TWICE DAILY 180 tablet 3    multivitamin with minerals tablet Take 1 tablet by mouth once daily.       nebulizer and  compressor Archana 1 Device by Misc.(Non-Drug; Combo Route) route 2 (two) times a day. 1 each 0    niacin 500 MG tablet Take 500 mg by mouth Every PM.      nitroGLYCERIN (NITROSTAT) 0.4 MG SL tablet Take 1 tab under the tongue for chest pain. May repeat every 5 minutes for a total of 3 doses. If chest pain not relieved go to the ED. 25 tablet 4    omega-3 fatty acids-vitamin E 1,000 mg Cap Take 1 capsule by mouth 2 (two) times daily.      potassium chloride SA (K-DUR,KLOR-CON M) 10 MEQ tablet Take 1 tablet (10 mEq total) by mouth once daily. 30 tablet 11    pravastatin (PRAVACHOL) 40 MG tablet TAKE 1 TABLET EVERY EVENING 90 tablet 1    sacubitriL-valsartan (ENTRESTO)  mg per tablet Take 1 tablet by mouth 2 (two) times daily. 60 tablet 11    sodium chloride 3% 3 % nebulizer solution Take 4 mLs by nebulization 2 (two) times a day. 240 mL 5    sotaloL (BETAPACE) 120 MG Tab TAKE 1 TABLET TWICE DAILY 180 tablet 3    ubidecarenone (COENZYME Q10 ORAL) Take 1 tablet by mouth once daily.      vitamin D 1000 units Tab Take 1,000 Units by mouth every Tues, Thurs, Sat.       [DISCONTINUED] amLODIPine (NORVASC) 10 MG tablet TAKE 1 TABLET EVERY DAY 90 tablet 3    [DISCONTINUED] clopidogreL (PLAVIX) 75 mg tablet TAKE 1 TABLET EVERY DAY 90 tablet 1    [DISCONTINUED] nebulizer and compressor Archana 1 Device by Misc.(Non-Drug; Combo Route) route 2 (two) times a day. (Patient not taking: Reported on 4/25/2024) 1 each 0    [DISCONTINUED] nitroGLYCERIN (NITROSTAT) 0.4 MG SL tablet Take 1 tab under the tongue for chest pain. May repeat every 5 minutes for a total of 3 doses. If chest pain not relieved go to the ED. 25 tablet 4     Facility-Administered Encounter Medications as of 5/7/2024   Medication Dose Route Frequency Provider Last Rate Last Admin    sodium chloride 0.9% bolus 1,000 mL  1,000 mL Intravenous Once Violette Delgado, ELIZABETH        vancomycin in dextrose 5 % 1 gram/250 mL IVPB 1,000 mg  1,000 mg Intravenous On Call Procedure  "Violette Delgado, NP   1,000 mg at 03/18/22 1232     Orders Placed This Encounter   Procedures    Complete PFT with bronchodilator     Standing Status:   Future     Standing Expiration Date:   5/7/2025     Order Specific Question:   Release to patient     Answer:   Immediate    Six Minute Walk Test to qualify for Home Oxygen     Standing Status:   Future     Standing Expiration Date:   5/7/2025     Order Specific Question:   Release to patient     Answer:   Immediate       Plan:      1. Bronchiectasis without complication  -     Complete PFT with bronchodilator; Future  -     Six Minute Walk Test to qualify for Home Oxygen; Future    2. CARBONE (dyspnea on exertion)  Assessment & Plan:  Patient states he has no issues at rest. He states that he feels extremely fatigued with minimal activity. THis seems unlikely to be respiratory. No hypoxia associated with "fatigue". He is bradycardic on presentation. HR in the 50's.  Will order PFTs and 6 minute walk.           "

## 2024-05-08 DIAGNOSIS — I25.10 CORONARY ARTERY DISEASE INVOLVING NATIVE CORONARY ARTERY OF NATIVE HEART WITHOUT ANGINA PECTORIS: ICD-10-CM

## 2024-05-08 DIAGNOSIS — I10 ESSENTIAL HYPERTENSION: ICD-10-CM

## 2024-05-08 RX ORDER — AMLODIPINE BESYLATE 10 MG/1
TABLET ORAL
Qty: 90 TABLET | Refills: 3 | Status: SHIPPED | OUTPATIENT
Start: 2024-05-08

## 2024-05-08 RX ORDER — CLOPIDOGREL BISULFATE 75 MG/1
TABLET ORAL
Qty: 90 TABLET | Refills: 3 | Status: SHIPPED | OUTPATIENT
Start: 2024-05-08

## 2024-05-08 NOTE — TELEPHONE ENCOUNTER
Refill Routing Note   Medication(s) are not appropriate for processing by [facility] for the following reason(s):        Drug-disease interaction    [facility] action(s):  Defer  Approve        Medication Therapy Plan: Drug-Disease: clopidogreL and Idiopathic thrombocytopenic purpura; Hemoptysis    Pharmacist review requested: Yes     Appointments  past 12m or future 3m with PCP    Date Provider   Last Visit   4/25/2024 Bety Tomlinson MD   Next Visit   Visit date not found Bety Tomlinson MD   ED visits in past 90 days: 0        Note composed:11:40 AM 05/08/2024

## 2024-05-08 NOTE — TELEPHONE ENCOUNTER
Refill Decision Note   Edward Parent  is requesting a refill authorization.  Brief Assessment and Rationale for Refill:  Approve     Medication Therapy Plan:         Pharmacist review requested: Yes   Extended chart review required: Yes   Comments:     Note composed:1:23 PM 05/08/2024

## 2024-05-08 NOTE — TELEPHONE ENCOUNTER
No care due was identified.  [facility] Embedded Care Due Messages. Reference number: [id number].   5/08/2024 3:09:26 AM CDT

## 2024-05-09 PROBLEM — R06.09 DOE (DYSPNEA ON EXERTION): Status: ACTIVE | Noted: 2024-05-09

## 2024-05-10 NOTE — ASSESSMENT & PLAN NOTE
"Patient states he has no issues at rest. He states that he feels extremely fatigued with minimal activity. THis seems unlikely to be respiratory. No hypoxia associated with "fatigue". He is bradycardic on presentation. HR in the 50's.  Will order PFTs and 6 minute walk.   "

## 2024-05-17 LAB
ACID FAST MOD KINY STN SPEC: ABNORMAL
ACID FAST MOD KINY STN SPEC: ABNORMAL
MYCOBACTERIUM SPEC QL CULT: ABNORMAL
MYCOBACTERIUM SPEC QL CULT: ABNORMAL

## 2024-05-21 ENCOUNTER — HOSPITAL ENCOUNTER (OUTPATIENT)
Dept: PULMONOLOGY | Facility: HOSPITAL | Age: 89
Discharge: HOME | End: 2024-05-21
Attending: INTERNAL MEDICINE
Payer: MEDICARE

## 2024-05-21 ENCOUNTER — OFFICE VISIT (OUTPATIENT)
Dept: GASTROENTEROLOGY | Facility: CLINIC | Age: 89
End: 2024-05-21
Payer: MEDICARE

## 2024-05-21 VITALS — HEIGHT: 72 IN | WEIGHT: 166.88 LBS | BODY MASS INDEX: 22.6 KG/M2

## 2024-05-21 DIAGNOSIS — A31.0 MYCOBACTERIUM AVIUM INFECTION: ICD-10-CM

## 2024-05-21 DIAGNOSIS — K22.70 BARRETT'S ESOPHAGUS WITHOUT DYSPLASIA: Primary | ICD-10-CM

## 2024-05-21 DIAGNOSIS — J47.9 BRONCHIECTASIS WITHOUT COMPLICATION: ICD-10-CM

## 2024-05-21 DIAGNOSIS — K21.9 GASTROESOPHAGEAL REFLUX DISEASE WITHOUT ESOPHAGITIS: ICD-10-CM

## 2024-05-21 PROCEDURE — 1101F PT FALLS ASSESS-DOCD LE1/YR: CPT | Mod: HCNC,CPTII,S$GLB, | Performed by: INTERNAL MEDICINE

## 2024-05-21 PROCEDURE — 1157F ADVNC CARE PLAN IN RCRD: CPT | Mod: HCNC,CPTII,S$GLB, | Performed by: INTERNAL MEDICINE

## 2024-05-21 PROCEDURE — 3288F FALL RISK ASSESSMENT DOCD: CPT | Mod: HCNC,CPTII,S$GLB, | Performed by: INTERNAL MEDICINE

## 2024-05-21 PROCEDURE — 94618 PULMONARY STRESS TESTING: CPT | Mod: HCNC

## 2024-05-21 PROCEDURE — 1126F AMNT PAIN NOTED NONE PRSNT: CPT | Mod: HCNC,CPTII,S$GLB, | Performed by: INTERNAL MEDICINE

## 2024-05-21 PROCEDURE — 1159F MED LIST DOCD IN RCRD: CPT | Mod: HCNC,CPTII,S$GLB, | Performed by: INTERNAL MEDICINE

## 2024-05-21 PROCEDURE — 94070 EVALUATION OF WHEEZING: CPT | Mod: HCNC

## 2024-05-21 PROCEDURE — 99999 PR PBB SHADOW E&M-EST. PATIENT-LVL IV: CPT | Mod: PBBFAC,HCNC,, | Performed by: INTERNAL MEDICINE

## 2024-05-21 PROCEDURE — 99215 OFFICE O/P EST HI 40 MIN: CPT | Mod: HCNC,S$GLB,, | Performed by: INTERNAL MEDICINE

## 2024-05-21 NOTE — LETTER
May 21, 2024        Eliza Rueda MD  [address]  [facility] [address]             [facility]  [address]  [address]  [place] [address]  Phone: [phone]   Patient: Nelson Huntley   MR Number: [id number]   YOB: 1935   Date of Visit: 5/21/2024       Dear Dr. Rueda:    Thank you for referring Nelson Huntley to me for evaluation. Below are the relevant portions of my assessment and plan of care.            If you have questions, please do not hesitate to call me. I look forward to following Nelson along with you.    Sincerely,      Terry Ulrich MD           CC    No Recipients

## 2024-05-21 NOTE — PATIENT INSTRUCTIONS
EGD Instructions    [facility] [address]  Clinic Office [phone]  Endoscopy Lab [phone]    You are scheduled for an EGD with Dr. Ulrich  on  06/25/2024 at [facility] in [place].  Someone from the hospital will call you 7 days prior to the procedure with an arrival time.  Check in at the Hospital -1st floor, Information desk.   Call [phone] to reschedule.    You cannot have anything to eat or drink after Midnight. You can brush your teeth with a sip of water.     An adult friend/family member must come with you to drive you home.  You cannot drive, take a taxi, Uber/Lyft or bus to leave the Endoscopy Center alone.  If you do not have someone to drive you home, your test will be cancelled.     Please follow the directions of your doctor if you take any pills that thin your blood. If you take these meds: Aggrenox, Brilinta, Effient, Eliquis, Lovenox, Plavix, Pletal, Pradaxa, Ticilid, Xarelto or Coumadin, let the doctor's office know.    Please hold any GLP-1 medications prior to the procedure: Dulaglutide Trulicity(hold week prior), Exenatide Byetta (hold the morning of procedure), Semaglutide Ozempic (hold week prior), Liraglutide Victoza, Saxenda(hold week prior), Lixisenatide Adlyxin (hold the morning of procedure), Semaglutide Rybelsus (hold the morning of procedure), Tirzepatide Mounjaro (hold week prior)     DON'T: On the morning of the test do not take insulin or pills for diabetes.     DO: On the morning of the test, do take any pills for blood pressure, heart, anti-rejection and or seizures with a small sip of water. Bring any inhalers with you.    Leave all valuables and jewelry at home. You will be at the hospital for 2-4 hours.    Call the Endoscopy department at [phone] with any questions about your procedure.    Thank you for choosing [facility].

## 2024-05-21 NOTE — PROGRESS NOTES
GASTROENTEROLOGY CLINIC NOTE    Reason for visit: The primary encounter diagnosis was Tovar's esophagus without dysplasia. Diagnoses of Mycobacterium avium infection and Gastroesophageal reflux disease without esophagitis were also pertinent to this visit.  Referring provider/PCP: Bety Tomlinson MD    HPI:  Nelson Huntley is a 88 y.o. male here today for follow up  Previously seeing dr ray.  Hx of barretts.    Interval  Here to discuss recs from his ID doc, about his MAC infection in lungs. Hx of barretts.  Previously on omeprazole and has since transitiioned to pepcid once a day because of kidneys.  Not having reflux. Does have some discomfort or irritation in the epigastrium. No true heartburn. No food coming back into mouth.  No choking .    ========================  Initial visit with me  He has recently over the past 30-60 days noticed some changes in his bowel habits, describing somewhat looser stool although there is no diarrhea.  He does mention concerns about pancreatitis although there is no oily or fatty stools.  There is no weight loss.  He declines having a CAT scan done of his pancreas although I offered that to him.  They also has had some indigestion at times.  He has a known medium-sized hiatal hernia.  There is no blood in the stool.  He mentions the stool looks a little bit more yellow in color than 8 than usual.  He does note he used to take Metamucil many months ago but he stopped this recently.  He is also on famotidine for reflux but he was previously on omeprazole and Protonix but he stopped these because of kidney issues and he is not interested in resuming these at this time.  Bowel habit changes worse with ice cream, brisotol 3-5    Prior Endoscopy:  EGD:  Hx of barretts.  Medium HH    Colon:  2018  For + cologuard  Couple polyps    (Portions of this note were dictated using voice recognition software and may contain dictation related errors in spelling/grammar/syntax  not found on text review)    Review of Systems   Constitutional:  Negative for fever and malaise/fatigue.   Respiratory:  Negative for cough and shortness of breath.    Cardiovascular:  Negative for chest pain and palpitations.   Gastrointestinal:  Negative for abdominal pain, blood in stool, nausea and vomiting.   Neurological:  Negative for dizziness and headaches.       Past Medical History: has a past medical history of AICD (automatic cardioverter/defibrillator) present, Cardiomyopathy, CKD (chronic kidney disease) stage 3, GFR 30-59 ml/min, Coronary artery disease, GERD (gastroesophageal reflux disease), Hyperlipidemia, Hypertension, Ischemic cardiomyopathy, MGUS (monoclonal gammopathy of unknown significance), Thrombocytopenia, Ventricular tachycardia, and VT (ventricular tachycardia).    Past Surgical History: has a past surgical history that includes Coronary angioplasty; Cardiac defibrillator placement; Hernia repair; Abdominal aortic aneurysm repair; Eye surgery (Bilateral); Cataract extraction, bilateral (2018); and replacement of implantable cardioverter-defibrillator (icd) generator (Left, 3/18/2022).    Family History:family history includes Cancer in his brother and mother; Coronary artery disease in his mother; Heart attacks under age 50 in his father; Heart disease in his father, mother, and sister; Lymphoma in his mother.    Allergies:   Review of patient's allergies indicates:   Allergen Reactions    Fluorescein-benoxinate Other (See Comments)    Pcn  [penicillins]      Other reaction(s): Unknown    Tropicamide Other (See Comments)    Clindamycin Rash       Social History: reports that he has never smoked. He has been exposed to tobacco smoke. He has never used smokeless tobacco. He reports that he does not drink alcohol and does not use drugs.    Home medications:   Current Outpatient Medications on File Prior to Visit   Medication Sig Dispense Refill    amLODIPine (NORVASC) 10 MG tablet TAKE 1  TABLET EVERY DAY 90 tablet 3    aspirin 81 MG chewable tablet Take 1 tablet by mouth Daily. 81  By mouth Every day      azelastine (ASTELIN) 137 mcg (0.1 %) nasal spray 1 spray (137 mcg total) by Nasal route 2 (two) times daily. 30 mL 11    beta-carotene,A,-vits C,E/mins (OCUVITE ORAL) Take by mouth.      clopidogreL (PLAVIX) 75 mg tablet TAKE 1 TABLET EVERY DAY 90 tablet 3    famotidine (PEPCID) 20 MG tablet Take 20 mg by mouth before dinner.      fluticasone propionate (FLONASE) 50 mcg/actuation nasal spray 1 spray (50 mcg total) by Each Nostril route once daily. 16 g 11    furosemide (LASIX) 20 MG tablet Take 1 tablet (20 mg total) by mouth 2 (two) times daily. Take one tablet every other day orbas directed 30 tablet 11    metoprolol succinate (TOPROL-XL) 25 MG 24 hr tablet TAKE 1 TABLET TWICE DAILY 180 tablet 3    multivitamin with minerals tablet Take 1 tablet by mouth once daily.       nebulizer and compressor Archana 1 Device by Misc.(Non-Drug; Combo Route) route 2 (two) times a day. 1 each 0    niacin 500 MG tablet Take 500 mg by mouth Every PM.      nitroGLYCERIN (NITROSTAT) 0.4 MG SL tablet Take 1 tab under the tongue for chest pain. May repeat every 5 minutes for a total of 3 doses. If chest pain not relieved go to the ED. 25 tablet 4    omega-3 fatty acids-vitamin E 1,000 mg Cap Take 1 capsule by mouth 2 (two) times daily.      potassium chloride SA (K-DUR,KLOR-CON M) 10 MEQ tablet Take 1 tablet (10 mEq total) by mouth once daily. 30 tablet 11    pravastatin (PRAVACHOL) 40 MG tablet TAKE 1 TABLET EVERY EVENING 90 tablet 1    sacubitriL-valsartan (ENTRESTO)  mg per tablet Take 1 tablet by mouth 2 (two) times daily. 60 tablet 11    sodium chloride 3% 3 % nebulizer solution Take 4 mLs by nebulization 2 (two) times a day. 240 mL 5    sotaloL (BETAPACE) 120 MG Tab TAKE 1 TABLET TWICE DAILY 180 tablet 3    ubidecarenone (COENZYME Q10 ORAL) Take 1 tablet by mouth once daily.      vitamin D 1000 units Tab  Take 1,000 Units by mouth every Tues, Thurs, Sat.        Current Facility-Administered Medications on File Prior to Visit   Medication Dose Route Frequency Provider Last Rate Last Admin    sodium chloride 0.9% bolus 1,000 mL  1,000 mL Intravenous Once Violette Delgado, ELIZABETH        vancomycin in dextrose 5 % 1 gram/250 mL IVPB 1,000 mg  1,000 mg Intravenous On Call Procedure Violette Delgado, ELIZABETH   1,000 mg at 03/18/22 1232       Vital signs:  Ht 6' (1.829 m)   Wt 75.7 kg (166 lb 14.2 oz)   BMI 22.63 kg/m²     Physical Exam  Vitals reviewed.   Constitutional:       Appearance: He is not toxic-appearing.   HENT:      Head: Normocephalic and atraumatic.   Eyes:      General: No scleral icterus.     Conjunctiva/sclera: Conjunctivae normal.   Neurological:      Mental Status: He is alert and oriented to person, place, and time.      Gait: Gait normal.   Psychiatric:         Mood and Affect: Mood normal.         Behavior: Behavior normal.         I have reviewed prior labs, imaging, notes from last month    Assessment:  1. Tovar's esophagus without dysplasia    2. Mycobacterium avium infection    3. Gastroesophageal reflux disease without esophagitis      The question that is being posed , is there a GI relation to MAC infection such as aspiration / regurg ,etc in the setting of barretts.    Plan:  Orders Placed This Encounter    [place] Upper GI to include esophagram    Case Request Endoscopy: EGD (ESOPHAGOGASTRODUODENOSCOPY)     Plan EGD for barretts, and rule out reflux / regurg.   - higher than avg risk, needs to hold plavix.     Continue pepcid for now.    Set up upper GI series to eval regurg / rule out silent aspiration etc    Discussed and offered colonoscopy at same time, and he wishes to defer / decline this.    RTC based on above.      Terry Ulrich MD  [facility] [place]

## 2024-05-24 NOTE — PROCEDURES
Nelson Huntley is a 88 y.o.   male patient, who presents for a 6 minute walk test ordered by Dr Bradford .  The diagnosis is bronchiestasis .  The patient's BMI is  22.63 kg/m2.    Predicted distance (lower limit of normal) is  388 meters.      Test Results:    The test was completed .  The patient stopped 0  times for a total of 0 seconds.  The total time walked was 360 seconds.  During walking, the patient reported:SOB   .    05/24/2024---------Distance:   ( )563m     O2 Sat % Supplemental Oxygen Heart Rate Blood Pressure Leland Scale   Pre-exercise  (Resting)  98 ra 63 na 1   During Exercise 93 ra 87 na 3   Post-exercise  (Recovery) 97  ra  63 na 1     Recovery Time:  15 seconds    Performing nurse/tech:  [facility]    SUMMARY/INTERPRETATION:    Total distance walked: 563  meters  Predicted distance: 388/ 541 meters  Percentage predicted: 104 %      The patient has not had a previous study.      CLINICAL INTERPRETATION:  Six minute walk distance is   (563m ) with very light dyspnea.  During exercise, there was no significant desaturation while breathing room air.

## 2024-05-26 PROCEDURE — 94618 PULMONARY STRESS TESTING: CPT | Mod: 26,HCNC,, | Performed by: INTERNAL MEDICINE

## 2024-05-27 ENCOUNTER — TELEPHONE (OUTPATIENT)
Dept: GASTROENTEROLOGY | Facility: CLINIC | Age: 89
End: 2024-05-27
Payer: MEDICARE

## 2024-05-27 NOTE — TELEPHONE ENCOUNTER
Spoke with patient's wife to schedule upper GI series. Informed wife that the only location with availability is the [facility]. Wife states that she would rather not travel to the  campus. States that she will wait until another location has availability. V/U

## 2024-05-27 NOTE — TELEPHONE ENCOUNTER
----- Message from Fish Resendiz MA sent at 5/21/2024 11:43 AM CDT -----  Schedule Upper GI series.

## 2024-05-29 LAB
BRPFT: NORMAL
FEF 25 75 LLN: 0.61
FEF 25 75 PRE REF: 70.2 %
FEF 25 75 REF: 1.83
FEV1 FVC LLN: 57
FEV1 FVC PRE REF: 96.6 %
FEV1 FVC REF: 73
FEV1 LLN: 1.87
FEV1 PRE REF: 76.7 %
FEV1 REF: 2.8
FVC LLN: 2.73
FVC PRE REF: 78.3 %
FVC REF: 3.88
PEF LLN: 4.15
PEF PRE REF: 112.9 %
PEF REF: 6.61
PRE FEF 25 75: 1.29 L/S (ref 0.61–3.71)
PRE FET 100: 9.09 SEC
PRE FEV1 FVC: 70.8 % (ref 57.48–88.01)
PRE FEV1: 2.15 L (ref 1.87–3.65)
PRE FVC: 3.04 L (ref 2.73–5.04)
PRE PEF: 7.46 L/S (ref 4.15–9.06)

## 2024-05-29 PROCEDURE — 94010 BREATHING CAPACITY TEST: CPT | Mod: 26,HCNC,, | Performed by: INTERNAL MEDICINE

## 2024-05-30 ENCOUNTER — PATIENT MESSAGE (OUTPATIENT)
Dept: PULMONOLOGY | Facility: CLINIC | Age: 89
End: 2024-05-30
Payer: MEDICARE

## 2024-05-30 ENCOUNTER — PATIENT MESSAGE (OUTPATIENT)
Dept: GASTROENTEROLOGY | Facility: CLINIC | Age: 89
End: 2024-05-30
Payer: MEDICARE

## 2024-05-30 DIAGNOSIS — R10.13 EPIGASTRIC PAIN: Primary | ICD-10-CM

## 2024-06-04 ENCOUNTER — LAB VISIT (OUTPATIENT)
Dept: LAB | Facility: HOSPITAL | Age: 89
End: 2024-06-04
Attending: INTERNAL MEDICINE
Payer: MEDICARE

## 2024-06-04 DIAGNOSIS — R10.13 EPIGASTRIC PAIN: ICD-10-CM

## 2024-06-04 LAB
CREAT SERPL-MCNC: 1.3 MG/DL (ref 0.5–1.4)
EST. GFR  (NO RACE VARIABLE): 52.8 ML/MIN/1.73 M^2

## 2024-06-04 PROCEDURE — 82565 ASSAY OF CREATININE: CPT | Performed by: INTERNAL MEDICINE

## 2024-06-04 PROCEDURE — 36415 COLL VENOUS BLD VENIPUNCTURE: CPT | Mod: PO | Performed by: INTERNAL MEDICINE

## 2024-06-05 ENCOUNTER — TELEPHONE (OUTPATIENT)
Dept: GASTROENTEROLOGY | Facility: HOSPITAL | Age: 89
End: 2024-06-05
Payer: MEDICARE

## 2024-06-05 ENCOUNTER — HOSPITAL ENCOUNTER (OUTPATIENT)
Dept: RADIOLOGY | Facility: HOSPITAL | Age: 89
Discharge: HOME | End: 2024-06-05
Attending: INTERNAL MEDICINE
Payer: MEDICARE

## 2024-06-05 DIAGNOSIS — Z98.890 S/P AAA (ABDOMINAL AORTIC ANEURYSM) REPAIR: Primary | ICD-10-CM

## 2024-06-05 DIAGNOSIS — Z86.79 S/P AAA (ABDOMINAL AORTIC ANEURYSM) REPAIR: Primary | ICD-10-CM

## 2024-06-05 DIAGNOSIS — R10.13 EPIGASTRIC PAIN: ICD-10-CM

## 2024-06-05 LAB
CREAT SERPL-MCNC: 1.5 MG/DL (ref 0.5–1.4)
SAMPLE: ABNORMAL

## 2024-06-05 PROCEDURE — 74160 CT ABDOMEN W/CONTRAST: CPT | Mod: 26,,, | Performed by: RADIOLOGY

## 2024-06-05 PROCEDURE — 25500020 PHARM REV CODE 255: Performed by: INTERNAL MEDICINE

## 2024-06-05 PROCEDURE — 74160 CT ABDOMEN W/CONTRAST: CPT | Mod: TC

## 2024-06-05 RX ADMIN — IOHEXOL 75 ML: 350 INJECTION, SOLUTION INTRAVENOUS at 11:06

## 2024-06-06 LAB
ACID FAST MOD KINY STN SPEC: NORMAL
MYCOBACTERIUM SPEC QL CULT: NORMAL

## 2024-06-07 ENCOUNTER — LAB VISIT (OUTPATIENT)
Dept: LAB | Facility: HOSPITAL | Age: 89
End: 2024-06-07
Attending: INTERNAL MEDICINE
Payer: MEDICARE

## 2024-06-07 DIAGNOSIS — A31.0 MYCOBACTERIUM AVIUM INFECTION: ICD-10-CM

## 2024-06-07 DIAGNOSIS — J15.1 PNEUMONIA OF BOTH LUNGS DUE TO PSEUDOMONAS SPECIES, UNSPECIFIED PART OF LUNG: ICD-10-CM

## 2024-06-07 PROCEDURE — 87116 MYCOBACTERIA CULTURE: CPT | Mod: HCNC | Performed by: INTERNAL MEDICINE

## 2024-06-07 PROCEDURE — 87206 SMEAR FLUORESCENT/ACID STAI: CPT | Mod: HCNC | Performed by: INTERNAL MEDICINE

## 2024-06-07 PROCEDURE — 87015 SPECIMEN INFECT AGNT CONCNTJ: CPT | Mod: HCNC | Performed by: INTERNAL MEDICINE

## 2024-06-08 ENCOUNTER — CLINICAL SUPPORT (OUTPATIENT)
Dept: CARDIOLOGY | Facility: HOSPITAL | Age: 89
End: 2024-06-08
Attending: INTERNAL MEDICINE
Payer: MEDICARE

## 2024-06-08 ENCOUNTER — CLINICAL SUPPORT (OUTPATIENT)
Dept: CARDIOLOGY | Facility: HOSPITAL | Age: 89
End: 2024-06-08
Payer: MEDICARE

## 2024-06-08 DIAGNOSIS — I25.5 ISCHEMIC CARDIOMYOPATHY: ICD-10-CM

## 2024-06-08 DIAGNOSIS — Z95.810 PRESENCE OF AUTOMATIC (IMPLANTABLE) CARDIAC DEFIBRILLATOR: ICD-10-CM

## 2024-06-08 PROCEDURE — 93296 REM INTERROG EVL PM/IDS: CPT | Mod: HCNC | Performed by: INTERNAL MEDICINE

## 2024-06-08 PROCEDURE — 93295 DEV INTERROG REMOTE 1/2/MLT: CPT | Mod: HCNC,,, | Performed by: INTERNAL MEDICINE

## 2024-06-10 PROBLEM — J15.1 PNEUMONIA OF BOTH LUNGS DUE TO PSEUDOMONAS SPECIES: Status: RESOLVED | Noted: 2024-03-05 | Resolved: 2024-06-10

## 2024-06-18 ENCOUNTER — TELEPHONE (OUTPATIENT)
Dept: ENDOSCOPY | Facility: HOSPITAL | Age: 89
End: 2024-06-18
Payer: MEDICARE

## 2024-06-18 ENCOUNTER — TELEPHONE (OUTPATIENT)
Dept: GASTROENTEROLOGY | Facility: CLINIC | Age: 89
End: 2024-06-18
Payer: MEDICARE

## 2024-06-18 RX ORDER — SOTALOL HYDROCHLORIDE 120 MG/1
120 TABLET ORAL 2 TIMES DAILY
Qty: 180 TABLET | Refills: 3 | Status: SHIPPED | OUTPATIENT
Start: 2024-06-18

## 2024-06-18 NOTE — TELEPHONE ENCOUNTER
Contacted Pt for endoscopy pre-call for upcoming procedure.  Pre-call complete, Pt does not have any further questions. Arrival time: 8:45AM

## 2024-06-18 NOTE — TELEPHONE ENCOUNTER
Spoke with patient and informed him of clearance and when to hold plavix. Patient verbalized understanding

## 2024-06-18 NOTE — TELEPHONE ENCOUNTER
Spoke with patient to see if pt is taking plavix. Patient states that he stopped taking plavix on his own since 06/15. I informed patient that he should continue to take medication until our office receives the approval from cardiologist. Patient states that he will continue to hold medication until his procedure on 06/25. Also informed patient that medication only needs to be held 5 days prior. Clearance message sent to cardiologist. Patient verbalized understanding.    ----- Message from Monica Quick sent at 6/18/2024 10:29 AM CDT -----  Type:  Patient Returning Call    Who Called: pt's wife  Who Left Message for Patient: Fish Resendiz MA  Does the patient know what this is regarding?:   Would the patient rather a call back or a response via [facility]? call  Best Call Back Number: [phone]  Additional Information:

## 2024-06-21 ENCOUNTER — TELEPHONE (OUTPATIENT)
Dept: ENDOSCOPY | Facility: HOSPITAL | Age: 89
End: 2024-06-21
Payer: MEDICARE

## 2024-06-21 NOTE — TELEPHONE ENCOUNTER
Spoke to pt to confirm arrival time of 8:45AM and NPO instructions for EGD on 6/25/24.  Pt verbalized understanding.

## 2024-06-24 LAB
OHS CV AF BURDEN PERCENT: < 1
OHS CV DC REMOTE DEVICE TYPE: NORMAL
OHS CV RV PACING PERCENT: 1 %

## 2024-07-03 ENCOUNTER — HOSPITAL ENCOUNTER (INPATIENT)
Facility: HOSPITAL | Age: 89
LOS: 3 days | Discharge: HOME | DRG: 191 | End: 2024-07-06
Attending: EMERGENCY MEDICINE | Admitting: STUDENT IN AN ORGANIZED HEALTH CARE EDUCATION/TRAINING PROGRAM
Payer: MEDICARE

## 2024-07-03 ENCOUNTER — TELEPHONE (OUTPATIENT)
Dept: VASCULAR SURGERY | Facility: CLINIC | Age: 89
End: 2024-07-03
Payer: MEDICARE

## 2024-07-03 DIAGNOSIS — I25.10 CORONARY ARTERY DISEASE INVOLVING NATIVE CORONARY ARTERY OF NATIVE HEART WITHOUT ANGINA PECTORIS: ICD-10-CM

## 2024-07-03 DIAGNOSIS — N18.31 CHRONIC KIDNEY DISEASE, STAGE 3A: ICD-10-CM

## 2024-07-03 DIAGNOSIS — R07.9 CHEST PAIN: ICD-10-CM

## 2024-07-03 DIAGNOSIS — J18.9 PNEUMONIA: ICD-10-CM

## 2024-07-03 DIAGNOSIS — I50.20 SYSTOLIC HEART FAILURE: ICD-10-CM

## 2024-07-03 DIAGNOSIS — R04.2 HEMOPTYSIS: ICD-10-CM

## 2024-07-03 DIAGNOSIS — A31.0 NONTUBERCULOUS MYCOBACTERIAL DISEASE OF LUNG: ICD-10-CM

## 2024-07-03 DIAGNOSIS — J47.9 BRONCHIECTASIS, NON-TUBERCULOUS: ICD-10-CM

## 2024-07-03 DIAGNOSIS — D69.6 THROMBOCYTOPENIA: Primary | ICD-10-CM

## 2024-07-03 DIAGNOSIS — I48.91 ATRIAL FIBRILLATION: ICD-10-CM

## 2024-07-03 LAB
ABO + RH BLD: NORMAL
ANION GAP SERPL CALC-SCNC: 11 MMOL/L (ref 8–16)
BASOPHILS # BLD AUTO: 0.06 K/UL (ref 0–0.2)
BASOPHILS NFR BLD: 0.6 % (ref 0–1.9)
BLD GP AB SCN CELLS X3 SERPL QL: NORMAL
BUN SERPL-MCNC: 18 MG/DL (ref 8–23)
CALCIUM SERPL-MCNC: 10 MG/DL (ref 8.7–10.5)
CHLORIDE SERPL-SCNC: 108 MMOL/L (ref 95–110)
CO2 SERPL-SCNC: 23 MMOL/L (ref 23–29)
CREAT SERPL-MCNC: 1.5 MG/DL (ref 0.5–1.4)
DIFFERENTIAL METHOD BLD: ABNORMAL
EOSINOPHIL # BLD AUTO: 0.2 K/UL (ref 0–0.5)
EOSINOPHIL NFR BLD: 1.9 % (ref 0–8)
ERYTHROCYTE [DISTWIDTH] IN BLOOD BY AUTOMATED COUNT: 14.6 % (ref 11.5–14.5)
ERYTHROCYTE [DISTWIDTH] IN BLOOD BY AUTOMATED COUNT: 14.6 % (ref 11.5–14.5)
EST. GFR  (NO RACE VARIABLE): 45 ML/MIN/1.73 M^2
GLUCOSE SERPL-MCNC: 106 MG/DL (ref 70–110)
HCT VFR BLD AUTO: 39.6 % (ref 40–54)
HCT VFR BLD AUTO: 40 % (ref 40–54)
HGB BLD-MCNC: 13.3 G/DL (ref 14–18)
HGB BLD-MCNC: 13.3 G/DL (ref 14–18)
IMM GRANULOCYTES # BLD AUTO: 0.05 K/UL (ref 0–0.04)
IMM GRANULOCYTES NFR BLD AUTO: 0.5 % (ref 0–0.5)
LYMPHOCYTES # BLD AUTO: 2.3 K/UL (ref 1–4.8)
LYMPHOCYTES NFR BLD: 22.9 % (ref 18–48)
MCH RBC QN AUTO: 29.4 PG (ref 27–31)
MCH RBC QN AUTO: 29.4 PG (ref 27–31)
MCHC RBC AUTO-ENTMCNC: 33.3 G/DL (ref 32–36)
MCHC RBC AUTO-ENTMCNC: 33.6 G/DL (ref 32–36)
MCV RBC AUTO: 87 FL (ref 82–98)
MCV RBC AUTO: 89 FL (ref 82–98)
MONOCYTES # BLD AUTO: 0.9 K/UL (ref 0.3–1)
MONOCYTES NFR BLD: 9 % (ref 4–15)
NEUTROPHILS # BLD AUTO: 6.5 K/UL (ref 1.8–7.7)
NEUTROPHILS NFR BLD: 65.1 % (ref 38–73)
NRBC BLD-RTO: 0 /100 WBC
PLATELET # BLD AUTO: 109 K/UL (ref 150–450)
PLATELET # BLD AUTO: 110 K/UL (ref 150–450)
PMV BLD AUTO: 12.1 FL (ref 9.2–12.9)
PMV BLD AUTO: 12.3 FL (ref 9.2–12.9)
POTASSIUM SERPL-SCNC: 4.1 MMOL/L (ref 3.5–5.1)
RBC # BLD AUTO: 4.52 M/UL (ref 4.6–6.2)
RBC # BLD AUTO: 4.53 M/UL (ref 4.6–6.2)
SODIUM SERPL-SCNC: 142 MMOL/L (ref 136–145)
SPECIMEN OUTDATE: NORMAL
WBC # BLD AUTO: 9.73 K/UL (ref 3.9–12.7)
WBC # BLD AUTO: 9.94 K/UL (ref 3.9–12.7)

## 2024-07-03 PROCEDURE — 86850 RBC ANTIBODY SCREEN: CPT | Mod: HCNC | Performed by: STUDENT IN AN ORGANIZED HEALTH CARE EDUCATION/TRAINING PROGRAM

## 2024-07-03 PROCEDURE — 63600175 PHARM REV CODE 636 W HCPCS: Mod: HCNC | Performed by: STUDENT IN AN ORGANIZED HEALTH CARE EDUCATION/TRAINING PROGRAM

## 2024-07-03 PROCEDURE — 25000003 PHARM REV CODE 250: Mod: HCNC | Performed by: STUDENT IN AN ORGANIZED HEALTH CARE EDUCATION/TRAINING PROGRAM

## 2024-07-03 PROCEDURE — 99285 EMERGENCY DEPT VISIT HI MDM: CPT | Mod: 25,HCNC

## 2024-07-03 PROCEDURE — 86900 BLOOD TYPING SEROLOGIC ABO: CPT | Mod: HCNC | Performed by: STUDENT IN AN ORGANIZED HEALTH CARE EDUCATION/TRAINING PROGRAM

## 2024-07-03 PROCEDURE — 36415 COLL VENOUS BLD VENIPUNCTURE: CPT | Mod: HCNC | Performed by: STUDENT IN AN ORGANIZED HEALTH CARE EDUCATION/TRAINING PROGRAM

## 2024-07-03 PROCEDURE — 11000001 HC ACUTE MED/SURG PRIVATE ROOM: Mod: HCNC

## 2024-07-03 PROCEDURE — 96365 THER/PROPH/DIAG IV INF INIT: CPT | Mod: HCNC

## 2024-07-03 PROCEDURE — 80048 BASIC METABOLIC PNL TOTAL CA: CPT | Mod: HCNC | Performed by: EMERGENCY MEDICINE

## 2024-07-03 PROCEDURE — 85027 COMPLETE CBC AUTOMATED: CPT | Mod: HCNC | Performed by: STUDENT IN AN ORGANIZED HEALTH CARE EDUCATION/TRAINING PROGRAM

## 2024-07-03 PROCEDURE — 96367 TX/PROPH/DG ADDL SEQ IV INF: CPT | Mod: HCNC

## 2024-07-03 PROCEDURE — 85025 COMPLETE CBC W/AUTO DIFF WBC: CPT | Mod: HCNC | Performed by: EMERGENCY MEDICINE

## 2024-07-03 PROCEDURE — 86901 BLOOD TYPING SEROLOGIC RH(D): CPT | Mod: HCNC | Performed by: STUDENT IN AN ORGANIZED HEALTH CARE EDUCATION/TRAINING PROGRAM

## 2024-07-03 RX ORDER — GLUCAGON 1 MG
1 KIT INJECTION
Status: DISCONTINUED | OUTPATIENT
Start: 2024-07-03 | End: 2024-07-06 | Stop reason: HOSPADM

## 2024-07-03 RX ORDER — IBUPROFEN 200 MG
16 TABLET ORAL
Status: DISCONTINUED | OUTPATIENT
Start: 2024-07-03 | End: 2024-07-06 | Stop reason: HOSPADM

## 2024-07-03 RX ORDER — FUROSEMIDE 20 MG/1
20 TABLET ORAL DAILY
Status: DISCONTINUED | OUTPATIENT
Start: 2024-07-04 | End: 2024-07-06 | Stop reason: HOSPADM

## 2024-07-03 RX ORDER — AMLODIPINE BESYLATE 5 MG/1
10 TABLET ORAL DAILY
Status: DISCONTINUED | OUTPATIENT
Start: 2024-07-04 | End: 2024-07-06 | Stop reason: HOSPADM

## 2024-07-03 RX ORDER — FAMOTIDINE 20 MG/1
20 TABLET, FILM COATED ORAL NIGHTLY
Status: DISCONTINUED | OUTPATIENT
Start: 2024-07-03 | End: 2024-07-06 | Stop reason: HOSPADM

## 2024-07-03 RX ORDER — PRAVASTATIN SODIUM 40 MG/1
40 TABLET ORAL NIGHTLY
Status: DISCONTINUED | OUTPATIENT
Start: 2024-07-03 | End: 2024-07-06 | Stop reason: HOSPADM

## 2024-07-03 RX ORDER — SODIUM CHLORIDE 0.9 % (FLUSH) 0.9 %
10 SYRINGE (ML) INJECTION EVERY 12 HOURS PRN
Status: DISCONTINUED | OUTPATIENT
Start: 2024-07-03 | End: 2024-07-06 | Stop reason: HOSPADM

## 2024-07-03 RX ORDER — NALOXONE HCL 0.4 MG/ML
0.02 VIAL (ML) INJECTION
Status: DISCONTINUED | OUTPATIENT
Start: 2024-07-03 | End: 2024-07-06 | Stop reason: HOSPADM

## 2024-07-03 RX ORDER — METOPROLOL SUCCINATE 25 MG/1
25 TABLET, EXTENDED RELEASE ORAL 2 TIMES DAILY
Status: DISCONTINUED | OUTPATIENT
Start: 2024-07-03 | End: 2024-07-06 | Stop reason: HOSPADM

## 2024-07-03 RX ORDER — ACETAMINOPHEN 325 MG/1
650 TABLET ORAL EVERY 4 HOURS PRN
Status: DISCONTINUED | OUTPATIENT
Start: 2024-07-03 | End: 2024-07-06 | Stop reason: HOSPADM

## 2024-07-03 RX ORDER — IBUPROFEN 200 MG
24 TABLET ORAL
Status: DISCONTINUED | OUTPATIENT
Start: 2024-07-03 | End: 2024-07-06 | Stop reason: HOSPADM

## 2024-07-03 RX ADMIN — CEFTRIAXONE SODIUM 2 G: 2 INJECTION, POWDER, FOR SOLUTION INTRAMUSCULAR; INTRAVENOUS at 06:07

## 2024-07-03 RX ADMIN — PRAVASTATIN SODIUM 40 MG: 20 TABLET ORAL at 10:07

## 2024-07-03 RX ADMIN — SACUBITRIL AND VALSARTAN 1 TABLET: 97; 103 TABLET, FILM COATED ORAL at 10:07

## 2024-07-03 RX ADMIN — FAMOTIDINE 20 MG: 20 TABLET ORAL at 10:07

## 2024-07-03 RX ADMIN — DOXYCYCLINE 100 MG: 100 INJECTION, POWDER, LYOPHILIZED, FOR SOLUTION INTRAVENOUS at 07:07

## 2024-07-03 NOTE — TELEPHONE ENCOUNTER
Patients wife stopped by the   to let us know that her  was going to the ED due to spitting up blood.

## 2024-07-03 NOTE — TELEPHONE ENCOUNTER
Contacted pt and wife Aleah in response to message. States appt was rescheduled to 7/24/24 at [facility] and that he would prefer to keep appt in [place] rather than being seen at [facility] [place] sooner date. Pt states he had AAA repair with Dr. Cruz 25 years ago and that CT scan was ordered because new GI doctor wanted to evaluate state of repair. Offered to add appt to wait list for possible sooner appt in [place], wife agreed. Appt added to wait list. ----- Message from Analisa Dorman sent at 7/3/2024  8:02 AM CDT -----  Needs advice from nurse:      Who Called:wife-Aleah  Regarding:returning a call to Natalia re: an appt  Would the patient rather a call back or VIA [facility]?  Best Call Back number: [phone]  Additional Info:

## 2024-07-03 NOTE — TELEPHONE ENCOUNTER
Attempted to contact patient in response to message. Voice message left for patient requesting return call.   ----- Message from Danita Iverson sent at 7/3/2024  1:37 PM CDT -----  .Type:  Needs Medical Advice    Who Called: pt wife Aleah    Would the patient rather a call back or a response via [facility]? Call back  Best Call Back Number: [phone]  Additional Information:     Pt wife stated pt missed a call and would like a call back please

## 2024-07-03 NOTE — PHARMACY MED REC
"[facility]           Pharmacy  Admission Medication History     The home medication history was taken by Hoa Strickland.      Medication history obtained from Medications listed below were obtained from: Patient/family    Based on information gathered for medication list, you may go to "Admission" then "Reconcile Home Medications" tabs to review and/or act upon those items.     The home medication list has been updated by the Pharmacy department.   Please read ALL comments highlighted in yellow.   Please address this information as you see fit.    Feel free to contact us if you have any questions or require assistance.        Current Facility-Administered Medications on File Prior to Encounter   Medication Dose Route Frequency Provider Last Rate Last Admin    sodium chloride 0.9% bolus 1,000 mL  1,000 mL Intravenous Once Violette Delgado NP        vancomycin in dextrose 5 % 1 gram/250 mL IVPB 1,000 mg  1,000 mg Intravenous On Call Procedure Violette Delgado NP   1,000 mg at 03/18/22 1232     Current Outpatient Medications on File Prior to Encounter   Medication Sig Dispense Refill    amLODIPine (NORVASC) 10 MG tablet TAKE 1 TABLET EVERY DAY (Patient taking differently: Take 10 mg by mouth once daily.) 90 tablet 3    aspirin 81 MG chewable tablet Take 81 mg by mouth Daily.      beta-carotene,A,-vits C,E/mins (OCUVITE ORAL) Take by mouth.      clopidogreL (PLAVIX) 75 mg tablet TAKE 1 TABLET EVERY DAY (Patient taking differently: Take 75 mg by mouth every Tues, Thurs, Sat.) 90 tablet 3    famotidine (PEPCID) 20 MG tablet Take 20 mg by mouth before dinner.      furosemide (LASIX) 20 MG tablet Take 1 tablet (20 mg total) by mouth 2 (two) times daily. Take one tablet every other day orbas directed 30 tablet 11    metoprolol succinate (TOPROL-XL) 25 MG 24 hr tablet TAKE 1 TABLET TWICE DAILY (Patient taking differently: Take 25 mg by mouth 2 (two) times daily.) 180 tablet 3    niacin 500 MG tablet Take " 500 mg by mouth Every PM.      nitroGLYCERIN (NITROSTAT) 0.4 MG SL tablet Take 1 tab under the tongue for chest pain. May repeat every 5 minutes for a total of 3 doses. If chest pain not relieved go to the ED. (Patient taking differently: Place 0.4 mg under the tongue every 5 (five) minutes as needed for Chest pain.  If chest pain not relieved go to the ED.) 25 tablet 4    omega-3 fatty acids-vitamin E 1,000 mg Cap Take 1 capsule by mouth 2 (two) times daily.      potassium chloride SA (K-DUR,KLOR-CON M) 10 MEQ tablet Take 1 tablet (10 mEq total) by mouth once daily. 30 tablet 11    pravastatin (PRAVACHOL) 40 MG tablet TAKE 1 TABLET EVERY EVENING (Patient taking differently: Take 40 mg by mouth every evening.) 90 tablet 1    sacubitriL-valsartan (ENTRESTO)  mg per tablet Take 1 tablet by mouth 2 (two) times daily. 60 tablet 11    sotaloL (BETAPACE) 120 MG Tab Take 1 tablet (120 mg total) by mouth 2 (two) times daily. 180 tablet 3    ubidecarenone (COENZYME Q10 ORAL) Take 1 tablet by mouth once daily.      vitamin D 1000 units Tab Take 1,000 Units by mouth every Tues, Thurs, Sat.       azelastine (ASTELIN) 137 mcg (0.1 %) nasal spray 1 spray (137 mcg total) by Nasal route 2 (two) times daily. 30 mL 11    fluticasone propionate (FLONASE) 50 mcg/actuation nasal spray 1 spray (50 mcg total) by Each Nostril route once daily. 16 g 11    multivitamin with minerals tablet Take 1 tablet by mouth once daily.       nebulizer and compressor Archana 1 Device by Misc.(Non-Drug; Combo Route) route 2 (two) times a day. 1 each 0    sodium chloride 3% 3 % nebulizer solution Take 4 mLs by nebulization 2 (two) times a day. 240 mL 5       Please address this information as you see fit.  Feel free to contact us if you have any questions or require assistance.    Hoa Strickland  [phone]                  .

## 2024-07-03 NOTE — TELEPHONE ENCOUNTER
LVM that appointment today will need to be rescheduled due to a emergency surgery at [facility]. Callback number provided.

## 2024-07-04 PROBLEM — J18.9 PNEUMONIA: Status: ACTIVE | Noted: 2024-07-04

## 2024-07-04 LAB
ALBUMIN SERPL BCP-MCNC: 3.4 G/DL (ref 3.5–5.2)
ALP SERPL-CCNC: 111 U/L (ref 55–135)
ALT SERPL W/O P-5'-P-CCNC: 12 U/L (ref 10–44)
ANION GAP SERPL CALC-SCNC: 10 MMOL/L (ref 8–16)
AST SERPL-CCNC: 20 U/L (ref 10–40)
BASOPHILS # BLD AUTO: 0.07 K/UL (ref 0–0.2)
BASOPHILS NFR BLD: 0.7 % (ref 0–1.9)
BILIRUB SERPL-MCNC: 0.7 MG/DL (ref 0.1–1)
BNP SERPL-MCNC: 589 PG/ML (ref 0–99)
BUN SERPL-MCNC: 18 MG/DL (ref 8–23)
CALCIUM SERPL-MCNC: 9.6 MG/DL (ref 8.7–10.5)
CHLORIDE SERPL-SCNC: 107 MMOL/L (ref 95–110)
CO2 SERPL-SCNC: 23 MMOL/L (ref 23–29)
CREAT SERPL-MCNC: 1.3 MG/DL (ref 0.5–1.4)
DIFFERENTIAL METHOD BLD: ABNORMAL
EOSINOPHIL # BLD AUTO: 0.1 K/UL (ref 0–0.5)
EOSINOPHIL NFR BLD: 0.9 % (ref 0–8)
ERYTHROCYTE [DISTWIDTH] IN BLOOD BY AUTOMATED COUNT: 14.5 % (ref 11.5–14.5)
EST. GFR  (NO RACE VARIABLE): 53 ML/MIN/1.73 M^2
GLUCOSE SERPL-MCNC: 89 MG/DL (ref 70–110)
HCT VFR BLD AUTO: 37.2 % (ref 40–54)
HGB BLD-MCNC: 12.4 G/DL (ref 14–18)
IMM GRANULOCYTES # BLD AUTO: 0.02 K/UL (ref 0–0.04)
IMM GRANULOCYTES NFR BLD AUTO: 0.2 % (ref 0–0.5)
INR PPP: 1.2 (ref 0.8–1.2)
LYMPHOCYTES # BLD AUTO: 2.4 K/UL (ref 1–4.8)
LYMPHOCYTES NFR BLD: 25.6 % (ref 18–48)
MCH RBC QN AUTO: 29.2 PG (ref 27–31)
MCHC RBC AUTO-ENTMCNC: 33.3 G/DL (ref 32–36)
MCV RBC AUTO: 88 FL (ref 82–98)
MONOCYTES # BLD AUTO: 1 K/UL (ref 0.3–1)
MONOCYTES NFR BLD: 10.6 % (ref 4–15)
NEUTROPHILS # BLD AUTO: 5.9 K/UL (ref 1.8–7.7)
NEUTROPHILS NFR BLD: 62 % (ref 38–73)
NRBC BLD-RTO: 0 /100 WBC
PLATELET # BLD AUTO: 100 K/UL (ref 150–450)
PMV BLD AUTO: 12.5 FL (ref 9.2–12.9)
POTASSIUM SERPL-SCNC: 4.4 MMOL/L (ref 3.5–5.1)
PROT SERPL-MCNC: 6.6 G/DL (ref 6–8.4)
PROTHROMBIN TIME: 12.8 SEC (ref 9–12.5)
RBC # BLD AUTO: 4.24 M/UL (ref 4.6–6.2)
SODIUM SERPL-SCNC: 140 MMOL/L (ref 136–145)
WBC # BLD AUTO: 9.49 K/UL (ref 3.9–12.7)

## 2024-07-04 PROCEDURE — 25000003 PHARM REV CODE 250: Mod: HCNC | Performed by: STUDENT IN AN ORGANIZED HEALTH CARE EDUCATION/TRAINING PROGRAM

## 2024-07-04 PROCEDURE — 36415 COLL VENOUS BLD VENIPUNCTURE: CPT | Mod: HCNC

## 2024-07-04 PROCEDURE — 25500020 PHARM REV CODE 255: Mod: HCNC | Performed by: HOSPITALIST

## 2024-07-04 PROCEDURE — 63600175 PHARM REV CODE 636 W HCPCS: Mod: HCNC | Performed by: HOSPITALIST

## 2024-07-04 PROCEDURE — 99222 1ST HOSP IP/OBS MODERATE 55: CPT | Mod: HCNC,,, | Performed by: INTERNAL MEDICINE

## 2024-07-04 PROCEDURE — 11000001 HC ACUTE MED/SURG PRIVATE ROOM: Mod: HCNC

## 2024-07-04 PROCEDURE — 36415 COLL VENOUS BLD VENIPUNCTURE: CPT | Mod: HCNC | Performed by: STUDENT IN AN ORGANIZED HEALTH CARE EDUCATION/TRAINING PROGRAM

## 2024-07-04 PROCEDURE — 63600175 PHARM REV CODE 636 W HCPCS: Mod: HCNC | Performed by: STUDENT IN AN ORGANIZED HEALTH CARE EDUCATION/TRAINING PROGRAM

## 2024-07-04 PROCEDURE — 93005 ELECTROCARDIOGRAM TRACING: CPT | Mod: HCNC

## 2024-07-04 PROCEDURE — 80053 COMPREHEN METABOLIC PANEL: CPT | Mod: HCNC | Performed by: STUDENT IN AN ORGANIZED HEALTH CARE EDUCATION/TRAINING PROGRAM

## 2024-07-04 PROCEDURE — 94761 N-INVAS EAR/PLS OXIMETRY MLT: CPT | Mod: HCNC

## 2024-07-04 PROCEDURE — 99900035 HC TECH TIME PER 15 MIN (STAT): Mod: HCNC

## 2024-07-04 PROCEDURE — 93010 ELECTROCARDIOGRAM REPORT: CPT | Mod: HCNC,,, | Performed by: INTERNAL MEDICINE

## 2024-07-04 PROCEDURE — 25000003 PHARM REV CODE 250: Mod: HCNC | Performed by: HOSPITALIST

## 2024-07-04 PROCEDURE — 85025 COMPLETE CBC W/AUTO DIFF WBC: CPT | Mod: HCNC | Performed by: STUDENT IN AN ORGANIZED HEALTH CARE EDUCATION/TRAINING PROGRAM

## 2024-07-04 PROCEDURE — 85610 PROTHROMBIN TIME: CPT | Mod: HCNC

## 2024-07-04 PROCEDURE — 83880 ASSAY OF NATRIURETIC PEPTIDE: CPT | Mod: HCNC | Performed by: STUDENT IN AN ORGANIZED HEALTH CARE EDUCATION/TRAINING PROGRAM

## 2024-07-04 RX ORDER — TRANEXAMIC ACID 100 MG/ML
500 INJECTION, SOLUTION INTRAVENOUS 3 TIMES DAILY
Status: DISCONTINUED | OUTPATIENT
Start: 2024-07-04 | End: 2024-07-04

## 2024-07-04 RX ORDER — TRANEXAMIC ACID 100 MG/ML
500 INJECTION, SOLUTION INTRAVENOUS 3 TIMES DAILY
Status: DISPENSED | OUTPATIENT
Start: 2024-07-04 | End: 2024-07-05

## 2024-07-04 RX ORDER — SODIUM CHLORIDE 9 MG/ML
INJECTION, SOLUTION INTRAVENOUS CONTINUOUS
Status: DISCONTINUED | OUTPATIENT
Start: 2024-07-04 | End: 2024-07-06

## 2024-07-04 RX ORDER — ALPRAZOLAM 0.25 MG/1
0.25 TABLET ORAL 3 TIMES DAILY PRN
Status: DISCONTINUED | OUTPATIENT
Start: 2024-07-04 | End: 2024-07-06 | Stop reason: HOSPADM

## 2024-07-04 RX ORDER — ONDANSETRON HYDROCHLORIDE 2 MG/ML
4 INJECTION, SOLUTION INTRAVENOUS EVERY 6 HOURS PRN
Status: DISCONTINUED | OUTPATIENT
Start: 2024-07-04 | End: 2024-07-06 | Stop reason: HOSPADM

## 2024-07-04 RX ADMIN — SACUBITRIL AND VALSARTAN 1 TABLET: 97; 103 TABLET, FILM COATED ORAL at 09:07

## 2024-07-04 RX ADMIN — METOPROLOL SUCCINATE 25 MG: 25 TABLET, EXTENDED RELEASE ORAL at 09:07

## 2024-07-04 RX ADMIN — SOTALOL HYDROCHLORIDE 120 MG: 80 TABLET ORAL at 09:07

## 2024-07-04 RX ADMIN — IOHEXOL 100 ML: 350 INJECTION, SOLUTION INTRAVENOUS at 04:07

## 2024-07-04 RX ADMIN — SODIUM CHLORIDE: 9 INJECTION, SOLUTION INTRAVENOUS at 02:07

## 2024-07-04 RX ADMIN — CEFEPIME 1 G: 1 INJECTION, POWDER, FOR SOLUTION INTRAMUSCULAR; INTRAVENOUS at 04:07

## 2024-07-04 RX ADMIN — PRAVASTATIN SODIUM 40 MG: 20 TABLET ORAL at 09:07

## 2024-07-04 RX ADMIN — AMLODIPINE BESYLATE 10 MG: 5 TABLET ORAL at 09:07

## 2024-07-04 RX ADMIN — TRANEXAMIC ACID 500 MG: 1 INJECTION, SOLUTION INTRAVENOUS at 09:07

## 2024-07-04 RX ADMIN — VANCOMYCIN HYDROCHLORIDE 1500 MG: 1.5 INJECTION, POWDER, LYOPHILIZED, FOR SOLUTION INTRAVENOUS at 05:07

## 2024-07-04 RX ADMIN — FAMOTIDINE 20 MG: 20 TABLET ORAL at 09:07

## 2024-07-04 RX ADMIN — FUROSEMIDE 20 MG: 20 TABLET ORAL at 09:07

## 2024-07-04 NOTE — ASSESSMENT & PLAN NOTE
- patient with reported significant hemoptysis  - exact volume is unclear  - on room air, no acute respiratory distress  - Imaging concerning for RLL, although reported amount of hemoptysis is out of proportion  - will consult pulmonology  - given history of MAC, will consult infectious disease  - community acquired pneumonia coverage   - no further episodes of hemoptysis in ED. Comfortable on room air. Hemoglobin stable. Will monitor closely If recurrence of significant hemoptysis, will consult pulmonology stat with escalation of care to ICU

## 2024-07-04 NOTE — SUBJECTIVE & OBJECTIVE
Past Medical History:   Diagnosis Date    AICD (automatic cardioverter/defibrillator) present 7/17/2012    Cardiomyopathy     CKD (chronic kidney disease) stage 3, GFR 30-59 ml/min 7/17/2012    Coronary artery disease     GERD (gastroesophageal reflux disease)     Tovar's; Dr. Vu    Hyperlipidemia 7/17/2012    Hypertension 7/17/2012    Ischemic cardiomyopathy 7/17/2012    MGUS (monoclonal gammopathy of unknown significance)     Thrombocytopenia 7/17/2012    Ventricular tachycardia     VT (ventricular tachycardia) 7/17/2012       Past Surgical History:   Procedure Laterality Date    ABDOMINAL AORTIC ANEURYSM REPAIR      CARDIAC DEFIBRILLATOR PLACEMENT      CATARACT EXTRACTION, BILATERAL  2018    CORONARY ANGIOPLASTY      EYE SURGERY Bilateral     cataract    HERNIA REPAIR      x2    REPLACEMENT OF IMPLANTABLE CARDIOVERTER-DEFIBRILLATOR (ICD) GENERATOR Left 3/18/2022    Procedure: REPLACEMENT, ICD GENERATOR;  Surgeon: Felipe Woodard MD;  Location: [facility] EP LAB;  Service: Cardiology;  Laterality: Left;  SEFERINO, ICD gen chg, SJM, anes, MB, 3prep*ANUJ ICD insitu*        Review of patient's allergies indicates:   Allergen Reactions    Fluorescein-benoxinate Other (See Comments)    Pcn  [penicillins]      Other reaction(s): Unknown    Tropicamide Other (See Comments)    Clindamycin Rash       Current Facility-Administered Medications on File Prior to Encounter   Medication    sodium chloride 0.9% bolus 1,000 mL    vancomycin in dextrose 5 % 1 gram/250 mL IVPB 1,000 mg     Current Outpatient Medications on File Prior to Encounter   Medication Sig    aspirin 81 MG chewable tablet Take 81 mg by mouth Daily.    beta-carotene,A,-vits C,E/mins (OCUVITE ORAL) Take by mouth.    clopidogreL (PLAVIX) 75 mg tablet TAKE 1 TABLET EVERY DAY (Patient taking differently: Take 75 mg by mouth every Tues, Thurs, Sat.)    famotidine (PEPCID) 20 MG tablet Take 20 mg by mouth before dinner.    furosemide (LASIX) 20 MG tablet Take 1  tablet (20 mg total) by mouth 2 (two) times daily. Take one tablet every other day orbas directed    metoprolol succinate (TOPROL-XL) 25 MG 24 hr tablet TAKE 1 TABLET TWICE DAILY (Patient taking differently: Take 25 mg by mouth 2 (two) times daily.)    niacin 500 MG tablet Take 500 mg by mouth Every PM.    nitroGLYCERIN (NITROSTAT) 0.4 MG SL tablet Take 1 tab under the tongue for chest pain. May repeat every 5 minutes for a total of 3 doses. If chest pain not relieved go to the ED. (Patient taking differently: Place 0.4 mg under the tongue every 5 (five) minutes as needed for Chest pain.  If chest pain not relieved go to the ED.)    omega-3 fatty acids-vitamin E 1,000 mg Cap Take 1 capsule by mouth 2 (two) times daily.    potassium chloride SA (K-DUR,KLOR-CON M) 10 MEQ tablet Take 1 tablet (10 mEq total) by mouth once daily.    pravastatin (PRAVACHOL) 40 MG tablet TAKE 1 TABLET EVERY EVENING (Patient taking differently: Take 40 mg by mouth every evening.)    sacubitriL-valsartan (ENTRESTO)  mg per tablet Take 1 tablet by mouth 2 (two) times daily.    sotaloL (BETAPACE) 120 MG Tab Take 1 tablet (120 mg total) by mouth 2 (two) times daily.    ubidecarenone (COENZYME Q10 ORAL) Take 1 tablet by mouth once daily.    vitamin D 1000 units Tab Take 1,000 Units by mouth every Tues, Thurs, Sat.     amLODIPine (NORVASC) 10 MG tablet TAKE 1 TABLET EVERY DAY (Patient taking differently: Take 10 mg by mouth once daily.)    azelastine (ASTELIN) 137 mcg (0.1 %) nasal spray 1 spray (137 mcg total) by Nasal route 2 (two) times daily.    fluticasone propionate (FLONASE) 50 mcg/actuation nasal spray 1 spray (50 mcg total) by Each Nostril route once daily.    multivitamin with minerals tablet Take 1 tablet by mouth once daily.     nebulizer and compressor Archana 1 Device by Misc.(Non-Drug; Combo Route) route 2 (two) times a day.    sodium chloride 3% 3 % nebulizer solution Take 4 mLs by nebulization 2 (two) times a day.     Family  History       Problem Relation (Age of Onset)    Cancer Mother, Brother    Coronary artery disease Mother    Heart attacks under age 50 Father    Heart disease Mother, Father, Sister    Lymphoma Mother          Tobacco Use    Smoking status: Never     Passive exposure: Past    Smokeless tobacco: Never   Substance and Sexual Activity    Alcohol use: No    Drug use: No    Sexual activity: Yes     Partners: Female     Comment:      Review of Systems   Constitutional: Negative.    HENT: Negative.     Respiratory:  Positive for cough.    Cardiovascular: Negative.    Gastrointestinal: Negative.    Endocrine: Negative.    Musculoskeletal: Negative.    Skin: Negative.    Neurological: Negative.    Psychiatric/Behavioral: Negative.       Objective:     Vital Signs (Most Recent):  Temp: 98.1 °F (36.7 °C) (07/03/24 2300)  Pulse: 60 (07/04/24 0009)  Resp: 20 (07/03/24 2300)  BP: 136/68 (07/03/24 2300)  SpO2: (!) 94 % (07/03/24 2300) Vital Signs (24h Range):  Temp:  [97.8 °F (36.6 °C)-98.1 °F (36.7 °C)] 98.1 °F (36.7 °C)  Pulse:  [55-72] 60  Resp:  [19-20] 20  SpO2:  [94 %-97 %] 94 %  BP: (124-167)/(56-96) 136/68     Weight: 73.6 kg (162 lb 4.1 oz)  Body mass index is 22.01 kg/m².     Physical Exam  Constitutional:       General: He is not in acute distress.     Appearance: Normal appearance. He is not ill-appearing.   HENT:      Head: Normocephalic and atraumatic.      Nose: No congestion or rhinorrhea.      Mouth/Throat:      Mouth: Mucous membranes are moist.      Pharynx: Oropharynx is clear.   Eyes:      General: No scleral icterus.     Extraocular Movements: Extraocular movements intact.      Conjunctiva/sclera: Conjunctivae normal.      Pupils: Pupils are equal, round, and reactive to light.   Cardiovascular:      Rate and Rhythm: Normal rate and regular rhythm.      Pulses: Normal pulses.      Heart sounds: Normal heart sounds.   Pulmonary:      Effort: Pulmonary effort is normal.      Breath sounds: Normal breath  sounds.   Abdominal:      General: Abdomen is flat. Bowel sounds are normal.      Palpations: Abdomen is soft.   Musculoskeletal:      Cervical back: Normal range of motion and neck supple.   Neurological:      Mental Status: He is alert.              CRANIAL NERVES     CN III, IV, VI   Pupils are equal, round, and reactive to light.       Significant Labs: All pertinent labs within the past 24 hours have been reviewed.    Significant Imaging: I have reviewed all pertinent imaging results/findings within the past 24 hours.

## 2024-07-04 NOTE — SUBJECTIVE & OBJECTIVE
Interval History:     Patient continued small amount of hemoptysis this morning, but much improved  Denies shortness of breath  Patient has no other complaints    Review of Systems   Constitutional:  Negative for chills, fatigue and fever.   Respiratory:  Positive for cough. Negative for shortness of breath and wheezing.    Cardiovascular:  Negative for chest pain, palpitations and leg swelling.   Gastrointestinal:  Negative for abdominal distention and abdominal pain.   Neurological:  Negative for dizziness and headaches.   Psychiatric/Behavioral:  Negative for agitation and confusion.      Objective:     Vital Signs (Most Recent):  Temp: 97.7 °F (36.5 °C) (07/04/24 1116)  Pulse: 61 (07/04/24 1243)  Resp: 18 (07/04/24 1116)  BP: 132/63 (07/04/24 1116)  SpO2: (!) 94 % (07/04/24 1116) Vital Signs (24h Range):  Temp:  [97.7 °F (36.5 °C)-98.2 °F (36.8 °C)] 97.7 °F (36.5 °C)  Pulse:  [55-72] 61  Resp:  [18-20] 18  SpO2:  [94 %-97 %] 94 %  BP: (124-167)/(56-96) 132/63     Weight: 73.6 kg (162 lb 4.1 oz)  Body mass index is 22.01 kg/m².    Intake/Output Summary (Last 24 hours) at 7/4/2024 1425  Last data filed at 7/4/2024 1201  Gross per 24 hour   Intake --   Output 650 ml   Net -650 ml         Physical Exam  Constitutional:       General: He is not in acute distress.     Appearance: Normal appearance. He is not ill-appearing or toxic-appearing.   Eyes:      Extraocular Movements: Extraocular movements intact.      Conjunctiva/sclera: Conjunctivae normal.   Cardiovascular:      Rate and Rhythm: Normal rate and regular rhythm.   Pulmonary:      Effort: Pulmonary effort is normal. No respiratory distress.   Abdominal:      General: There is no distension.      Tenderness: There is no abdominal tenderness. There is no guarding.   Musculoskeletal:         General: Normal range of motion.   Skin:     General: Skin is warm and dry.   Neurological:      General: No focal deficit present.      Mental Status: He is alert and  oriented to person, place, and time.   Psychiatric:         Mood and Affect: Mood normal.         Behavior: Behavior normal.             Significant Labs: All pertinent labs within the past 24 hours have been reviewed.    Significant Imaging: I have reviewed all pertinent imaging results/findings within the past 24 hours.

## 2024-07-04 NOTE — ASSESSMENT & PLAN NOTE
Creatine stable for now. BMP reviewed- noted Estimated Creatinine Clearance: 40.9 mL/min (based on SCr of 1.3 mg/dL). according to latest data. Based on current GFR, CKD stage is stage 3 - GFR 30-59.  Monitor UOP and serial BMP and adjust therapy as needed. Renally dose meds. Avoid nephrotoxic medications and procedures.

## 2024-07-04 NOTE — HPI
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 He states that prior to arrival he had an episode of cough with bright red blood. He reports it was ~1 cup of blood. No further episodes since prior to arrival to the ED. He has had similar episodes in the past, although the volume of blood was greater during this event. Overall, he feels well. Resting comfortably on room air. No chest pain, no shortness of breath, no fevers, no chills, no n/v/d.

## 2024-07-04 NOTE — CARE UPDATE
Patient given specimen collection cup for sputum sample collection, pt unable to produce. Pt shared that he rarely produces sputum with coughing, pt instructed to produce sputum into specimen cup at bedside if possible. Will continue to monitor

## 2024-07-04 NOTE — ASSESSMENT & PLAN NOTE
- chronic thrombocytopenia  - platelets 109, stable  - no indication for platelet transfusion at this time

## 2024-07-04 NOTE — ASSESSMENT & PLAN NOTE
Creatine stable for now. BMP reviewed- noted Estimated Creatinine Clearance: 35.4 mL/min (A) (based on SCr of 1.5 mg/dL (H)). according to latest data. Based on current GFR, CKD stage is stage 3 - GFR 30-59.  Monitor UOP and serial BMP and adjust therapy as needed. Renally dose meds. Avoid nephrotoxic medications and procedures.

## 2024-07-04 NOTE — HPI
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 He states that prior to arrival he had an episode of cough with bright red blood. He reports it was ~1 cup of blood. No further episodes since prior to arrival to the ED. He has had similar episodes in the past, although the volume of blood was greater during this event. Overall, he feels well. Resting comfortably on room air. No chest pain, no shortness of breath, no fevers, no chills, no n/v/d.     He reports that the day prior to this event he was feeling well and normal. CT chest done here shows  "Decreased size and conspicuity of tree-in-bud nodular opacities in the upper lobes with persistent ground-glass opacities suggesting persistent but decreased inflammatory/infectious process. New consolidation in the medial basilar and posterior segment of the left lower lobe suggesting or acute process such is pneumonia"    He has been following with ID outpatient and the decision to hold antibiotics and monitor was made due to his dsire to avoid side effects from the antibiotics.    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

## 2024-07-04 NOTE — PROGRESS NOTES
Pharmacokinetic Initial Assessment: IV Vancomycin    Assessment/Plan:    Initiate intravenous vancomycin with loading dose of 1500 mg once followed by a maintenance dose of vancomycin 1250 mg IV every 24 hours  Desired empiric serum trough concentration is 15 to 20 mcg/mL  Draw vancomycin trough level 60 min prior to third dose on 07/06/24 at approximately 1500  Pharmacy will continue to follow and monitor vancomycin.      Please contact pharmacy at extension [phone] with any questions regarding this assessment.     Thank you for the consult,   Fatoumata Smith       Patient brief summary:  Nelson GIBBONS Parent is a 88 y.o. male initiated on antimicrobial therapy with IV Vancomycin for treatment of suspected lower respiratory infection    Drug Allergies:   Review of patient's allergies indicates:   Allergen Reactions    Fluorescein-benoxinate Other (See Comments)    Pcn  [penicillins]      Other reaction(s): Unknown    Tropicamide Other (See Comments)    Clindamycin Rash       Actual Body Weight:   73.6 kg    Renal Function:   Estimated Creatinine Clearance: 40.9 mL/min (based on SCr of 1.3 mg/dL).,     Dialysis Method (if applicable):  N/A    CBC (last 72 hours):  Recent Labs   Lab Result Units 07/03/24  1502 07/03/24  2108 07/04/24  0253   WBC K/uL 9.94 9.73 9.49   Hemoglobin g/dL 13.3* 13.3* 12.4*   Hematocrit % 39.6* 40.0 37.2*   Platelets K/uL 109* 110* 100*   Gran % % 65.1  --  62.0   Lymph % % 22.9  --  25.6   Mono % % 9.0  --  10.6   Eosinophil % % 1.9  --  0.9   Basophil % % 0.6  --  0.7   Differential Method  Automated  --  Automated       Metabolic Panel (last 72 hours):  Recent Labs   Lab Result Units 07/03/24  1502 07/04/24  0252   Sodium mmol/L 142 140   Potassium mmol/L 4.1 4.4   Chloride mmol/L 108 107   CO2 mmol/L 23 23   Glucose mg/dL 106 89   BUN mg/dL 18 18   Creatinine mg/dL 1.5* 1.3   Albumin g/dL  --  3.4*   Total Bilirubin mg/dL  --  0.7   Alkaline Phosphatase U/L  --  111   AST U/L  --  20   ALT U/L  " --  12       Drug levels (last 3 results):  No results for input(s): "VANCOMYCINRA", "VANCORANDOM", "VANCOMYCINPE", "VANCOPEAK", "VANCOMYCINTR", "VANCOTROUGH" in the last 72 hours.    Microbiologic Results:  Microbiology Results (last 7 days)       Procedure Component Value Units Date/Time    Culture, Respiratory with Gram Stain [[id number]]     Order Status: No result Specimen: Respiratory from Sputum, Expectorated     AFB Culture & Smear [[id number]]     Order Status: No result Specimen: Respiratory from Sputum, Expectorated             "

## 2024-07-04 NOTE — ED PROVIDER NOTES
Encounter Date: 7/3/2024       History     Chief Complaint   Patient presents with    Hemoptysis     Patient states he has been coughing daily for about 6 months with recent dx of MAC. Today began coughing up BRB. Denies pain. Presents awake, alert, oriented. Resp unlabored and without distress. Denies SOB. + h/o esophageal varices     HPI  This is a 88 y.o. male who presents to the Emergency Department with hemoptysis.  Prior to arrival, patient began coughing up bright red blood.  States it was large amount with clots, states that it would fill about half of an emesis bag.  States that symptoms have resolved and he feels better now.  He is without shortness of breath or chest discomfort.  Patient states that he has been coughing consistently for the last 6 months, diagnosed with MAC.      Review of patient's allergies indicates:   Allergen Reactions    Fluorescein-benoxinate Other (See Comments)    Pcn  [penicillins]      Other reaction(s): Unknown    Tropicamide Other (See Comments)    Clindamycin Rash     Past Medical History:   Diagnosis Date    AICD (automatic cardioverter/defibrillator) present 7/17/2012    Cardiomyopathy     CKD (chronic kidney disease) stage 3, GFR 30-59 ml/min 7/17/2012    Coronary artery disease     GERD (gastroesophageal reflux disease)     Tovar's; Dr. Vu    Hyperlipidemia 7/17/2012    Hypertension 7/17/2012    Ischemic cardiomyopathy 7/17/2012    MGUS (monoclonal gammopathy of unknown significance)     Thrombocytopenia 7/17/2012    Ventricular tachycardia     VT (ventricular tachycardia) 7/17/2012     Past Surgical History:   Procedure Laterality Date    ABDOMINAL AORTIC ANEURYSM REPAIR      CARDIAC DEFIBRILLATOR PLACEMENT      CATARACT EXTRACTION, BILATERAL  2018    CORONARY ANGIOPLASTY      EYE SURGERY Bilateral     cataract    HERNIA REPAIR      x2    REPLACEMENT OF IMPLANTABLE CARDIOVERTER-DEFIBRILLATOR (ICD) GENERATOR Left 3/18/2022    Procedure: REPLACEMENT, ICD GENERATOR;   Surgeon: Felipe Woodard MD;  Location: [facility] EP LAB;  Service: Cardiology;  Laterality: Left;  SEFERINO, ICD gen chg, SJM, anes, MB, 3prep*ANUJ ICD insitu*      Family History   Problem Relation Name Age of Onset    Cancer Mother          Lymphoma    Lymphoma Mother      Coronary artery disease Mother      Heart disease Mother      Heart disease Father      Heart attacks under age 50 Father      Heart disease Sister      Cancer Brother          lymphoma     Social History     Tobacco Use    Smoking status: Never     Passive exposure: Past    Smokeless tobacco: Never   Substance Use Topics    Alcohol use: No    Drug use: No     Review of Systems    Physical Exam     Initial Vitals [07/03/24 1427]   BP Pulse Resp Temp SpO2   128/68 63 20 97.9 °F (36.6 °C) 97 %      MAP       --         Physical Exam    Nursing note and vitals reviewed.  Constitutional: He appears well-developed and well-nourished. No distress.   HENT:   Head: Normocephalic and atraumatic.   Mouth/Throat: Oropharynx is clear and moist.   Eyes: Conjunctivae are normal. Pupils are equal, round, and reactive to light.   No pallor   Neck: Neck supple.   Normal range of motion.  Cardiovascular:  Normal rate, regular rhythm, normal heart sounds and intact distal pulses.           Pulmonary/Chest: No respiratory distress. He has no wheezes. He has rales (faint in LLL).   Abdominal: Abdomen is soft. Bowel sounds are normal. He exhibits no distension. There is no abdominal tenderness.   Musculoskeletal:         General: No tenderness or edema. Normal range of motion.      Cervical back: Normal range of motion and neck supple.     Lymphadenopathy:     He has no cervical adenopathy.   Neurological: He is alert and oriented to person, place, and time.   Skin: Skin is warm and dry. Capillary refill takes less than 2 seconds. No rash noted. No erythema.   Psychiatric: He has a normal mood and affect. Thought content normal.         ED Course   Procedures  Labs  Reviewed   CBC W/ AUTO DIFFERENTIAL - Abnormal; Notable for the following components:       Result Value    RBC 4.53 (*)     Hemoglobin 13.3 (*)     Hematocrit 39.6 (*)     RDW 14.6 (*)     Platelets 109 (*)     Immature Grans (Abs) 0.05 (*)     All other components within normal limits   BASIC METABOLIC PANEL - Abnormal; Notable for the following components:    Creatinine 1.5 (*)     eGFR 45 (*)     All other components within normal limits   CULTURE, RESPIRATORY   AFB CULTURE & SMEAR          Imaging Results              CT Chest Without Contrast (Final result)  Result time 07/03/24 17:17:30      Final result by Darien Gilliam MD (07/03/24 17:17:30)                   Impression:      Decreased size and conspicuity of tree-in-bud nodular opacities in the upper lobes with persistent ground-glass opacities suggesting persistent but decreased inflammatory/infectious process.    New consolidation in the medial basilar and posterior segment of the left lower lobe suggesting or acute process such is pneumonia.  Correlate for SHAY.    Multi-vessel coronary artery atherosclerotic disease.    Progressive tubular bronchiectasis.      Electronically signed by: Darien Gilliam  Date:    07/03/2024  Time:    17:17               Narrative:    EXAMINATION:  CT CHEST WITHOUT CONTRAST    CLINICAL HISTORY:  Hemoptysis;    TECHNIQUE:  Low dose axial images, sagittal and coronal reformations were obtained from the thoracic inlet to the lung bases. Contrast was not administered.    COMPARISON:  01/23/2024    FINDINGS:  The multifocal tree-in-bud configuration opacities primarily within the upper lobes have decreased since the prior exam.  Diffuse bronchiectasis mainly in the upper lobes with tubular configuration has increased.    There is new consolidation in the medial left lower lobe no other new consolidation or dominant nodule is evident.  Nodular opacities in the lingula and in the anterior segment of right upper lobe appear  stable.    The heart, pericardium and great vessels with multifocal three-vessel coronary artery disease as well as pacemaker appear stable.  No mediastinal mass or lymph node enlargement.  No endobronchial lesion.  The thoracic inlet and axillary regions stable and unremarkable.    Bony structures remain intact.                                       Medications   amLODIPine tablet 10 mg (has no administration in time range)   famotidine tablet 20 mg (has no administration in time range)   furosemide tablet 20 mg (has no administration in time range)   metoprolol succinate (TOPROL-XL) 24 hr tablet 25 mg (has no administration in time range)   multivitamin tablet (has no administration in time range)   pravastatin tablet 40 mg (has no administration in time range)   sacubitriL-valsartan  mg per tablet 1 tablet (has no administration in time range)   sodium chloride 0.9% flush 10 mL (has no administration in time range)   naloxone 0.4 mg/mL injection 0.02 mg (has no administration in time range)   glucose chewable tablet 16 g (has no administration in time range)   glucose chewable tablet 24 g (has no administration in time range)   glucagon (human recombinant) injection 1 mg (has no administration in time range)   acetaminophen tablet 650 mg (has no administration in time range)   dextrose 10% bolus 125 mL 125 mL (has no administration in time range)   dextrose 10% bolus 250 mL 250 mL (has no administration in time range)   sotalol split tablet 120 mg (has no administration in time range)   cefTRIAXone (ROCEPHIN) 2 g in D5W 100 mL IVPB (MB+) (0 g Intravenous Stopped 7/3/24 1858)   doxycycline 100 mg in D5W 100 mL IVPB (MB+) (0 mg Intravenous Stopped 7/3/24 2006)     Medical Decision Making  This is an emergent evaluation of a 88 y.o.male patient with presentation of hemoptysis that occurred just prior to arrival, large volume, resolved upon coming to the emergency department.  Patient denies any shortness of  "breath, chest discomfort, weakness.  Has a history of MAC and persistent cough for the last 6 months.  Vital signs are stable at this time.     Initial differentials include but are not limited to:  Of pneumonia, bronchitis, lung mass, less likely consider PE.     Plan:  CT chest, CBC, chemistry, cardiac monitoring, continuous pulse ox.  Consider hospitalization for large volume hemoptysis      Problems Addressed:  Hemoptysis: complicated acute illness or injury that poses a threat to life or bodily functions  Pneumonia: complicated acute illness or injury  Thrombocytopenia: chronic illness or injury    Amount and/or Complexity of Data Reviewed  External Data Reviewed: notes.     Details:     ID clinic note for/22/24:    "Assessment:        Pseudomonas + resp culture 1/2/24 s/p course of cipro  MAC +resp culture 1/2/24, 2/8/24  M.abscessus + resp culture 12/18/23, 3/4/24        Symptoms improved with pseudomonas treatment. Has had several positive sputum cultures for NTM, but not growing same organism consistently and symptoms mild and no radiographic progression of infection. Pt prefers watchful waiting to starting antibiotics for NTM     No toxicities from antimicrobials  Extremely medically complex  Patient is high risk for infectious complications given drug resistant infections     Plan:     Continue afb sputum cultures surveillance q1-2 months     Repeat ct 6 months     Discussed importance of airway clearance. Encouraged use of Aerobika and nebulized hypertonic saline twice a day.      Prevent reflux- avoid stomach sleeping. Sleep inclined. Famotidine/tums preferrable to PPI if needed. Stop liquids 3hr before bedtime.      Pt to meet with GI re: h/o barett's esophagus, which may be risk factor for ntm infection     Encourage probiotics"    Labs: ordered. Decision-making details documented in ED Course.  Radiology: ordered and independent interpretation performed.     Details:     LLL consolidation. " Inflammatory changes in bilateral upper lobes. No effusion noted.   Discussion of management or test interpretation with external provider(s):     Pt was re-evaluated and turned over to Dr. Govea at 5pm in stable condition. Pt is pending CT chest interpretation and disposition, likely admission.     Risk  Decision regarding hospitalization.               ED Course as of 07/03/24 2156 Wed Jul 03, 2024   1808 Patient admitted to hospital medicine in stable condition.  No additional episodes of hemoptysis since he has been here.  Did discuss patient with infectious disease on-call who recommended community acquired pneumonia treatment at this time with Rocephin and doxy.  Pulmonology is also aware of patient, no acute interventions indicated currently.  At this time with stable vital signs, on room air and no other episodes of hypoxia do feel that he was appropriate for admission to the floor at this time. [HL]   2155 WBC: 9.94 [NP]   2155 Hemoglobin(!): 13.3 [NP]   2155 Hematocrit(!): 39.6 [NP]   2155 Platelet Count(!): 109 [NP]   2155 Sodium: 142 [NP]   2155 Potassium: 4.1 [NP]   2155 Chloride: 108 [NP]   2155 CO2: 23 [NP]   2155 Glucose: 106 [NP]   2155 BUN: 18 [NP]   2155 Creatinine(!): 1.5 [NP]      ED Course User Index  [HL] Mónica Govea, DO  [NP] Fredrick Christina MD                           Clinical Impression:  Final diagnoses:  [R04.2] Hemoptysis  [J18.9] Pneumonia  [D69.6] Thrombocytopenia (Primary)          ED Disposition Condition    Admit                 Fredrick Christina MD  07/03/24 2159

## 2024-07-04 NOTE — SUBJECTIVE & OBJECTIVE
Past Medical History:   Diagnosis Date    AICD (automatic cardioverter/defibrillator) present 7/17/2012    Cardiomyopathy     CKD (chronic kidney disease) stage 3, GFR 30-59 ml/min 7/17/2012    Coronary artery disease     GERD (gastroesophageal reflux disease)     Tovar's; Dr. Vu    Hyperlipidemia 7/17/2012    Hypertension 7/17/2012    Ischemic cardiomyopathy 7/17/2012    MGUS (monoclonal gammopathy of unknown significance)     Thrombocytopenia 7/17/2012    Ventricular tachycardia     VT (ventricular tachycardia) 7/17/2012       Past Surgical History:   Procedure Laterality Date    ABDOMINAL AORTIC ANEURYSM REPAIR      CARDIAC DEFIBRILLATOR PLACEMENT      CATARACT EXTRACTION, BILATERAL  2018    CORONARY ANGIOPLASTY      EYE SURGERY Bilateral     cataract    HERNIA REPAIR      x2    REPLACEMENT OF IMPLANTABLE CARDIOVERTER-DEFIBRILLATOR (ICD) GENERATOR Left 3/18/2022    Procedure: REPLACEMENT, ICD GENERATOR;  Surgeon: Felipe Woodard MD;  Location: [facility] EP LAB;  Service: Cardiology;  Laterality: Left;  SEFERINO, ICD gen chg, SJM, anes, MB, 3prep*ANUJ ICD insitu*        Review of patient's allergies indicates:   Allergen Reactions    Fluorescein-benoxinate Other (See Comments)    Pcn  [penicillins]      Other reaction(s): Unknown    Tropicamide Other (See Comments)    Clindamycin Rash       Medications:  Medications Prior to Admission   Medication Sig    aspirin 81 MG chewable tablet Take 81 mg by mouth Daily.    beta-carotene,A,-vits C,E/mins (OCUVITE ORAL) Take by mouth.    clopidogreL (PLAVIX) 75 mg tablet TAKE 1 TABLET EVERY DAY (Patient taking differently: Take 75 mg by mouth every Tues, Thurs, Sat.)    famotidine (PEPCID) 20 MG tablet Take 20 mg by mouth before dinner.    furosemide (LASIX) 20 MG tablet Take 1 tablet (20 mg total) by mouth 2 (two) times daily. Take one tablet every other day orbas directed    metoprolol succinate (TOPROL-XL) 25 MG 24 hr tablet TAKE 1 TABLET TWICE DAILY (Patient taking  differently: Take 25 mg by mouth 2 (two) times daily.)    niacin 500 MG tablet Take 500 mg by mouth Every PM.    nitroGLYCERIN (NITROSTAT) 0.4 MG SL tablet Take 1 tab under the tongue for chest pain. May repeat every 5 minutes for a total of 3 doses. If chest pain not relieved go to the ED. (Patient taking differently: Place 0.4 mg under the tongue every 5 (five) minutes as needed for Chest pain.  If chest pain not relieved go to the ED.)    omega-3 fatty acids-vitamin E 1,000 mg Cap Take 1 capsule by mouth 2 (two) times daily.    potassium chloride SA (K-DUR,KLOR-CON M) 10 MEQ tablet Take 1 tablet (10 mEq total) by mouth once daily.    pravastatin (PRAVACHOL) 40 MG tablet TAKE 1 TABLET EVERY EVENING (Patient taking differently: Take 40 mg by mouth every evening.)    sacubitriL-valsartan (ENTRESTO)  mg per tablet Take 1 tablet by mouth 2 (two) times daily.    sotaloL (BETAPACE) 120 MG Tab Take 1 tablet (120 mg total) by mouth 2 (two) times daily.    ubidecarenone (COENZYME Q10 ORAL) Take 1 tablet by mouth once daily.    vitamin D 1000 units Tab Take 1,000 Units by mouth every Tues, Thurs, Sat.     amLODIPine (NORVASC) 10 MG tablet TAKE 1 TABLET EVERY DAY (Patient taking differently: Take 10 mg by mouth once daily.)    azelastine (ASTELIN) 137 mcg (0.1 %) nasal spray 1 spray (137 mcg total) by Nasal route 2 (two) times daily.    fluticasone propionate (FLONASE) 50 mcg/actuation nasal spray 1 spray (50 mcg total) by Each Nostril route once daily.    multivitamin with minerals tablet Take 1 tablet by mouth once daily.     nebulizer and compressor Archana 1 Device by Misc.(Non-Drug; Combo Route) route 2 (two) times a day.    sodium chloride 3% 3 % nebulizer solution Take 4 mLs by nebulization 2 (two) times a day.     Antibiotics (From admission, onward)      None          Antifungals (From admission, onward)      None          Antivirals (From admission, onward)      None             Immunization History    Administered Date(s) Administered    COVID-19, MRNA, LN-S, PF (Pfizer) (Gray Cap) 07/28/2022    COVID-19, MRNA, LN-S, PF (Pfizer) (Purple Cap) 01/14/2021, 02/06/2021, 10/14/2021, 07/28/2022    Influenza 10/18/2007, 10/28/2008, 10/14/2009, 10/28/2010, 10/18/2011, 10/25/2012, 10/08/2018    Influenza (FLUAD) - Quadrivalent - Adjuvanted - PF *Preferred* (65+) 10/15/2020, 10/16/2021, 09/24/2022, 10/24/2023    Influenza - High Dose - PF (65 years and older) 10/08/2013, 10/09/2014, 11/03/2015, 10/25/2016, 10/10/2017, 10/08/2018, 10/29/2019    Influenza - Quadrivalent - PF *Preferred* (6 months and older) 10/25/2012    Influenza - Trivalent (ADULT) 10/18/2007, 10/28/2008, 10/14/2009, 10/28/2010, 10/18/2011, 10/25/2012    Influenza Split 10/25/2012    Pneumococcal Conjugate - 13 Valent 02/01/2017, 02/24/2017    Pneumococcal Polysaccharide - 23 Valent 08/23/2012    Td (ADULT) 10/18/2007    Tdap 11/22/2013       Family History       Problem Relation (Age of Onset)    Cancer Mother, Brother    Coronary artery disease Mother    Heart attacks under age 50 Father    Heart disease Mother, Father, Sister    Lymphoma Mother          Social History     Socioeconomic History    Marital status:    Tobacco Use    Smoking status: Never     Passive exposure: Past    Smokeless tobacco: Never   Substance and Sexual Activity    Alcohol use: No    Drug use: No    Sexual activity: Yes     Partners: Female     Comment:      Social Determinants of Health     Financial Resource Strain: Low Risk  (4/22/2024)    Overall Financial Resource Strain (CARDIA)     Difficulty of Paying Living Expenses: Not hard at all   Food Insecurity: No Food Insecurity (4/22/2024)    Hunger Vital Sign     Worried About Running Out of Food in the Last Year: Never true     Ran Out of Food in the Last Year: Never true   Transportation Needs: No Transportation Needs (4/22/2024)    PRAPARE - Transportation     Lack of Transportation (Medical): No     Lack of  Transportation (Non-Medical): No   Physical Activity: Sufficiently Active (4/22/2024)    Exercise Vital Sign     Days of Exercise per Week: 4 days     Minutes of Exercise per Session: 40 min   Stress: No Stress Concern Present (4/22/2024)    [employer] - Occupational Stress Questionnaire     Feeling of Stress : Not at all   Housing Stability: Low Risk  (11/24/2023)    Housing Stability Vital Sign     Unable to Pay for Housing in the Last Year: No     Number of Places Lived in the Last Year: 1     Unstable Housing in the Last Year: No     Review of Systems   Constitutional: Negative.    HENT: Negative.     Respiratory:  Positive for cough.    Cardiovascular: Negative.    Gastrointestinal: Negative.    Endocrine: Negative.    Musculoskeletal: Negative.    Skin: Negative.    Neurological: Negative.    Psychiatric/Behavioral: Negative.       Objective:     Vital Signs (Most Recent):  Temp: 97.7 °F (36.5 °C) (07/04/24 1116)  Pulse: 63 (07/04/24 1116)  Resp: 18 (07/04/24 1116)  BP: 132/63 (07/04/24 1116)  SpO2: (!) 94 % (07/04/24 1116) Vital Signs (24h Range):  Temp:  [97.7 °F (36.5 °C)-98.2 °F (36.8 °C)] 97.7 °F (36.5 °C)  Pulse:  [55-72] 63  Resp:  [18-20] 18  SpO2:  [94 %-97 %] 94 %  BP: (124-167)/(56-96) 132/63     Weight: 73.6 kg (162 lb 4.1 oz)  Body mass index is 22.01 kg/m².    Estimated Creatinine Clearance: 40.9 mL/min (based on SCr of 1.3 mg/dL).     Physical Exam  Constitutional:       General: He is not in acute distress.     Appearance: Normal appearance. He is not ill-appearing.   HENT:      Head: Normocephalic and atraumatic.      Nose: No congestion or rhinorrhea.      Mouth/Throat:      Mouth: Mucous membranes are moist.      Pharynx: Oropharynx is clear.   Eyes:      General: No scleral icterus.     Extraocular Movements: Extraocular movements intact.      Conjunctiva/sclera: Conjunctivae normal.      Pupils: Pupils are equal, round, and reactive to light.   Cardiovascular:       Rate and Rhythm: Normal rate and regular rhythm.      Pulses: Normal pulses.      Heart sounds: Normal heart sounds.   Pulmonary:      Effort: Pulmonary effort is normal.      Breath sounds: Normal breath sounds.   Abdominal:      General: Abdomen is flat. Bowel sounds are normal.      Palpations: Abdomen is soft.   Musculoskeletal:      Cervical back: Normal range of motion and neck supple.   Neurological:      Mental Status: He is alert.          Significant Labs: All pertinent labs within the past 24 hours have been reviewed.    Significant Imaging: I have reviewed all pertinent imaging results/findings within the past 24 hours.

## 2024-07-04 NOTE — CONSULTS
[facility]  Infectious Disease  Consult Note    Patient Name: Nelson Huntley  MRN: [id number]  Admission Date: 7/3/2024  Hospital Length of Stay: 1 days  Attending Physician: Hernando Verma MD  Primary Care Provider: Bety Tomlinson MD     Isolation Status: No active isolations    Patient information was obtained from patient and past medical records.      Inpatient consult to Infectious Diseases  Consult performed by: Edis Cespedes MD  Consult ordered by: Kumar Lott MD  Reason for consult: Mycibacterial infection        Assessment/Plan:     ID  Mycobacterium avium infection  88 year old man with a history of MAC, CKD, Afib on sotalol, CKD 3, chronic kidney disease stage 3, hypertension, hyperlipidemia, CAD, MGUS, Tovar's esophagus, AICD, chronic systolic and diastolic CHF (echo 6/2020),  chronic ITP, recurrent pneumonias and non-TB mycobacterial infection who presents following an episode of hemoptysis    -of note his CT actually showed some improvement in tree-in-bud areas  -question of wether current episode is due to worsening of mycobacterial infection or other bacterial infection  -will not start mycobacterial treatment how  -would treat with cirpo, ceftriaxone, and doxy and monitor  -collect routine bacterial sputum cx        Thank you for your consult. I will follow-up with patient. Please contact us if you have any additional questions.    Edis Cespedes MD  Infectious Disease  [facility]    Subjective:     Principal Problem: Hemoptysis    HPI: 88 year old man with a history of MAC, CKD, Afib on sotalol, CKD 3, chronic kidney disease stage 3, hypertension, hyperlipidemia, CAD, MGUS, Tovar's esophagus, AICD, chronic systolic and diastolic CHF (echo 6/2020),  chronic ITP, recurrent pneumonias and non-TB mycobacterial infection who presents following an episode of hemoptysis. He states that prior to arrival he had an episode of cough with bright red blood. He reports it was ~1  "cup of blood. No further episodes since prior to arrival to the ED. He has had similar episodes in the past, although the volume of blood was greater during this event. Overall, he feels well. Resting comfortably on room air. No chest pain, no shortness of breath, no fevers, no chills, no n/v/d.     He reports that the day prior to this event he was feeling well and normal. CT chest done here shows  "Decreased size and conspicuity of tree-in-bud nodular opacities in the upper lobes with persistent ground-glass opacities suggesting persistent but decreased inflammatory/infectious process. New consolidation in the medial basilar and posterior segment of the left lower lobe suggesting or acute process such is pneumonia"    He has been following with ID outpatient and the decision to hold antibiotics and monitor was made due to his dsire to avoid side effects from the antibiotics.    His cultures have consistently grown different mycobacteria    12/18/23 - Mycobacterium abscessus  1/2/24- M avium  2/8/24 - M avium  3/4/24 M abscessus  6/7/24 - + culture id pending    He has also grown pseudomonas on 2 occasions            Past Medical History:   Diagnosis Date    AICD (automatic cardioverter/defibrillator) present 7/17/2012    Cardiomyopathy     CKD (chronic kidney disease) stage 3, GFR 30-59 ml/min 7/17/2012    Coronary artery disease     GERD (gastroesophageal reflux disease)     Tovar's; Dr. Vu    Hyperlipidemia 7/17/2012    Hypertension 7/17/2012    Ischemic cardiomyopathy 7/17/2012    MGUS (monoclonal gammopathy of unknown significance)     Thrombocytopenia 7/17/2012    Ventricular tachycardia     VT (ventricular tachycardia) 7/17/2012       Past Surgical History:   Procedure Laterality Date    ABDOMINAL AORTIC ANEURYSM REPAIR      CARDIAC DEFIBRILLATOR PLACEMENT      CATARACT EXTRACTION, BILATERAL  2018    CORONARY ANGIOPLASTY      EYE SURGERY Bilateral     cataract    HERNIA REPAIR      x2    REPLACEMENT " OF IMPLANTABLE CARDIOVERTER-DEFIBRILLATOR (ICD) GENERATOR Left 3/18/2022    Procedure: REPLACEMENT, ICD GENERATOR;  Surgeon: Felipe Woodard MD;  Location: [facility];  Service: Cardiology;  Laterality: Left;  SEFERINO, ICD gen chg, SJM, anes, MB, 3prep*ANUJ ICD insitu*        Review of patient's allergies indicates:   Allergen Reactions    Fluorescein-benoxinate Other (See Comments)    Pcn  [penicillins]      Other reaction(s): Unknown    Tropicamide Other (See Comments)    Clindamycin Rash       Medications:  Medications Prior to Admission   Medication Sig    aspirin 81 MG chewable tablet Take 81 mg by mouth Daily.    beta-carotene,A,-vits C,E/mins (OCUVITE ORAL) Take by mouth.    clopidogreL (PLAVIX) 75 mg tablet TAKE 1 TABLET EVERY DAY (Patient taking differently: Take 75 mg by mouth every Tues, Thurs, Sat.)    famotidine (PEPCID) 20 MG tablet Take 20 mg by mouth before dinner.    furosemide (LASIX) 20 MG tablet Take 1 tablet (20 mg total) by mouth 2 (two) times daily. Take one tablet every other day orbas directed    metoprolol succinate (TOPROL-XL) 25 MG 24 hr tablet TAKE 1 TABLET TWICE DAILY (Patient taking differently: Take 25 mg by mouth 2 (two) times daily.)    niacin 500 MG tablet Take 500 mg by mouth Every PM.    nitroGLYCERIN (NITROSTAT) 0.4 MG SL tablet Take 1 tab under the tongue for chest pain. May repeat every 5 minutes for a total of 3 doses. If chest pain not relieved go to the ED. (Patient taking differently: Place 0.4 mg under the tongue every 5 (five) minutes as needed for Chest pain.  If chest pain not relieved go to the ED.)    omega-3 fatty acids-vitamin E 1,000 mg Cap Take 1 capsule by mouth 2 (two) times daily.    potassium chloride SA (K-DUR,KLOR-CON M) 10 MEQ tablet Take 1 tablet (10 mEq total) by mouth once daily.    pravastatin (PRAVACHOL) 40 MG tablet TAKE 1 TABLET EVERY EVENING (Patient taking differently: Take 40 mg by mouth every evening.)    sacubitriL-valsartan (ENTRESTO)   mg per tablet Take 1 tablet by mouth 2 (two) times daily.    sotaloL (BETAPACE) 120 MG Tab Take 1 tablet (120 mg total) by mouth 2 (two) times daily.    ubidecarenone (COENZYME Q10 ORAL) Take 1 tablet by mouth once daily.    vitamin D 1000 units Tab Take 1,000 Units by mouth every Tues, Thurs, Sat.     amLODIPine (NORVASC) 10 MG tablet TAKE 1 TABLET EVERY DAY (Patient taking differently: Take 10 mg by mouth once daily.)    azelastine (ASTELIN) 137 mcg (0.1 %) nasal spray 1 spray (137 mcg total) by Nasal route 2 (two) times daily.    fluticasone propionate (FLONASE) 50 mcg/actuation nasal spray 1 spray (50 mcg total) by Each Nostril route once daily.    multivitamin with minerals tablet Take 1 tablet by mouth once daily.     nebulizer and compressor Archana 1 Device by Misc.(Non-Drug; Combo Route) route 2 (two) times a day.    sodium chloride 3% 3 % nebulizer solution Take 4 mLs by nebulization 2 (two) times a day.     Antibiotics (From admission, onward)      None          Antifungals (From admission, onward)      None          Antivirals (From admission, onward)      None             Immunization History   Administered Date(s) Administered    COVID-19, MRNA, LN-S, PF (Pfizer) (Gray Cap) 07/28/2022    COVID-19, MRNA, LN-S, PF (Pfizer) (Purple Cap) 01/14/2021, 02/06/2021, 10/14/2021, 07/28/2022    Influenza 10/18/2007, 10/28/2008, 10/14/2009, 10/28/2010, 10/18/2011, 10/25/2012, 10/08/2018    Influenza (FLUAD) - Quadrivalent - Adjuvanted - PF *Preferred* (65+) 10/15/2020, 10/16/2021, 09/24/2022, 10/24/2023    Influenza - High Dose - PF (65 years and older) 10/08/2013, 10/09/2014, 11/03/2015, 10/25/2016, 10/10/2017, 10/08/2018, 10/29/2019    Influenza - Quadrivalent - PF *Preferred* (6 months and older) 10/25/2012    Influenza - Trivalent (ADULT) 10/18/2007, 10/28/2008, 10/14/2009, 10/28/2010, 10/18/2011, 10/25/2012    Influenza Split 10/25/2012    Pneumococcal Conjugate - 13 Valent 02/01/2017, 02/24/2017    Pneumococcal  Polysaccharide - 23 Valent 08/23/2012    Td (ADULT) 10/18/2007    Tdap 11/22/2013       Family History       Problem Relation (Age of Onset)    Cancer Mother, Brother    Coronary artery disease Mother    Heart attacks under age 50 Father    Heart disease Mother, Father, Sister    Lymphoma Mother          Social History     Socioeconomic History    Marital status:    Tobacco Use    Smoking status: Never     Passive exposure: Past    Smokeless tobacco: Never   Substance and Sexual Activity    Alcohol use: No    Drug use: No    Sexual activity: Yes     Partners: Female     Comment:      Social Determinants of Health     Financial Resource Strain: Low Risk  (4/22/2024)    Overall Financial Resource Strain (CARDIA)     Difficulty of Paying Living Expenses: Not hard at all   Food Insecurity: No Food Insecurity (4/22/2024)    Hunger Vital Sign     Worried About Running Out of Food in the Last Year: Never true     Ran Out of Food in the Last Year: Never true   Transportation Needs: No Transportation Needs (4/22/2024)    PRAPARE - Transportation     Lack of Transportation (Medical): No     Lack of Transportation (Non-Medical): No   Physical Activity: Sufficiently Active (4/22/2024)    Exercise Vital Sign     Days of Exercise per Week: 4 days     Minutes of Exercise per Session: 40 min   Stress: No Stress Concern Present (4/22/2024)    [employer] - Occupational Stress Questionnaire     Feeling of Stress : Not at all   Housing Stability: Low Risk  (11/24/2023)    Housing Stability Vital Sign     Unable to Pay for Housing in the Last Year: No     Number of Places Lived in the Last Year: 1     Unstable Housing in the Last Year: No     Review of Systems   Constitutional: Negative.    HENT: Negative.     Respiratory:  Positive for cough.    Cardiovascular: Negative.    Gastrointestinal: Negative.    Endocrine: Negative.    Musculoskeletal: Negative.    Skin: Negative.    Neurological:  Negative.    Psychiatric/Behavioral: Negative.       Objective:     Vital Signs (Most Recent):  Temp: 97.7 °F (36.5 °C) (07/04/24 1116)  Pulse: 63 (07/04/24 1116)  Resp: 18 (07/04/24 1116)  BP: 132/63 (07/04/24 1116)  SpO2: (!) 94 % (07/04/24 1116) Vital Signs (24h Range):  Temp:  [97.7 °F (36.5 °C)-98.2 °F (36.8 °C)] 97.7 °F (36.5 °C)  Pulse:  [55-72] 63  Resp:  [18-20] 18  SpO2:  [94 %-97 %] 94 %  BP: (124-167)/(56-96) 132/63     Weight: 73.6 kg (162 lb 4.1 oz)  Body mass index is 22.01 kg/m².    Estimated Creatinine Clearance: 40.9 mL/min (based on SCr of 1.3 mg/dL).     Physical Exam  Constitutional:       General: He is not in acute distress.     Appearance: Normal appearance. He is not ill-appearing.   HENT:      Head: Normocephalic and atraumatic.      Nose: No congestion or rhinorrhea.      Mouth/Throat:      Mouth: Mucous membranes are moist.      Pharynx: Oropharynx is clear.   Eyes:      General: No scleral icterus.     Extraocular Movements: Extraocular movements intact.      Conjunctiva/sclera: Conjunctivae normal.      Pupils: Pupils are equal, round, and reactive to light.   Cardiovascular:      Rate and Rhythm: Normal rate and regular rhythm.      Pulses: Normal pulses.      Heart sounds: Normal heart sounds.   Pulmonary:      Effort: Pulmonary effort is normal.      Breath sounds: Normal breath sounds.   Abdominal:      General: Abdomen is flat. Bowel sounds are normal.      Palpations: Abdomen is soft.   Musculoskeletal:      Cervical back: Normal range of motion and neck supple.   Neurological:      Mental Status: He is alert.          Significant Labs: All pertinent labs within the past 24 hours have been reviewed.    Significant Imaging: I have reviewed all pertinent imaging results/findings within the past 24 hours.

## 2024-07-04 NOTE — PROGRESS NOTES
[facility] Medicine  Progress Note    Patient Name: Nelson Huntley  MRN: [id number]  Patient Class: IP- Inpatient   Admission Date: 7/3/2024  Length of Stay: 1 days  Attending Physician: Hernando Verma MD  Primary Care Provider: Bety Tomlinson MD        Subjective:     Principal Problem:Hemoptysis        HPI:  88 year old man with a history of MAC, CKD, Afib on sotalol, CKD 3, chronic kidney disease stage 3, hypertension, hyperlipidemia, CAD, MGUS, Tovar's esophagus, AICD, chronic systolic and diastolic CHF (echo 6/2020),  chronic ITP, recurrent pneumonias and MAC infection who presents following an episode of hemoptysis. He states that prior to arrival he had an episode of cough with bright red blood. He reports it was ~1 cup of blood. No further episodes since prior to arrival to the ED. He has had similar episodes in the past, although the volume of blood was greater during this event. Overall, he feels well. Resting comfortably on room air. No chest pain, no shortness of breath, no fevers, no chills, no n/v/d.     Overview/Hospital Course:  No notes on file    Interval History:     Patient continued small amount of hemoptysis this morning, but much improved  Denies shortness of breath  Patient has no other complaints    Review of Systems   Constitutional:  Negative for chills, fatigue and fever.   Respiratory:  Positive for cough. Negative for shortness of breath and wheezing.    Cardiovascular:  Negative for chest pain, palpitations and leg swelling.   Gastrointestinal:  Negative for abdominal distention and abdominal pain.   Neurological:  Negative for dizziness and headaches.   Psychiatric/Behavioral:  Negative for agitation and confusion.      Objective:     Vital Signs (Most Recent):  Temp: 97.7 °F (36.5 °C) (07/04/24 1116)  Pulse: 61 (07/04/24 1243)  Resp: 18 (07/04/24 1116)  BP: 132/63 (07/04/24 1116)  SpO2: (!) 94 % (07/04/24 1116) Vital Signs (24h Range):  Temp:  [97.7 °F  (36.5 °C)-98.2 °F (36.8 °C)] 97.7 °F (36.5 °C)  Pulse:  [55-72] 61  Resp:  [18-20] 18  SpO2:  [94 %-97 %] 94 %  BP: (124-167)/(56-96) 132/63     Weight: 73.6 kg (162 lb 4.1 oz)  Body mass index is 22.01 kg/m².    Intake/Output Summary (Last 24 hours) at 7/4/2024 1425  Last data filed at 7/4/2024 1201  Gross per 24 hour   Intake --   Output 650 ml   Net -650 ml         Physical Exam  Constitutional:       General: He is not in acute distress.     Appearance: Normal appearance. He is not ill-appearing or toxic-appearing.   Eyes:      Extraocular Movements: Extraocular movements intact.      Conjunctiva/sclera: Conjunctivae normal.   Cardiovascular:      Rate and Rhythm: Normal rate and regular rhythm.   Pulmonary:      Effort: Pulmonary effort is normal. No respiratory distress.   Abdominal:      General: There is no distension.      Tenderness: There is no abdominal tenderness. There is no guarding.   Musculoskeletal:         General: Normal range of motion.   Skin:     General: Skin is warm and dry.   Neurological:      General: No focal deficit present.      Mental Status: He is alert and oriented to person, place, and time.   Psychiatric:         Mood and Affect: Mood normal.         Behavior: Behavior normal.             Significant Labs: All pertinent labs within the past 24 hours have been reviewed.    Significant Imaging: I have reviewed all pertinent imaging results/findings within the past 24 hours.    Assessment/Plan:      * Hemoptysis  - patient with reported significant hemoptysis with a known history of MAC  - Imaging concerning for RLL, although reported amount of hemoptysis is out of proportion  - exact volume is unclear, but he reports blood to cover an entire sheet of paper    - on room air, no acute respiratory distress    Plan:  - will consult pulmonology  - given history of MAC, will consult infectious disease  - community acquired pneumonia coverage   - no further episodes of hemoptysis in ED.  Comfortable on room air. Hemoglobin stable. Will monitor closely If recurrence of significant hemoptysis, will consult pulmonology stat with escalation of care to ICU        Pneumonia  - imaging concerning for consolidation    Plan:  - continue CAP coverage with azithromycin and ceftriaxone  - respiratory cultures  - Will consult infectious disease for further assistance. Appreciate recs    Mycobacterium avium infection  - previously treated, not currently on therapy  - respiratory culture and AFB culture ordered  - ID consult as above      Ischemic cardiomyopathy  - continue GDMT  - holding antiplatelets in setting of hemoptysis  - appears euvolemic    Hyperlipidemia  - continue statin      Essential hypertension  - controlled  - continue home medications    Chronic kidney disease, stage 3a  Creatine stable for now. BMP reviewed- noted Estimated Creatinine Clearance: 40.9 mL/min (based on SCr of 1.3 mg/dL). according to latest data. Based on current GFR, CKD stage is stage 3 - GFR 30-59.  Monitor UOP and serial BMP and adjust therapy as needed. Renally dose meds. Avoid nephrotoxic medications and procedures.    Thrombocytopenia  - chronic thrombocytopenia  - platelets 109, stable  - no indication for platelet transfusion at this time        VTE Risk Mitigation (From admission, onward)           Ordered     IP VTE HIGH RISK PATIENT  Once         07/03/24 1955     Place sequential compression device  Until discontinued         07/03/24 1955     Reason for No Pharmacological VTE Prophylaxis  Once        Question:  Reasons:  Answer:  Risk of Bleeding    07/03/24 1955                    Discharge Planning   ABHINAV:      Code Status: Full Code   Is the patient medically ready for discharge?:     Reason for patient still in hospital (select all that apply): Patient trending condition, Laboratory test, and Treatment                     Hernando Verma MD  Department of Hospital Medicine   [place] - [facility]

## 2024-07-04 NOTE — ASSESSMENT & PLAN NOTE
- imaging concerning for consolidation    Plan:  - continue CAP coverage with azithromycin and ceftriaxone  - respiratory cultures  - Will consult infectious disease for further assistance. Appreciate recs

## 2024-07-04 NOTE — ASSESSMENT & PLAN NOTE
88 year old man with a history of MAC, CKD, Afib on sotalol, CKD 3, chronic kidney disease stage 3, hypertension, hyperlipidemia, CAD, MGUS, Tovar's esophagus, AICD, chronic systolic and diastolic CHF (echo 6/2020),  chronic ITP, recurrent pneumonias and non-TB mycobacterial infection who presents following an episode of hemoptysis    -of note his CT actually showed some improvement in tree-in-bud areas  -question of wether current episode is due to worsening of mycobacterial infection or other bacterial infection  -will not start mycobacterial treatment how  -would treat with cirpo, ceftriaxone, and doxy and monitor  -collect routine bacterial sputum cx

## 2024-07-04 NOTE — ASSESSMENT & PLAN NOTE
- patient with reported significant hemoptysis with a known history of MAC  - Imaging concerning for RLL, although reported amount of hemoptysis is out of proportion  - exact volume is unclear, but he reports blood to cover an entire sheet of paper    - on room air, no acute respiratory distress    Plan:  - will consult pulmonology  - given history of MAC, will consult infectious disease  - community acquired pneumonia coverage   - no further episodes of hemoptysis in ED. Comfortable on room air. Hemoglobin stable. Will monitor closely If recurrence of significant hemoptysis, will consult pulmonology stat with escalation of care to ICU

## 2024-07-04 NOTE — PLAN OF CARE
Problem: Adult Inpatient Plan of Care  Goal: Plan of Care Review  Outcome: Progressing     VIRTUAL NURSE:  Cued into patient's room.  Permission received per patient to turn camera to view patient.  Introduced as VN for night shift that will be working with floor nurse and nursing assistant.  Educated patient on VN's role in patient care and  [facility] model.  Plan of care reviewed with patient.  Education per flowsheet.   Informed patient that staff will round on them every 2 hours but to use call light for any other needs they may have; informed of fall risk and fall precautions.  Patient verbalized understanding.  Call light within reach; bed siderails up x3.  Opportunity given for questions and questions answered.  Admission assessment questions answered.  Patient denies complaints or any needs at this time. Instructed to call for assistance.  Will cont to monitor and intervene as needed.    Labs, notes, orders, and careplan initiated.       07/03/24 [id number]   Patient Request   Patient Requested no complaints or needs currently   Admission   Initial VN Admission Questions Complete   Communication Issues? Technical Issue   Shift   Virtual Nurse - Rounding Complete   Virtual Nurse - Patient Verbalized Approval Of Camera Use;VN Rounding   Type of Frequent Check   Type Patient Rounds   Safety/Activity   Patient Rounds bed in low position;placement of personal items at bedside;bed wheels locked;call light in patient/parent reach;visualized patient;clutter free environment maintained   Safety Promotion/Fall Prevention assistive device/personal item within reach;bed alarm set;commode/urinal/bedpan at bedside;high risk medications identified;Fall Risk reviewed with patient/family;diversional activities provided;medications reviewed;nonskid shoes/socks when out of bed;room near unit station;side rails raised x 3;supervised activity;Supervised toileting - stay within arms reach;instructed to call staff for mobility   Safety  Precautions emergency equipment at bedside   Positioning   Body Position neutral body alignment;neutral head position;position changed independently   Head of Bed (HOB) Positioning HOB at 60-90 degrees   Pain/Comfort/Sleep   Preferred Pain Scale number (Numeric Rating Pain Scale)   Comfort/Acceptable Pain Level 0   Pain Rating (0-10): Rest 0   Sleep/Rest/Relaxation no problem identified;awake

## 2024-07-04 NOTE — PLAN OF CARE
Problem: Adult Inpatient Plan of Care  Goal: Plan of Care Review  Outcome: Progressing  Goal: Patient-Specific Goal (Individualized)  Outcome: Progressing  Goal: Optimal Comfort and Wellbeing  Outcome: Progressing     Problem: Pneumonia  Goal: Resolution of Infection Signs and Symptoms  Outcome: Progressing  Goal: Effective Oxygenation and Ventilation  Outcome: Progressing

## 2024-07-04 NOTE — CONSULTS
"[facility] Consult Note    Primary Attending Physician: Hernando Verma MD  Consultant Attending: Darien Bell MD  Consultant Fellow: Daphne Verdugo MD    Reason for Consult:     Hemoptysis    Subjective:      History of Present Illness:  Nelson Huntley is a 88 y.o.  male who  has a past medical history of AICD (automatic cardioverter/defibrillator) present (7/17/2012), Cardiomyopathy, CKD (chronic kidney disease) stage 3, GFR 30-59 ml/min (7/17/2012), Coronary artery disease, GERD (gastroesophageal reflux disease), Hyperlipidemia (7/17/2012), Hypertension (7/17/2012), Ischemic cardiomyopathy (7/17/2012), MGUS (monoclonal gammopathy of unknown significance), Thrombocytopenia (7/17/2012), Ventricular tachycardia, and VT (ventricular tachycardia) (7/17/2012).. The patient presented to the [facility] on 7/3/2024 with a primary complaint of Hemoptysis (Patient states he has been coughing daily for about 6 months with recent dx of MAC. Today began coughing up BRB. Denies pain. Presents awake, alert, oriented. Resp unlabored and without distress. Denies SOB. + h/o esophageal varices)     describes an episode yesterday of severe hemoptysis. He says he coughed up lots of bright red blood with some clots and estimates he coughed up 4-8 ounces of blood in addition to saturating 2 hand towels with blood. He endorses past isolated episodes of hemoptysis but never to this extent. 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 Imaging also showed chronic bronchiectasis. He follows with ID for his chronic NTM for which the patient and provider agreed not to pursue treatment of the NTM.    Patient denies any past history of asthma, " lung infections, generalized systemic infections, allergies, or respiratory issues of any kind prior to his admission this past winter. He says he fathered 3 children in his first marriage, denies smoking history, denies known exposures, past tuberculosis infection, or other infections.    Past Medical History:  Past Medical History:   Diagnosis Date    AICD (automatic cardioverter/defibrillator) present 7/17/2012    Cardiomyopathy     CKD (chronic kidney disease) stage 3, GFR 30-59 ml/min 7/17/2012    Coronary artery disease     GERD (gastroesophageal reflux disease)     Tovar's; Dr. Vu    Hyperlipidemia 7/17/2012    Hypertension 7/17/2012    Ischemic cardiomyopathy 7/17/2012    MGUS (monoclonal gammopathy of unknown significance)     Thrombocytopenia 7/17/2012    Ventricular tachycardia     VT (ventricular tachycardia) 7/17/2012       Past Surgical History:  Past Surgical History:   Procedure Laterality Date    ABDOMINAL AORTIC ANEURYSM REPAIR      CARDIAC DEFIBRILLATOR PLACEMENT      CATARACT EXTRACTION, BILATERAL  2018    CORONARY ANGIOPLASTY      EYE SURGERY Bilateral     cataract    HERNIA REPAIR      x2    REPLACEMENT OF IMPLANTABLE CARDIOVERTER-DEFIBRILLATOR (ICD) GENERATOR Left 3/18/2022    Procedure: REPLACEMENT, ICD GENERATOR;  Surgeon: Felipe Woodard MD;  Location: [facility] EP LAB;  Service: Cardiology;  Laterality: Left;  SEFERINO, ICD gen chg, SJM, anes, MB, 3prep*ANUJ ICD insitu*        Allergies:  Review of patient's allergies indicates:   Allergen Reactions    Fluorescein-benoxinate Other (See Comments)    Pcn  [penicillins]      Other reaction(s): Unknown    Tropicamide Other (See Comments)    Clindamycin Rash       Medications:   In-Hospital Scheduled Medications:   amLODIPine  10 mg Oral Daily    ceFEPime IV (PEDS and ADULTS)  1 g Intravenous Q8H    famotidine  20 mg Oral QHS    furosemide  20 mg Oral Daily    metoprolol succinate  25 mg Oral BID    multivitamin  1 tablet Oral Daily     pravastatin  40 mg Oral QHS    sacubitriL-valsartan  1 tablet Oral BID    sotaloL  120 mg Oral Daily    tranexamic acid  500 mg Nasal TID    [START ON 7/5/2024] vancomycin (VANCOCIN) IV (PEDS and ADULTS)  1,250 mg Intravenous Q24H    vancomycin (VANCOCIN) IV (PEDS and ADULTS)  1,500 mg Intravenous Once      In-Hospital PRN Medications:    Current Facility-Administered Medications:     acetaminophen, 650 mg, Oral, Q4H PRN    dextrose 10%, 12.5 g, Intravenous, PRN    dextrose 10%, 25 g, Intravenous, PRN    glucagon (human recombinant), 1 mg, Intramuscular, PRN    glucose, 16 g, Oral, PRN    glucose, 24 g, Oral, PRN    naloxone, 0.02 mg, Intravenous, PRN    sodium chloride 0.9%, 10 mL, Intravenous, Q12H PRN    Pharmacy to dose Vancomycin consult, , , Once **AND** vancomycin - pharmacy to dose, , Intravenous, pharmacy to manage frequency   In-Hospital IV Infusion Medications:   0.9% NaCl   Intravenous Continuous 75 mL/hr at 07/04/24 1438 New Bag at 07/04/24 1438      Home Medications:  Prior to Admission medications    Medication Sig Start Date End Date Taking? Authorizing Provider   aspirin 81 MG chewable tablet Take 81 mg by mouth Daily.   Yes Provider, Historical   beta-carotene,A,-vits C,E/mins (OCUVITE ORAL) Take by mouth.   Yes Provider, Historical   clopidogreL (PLAVIX) 75 mg tablet TAKE 1 TABLET EVERY DAY  Patient taking differently: Take 75 mg by mouth every Tues, Thurs, Sat. 5/8/24  Yes Bety Tomlinson MD   famotidine (PEPCID) 20 MG tablet Take 20 mg by mouth before dinner.   Yes Provider, Historical   furosemide (LASIX) 20 MG tablet Take 1 tablet (20 mg total) by mouth 2 (two) times daily. Take one tablet every other day orbas directed 12/1/23  Yes Shiraz Cartagena MD   metoprolol succinate (TOPROL-XL) 25 MG 24 hr tablet TAKE 1 TABLET TWICE DAILY  Patient taking differently: Take 25 mg by mouth 2 (two) times daily. 3/20/24  Yes Bety Tomlinson MD   niacin 500 MG tablet Take 500 mg by mouth Every PM.    Yes Provider, Historical   nitroGLYCERIN (NITROSTAT) 0.4 MG SL tablet Take 1 tab under the tongue for chest pain. May repeat every 5 minutes for a total of 3 doses. If chest pain not relieved go to the ED.  Patient taking differently: Place 0.4 mg under the tongue every 5 (five) minutes as needed for Chest pain.  If chest pain not relieved go to the ED. 4/25/24  Yes Bety Tomlinson MD   omega-3 fatty acids-vitamin E 1,000 mg Cap Take 1 capsule by mouth 2 (two) times daily.   Yes Provider, Historical   potassium chloride SA (K-DUR,KLOR-CON M) 10 MEQ tablet Take 1 tablet (10 mEq total) by mouth once daily. 12/1/23  Yes Shiraz Cartagena MD   pravastatin (PRAVACHOL) 40 MG tablet TAKE 1 TABLET EVERY EVENING  Patient taking differently: Take 40 mg by mouth every evening. 3/20/24  Yes Bety Tomlinson MD   sacubitriL-valsartan (ENTRESTO)  mg per tablet Take 1 tablet by mouth 2 (two) times daily. 12/18/23  Yes Shiraz Cartagena MD   sotaloL (BETAPACE) 120 MG Tab Take 1 tablet (120 mg total) by mouth 2 (two) times daily. 6/18/24  Yes Solange Moon PA-C   ubidecarenone (COENZYME Q10 ORAL) Take 1 tablet by mouth once daily. 12/9/22  Yes Provider, Historical   vitamin D 1000 units Tab Take 1,000 Units by mouth every Tues, Thurs, Sat.    Yes Provider, Historical   amLODIPine (NORVASC) 10 MG tablet TAKE 1 TABLET EVERY DAY  Patient taking differently: Take 10 mg by mouth once daily. 5/8/24   Bety Tomlinson MD   azelastine (ASTELIN) 137 mcg (0.1 %) nasal spray 1 spray (137 mcg total) by Nasal route 2 (two) times daily. 10/24/23 10/23/24  Bety Tomlinson MD   fluticasone propionate (FLONASE) 50 mcg/actuation nasal spray 1 spray (50 mcg total) by Each Nostril route once daily. 10/24/23   Bety Tomlinson MD   multivitamin with minerals tablet Take 1 tablet by mouth once daily.  1/1/18   Provider, Historical   nebulizer and compressor Archana 1 Device by Misc.(Non-Drug; Combo Route) route 2 (two) times  a day. 24   Bety Tomlinson MD   sodium chloride 3% 3 % nebulizer solution Take 4 mLs by nebulization 2 (two) times a day. 24  Eliza Rueda MD       Family History:  Family History   Problem Relation Name Age of Onset    Cancer Mother          Lymphoma    Lymphoma Mother      Coronary artery disease Mother      Heart disease Mother      Heart disease Father      Heart attacks under age 50 Father      Heart disease Sister      Cancer Brother          lymphoma       Social History:  Social History     Tobacco Use    Smoking status: Never     Passive exposure: Past    Smokeless tobacco: Never   Substance Use Topics    Alcohol use: No    Drug use: No       Review of Systems:  Denies fever, SOB, pleuritic chest pain, significant sputum production, night sweats, weight loss. Pertinent positives listed in HPI above.     Objective:   Last 24 Hour Vital Signs:  BP  Min: 124/56  Max: 167/96  Temp  Av °F (36.7 °C)  Min: 97.7 °F (36.5 °C)  Max: 98.2 °F (36.8 °C)  Pulse  Av.6  Min: 58  Max: 72  Resp  Av.9  Min: 18  Max: 20  SpO2  Av.9 %  Min: 93 %  Max: 96 %  Weight  Av.6 kg (162 lb 4.1 oz)  Min: 73.6 kg (162 lb 4.1 oz)  Max: 73.6 kg (162 lb 4.1 oz)  I/O last 3 completed shifts:  In: -   Out: 300 [Urine:300]    Physical Examination:  Gen: WDWN WM in NAD  Skin: warm, well perfused, no rashes or lesions noted  Neck: supple, no JVD  HEENT: NCAT, PERRL, EOMI, moist oral and nasal mucosa, bright red blood noted in OP, nares without evidence of epistaxis  CV: RRR, no m/r/g, CR<3sec, DP2+ bilaterally, pacemaker noted to L lateral chest wall  PULM: rales auscultated in LLL, other lung fields CTAB, non-labored respirations, symmetric chest expansion  ABD: soft, NTND, no HSM noted  Neuro: AAO*4, appropriate mood and affect, normal muscle strength and tone in all extremities, no focal deficits noted     Laboratory:  Trended Lab Data:  Recent Labs     24  1502 24  7260  07/04/24  0252 07/04/24  0253   WBC 9.94 9.73  --  9.49   HGB 13.3* 13.3*  --  12.4*   HCT 39.6* 40.0  --  37.2*   * 110*  --  100*     --  140  --    K 4.1  --  4.4  --      --  107  --    CO2 23  --  23  --    BUN 18  --  18  --    CREATININE 1.5*  --  1.3  --      --  89  --    BILITOT  --   --  0.7  --    AST  --   --  20  --    ALT  --   --  12  --    ALKPHOS  --   --  111  --    CALCIUM 10.0  --  9.6  --    ALBUMIN  --   --  3.4*  --    PROT  --   --  6.6  --        Cardiac:   Recent Labs   Lab 07/04/24 0252   *       Urinalysis:   Lab Results   Component Value Date    COLORU Colorless (A) 01/20/2023    SPECGRAV 1.010 01/20/2023    NITRITE Negative 01/20/2023    KETONESU Negative 01/20/2023    UROBILINOGEN Negative 01/20/2023       Microbiology:  Microbiology Results (last 7 days)       Procedure Component Value Units Date/Time    Culture, Respiratory with Gram Stain [[id number]]     Order Status: No result Specimen: Respiratory from Sputum, Expectorated     AFB Culture & Smear [[id number]]     Order Status: No result Specimen: Respiratory from Sputum, Expectorated             Radiology:  CXR   Pacing device over the left chest wall with 2 leads in stable position.  The cardiomediastinal silhouette is normal in size and midline.  Atherosclerotic calcification of the aorta.  Pulmonary vascularity appears within normal limits.The lungs appear symmetrically expanded.  Faint patchy airspace opacity throughout both lungs, not appreciably changed from recent CT allowing for some variation modality.  No new large area of consolidation.  No new pleural fluid or pneumothorax.       CT  Decreased size and conspicuity of tree-in-bud nodular opacities in the upper lobes with persistent ground-glass opacities suggesting persistent but decreased inflammatory/infectious process. New consolidation in the medial basilar and posterior segment of the left lower lobe suggesting or acute process  such is pneumonia.  Correlate for SHAY. Multi-vessel coronary artery atherosclerotic disease. Progressive tubular bronchiectasis.    I have personally reviewed the above labs and imaging.    Current Medications:     Infusions:   0.9% NaCl   Intravenous Continuous 75 mL/hr at 07/04/24 1438 New Bag at 07/04/24 1438        Scheduled:   amLODIPine  10 mg Oral Daily    ceFEPime IV (PEDS and ADULTS)  1 g Intravenous Q8H    famotidine  20 mg Oral QHS    furosemide  20 mg Oral Daily    metoprolol succinate  25 mg Oral BID    multivitamin  1 tablet Oral Daily    pravastatin  40 mg Oral QHS    sacubitriL-valsartan  1 tablet Oral BID    sotaloL  120 mg Oral Daily    tranexamic acid  500 mg Nasal TID    [START ON 7/5/2024] vancomycin (VANCOCIN) IV (PEDS and ADULTS)  1,250 mg Intravenous Q24H    vancomycin (VANCOCIN) IV (PEDS and ADULTS)  1,500 mg Intravenous Once        PRN:    Current Facility-Administered Medications:     acetaminophen, 650 mg, Oral, Q4H PRN    dextrose 10%, 12.5 g, Intravenous, PRN    dextrose 10%, 25 g, Intravenous, PRN    glucagon (human recombinant), 1 mg, Intramuscular, PRN    glucose, 16 g, Oral, PRN    glucose, 24 g, Oral, PRN    naloxone, 0.02 mg, Intravenous, PRN    sodium chloride 0.9%, 10 mL, Intravenous, Q12H PRN    Pharmacy to dose Vancomycin consult, , , Once **AND** vancomycin - pharmacy to dose, , Intravenous, pharmacy to manage frequency     Assessment:     Nelson GIBBONS Parent is a 88 y.o. 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    Chronic cough 12/15/2023    Hemoptysis 12/15/2023    Bronchiectasis without complication 12/15/2023    Sensorineural hearing loss, bilateral 03/16/2023    Idiopathic thrombocytopenic purpura 03/31/2022    Chronic combined systolic and diastolic congestive heart failure 01/24/2022    History of ventricular tachycardia 07/02/2019    Cerebral  infarction, remote, resolved 10/25/2018    Abnormal CT of the head 10/08/2018    Hypercalcemia 07/05/2018    Metabolic bone disease 07/05/2018    History of TIA (transient ischemic attack) 05/15/2018    Gastroesophageal reflux disease 08/29/2017    MGUS (monoclonal gammopathy of unknown significance) 03/07/2017    Positive ELYSIA (antinuclear antibody) 03/01/2017    Abnormal serum protein electrophoresis 03/01/2017    Vitamin D deficiency 03/01/2017    Ischemic cardiomyopathy 02/24/2017    Seasonal allergic rhinitis 02/24/2017    Body mass index (BMI) of 22.0 to 22.9 in adult 02/24/2017    Monoclonal paraproteinemia 02/23/2017    Tovar's esophagus without dysplasia 01/18/2016    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    Nuclear sclerosis 12/13/2012    Coronary artery disease of native artery with stable angina pectoris 10/28/2012    Old myocardial infarction 10/28/2012    S/P AAA (abdominal aortic aneurysm) repair 10/28/2012    Diverticulosis 10/28/2012    Thrombocytopenia 07/17/2012    Chronic kidney disease, stage 3a 07/17/2012    AICD (automatic cardioverter/defibrillator) present 07/17/2012    Essential hypertension 07/17/2012    Hyperlipidemia 07/17/2012    Anemia associated with chronic renal failure 07/17/2012    Aortic atherosclerosis 03/15/2012        Assessment/Plan:     Acute Exacerbation of Chronic Bronchiectasis with Hemoptysis  Patient has a known history of chronic bronchiectasis discovered this past winter who was admitted in December/January for pneumonia. Cultures were positive for Pseudomonas and non-tuberculous mycobacterium which speciated both M. Avium and M. Abscessus. Unclear whether the NTM colonized a pre-existing chronic bronchiectasis or whether the NTM led to chronic bronchiectasis. Patient follows with Dr. Rueda in ID and made the decision along with his provider that he did not want to pursue treatment of the MAC at this time. Patient previously managed chronic, dry cough until  yesterday when patient developed life threatening, large volume hemoptysis with clots. He continues to actively cough up bright red blood though in smaller volumes. CT here showed decreased size and conspicuity of tree-in-bud nodular opacities in the upper lobes with persistent ground-glass opacities suggesting persistent but decreased inflammatory/infectious process. New consolidation in the medial basilar and posterior segment of the left lower lobe suggesting or acute process such is pneumonia. Progressive tubular bronchiectasis.H and H showed slight (0.9) drop in hemoglobin to 12.4.   - CTA ordered to attempt to identify source of bleeding  - IR consult placed in the event that patient's clinical condition deteriorates  - Patient and wife instructed to promptly alert staff should bleeding increase given risk of asphyxiation  - Vancomycin and Cefepime added for treatment of acute exacerbation of chronic bronchiectasis covering for possible staph and pseudomonas  - Macrolides should be avoided in the treatment of this patient due to drug resistance with MAC  -PT/INR ordered  - TXA nebulizer ordered to try to decrease intrapleural bleeding  - Hypertonic saline nebulizers ordered for treatment of bronchiectasis    Thank you for allowing us to participate in the care of this patient. Please contact me if you have any questions regarding this consult.    Tashia Benoit MD   [facility] PGY1

## 2024-07-04 NOTE — PLAN OF CARE
"  Problem: Adult Inpatient Plan of Care  Goal: Plan of Care Review  Outcome: Progressing     Problem: Adult Inpatient Plan of Care  Goal: Optimal Comfort and Wellbeing  Outcome: Progressing     Problem: Pneumonia  Goal: Fluid Balance  Outcome: Progressing     Problem: Pulmonary Impairment  Goal: Improved Activity Tolerance  Outcome: Progressing     Patient arrived on the unit @ 2010 hrs. Oriented the patient to the nurse call button. Vitals taken. Plan of care reviewed with the patient. Scheduled medicines given and the patient tolerated well. Held Metoprolol 25 mg on MD orders as the patient was in Bradycardia. Fall and safety precautions taken and the standard interventions are in place. On Telemetry monitoring with NSR, no true "red alarms' noted, no acute distress reported either on the shift. Advised the patient to call for assistance. Continued monitoring the patient.   "

## 2024-07-04 NOTE — ASSESSMENT & PLAN NOTE
- imaging concerning for consolidation  - continue CAP coverage with azithromycin and ceftriaxone  - respiratory cultures  - Will consult infectious disease for further assistance. Appreciate recs

## 2024-07-04 NOTE — H&P
[facility] Medicine  History & Physical    Patient Name: Nelson Huntley  MRN: [id number]  Patient Class: IP- Inpatient  Admission Date: 7/3/2024  Attending Physician: Jose Serrato MD   Primary Care Provider: Bety Tomlinson MD         Patient information was obtained from patient and ER records.     Subjective:     Principal Problem:Hemoptysis    Chief Complaint:   Chief Complaint   Patient presents with    Hemoptysis     Patient states he has been coughing daily for about 6 months with recent dx of MAC. Today began coughing up BRB. Denies pain. Presents awake, alert, oriented. Resp unlabored and without distress. Denies SOB. + h/o esophageal varices        HPI: 88 year old man with a history of MAC, CKD, Afib on sotalol, CKD 3, chronic kidney disease stage 3, hypertension, hyperlipidemia, CAD, MGUS, Tovar's esophagus, AICD, chronic systolic and diastolic CHF (echo 6/2020),  chronic ITP, recurrent pneumonias and MAC infection who presents following an episode of hemoptysis. He states that prior to arrival he had an episode of cough with bright red blood. He reports it was ~1 cup of blood. No further episodes since prior to arrival to the ED. He has had similar episodes in the past, although the volume of blood was greater during this event. Overall, he feels well. Resting comfortably on room air. No chest pain, no shortness of breath, no fevers, no chills, no n/v/d.     Past Medical History:   Diagnosis Date    AICD (automatic cardioverter/defibrillator) present 7/17/2012    Cardiomyopathy     CKD (chronic kidney disease) stage 3, GFR 30-59 ml/min 7/17/2012    Coronary artery disease     GERD (gastroesophageal reflux disease)     Jan[place]; Dr. Vu    Hyperlipidemia 7/17/2012    Hypertension 7/17/2012    Ischemic cardiomyopathy 7/17/2012    MGUS (monoclonal gammopathy of unknown significance)     Thrombocytopenia 7/17/2012    Ventricular tachycardia     VT (ventricular tachycardia)  7/17/2012       Past Surgical History:   Procedure Laterality Date    ABDOMINAL AORTIC ANEURYSM REPAIR      CARDIAC DEFIBRILLATOR PLACEMENT      CATARACT EXTRACTION, BILATERAL  2018    CORONARY ANGIOPLASTY      EYE SURGERY Bilateral     cataract    HERNIA REPAIR      x2    REPLACEMENT OF IMPLANTABLE CARDIOVERTER-DEFIBRILLATOR (ICD) GENERATOR Left 3/18/2022    Procedure: REPLACEMENT, ICD GENERATOR;  Surgeon: Felipe Woodard MD;  Location: [facility] EP LAB;  Service: Cardiology;  Laterality: Left;  SEFERINO, ICD gen chg, SJM, anes, MB, 3prep*ANUJ ICD insitu*        Review of patient's allergies indicates:   Allergen Reactions    Fluorescein-benoxinate Other (See Comments)    Pcn  [penicillins]      Other reaction(s): Unknown    Tropicamide Other (See Comments)    Clindamycin Rash       Current Facility-Administered Medications on File Prior to Encounter   Medication    sodium chloride 0.9% bolus 1,000 mL    vancomycin in dextrose 5 % 1 gram/250 mL IVPB 1,000 mg     Current Outpatient Medications on File Prior to Encounter   Medication Sig    aspirin 81 MG chewable tablet Take 81 mg by mouth Daily.    beta-carotene,A,-vits C,E/mins (OCUVITE ORAL) Take by mouth.    clopidogreL (PLAVIX) 75 mg tablet TAKE 1 TABLET EVERY DAY (Patient taking differently: Take 75 mg by mouth every Tues, Thurs, Sat.)    famotidine (PEPCID) 20 MG tablet Take 20 mg by mouth before dinner.    furosemide (LASIX) 20 MG tablet Take 1 tablet (20 mg total) by mouth 2 (two) times daily. Take one tablet every other day orbas directed    metoprolol succinate (TOPROL-XL) 25 MG 24 hr tablet TAKE 1 TABLET TWICE DAILY (Patient taking differently: Take 25 mg by mouth 2 (two) times daily.)    niacin 500 MG tablet Take 500 mg by mouth Every PM.    nitroGLYCERIN (NITROSTAT) 0.4 MG SL tablet Take 1 tab under the tongue for chest pain. May repeat every 5 minutes for a total of 3 doses. If chest pain not relieved go to the ED. (Patient taking differently: Place 0.4 mg  under the tongue every 5 (five) minutes as needed for Chest pain.  If chest pain not relieved go to the ED.)    omega-3 fatty acids-vitamin E 1,000 mg Cap Take 1 capsule by mouth 2 (two) times daily.    potassium chloride SA (K-DUR,KLOR-CON M) 10 MEQ tablet Take 1 tablet (10 mEq total) by mouth once daily.    pravastatin (PRAVACHOL) 40 MG tablet TAKE 1 TABLET EVERY EVENING (Patient taking differently: Take 40 mg by mouth every evening.)    sacubitriL-valsartan (ENTRESTO)  mg per tablet Take 1 tablet by mouth 2 (two) times daily.    sotaloL (BETAPACE) 120 MG Tab Take 1 tablet (120 mg total) by mouth 2 (two) times daily.    ubidecarenone (COENZYME Q10 ORAL) Take 1 tablet by mouth once daily.    vitamin D 1000 units Tab Take 1,000 Units by mouth every Tues, Thurs, Sat.     amLODIPine (NORVASC) 10 MG tablet TAKE 1 TABLET EVERY DAY (Patient taking differently: Take 10 mg by mouth once daily.)    azelastine (ASTELIN) 137 mcg (0.1 %) nasal spray 1 spray (137 mcg total) by Nasal route 2 (two) times daily.    fluticasone propionate (FLONASE) 50 mcg/actuation nasal spray 1 spray (50 mcg total) by Each Nostril route once daily.    multivitamin with minerals tablet Take 1 tablet by mouth once daily.     nebulizer and compressor Archana 1 Device by Misc.(Non-Drug; Combo Route) route 2 (two) times a day.    sodium chloride 3% 3 % nebulizer solution Take 4 mLs by nebulization 2 (two) times a day.     Family History       Problem Relation (Age of Onset)    Cancer Mother, Brother    Coronary artery disease Mother    Heart attacks under age 50 Father    Heart disease Mother, Father, Sister    Lymphoma Mother          Tobacco Use    Smoking status: Never     Passive exposure: Past    Smokeless tobacco: Never   Substance and Sexual Activity    Alcohol use: No    Drug use: No    Sexual activity: Yes     Partners: Female     Comment:      Review of Systems   Constitutional: Negative.    HENT: Negative.     Respiratory:   Positive for cough.    Cardiovascular: Negative.    Gastrointestinal: Negative.    Endocrine: Negative.    Musculoskeletal: Negative.    Skin: Negative.    Neurological: Negative.    Psychiatric/Behavioral: Negative.       Objective:     Vital Signs (Most Recent):  Temp: 98.1 °F (36.7 °C) (07/03/24 2300)  Pulse: 60 (07/04/24 0009)  Resp: 20 (07/03/24 2300)  BP: 136/68 (07/03/24 2300)  SpO2: (!) 94 % (07/03/24 2300) Vital Signs (24h Range):  Temp:  [97.8 °F (36.6 °C)-98.1 °F (36.7 °C)] 98.1 °F (36.7 °C)  Pulse:  [55-72] 60  Resp:  [19-20] 20  SpO2:  [94 %-97 %] 94 %  BP: (124-167)/(56-96) 136/68     Weight: 73.6 kg (162 lb 4.1 oz)  Body mass index is 22.01 kg/m².     Physical Exam  Constitutional:       General: He is not in acute distress.     Appearance: Normal appearance. He is not ill-appearing.   HENT:      Head: Normocephalic and atraumatic.      Nose: No congestion or rhinorrhea.      Mouth/Throat:      Mouth: Mucous membranes are moist.      Pharynx: Oropharynx is clear.   Eyes:      General: No scleral icterus.     Extraocular Movements: Extraocular movements intact.      Conjunctiva/sclera: Conjunctivae normal.      Pupils: Pupils are equal, round, and reactive to light.   Cardiovascular:      Rate and Rhythm: Normal rate and regular rhythm.      Pulses: Normal pulses.      Heart sounds: Normal heart sounds.   Pulmonary:      Effort: Pulmonary effort is normal.      Breath sounds: Normal breath sounds.   Abdominal:      General: Abdomen is flat. Bowel sounds are normal.      Palpations: Abdomen is soft.   Musculoskeletal:      Cervical back: Normal range of motion and neck supple.   Neurological:      Mental Status: He is alert.              CRANIAL NERVES     CN III, IV, VI   Pupils are equal, round, and reactive to light.       Significant Labs: All pertinent labs within the past 24 hours have been reviewed.    Significant Imaging: I have reviewed all pertinent imaging results/findings within the past 24  hours.  Assessment/Plan:     * Hemoptysis  - patient with reported significant hemoptysis  - exact volume is unclear  - on room air, no acute respiratory distress  - Imaging concerning for RLL, although reported amount of hemoptysis is out of proportion  - will consult pulmonology  - given history of MAC, will consult infectious disease  - community acquired pneumonia coverage   - no further episodes of hemoptysis in ED. Comfortable on room air. Hemoglobin stable. Will monitor closely If recurrence of significant hemoptysis, will consult pulmonology stat with escalation of care to ICU      Pneumonia  - imaging concerning for consolidation  - continue CAP coverage with azithromycin and ceftriaxone  - respiratory cultures  - Will consult infectious disease for further assistance. Appreciate recs    Mycobacterium avium infection  - previously treated, not currently on therapy  - respiratory culture and AFB culture ordered  - ID consult as above      Ischemic cardiomyopathy  - continue GDMT  - holding antiplatelets in setting of hemoptysis  - appears euvolemic    Hyperlipidemia  - continue statin      Essential hypertension  - controlled  - continue home medications    Chronic kidney disease, stage 3a  Creatine stable for now. BMP reviewed- noted Estimated Creatinine Clearance: 35.4 mL/min (A) (based on SCr of 1.5 mg/dL (H)). according to latest data. Based on current GFR, CKD stage is stage 3 - GFR 30-59.  Monitor UOP and serial BMP and adjust therapy as needed. Renally dose meds. Avoid nephrotoxic medications and procedures.    Thrombocytopenia  - chronic thrombocytopenia  - platelets 109, stable  - no indication for platelet transfusion at this time        VTE Risk Mitigation (From admission, onward)           Ordered     IP VTE HIGH RISK PATIENT  Once         07/03/24 1955     Place sequential compression device  Until discontinued         07/03/24 1955     Reason for No Pharmacological VTE Prophylaxis  Once         Question:  Reasons:  Answer:  Risk of Bleeding    07/03/24 1955                                    Kumar Lott MD  Department of [facility] Medicine  [facility]

## 2024-07-04 NOTE — ASSESSMENT & PLAN NOTE
- previously treated, not currently on therapy  - respiratory culture and AFB culture ordered  - ID consult as above

## 2024-07-05 PROBLEM — J47.9 BRONCHIECTASIS, NON-TUBERCULOUS: Status: ACTIVE | Noted: 2024-07-05

## 2024-07-05 PROBLEM — A31.0 NONTUBERCULOUS MYCOBACTERIAL DISEASE OF LUNG: Status: ACTIVE | Noted: 2024-07-05

## 2024-07-05 LAB
ALBUMIN SERPL BCP-MCNC: 3.1 G/DL (ref 3.5–5.2)
ALP SERPL-CCNC: 106 U/L (ref 55–135)
ALT SERPL W/O P-5'-P-CCNC: 18 U/L (ref 10–44)
ANION GAP SERPL CALC-SCNC: 9 MMOL/L (ref 8–16)
APICAL FOUR CHAMBER EJECTION FRACTION: 57 %
APICAL TWO CHAMBER EJECTION FRACTION: 43 %
ASCENDING AORTA: 3.26 CM
AST SERPL-CCNC: 24 U/L (ref 10–40)
AV INDEX (PROSTH): 0.81
AV MEAN GRADIENT: 3 MMHG
AV PEAK GRADIENT: 5 MMHG
AV VALVE AREA BY VELOCITY RATIO: 2.74 CM²
AV VALVE AREA: 2.71 CM²
AV VELOCITY RATIO: 0.82
BASOPHILS # BLD AUTO: 0.05 K/UL (ref 0–0.2)
BASOPHILS NFR BLD: 0.5 % (ref 0–1.9)
BILIRUB SERPL-MCNC: 0.8 MG/DL (ref 0.1–1)
BSA FOR ECHO PROCEDURE: 1.95 M2
BUN SERPL-MCNC: 19 MG/DL (ref 8–23)
CALCIUM SERPL-MCNC: 8.9 MG/DL (ref 8.7–10.5)
CHLORIDE SERPL-SCNC: 106 MMOL/L (ref 95–110)
CO2 SERPL-SCNC: 23 MMOL/L (ref 23–29)
CREAT SERPL-MCNC: 1.3 MG/DL (ref 0.5–1.4)
CV ECHO LV RWT: 0.27 CM
DIFFERENTIAL METHOD BLD: ABNORMAL
DOP CALC AO PEAK VEL: 1.12 M/S
DOP CALC AO VTI: 22.1 CM
DOP CALC LVOT AREA: 3.3 CM2
DOP CALC LVOT DIAMETER: 2.06 CM
DOP CALC LVOT PEAK VEL: 0.92 M/S
DOP CALC LVOT STROKE VOLUME: 59.96 CM3
DOP CALC MV VTI: 16.7 CM
DOP CALCLVOT PEAK VEL VTI: 18 CM
E WAVE DECELERATION TIME: 140.36 MSEC
E/A RATIO: 0.63
E/E' RATIO: 11 M/S
ECHO LV POSTERIOR WALL: 0.77 CM (ref 0.6–1.1)
EOSINOPHIL # BLD AUTO: 0.1 K/UL (ref 0–0.5)
EOSINOPHIL NFR BLD: 0.9 % (ref 0–8)
ERYTHROCYTE [DISTWIDTH] IN BLOOD BY AUTOMATED COUNT: 14.4 % (ref 11.5–14.5)
EST. GFR  (NO RACE VARIABLE): 53 ML/MIN/1.73 M^2
FRACTIONAL SHORTENING: 17 % (ref 28–44)
GLUCOSE SERPL-MCNC: 93 MG/DL (ref 70–110)
HCT VFR BLD AUTO: 35.2 % (ref 40–54)
HGB BLD-MCNC: 11.9 G/DL (ref 14–18)
IMM GRANULOCYTES # BLD AUTO: 0.03 K/UL (ref 0–0.04)
IMM GRANULOCYTES NFR BLD AUTO: 0.3 % (ref 0–0.5)
INTERVENTRICULAR SEPTUM: 1.02 CM (ref 0.6–1.1)
IVC DIAMETER: 1.4 CM
LA MAJOR: 5.33 CM
LA MINOR: 3.15 CM
LA WIDTH: 3.3 CM
LEFT ATRIUM AREA SYSTOLIC (APICAL 2 CHAMBER): 14.68 CM2
LEFT ATRIUM AREA SYSTOLIC (APICAL 4 CHAMBER): 16.1 CM2
LEFT ATRIUM SIZE: 3.56 CM
LEFT ATRIUM VOLUME INDEX MOD: 18.8 ML/M2
LEFT ATRIUM VOLUME INDEX: 20.2 ML/M2
LEFT ATRIUM VOLUME MOD: 36.88 CM3
LEFT ATRIUM VOLUME: 39.54 CM3
LEFT INTERNAL DIMENSION IN SYSTOLE: 4.71 CM (ref 2.1–4)
LEFT VENTRICLE DIASTOLIC VOLUME INDEX: 81.31 ML/M2
LEFT VENTRICLE DIASTOLIC VOLUME: 159.36 ML
LEFT VENTRICLE END DIASTOLIC VOLUME APICAL 2 CHAMBER: 58.51 ML
LEFT VENTRICLE END DIASTOLIC VOLUME APICAL 4 CHAMBER: 82.74 ML
LEFT VENTRICLE END SYSTOLIC VOLUME APICAL 2 CHAMBER: 31.2 ML
LEFT VENTRICLE END SYSTOLIC VOLUME APICAL 4 CHAMBER: 39.89 ML
LEFT VENTRICLE MASS INDEX: 100 G/M2
LEFT VENTRICLE SYSTOLIC VOLUME INDEX: 52.4 ML/M2
LEFT VENTRICLE SYSTOLIC VOLUME: 102.65 ML
LEFT VENTRICULAR INTERNAL DIMENSION IN DIASTOLE: 5.69 CM (ref 3.5–6)
LEFT VENTRICULAR MASS: 195.53 G
LV LATERAL E/E' RATIO: 11 M/S
LV SEPTAL E/E' RATIO: 11 M/S
LVED V (TEICH): 159.36 ML
LVES V (TEICH): 102.65 ML
LVOT MG: 1.71 MMHG
LVOT MV: 0.61 CM/S
LYMPHOCYTES # BLD AUTO: 2.8 K/UL (ref 1–4.8)
LYMPHOCYTES NFR BLD: 29.3 % (ref 18–48)
MCH RBC QN AUTO: 29.2 PG (ref 27–31)
MCHC RBC AUTO-ENTMCNC: 33.8 G/DL (ref 32–36)
MCV RBC AUTO: 87 FL (ref 82–98)
MONOCYTES # BLD AUTO: 1.1 K/UL (ref 0.3–1)
MONOCYTES NFR BLD: 11.8 % (ref 4–15)
MV MEAN GRADIENT: 2 MMHG
MV PEAK A VEL: 0.88 M/S
MV PEAK E VEL: 0.55 M/S
MV PEAK GRADIENT: 3 MMHG
MV STENOSIS PRESSURE HALF TIME: 40.7 MS
MV VALVE AREA BY CONTINUITY EQUATION: 3.59 CM2
MV VALVE AREA P 1/2 METHOD: 5.41 CM2
NEUTROPHILS # BLD AUTO: 5.5 K/UL (ref 1.8–7.7)
NEUTROPHILS NFR BLD: 57.2 % (ref 38–73)
NRBC BLD-RTO: 0 /100 WBC
OHS CV RV/LV RATIO: 0.44 CM
OHS LV EJECTION FRACTION SIMPSONS BIPLANE MOD: 50 %
PISA TR MAX VEL: 2.98 M/S
PLATELET # BLD AUTO: 93 K/UL (ref 150–450)
PMV BLD AUTO: 12.4 FL (ref 9.2–12.9)
POTASSIUM SERPL-SCNC: 3.7 MMOL/L (ref 3.5–5.1)
PROT SERPL-MCNC: 6.3 G/DL (ref 6–8.4)
RA MAJOR: 4.61 CM
RA PRESSURE ESTIMATED: 3 MMHG
RA WIDTH: 3.62 CM
RBC # BLD AUTO: 4.07 M/UL (ref 4.6–6.2)
RIGHT VENTRICLE DIASTOLIC BASEL DIMENSION: 3.6 CM
RIGHT VENTRICULAR END-DIASTOLIC DIMENSION: 2.49 CM
RV TB RVSP: 6 MMHG
RV TISSUE DOPPLER FREE WALL SYSTOLIC VELOCITY 1 (APICAL 4 CHAMBER VIEW): 13.28 CM/S
SINUS: 3.43 CM
SODIUM SERPL-SCNC: 138 MMOL/L (ref 136–145)
STJ: 2.63 CM
TDI LATERAL: 0.05 M/S
TDI SEPTAL: 0.05 M/S
TDI: 0.05 M/S
TR MAX PG: 36 MMHG
TRICUSPID ANNULAR PLANE SYSTOLIC EXCURSION: 1.86 CM
TV REST PULMONARY ARTERY PRESSURE: 39 MMHG
WBC # BLD AUTO: 9.53 K/UL (ref 3.9–12.7)
Z-SCORE OF LEFT VENTRICULAR DIMENSION IN END DIASTOLE: 0.14
Z-SCORE OF LEFT VENTRICULAR DIMENSION IN END SYSTOLE: 2.46

## 2024-07-05 PROCEDURE — 94761 N-INVAS EAR/PLS OXIMETRY MLT: CPT | Mod: HCNC

## 2024-07-05 PROCEDURE — 99900035 HC TECH TIME PER 15 MIN (STAT): Mod: HCNC

## 2024-07-05 PROCEDURE — 25000003 PHARM REV CODE 250: Mod: HCNC

## 2024-07-05 PROCEDURE — 80053 COMPREHEN METABOLIC PANEL: CPT | Mod: HCNC | Performed by: STUDENT IN AN ORGANIZED HEALTH CARE EDUCATION/TRAINING PROGRAM

## 2024-07-05 PROCEDURE — 63600175 PHARM REV CODE 636 W HCPCS: Mod: HCNC | Performed by: HOSPITALIST

## 2024-07-05 PROCEDURE — 87186 SC STD MICRODIL/AGAR DIL: CPT | Mod: HCNC | Performed by: STUDENT IN AN ORGANIZED HEALTH CARE EDUCATION/TRAINING PROGRAM

## 2024-07-05 PROCEDURE — 94660 CPAP INITIATION&MGMT: CPT | Mod: HCNC,XB

## 2024-07-05 PROCEDURE — 25000003 PHARM REV CODE 250: Mod: HCNC | Performed by: HOSPITALIST

## 2024-07-05 PROCEDURE — 63600175 PHARM REV CODE 636 W HCPCS: Mod: HCNC | Performed by: STUDENT IN AN ORGANIZED HEALTH CARE EDUCATION/TRAINING PROGRAM

## 2024-07-05 PROCEDURE — 36415 COLL VENOUS BLD VENIPUNCTURE: CPT | Mod: HCNC | Performed by: STUDENT IN AN ORGANIZED HEALTH CARE EDUCATION/TRAINING PROGRAM

## 2024-07-05 PROCEDURE — 99900025 HC BRONCHOSCOPY-ASST (STAT): Mod: HCNC

## 2024-07-05 PROCEDURE — 99232 SBSQ HOSP IP/OBS MODERATE 35: CPT | Mod: HCNC,,, | Performed by: INTERNAL MEDICINE

## 2024-07-05 PROCEDURE — 11000001 HC ACUTE MED/SURG PRIVATE ROOM: Mod: HCNC

## 2024-07-05 PROCEDURE — 25000003 PHARM REV CODE 250: Mod: HCNC | Performed by: STUDENT IN AN ORGANIZED HEALTH CARE EDUCATION/TRAINING PROGRAM

## 2024-07-05 PROCEDURE — 0BJ08ZZ INSPECTION OF TRACHEOBRONCHIAL TREE, VIA NATURAL OR ARTIFICIAL OPENING ENDOSCOPIC: ICD-10-PCS | Performed by: INTERNAL MEDICINE

## 2024-07-05 PROCEDURE — 63600175 PHARM REV CODE 636 W HCPCS: Mod: HCNC

## 2024-07-05 PROCEDURE — 94002 VENT MGMT INPAT INIT DAY: CPT | Mod: HCNC

## 2024-07-05 PROCEDURE — 87205 SMEAR GRAM STAIN: CPT | Mod: HCNC | Performed by: STUDENT IN AN ORGANIZED HEALTH CARE EDUCATION/TRAINING PROGRAM

## 2024-07-05 PROCEDURE — 87077 CULTURE AEROBIC IDENTIFY: CPT | Mod: HCNC | Performed by: STUDENT IN AN ORGANIZED HEALTH CARE EDUCATION/TRAINING PROGRAM

## 2024-07-05 PROCEDURE — 85025 COMPLETE CBC W/AUTO DIFF WBC: CPT | Mod: HCNC | Performed by: STUDENT IN AN ORGANIZED HEALTH CARE EDUCATION/TRAINING PROGRAM

## 2024-07-05 PROCEDURE — 31622 DX BRONCHOSCOPE/WASH: CPT | Mod: HCNC

## 2024-07-05 PROCEDURE — 27000221 HC OXYGEN, UP TO 24 HOURS: Mod: HCNC

## 2024-07-05 PROCEDURE — 99222 1ST HOSP IP/OBS MODERATE 55: CPT | Mod: HCNC,,, | Performed by: STUDENT IN AN ORGANIZED HEALTH CARE EDUCATION/TRAINING PROGRAM

## 2024-07-05 PROCEDURE — 94640 AIRWAY INHALATION TREATMENT: CPT | Mod: HCNC

## 2024-07-05 PROCEDURE — 27000190 HC CPAP FULL FACE MASK W/VALVE: Mod: HCNC

## 2024-07-05 PROCEDURE — 87070 CULTURE OTHR SPECIMN AEROBIC: CPT | Mod: HCNC | Performed by: STUDENT IN AN ORGANIZED HEALTH CARE EDUCATION/TRAINING PROGRAM

## 2024-07-05 PROCEDURE — 94799 UNLISTED PULMONARY SVC/PX: CPT | Mod: HCNC

## 2024-07-05 RX ORDER — PROPOFOL 10 MG/ML
0-50 INJECTION, EMULSION INTRAVENOUS CONTINUOUS
Status: DISCONTINUED | OUTPATIENT
Start: 2024-07-05 | End: 2024-07-05

## 2024-07-05 RX ORDER — ROCURONIUM BROMIDE 10 MG/ML
75 INJECTION, SOLUTION INTRAVENOUS ONCE
Status: COMPLETED | OUTPATIENT
Start: 2024-07-05 | End: 2024-07-05

## 2024-07-05 RX ORDER — ETOMIDATE 2 MG/ML
24 INJECTION INTRAVENOUS ONCE
Status: COMPLETED | OUTPATIENT
Start: 2024-07-05 | End: 2024-07-05

## 2024-07-05 RX ORDER — ETOMIDATE 2 MG/ML
INJECTION INTRAVENOUS
Status: COMPLETED
Start: 2024-07-05 | End: 2024-07-05

## 2024-07-05 RX ORDER — PROPOFOL 10 MG/ML
INJECTION, EMULSION INTRAVENOUS
Status: COMPLETED
Start: 2024-07-05 | End: 2024-07-05

## 2024-07-05 RX ORDER — LIDOCAINE HYDROCHLORIDE 10 MG/ML
10 INJECTION, SOLUTION EPIDURAL; INFILTRATION; INTRACAUDAL; PERINEURAL ONCE
Status: COMPLETED | OUTPATIENT
Start: 2024-07-05 | End: 2024-07-05

## 2024-07-05 RX ORDER — EPINEPHRINE 0.1 MG/ML
0.5 INJECTION INTRAVENOUS ONCE
Status: DISCONTINUED | OUTPATIENT
Start: 2024-07-05 | End: 2024-07-06 | Stop reason: HOSPADM

## 2024-07-05 RX ORDER — ROCURONIUM BROMIDE 10 MG/ML
INJECTION, SOLUTION INTRAVENOUS
Status: COMPLETED
Start: 2024-07-05 | End: 2024-07-05

## 2024-07-05 RX ADMIN — SACUBITRIL AND VALSARTAN 1 TABLET: 97; 103 TABLET, FILM COATED ORAL at 09:07

## 2024-07-05 RX ADMIN — PRAVASTATIN SODIUM 40 MG: 20 TABLET ORAL at 09:07

## 2024-07-05 RX ADMIN — METOPROLOL SUCCINATE 25 MG: 25 TABLET, EXTENDED RELEASE ORAL at 09:07

## 2024-07-05 RX ADMIN — ROCURONIUM BROMIDE 75 MG: 10 INJECTION, SOLUTION INTRAVENOUS at 10:07

## 2024-07-05 RX ADMIN — TRANEXAMIC ACID 500 MG: 1 INJECTION, SOLUTION INTRAVENOUS at 08:07

## 2024-07-05 RX ADMIN — PROPOFOL 30 MCG/KG/MIN: 10 INJECTION, EMULSION INTRAVENOUS at 10:07

## 2024-07-05 RX ADMIN — ETOMIDATE 24 MG: 2 INJECTION INTRAVENOUS at 10:07

## 2024-07-05 RX ADMIN — CEFEPIME 1 G: 1 INJECTION, POWDER, FOR SOLUTION INTRAMUSCULAR; INTRAVENOUS at 03:07

## 2024-07-05 RX ADMIN — SODIUM CHLORIDE: 9 INJECTION, SOLUTION INTRAVENOUS at 08:07

## 2024-07-05 RX ADMIN — CEFEPIME 1 G: 1 INJECTION, POWDER, FOR SOLUTION INTRAMUSCULAR; INTRAVENOUS at 08:07

## 2024-07-05 RX ADMIN — CEFEPIME 1 G: 1 INJECTION, POWDER, FOR SOLUTION INTRAMUSCULAR; INTRAVENOUS at 11:07

## 2024-07-05 RX ADMIN — TRANEXAMIC ACID 500 MG: 1 INJECTION, SOLUTION INTRAVENOUS at 03:07

## 2024-07-05 RX ADMIN — VANCOMYCIN HYDROCHLORIDE 1250 MG: 1.25 INJECTION, POWDER, LYOPHILIZED, FOR SOLUTION INTRAVENOUS at 04:07

## 2024-07-05 RX ADMIN — LIDOCAINE HYDROCHLORIDE 100 MG: 10 INJECTION, SOLUTION EPIDURAL; INFILTRATION; INTRACAUDAL at 10:07

## 2024-07-05 RX ADMIN — ROCURONIUM BROMIDE 75 MG: 50 INJECTION INTRAVENOUS at 10:07

## 2024-07-05 RX ADMIN — CEFEPIME 1 G: 1 INJECTION, POWDER, FOR SOLUTION INTRAMUSCULAR; INTRAVENOUS at 12:07

## 2024-07-05 NOTE — ASSESSMENT & PLAN NOTE
CT demonstrating persistent abdominal aortic aneurysm sac incompletely imaged previously seen with suspected type 2 endoleak.     Patient has no abdominal pain    Discussed findings patient.  Recommend following up with vascular as outpatient

## 2024-07-05 NOTE — PROCEDURES
"Nelson Huntley is a 88 y.o. male patient.    Temp: 97.8 °F (36.6 °C) (24)  Pulse: 67 (24)  Resp: 16 (24)  BP: 135/62 (24)  SpO2: (!) 94 % (24)  Weight: 74.8 kg (165 lb) (24)  Height: 6' (182.9 cm) (24)       Intubation    Date/Time: 2024 5:21 PM  Location procedure was performed: [facility] ICU 5TH FLOOR    Performed by: Daphne Varghese MD  Authorized by: Daphne Varghese MD  Assisting provider: Darien Bell MD  Pre-operative diagnosis: Hemoptysis  Post-operative diagnosis: same  Consent Done: Yes  Consent: Verbal consent obtained.  Risks and benefits: risks, benefits and alternatives were discussed  Consent given by: patient  Patient understanding: patient states understanding of the procedure being performed  Patient consent: the patient's understanding of the procedure matches consent given  Procedure consent: procedure consent matches procedure scheduled  Relevant documents: relevant documents present and verified  Test results: test results available and properly labeled  Imaging studies: imaging studies available  Patient identity confirmed: , MRN, name and verbally with patient  Time out: Immediately prior to procedure a "time out" was called to verify the correct patient, procedure, equipment, support staff and site/side marked as required.  Indications: hemoptysis, bronchoscopic evaluation.  Description of findings: normal anatomy   Intubation method: video-assisted  Patient status: paralyzed (RSI)  Preoxygenation: BVM  Sedatives: etomidate  Paralytic: rocuronium  Laryngoscope size: Glide 4  Tube size: 8.0 mm  Tube type: cuffed  Number of attempts: 1  Cricoid pressure: no  Cords visualized: yes  Post-procedure assessment: CO2 detector  Breath sounds: clear  Cuff inflated: yes  ETT to lip: 24 cm  Tube secured with: ETT forbes  Patient tolerance: Patient tolerated the procedure well with no immediate " complications  Technical procedures used: Video-assisted laryngoscopy  Significant surgical tasks conducted by the assistant(s): supervised  Complications: No  Estimated blood loss (mL): 0  Specimens: No  Implants: No  Comments: Tube placement confirmed during bronchoscopy      Daphne Castaneda MD  [employer] Pul[facility] Critical Care Medicine Fellow    7/5/2024

## 2024-07-05 NOTE — NURSING
"Proactive Nurse Follow-up Note     Followed up with patient for proactive rounding.   Patient still coughing up chencho blood. The amount has decreased.  I am concerned because patient is concerned about how he is feeling. He states he hears "a raspiness when he coughs". Lung sounds are course. His oxygen is 94% on room air.     NP Aleksander Rodarte notified.  He asked for us to call pulmonology.   0004:  Dr. Neal notified.  MD asked if the amount has increased, which it has not. Patient received tranexamic acid neb at 2118. He did not receive the 1515 dose. Patient came in on 7/3 with hemoptysis newly diagnosed with double pna and MAC.  MD acknowledged, no new orders given.     Please call Proactive round nurse, Marilia Hernández RN with any questions or concerns at [phone].     "

## 2024-07-05 NOTE — SUBJECTIVE & OBJECTIVE
Interval History:     No acute events overnight  Patient is seen status post bronchoscopy  He reports hemoptysis has resolved  Denies shortness of breath  Patient has no other complaints    Review of Systems   Constitutional:  Negative for chills, fatigue and fever.   Respiratory:  Positive for cough. Negative for shortness of breath and wheezing.    Cardiovascular:  Negative for chest pain, palpitations and leg swelling.   Gastrointestinal:  Negative for abdominal distention and abdominal pain.   Neurological:  Negative for dizziness and headaches.   Psychiatric/Behavioral:  Negative for agitation and confusion.      Objective:     Vital Signs (Most Recent):  Temp: 98.1 °F (36.7 °C) (07/05/24 1417)  Pulse: 78 (07/05/24 1535)  Resp: 18 (07/05/24 1535)  BP: (!) 119/56 (07/05/24 1417)  SpO2: 97 % (07/05/24 1535) Vital Signs (24h Range):  Temp:  [97.3 °F (36.3 °C)-98.3 °F (36.8 °C)] 98.1 °F (36.7 °C)  Pulse:  [64-81] 78  Resp:  [13-33] 18  SpO2:  [93 %-100 %] 97 %  BP: (102-178)/(56-85) 119/56     Weight: 74.8 kg (165 lb)  Body mass index is 22.38 kg/m².    Intake/Output Summary (Last 24 hours) at 7/5/2024 1604  Last data filed at 7/5/2024 0722  Gross per 24 hour   Intake 359.38 ml   Output 700 ml   Net -340.62 ml         Physical Exam  Constitutional:       General: He is not in acute distress.     Appearance: Normal appearance. He is not ill-appearing or toxic-appearing.   Eyes:      Extraocular Movements: Extraocular movements intact.      Conjunctiva/sclera: Conjunctivae normal.   Cardiovascular:      Rate and Rhythm: Normal rate and regular rhythm.   Pulmonary:      Effort: Pulmonary effort is normal. No respiratory distress.   Abdominal:      General: There is no distension.      Tenderness: There is no abdominal tenderness. There is no guarding.   Musculoskeletal:         General: Normal range of motion.   Skin:     General: Skin is warm and dry.   Neurological:      General: No focal deficit present.       Mental Status: He is alert and oriented to person, place, and time.   Psychiatric:         Mood and Affect: Mood normal.         Behavior: Behavior normal.             Significant Labs: All pertinent labs within the past 24 hours have been reviewed.    Significant Imaging: I have reviewed all pertinent imaging results/findings within the past 24 hours.

## 2024-07-05 NOTE — NURSING TRANSFER
Nursing Transfer Note      7/5/2024   9:51 AM    Nurse giving handoff:BASIM Sy, RN   Nurse receiving handoff:MARTA Anderson RN    Reason patient is being transferred: Bronch    Transfer From: 424    Transfer via wheelchair    Transfer with cardiac monitoring    Transported by YUNIER Rodriguez RN     Transfer Vital Signs:  See flowsheet    Telemetry: yes  Order for Tele Monitor? Yes    Additional Lines: PIV x2    4eyes on Skin: yes    Medicines sent: None    Any special needs or follow-up needed: none at this time    Patient belongings transferred with patient: Yes, watch    Chart send with patient: Yes    Notified: spouse, Jenny    Patient reassessed at: 0945 on 7/5/24     Upon arrival to floor: cardiac monitor applied, patient oriented to room, call bell in reach, and bed in lowest position

## 2024-07-05 NOTE — NURSING
PROACTIVE NURSE ROUNDING NOTE     Time of Visit:     Admit Date: 7/3/2024  LOS: 1  Code Status: Full Code   Date of Visit: 2024  : 1935  Age: 88 y.o.  Sex: male  Race: White  Bed: K424/K424 A:   MRN: [id number]  Was the patient discharged from an ICU this admission? No   Was the patient discharged from a PACU within last 24 hours?  No  Did the patient receive conscious sedation/general anesthesia in last 24 hours?  No  Was the patient in the ED within the past 24 hours?  No  Was the patient started on NIPPV within the past 24 hours?  No  Attending Physician: Hernando Verma MD  Primary Service: Networked reference to record PCT     ASSESSMENT     Notified by charge RN during rounding.  Reason for alert: administration of tranexamic acid nebulizer.     Diagnosis: Hemoptysis    Abnormal Vital Signs: BP (!) 159/72 (BP Location: Left arm)   Pulse 72   Temp 97.9 °F (36.6 °C) (Oral)   Resp 18   Ht 6' (1.829 m)   Wt 73.6 kg (162 lb 4.1 oz)   SpO2 (!) 94%   BMI 22.01 kg/m²      Clinical Issues: Respiratory    Patient  has a past medical history of AICD (automatic cardioverter/defibrillator) present, Cardiomyopathy, CKD (chronic kidney disease) stage 3, GFR 30-59 ml/min, Coronary artery disease, GERD (gastroesophageal reflux disease), Hyperlipidemia, Hypertension, Ischemic cardiomyopathy, MGUS (monoclonal gammopathy of unknown significance), Thrombocytopenia, Ventricular tachycardia, and VT (ventricular tachycardia).      Hemoptysis.  Emesis bag at bedside with bright red blood in it.    Patient having coughing fits with blood.   Nebulizer given.     INTERVENTIONS/ RECOMMENDATIONS     Please call ICU if patient has compromised airway, difficulty breathing, or increase in bloody     Discussed plan of care with Lisa ADAMS.    PHYSICIAN ESCALATION     Yes/No  No    Orders received and case discussed with NA.    Disposition: Remain in room 424. 5780: Notified NP Aleksander WALKER  Patient has received  nebulizer.  Blood noted in emesis bag.   NP went to bedside and assessed patient.  Patient stated he hasn't coughed since he was given the nebulizer.     FOLLOW-UP     Call back the Proactive round Nurse, Marilia Hernández RN at [phone] for additional questions or concerns.

## 2024-07-05 NOTE — PROGRESS NOTES
[facility] - Intensive Care  Infectious Disease  Progress Note    Patient Name: Nelson GIBBONS Parent  MRN: [id number]  Admission Date: 7/3/2024  Length of Stay: 2 days  Attending Physician: Hernando Verma MD  Primary Care Provider: Bety Tomlinson MD    Isolation Status: No active isolations  Assessment/Plan:      ID  Mycobacterium avium infection  88 year old man with a history of MAC, CKD, Afib on sotalol, CKD 3, chronic kidney disease stage 3, hypertension, hyperlipidemia, CAD, MGUS, Tovar's esophagus, AICD, chronic systolic and diastolic CHF (echo 6/2020),  chronic ITP, recurrent pneumonias and non-TB mycobacterial infection who presents following an episode of hemoptysis    -of note his CT actually showed some improvement in tree-in-bud areas  -question of wether current episode is due to worsening of mycobacterial infection or other bacterial infection  -will not start mycobacterial treatment how  -agree with vanco and cefepime  -await studies and cultures from bronch          Thank you for your consult. I will follow-up with patient. Please contact us if you have any additional questions.    Edis Cespedes MD  Infectious Disease  [facility] - Intensive Care    Subjective:     Principal Problem:Hemoptysis    HPI: 88 year old man with a history of MAC, CKD, Afib on sotalol, CKD 3, chronic kidney disease stage 3, hypertension, hyperlipidemia, CAD, MGUS, Tovar's esophagus, AICD, chronic systolic and diastolic CHF (echo 6/2020),  chronic ITP, recurrent pneumonias and non-TB mycobacterial infection who presents following an episode of hemoptysis. He states that prior to arrival he had an episode of cough with bright red blood. He reports it was ~1 cup of blood. No further episodes since prior to arrival to the ED. He has had similar episodes in the past, although the volume of blood was greater during this event. Overall, he feels well. Resting comfortably on room air. No chest pain, no shortness of breath, no fevers,  "no chills, no n/v/d.     He reports that the day prior to this event he was feeling well and normal. CT chest done here shows  "Decreased size and conspicuity of tree-in-bud nodular opacities in the upper lobes with persistent ground-glass opacities suggesting persistent but decreased inflammatory/infectious process. New consolidation in the medial basilar and posterior segment of the left lower lobe suggesting or acute process such is pneumonia"    He has been following with ID outpatient and the decision to hold antibiotics and monitor was made due to his dsire to avoid side effects from the antibiotics.    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 Patient was transferred to ICU for bronchoscopy.     Review of Systems   Constitutional: Negative.    HENT: Negative.     Respiratory:  Positive for cough.    Cardiovascular: Negative.    Gastrointestinal: Negative.    Endocrine: Negative.    Musculoskeletal: Negative.    Skin: Negative.    Neurological: Negative.    Psychiatric/Behavioral: Negative.       Objective:     Vital Signs (Most Recent):  Temp: 98.3 °F (36.8 °C) (07/05/24 0945)  Pulse: 81 (07/05/24 1100)  Resp: 14 (07/05/24 1100)  BP: 137/67 (07/05/24 1000)  SpO2: 100 % (07/05/24 1100) Vital Signs (24h Range):  Temp:  [97.3 °F (36.3 °C)-98.3 °F (36.8 °C)] 98.3 °F (36.8 °C)  Pulse:  [61-81] 81  Resp:  [14-32] 14  SpO2:  [93 %-100 %] 100 %  BP: (134-159)/(62-73) 137/67     Weight: 74.8 kg (165 lb)  Body mass index is 22.38 kg/m².    Estimated Creatinine Clearance: 41.6 mL/min (based on SCr of 1.3 mg/dL).     Physical Exam  Constitutional:       General: He is not in acute distress.     Appearance: Normal appearance. He is not ill-appearing.      Comments: " Recently extubated to bipap   HENT:      Head: Normocephalic and atraumatic.      Nose: No congestion or rhinorrhea.      Mouth/Throat:      Mouth: Mucous membranes are moist.      Pharynx: Oropharynx is clear.   Eyes:      General: No scleral icterus.     Extraocular Movements: Extraocular movements intact.      Conjunctiva/sclera: Conjunctivae normal.      Pupils: Pupils are equal, round, and reactive to light.   Cardiovascular:      Rate and Rhythm: Normal rate and regular rhythm.      Pulses: Normal pulses.      Heart sounds: Normal heart sounds.   Pulmonary:      Effort: Pulmonary effort is normal.      Breath sounds: Normal breath sounds.   Abdominal:      General: Abdomen is flat. Bowel sounds are normal.      Palpations: Abdomen is soft.   Musculoskeletal:      Cervical back: Normal range of motion and neck supple.   Neurological:      Mental Status: He is alert.          Significant Labs: All pertinent labs within the past 24 hours have been reviewed.    Significant Imaging: I have reviewed all pertinent imaging results/findings within the past 24 hours.

## 2024-07-05 NOTE — PLAN OF CARE
"   07/05/24 0905   Rounds   Attendance Nurse ;Provider   Discharge Plan A Home with family   Why the patient remains in the hospital Requires continued medical care       0905  CM was informed by Dr Verma that the pt is not medically stable to discharge & is scheduled to have a bronch done today.       [facility] - Telemetry  Initial Discharge Assessment       Primary Care Provider: Bety Tomlinson MD    Admission Diagnosis: Pneumonia [J18.9]  Hemoptysis [R04.2]  Chest pain [R07.9]  Thrombocytopenia [D69.6]    Admission Date: 7/3/2024  Expected Discharge Date: 7/7/2024    Consult: pulm & ID    Payor: [employer] MANAGED MEDICARE / Plan: [employer] HMO / Product Type: Capitation /     Extended Emergency Contact Information  Primary Emergency Contact: ParentAleah  Address: [address] [place] [address]           ISABEL GUTIERREZ [address] [facility]  Home Phone: [phone]  Relation: Spouse    Discharge Plan A: Home with family  Discharge Plan B: (P) Home Health      [address] ISABEL GUTIERREZ - [address] [facility]  [address] [facility]  [place] [address]  Phone: [phone] Fax: [phone]    [facility] - [facility] [address] [facility]  [address] [facility] [address]  Phone: [phone] Fax: [phone]    [facility] [address] [address]  Phone: [phone] Fax: [phone]      Initial Assessment (most recent)       Adult Discharge Assessment - 07/05/24 0500          Discharge Assessment    Assessment Type Discharge Planning Assessment     Confirmed/corrected address, phone number and insurance Yes     Confirmed Demographics Correct on Facesheet     Source of Information patient;family   spouse, Aleah "Jenny" Parent ([phone])    Communicated ABHINAV with patient/caregiver Date not available/Unable to determine     People in Home spouse (P)    spouse, Aleah "Jenny" Parent ([phone])    Do you " "expect to return to your current living situation? Yes (P)      Do you have help at home or someone to help you manage your care at home? Yes (P)      Prior to hospitilization cognitive status: Alert/Oriented (P)      Current cognitive status: Alert/Oriented (P)      Equipment Currently Used at Home blood pressure machine (P)      Readmission within 30 days? No (P)      Patient currently being followed by outpatient case management? No (P)      Do you currently have service(s) that help you manage your care at home? No (P)      Do you take prescription medications? Yes (P)      Do you have prescription coverage? Yes (P)      Do you have any problems affording any of your prescribed medications? No (P)      Is the patient taking medications as prescribed? yes (P)      How do you get to doctors appointments? family or friend will provide;car, drives self (P)      Are you on dialysis? No (P)      Do you take coumadin? No (P)      Discharge Plan A Home with family (P)      Discharge Plan B Home Health (P)      DME Needed Upon Discharge  none (P)      Discharge Plan discussed with: Patient;Spouse/sig other (P)    spouse, Aleah "Jenny" Parent ([phone])       Physical Activity    On average, how many days per week do you engage in moderate to strenuous exercise (like a brisk walk)? 3 days (P)      On average, how many minutes do you engage in exercise at this level? 30 min (P)         Financial Resource Strain    How hard is it for you to pay for the very basics like food, housing, medical care, and heating? Not hard at all (P)         Housing Stability    In the last 12 months, was there a time when you were not able to pay the mortgage or rent on time? No (P)      At any time in the past 12 months, were you homeless or living in a shelter (including now)? No (P)         Transportation Needs    Has the lack of transportation kept you from medical appointments, meetings, work or from getting things needed for daily " "living? No (P)         Food Insecurity    Within the past 12 months, you worried that your food would run out before you got the money to buy more. Never true (P)      Within the past 12 months, the food you bought just didn't last and you didn't have money to get more. Never true (P)         Stress    Do you feel stress - tense, restless, nervous, or anxious, or unable to sleep at night because your mind is troubled all the time - these days? Not at all (P)         Social Isolation    How often do you feel lonely or isolated from those around you?  Never (P)         Alcohol Use    Q1: How often do you have a drink containing alcohol? Never (P)      Q2: How many drinks containing alcohol do you have on a typical day when you are drinking? Patient does not drink (P)      Q3: How often do you have six or more drinks on one occasion? Never (P)         Utilities    In the past 12 months has the electric, gas, oil, or water company threatened to shut off services in your home? No (P)         Health Literacy    How often do you need to have someone help you when you read instructions, pamphlets, or other written material from your doctor or pharmacy? Rarely (P)    pt wears reading glasses                   1555  Patient resting quietly in bed with spouse, Aleah Hein" Parent ([phone]), at the bedside when CM rounded via [employer]. Patient was admitted with hemoptysis & is being followed by pulm and ID. Pt denied cough up blood since admit.     Patient lives with his spouse, is independent of all ADLs, works out at the [facility] 3 times a week, & denied the need for assistance with transportation at time of discharge.     CM informed the pt & spouse of the previously appointments with Dr Gonzalez Granado (vasc surg) on 7/24/2024 at 1120 & Dr Jerry Castro (PCP) on 7/31/2024 a 1300. Pt verbalized understanding. Information added to the pt's discharge paperwork.     1606  Message sent to Dr Castro's  requesting a sooner " PCP hospfu appt. Message sent to Dr Roselia Bradford's (pulm)  & Dr Cespedes's (ID)  requesting a hospfu appt. Pt will be notified of appt dates & times. Information added to the pt's discharge paperwork.       Will continue to follow.

## 2024-07-05 NOTE — PLAN OF CARE
Patient intubated for bronch, extubated at 12:00. Pt required a little extra support post extubation and was placed on bipap for approximately 10 minutes. Patient placed on 3L NC, VSS and spo2 % Will continue to monitor.

## 2024-07-05 NOTE — NURSING
Pt transferred back to 424 from . VSS and pt aox4. Telemetry in place. Bed in lowest position, call light in reach, pt oriented to room. Wife at bedside. Report given to Estefania ADAMS

## 2024-07-05 NOTE — ASSESSMENT & PLAN NOTE
- patient with reported significant hemoptysis with a known history of MAC  - Imaging concerning for RLL, although reported amount of hemoptysis is out of proportion  Bronchoscopy done on July 5th - without readily identifiable source of bleeding, though appears to be within the RLL     - on room air, no acute respiratory distress  Hemoptysis has resolved this time    Plan:  - will consult pulmonology  - given history of MAC, will consult infectious disease   -continue vanco and Cefepime  -pending bronch cultures

## 2024-07-05 NOTE — NURSING
10:17 Bediside timeout performed and completed for intubation and bronchoscopy. Consent signed.     1020: 24mg Etomidate administered by Dylon JACKSON. Propofol started at 20mcg/kg/min then titrated up to 30mcg/kg/min per Dylon JACKSON. 75mg Rocuronium administered by Dylon JACKSON. Positive color change and bilateral breath sounds noted.     1042: Bronchoscopy completed. Propofol remains infusing at 30mcg/kg/minute.

## 2024-07-05 NOTE — PROGRESS NOTES
"[facility] Consult Note    Primary Attending Physician: Hernando Verma MD  Consultant Attending: Darien Bell MD  Consultant Fellow: Daphne Verdugo MD    Reason for Consult:     Hemoptysis    Subjective:      Patient doing well this morning but continued to cough up chencho blood throughout the night. Patient to be sent for bronch today.    History of Present Illness:  Nelson Huntley is a 88 y.o.  male who  has a past medical history of AICD (automatic cardioverter/defibrillator) present (7/17/2012), Cardiomyopathy, CKD (chronic kidney disease) stage 3, GFR 30-59 ml/min (7/17/2012), Coronary artery disease, GERD (gastroesophageal reflux disease), Hyperlipidemia (7/17/2012), Hypertension (7/17/2012), Ischemic cardiomyopathy (7/17/2012), MGUS (monoclonal gammopathy of unknown significance), Thrombocytopenia (7/17/2012), Ventricular tachycardia, and VT (ventricular tachycardia) (7/17/2012).. The patient presented to the [facility] on 7/3/2024 with a primary complaint of Hemoptysis (Patient states he has been coughing daily for about 6 months with recent dx of MAC. Today began coughing up BRB. Denies pain. Presents awake, alert, oriented. Resp unlabored and without distress. Denies SOB. + h/o esophageal varices)     describes an episode yesterday of severe hemoptysis. He says he coughed up lots of bright red blood with some clots and estimates he coughed up 4-8 ounces of blood in addition to saturating 2 hand towels with blood. He endorses past isolated episodes of hemoptysis but never to this extent. 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 Imaging also showed chronic bronchiectasis. He follows with ID for his " chronic NTM for which the patient and provider agreed not to pursue treatment of the NTM.    Patient denies any past history of asthma, lung infections, generalized systemic infections, allergies, or respiratory issues of any kind prior to his admission this past winter. He says he fathered 3 children in his first marriage, denies smoking history, denies known exposures, past tuberculosis infection, or other infections.    Past Medical History:  Past Medical History:   Diagnosis Date    AICD (automatic cardioverter/defibrillator) present 7/17/2012    Cardiomyopathy     CKD (chronic kidney disease) stage 3, GFR 30-59 ml/min 7/17/2012    Coronary artery disease     GERD (gastroesophageal reflux disease)     Tovar's; Dr. Vu    Hyperlipidemia 7/17/2012    Hypertension 7/17/2012    Ischemic cardiomyopathy 7/17/2012    MGUS (monoclonal gammopathy of unknown significance)     Thrombocytopenia 7/17/2012    Ventricular tachycardia     VT (ventricular tachycardia) 7/17/2012       Past Surgical History:  Past Surgical History:   Procedure Laterality Date    ABDOMINAL AORTIC ANEURYSM REPAIR      CARDIAC DEFIBRILLATOR PLACEMENT      CATARACT EXTRACTION, BILATERAL  2018    CORONARY ANGIOPLASTY      EYE SURGERY Bilateral     cataract    HERNIA REPAIR      x2    REPLACEMENT OF IMPLANTABLE CARDIOVERTER-DEFIBRILLATOR (ICD) GENERATOR Left 3/18/2022    Procedure: REPLACEMENT, ICD GENERATOR;  Surgeon: Felipe Woodard MD;  Location: [facility] LAB;  Service: Cardiology;  Laterality: Left;  SEFERINO, ICD gen chg, SJM, giovannis, MB, 3prep*ANUJ ICD insitu*        Allergies:  Review of patient's allergies indicates:   Allergen Reactions    Fluorescein-benoxinate Other (See Comments)    Pcn  [penicillins]      Other reaction(s): Unknown    Tropicamide Other (See Comments)    Clindamycin Rash       Medications:   In-Hospital Scheduled Medications:   amLODIPine  10 mg Oral Daily    ceFEPime IV (PEDS and ADULTS)  1 g Intravenous Q8H    EPINEPHrine   0.5 mg Tracheal Tube Once    famotidine  20 mg Oral QHS    furosemide  20 mg Oral Daily    metoprolol succinate  25 mg Oral BID    multivitamin  1 tablet Oral Daily    pravastatin  40 mg Oral QHS    sacubitriL-valsartan  1 tablet Oral BID    sotaloL  120 mg Oral Daily    tranexamic acid  500 mg Nasal TID    tranexamic acid  500 mg Nebulization TID    vancomycin (VANCOCIN) IV (PEDS and ADULTS)  1,250 mg Intravenous Q24H      In-Hospital PRN Medications:    Current Facility-Administered Medications:     acetaminophen, 650 mg, Oral, Q4H PRN    ALPRAZolam, 0.25 mg, Oral, TID PRN    dextrose 10%, 12.5 g, Intravenous, PRN    dextrose 10%, 25 g, Intravenous, PRN    glucagon (human recombinant), 1 mg, Intramuscular, PRN    glucose, 16 g, Oral, PRN    glucose, 24 g, Oral, PRN    naloxone, 0.02 mg, Intravenous, PRN    ondansetron, 4 mg, Intravenous, Q6H PRN    sodium chloride 0.9%, 10 mL, Intravenous, Q12H PRN    Pharmacy to dose Vancomycin consult, , , Once **AND** vancomycin - pharmacy to dose, , Intravenous, pharmacy to manage frequency   In-Hospital IV Infusion Medications:   0.9% NaCl   Intravenous Continuous 75 mL/hr at 07/05/24 0820 New Bag at 07/05/24 0820    propofoL  0-50 mcg/kg/min Intravenous Continuous 13.2 mL/hr at 07/05/24 1017 30 mcg/kg/min at 07/05/24 1017      Home Medications:  Prior to Admission medications    Medication Sig Start Date End Date Taking? Authorizing Provider   aspirin 81 MG chewable tablet Take 81 mg by mouth Daily.   Yes Provider, Historical   beta-carotene,A,-vits C,E/mins (OCUVITE ORAL) Take by mouth.   Yes Provider, Historical   clopidogreL (PLAVIX) 75 mg tablet TAKE 1 TABLET EVERY DAY  Patient taking differently: Take 75 mg by mouth every Tues, Thurs, Sat. 5/8/24  Yes Bety Tomlinson MD   famotidine (PEPCID) 20 MG tablet Take 20 mg by mouth before dinner.   Yes Provider, Historical   furosemide (LASIX) 20 MG tablet Take 1 tablet (20 mg total) by mouth 2 (two) times daily. Take one  tablet every other day orbas directed 12/1/23  Yes Shiraz Cartagena MD   metoprolol succinate (TOPROL-XL) 25 MG 24 hr tablet TAKE 1 TABLET TWICE DAILY  Patient taking differently: Take 25 mg by mouth 2 (two) times daily. 3/20/24  Yes Bety Tomlinson MD   niacin 500 MG tablet Take 500 mg by mouth Every PM.   Yes Provider, Historical   nitroGLYCERIN (NITROSTAT) 0.4 MG SL tablet Take 1 tab under the tongue for chest pain. May repeat every 5 minutes for a total of 3 doses. If chest pain not relieved go to the ED.  Patient taking differently: Place 0.4 mg under the tongue every 5 (five) minutes as needed for Chest pain.  If chest pain not relieved go to the ED. 4/25/24  Yes Bety Tomlinson MD   omega-3 fatty acids-vitamin E 1,000 mg Cap Take 1 capsule by mouth 2 (two) times daily.   Yes Provider, Historical   potassium chloride SA (K-DUR,KLOR-CON M) 10 MEQ tablet Take 1 tablet (10 mEq total) by mouth once daily. 12/1/23  Yes Shiraz Cartagena MD   pravastatin (PRAVACHOL) 40 MG tablet TAKE 1 TABLET EVERY EVENING  Patient taking differently: Take 40 mg by mouth every evening. 3/20/24  Yes Bety Tomlinson MD   sacubitriL-valsartan (ENTRESTO)  mg per tablet Take 1 tablet by mouth 2 (two) times daily. 12/18/23  Yes Shiraz Cartagena MD   sotaloL (BETAPACE) 120 MG Tab Take 1 tablet (120 mg total) by mouth 2 (two) times daily. 6/18/24  Yes Solange Moon PA-C   ubidecarenone (COENZYME Q10 ORAL) Take 1 tablet by mouth once daily. 12/9/22  Yes Provider, Historical   vitamin D 1000 units Tab Take 1,000 Units by mouth every Tues, Thurs, Sat.    Yes Provider, Historical   amLODIPine (NORVASC) 10 MG tablet TAKE 1 TABLET EVERY DAY  Patient taking differently: Take 10 mg by mouth once daily. 5/8/24   Bety Tomlinson MD   azelastine (ASTELIN) 137 mcg (0.1 %) nasal spray 1 spray (137 mcg total) by Nasal route 2 (two) times daily. 10/24/23 10/23/24  Bety Tomlinson MD   fluticasone propionate (FLONASE) 50  mcg/actuation nasal spray 1 spray (50 mcg total) by Each Nostril route once daily. 10/24/23   Bety Tomlinson MD   multivitamin with minerals tablet Take 1 tablet by mouth once daily.  18   Provider, Historical   nebulizer and compressor Archana 1 Device by Misc.(Non-Drug; Combo Route) route 2 (two) times a day. 24   Bety Tomlinson MD   sodium chloride 3% 3 % nebulizer solution Take 4 mLs by nebulization 2 (two) times a day. 24  Eliza Rueda MD       Family History:  Family History   Problem Relation Name Age of Onset    Cancer Mother          Lymphoma    Lymphoma Mother      Coronary artery disease Mother      Heart disease Mother      Heart disease Father      Heart attacks under age 50 Father      Heart disease Sister      Cancer Brother          lymphoma       Social History:  Social History     Tobacco Use    Smoking status: Never     Passive exposure: Past    Smokeless tobacco: Never   Substance Use Topics    Alcohol use: No    Drug use: No       Review of Systems:  Denies fever, SOB, pleuritic chest pain, significant sputum production, night sweats, weight loss. Pertinent positives listed in HPI above.     Objective:   Last 24 Hour Vital Signs:  BP  Min: 125/62  Max: 178/83  Temp  Av.9 °F (36.6 °C)  Min: 97.3 °F (36.3 °C)  Max: 98.3 °F (36.8 °C)  Pulse  Av.3  Min: 64  Max: 81  Resp  Av.7  Min: 13  Max: 33  SpO2  Av.8 %  Min: 93 %  Max: 100 %  Height  Av' (182.9 cm)  Min: 6' (182.9 cm)  Max: 6' (182.9 cm)  Weight  Av.8 kg (165 lb)  Min: 74.8 kg (165 lb)  Max: 74.8 kg (165 lb)  I/O last 3 completed shifts:  In: 359.4 [I.V.:125.7; IV Piggyback:233.6]  Out: 1150 [Urine:1150]    Physical Examination:  Gen: WDWN WM in NAD  Skin: warm, well perfused, no rashes or lesions noted  Neck: supple, no JVD  HEENT: NCAT, PERRL, EOMI, moist oral and nasal mucosa  CV: RRR, no m/r/g, CR<3sec, DP2+ bilaterally, pacemaker noted to L lateral chest wall  PULM: crackles  auscultated in RLL, other lung fields CTAB, non-labored respirations, symmetric chest expansion  ABD: soft, NTND, no HSM noted  Neuro: AAO*4, appropriate mood and affect, normal muscle strength and tone in all extremities, no focal deficits noted     Laboratory:  Trended Lab Data:  Recent Labs     07/03/24  1502 07/03/24  2108 07/04/24  0252 07/04/24  0253 07/04/24  1718 07/05/24  0231   WBC 9.94 9.73  --  9.49  --  9.53   HGB 13.3* 13.3*  --  12.4*  --  11.9*   HCT 39.6* 40.0  --  37.2*  --  35.2*   * 110*  --  100*  --  93*     --  140  --   --  138   K 4.1  --  4.4  --   --  3.7     --  107  --   --  106   CO2 23  --  23  --   --  23   BUN 18  --  18  --   --  19   CREATININE 1.5*  --  1.3  --   --  1.3     --  89  --   --  93   BILITOT  --   --  0.7  --   --  0.8   AST  --   --  20  --   --  24   ALT  --   --  12  --   --  18   ALKPHOS  --   --  111  --   --  106   CALCIUM 10.0  --  9.6  --   --  8.9   ALBUMIN  --   --  3.4*  --   --  3.1*   PROT  --   --  6.6  --   --  6.3   INR  --   --   --   --  1.2  --        Cardiac:   Recent Labs   Lab 07/04/24  0252   *       Urinalysis:   Lab Results   Component Value Date    COLORU Colorless (A) 01/20/2023    SPECGRAV 1.010 01/20/2023    NITRITE Negative 01/20/2023    KETONESU Negative 01/20/2023    UROBILINOGEN Negative 01/20/2023       Microbiology:  Microbiology Results (last 7 days)       Procedure Component Value Units Date/Time    Culture, Respiratory with Gram Stain [[id number]] Collected: 07/05/24 6893    Order Status: Sent Specimen: Respiratory from Sputum, Expectorated Updated: 07/05/24 1318    AFB Culture & Smear [[id number]]     Order Status: No result Specimen: Respiratory from Sputum, Expectorated             Radiology:  CXR   Pacing device over the left chest wall with 2 leads in stable position.  The cardiomediastinal silhouette is normal in size and midline.  Atherosclerotic calcification of the aorta.  Pulmonary  vascularity appears within normal limits.The lungs appear symmetrically expanded.  Faint patchy airspace opacity throughout both lungs, not appreciably changed from recent CT allowing for some variation modality.  No new large area of consolidation.  No new pleural fluid or pneumothorax.       CT  Decreased size and conspicuity of tree-in-bud nodular opacities in the upper lobes with persistent ground-glass opacities suggesting persistent but decreased inflammatory/infectious process. New consolidation in the medial basilar and posterior segment of the left lower lobe suggesting or acute process such is pneumonia.  Correlate for SHAY. Multi-vessel coronary artery atherosclerotic disease. Progressive tubular bronchiectasis.    CTA   No official read yet, but CTA appears unrevealing without focal evidence of bleeding.      I have personally reviewed the above labs and imaging.    Current Medications:     Infusions:   0.9% NaCl   Intravenous Continuous 75 mL/hr at 07/05/24 0820 New Bag at 07/05/24 0820    propofoL  0-50 mcg/kg/min Intravenous Continuous 13.2 mL/hr at 07/05/24 1017 30 mcg/kg/min at 07/05/24 1017        Scheduled:   amLODIPine  10 mg Oral Daily    ceFEPime IV (PEDS and ADULTS)  1 g Intravenous Q8H    EPINEPHrine  0.5 mg Tracheal Tube Once    famotidine  20 mg Oral QHS    furosemide  20 mg Oral Daily    metoprolol succinate  25 mg Oral BID    multivitamin  1 tablet Oral Daily    pravastatin  40 mg Oral QHS    sacubitriL-valsartan  1 tablet Oral BID    sotaloL  120 mg Oral Daily    tranexamic acid  500 mg Nasal TID    tranexamic acid  500 mg Nebulization TID    vancomycin (VANCOCIN) IV (PEDS and ADULTS)  1,250 mg Intravenous Q24H        PRN:    Current Facility-Administered Medications:     acetaminophen, 650 mg, Oral, Q4H PRN    ALPRAZolam, 0.25 mg, Oral, TID PRN    dextrose 10%, 12.5 g, Intravenous, PRN    dextrose 10%, 25 g, Intravenous, PRN    glucagon (human recombinant), 1 mg, Intramuscular, PRN     glucose, 16 g, Oral, PRN    glucose, 24 g, Oral, PRN    naloxone, 0.02 mg, Intravenous, PRN    ondansetron, 4 mg, Intravenous, Q6H PRN    sodium chloride 0.9%, 10 mL, Intravenous, Q12H PRN    Pharmacy to dose Vancomycin consult, , , Once **AND** vancomycin - pharmacy to dose, , Intravenous, pharmacy to manage frequency     Assessment:     Nelson Huntley is a 88 y.o. 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    Chronic cough 12/15/2023    Hemoptysis 12/15/2023    Bronchiectasis without complication 12/15/2023    Sensorineural hearing loss, bilateral 03/16/2023    Idiopathic thrombocytopenic purpura 03/31/2022    Chronic combined systolic and diastolic congestive heart failure 01/24/2022    History of ventricular tachycardia 07/02/2019    Cerebral infarction, remote, resolved 10/25/2018    Abnormal CT of the head 10/08/2018    Hypercalcemia 07/05/2018    Metabolic bone disease 07/05/2018    History of TIA (transient ischemic attack) 05/15/2018    Gastroesophageal reflux disease 08/29/2017    MGUS (monoclonal gammopathy of unknown significance) 03/07/2017    Positive ELYSIA (antinuclear antibody) 03/01/2017    Abnormal serum protein electrophoresis 03/01/2017    Vitamin D deficiency 03/01/2017    Ischemic cardiomyopathy 02/24/2017    Seasonal allergic rhinitis 02/24/2017    Body mass index (BMI) of 22.0 to 22.9 in adult 02/24/2017    Monoclonal paraproteinemia 02/23/2017    Tovar's esophagus without dysplasia 01/18/2016    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    Nuclear sclerosis 12/13/2012    Coronary artery disease of native artery with stable angina pectoris 10/28/2012    Old myocardial infarction 10/28/2012    S/P AAA (abdominal aortic aneurysm) repair 10/28/2012     Diverticulosis 10/28/2012    Thrombocytopenia 07/17/2012    Chronic kidney disease, stage 3a 07/17/2012    AICD (automatic cardioverter/defibrillator) present 07/17/2012    Essential hypertension 07/17/2012    Hyperlipidemia 07/17/2012    Anemia associated with chronic renal failure 07/17/2012    Aortic atherosclerosis 03/15/2012        Assessment/Plan:     Acute Exacerbation of Chronic Bronchiectasis with Hemoptysis  Patient has a known history of chronic bronchiectasis discovered this past winter who was admitted in December/January for pneumonia. Cultures were positive for Pseudomonas and non-tuberculous mycobacterium which speciated both M. Avium and M. Abscessus. Unclear whether the NTM colonized a pre-existing chronic bronchiectasis or whether the NTM led to chronic bronchiectasis. Patient follows with Dr. Rueda in ID and made the decision along with his provider that he did not want to pursue treatment of the MAC at this time. Patient previously managed chronic, dry cough until yesterday when patient developed life threatening, large volume hemoptysis with clots. He continues to actively cough up bright red blood though in smaller volumes. CT here showed decreased size and conspicuity of tree-in-bud nodular opacities in the upper lobes with persistent ground-glass opacities suggesting persistent but decreased inflammatory/infectious process. New consolidation in the medial basilar and posterior segment of the left lower lobe suggesting or acute process such is pneumonia. Progressive tubular bronchiectasis.H and H showed slight (0.9) drop in hemoglobin to 12.4.     Patient continued to cough up chencho blood overnight. CTA was unrevealing, so patient was taken today for a bronchoscopic visualization. Patient tolerated procedure well, undergoing intubation and subsequent bronchoscopy. No clear source for bleeding visualized though it appears to have originated in the RLL.    - IR consult placed without need  for intervention  - Bronchoscopy completed today without readily identifiable source of bleeding, though appears to be within the RLL  - Continue Vancomycin and Cefepime for treatment of acute exacerbation of chronic bronchiectasis- if discharged, okay to switch to oral equivalents with target length of therapy of 5-7 days  - Macrolides should be avoided in the treatment of this patient due to drug resistance with MAC  - Continue TXA nebulizer   - Continue Hypertonic saline nebulizer  - Recommend follow up with Infectious Disease for treatment of NTM upon discharge  - Recommend follow up with Pulmonology for treatment of Chronic Bronchiectasis upon discharge  - Recommended for patient to reach mutual decision with his cardiologist regarding risks vs benefits of continued Clopidogrel given patient's hemoptysis    Thank you for allowing us to participate in the care of this patient. Please contact me if you have any questions regarding this consult.    Tashia Benoit MD   [facility] Internal Medicine PGY1

## 2024-07-05 NOTE — PROGRESS NOTES
[facility] Medicine  Progress Note    Patient Name: Nelson Huntley  MRN: [id number]  Patient Class: IP- Inpatient   Admission Date: 7/3/2024  Length of Stay: 2 days  Attending Physician: Hernando Verma MD  Primary Care Provider: Bety Tomlinson MD        Subjective:     Principal Problem:Hemoptysis        HPI:  88 year old man with a history of MAC, CKD, Afib on sotalol, CKD 3, chronic kidney disease stage 3, hypertension, hyperlipidemia, CAD, MGUS, Tovar's esophagus, AICD, chronic systolic and diastolic CHF (echo 6/2020),  chronic ITP, recurrent pneumonias and MAC infection who presents following an episode of hemoptysis. He states that prior to arrival he had an episode of cough with bright red blood. He reports it was ~1 cup of blood. No further episodes since prior to arrival to the ED. He has had similar episodes in the past, although the volume of blood was greater during this event. Overall, he feels well. Resting comfortably on room air. No chest pain, no shortness of breath, no fevers, no chills, no n/v/d.     Overview/Hospital Course:  No notes on file    Interval History:     No acute events overnight  Patient is seen status post bronchoscopy  He reports hemoptysis has resolved  Denies shortness of breath  Patient has no other complaints    Review of Systems   Constitutional:  Negative for chills, fatigue and fever.   Respiratory:  Positive for cough. Negative for shortness of breath and wheezing.    Cardiovascular:  Negative for chest pain, palpitations and leg swelling.   Gastrointestinal:  Negative for abdominal distention and abdominal pain.   Neurological:  Negative for dizziness and headaches.   Psychiatric/Behavioral:  Negative for agitation and confusion.      Objective:     Vital Signs (Most Recent):  Temp: 98.1 °F (36.7 °C) (07/05/24 1417)  Pulse: 78 (07/05/24 1535)  Resp: 18 (07/05/24 1535)  BP: (!) 119/56 (07/05/24 1417)  SpO2: 97 % (07/05/24 1535) Vital Signs (24h  Range):  Temp:  [97.3 °F (36.3 °C)-98.3 °F (36.8 °C)] 98.1 °F (36.7 °C)  Pulse:  [64-81] 78  Resp:  [13-33] 18  SpO2:  [93 %-100 %] 97 %  BP: (102-178)/(56-85) 119/56     Weight: 74.8 kg (165 lb)  Body mass index is 22.38 kg/m².    Intake/Output Summary (Last 24 hours) at 7/5/2024 1604  Last data filed at 7/5/2024 0722  Gross per 24 hour   Intake 359.38 ml   Output 700 ml   Net -340.62 ml         Physical Exam  Constitutional:       General: He is not in acute distress.     Appearance: Normal appearance. He is not ill-appearing or toxic-appearing.   Eyes:      Extraocular Movements: Extraocular movements intact.      Conjunctiva/sclera: Conjunctivae normal.   Cardiovascular:      Rate and Rhythm: Normal rate and regular rhythm.   Pulmonary:      Effort: Pulmonary effort is normal. No respiratory distress.   Abdominal:      General: There is no distension.      Tenderness: There is no abdominal tenderness. There is no guarding.   Musculoskeletal:         General: Normal range of motion.   Skin:     General: Skin is warm and dry.   Neurological:      General: No focal deficit present.      Mental Status: He is alert and oriented to person, place, and time.   Psychiatric:         Mood and Affect: Mood normal.         Behavior: Behavior normal.             Significant Labs: All pertinent labs within the past 24 hours have been reviewed.    Significant Imaging: I have reviewed all pertinent imaging results/findings within the past 24 hours.    Assessment/Plan:      * Hemoptysis  - patient with reported significant hemoptysis with a known history of MAC  - Imaging concerning for RLL, although reported amount of hemoptysis is out of proportion  Bronchoscopy done on July 5th - without readily identifiable source of bleeding, though appears to be within the RLL     - on room air, no acute respiratory distress  Hemoptysis has resolved this time    Plan:  - will consult pulmonology  - given history of MAC, will consult  infectious disease   -continue vanco and Cefepime  -pending bronch cultures        Nontuberculous mycobacterial disease of lung        Pneumonia  See: hemoptysis      Mycobacterium avium infection  - previously treated, not currently on therapy  - respiratory culture and AFB culture ordered  - ID consult as above      Ischemic cardiomyopathy  - continue GDMT  - holding antiplatelets in setting of hemoptysis  - appears euvolemic    S/P AAA (abdominal aortic aneurysm) repair  CT demonstrating persistent abdominal aortic aneurysm sac incompletely imaged previously seen with suspected type 2 endoleak.     Patient has no abdominal pain    Discussed findings patient.  Recommend following up with vascular as outpatient    Hyperlipidemia  - continue statin      Essential hypertension  - controlled  - continue home medications    Chronic kidney disease, stage 3a  Creatine stable for now. BMP reviewed- noted Estimated Creatinine Clearance: 41.6 mL/min (based on SCr of 1.3 mg/dL). according to latest data. Based on current GFR, CKD stage is stage 3 - GFR 30-59.  Monitor UOP and serial BMP and adjust therapy as needed. Renally dose meds. Avoid nephrotoxic medications and procedures.    Thrombocytopenia  - chronic thrombocytopenia  - platelets 109, stable  - no indication for platelet transfusion at this time        VTE Risk Mitigation (From admission, onward)           Ordered     IP VTE HIGH RISK PATIENT  Once         07/03/24 1955     Place sequential compression device  Until discontinued         07/03/24 1955     Reason for No Pharmacological VTE Prophylaxis  Once        Question:  Reasons:  Answer:  Risk of Bleeding    07/03/24 1955                    Discharge Planning   ABHINAV: 7/8/2024     Code Status: Full Code   Is the patient medically ready for discharge?:     Reason for patient still in hospital (select all that apply): Patient trending condition, Laboratory test, and Treatment  Discharge Plan A: Home with family                   Hernando Verma MD  Department of [facility] Medicine   [facility]

## 2024-07-05 NOTE — ASSESSMENT & PLAN NOTE
Creatine stable for now. BMP reviewed- noted Estimated Creatinine Clearance: 41.6 mL/min (based on SCr of 1.3 mg/dL). according to latest data. Based on current GFR, CKD stage is stage 3 - GFR 30-59.  Monitor UOP and serial BMP and adjust therapy as needed. Renally dose meds. Avoid nephrotoxic medications and procedures.

## 2024-07-05 NOTE — SUBJECTIVE & OBJECTIVE
Interval History: Patient was transferred to ICU for bronchoscopy.     Review of Systems   Constitutional: Negative.    HENT: Negative.     Respiratory:  Positive for cough.    Cardiovascular: Negative.    Gastrointestinal: Negative.    Endocrine: Negative.    Musculoskeletal: Negative.    Skin: Negative.    Neurological: Negative.    Psychiatric/Behavioral: Negative.       Objective:     Vital Signs (Most Recent):  Temp: 98.3 °F (36.8 °C) (07/05/24 0945)  Pulse: 81 (07/05/24 1100)  Resp: 14 (07/05/24 1100)  BP: 137/67 (07/05/24 1000)  SpO2: 100 % (07/05/24 1100) Vital Signs (24h Range):  Temp:  [97.3 °F (36.3 °C)-98.3 °F (36.8 °C)] 98.3 °F (36.8 °C)  Pulse:  [61-81] 81  Resp:  [14-32] 14  SpO2:  [93 %-100 %] 100 %  BP: (134-159)/(62-73) 137/67     Weight: 74.8 kg (165 lb)  Body mass index is 22.38 kg/m².    Estimated Creatinine Clearance: 41.6 mL/min (based on SCr of 1.3 mg/dL).     Physical Exam  Constitutional:       General: He is not in acute distress.     Appearance: Normal appearance. He is not ill-appearing.      Comments: Recently extubated to bipap   HENT:      Head: Normocephalic and atraumatic.      Nose: No congestion or rhinorrhea.      Mouth/Throat:      Mouth: Mucous membranes are moist.      Pharynx: Oropharynx is clear.   Eyes:      General: No scleral icterus.     Extraocular Movements: Extraocular movements intact.      Conjunctiva/sclera: Conjunctivae normal.      Pupils: Pupils are equal, round, and reactive to light.   Cardiovascular:      Rate and Rhythm: Normal rate and regular rhythm.      Pulses: Normal pulses.      Heart sounds: Normal heart sounds.   Pulmonary:      Effort: Pulmonary effort is normal.      Breath sounds: Normal breath sounds.   Abdominal:      General: Abdomen is flat. Bowel sounds are normal.      Palpations: Abdomen is soft.   Musculoskeletal:      Cervical back: Normal range of motion and neck supple.   Neurological:      Mental Status: He is alert.           Significant Labs: All pertinent labs within the past 24 hours have been reviewed.    Significant Imaging: I have reviewed all pertinent imaging results/findings within the past 24 hours.

## 2024-07-05 NOTE — CONSULTS
Inpatient Radiology Pre-procedure Note    History of Present Illness:  Nelson GIBBONS Parent is a 88 y.o. male who presents for hemoptysis. Patient had several episodes yesterday with hemoptysis. He was 12 hours clear starting last night and recently underwent bronchoscopy which demonstrated a small amount of oozing from right side. Patient was in good spirits at time on consultation.    Admission H&P reviewed.  Past Medical History:   Diagnosis Date    AICD (automatic cardioverter/defibrillator) present 7/17/2012    Cardiomyopathy     CKD (chronic kidney disease) stage 3, GFR 30-59 ml/min 7/17/2012    Coronary artery disease     GERD (gastroesophageal reflux disease)     Tovar's; Dr. Vu    Hyperlipidemia 7/17/2012    Hypertension 7/17/2012    Ischemic cardiomyopathy 7/17/2012    MGUS (monoclonal gammopathy of unknown significance)     Thrombocytopenia 7/17/2012    Ventricular tachycardia     VT (ventricular tachycardia) 7/17/2012     Past Surgical History:   Procedure Laterality Date    ABDOMINAL AORTIC ANEURYSM REPAIR      CARDIAC DEFIBRILLATOR PLACEMENT      CATARACT EXTRACTION, BILATERAL  2018    CORONARY ANGIOPLASTY      EYE SURGERY Bilateral     cataract    HERNIA REPAIR      x2    REPLACEMENT OF IMPLANTABLE CARDIOVERTER-DEFIBRILLATOR (ICD) GENERATOR Left 3/18/2022    Procedure: REPLACEMENT, ICD GENERATOR;  Surgeon: Feliep Woodard MD;  Location: [facility] EP LAB;  Service: Cardiology;  Laterality: Left;  SEFERINO, ICD gen chg, SJM, elyssa, MB, 3prep*ANUJ ICD insitu*        Review of Systems:   As documented in primary team H&P    Home Meds:   Prior to Admission medications    Medication Sig Start Date End Date Taking? Authorizing Provider   aspirin 81 MG chewable tablet Take 81 mg by mouth Daily.   Yes Provider, Historical   beta-carotene,A,-vits C,E/mins (OCUVITE ORAL) Take by mouth.   Yes Provider, Historical   clopidogreL (PLAVIX) 75 mg tablet TAKE 1 TABLET EVERY DAY  Patient taking differently: Take 75 mg by  mouth every Tues, Thurs, Sat. 5/8/24  Yes Bety Tomlinson MD   famotidine (PEPCID) 20 MG tablet Take 20 mg by mouth before dinner.   Yes Provider, Historical   furosemide (LASIX) 20 MG tablet Take 1 tablet (20 mg total) by mouth 2 (two) times daily. Take one tablet every other day orbas directed 12/1/23  Yes Shiraz Cartagena MD   metoprolol succinate (TOPROL-XL) 25 MG 24 hr tablet TAKE 1 TABLET TWICE DAILY  Patient taking differently: Take 25 mg by mouth 2 (two) times daily. 3/20/24  Yes Bety Tomlinson MD   niacin 500 MG tablet Take 500 mg by mouth Every PM.   Yes Provider, Historical   nitroGLYCERIN (NITROSTAT) 0.4 MG SL tablet Take 1 tab under the tongue for chest pain. May repeat every 5 minutes for a total of 3 doses. If chest pain not relieved go to the ED.  Patient taking differently: Place 0.4 mg under the tongue every 5 (five) minutes as needed for Chest pain.  If chest pain not relieved go to the ED. 4/25/24  Yes Bety Tomlinson MD   omega-3 fatty acids-vitamin E 1,000 mg Cap Take 1 capsule by mouth 2 (two) times daily.   Yes Provider, Historical   potassium chloride SA (K-DUR,KLOR-CON M) 10 MEQ tablet Take 1 tablet (10 mEq total) by mouth once daily. 12/1/23  Yes Shiraz Cartagena MD   pravastatin (PRAVACHOL) 40 MG tablet TAKE 1 TABLET EVERY EVENING  Patient taking differently: Take 40 mg by mouth every evening. 3/20/24  Yes Bety Tomlinson MD   sacubitriL-valsartan (ENTRESTO)  mg per tablet Take 1 tablet by mouth 2 (two) times daily. 12/18/23  Yes Shiraz Cartagena MD   sotaloL (BETAPACE) 120 MG Tab Take 1 tablet (120 mg total) by mouth 2 (two) times daily. 6/18/24  Yes Solange Moon PA-C   ubidecarenone (COENZYME Q10 ORAL) Take 1 tablet by mouth once daily. 12/9/22  Yes Provider, Historical   vitamin D 1000 units Tab Take 1,000 Units by mouth every Tues, Thurs, Sat.    Yes Provider, Historical   amLODIPine (NORVASC) 10 MG tablet TAKE 1 TABLET EVERY DAY  Patient taking  differently: Take 10 mg by mouth once daily. 5/8/24   Bety Tomlinson MD   azelastine (ASTELIN) 137 mcg (0.1 %) nasal spray 1 spray (137 mcg total) by Nasal route 2 (two) times daily. 10/24/23 10/23/24  Bety Tomlinson MD   fluticasone propionate (FLONASE) 50 mcg/actuation nasal spray 1 spray (50 mcg total) by Each Nostril route once daily. 10/24/23   Bety Tomlinson MD   multivitamin with minerals tablet Take 1 tablet by mouth once daily.  1/1/18   Provider, Historical   nebulizer and compressor Archana 1 Device by Misc.(Non-Drug; Combo Route) route 2 (two) times a day. 4/25/24   Bety Tomlinson MD   sodium chloride 3% 3 % nebulizer solution Take 4 mLs by nebulization 2 (two) times a day. 2/28/24 2/27/25  Eliza Rueda MD     Scheduled Meds:    amLODIPine  10 mg Oral Daily    ceFEPime IV (PEDS and ADULTS)  1 g Intravenous Q8H    EPINEPHrine  0.5 mg Tracheal Tube Once    famotidine  20 mg Oral QHS    furosemide  20 mg Oral Daily    metoprolol succinate  25 mg Oral BID    multivitamin  1 tablet Oral Daily    pravastatin  40 mg Oral QHS    sacubitriL-valsartan  1 tablet Oral BID    sotaloL  120 mg Oral Daily    tranexamic acid  500 mg Nasal TID    vancomycin (VANCOCIN) IV (PEDS and ADULTS)  1,250 mg Intravenous Q24H     Continuous Infusions:    0.9% NaCl   Intravenous Continuous 75 mL/hr at 07/05/24 0820 New Bag at 07/05/24 0820    propofoL  0-50 mcg/kg/min Intravenous Continuous 13.2 mL/hr at 07/05/24 1017 30 mcg/kg/min at 07/05/24 1017     PRN Meds:  Current Facility-Administered Medications:     acetaminophen, 650 mg, Oral, Q4H PRN    ALPRAZolam, 0.25 mg, Oral, TID PRN    dextrose 10%, 12.5 g, Intravenous, PRN    dextrose 10%, 25 g, Intravenous, PRN    glucagon (human recombinant), 1 mg, Intramuscular, PRN    glucose, 16 g, Oral, PRN    glucose, 24 g, Oral, PRN    naloxone, 0.02 mg, Intravenous, PRN    ondansetron, 4 mg, Intravenous, Q6H PRN    sodium chloride 0.9%, 10 mL, Intravenous, Q12H  "PRN    Pharmacy to dose Vancomycin consult, , , Once **AND** vancomycin - pharmacy to dose, , Intravenous, pharmacy to manage frequency  Anticoagulants/Antiplatelets: Plavix    Allergies:   Review of patient's allergies indicates:   Allergen Reactions    Fluorescein-benoxinate Other (See Comments)    Pcn  [penicillins]      Other reaction(s): Unknown    Tropicamide Other (See Comments)    Clindamycin Rash     Sedation Hx: have not been any systemic reactions    Labs:  Recent Labs   Lab 07/04/24  1718   INR 1.2       Recent Labs   Lab 07/05/24  0231   WBC 9.53   HGB 11.9*   HCT 35.2*   MCV 87   PLT 93*      Recent Labs   Lab 07/05/24  0231   GLU 93      K 3.7      CO2 23   BUN 19   CREATININE 1.3   CALCIUM 8.9   ALT 18   AST 24   ALBUMIN 3.1*   BILITOT 0.8         Vitals:  Temp: 98.3 °F (36.8 °C) (07/05/24 0945)  Pulse: 70 (07/05/24 1230)  Resp: (!) 21 (07/05/24 1230)  BP: 129/61 (07/05/24 1230)  SpO2: 100 % (07/05/24 1230)     Physical Exam:  ASA: 3  Mallampati: 3    General: no acute distress  Mental Status: alert and oriented to person, place and time  HEENT: normocephalic, atraumatic  Chest: unlabored breathing  Heart: regular heart rate  Abdomen: nondistended  Extremity: moves all extremities    Plan: Given recent Bronch findings, will refrain from bronchial artery embolization at this time. Please reconsult IR if there are changes to patients status      Hernando Moreno MD ([facility])  Interventional Radiology          "

## 2024-07-05 NOTE — ASSESSMENT & PLAN NOTE
88 year old man with a history of MAC, CKD, Afib on sotalol, CKD 3, chronic kidney disease stage 3, hypertension, hyperlipidemia, CAD, MGUS, Tovar's esophagus, AICD, chronic systolic and diastolic CHF (echo 6/2020),  chronic ITP, recurrent pneumonias and non-TB mycobacterial infection who presents following an episode of hemoptysis    -of note his CT actually showed some improvement in tree-in-bud areas  -question of wether current episode is due to worsening of mycobacterial infection or other bacterial infection  -will not start mycobacterial treatment how  -agree with vanco and cefepime  -await studies and cultures from bronch

## 2024-07-06 VITALS
TEMPERATURE: 98 F | OXYGEN SATURATION: 94 % | SYSTOLIC BLOOD PRESSURE: 120 MMHG | DIASTOLIC BLOOD PRESSURE: 60 MMHG | BODY MASS INDEX: 22.54 KG/M2 | WEIGHT: 166.44 LBS | HEIGHT: 72 IN | HEART RATE: 65 BPM | RESPIRATION RATE: 16 BRPM

## 2024-07-06 LAB
ALBUMIN SERPL BCP-MCNC: 3 G/DL (ref 3.5–5.2)
ALP SERPL-CCNC: 94 U/L (ref 55–135)
ALT SERPL W/O P-5'-P-CCNC: 16 U/L (ref 10–44)
ANION GAP SERPL CALC-SCNC: 9 MMOL/L (ref 8–16)
AST SERPL-CCNC: 29 U/L (ref 10–40)
BASOPHILS # BLD AUTO: 0.06 K/UL (ref 0–0.2)
BASOPHILS NFR BLD: 0.6 % (ref 0–1.9)
BILIRUB SERPL-MCNC: 0.7 MG/DL (ref 0.1–1)
BUN SERPL-MCNC: 20 MG/DL (ref 8–23)
CALCIUM SERPL-MCNC: 8.5 MG/DL (ref 8.7–10.5)
CHLORIDE SERPL-SCNC: 109 MMOL/L (ref 95–110)
CO2 SERPL-SCNC: 22 MMOL/L (ref 23–29)
CREAT SERPL-MCNC: 1.2 MG/DL (ref 0.5–1.4)
DIFFERENTIAL METHOD BLD: ABNORMAL
EOSINOPHIL # BLD AUTO: 0.1 K/UL (ref 0–0.5)
EOSINOPHIL NFR BLD: 1.3 % (ref 0–8)
ERYTHROCYTE [DISTWIDTH] IN BLOOD BY AUTOMATED COUNT: 14.3 % (ref 11.5–14.5)
EST. GFR  (NO RACE VARIABLE): 58 ML/MIN/1.73 M^2
GLUCOSE SERPL-MCNC: 115 MG/DL (ref 70–110)
HCT VFR BLD AUTO: 33 % (ref 40–54)
HGB BLD-MCNC: 11.3 G/DL (ref 14–18)
IMM GRANULOCYTES # BLD AUTO: 0.05 K/UL (ref 0–0.04)
IMM GRANULOCYTES NFR BLD AUTO: 0.5 % (ref 0–0.5)
LYMPHOCYTES # BLD AUTO: 2.2 K/UL (ref 1–4.8)
LYMPHOCYTES NFR BLD: 22.7 % (ref 18–48)
MCH RBC QN AUTO: 29.4 PG (ref 27–31)
MCHC RBC AUTO-ENTMCNC: 34.2 G/DL (ref 32–36)
MCV RBC AUTO: 86 FL (ref 82–98)
MONOCYTES # BLD AUTO: 1.3 K/UL (ref 0.3–1)
MONOCYTES NFR BLD: 13.9 % (ref 4–15)
NEUTROPHILS # BLD AUTO: 5.8 K/UL (ref 1.8–7.7)
NEUTROPHILS NFR BLD: 61 % (ref 38–73)
NRBC BLD-RTO: 0 /100 WBC
PLATELET # BLD AUTO: 86 K/UL (ref 150–450)
PMV BLD AUTO: 12.7 FL (ref 9.2–12.9)
POTASSIUM SERPL-SCNC: 3.2 MMOL/L (ref 3.5–5.1)
PROT SERPL-MCNC: 5.9 G/DL (ref 6–8.4)
RBC # BLD AUTO: 3.84 M/UL (ref 4.6–6.2)
SODIUM SERPL-SCNC: 140 MMOL/L (ref 136–145)
VANCOMYCIN TROUGH SERPL-MCNC: 13.9 UG/ML (ref 10–22)
WBC # BLD AUTO: 9.52 K/UL (ref 3.9–12.7)

## 2024-07-06 PROCEDURE — 80053 COMPREHEN METABOLIC PANEL: CPT | Mod: HCNC | Performed by: STUDENT IN AN ORGANIZED HEALTH CARE EDUCATION/TRAINING PROGRAM

## 2024-07-06 PROCEDURE — 25000003 PHARM REV CODE 250: Mod: HCNC | Performed by: STUDENT IN AN ORGANIZED HEALTH CARE EDUCATION/TRAINING PROGRAM

## 2024-07-06 PROCEDURE — 94640 AIRWAY INHALATION TREATMENT: CPT | Mod: HCNC

## 2024-07-06 PROCEDURE — 63600175 PHARM REV CODE 636 W HCPCS: Mod: HCNC | Performed by: STUDENT IN AN ORGANIZED HEALTH CARE EDUCATION/TRAINING PROGRAM

## 2024-07-06 PROCEDURE — 25000003 PHARM REV CODE 250: Mod: HCNC

## 2024-07-06 PROCEDURE — 36415 COLL VENOUS BLD VENIPUNCTURE: CPT | Mod: HCNC | Performed by: HOSPITALIST

## 2024-07-06 PROCEDURE — 36415 COLL VENOUS BLD VENIPUNCTURE: CPT | Mod: HCNC | Performed by: STUDENT IN AN ORGANIZED HEALTH CARE EDUCATION/TRAINING PROGRAM

## 2024-07-06 PROCEDURE — 99232 SBSQ HOSP IP/OBS MODERATE 35: CPT | Mod: HCNC,,, | Performed by: INTERNAL MEDICINE

## 2024-07-06 PROCEDURE — 85025 COMPLETE CBC W/AUTO DIFF WBC: CPT | Mod: HCNC | Performed by: STUDENT IN AN ORGANIZED HEALTH CARE EDUCATION/TRAINING PROGRAM

## 2024-07-06 PROCEDURE — 94761 N-INVAS EAR/PLS OXIMETRY MLT: CPT | Mod: HCNC

## 2024-07-06 PROCEDURE — 80202 ASSAY OF VANCOMYCIN: CPT | Mod: HCNC | Performed by: HOSPITALIST

## 2024-07-06 RX ORDER — CIPROFLOXACIN 500 MG/1
500 TABLET ORAL 2 TIMES DAILY
Qty: 42 TABLET | Refills: 0 | Status: SHIPPED | OUTPATIENT
Start: 2024-07-06 | End: 2024-07-27

## 2024-07-06 RX ORDER — POTASSIUM CHLORIDE 20 MEQ/1
40 TABLET, EXTENDED RELEASE ORAL EVERY 4 HOURS
Status: COMPLETED | OUTPATIENT
Start: 2024-07-06 | End: 2024-07-06

## 2024-07-06 RX ADMIN — FUROSEMIDE 20 MG: 20 TABLET ORAL at 08:07

## 2024-07-06 RX ADMIN — POTASSIUM CHLORIDE 40 MEQ: 1500 TABLET, EXTENDED RELEASE ORAL at 01:07

## 2024-07-06 RX ADMIN — POTASSIUM CHLORIDE 40 MEQ: 1500 TABLET, EXTENDED RELEASE ORAL at 08:07

## 2024-07-06 RX ADMIN — METOPROLOL SUCCINATE 25 MG: 25 TABLET, EXTENDED RELEASE ORAL at 08:07

## 2024-07-06 RX ADMIN — AMLODIPINE BESYLATE 10 MG: 5 TABLET ORAL at 08:07

## 2024-07-06 RX ADMIN — TRANEXAMIC ACID 500 MG: 1 INJECTION, SOLUTION INTRAVENOUS at 09:07

## 2024-07-06 RX ADMIN — SOTALOL HYDROCHLORIDE 120 MG: 80 TABLET ORAL at 09:07

## 2024-07-06 RX ADMIN — THERA TABS 1 TABLET: TAB at 08:07

## 2024-07-06 RX ADMIN — SACUBITRIL AND VALSARTAN 1 TABLET: 97; 103 TABLET, FILM COATED ORAL at 08:07

## 2024-07-06 RX ADMIN — CEFEPIME 1 G: 1 INJECTION, POWDER, FOR SOLUTION INTRAMUSCULAR; INTRAVENOUS at 08:07

## 2024-07-06 NOTE — PLAN OF CARE
"1320  CM was informed by Dr Verma that the pt has been cleared by pulm & will likely dc home today.     1330  Patient resting quietly in bed with spouse, Ayleen Hein" Parent ([phone]), at the bedside when CM rounded via [employer]. Patient & spouse in agreement with the plan to discharge home today, denied the need for assistance with transportation at time of discharge, & verbalized understanding regarding the hospital follow up appointments with Dr Granado (vasc surg) & Dr Castro (PCP) and will be notified of hospfu appts with Dr Cespedes (ID) & Dr Bradford (pulm).     1440  DC order noted. Message sent to nurse Tashia & virtual nurse Cherry informing that the pt is cleared to discharge.       Will continue to follow.     "

## 2024-07-06 NOTE — SUBJECTIVE & OBJECTIVE
Interval History: He is back on the floor and has had no acute events. No more hemoptysis    Review of Systems   Constitutional: Negative.    HENT: Negative.     Respiratory:  Positive for cough.    Cardiovascular: Negative.    Gastrointestinal: Negative.    Endocrine: Negative.    Musculoskeletal: Negative.    Skin: Negative.    Neurological: Negative.    Psychiatric/Behavioral: Negative.       Objective:     Vital Signs (Most Recent):  Temp: 97.7 °F (36.5 °C) (07/06/24 0802)  Pulse: 68 (07/06/24 0921)  Resp: 16 (07/06/24 0802)  BP: 136/63 (07/06/24 0802)  SpO2: 99 % (07/06/24 0921) Vital Signs (24h Range):  Temp:  [97.7 °F (36.5 °C)-99.7 °F (37.6 °C)] 97.7 °F (36.5 °C)  Pulse:  [60-89] 68  Resp:  [16-33] 16  SpO2:  [93 %-100 %] 99 %  BP: (102-174)/(56-76) 136/63     Weight: 75.5 kg (166 lb 7.2 oz)  Body mass index is 22.57 kg/m².    Estimated Creatinine Clearance: 45.4 mL/min (based on SCr of 1.2 mg/dL).     Physical Exam  Constitutional:       General: He is not in acute distress.     Appearance: Normal appearance. He is not ill-appearing.   HENT:      Head: Normocephalic and atraumatic.      Nose: No congestion or rhinorrhea.      Mouth/Throat:      Mouth: Mucous membranes are moist.      Pharynx: Oropharynx is clear.   Eyes:      General: No scleral icterus.     Extraocular Movements: Extraocular movements intact.      Conjunctiva/sclera: Conjunctivae normal.      Pupils: Pupils are equal, round, and reactive to light.   Cardiovascular:      Rate and Rhythm: Normal rate and regular rhythm.      Pulses: Normal pulses.      Heart sounds: Normal heart sounds.   Pulmonary:      Effort: Pulmonary effort is normal.      Breath sounds: Normal breath sounds.   Abdominal:      General: Abdomen is flat. Bowel sounds are normal.      Palpations: Abdomen is soft.   Musculoskeletal:      Cervical back: Normal range of motion and neck supple.   Neurological:      Mental Status: He is alert.          Significant Labs: All  pertinent labs within the past 24 hours have been reviewed.    Significant Imaging: I have reviewed all pertinent imaging results/findings within the past 24 hours.

## 2024-07-06 NOTE — PLAN OF CARE
[facility] - Telemetry  Discharge Final Note    Primary Care Provider: Bety Tomlinson MD    Expected Discharge Date: 7/6/2024    Final Discharge Note (most recent)       Final Note - 07/06/24 1515          Final Note    Assessment Type Final Discharge Note (P)      Anticipated Discharge Disposition Home or Self Care (P)      Hospital Resources/Appts/Education Provided Appointments scheduled and added to AVS (P)                      Contact Info       Gonzalez Granado MD   Specialty: Vascular Surgery    [address] [facility] [address]   Phone: [phone]       Next Steps: Follow up on 7/9/2024    Instructions: at 3:00 pm; previously scheduled vascular surgery appointment    Jerry Castro III, MD   Specialty: Internal Medicine    [address] [facility] [address]   Phone: [phone]       Next Steps: Follow up on 7/31/2024    Instructions: at 1:00pm; previously scheduled PCP appointment    Roselia Bradford MD   Specialty: Pulmonary Disease    [address]  [address]  [facility] [address]   Phone: [phone]       Next Steps: Follow up    Instructions: Patient will be notified of a pulmonology hospital follow up appointment.    Edis Cespedes MD   Specialty: Infectious Diseases    [address]  [address]  [place] [address]   Phone: [phone]       Next Steps: Follow up    Instructions: Patient will be notified of an infectious disease hospital follow up appointment.

## 2024-07-06 NOTE — ASSESSMENT & PLAN NOTE
- previously treated, not currently on therapy    Plan:  - respiratory culture and AFB culture ordered  - ID consult as above

## 2024-07-06 NOTE — PROGRESS NOTES
[facility]  Infectious Disease  Progress Note    Patient Name: Nelson GIBBONS Parent  MRN: [id number]  Admission Date: 7/3/2024  Length of Stay: 3 days  Attending Physician: Hernando Verma MD  Primary Care Provider: Bety Tomlinson MD    Isolation Status: No active isolations  Assessment/Plan:      ID  Mycobacterium avium infection  88 year old man with a history of MAC, CKD, Afib on sotalol, CKD 3, chronic kidney disease stage 3, hypertension, hyperlipidemia, CAD, MGUS, Tovar's esophagus, AICD, chronic systolic and diastolic CHF (echo 6/2020),  chronic ITP, recurrent pneumonias and non-TB mycobacterial infection who presents following an episode of hemoptysis    -CT showed some improvement in tree-in-bud areas  -question of wether current episode is due to worsening of mycobacterial infection or other bacterial infection  -will not start mycobacterial treatment how  -per bronch no source of bleeding identified but seemed to be from RLL  -sputum culture from bronch with GNR - await identifcation   -would continue cefepime and stop vanco          Thank you for your consult. I will follow-up with patient. Please contact us if you have any additional questions.    Edis Cespedes MD  Infectious Disease  [facility]    Subjective:     Principal Problem:Hemoptysis    HPI: 88 year old man with a history of MAC, CKD, Afib on sotalol, CKD 3, chronic kidney disease stage 3, hypertension, hyperlipidemia, CAD, MGUS, Tovar's esophagus, AICD, chronic systolic and diastolic CHF (echo 6/2020),  chronic ITP, recurrent pneumonias and non-TB mycobacterial infection who presents following an episode of hemoptysis. He states that prior to arrival he had an episode of cough with bright red blood. He reports it was ~1 cup of blood. No further episodes since prior to arrival to the ED. He has had similar episodes in the past, although the volume of blood was greater during this event. Overall, he feels well. Resting  "comfortably on room air. No chest pain, no shortness of breath, no fevers, no chills, no n/v/d.     He reports that the day prior to this event he was feeling well and normal. CT chest done here shows  "Decreased size and conspicuity of tree-in-bud nodular opacities in the upper lobes with persistent ground-glass opacities suggesting persistent but decreased inflammatory/infectious process. New consolidation in the medial basilar and posterior segment of the left lower lobe suggesting or acute process such is pneumonia"    He has been following with ID outpatient and the decision to hold antibiotics and monitor was made due to his dsire to avoid side effects from the antibiotics.    His cultures have consistently grown different mycobacteria    12/18/23 - Mycobacterium abscessus  1/2/24- M avium  2/8/24 - M avium  3/4/24 M abscessus  6/7/24 - + culture id pending    He has also grown pseudomonas on 2 occasions          Interval History: He is back on the floor and has had no acute events. No more hemoptysis    Review of Systems   Constitutional: Negative.    HENT: Negative.     Respiratory:  Positive for cough.    Cardiovascular: Negative.    Gastrointestinal: Negative.    Endocrine: Negative.    Musculoskeletal: Negative.    Skin: Negative.    Neurological: Negative.    Psychiatric/Behavioral: Negative.       Objective:     Vital Signs (Most Recent):  Temp: 97.7 °F (36.5 °C) (07/06/24 0802)  Pulse: 68 (07/06/24 0921)  Resp: 16 (07/06/24 0802)  BP: 136/63 (07/06/24 0802)  SpO2: 99 % (07/06/24 0921) Vital Signs (24h Range):  Temp:  [97.7 °F (36.5 °C)-99.7 °F (37.6 °C)] 97.7 °F (36.5 °C)  Pulse:  [60-89] 68  Resp:  [16-33] 16  SpO2:  [93 %-100 %] 99 %  BP: (102-174)/(56-76) 136/63     Weight: 75.5 kg (166 lb 7.2 oz)  Body mass index is 22.57 kg/m².    Estimated Creatinine Clearance: 45.4 mL/min (based on SCr of 1.2 mg/dL).     Physical Exam  Constitutional:       General: He is not in acute distress.     Appearance: " Normal appearance. He is not ill-appearing.   HENT:      Head: Normocephalic and atraumatic.      Nose: No congestion or rhinorrhea.      Mouth/Throat:      Mouth: Mucous membranes are moist.      Pharynx: Oropharynx is clear.   Eyes:      General: No scleral icterus.     Extraocular Movements: Extraocular movements intact.      Conjunctiva/sclera: Conjunctivae normal.      Pupils: Pupils are equal, round, and reactive to light.   Cardiovascular:      Rate and Rhythm: Normal rate and regular rhythm.      Pulses: Normal pulses.      Heart sounds: Normal heart sounds.   Pulmonary:      Effort: Pulmonary effort is normal.      Breath sounds: Normal breath sounds.   Abdominal:      General: Abdomen is flat. Bowel sounds are normal.      Palpations: Abdomen is soft.   Musculoskeletal:      Cervical back: Normal range of motion and neck supple.   Neurological:      Mental Status: He is alert.          Significant Labs: All pertinent labs within the past 24 hours have been reviewed.    Significant Imaging: I have reviewed all pertinent imaging results/findings within the past 24 hours.

## 2024-07-06 NOTE — PROGRESS NOTES
Pharmacokinetic Assessment Follow Up: IV Vancomycin    Vancomycin serum concentration assessment(s):    The trough level was drawn correctly and can be used to guide therapy at this time. The measurement is below the desired definitive target range of 15 to 20 mcg/mL.    Vancomycin Regimen Plan:    Change regimen to Vancomycin 1500 mg IV every 24 hours with next serum trough concentration measured at 16:00 on 7-8    Drug levels (last 3 results):  Recent Labs   Lab Result Units 07/06/24  1456   Vancomycin-Trough ug/mL 13.9       Pharmacy will continue to follow and monitor vancomycin.    Please contact pharmacy at extension [phone] for questions regarding this assessment.    Thank you for the consult,   Parag Nino       Patient brief summary:  Nelson GIBBONS Parent is a 88 y.o. male initiated on antimicrobial therapy with IV Vancomycin for treatment of lower respiratory infection    The patient's current regimen is 1250 q 24    Drug Allergies:   Review of patient's allergies indicates:   Allergen Reactions    Fluorescein-benoxinate Other (See Comments)    Pcn  [penicillins]      Other reaction(s): Unknown    Tropicamide Other (See Comments)    Clindamycin Rash       Actual Body Weight:   75.5 kg    Renal Function:   Estimated Creatinine Clearance: 45.4 mL/min (based on SCr of 1.2 mg/dL).,     Dialysis Method (if applicable):  N/A    CBC (last 72 hours):  Recent Labs   Lab Result Units 07/03/24 2108 07/04/24 0253 07/05/24  0231 07/06/24  0302   WBC K/uL 9.73 9.49 9.53 9.52   Hemoglobin g/dL 13.3* 12.4* 11.9* 11.3*   Hematocrit % 40.0 37.2* 35.2* 33.0*   Platelets K/uL 110* 100* 93* 86*   Gran % %  --  62.0 57.2 61.0   Lymph % %  --  25.6 29.3 22.7   Mono % %  --  10.6 11.8 13.9   Eosinophil % %  --  0.9 0.9 1.3   Basophil % %  --  0.7 0.5 0.6   Differential Method   --  Automated Automated Automated       Metabolic Panel (last 72 hours):  Recent Labs   Lab Result Units 07/04/24  0252 07/05/24  0231 07/06/24  0302    Sodium mmol/L 140 138 140   Potassium mmol/L 4.4 3.7 3.2*   Chloride mmol/L 107 106 109   CO2 mmol/L 23 23 22*   Glucose mg/dL 89 93 115*   BUN mg/dL 18 19 20   Creatinine mg/dL 1.3 1.3 1.2   Albumin g/dL 3.4* 3.1* 3.0*   Total Bilirubin mg/dL 0.7 0.8 0.7   Alkaline Phosphatase U/L 111 106 94   AST U/L 20 24 29   ALT U/L 12 18 16       Vancomycin Administrations:  vancomycin given in the last 96 hours                     vancomycin 1,250 mg in D5W 250 mL IVPB (Vial-Mate) (mg) 1,250 mg New Bag 07/05/24 1613    vancomycin 1,500 mg in D5W 250 mL IVPB (Vial-Mate) (mg) 1,500 mg New Bag 07/04/24 1734                    Microbiologic Results:  Microbiology Results (last 7 days)       Procedure Component Value Units Date/Time    Culture, Respiratory with Gram Stain [[id number]]  (Abnormal) Collected: 07/05/24 0333    Order Status: Completed Specimen: Respiratory from Sputum, Expectorated Updated: 07/06/24 1006     Respiratory Culture GRAM NEGATIVE RUBENS  Moderate  Identification and susceptibility pending  Normal respiratory anel also present       Gram Stain (Respiratory) <10 epithelial cells per low power field.     Gram Stain (Respiratory) Rare WBC's     Gram Stain (Respiratory) Moderate Gram negative rods     Gram Stain (Respiratory) Rare Gram positive cocci    AFB Culture & Smear [[id number]]     Order Status: No result Specimen: Respiratory from Sputum, Expectorated

## 2024-07-06 NOTE — SUBJECTIVE & OBJECTIVE
Interval History:     No acute events overnight  He reports hemoptysis has resolved  Denies shortness of breath  Patient has no other complaints    Review of Systems   Constitutional:  Negative for chills, fatigue and fever.   Respiratory:  Negative for cough, shortness of breath and wheezing.    Cardiovascular:  Negative for chest pain, palpitations and leg swelling.   Gastrointestinal:  Negative for abdominal distention and abdominal pain.   Neurological:  Negative for dizziness and headaches.   Psychiatric/Behavioral:  Negative for agitation and confusion.      Objective:     Vital Signs (Most Recent):  Temp: 97.7 °F (36.5 °C) (07/06/24 0802)  Pulse: 68 (07/06/24 0921)  Resp: 16 (07/06/24 0802)  BP: 136/63 (07/06/24 0802)  SpO2: 99 % (07/06/24 0921) Vital Signs (24h Range):  Temp:  [97.7 °F (36.5 °C)-99.7 °F (37.6 °C)] 97.7 °F (36.5 °C)  Pulse:  [60-89] 68  Resp:  [13-33] 16  SpO2:  [93 %-100 %] 99 %  BP: (102-178)/(56-85) 136/63     Weight: 75.5 kg (166 lb 7.2 oz)  Body mass index is 22.57 kg/m².    Intake/Output Summary (Last 24 hours) at 7/6/2024 0949  Last data filed at 7/6/2024 0537  Gross per 24 hour   Intake 365 ml   Output 400 ml   Net -35 ml         Physical Exam  Constitutional:       General: He is not in acute distress.     Appearance: Normal appearance. He is not ill-appearing or toxic-appearing.   Eyes:      Extraocular Movements: Extraocular movements intact.      Conjunctiva/sclera: Conjunctivae normal.   Cardiovascular:      Rate and Rhythm: Normal rate and regular rhythm.   Pulmonary:      Effort: Pulmonary effort is normal. No respiratory distress.   Abdominal:      General: There is no distension.      Tenderness: There is no abdominal tenderness. There is no guarding.   Musculoskeletal:         General: Normal range of motion.   Skin:     General: Skin is warm and dry.   Neurological:      General: No focal deficit present.      Mental Status: He is alert and oriented to person, place, and  time.   Psychiatric:         Mood and Affect: Mood normal.         Behavior: Behavior normal.             Significant Labs: All pertinent labs within the past 24 hours have been reviewed.    Significant Imaging: I have reviewed all pertinent imaging results/findings within the past 24 hours.

## 2024-07-06 NOTE — ASSESSMENT & PLAN NOTE
Creatine stable for now. BMP reviewed- noted Estimated Creatinine Clearance: 45.4 mL/min (based on SCr of 1.2 mg/dL). according to latest data. Based on current GFR, CKD stage is stage 3 - GFR 30-59.  Monitor UOP and serial BMP and adjust therapy as needed. Renally dose meds. Avoid nephrotoxic medications and procedures.

## 2024-07-06 NOTE — PROGRESS NOTES
"Pulmonary & Critical Care Medicine Progress Note    Subjective:   No overnight events. Patient is in good spirits this morning. He says that he is feeling physically and mentally great.     Vital Signs:   Vitals:    07/06/24 1208   BP:    Pulse: 67   Resp:    Temp:        Fluid Balance:     Intake/Output Summary (Last 24 hours) at 7/6/2024 1257  Last data filed at 7/6/2024 1225  Gross per 24 hour   Intake 590 ml   Output 600 ml   Net -10 ml       Physical Exam:   General: Sitting comfortably in bed   HEENT: NC/AT, PERRL, EOMI  Cardiac: S1S2 auscultated, RRR, no murmurs/rubs/gallops  Resp: Auscultation clear bilaterally with no increased work of breathing. Good inspiratory effort. Bibasilar crackles present, right>left   Skin: Warm and dry  Neuro: AAOx3, no focal deficits  Psych: Appropriate mood and affect, cooperative & interactive    Laboratory Studies:   No results for input(s): "PH", "PCO2", "PO2", "HCO3", "POCSATURATED", "BE" in the last 24 hours.  Recent Labs   Lab 07/06/24  0302   WBC 9.52   RBC 3.84*   HGB 11.3*   HCT 33.0*   PLT 86*   MCV 86   MCH 29.4   MCHC 34.2     Recent Labs   Lab 07/06/24  0302      K 3.2*      CO2 22*   BUN 20   CREATININE 1.2   CALCIUM 8.5*       Microbiology Data:   Microbiology Results (last 7 days)       Procedure Component Value Units Date/Time    Culture, Respiratory with Gram Stain [[id number]]  (Abnormal) Collected: 07/05/24 0333    Order Status: Completed Specimen: Respiratory from Sputum, Expectorated Updated: 07/06/24 1006     Respiratory Culture GRAM NEGATIVE RUBENS  Moderate  Identification and susceptibility pending  Normal respiratory anel also present       Gram Stain (Respiratory) <10 epithelial cells per low power field.     Gram Stain (Respiratory) Rare WBC's     Gram Stain (Respiratory) Moderate Gram negative rods     Gram Stain (Respiratory) Rare Gram positive cocci    AFB Culture & Smear [[id number]]     Order Status: No result Specimen: Respiratory " from Sputum, Expectorated              Chest Imaging:   No new imaging.     Infusions:        Scheduled Medications:    amLODIPine  10 mg Oral Daily    ceFEPime IV (PEDS and ADULTS)  1 g Intravenous Q8H    EPINEPHrine  0.5 mg Tracheal Tube Once    famotidine  20 mg Oral QHS    furosemide  20 mg Oral Daily    metoprolol succinate  25 mg Oral BID    multivitamin  1 tablet Oral Daily    potassium chloride  40 mEq Oral Q4H    pravastatin  40 mg Oral QHS    sacubitriL-valsartan  1 tablet Oral BID    sotaloL  120 mg Oral Daily    vancomycin (VANCOCIN) IV (PEDS and ADULTS)  1,250 mg Intravenous Q24H       PRN Medications:     Current Facility-Administered Medications:     acetaminophen, 650 mg, Oral, Q4H PRN    ALPRAZolam, 0.25 mg, Oral, TID PRN    dextrose 10%, 12.5 g, Intravenous, PRN    dextrose 10%, 25 g, Intravenous, PRN    glucagon (human recombinant), 1 mg, Intramuscular, PRN    glucose, 16 g, Oral, PRN    glucose, 24 g, Oral, PRN    naloxone, 0.02 mg, Intravenous, PRN    ondansetron, 4 mg, Intravenous, Q6H PRN    sodium chloride 0.9%, 10 mL, Intravenous, Q12H PRN    Pharmacy to dose Vancomycin consult, , , Once **AND** vancomycin - pharmacy to dose, , Intravenous, pharmacy to manage frequency    Assessment & Plan:   88 year old male with 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 Hemoptysis had slowed down on arrival but did continue. He was started on antibiotics and inhaled TXA. He underwent bronchoscopy on 7/5 some blood seen in the right right lower lobe but no active bleeding. BAL respiratory culture growing GNR (has grown pseudomonas in the past), AFB smear positive. Today, hemoptysis has completely stopped and he is feeling well. Hemoptysis occurred in the setting of underlying bronchiectasis and NTM infection that likely was exacerbated by being on plavix.       - Discharge on ciprofloxacin for an  additional 3 weeks; resp cx with GNR and has grown pseudomonas multiple times in the past   - Hold aspirin and plavix on discharge  - Patient will need to follow up with ID   - Will reach out to Dr. Bradford for pulmonary follow up   - Safe to discharge from a pulmonary standpoint   - Spoke with the patient about return precautions if hemoptysis were to occur again     Thank you for involving us in the care of this patient. Please call with any questions.    Seen and discussed with Dr. Bell.     DO MANUEL Ames/[facility] PCCM Fellow, PGY 5  Phone: [phone]

## 2024-07-06 NOTE — PROGRESS NOTES
Discharge orders noted. Additional clinical references attached. Patient's discharge instructions given by bedside RN. Virtual nurse cued into room and reviewed discharge instructions. Education provided on new medication, diagnosis, and follow-up appointments. Teach back method used. Patient and spouse verbalized understanding. All questions answered. Transport to [facility] requested. Bedside nurse updated on patient status and transportation request.    07/06/24 8017   AVS Confirmation   Discharge instructions and AVS given to and reviewed with patient and/or significant other. Yes

## 2024-07-06 NOTE — ASSESSMENT & PLAN NOTE
- patient with reported significant hemoptysis with a known history of MAC  Hemoptysis likely due to bronchiectasis in the setting of recent MAC infection  Bronchoscopy done on July 5th - without readily identifiable source of bleeding, though appears to be within the RLL     Respiratory cultures from July 5th demonstrating rare gram-positive cocci and moderate Gram-negative rods    - on room air, no acute respiratory distress  Hemoptysis has resolved this time    Plan:  Infectious disease and pulmonology on consult  -continue vanco and Cefepime  -follow-up respiratory culture sensitivities

## 2024-07-06 NOTE — NURSING
Rapid Response Nurse Follow-up Note     Followed up with patient for proactive rounding.   Patient ambulating in room from bathroom. Pt stated he feels much better than he has the past few nights. No mentions of any bloody septum throughout day.   Team will continue to follow.  Please call Rapid Response RN, Rayo Sandoavl RN with any questions or concerns at [phone].

## 2024-07-06 NOTE — PLAN OF CARE
Patient discharge home self care, a copy of all discharge instructions and education giving to patient and wife. PIV removed with cath tip intact. Tele monitor removed and returned to monitor room. VN notified of patient status.

## 2024-07-06 NOTE — PROGRESS NOTES
[facility] Medicine  Progress Note    Patient Name: Nelson Huntley  MRN: [id number]  Patient Class: IP- Inpatient   Admission Date: 7/3/2024  Length of Stay: 3 days  Attending Physician: Hernando Verma MD  Primary Care Provider: Bety Tomlinson MD        Subjective:     Principal Problem:Hemoptysis        HPI:  88 year old man with a history of MAC, CKD, Afib on sotalol, CKD 3, chronic kidney disease stage 3, hypertension, hyperlipidemia, CAD, MGUS, Tovar's esophagus, AICD, chronic systolic and diastolic CHF (echo 6/2020),  chronic ITP, recurrent pneumonias and MAC infection who presents following an episode of hemoptysis. He states that prior to arrival he had an episode of cough with bright red blood. He reports it was ~1 cup of blood. No further episodes since prior to arrival to the ED. He has had similar episodes in the past, although the volume of blood was greater during this event. Overall, he feels well. Resting comfortably on room air. No chest pain, no shortness of breath, no fevers, no chills, no n/v/d.     Overview/Hospital Course:  No notes on file    Interval History:     No acute events overnight  He reports hemoptysis has resolved  Denies shortness of breath  Patient has no other complaints    Review of Systems   Constitutional:  Negative for chills, fatigue and fever.   Respiratory:  Negative for cough, shortness of breath and wheezing.    Cardiovascular:  Negative for chest pain, palpitations and leg swelling.   Gastrointestinal:  Negative for abdominal distention and abdominal pain.   Neurological:  Negative for dizziness and headaches.   Psychiatric/Behavioral:  Negative for agitation and confusion.      Objective:     Vital Signs (Most Recent):  Temp: 97.7 °F (36.5 °C) (07/06/24 0802)  Pulse: 68 (07/06/24 0921)  Resp: 16 (07/06/24 0802)  BP: 136/63 (07/06/24 0802)  SpO2: 99 % (07/06/24 0921) Vital Signs (24h Range):  Temp:  [97.7 °F (36.5 °C)-99.7 °F (37.6 °C)] 97.7 °F  (36.5 °C)  Pulse:  [60-89] 68  Resp:  [13-33] 16  SpO2:  [93 %-100 %] 99 %  BP: (102-178)/(56-85) 136/63     Weight: 75.5 kg (166 lb 7.2 oz)  Body mass index is 22.57 kg/m².    Intake/Output Summary (Last 24 hours) at 7/6/2024 0958  Last data filed at 7/6/2024 0537  Gross per 24 hour   Intake 365 ml   Output 400 ml   Net -35 ml         Physical Exam  Constitutional:       General: He is not in acute distress.     Appearance: Normal appearance. He is not ill-appearing or toxic-appearing.   Eyes:      Extraocular Movements: Extraocular movements intact.      Conjunctiva/sclera: Conjunctivae normal.   Cardiovascular:      Rate and Rhythm: Normal rate and regular rhythm.   Pulmonary:      Effort: Pulmonary effort is normal. No respiratory distress.   Abdominal:      General: There is no distension.      Tenderness: There is no abdominal tenderness. There is no guarding.   Musculoskeletal:         General: Normal range of motion.   Skin:     General: Skin is warm and dry.   Neurological:      General: No focal deficit present.      Mental Status: He is alert and oriented to person, place, and time.   Psychiatric:         Mood and Affect: Mood normal.         Behavior: Behavior normal.             Significant Labs: All pertinent labs within the past 24 hours have been reviewed.    Significant Imaging: I have reviewed all pertinent imaging results/findings within the past 24 hours.    Assessment/Plan:      * Hemoptysis  - patient with reported significant hemoptysis with a known history of MAC  Hemoptysis likely due to bronchiectasis in the setting of recent MAC infection  Bronchoscopy done on July 5th - without readily identifiable source of bleeding, though appears to be within the RLL     Respiratory cultures from July 5th demonstrating rare gram-positive cocci and moderate Gram-negative rods    - on room air, no acute respiratory distress  Hemoptysis has resolved this time    Plan:  Infectious disease and pulmonology  on consult  -continue vanco and Cefepime  -follow-up respiratory culture sensitivities          Nontuberculous mycobacterial disease of lung        Pneumonia  See: hemoptysis      Mycobacterium avium infection  - previously treated, not currently on therapy    Plan:  - respiratory culture and AFB culture ordered  - ID consult as above        Ischemic cardiomyopathy  Echocardiogram showing EF of 45% and wall motion abnormalities  His PA pressure has been steadily increasing over the past few years, currently at 39 mm Hg    Patient appears euvolemic and is well compensated    Plan:  - continue GDMT  - holding antiplatelets in setting of hemoptysis    S/P AAA (abdominal aortic aneurysm) repair  CT demonstrating persistent abdominal aortic aneurysm sac incompletely imaged previously seen with suspected type 2 endoleak.     Patient has no abdominal pain    Discussed findings patient.  Recommend following up with vascular as outpatient    Hyperlipidemia  - continue statin      Essential hypertension  - controlled  - continue home medications    Chronic kidney disease, stage 3a  Creatine stable for now. BMP reviewed- noted Estimated Creatinine Clearance: 45.4 mL/min (based on SCr of 1.2 mg/dL). according to latest data. Based on current GFR, CKD stage is stage 3 - GFR 30-59.  Monitor UOP and serial BMP and adjust therapy as needed. Renally dose meds. Avoid nephrotoxic medications and procedures.    Thrombocytopenia  - chronic thrombocytopenia  - platelets 109, stable  - no indication for platelet transfusion at this time        VTE Risk Mitigation (From admission, onward)           Ordered     IP VTE HIGH RISK PATIENT  Once         07/03/24 1955     Place sequential compression device  Until discontinued         07/03/24 1955     Reason for No Pharmacological VTE Prophylaxis  Once        Question:  Reasons:  Answer:  Risk of Bleeding    07/03/24 1955                    Discharge Planning   ABHINAV: 7/8/2024     Code Status:  Full Code   Is the patient medically ready for discharge?:     Reason for patient still in hospital (select all that apply): Patient trending condition, Laboratory test, and Treatment  Discharge Plan A: Home with family                  Hernando Verma MD  Department of [facility] Medicine   [facility]

## 2024-07-06 NOTE — ASSESSMENT & PLAN NOTE
Echocardiogram showing EF of 45% and wall motion abnormalities  His PA pressure has been steadily increasing over the past few years, currently at 39 mm Hg    Patient appears euvolemic and is well compensated    Plan:  - continue GDMT  - holding antiplatelets in setting of hemoptysis

## 2024-07-06 NOTE — ASSESSMENT & PLAN NOTE
88 year old man with a history of MAC, CKD, Afib on sotalol, CKD 3, chronic kidney disease stage 3, hypertension, hyperlipidemia, CAD, MGUS, Tovar's esophagus, AICD, chronic systolic and diastolic CHF (echo 6/2020),  chronic ITP, recurrent pneumonias and non-TB mycobacterial infection who presents following an episode of hemoptysis    -CT showed some improvement in tree-in-bud areas  -question of wether current episode is due to worsening of mycobacterial infection or other bacterial infection  -will not start mycobacterial treatment how  -per bronch no source of bleeding identified but seemed to be from RLL  -sputum culture from bronch with GNR - await identifcation   -would continue cefepime and stop vanco

## 2024-07-08 ENCOUNTER — TELEPHONE (OUTPATIENT)
Dept: PULMONOLOGY | Facility: CLINIC | Age: 89
End: 2024-07-08
Payer: MEDICARE

## 2024-07-08 LAB
BACTERIA SPEC AEROBE CULT: ABNORMAL
GRAM STN SPEC: ABNORMAL
OHS QRS DURATION: 94 MS
OHS QTC CALCULATION: 461 MS

## 2024-07-08 NOTE — TELEPHONE ENCOUNTER
----- Message from Haydee Lainez RN sent at 7/5/2024  4:45 PM CDT -----  Regarding: FW: hops[facility] appt    ----- Message -----  From: Mellisa De La Torre RN  Sent: 7/5/2024   4:12 PM CDT  To: [facility] Staff  Subject: [facility] appt                                      Pt needs a [facility] hosp appt. Thanks !

## 2024-07-09 ENCOUNTER — PATIENT OUTREACH (OUTPATIENT)
Dept: ADMINISTRATIVE | Facility: CLINIC | Age: 89
End: 2024-07-09
Payer: MEDICARE

## 2024-07-09 ENCOUNTER — OFFICE VISIT (OUTPATIENT)
Dept: VASCULAR SURGERY | Facility: CLINIC | Age: 89
End: 2024-07-09
Payer: MEDICARE

## 2024-07-09 VITALS
WEIGHT: 163 LBS | RESPIRATION RATE: 17 BRPM | DIASTOLIC BLOOD PRESSURE: 67 MMHG | HEART RATE: 64 BPM | BODY MASS INDEX: 22.08 KG/M2 | SYSTOLIC BLOOD PRESSURE: 122 MMHG | HEIGHT: 72 IN

## 2024-07-09 DIAGNOSIS — Z98.890 S/P AAA (ABDOMINAL AORTIC ANEURYSM) REPAIR: ICD-10-CM

## 2024-07-09 DIAGNOSIS — Z86.79 S/P AAA (ABDOMINAL AORTIC ANEURYSM) REPAIR: ICD-10-CM

## 2024-07-09 PROCEDURE — 1101F PT FALLS ASSESS-DOCD LE1/YR: CPT | Mod: HCNC,CPTII,S$GLB, | Performed by: SURGERY

## 2024-07-09 PROCEDURE — 99203 OFFICE O/P NEW LOW 30 MIN: CPT | Mod: HCNC,S$GLB,, | Performed by: SURGERY

## 2024-07-09 PROCEDURE — 1126F AMNT PAIN NOTED NONE PRSNT: CPT | Mod: HCNC,CPTII,S$GLB, | Performed by: SURGERY

## 2024-07-09 PROCEDURE — 1159F MED LIST DOCD IN RCRD: CPT | Mod: HCNC,CPTII,S$GLB, | Performed by: SURGERY

## 2024-07-09 PROCEDURE — 1111F DSCHRG MED/CURRENT MED MERGE: CPT | Mod: HCNC,CPTII,S$GLB, | Performed by: SURGERY

## 2024-07-09 PROCEDURE — 99999 PR PBB SHADOW E&M-EST. PATIENT-LVL V: CPT | Mod: PBBFAC,HCNC,, | Performed by: SURGERY

## 2024-07-09 PROCEDURE — 3288F FALL RISK ASSESSMENT DOCD: CPT | Mod: HCNC,CPTII,S$GLB, | Performed by: SURGERY

## 2024-07-09 PROCEDURE — 1157F ADVNC CARE PLAN IN RCRD: CPT | Mod: HCNC,CPTII,S$GLB, | Performed by: SURGERY

## 2024-07-09 NOTE — PROGRESS NOTES
C3 nurse spoke with Nelson Huntley and Aleah for a TCC post hospital discharge follow up call. The patient does not have a scheduled HOSFU appointment with Jerry Castro within 5-7 days post hospital discharge date 7/6/24. C3 nurse was unable to schedule HOSFU appointment in [facility].    Message sent to PCP staff requesting they contact patient and schedule follow up appointment.

## 2024-07-09 NOTE — PROGRESS NOTES
It was my pleasure to see him to dictate on Mr. Nelson coleman.  This is a very pleasant 89-year-old gentleman who was referred in for a large abdominal aortic aneurysms.  He had his aneurysms repaired about 20 or 25 years ago at [facility].  He now was found to have a large abdominal aortic aneurysms with a small but significant endoleak seems to be coming from a lumbar vessel.  Patient has no symptoms of this has no abdominal pain has no issues whatsoever with this.x11. 6 x 13.1 x 13.1.  He can exercise for 20-25 minutes on a treadmill.  However he says he feels tired and going to spent afterwards.    He has not no tenderness no rebound no guarding no pain whatsoever in the area he said.      Patient was recently admitted to the hospital for hemoptysis.  Has a a mycobacterium infection, a non TB mycobacterium infection.  He is being treated with Cipro at this point.  Patient had a low EF in the past, however when he was in the hospital he had an echo which showed his EF to be 45-50%.    He has no history of any issues with other aneurysms in his body.  Problem list was reviewed.      Neuro he has a history of a TIA and a remote infarct.  This was 20-25 years ago.  ENT he does have some mild hearing loss and some rhinitis.      Pulmonary he has a history of hemoptysis please see his discharge from a few days ago.  He also has bronchiectasis.  He has a non tuberculosis mycobacterial infection which caused him significant problems.    Cardiac vascular he has an AICD essential hypertension he does have an old MI.  Had a low EF in the past and he still has a mildly depressed EF but it was filled 45-50% on the most recent echo he had in the hospital.  He also has a history of ventricular tachycardia and he does have dyspnea on exertion on exertion.    Renal he has stage III renal disease with a creatinine of 1.2 his EF is approximately 50 his his creatinine clearance is proximally 50 cc/minute.  In terms  of infectious disease he does have a make a bacteria AV and infection of his lungs he is being treated with Cipro.  Immunology he has a positive ELYSIA hematology has thrombocytopenia with a platelet count of approximately 90 he does have anemia of chronic disease and a monoclonal gamma globulin our area.    He has a Tovar's esophagectomy.      Physical exam.  I can not palpate this aneurysms at all.  He has no tenderness no rebound no guarding no issues with this I can not actually feel the pulsations.  He does have an umbilical hernia.  States he has a hiatal hernia with some reflux.    Good femoral pulses.    I have spent time talking to he and his wife.  And I believe he needs an angiogram to see if we can determine where this is coming from.  On 1 of the CT scans that looks like it is coming from lumbar.  If we can get into it with micro coils and coil it that would be great.  I would like to diagnosed it 1st.  Not extend all the contrast.    He has had a weight loss of approximately 10 lb.  He has down to a proximally 164.  His eating has not been normal.  I suggested that he take boost or ensure to try to supplement his his caloric intake.  He said they are going to try to eat properly.  Looks like he is in fairly good shape and he can not exercise for 2025 minutes, however he says he is just exhausted.  I will give him 3 weeks or 4 weeks to get over his hospitalization in his hemoptysis.  His hemoglobin was in the 30s low 30s on his last CBC.  We will arrange him to get an angiogram see if we can figure out where this endoleak is coming from.  And if we can easily enter it we will place coils, if not we will bring him back another day and do that in order to conserve the affects of the contrast on his kidneys.  It was a pleasure meeting he and his wife.  We will schedule them for an angiogram in a proximally 3-4 weeks

## 2024-07-10 ENCOUNTER — TELEPHONE (OUTPATIENT)
Dept: VASCULAR SURGERY | Facility: CLINIC | Age: 89
End: 2024-07-10
Payer: MEDICARE

## 2024-07-10 DIAGNOSIS — I97.89 TYPE II ENDOLEAK OF AORTIC GRAFT: Primary | ICD-10-CM

## 2024-07-10 DIAGNOSIS — Z01.818 PRE-OP EVALUATION: ICD-10-CM

## 2024-07-10 NOTE — TELEPHONE ENCOUNTER
Contacted pt and wife Aleah to schedule angiogram w/ poss. coil embolization of endoleak in approximately 3-4 weeks from now. Procedure scheduled Thursday 8/15/24. Wife states she will call back at a later time to review pre-op instructions as she is on her way to an appt at this time. Provided clinic contact information and will await wife's return call.

## 2024-07-11 ENCOUNTER — TELEPHONE (OUTPATIENT)
Dept: VASCULAR SURGERY | Facility: CLINIC | Age: 89
End: 2024-07-11
Payer: MEDICARE

## 2024-07-11 NOTE — TELEPHONE ENCOUNTER
Spoke with pt and he needs a return call from Snehal to give him a time for the surgery date. I informed him that I will let Snehal what he needs and she will call him with specifics of the date.

## 2024-07-11 NOTE — TELEPHONE ENCOUNTER
----- Message from Marlee Quick sent at 7/11/2024  3:12 PM CDT -----  Regarding: pt advice  Contact: pt [phone]  Type:  Patient Returning Call    Who Called:pt   Who Left Message for Patient:Snehal   Does the patient know what this is regarding?:Yes, an appt   Would the patient rather a call back or a response via [facility]? callback  Best Call Back Number:[phone]

## 2024-07-17 ENCOUNTER — PATIENT MESSAGE (OUTPATIENT)
Dept: GASTROENTEROLOGY | Facility: CLINIC | Age: 89
End: 2024-07-17
Payer: MEDICARE

## 2024-07-19 ENCOUNTER — TELEPHONE (OUTPATIENT)
Dept: VASCULAR SURGERY | Facility: CLINIC | Age: 89
End: 2024-07-19
Payer: MEDICARE

## 2024-07-19 NOTE — TELEPHONE ENCOUNTER
Contacted pt in response to message. Pre-op instructions given including time and location of arrival, fasting starting at midnight before procedure, two showers with antibacterial soap, morning medications, updates to visitor policy, etc. Confirmed pt is not taking any GLP-1 agonist medications. Pt repeated instructions to nurse and verbalized understanding.   ----- Message from Deb Geronimo sent at 7/19/2024  2:16 PM CDT -----  Regarding: missed call  Contact: @ [phone]  Pt is returning a missed call from Snehal Perez.. in regards to his upcoming surgery ..Please call and adv @ [phone] they were at the gym

## 2024-07-19 NOTE — TELEPHONE ENCOUNTER
Attempted to contact pt and wife with instructions for upcoming surgery with Dr. Granado. Voice message left for pt requesting return call.

## 2024-07-24 ENCOUNTER — PATIENT OUTREACH (OUTPATIENT)
Dept: ADMINISTRATIVE | Facility: HOSPITAL | Age: 89
End: 2024-07-24
Payer: MEDICARE

## 2024-07-24 NOTE — PROGRESS NOTES
Population Health Chart Review & Patient Outreach Details      Additional [facility] Notes:               Updates Requested / Reviewed:      Updated Care Coordination Note, Care Everywhere, and Immunizations Reconciliation Completed or Queried: [place]         Health Maintenance Topics Overdue:      [facility] Score: 0     Patient is not due for any topics at this time.    Tetanus Vaccine  RSV Vaccine                  Health Maintenance Topic(s) Outreach Outcomes & Actions Taken:    Provider Pt Reattribution - Outreach Outcomes & Actions Taken  : Upcoming Appt with Another Provider Already Scheduled

## 2024-07-30 NOTE — PROGRESS NOTES
Assessment:       1. History of TIA (transient ischemic attack)    2. Cerebral infarction, remote, resolved    3. Sensorineural hearing loss, bilateral    4. Bronchiectasis without complication    5. Bronchiectasis, non-tuberculous    6. AICD (automatic cardioverter/defibrillator) present    7. Chronic combined systolic and diastolic congestive heart failure    8. Coronary artery disease of native artery of native heart with stable angina pectoris    9. S/P AAA (abdominal aortic aneurysm) repair    10. Tovar's esophagus without dysplasia    11. Chronic kidney disease, stage 3a              Plan:             Nelson was seen today for establish care.    Diagnoses and all orders for this visit:    History of TIA (transient ischemic attack)    Cerebral infarction, remote, resolved    Sensorineural hearing loss, bilateral    Bronchiectasis without complication    Bronchiectasis, non-tuberculous    AICD (automatic cardioverter/defibrillator) present    Chronic combined systolic and diastolic congestive heart failure    Coronary artery disease of native artery of native heart with stable angina pectoris    S/P AAA (abdominal aortic aneurysm) repair    Tovar's esophagus without dysplasia    Chronic kidney disease, stage 3a  -     Basic Metabolic Panel; Standing  -     CBC Auto Differential; Standing  -     Hepatic Function Panel; Standing  -     Lipid Panel; Standing  -     Microalbumin/Creatinine Ratio, Urine; Standing  -     PTH, Intact; Standing    Localized swelling of left upper extremity  -     US Upper Extremity Veins Left; Future        I spent at least 40 mins with preparing to see the patient, obtaining and/or revieweing separately obtained history, preforming a examination and evaluation, counseling, communicating with other health care professional, documenting clinical information in the electronic health record,  and/or coordinating care.                 Subjective:       Patient ID: Nelson GIBBONS Parent is a  89 y.o. male.    Chief Complaint:   Establish Care      HPI    #Last visit/Previous Provider  This patient is new to me  Previously seen by Bety Tomlinson MD  Last visit was 4/2024        Link to History         #Interim Hx        #Concerns Today    Est care  Swelling of left UE    Hospital follow up       Admin/Discharge:   Admission Date: 7/3/2024  Hospital Length of Stay: 3 days  Discharge Date and Time: 7/6/2024  6:30 PM    Reason for admission:   Hemoptysis 2/2 pseudomonaas infections / MAC    Brief History:   Cough, s/p bronchoscopy    Pertinent Lab:     Pertinent Imaging;  CT chest     Discharge Plan;       Discharge pertinent meds:  PO ciprofloxacin for 3 weeks  Patient to hold aspirin and Plavix    Labs/Imaging pending at the time of discharged:         Since Discharge:   -- was seen by vascular surgery  -- no fevers chills, sweats,          #Chronic Problems    Patient Active Problem List   Diagnosis    Thrombocytopenia    Chronic kidney disease, stage 3a    AICD (automatic cardioverter/defibrillator) present    Essential hypertension    Pure hypercholesterolemia    Anemia associated with chronic renal failure    Coronary artery disease of native artery with stable angina pectoris    Old myocardial infarction    S/P AAA (abdominal aortic aneurysm) repair    Diverticulosis    Nuclear sclerosis    Vasculogenic erectile dysfunction    Tovar's esophagus without dysplasia    Monoclonal paraproteinemia    Aortic atherosclerosis    Ischemic cardiomyopathy    Seasonal allergic rhinitis    Body mass index (BMI) of 22.0 to 22.9 in adult    Positive ELYSIA (antinuclear antibody)    Abnormal serum protein electrophoresis    Vitamin D deficiency    MGUS (monoclonal gammopathy of unknown significance)    Gastroesophageal reflux disease    History of TIA (transient ischemic attack)    Metabolic bone disease    Abnormal CT of the head    Cerebral infarction, remote, resolved    History of ventricular tachycardia     Chronic combined systolic and diastolic congestive heart failure    Idiopathic thrombocytopenic purpura    Sensorineural hearing loss, bilateral    Bronchiectasis without complication    Mycobacterium avium infection    Nontuberculous mycobacterial disease of lung    Bronchiectasis, non-tuberculous                     Health Maintenance Due   Topic Date Due    Aspirin/Antiplatelet Therapy  Never done    RSV Vaccine (Age 60+ and Pregnant patients) (1 - 1-dose 60+ series) Never done    COVID-19 Vaccine (6 - 2023-24 season) 09/01/2023    TETANUS VACCINE  11/22/2023       Health Maintenance Topics with due status: Not Due       Topic Last Completion Date    Lipid Panel 09/22/2023    Influenza Vaccine 10/24/2023           Tobacco Use: Low Risk  (7/31/2024)    Patient History     Smoking Tobacco Use: Never     Smokeless Tobacco Use: Never     Passive Exposure: Past           No results found for this or any previous visit.        Review of Systems   All other systems reviewed and are negative.        Objective:   /86 (BP Location: Left arm, Patient Position: Sitting, BP Method: Medium (Manual))   Pulse 65   Ht 6' (1.829 m)   Wt 74.7 kg (164 lb 10.9 oz)   SpO2 96%   BMI 22.34 kg/m²         7/31/2024     9:57 AM 7/9/2024     2:44 PM 7/5/2024    11:02 PM 7/5/2024    11:00 AM 7/3/2024     8:13 PM   Vitals   Height 6' (1.829 m) 6' (1.829 m)  6' (1.829 m)    Weight (lbs) 164.68 163 166.45 165 162.26   BMI (kg/m2) 22.3 22.1 22.6 22.4 22            Physical Exam  Vitals and nursing note reviewed.   Constitutional:       General: He is not in acute distress.     Appearance: He is well-developed. He is not ill-appearing, toxic-appearing or diaphoretic.   HENT:      Head: Normocephalic.   Eyes:      Conjunctiva/sclera: Conjunctivae normal.   Cardiovascular:      Rate and Rhythm: Normal rate and regular rhythm.      Heart sounds: Normal heart sounds. No murmur heard.     No friction rub. No gallop.   Pulmonary:       "Effort: Pulmonary effort is normal. No respiratory distress.   Abdominal:      General: There is no distension.      Palpations: Abdomen is soft.   Musculoskeletal:      Comments: LUE swelling forearm    Neurological:      General: No focal deficit present.      Mental Status: He is alert and oriented to person, place, and time.             ASCVD  The ASCVD Risk score (Leodan POWELL, et al., 2019) failed to calculate for the following reasons:    The 2019 ASCVD risk score is only valid for ages 40 to 79    The patient has a prior MI or stroke diagnosis    Basic Labs  BMP  Lab Results   Component Value Date     07/06/2024    K 3.2 (L) 07/06/2024     07/06/2024    CO2 22 (L) 07/06/2024    BUN 20 07/06/2024    CREATININE 1.2 07/06/2024    CALCIUM 8.5 (L) 07/06/2024    ANIONGAP 9 07/06/2024    ESTGFRAFRICA 52.2 (A) 06/30/2022    EGFRNONAA 45.2 (A) 06/30/2022     Lab Results   Component Value Date    EGFRNORACEVR 58 (A) 07/06/2024       Lab Results   Component Value Date    ALT 16 07/06/2024    AST 29 07/06/2024    ALKPHOS 94 07/06/2024    BILITOT 0.7 07/06/2024       Lab Results   Component Value Date    TSH 2.613 09/22/2023       Lab Results   Component Value Date    WBC 9.52 07/06/2024    HGB 11.3 (L) 07/06/2024    HCT 33.0 (L) 07/06/2024    MCV 86 07/06/2024    PLT 86 (L) 07/06/2024           STD  No results found for: "HIV1X2", "GVA62YHJI"  No results found for: "RPR"  Lab Results   Component Value Date    HEPCAB Negative 02/22/2017     No results found for: "LABNGO", "LABCHLA"      Lipids  Lab Results   Component Value Date    CHOL 117 (L) 09/22/2023     Lab Results   Component Value Date    HDL 50 09/22/2023     Lab Results   Component Value Date    LDLCALC 55.2 (L) 09/22/2023     Lab Results   Component Value Date    TRIG 59 09/22/2023     Lab Results   Component Value Date    CHOLHDL 42.7 09/22/2023       DM  Lab Results   Component Value Date    HGBA1C 4.8 10/26/2007       PSA  PSA, Screen (ng/mL) "   Date Value   09/22/2023 1.1             Future Appointments   Date Time Provider Department Center   7/31/2024  1:00 PM Jerry Castro III, MD [facility] IM [place]   8/1/2024  2:00 PM Roselia Bradford MD [facility] PULMO [place] [facility]   8/2/2024  1:00 PM Eliza Rueda MD [facility] ID [address]   8/12/2024 11:00 AM [facility] OIC-CT1 500 LB LIMIT [facility] IC [facility]   8/19/2024 11:30 AM Edis Cespedes MD [facility] ID [facility]   3/3/2025  2:00 PM Claudia Laird NP [facility] FAM MED [place]           Medication List with Changes/Refills   Current Medications    AMLODIPINE (NORVASC) 10 MG TABLET    TAKE 1 TABLET EVERY DAY    AZELASTINE (ASTELIN) 137 MCG (0.1 %) NASAL SPRAY    1 spray (137 mcg total) by Nasal route 2 (two) times daily.    BETA-CAROTENE,A,-VITS C,E/MINS (OCUVITE ORAL)    Take by mouth.    FAMOTIDINE (PEPCID) 20 MG TABLET    Take 20 mg by mouth before dinner.    FLUTICASONE PROPIONATE (FLONASE) 50 MCG/ACTUATION NASAL SPRAY    1 spray (50 mcg total) by Each Nostril route once daily.    FUROSEMIDE (LASIX) 20 MG TABLET    Take 1 tablet (20 mg total) by mouth 2 (two) times daily. Take one tablet every other day orbas directed    METOPROLOL SUCCINATE (TOPROL-XL) 25 MG 24 HR TABLET    TAKE 1 TABLET TWICE DAILY    MULTIVITAMIN WITH MINERALS TABLET    Take 1 tablet by mouth once daily.    NEBULIZER AND COMPRESSOR DEEPTHI    1 Device by Misc.(Non-Drug; Combo Route) route 2 (two) times a day.    NIACIN 500 MG TABLET    Take 500 mg by mouth Every PM.    NITROGLYCERIN (NITROSTAT) 0.4 MG SL TABLET    Take 1 tab under the tongue for chest pain. May repeat every 5 minutes for a total of 3 doses. If chest pain not relieved go to the ED.    OMEGA-3 FATTY ACIDS-VITAMIN E 1,000 MG CAP    Take 1 capsule by mouth 2 (two) times daily.    POTASSIUM CHLORIDE SA (K-DUR,KLOR-CON M) 10 MEQ TABLET    Take 1 tablet (10 mEq total) by mouth once daily.    PRAVASTATIN (PRAVACHOL) 40 MG TABLET    TAKE 1 TABLET EVERY EVENING     SACUBITRIL-VALSARTAN (ENTRESTO)  MG PER TABLET    Take 1 tablet by mouth 2 (two) times daily.    SODIUM CHLORIDE 3% 3 % NEBULIZER SOLUTION    Take 4 mLs by nebulization 2 (two) times a day.    SOTALOL (BETAPACE) 120 MG TAB    Take 1 tablet (120 mg total) by mouth 2 (two) times daily.    UBIDECARENONE (COENZYME Q10 ORAL)    Take 1 tablet by mouth once daily.    VITAMIN D 1000 UNITS TAB    Take 1,000 Units by mouth every Tues, Thurs, Sat.       Disclaimer:  This note has been generated using voice-recognition software. There may be grammatical or spelling errors that have been missed during proof-reading

## 2024-07-31 ENCOUNTER — OFFICE VISIT (OUTPATIENT)
Dept: INTERNAL MEDICINE | Facility: CLINIC | Age: 89
End: 2024-07-31
Payer: MEDICARE

## 2024-07-31 VITALS
SYSTOLIC BLOOD PRESSURE: 120 MMHG | OXYGEN SATURATION: 96 % | HEART RATE: 65 BPM | HEIGHT: 72 IN | DIASTOLIC BLOOD PRESSURE: 86 MMHG | WEIGHT: 164.69 LBS | BODY MASS INDEX: 22.31 KG/M2

## 2024-07-31 DIAGNOSIS — I25.118 CORONARY ARTERY DISEASE OF NATIVE ARTERY OF NATIVE HEART WITH STABLE ANGINA PECTORIS: ICD-10-CM

## 2024-07-31 DIAGNOSIS — D69.3 IDIOPATHIC THROMBOCYTOPENIC PURPURA: ICD-10-CM

## 2024-07-31 DIAGNOSIS — N18.31 CHRONIC KIDNEY DISEASE, STAGE 3A: ICD-10-CM

## 2024-07-31 DIAGNOSIS — H90.3 SENSORINEURAL HEARING LOSS, BILATERAL: ICD-10-CM

## 2024-07-31 DIAGNOSIS — Z86.73 HISTORY OF TIA (TRANSIENT ISCHEMIC ATTACK): Primary | Chronic | ICD-10-CM

## 2024-07-31 DIAGNOSIS — Z95.810 AICD (AUTOMATIC CARDIOVERTER/DEFIBRILLATOR) PRESENT: Chronic | ICD-10-CM

## 2024-07-31 DIAGNOSIS — I50.42 CHRONIC COMBINED SYSTOLIC AND DIASTOLIC CONGESTIVE HEART FAILURE: ICD-10-CM

## 2024-07-31 DIAGNOSIS — J47.9 BRONCHIECTASIS, NON-TUBERCULOUS: ICD-10-CM

## 2024-07-31 DIAGNOSIS — Z86.73 CEREBRAL INFARCTION, REMOTE, RESOLVED: ICD-10-CM

## 2024-07-31 DIAGNOSIS — R22.32 LOCALIZED SWELLING OF LEFT UPPER EXTREMITY: ICD-10-CM

## 2024-07-31 DIAGNOSIS — J47.9 BRONCHIECTASIS WITHOUT COMPLICATION: ICD-10-CM

## 2024-07-31 DIAGNOSIS — K22.70 BARRETT'S ESOPHAGUS WITHOUT DYSPLASIA: ICD-10-CM

## 2024-07-31 DIAGNOSIS — Z98.890 S/P AAA (ABDOMINAL AORTIC ANEURYSM) REPAIR: ICD-10-CM

## 2024-07-31 DIAGNOSIS — Z86.79 S/P AAA (ABDOMINAL AORTIC ANEURYSM) REPAIR: ICD-10-CM

## 2024-07-31 DIAGNOSIS — D69.6 THROMBOCYTOPENIA: ICD-10-CM

## 2024-07-31 PROBLEM — R05.3 CHRONIC COUGH: Status: RESOLVED | Noted: 2023-12-15 | Resolved: 2024-07-31

## 2024-07-31 PROBLEM — R04.2 HEMOPTYSIS: Status: RESOLVED | Noted: 2023-12-15 | Resolved: 2024-07-31

## 2024-07-31 PROBLEM — R06.09 DOE (DYSPNEA ON EXERTION): Status: RESOLVED | Noted: 2024-05-09 | Resolved: 2024-07-31

## 2024-07-31 PROBLEM — J18.9 PNEUMONIA: Status: RESOLVED | Noted: 2024-07-04 | Resolved: 2024-07-31

## 2024-07-31 PROBLEM — E83.52 HYPERCALCEMIA: Status: RESOLVED | Noted: 2018-07-05 | Resolved: 2024-07-31

## 2024-07-31 PROCEDURE — G2211 COMPLEX E/M VISIT ADD ON: HCPCS | Mod: HCNC,S$GLB,, | Performed by: INTERNAL MEDICINE

## 2024-07-31 PROCEDURE — 1126F AMNT PAIN NOTED NONE PRSNT: CPT | Mod: HCNC,CPTII,S$GLB, | Performed by: INTERNAL MEDICINE

## 2024-07-31 PROCEDURE — 99215 OFFICE O/P EST HI 40 MIN: CPT | Mod: HCNC,S$GLB,, | Performed by: INTERNAL MEDICINE

## 2024-07-31 PROCEDURE — 1160F RVW MEDS BY RX/DR IN RCRD: CPT | Mod: HCNC,CPTII,S$GLB, | Performed by: INTERNAL MEDICINE

## 2024-07-31 PROCEDURE — 99999 PR PBB SHADOW E&M-EST. PATIENT-LVL V: CPT | Mod: PBBFAC,HCNC,, | Performed by: INTERNAL MEDICINE

## 2024-07-31 PROCEDURE — 1123F ACP DISCUSS/DSCN MKR DOCD: CPT | Mod: HCNC,CPTII,S$GLB, | Performed by: INTERNAL MEDICINE

## 2024-07-31 PROCEDURE — 1159F MED LIST DOCD IN RCRD: CPT | Mod: HCNC,CPTII,S$GLB, | Performed by: INTERNAL MEDICINE

## 2024-07-31 PROCEDURE — 1101F PT FALLS ASSESS-DOCD LE1/YR: CPT | Mod: HCNC,CPTII,S$GLB, | Performed by: INTERNAL MEDICINE

## 2024-07-31 PROCEDURE — 3288F FALL RISK ASSESSMENT DOCD: CPT | Mod: HCNC,CPTII,S$GLB, | Performed by: INTERNAL MEDICINE

## 2024-07-31 PROCEDURE — 1111F DSCHRG MED/CURRENT MED MERGE: CPT | Mod: HCNC,CPTII,S$GLB, | Performed by: INTERNAL MEDICINE

## 2024-07-31 NOTE — PROGRESS NOTES
Advance Care Planning     Date: 07/31/2024    ACP Reviewed/No Changes  Voluntary advance care planning discussion had today with patient and spouse. Previously completed HCPOA and Legal Will in electronic medical record is current, no changes made.      A total of 1 min was spent on advance care planning, goals of care discussion, emotional support, formulating and communicating prognosis and exploring burden/benefit of various approaches of treatment. This discussion occurred on a fully voluntary basis with the verbal consent of the patient and/or family.

## 2024-08-01 ENCOUNTER — OFFICE VISIT (OUTPATIENT)
Dept: PULMONOLOGY | Facility: CLINIC | Age: 89
End: 2024-08-01
Payer: MEDICARE

## 2024-08-01 VITALS
SYSTOLIC BLOOD PRESSURE: 115 MMHG | BODY MASS INDEX: 22.08 KG/M2 | DIASTOLIC BLOOD PRESSURE: 67 MMHG | RESPIRATION RATE: 17 BRPM | HEIGHT: 72 IN | OXYGEN SATURATION: 98 % | WEIGHT: 163 LBS | HEART RATE: 67 BPM

## 2024-08-01 DIAGNOSIS — J47.9 BRONCHIECTASIS WITHOUT COMPLICATION: Primary | ICD-10-CM

## 2024-08-01 PROCEDURE — 1157F ADVNC CARE PLAN IN RCRD: CPT | Mod: HCNC,CPTII,S$GLB, | Performed by: INTERNAL MEDICINE

## 2024-08-01 PROCEDURE — 1111F DSCHRG MED/CURRENT MED MERGE: CPT | Mod: HCNC,CPTII,S$GLB, | Performed by: INTERNAL MEDICINE

## 2024-08-01 PROCEDURE — 1126F AMNT PAIN NOTED NONE PRSNT: CPT | Mod: HCNC,CPTII,S$GLB, | Performed by: INTERNAL MEDICINE

## 2024-08-01 PROCEDURE — 99213 OFFICE O/P EST LOW 20 MIN: CPT | Mod: HCNC,S$GLB,, | Performed by: INTERNAL MEDICINE

## 2024-08-01 PROCEDURE — 99999 PR PBB SHADOW E&M-EST. PATIENT-LVL III: CPT | Mod: PBBFAC,HCNC,, | Performed by: INTERNAL MEDICINE

## 2024-08-02 ENCOUNTER — OFFICE VISIT (OUTPATIENT)
Dept: INFECTIOUS DISEASES | Facility: CLINIC | Age: 89
End: 2024-08-02
Payer: MEDICARE

## 2024-08-02 VITALS
TEMPERATURE: 98 F | WEIGHT: 165.13 LBS | SYSTOLIC BLOOD PRESSURE: 128 MMHG | DIASTOLIC BLOOD PRESSURE: 63 MMHG | HEIGHT: 72 IN | BODY MASS INDEX: 22.37 KG/M2 | HEART RATE: 70 BPM

## 2024-08-02 DIAGNOSIS — A31.0 NONTUBERCULOUS MYCOBACTERIAL DISEASE OF LUNG: ICD-10-CM

## 2024-08-02 PROCEDURE — 99999 PR PBB SHADOW E&M-EST. PATIENT-LVL V: CPT | Mod: PBBFAC,HCNC,, | Performed by: INTERNAL MEDICINE

## 2024-08-02 NOTE — PROGRESS NOTES
INFECTIOUS DISEASE CLINIC  8/2/24     Subjective:      Chief Complaint:   Chief Complaint   Patient presents with    Follow-up       History of Present Illness:    Patient Nelson GIBBONS Parent is a 89 y.o. male who presents today for 4 month f/u of +AFB cultures.     Since last visit, had hospital admit for hemoptysis  Underwent bronch/BAL on 7/5- resp cultures sent and grew pseudomonas, but no AFB cultures. Seen by [facility] ID, d/c'd on po cipro, which he completed.     Cough very little now  No further hemoptysis  Reports has had a hard time getting any sputum to come up for cultures  Preference is to hold off on antibiotics for NTM     Undergoing work up for AAA, has angiogram scheduled          Component 4 wk ago   Respiratory Culture  Abnormal   PSEUDOMONAS AERUGINOSA  Moderate  Normal respiratory anel also present    Gram Stain (Respiratory) <10 epithelial cells per low power field.   Gram Stain (Respiratory) Rare WBC's   Gram Stain (Respiratory) Moderate Gram negative rods   Gram Stain (Respiratory) Rare Gram positive cocci   Resulting Agency [facility]        Susceptibility     Pseudomonas aeruginosa     CULTURE, RESPIRATORY     Cefepime <=2 mcg/mL Sensitive     Ciprofloxacin <=1 mcg/mL Sensitive     Levofloxacin <=2 mcg/mL Sensitive     Meropenem <=1 mcg/mL Sensitive     Piperacillin/Tazo <=16 mcg/mL Sensitive                   Dropped off AFB sputum cultures  6/7- M.abscessus, macrolide intermediate  4/17- no growth  3/4- M. Abscessus  2/8- MAC  1/2- MAc  12/18- M.abscessus           Reluctant to start antibiotics because he's feeling well and nervous about side effects    Never smoker  H/o kendall's esophagus  Sleeps in chair  exercise at gym regularly        [employer], [employer]    [employer] travel          Review of Symptoms:  Constitutional: Denies fevers, chills, or weakness.  ENT: Denies dysphagia, nasal discharge, ear pain or discharge.  Cardiovascular: Denies chest pain, palpitations, orthopnea, or  claudication.  Respiratory: Denies shortness of breath, cough, hemoptysis, or wheezing.  GI: Denies nausea/vomitting, hematochezia, melena, abd pain, or changes in appetite.  : Denies dysuria, incontinence, or hematuria.  Musculoskeletal: Denies joint pain or myalgias.  Skin/breast: Denies rashes, lumps, lesions, or discharge.  Neurologic: Denies headache, dizziness, vertigo, or paresthesias.    Past Medical History:   Diagnosis Date    AICD (automatic cardioverter/defibrillator) present 7/17/2012    Cardiomyopathy     CKD (chronic kidney disease) stage 3, GFR 30-59 ml/min 7/17/2012    Coronary artery disease     GERD (gastroesophageal reflux disease)     Tovar's; Dr. Vu    Hyperlipidemia 7/17/2012    Hypertension 7/17/2012    Ischemic cardiomyopathy 7/17/2012    MGUS (monoclonal gammopathy of unknown significance)     Thrombocytopenia 7/17/2012    Ventricular tachycardia     VT (ventricular tachycardia) 7/17/2012       Past Surgical History:   Procedure Laterality Date    ABDOMINAL AORTIC ANEURYSM REPAIR      CARDIAC DEFIBRILLATOR PLACEMENT      CATARACT EXTRACTION, BILATERAL  2018    CORONARY ANGIOPLASTY      EYE SURGERY Bilateral     cataract    HERNIA REPAIR      x2    REPLACEMENT OF IMPLANTABLE CARDIOVERTER-DEFIBRILLATOR (ICD) GENERATOR Left 03/18/2022    Procedure: REPLACEMENT, ICD GENERATOR;  Surgeon: Felipe Woodard MD;  Location: [facility] EP LAB;  Service: Cardiology;  Laterality: Left;  SEFERINO, ICD gen chg, SJM, elyssa, MB, 3prep*ANUJ ICD insitu*        Family History   Problem Relation Name Age of Onset    Cancer Mother Rebecca         Lymphoma    Lymphoma Mother Rebecca     Coronary artery disease Mother Rebecca     Heart disease Mother Rebecca     Heart disease Father      Heart attacks under age 50 Father      Heart disease Sister      Heart disease Brother Hernando     Cancer Brother Hernando         lymphoma    Colon cancer Neg Hx      Prostate cancer Neg Hx         Social History     Socioeconomic  History    Marital status:    Tobacco Use    Smoking status: Never     Passive exposure: Past    Smokeless tobacco: Never   Substance and Sexual Activity    Alcohol use: No     Comment: none    Drug use: No    Sexual activity: Yes     Partners: Female     Birth control/protection: None     Comment:    Social History Narrative    Patient is originally from [facility] in [place] since 60     Graduated [employer]     College/university [employer]          background    Working [employer], retired now         0 Children     Lives with wife         Diet/Exercise ;         Exercise        Eating    B     L    D    Snacks    Beverages    Fast:food             Social Determinants of Health     Financial Resource Strain: Low Risk  (7/5/2024)    Overall Financial Resource Strain (CARDIA)     Difficulty of Paying Living Expenses: Not hard at all   Food Insecurity: No Food Insecurity (7/5/2024)    Hunger Vital Sign     Worried About Running Out of Food in the Last Year: Never true     Ran Out of Food in the Last Year: Never true   Transportation Needs: No Transportation Needs (7/5/2024)    TRANSPORTATION NEEDS     Transportation : No   Physical Activity: Insufficiently Active (7/5/2024)    Exercise Vital Sign     Days of Exercise per Week: 3 days     Minutes of Exercise per Session: 30 min   Stress: No Stress Concern Present (7/5/2024)    [employer] - Occupational Stress Questionnaire     Feeling of Stress : Not at all   Housing Stability: Low Risk  (7/5/2024)    Housing Stability Vital Sign     Unable to Pay for Housing in the Last Year: No     Homeless in the Last Year: No       Review of patient's allergies indicates:   Allergen Reactions    Fluorescein-benoxinate Other (See Comments)    Pcn  [penicillins]      Other reaction(s): Unknown    Tropicamide Other (See Comments)    Clindamycin Rash         Objective:   VS (24h):   Vitals:    08/02/24  1248   BP: 128/63   Pulse: 70   Temp: 97.5 °F (36.4 °C)     [unfilled]  General: Afebrile, alert, comfortable, no acute distress.   HEENT: DIANA. EOMI, no scleral icterus.  Pulmonary: Non labored,clear to auscultation A/P/L. Crackles at LLL  Cardiac: normal S1 & S2 w/o rubs/murmurs/gallops.   Abdominal: Non-tender, non-distended.  Extremities: Moves all extremities x 4.   Skin: No jaundice, rashes, or visible lesions.   Neurological:  Alert and oriented x 4.     Labs:    Glucose   Date Value Ref Range Status   07/06/2024 115 (H) 70 - 110 mg/dL Final   07/05/2024 93 70 - 110 mg/dL Final   07/04/2024 89 70 - 110 mg/dL Final       Calcium   Date Value Ref Range Status   07/06/2024 8.5 (L) 8.7 - 10.5 mg/dL Final   07/05/2024 8.9 8.7 - 10.5 mg/dL Final   07/04/2024 9.6 8.7 - 10.5 mg/dL Final       Albumin   Date Value Ref Range Status   07/06/2024 3.0 (L) 3.5 - 5.2 g/dL Final   07/05/2024 3.1 (L) 3.5 - 5.2 g/dL Final   07/04/2024 3.4 (L) 3.5 - 5.2 g/dL Final       Total Protein   Date Value Ref Range Status   07/06/2024 5.9 (L) 6.0 - 8.4 g/dL Final   07/05/2024 6.3 6.0 - 8.4 g/dL Final   07/04/2024 6.6 6.0 - 8.4 g/dL Final       Sodium   Date Value Ref Range Status   07/06/2024 140 136 - 145 mmol/L Final   07/05/2024 138 136 - 145 mmol/L Final   07/04/2024 140 136 - 145 mmol/L Final       Potassium   Date Value Ref Range Status   07/06/2024 3.2 (L) 3.5 - 5.1 mmol/L Final   07/05/2024 3.7 3.5 - 5.1 mmol/L Final   07/04/2024 4.4 3.5 - 5.1 mmol/L Final       CO2   Date Value Ref Range Status   07/06/2024 22 (L) 23 - 29 mmol/L Final   07/05/2024 23 23 - 29 mmol/L Final   07/04/2024 23 23 - 29 mmol/L Final       Chloride   Date Value Ref Range Status   07/06/2024 109 95 - 110 mmol/L Final   07/05/2024 106 95 - 110 mmol/L Final   07/04/2024 107 95 - 110 mmol/L Final       BUN   Date Value Ref Range Status   07/06/2024 20 8 - 23 mg/dL Final   07/05/2024 19 8 - 23 mg/dL Final   07/04/2024 18 8 - 23 mg/dL Final        Creatinine   Date Value Ref Range Status   07/06/2024 1.2 0.5 - 1.4 mg/dL Final   07/05/2024 1.3 0.5 - 1.4 mg/dL Final   07/04/2024 1.3 0.5 - 1.4 mg/dL Final       Alkaline Phosphatase   Date Value Ref Range Status   07/06/2024 94 55 - 135 U/L Final   07/05/2024 106 55 - 135 U/L Final   07/04/2024 111 55 - 135 U/L Final       ALT   Date Value Ref Range Status   07/06/2024 16 10 - 44 U/L Final   07/05/2024 18 10 - 44 U/L Final   07/04/2024 12 10 - 44 U/L Final       AST   Date Value Ref Range Status   07/06/2024 29 10 - 40 U/L Final   07/05/2024 24 10 - 40 U/L Final   07/04/2024 20 10 - 40 U/L Final       Total Bilirubin   Date Value Ref Range Status   07/06/2024 0.7 0.1 - 1.0 mg/dL Final     Comment:     For infants and newborns, interpretation of results should be based  on gestational age, weight and in agreement with clinical  observations.    Premature Infant recommended reference ranges:  Up to 24 hours.............<8.0 mg/dL  Up to 48 hours............<12.0 mg/dL  3-5 days..................<15.0 mg/dL  6-29 days.................<15.0 mg/dL     07/05/2024 0.8 0.1 - 1.0 mg/dL Final     Comment:     For infants and newborns, interpretation of results should be based  on gestational age, weight and in agreement with clinical  observations.    Premature Infant recommended reference ranges:  Up to 24 hours.............<8.0 mg/dL  Up to 48 hours............<12.0 mg/dL  3-5 days..................<15.0 mg/dL  6-29 days.................<15.0 mg/dL     07/04/2024 0.7 0.1 - 1.0 mg/dL Final     Comment:     For infants and newborns, interpretation of results should be based  on gestational age, weight and in agreement with clinical  observations.    Premature Infant recommended reference ranges:  Up to 24 hours.............<8.0 mg/dL  Up to 48 hours............<12.0 mg/dL  3-5 days..................<15.0 mg/dL  6-29 days.................<15.0 mg/dL         WBC   Date Value Ref Range Status   07/06/2024 9.52 3.90 -  "12.70 K/uL Final   07/05/2024 9.53 3.90 - 12.70 K/uL Final   07/04/2024 9.49 3.90 - 12.70 K/uL Final       Hemoglobin   Date Value Ref Range Status   07/06/2024 11.3 (L) 14.0 - 18.0 g/dL Final   07/05/2024 11.9 (L) 14.0 - 18.0 g/dL Final   07/04/2024 12.4 (L) 14.0 - 18.0 g/dL Final       Hematocrit   Date Value Ref Range Status   07/06/2024 33.0 (L) 40.0 - 54.0 % Final   07/05/2024 35.2 (L) 40.0 - 54.0 % Final   07/04/2024 37.2 (L) 40.0 - 54.0 % Final       MCV   Date Value Ref Range Status   07/06/2024 86 82 - 98 fL Final   07/05/2024 87 82 - 98 fL Final   07/04/2024 88 82 - 98 fL Final       Platelets   Date Value Ref Range Status   07/06/2024 86 (L) 150 - 450 K/uL Final   07/05/2024 93 (L) 150 - 450 K/uL Final   07/04/2024 100 (L) 150 - 450 K/uL Final       Lab Results   Component Value Date    CHOL 117 (L) 09/22/2023    CHOL 104 (L) 01/18/2023    CHOL 107 (L) 06/30/2022       Lab Results   Component Value Date    HDL 50 09/22/2023    HDL 48 01/18/2023    HDL 43 06/30/2022       Lab Results   Component Value Date    LDLCALC 55.2 (L) 09/22/2023    LDLCALC 45.8 (L) 01/18/2023    LDLCALC 53.0 (L) 06/30/2022       Lab Results   Component Value Date    TRIG 59 09/22/2023    TRIG 51 01/18/2023    TRIG 55 06/30/2022       Lab Results   Component Value Date    CHOLHDL 42.7 09/22/2023    CHOLHDL 46.2 01/18/2023    CHOLHDL 40.2 06/30/2022     No results found for: "RPR"  No results found for: "QUANTIFERON"    Medications:  Current Outpatient Medications on File Prior to Visit   Medication Sig Dispense Refill    amLODIPine (NORVASC) 10 MG tablet TAKE 1 TABLET EVERY DAY (Patient taking differently: Take 10 mg by mouth once daily.) 90 tablet 3    azelastine (ASTELIN) 137 mcg (0.1 %) nasal spray 1 spray (137 mcg total) by Nasal route 2 (two) times daily. 30 mL 11    beta-carotene,A,-vits C,E/mins (OCUVITE ORAL) Take by mouth.      famotidine (PEPCID) 20 MG tablet Take 20 mg by mouth before dinner.      fluticasone propionate " (FLONASE) 50 mcg/actuation nasal spray 1 spray (50 mcg total) by Each Nostril route once daily. 16 g 11    furosemide (LASIX) 20 MG tablet Take 1 tablet (20 mg total) by mouth 2 (two) times daily. Take one tablet every other day orbas directed 30 tablet 11    metoprolol succinate (TOPROL-XL) 25 MG 24 hr tablet TAKE 1 TABLET TWICE DAILY (Patient taking differently: Take 25 mg by mouth 2 (two) times daily.) 180 tablet 3    multivitamin with minerals tablet Take 1 tablet by mouth once daily.      nebulizer and compressor Archana 1 Device by Misc.(Non-Drug; Combo Route) route 2 (two) times a day. 1 each 0    niacin 500 MG tablet Take 500 mg by mouth Every PM.      nitroGLYCERIN (NITROSTAT) 0.4 MG SL tablet Take 1 tab under the tongue for chest pain. May repeat every 5 minutes for a total of 3 doses. If chest pain not relieved go to the ED. (Patient taking differently: Place 0.4 mg under the tongue every 5 (five) minutes as needed for Chest pain.  If chest pain not relieved go to the ED.) 25 tablet 4    omega-3 fatty acids-vitamin E 1,000 mg Cap Take 1 capsule by mouth 2 (two) times daily.      potassium chloride SA (K-DUR,KLOR-CON M) 10 MEQ tablet Take 1 tablet (10 mEq total) by mouth once daily. 30 tablet 11    pravastatin (PRAVACHOL) 40 MG tablet TAKE 1 TABLET EVERY EVENING (Patient taking differently: Take 40 mg by mouth every evening.) 90 tablet 1    sacubitriL-valsartan (ENTRESTO)  mg per tablet Take 1 tablet by mouth 2 (two) times daily. 60 tablet 11    sodium chloride 3% 3 % nebulizer solution Take 4 mLs by nebulization 2 (two) times a day. 240 mL 5    sotaloL (BETAPACE) 120 MG Tab Take 1 tablet (120 mg total) by mouth 2 (two) times daily. 180 tablet 3    ubidecarenone (COENZYME Q10 ORAL) Take 1 tablet by mouth once daily.      vitamin D 1000 units Tab Take 1,000 Units by mouth every Tues, Thurs, Sat.       Current Facility-Administered Medications on File Prior to Visit   Medication Dose Route Frequency  "Provider Last Rate Last Admin    sodium chloride 0.9% bolus 1,000 mL  1,000 mL Intravenous Once Violette Delgado NP        vancomycin in dextrose 5 % 1 gram/250 mL IVPB 1,000 mg  1,000 mg Intravenous On Call Procedure Violette Delgado NP   1,000 mg at 03/18/22 1232       Antibiotics:   Antibiotics (From admission, onward)      None            HIV: No components found for: "HIV 1/2 AG/AB"  Hepatitis C IgG: No components found for: "HEPATITIS C"  Syphilis: No results found for: "RPR"    Hepatitis A IgG: No components found for: "HEPATITIS A IGG"  Hepatitis Bc IgG: No components found for: "HEPATITIS B CORE IGG"  Hepatitis Bs IgG:  Quantiferon: No results found for: "QUANTIFERON"  VZV IgG: No components found for: "VARICELLA IGG"    No components found for: "SEDIMENTATION RATE"  No components found for: "C-REACTIVE PROTEIN"      Microbiology x 7d:   Microbiology Results (last 7 days)       ** No results found for the last 168 hours. **            Immunization History   Administered Date(s) Administered    COVID-19, MRNA, LN-S, PF (Pfizer) (Gray Cap) 07/28/2022    COVID-19, MRNA, LN-S, PF (Pfizer) (Purple Cap) 01/14/2021, 02/06/2021, 10/14/2021, 07/28/2022    Influenza 10/18/2007, 10/28/2008, 10/14/2009, 10/28/2010, 10/18/2011, 10/25/2012, 10/08/2018    Influenza (FLUAD) - Quadrivalent - Adjuvanted - PF *Preferred* (65+) 10/15/2020, 10/16/2021, 09/24/2022, 10/24/2023    Influenza - High Dose - PF (65 years and older) 10/08/2013, 10/09/2014, 11/03/2015, 10/25/2016, 10/10/2017, 10/08/2018, 10/29/2019    Influenza - Quadrivalent - PF *Preferred* (6 months and older) 10/25/2012    Influenza - Trivalent (ADULT) 10/18/2007, 10/28/2008, 10/14/2009, 10/28/2010, 10/18/2011, 10/25/2012    Influenza Split 10/25/2012    Pneumococcal Conjugate - 13 Valent 02/01/2017, 02/24/2017    Pneumococcal Polysaccharide - 23 Valent 08/23/2012    Td (ADULT) 10/18/2007    Tdap 11/22/2013         Reviewed records today as well as relevant labs, " cultures, and imaging    Assessment:       MAC +resp culture 1/2/24, 2/8/24  M.abscessus + resp culture 12/18/23, 3/4/24, 6/7/24  Non-cavitary    Symptoms improved with pseudomonas treatment (again). Has had several positive sputum cultures for NTM, but not growing same organism consistently and symptoms mild and no radiographic progression of infection. Pt prefers watchful waiting to starting antibiotics for NTM    Notably with h/o AAA, would avoid quinolone use in future 2/2 potential risk of worsening of aneurysm    No toxicities from antimicrobials  Extremely medically complex  Patient is high risk for infectious complications given drug resistant infections    Plan:     Agree with watchful waiting, assuming the NTM infection was not the reason for hemoptysis event, which it doesn't sound like it was (but will send message to pulmonary to clarify)    Continue afb sputum cultures surveillance    Repeat ct 6 months    Discussed importance of airway clearance. Encouraged use of Aerobika and nebulized hypertonic saline twice a day.     Prevent reflux- avoid stomach sleeping. Sleep inclined. Famotidine/tums preferrable to PPI if needed. Stop liquids 3hr before bedtime.     Pt to meet with GI re: h/o barett's esophagus, which may be risk factor for ntm infection    Encourage probiotics      The total time for evaluation and management services performed on 8/2/24 was greater than 41 minutes.  This includes face to face time and non-face to face time preparing to see the patient (eg, review of tests), obtaining and/or reviewing separately obtained history, documenting clinical information in the electronic or other health record, independently interpreting results, and communicating results to the patient/family/caregiver, or care coordination.             Eliza Rueda MD, MPH  Infectious Disease

## 2024-08-08 ENCOUNTER — HOSPITAL ENCOUNTER (OUTPATIENT)
Dept: RADIOLOGY | Facility: HOSPITAL | Age: 89
Discharge: HOME | End: 2024-08-08
Attending: INTERNAL MEDICINE
Payer: MEDICARE

## 2024-08-08 DIAGNOSIS — R22.32 LOCALIZED SWELLING OF LEFT UPPER EXTREMITY: ICD-10-CM

## 2024-08-08 PROCEDURE — 93971 EXTREMITY STUDY: CPT | Mod: TC,HCNC,LT

## 2024-08-08 PROCEDURE — 93971 EXTREMITY STUDY: CPT | Mod: 26,HCNC,LT, | Performed by: INTERNAL MEDICINE

## 2024-08-09 NOTE — PROGRESS NOTES
Your results look fine and do not require any change in treatment. 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    Please contact me if you have any additional concerns.    Sincerely,    Shiraz Cartagena Fair

## 2024-08-12 ENCOUNTER — TELEPHONE (OUTPATIENT)
Dept: VASCULAR SURGERY | Facility: CLINIC | Age: 89
End: 2024-08-12
Payer: MEDICARE

## 2024-08-12 NOTE — TELEPHONE ENCOUNTER
Contacted patient to reschedule angiogram with Dr. Granado on Wednesday 8/14/24 due to change in surgeon's schedule. Offered Wednesday 8/21/24, pt accepted. Reviewed pre-op instructions, pt verbalized understanding.

## 2024-08-19 ENCOUNTER — LAB VISIT (OUTPATIENT)
Dept: LAB | Facility: HOSPITAL | Age: 89
End: 2024-08-19
Attending: INTERNAL MEDICINE
Payer: MEDICARE

## 2024-08-19 ENCOUNTER — PATIENT MESSAGE (OUTPATIENT)
Dept: INTERNAL MEDICINE | Facility: CLINIC | Age: 89
End: 2024-08-19
Payer: MEDICARE

## 2024-08-19 DIAGNOSIS — J15.1 PNEUMONIA OF BOTH LUNGS DUE TO PSEUDOMONAS SPECIES, UNSPECIFIED PART OF LUNG: ICD-10-CM

## 2024-08-19 PROCEDURE — 87206 SMEAR FLUORESCENT/ACID STAI: CPT | Mod: HCNC | Performed by: INTERNAL MEDICINE

## 2024-08-19 PROCEDURE — 87116 MYCOBACTERIA CULTURE: CPT | Mod: HCNC | Performed by: INTERNAL MEDICINE

## 2024-08-19 PROCEDURE — 87015 SPECIMEN INFECT AGNT CONCNTJ: CPT | Mod: HCNC | Performed by: INTERNAL MEDICINE

## 2024-08-19 NOTE — PRE-PROCEDURE INSTRUCTIONS
PREOP INSTRUCTIONS:  No food,milk or milk products for 8 hours before surgery.  Clear liquids like water,gatorade,apple juice are allowed up until 2 hours before surgery.  Instructed to follow the surgeon's instructions if they differ from these.  Shower instructions as well as directions to the Surgery Center were given.  Encouraged to wear loose fitting,comfortable clothing.  Medication instructions for pm prior to and am of procedure reviewed.  Instructed to avoid taking vitamins,supplements,aspirin and ibuprofen the morning of surgery.    PATIENT HAS AN ABBOTT AICD.MESSAGE LEFT FOR THE [facility] [address] AT 2:05 PM ON MONDAY 8/19/2024    Patient denies any side effects or issues with anesthesia or sedation.

## 2024-08-20 ENCOUNTER — TELEPHONE (OUTPATIENT)
Dept: VASCULAR SURGERY | Facility: CLINIC | Age: 89
End: 2024-08-20
Payer: MEDICARE

## 2024-08-20 ENCOUNTER — ANESTHESIA EVENT (OUTPATIENT)
Dept: SURGERY | Facility: HOSPITAL | Age: 89
End: 2024-08-20
Payer: MEDICARE

## 2024-08-20 RX ORDER — OXYCODONE HYDROCHLORIDE 5 MG/1
5 TABLET ORAL
OUTPATIENT
Start: 2024-08-20

## 2024-08-20 RX ORDER — SODIUM CHLORIDE 0.9 % (FLUSH) 0.9 %
10 SYRINGE (ML) INJECTION
OUTPATIENT
Start: 2024-08-20

## 2024-08-20 RX ORDER — PROCHLORPERAZINE EDISYLATE 5 MG/ML
5 INJECTION INTRAMUSCULAR; INTRAVENOUS EVERY 30 MIN PRN
OUTPATIENT
Start: 2024-08-20

## 2024-08-20 RX ORDER — GLUCAGON 1 MG
1 KIT INJECTION
OUTPATIENT
Start: 2024-08-20

## 2024-08-20 NOTE — ANESTHESIA PREPROCEDURE EVALUATION
[facility]  Anesthesia Pre-Operative Evaluation         Patient Name: Nelson GIBBONS Parent  YOB: 1935  MRN: [id number]    SUBJECTIVE:     Pre-operative evaluation for Procedure(s) (LRB):  AORTOGRAM & poss. coil embolization (N/A)     08/20/2024    Nelson GIBBONS Parent is a 89 y.o. male w/ a significant PMHx of HTN, HLD, CAD s/p PCIs on DAPT, prior MI, CHF, ischemic cardiomyopathy s/p AICD placement, MGUS, GERD, CKD 3a, anemia, aortic atherosclerosis, bronchiectasis, TIA.    Patient now presents for the above procedure(s).    Results for orders placed during the hospital encounter of 07/03/24    Echo    Interpretation Summary    Left Ventricle: The left ventricle is mildly dilated. Normal wall thickness. Regional wall motion abnormalities present. See diagram for wall motion findings. There is mildly reduced systolic function with a visually estimated ejection fraction of 45 - 50%.    Right Ventricle: Normal right ventricular cavity size. Wall thickness is normal. Systolic function is normal. Pacemaker lead present in the ventricle.    Aortic Valve: There is mild aortic valve sclerosis.    Pulmonary Artery: The estimated pulmonary artery systolic pressure is 39 mmHg.    IVC/SVC: Normal venous pressure at 3 mmHg.       LDA: None documented.       Prev airway: None documented.    Drips: None documented.      Patient Active Problem List   Diagnosis    Thrombocytopenia    Chronic kidney disease, stage 3a    AICD (automatic cardioverter/defibrillator) present    Essential hypertension    Pure hypercholesterolemia    Anemia associated with chronic renal failure    Coronary artery disease of native artery with stable angina pectoris    Old myocardial infarction    S/P AAA (abdominal aortic aneurysm) repair    Diverticulosis    Nuclear sclerosis    Vasculogenic erectile dysfunction    Tovar's esophagus without dysplasia    Monoclonal paraproteinemia    Aortic atherosclerosis    Ischemic cardiomyopathy     Seasonal allergic rhinitis    Body mass index (BMI) of 22.0 to 22.9 in adult    Positive ELYSIA (antinuclear antibody)    Abnormal serum protein electrophoresis    Vitamin D deficiency    MGUS (monoclonal gammopathy of unknown significance)    Gastroesophageal reflux disease    History of TIA (transient ischemic attack)    Metabolic bone disease    Abnormal CT of the head    Cerebral infarction, remote, resolved    History of ventricular tachycardia    Chronic combined systolic and diastolic congestive heart failure    Idiopathic thrombocytopenic purpura    Sensorineural hearing loss, bilateral    Bronchiectasis without complication    Mycobacterium avium infection    Nontuberculous mycobacterial disease of lung    Bronchiectasis, non-tuberculous       Review of patient's allergies indicates:   Allergen Reactions    Fluorescein-benoxinate Other (See Comments)    Pcn  [penicillins]      Other reaction(s): Unknown    Quinolones Other (See Comments)     H/o AAA    Tropicamide Other (See Comments)    Clindamycin Rash       Current Inpatient Medications:      Current Facility-Administered Medications on File Prior to Encounter   Medication Dose Route Frequency Provider Last Rate Last Admin    sodium chloride 0.9% bolus 1,000 mL  1,000 mL Intravenous Once Violette Delgado NP        vancomycin in dextrose 5 % 1 gram/250 mL IVPB 1,000 mg  1,000 mg Intravenous On Call Procedure Violette Delgado NP   1,000 mg at 03/18/22 1232     Current Outpatient Medications on File Prior to Encounter   Medication Sig Dispense Refill    amLODIPine (NORVASC) 10 MG tablet TAKE 1 TABLET EVERY DAY (Patient taking differently: Take 10 mg by mouth once daily.) 90 tablet 3    azelastine (ASTELIN) 137 mcg (0.1 %) nasal spray 1 spray (137 mcg total) by Nasal route 2 (two) times daily. 30 mL 11    beta-carotene,A,-vits C,E/mins (OCUVITE ORAL) Take by mouth every evening.      famotidine (PEPCID) 20 MG tablet Take 20 mg by mouth before dinner.       fluticasone propionate (FLONASE) 50 mcg/actuation nasal spray 1 spray (50 mcg total) by Each Nostril route once daily. 16 g 11    furosemide (LASIX) 20 MG tablet Take 1 tablet (20 mg total) by mouth 2 (two) times daily. Take one tablet every other day orbas directed 30 tablet 11    metoprolol succinate (TOPROL-XL) 25 MG 24 hr tablet TAKE 1 TABLET TWICE DAILY (Patient taking differently: Take 25 mg by mouth 2 (two) times daily.) 180 tablet 3    multivitamin with minerals tablet Take 1 tablet by mouth every evening.      nebulizer and compressor Archana 1 Device by Misc.(Non-Drug; Combo Route) route 2 (two) times a day. 1 each 0    niacin 500 MG tablet Take 500 mg by mouth Every PM.      nitroGLYCERIN (NITROSTAT) 0.4 MG SL tablet Take 1 tab under the tongue for chest pain. May repeat every 5 minutes for a total of 3 doses. If chest pain not relieved go to the ED. (Patient taking differently: Place 0.4 mg under the tongue every 5 (five) minutes as needed for Chest pain.  If chest pain not relieved go to the ED.) 25 tablet 4    omega-3 fatty acids-vitamin E 1,000 mg Cap Take 1 capsule by mouth every evening.      potassium chloride SA (K-DUR,KLOR-CON M) 10 MEQ tablet Take 1 tablet (10 mEq total) by mouth once daily. (Patient taking differently: Take 10 mEq by mouth once daily. TAKES AT NOON) 30 tablet 11    pravastatin (PRAVACHOL) 40 MG tablet TAKE 1 TABLET EVERY EVENING (Patient taking differently: Take 40 mg by mouth every evening.) 90 tablet 1    sacubitriL-valsartan (ENTRESTO)  mg per tablet Take 1 tablet by mouth 2 (two) times daily. 60 tablet 11    sodium chloride 3% 3 % nebulizer solution Take 4 mLs by nebulization 2 (two) times a day. 240 mL 5    sotaloL (BETAPACE) 120 MG Tab Take 1 tablet (120 mg total) by mouth 2 (two) times daily. 180 tablet 3    ubidecarenone (COENZYME Q10 ORAL) Take 1 tablet by mouth every evening.      vitamin D 1000 units Tab Take 1,000 Units by mouth every Tues, Thurs, Sat. TAKES AT  NIGHT         Past Surgical History:   Procedure Laterality Date    ABDOMINAL AORTIC ANEURYSM REPAIR      CARDIAC DEFIBRILLATOR PLACEMENT      CATARACT EXTRACTION, BILATERAL  2018    CORONARY ANGIOPLASTY      EYE SURGERY Bilateral     cataract    HERNIA REPAIR      x2    REPLACEMENT OF IMPLANTABLE CARDIOVERTER-DEFIBRILLATOR (ICD) GENERATOR Left 03/18/2022    Procedure: REPLACEMENT, ICD GENERATOR;  Surgeon: Felipe Woodard MD;  [facility] EP LAB;  Service: Cardiology;  Laterality: Left;  SEFERINO, ICD gen chg, SJM, anes, MB, 3prep*ANUJ ICD insitu*        Social History     Socioeconomic History    Marital status:    Tobacco Use    Smoking status: Never     Passive exposure: Past    Smokeless tobacco: Never   Substance and Sexual Activity    Alcohol use: No     Comment: none    Drug use: No    Sexual activity: Yes     Partners: Female     Birth control/protection: None     Comment:    Social History Narrative    Patient is originally from [facility] in [place] since 60     Graduated [employer]  [employer] background    Working [employer], retired now         0 Children     Lives with wife         Diet/Exercise ;         Exercise        Eating    B     L    D    Snacks    Beverages    Fast:food             Social Determinants of Health     Financial Resource Strain: Low Risk  (7/5/2024)    Overall Financial Resource Strain (CARDIA)     Difficulty of Paying Living Expenses: Not hard at all   Food Insecurity: No Food Insecurity (7/5/2024)    Hunger Vital Sign     Worried About Running Out of Food in the Last Year: Never true     Ran Out of Food in the Last Year: Never true   Transportation Needs: No Transportation Needs (7/5/2024)    TRANSPORTATION NEEDS     Transportation : No   Physical Activity: Insufficiently Active (7/5/2024)    Exercise Vital Sign     Days of Exercise per Week: 3 days     Minutes of Exercise per Session: 30 min    Stress: No Stress Concern Present (7/5/2024)    [place] [employer] - Occupational Stress Questionnaire     Feeling of Stress : Not at all   Housing Stability: Low Risk  (7/5/2024)    Housing Stability Vital Sign     Unable to Pay for Housing in the Last Year: No     Homeless in the Last Year: No       OBJECTIVE:     Vital Signs Range (Last 24H):         Significant Labs:  Lab Results   Component Value Date    WBC 9.52 07/06/2024    HGB 11.3 (L) 07/06/2024    HCT 33.0 (L) 07/06/2024    PLT 86 (L) 07/06/2024    CHOL 117 (L) 09/22/2023    TRIG 59 09/22/2023    HDL 50 09/22/2023    ALT 16 07/06/2024    AST 29 07/06/2024     07/06/2024    K 3.2 (L) 07/06/2024     07/06/2024    CREATININE 1.2 07/06/2024    BUN 20 07/06/2024    CO2 22 (L) 07/06/2024    TSH 2.613 09/22/2023    PSA 1.1 09/22/2023    INR 1.2 07/04/2024    HGBA1C 4.8 10/26/2007       Diagnostic Studies: No relevant studies.    EKG:   Results for orders placed or performed during the hospital encounter of 07/03/24   EKG 12-lead    Collection Time: 07/04/24  5:34 AM   Result Value Ref Range    QRS Duration 94 ms    [facility] QTC Calculation 461 ms    Narrative    Test Reason : I48.91,    Vent. Rate : 065 BPM     Atrial Rate : 065 BPM     P-R Int : 254 ms          QRS Dur : 094 ms      QT Int : 444 ms       P-R-T Axes : 082 -43 019 degrees     QTc Int : 461 ms    basic rhythm is sinus with 1st degree AV block with occasional ventricular  and atrial paced brats; PAC noted   Left axis deviation  Nonspecific T wave abnormality  Prolonged QT  Abnormal ECG  When compared with ECG of 22-NOV-2023 14:10,  atrial and ventricular paced beats noted   Confirmed by Jack Stevenson MD ([id number]) on 7/8/2024 8:05:55 AM    Referred By: [email or web address]   SELF           Confirmed By:Jack Stevenson MD       2D ECHO:  TTE:  Results for orders placed or performed during the hospital encounter of 07/03/24   Echo   Result Value Ref Range    Lima's  Biplane MOD Ejection Fraction 50 %    A2C EF 43 %    A4C EF 57 %    LVOT stroke volume 59.96 cm3    LVIDd 5.69 3.5 - 6.0 cm    LV Systolic Volume 102.65 mL    LV Systolic Volume Index 52.4 mL/m2    LVIDs 4.71 (A) 2.1 - 4.0 cm    LV ESV A2C 31.20 mL    LV Diastolic Volume 159.36 mL    LV ESV A4C 39.89 mL    LV Diastolic Volume Index 81.31 mL/m2    LV EDV A2C 58.589685956960808 mL    LV EDV A4C 82.74 mL    Left Ventricular End Systolic Volume by Teichholz Method 102.65 mL    Left Ventricular End Diastolic Volume by Teichholz Method 159.36 mL    IVS 1.02 0.6 - 1.1 cm    LVOT diameter 2.06 cm    LVOT area 3.3 cm2    FS 17 (A) 28 - 44 %    Left Ventricle Relative Wall Thickness 0.27 cm    Posterior Wall 0.77 0.6 - 1.1 cm    LV mass 195.53 g    LV Mass Index 100 g/m2    MV Peak E Denis 0.55 m/s    TDI LATERAL 0.05 m/s    TDI SEPTAL 0.05 m/s    E/E' ratio 11.00 m/s    MV Peak A Denis 0.88 m/s    TR Max Denis 2.98 m/s    E/A ratio 0.63     E wave deceleration time 140.36 msec    LV SEPTAL E/E' RATIO 11.00 m/s    LV LATERAL E/E' RATIO 11.00 m/s    LVOT peak denis 0.92 m/s    Left Ventricular Outflow Tract Mean Velocity 0.61 cm/s    Left Ventricular Outflow Tract Mean Gradient 1.71 mmHg    RV- bradshaw basal diam 3.6 cm    RV S' 13.28 cm/s    TAPSE 1.86 cm    RV/LV Ratio 0.44 cm    LA size 3.56 cm    Left Atrium Minor Axis 3.15 cm    Left Atrium Major Axis 5.33 cm    LA volume (mod) 36.88 cm3    LA Volume Index (Mod) 18.8 mL/m2    RA Major Axis 4.61 cm    RA Width 3.62 cm    AV mean gradient 3 mmHg    AV peak gradient 5 mmHg    Ao peak densi 1.12 m/s    Ao VTI 22.10 cm    LVOT peak VTI 18.00 cm    AV valve area 2.71 cm²    AV Velocity Ratio 0.82     AV index (prosthetic) 0.81     AKHIL by Velocity Ratio 2.74 cm²    MV mean gradient 2 mmHg    MV peak gradient 3 mmHg    MV stenosis pressure 1/2 time 40.70 ms    MV valve area p 1/2 method 5.41 cm2    MV valve area by continuity eq 3.59 cm2    MV VTI 16.7 cm    Triscuspid Valve Regurgitation  Peak Gradient 36 mmHg    Sinus 3.43 cm    STJ 2.63 cm    Ascending aorta 3.26 cm    IVC diameter 1.40 cm    Mean e' 0.05 m/s    ZLVIDS 2.46     ZLVIDD 0.14     LA area A4C 16.10 cm2    LA area A2C 14.68 cm2    RVDD 2.49 cm    LA Volume Index 20.2 mL/m2    LA volume 39.54 cm3    LA WIDTH 3.3 cm    BSA 1.95 m2    TV resting pulmonary artery pressure 39 mmHg    RV TB RVSP 6 mmHg    Est. RA pres 3 mmHg    Narrative      Left Ventricle: The left ventricle is mildly dilated. Normal wall   thickness. Regional wall motion abnormalities present. See diagram for   wall motion findings. There is mildly reduced systolic function with a   visually estimated ejection fraction of 45 - 50%.    Right Ventricle: Normal right ventricular cavity size. Wall thickness   is normal. Systolic function is normal. Pacemaker lead present in the   ventricle.    Aortic Valve: There is mild aortic valve sclerosis.    Pulmonary Artery: The estimated pulmonary artery systolic pressure is   39 mmHg.    IVC/SVC: Normal venous pressure at 3 mmHg.         CORY:  No results found for this or any previous visit.    ASSESSMENT/PLAN:           Pre-op Assessment    I have reviewed the Patient Summary Reports.     I have reviewed the Nursing Notes. I have reviewed the NPO Status.   I have reviewed the Medications.     Review of Systems  Anesthesia Hx:  No problems with previous Anesthesia             Denies Family Hx of Anesthesia complications.    Denies Personal Hx of Anesthesia complications.                    Social:  Non-Smoker, No Alcohol Use       Hematology/Oncology:       -- Anemia:                                  Cardiovascular:    Pacemaker Hypertension  Past MI CAD   CABG/stent   Angina CHF    hyperlipidemia                             Pulmonary:    Denies COPD.  Denies Asthma.  Shortness of breath                  Renal/:  Chronic Renal Disease, CKD                Hepatic/GI:     GERD             Neurological:  TIA,  Denies CVA.    Denies  Seizures.                                Endocrine:  Diabetes               Physical Exam  General: Well nourished, Cooperative, Alert and Oriented    Airway:  Mallampati: II / II  Mouth Opening: Normal  TM Distance: Normal  Tongue: Normal  Neck ROM: Normal ROM    Dental:  Partial Dentures    Chest/Lungs:  Normal Respiratory Rate    Heart:  Rate: Normal        Anesthesia Plan  Type of Anesthesia, risks & benefits discussed:    Anesthesia Type: MAC  Intra-op Monitoring Plan: Standard ASA Monitors  Post Op Pain Control Plan: multimodal analgesia  ASA Score: 3  Day of Surgery Review of History & Physical: H&P Update referred to the surgeon/provider.    Ready For Surgery From Anesthesia Perspective.     .

## 2024-08-21 ENCOUNTER — ANESTHESIA (OUTPATIENT)
Dept: SURGERY | Facility: HOSPITAL | Age: 89
End: 2024-08-21
Payer: MEDICARE

## 2024-08-21 ENCOUNTER — DOCUMENTATION ONLY (OUTPATIENT)
Dept: CARDIOLOGY | Facility: HOSPITAL | Age: 89
End: 2024-08-21
Payer: MEDICARE

## 2024-08-21 ENCOUNTER — HOSPITAL ENCOUNTER (OUTPATIENT)
Facility: HOSPITAL | Age: 89
Discharge: HOME | End: 2024-08-21
Attending: SURGERY | Admitting: SURGERY
Payer: MEDICARE

## 2024-08-21 VITALS
BODY MASS INDEX: 22.35 KG/M2 | DIASTOLIC BLOOD PRESSURE: 64 MMHG | SYSTOLIC BLOOD PRESSURE: 137 MMHG | TEMPERATURE: 98 F | WEIGHT: 165 LBS | HEART RATE: 72 BPM | OXYGEN SATURATION: 95 % | HEIGHT: 72 IN | RESPIRATION RATE: 19 BRPM

## 2024-08-21 DIAGNOSIS — I73.9 PERIPHERAL ARTERIAL DISEASE: Primary | ICD-10-CM

## 2024-08-21 LAB
ACID FAST MOD KINY STN SPEC: NORMAL
MYCOBACTERIUM SPEC QL CULT: NORMAL

## 2024-08-21 PROCEDURE — C1887 CATHETER, GUIDING: HCPCS | Mod: HCNC | Performed by: SURGERY

## 2024-08-21 PROCEDURE — 36000706: Mod: HCNC | Performed by: SURGERY

## 2024-08-21 PROCEDURE — C1894 INTRO/SHEATH, NON-LASER: HCPCS | Mod: HCNC | Performed by: SURGERY

## 2024-08-21 PROCEDURE — C1769 GUIDE WIRE: HCPCS | Mod: HCNC | Performed by: SURGERY

## 2024-08-21 PROCEDURE — 36000707: Mod: HCNC | Performed by: SURGERY

## 2024-08-21 PROCEDURE — 25000003 PHARM REV CODE 250: Mod: HCNC

## 2024-08-21 PROCEDURE — 71000015 HC POSTOP RECOV 1ST HR: Mod: HCNC | Performed by: SURGERY

## 2024-08-21 PROCEDURE — 25500020 PHARM REV CODE 255: Mod: HCNC | Performed by: SURGERY

## 2024-08-21 PROCEDURE — 71000016 HC POSTOP RECOV ADDL HR: Mod: HCNC | Performed by: SURGERY

## 2024-08-21 PROCEDURE — 63600175 PHARM REV CODE 636 W HCPCS: Mod: HCNC | Performed by: SURGERY

## 2024-08-21 PROCEDURE — 37000009 HC ANESTHESIA EA ADD 15 MINS: Mod: HCNC | Performed by: SURGERY

## 2024-08-21 PROCEDURE — 63600175 PHARM REV CODE 636 W HCPCS: Mod: HCNC

## 2024-08-21 PROCEDURE — 37000008 HC ANESTHESIA 1ST 15 MINUTES: Mod: HCNC | Performed by: SURGERY

## 2024-08-21 PROCEDURE — 71000044 HC DOSC ROUTINE RECOVERY FIRST HOUR: Mod: HCNC | Performed by: SURGERY

## 2024-08-21 PROCEDURE — 25000003 PHARM REV CODE 250: Mod: HCNC | Performed by: SURGERY

## 2024-08-21 PROCEDURE — C1760 CLOSURE DEV, VASC: HCPCS | Mod: HCNC | Performed by: SURGERY

## 2024-08-21 PROCEDURE — 27201423 OPTIME MED/SURG SUP & DEVICES STERILE SUPPLY: Mod: HCNC | Performed by: SURGERY

## 2024-08-21 RX ORDER — SODIUM CHLORIDE 9 MG/ML
INJECTION, SOLUTION INTRAVENOUS CONTINUOUS
Status: DISCONTINUED | OUTPATIENT
Start: 2024-08-21 | End: 2024-08-21 | Stop reason: HOSPADM

## 2024-08-21 RX ORDER — FENTANYL CITRATE 50 UG/ML
INJECTION, SOLUTION INTRAMUSCULAR; INTRAVENOUS
Status: DISCONTINUED | OUTPATIENT
Start: 2024-08-21 | End: 2024-08-21

## 2024-08-21 RX ORDER — CEFAZOLIN SODIUM 1 G/3ML
INJECTION, POWDER, FOR SOLUTION INTRAMUSCULAR; INTRAVENOUS
Status: DISCONTINUED | OUTPATIENT
Start: 2024-08-21 | End: 2024-08-21

## 2024-08-21 RX ORDER — LIDOCAINE HYDROCHLORIDE 10 MG/ML
INJECTION, SOLUTION EPIDURAL; INFILTRATION; INTRACAUDAL; PERINEURAL
Status: DISCONTINUED | OUTPATIENT
Start: 2024-08-21 | End: 2024-08-21 | Stop reason: HOSPADM

## 2024-08-21 RX ORDER — IODIXANOL 320 MG/ML
INJECTION, SOLUTION INTRAVASCULAR
Status: DISCONTINUED | OUTPATIENT
Start: 2024-08-21 | End: 2024-08-21 | Stop reason: HOSPADM

## 2024-08-21 RX ORDER — LIDOCAINE HYDROCHLORIDE 10 MG/ML
INJECTION, SOLUTION EPIDURAL; INFILTRATION; INTRACAUDAL; PERINEURAL
Status: DISCONTINUED
Start: 2024-08-21 | End: 2024-08-21 | Stop reason: HOSPADM

## 2024-08-21 RX ORDER — MIDAZOLAM HYDROCHLORIDE 1 MG/ML
INJECTION INTRAMUSCULAR; INTRAVENOUS
Status: DISCONTINUED | OUTPATIENT
Start: 2024-08-21 | End: 2024-08-21

## 2024-08-21 RX ORDER — DEXMEDETOMIDINE HYDROCHLORIDE 100 UG/ML
INJECTION, SOLUTION INTRAVENOUS
Status: DISCONTINUED | OUTPATIENT
Start: 2024-08-21 | End: 2024-08-21

## 2024-08-21 RX ORDER — HEPARIN SOD,PORCINE/0.9 % NACL 1000/500ML
INTRAVENOUS SOLUTION INTRAVENOUS
Status: DISCONTINUED | OUTPATIENT
Start: 2024-08-21 | End: 2024-08-21 | Stop reason: HOSPADM

## 2024-08-21 RX ADMIN — FENTANYL CITRATE 25 MCG: 50 INJECTION, SOLUTION INTRAMUSCULAR; INTRAVENOUS at 12:08

## 2024-08-21 RX ADMIN — DEXMEDETOMIDINE 8 MCG: 100 INJECTION, SOLUTION INTRAVENOUS at 12:08

## 2024-08-21 RX ADMIN — CEFAZOLIN 2 G: 330 INJECTION, POWDER, FOR SOLUTION INTRAMUSCULAR; INTRAVENOUS at 12:08

## 2024-08-21 RX ADMIN — MIDAZOLAM HYDROCHLORIDE 0.5 MG: 2 INJECTION, SOLUTION INTRAMUSCULAR; INTRAVENOUS at 12:08

## 2024-08-21 NOTE — PROGRESS NOTES
Patient has been identified as having an implanted cardiac rhythm device (CRD); the implanted device is a [employer] defibrillator.      Planned procedure:  AORTOGRAM & poss. coil embolization     No noted pacer dependency.        DEFIBRILLATORS/NON-DEPENDENT ANY LOCATION    Per protocol,a magnet should be applied directly over the implanted device during entire surgical procedure when electrocautery will be used.    If no electrocautery is planned, magnet application is not needed.    For additional questions, please contact the Arrhythmia Department at Ext [phone].

## 2024-08-21 NOTE — H&P
FOCUSED SURGICAL H&P    Nelson Huntley is a 89 y.o. male. MRN is [id number].    CC: Here today for the following surgical procedure(s):  AORTOGRAM & poss. coil embolization (Groin)    HPI: For a detailed history of the patients history of present illness please refer to the last progress note. In brief, this is a 89 y.o. male with a known history of AAA, here today for surgical intervention. There has been no recent changes in the patients health, including fevers, chest pain, or shortness of breath, and no new medications have been started. The patient has not had anything to eat or drink for the last 8 hours. The patient has held all blood thinners.    Per Dr. Granado's recent clinic note:   It was my pleasure to see him to dictate on Mr. Nelson huntley.  This is a very pleasant 89-year-old gentleman who was referred in for a large abdominal aortic aneurysms.  He had his aneurysms repaired about 20 or 25 years ago at [facility].  He now was found to have a large abdominal aortic aneurysms with a small but significant endoleak seems to be coming from a lumbar vessel.  Patient has no symptoms of this has no abdominal pain has no issues whatsoever with this.x11. 6 x 13.1 x 13.1.  He can exercise for 20-25 minutes on a treadmill.  However he says he feels tired and going to spent afterwards.     He has not no tenderness no rebound no guarding no pain whatsoever in the area he said.       Patient was recently admitted to the hospital for hemoptysis.  Has a a mycobacterium infection, a non TB mycobacterium infection.  He is being treated with Cipro at this point.  Patient had a low EF in the past, however when he was in the hospital he had an echo which showed his EF to be 45-50%.     He has no history of any issues with other aneurysms in his body.  Problem list was reviewed.       Neuro he has a history of a TIA and a remote infarct.  This was 20-25 years ago.  ENT he does have some mild hearing loss and some  rhinitis.       Pulmonary he has a history of hemoptysis please see his discharge from a few days ago.  He also has bronchiectasis.  He has a non tuberculosis mycobacterial infection which caused him significant problems.     Cardiac vascular he has an AICD essential hypertension he does have an old MI.  Had a low EF in the past and he still has a mildly depressed EF but it was filled 45-50% on the most recent echo he had in the hospital.  He also has a history of ventricular tachycardia and he does have dyspnea on exertion on exertion.     Renal he has stage III renal disease with a creatinine of 1.2 his EF is approximately 50 his his creatinine clearance is proximally 50 cc/minute.  In terms of infectious disease he does have a make a bacteria AV and infection of his lungs he is being treated with Cipro.  Immunology he has a positive ELYSIA hematology has thrombocytopenia with a platelet count of approximately 90 he does have anemia of chronic disease and a monoclonal gamma globulin our area.     He has a Tovar's esophagectomy.        Past Medical History:   Past Medical History:   Diagnosis Date    AICD (automatic cardioverter/defibrillator) present 7/17/2012    Cardiomyopathy     CKD (chronic kidney disease) stage 3, GFR 30-59 ml/min 7/17/2012    Coronary artery disease     GERD (gastroesophageal reflux disease)     Tovar's; Dr. Vu    Hyperlipidemia 7/17/2012    Hypertension 7/17/2012    Ischemic cardiomyopathy 7/17/2012    MGUS (monoclonal gammopathy of unknown significance)     Thrombocytopenia 7/17/2012    Ventricular tachycardia     VT (ventricular tachycardia) 7/17/2012       Past Surgical History:   Past Surgical History:   Procedure Laterality Date    ABDOMINAL AORTIC ANEURYSM REPAIR      CARDIAC DEFIBRILLATOR PLACEMENT      CATARACT EXTRACTION, BILATERAL  2018    CORONARY ANGIOPLASTY      EYE SURGERY Bilateral     cataract    HERNIA REPAIR      x2    REPLACEMENT OF IMPLANTABLE  CARDIOVERTER-DEFIBRILLATOR (ICD) GENERATOR Left 03/18/2022    Procedure: REPLACEMENT, ICD GENERATOR;  Surgeon: Felipe Woodard MD;  Location: [facility] LAB;  Service: Cardiology;  Laterality: Left;  SEFERINO, ICD gen chg, SJM, elyssa, MB, 3prep*ANUJ ICD insitu*        Social History:   Social History     Socioeconomic History    Marital status:    Tobacco Use    Smoking status: Never     Passive exposure: Past    Smokeless tobacco: Never   Substance and Sexual Activity    Alcohol use: No     Comment: none    Drug use: No    Sexual activity: Yes     Partners: Female     Birth control/protection: None     Comment:    Social History Narrative    Patient is originally from [facility] in [place] since 60     Graduated [employer] background    Working [employer], retired now         0 Children     Lives with wife         Diet/Exercise ;         Exercise        Eating    B     L    D    Snacks    Beverages    Fast:food             Social Determinants of Health     Financial Resource Strain: Low Risk  (7/5/2024)    Overall Financial Resource Strain (CARDIA)     Difficulty of Paying Living Expenses: Not hard at all   Food Insecurity: No Food Insecurity (7/5/2024)    Hunger Vital Sign     Worried About Running Out of Food in the Last Year: Never true     Ran Out of Food in the Last Year: Never true   Transportation Needs: No Transportation Needs (7/5/2024)    TRANSPORTATION NEEDS     Transportation : No   Physical Activity: Insufficiently Active (7/5/2024)    Exercise Vital Sign     Days of Exercise per Week: 3 days     Minutes of Exercise per Session: 30 min   Stress: No Stress Concern Present (7/5/2024)    [place] [employer] - Occupational Stress Questionnaire     Feeling of Stress : Not at all   Housing Stability: Low Risk  (7/5/2024)    Housing Stability Vital Sign     Unable to Pay for Housing in the Last Year: No      Homeless in the Last Year: No       Family History:   Family History   Problem Relation Name Age of Onset    Cancer Mother Rebecca         Lymphoma    Lymphoma Mother Rebecca     Coronary artery disease Mother Rebecca     Heart disease Mother Rebecca     Heart disease Father      Heart attacks under age 50 Father      Heart disease Sister      Heart disease Brother Hernando     Cancer Brother Hernando         lymphoma    Colon cancer Neg Hx      Prostate cancer Neg Hx            Allergies:  Review of patient's allergies indicates:   Allergen Reactions    Fluorescein-benoxinate Other (See Comments)    Pcn  [penicillins]      Other reaction(s): Unknown    Quinolones Other (See Comments)     H/o AAA    Tropicamide Other (See Comments)    Clindamycin Rash         Medications:    Current Facility-Administered Medications:     0.9%  NaCl infusion, , Intravenous, Continuous, Carl Marquez MD    Facility-Administered Medications Ordered in Other Encounters:     sodium chloride 0.9% bolus 1,000 mL, 1,000 mL, Intravenous, Once, Violette Delgado, NP    vancomycin in dextrose 5 % 1 gram/250 mL IVPB 1,000 mg, 1,000 mg, Intravenous, On Call Procedure, Violette Delgado NP, 1,000 mg at 03/18/22 1232                  Vital Signs:  There were no vitals filed for this visit.      Physical Exam:  Neuro: awake, alert, no acute distress.  HEENT: PERRLA, neck supple, no lymphadenopathy.  Heart: regular rate/rhythm  Lungs: equal chest expansion bilaterally, no increased work of breathing on RA  Abdomen: soft, non-distended, non-tender to palpation.  Extremities: warm, well-perfused       Labs:  Lab Results   Component Value Date/Time    WBC 9.52 07/06/2024 03:02 AM    HGB 11.3 (L) 07/06/2024 03:02 AM    HCT 33.0 (L) 07/06/2024 03:02 AM    PLT 86 (L) 07/06/2024 03:02 AM    MCV 86 07/06/2024 03:02 AM     Lab Results   Component Value Date/Time     07/06/2024 03:02 AM    K 3.2 (L) 07/06/2024 03:02 AM     07/06/2024 03:02 AM     "CO2 22 (L) 07/06/2024 03:02 AM    BUN 20 07/06/2024 03:02 AM     (H) 07/06/2024 03:02 AM    MG 2.0 01/20/2023 10:53 AM    PHOS 3.2 06/29/2018 10:23 AM     Lab Results   Component Value Date/Time    INR 1.2 07/04/2024 05:18 PM     No components found for: "TROPI"  Lab Results   Component Value Date/Time    ALT 16 07/06/2024 03:02 AM    AST 29 07/06/2024 03:02 AM    LIPASE 24 07/31/2009 10:05 AM          Assessment/Plan:  89 y.o. male here today for the following surgical procedure:    AORTOGRAM & poss. coil embolization (Groin)       The indications for surgery, highlighting the risks and benefits of the procedure were discussed with the patient. These included but are not limited to swelling, bleeding, pain, infection, and adverse anesthesia-related event. The patient seems to understand the risks, as well as the alternatives including nonoperative observation/survellience and wishes to proceed with the surgical intervention.    Patient has been examined and consented      Carl Marquez MD  General Surgery, PGY-1    "

## 2024-08-21 NOTE — TRANSFER OF CARE
Anesthesia Transfer of Care Note    Patient: Nelson Huntley    Procedure(s) Performed: Procedure(s) (LRB):  AORTOGRAM (N/A)    Patient location: PACU    Anesthesia Type: MAC    Transport from OR: Transported from OR on 2-3 L/min O2 by NC with adequate spontaneous ventilation    Post pain: adequate analgesia    Post assessment: no apparent anesthetic complications    Post vital signs: stable    Level of consciousness: awake and alert    Nausea/Vomiting: no nausea/vomiting    Complications: none    Transfer of care protocol was followed      Last vitals: Visit Vitals  BP (!) 145/65 (BP Location: Left arm, Patient Position: Lying)   Pulse 66   Temp 36.6 °C (97.9 °F) (Temporal)   Resp 18   Ht 6' (1.829 m)   Wt 74.8 kg (165 lb)   SpO2 96%   BMI 22.38 kg/m²

## 2024-08-21 NOTE — PROGRESS NOTES
Dr High called back to bedside to assess patients abdomen. Pt is c/o large non tender lump to the mid left abd. Dr Granado to bedside also. Dr Granado palpated abdomen and determines there is umbilical hernia and the large lump is caused by weakening of the abd. wall muscles. Will cont to monitor.

## 2024-08-21 NOTE — PROGRESS NOTES
Dr High at bedside. Right groin is soft but puffy around the puncture site. Dressing is clean dry and intact. Dr High states that he will remain at bedside to hold pressure. Will reassess.

## 2024-08-21 NOTE — BRIEF OP NOTE
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     Estimated Blood Loss: 10 cc.    Estimated Blood Loss has been documented.         Specimens:   Specimen (24h ago, onward)      None            [id number]

## 2024-08-21 NOTE — DISCHARGE INSTRUCTIONS
Continue taking all of your home medications    Remove dressing in one days  You may shower at that time, no soaking the wound (Bath, pool, hot tub, etc) for two weeks  Reasons to call the office:  - Fever over 101F  - Signs of infection at the wound (redness, pain, warmth, pus)  - Numbness, tingling, or pain in the leg/foot/groin  - Extensive bruising at the groin  - Firmness or bulge in the groin  - Bleeding from the access site in your groin    Once you go home, please abide by the following restrictions:     Rest in bed or a recliner for the remainder of the day following the procedure.      You should not go home alone the day of the procedure.     Lifting and activity restrictions depend on the insertion site for the procedure:     Groin:   - No heavy lifting of more than 5 pounds, running, swimming, or strenuous walking for at least 2 days after the angiogram.   - You may return to your usual activity after the two days.   - Do not take a hot bath or shower for at least 12 hours after discharge.     Keep an adhesive bandage on the catheter insertion site for one day to help prevent infection.     Because of the sedation you were given for your procedure, we recommend that you have someone stay with you overnight following your procedure.  - Do not drive a car or operate machinery for the remainder of the day.  - Do not consume any alcoholic beverages for the remainder of the day.  - Postpone signing any important papers or making any important decision for the remainder of the day.  - Do not take any muscle relaxants, sedatives, hypnotics, or mood-altering medication today unless ordered by your physician who is aware you are taking the medication today.     If bleeding occurs:  - Apply manual pressure, and immediately seek medical attention at the nearest hospital.   - Seek immediate medical attention if you experience loss of sensation, redness, swelling or discharge from the insertion site.

## 2024-08-21 NOTE — DISCHARGE SUMMARY
[facility] (2nd Fl)  Discharge Note  Short Stay    Procedure(s) (LRB):  AORTOGRAM (N/A)      OUTCOME: Condition has improved and patient is now ready for discharge.    DISPOSITION: Home or Self Care    FINAL DIAGNOSIS:  <principal problem not specified>    FOLLOWUP: In clinic    DISCHARGE INSTRUCTIONS:    Discharge Procedure Orders   Notify your health care provider if you experience any of the following:  persistent dizziness, light-headedness, or visual disturbances     Notify your health care provider if you experience any of the following:  worsening rash     Notify your health care provider if you experience any of the following:  severe persistent headache     Notify your health care provider if you experience any of the following:  difficulty breathing or increased cough     Notify your health care provider if you experience any of the following:  redness, tenderness, or signs of infection (pain, swelling, redness, odor or green/yellow discharge around incision site)     Notify your health care provider if you experience any of the following:  severe uncontrolled pain     Notify your health care provider if you experience any of the following:  persistent nausea and vomiting or diarrhea     Notify your health care provider if you experience any of the following:  temperature >100.4        TIME SPENT ON DISCHARGE: 30 minutes

## 2024-08-22 ENCOUNTER — PATIENT MESSAGE (OUTPATIENT)
Dept: VASCULAR SURGERY | Facility: CLINIC | Age: 89
End: 2024-08-22
Payer: MEDICARE

## 2024-08-22 DIAGNOSIS — E78.5 HYPERLIPIDEMIA, UNSPECIFIED HYPERLIPIDEMIA TYPE: ICD-10-CM

## 2024-08-22 NOTE — ANESTHESIA POSTPROCEDURE EVALUATION
Anesthesia Post Evaluation    Patient: Nelson Huntley    Procedure(s) Performed: Procedure(s) (LRB):  AORTOGRAM (N/A)    Final Anesthesia Type: general      Patient location during evaluation: PACU  Patient participation: Yes- Able to Participate  Level of consciousness: awake and alert  Post-procedure vital signs: reviewed and stable  Pain management: adequate  Airway patency: patent    PONV status at discharge: No PONV  Anesthetic complications: no      Cardiovascular status: blood pressure returned to baseline  Respiratory status: unassisted  Hydration status: euvolemic  Follow-up not needed.              Vitals Value Taken Time   /64 08/21/24 1503   Temp 36.7 °C (98.1 °F) 08/21/24 1500   Pulse 74 08/21/24 1510   Resp 20 08/21/24 1510   SpO2 97 % 08/21/24 1509   Vitals shown include unfiled device data.      No case tracking events are documented in the log.      Pain/Perlita Score: Perlita Score: 10 (8/21/2024  1:30 PM)

## 2024-08-22 NOTE — OP NOTE
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      Estimated Blood Loss: 10 cc.     Estimated Blood Loss has been documented.         Specimens:   Specimen (24h ago, onward)        None      Operation in Detail:  The patient was brought to the operating room and placed supine on the table.  Monitored anesthesia care was delivered by the anesthesia team.  The patient was prepped and draped in sterile fashion.  2 g of Ancef were administered.  Common femoral artery access was obtained under ultrasound guidance with micropuncture needle and wire.  Adequate cannulation over the femoral head was ensured under fluoroscopy.  This was exchanged for a 5 German sheath and a .035 wire.  An Omni flush catheter was then placed into the aorta and digital subtraction angiography aortogram was then obtained.  At this point, it was clear that there is a large branch off the right hypogastric artery that was traveling cephalad and feeding the aneurysm sac.  The hypogastric artery was then selectively accessed and repeat digital subtraction angiography was obtained which more clearly delineated this finding.  After successful identification of the endoleak, all catheters and wires were removed.  A 5 German Mynx closure device was then deployed successfully.  Slight venous pressure was then placed on the right groin for approximately 2 minutes and hemostasis was excellent at this point.  The patient was then taken to the PACU in  stable condition.  All counts were reported correct at the end of the case.  Dr. Granado was present and scrubbed for the entirety of the case.

## 2024-08-22 NOTE — TELEPHONE ENCOUNTER
Care Due:                  Date            Visit Type   Department     Provider  --------------------------------------------------------------------------------                                             [facility] INTERNAL  Last Visit: 07-      [facility]       Jerry Castro                               -                              PRIMARY      [facility] INTERNAL  Next Visit: 10-      CARE ([facility])   MEDICINE       Jerry Castro                                                            Last  Test          Frequency    Reason                     Performed    Due Date  --------------------------------------------------------------------------------    Lipid Panel.  12 months..  pravastatin..............  09- 09-    [facility] Embedded Care Due Messages. Reference number: [id number].   8/22/2024 8:30:22 AM CDT

## 2024-08-22 NOTE — TELEPHONE ENCOUNTER
Refill Routing Note   Medication(s) are not appropriate for processing by [facility] for the following reason(s):        No active prescription written by provider    [facility] action(s):  Defer        Medication Therapy Plan: Patient has recently changed PCP's. Recent OV but no current order under new PCP.  FLOS      Appointments  past 12m or future 3m with PCP    Date Provider   Last Visit   7/31/2024 Jerry Castro III, MD   Next Visit   10/14/2024 Jerry Castro III, MD   ED visits in past 90 days: 0        Note composed:4:21 PM 08/22/2024

## 2024-08-23 DIAGNOSIS — E78.5 HYPERLIPIDEMIA, UNSPECIFIED HYPERLIPIDEMIA TYPE: ICD-10-CM

## 2024-08-23 RX ORDER — PRAVASTATIN SODIUM 40 MG/1
40 TABLET ORAL NIGHTLY
Qty: 90 TABLET | Refills: 2 | Status: SHIPPED | OUTPATIENT
Start: 2024-08-23

## 2024-08-23 NOTE — TELEPHONE ENCOUNTER
No care due was identified.  [facility] Embedded Care Due Messages. Reference number: [id number].   8/23/2024 3:21:18 PM CDT

## 2024-08-24 RX ORDER — PRAVASTATIN SODIUM 40 MG/1
40 TABLET ORAL NIGHTLY
Qty: 90 TABLET | Refills: 2 | OUTPATIENT
Start: 2024-08-24

## 2024-08-24 NOTE — TELEPHONE ENCOUNTER
Gaudencio DC. Request already responded to by other means (e.g. phone or fax)   Refill Authorization Note   Edward Parent  is requesting a refill authorization.  Brief Assessment and Rationale for Refill:  Quick Discontinue  Medication Therapy Plan:       Medication Reconciliation Completed:  No      Comments:     Note composed:11:16 AM 08/24/2024

## 2024-08-26 ENCOUNTER — TELEPHONE (OUTPATIENT)
Dept: VASCULAR SURGERY | Facility: CLINIC | Age: 89
End: 2024-08-26
Payer: MEDICARE

## 2024-08-26 NOTE — TELEPHONE ENCOUNTER
Contacted pt to schedule FU with Dr. Granado to discuss IR repair of endoleak. Appointment scheduled, pt verified.

## 2024-08-27 ENCOUNTER — PATIENT MESSAGE (OUTPATIENT)
Dept: INTERNAL MEDICINE | Facility: CLINIC | Age: 89
End: 2024-08-27
Payer: MEDICARE

## 2024-08-27 DIAGNOSIS — E78.5 HYPERLIPIDEMIA, UNSPECIFIED HYPERLIPIDEMIA TYPE: ICD-10-CM

## 2024-08-27 RX ORDER — PRAVASTATIN SODIUM 40 MG/1
40 TABLET ORAL NIGHTLY
Qty: 90 TABLET | Refills: 3 | Status: SHIPPED | OUTPATIENT
Start: 2024-08-27

## 2024-08-27 NOTE — TELEPHONE ENCOUNTER
No care due was identified.  [facility] Embedded Care Due Messages. Reference number: [id number].   8/27/2024 2:16:51 PM CDT

## 2024-08-27 NOTE — TELEPHONE ENCOUNTER
Refill Routing Note   Medication(s) are not appropriate for processing by [facility] for the following reason(s):        New or recently adjusted medication    [facility] action(s):  Defer               Appointments  past 12m or future 3m with PCP    Date Provider   Last Visit   7/31/2024 Jerry Castro III, MD   Next Visit   10/14/2024 Jerry Castro III, MD   ED visits in past 90 days: 0        Note composed:5:24 PM 08/27/2024

## 2024-08-28 ENCOUNTER — OFFICE VISIT (OUTPATIENT)
Dept: VASCULAR SURGERY | Facility: CLINIC | Age: 89
End: 2024-08-28
Payer: MEDICARE

## 2024-08-28 VITALS
WEIGHT: 162.38 LBS | BODY MASS INDEX: 21.99 KG/M2 | DIASTOLIC BLOOD PRESSURE: 61 MMHG | SYSTOLIC BLOOD PRESSURE: 117 MMHG | HEIGHT: 72 IN | HEART RATE: 70 BPM | OXYGEN SATURATION: 96 %

## 2024-08-28 DIAGNOSIS — Z98.890 S/P AAA (ABDOMINAL AORTIC ANEURYSM) REPAIR: Primary | ICD-10-CM

## 2024-08-28 DIAGNOSIS — Z86.79 S/P AAA (ABDOMINAL AORTIC ANEURYSM) REPAIR: Primary | ICD-10-CM

## 2024-08-28 PROCEDURE — 1157F ADVNC CARE PLAN IN RCRD: CPT | Mod: HCNC,CPTII,S$GLB, | Performed by: SURGERY

## 2024-08-28 PROCEDURE — 1159F MED LIST DOCD IN RCRD: CPT | Mod: HCNC,CPTII,S$GLB, | Performed by: SURGERY

## 2024-08-28 PROCEDURE — 1126F AMNT PAIN NOTED NONE PRSNT: CPT | Mod: HCNC,CPTII,S$GLB, | Performed by: SURGERY

## 2024-08-28 PROCEDURE — 99999 PR PBB SHADOW E&M-EST. PATIENT-LVL IV: CPT | Mod: PBBFAC,HCNC,, | Performed by: SURGERY

## 2024-08-28 PROCEDURE — 1101F PT FALLS ASSESS-DOCD LE1/YR: CPT | Mod: HCNC,CPTII,S$GLB, | Performed by: SURGERY

## 2024-08-28 PROCEDURE — 99213 OFFICE O/P EST LOW 20 MIN: CPT | Mod: HCNC,S$GLB,, | Performed by: SURGERY

## 2024-08-28 PROCEDURE — 3288F FALL RISK ASSESSMENT DOCD: CPT | Mod: HCNC,CPTII,S$GLB, | Performed by: SURGERY

## 2024-08-28 NOTE — PROGRESS NOTES
Patient returns.  We have determined that there was a type 2 endoleak.  It was coming off the right hypogastric.  I wanted to show the patient and his wife the x-rays of the angiogram so they could appreciated.  At this point he has a very large aneurysms.  Needs to be intervened on.  We will select a date when I can do the case with the interventional radiologist to try to coil the leak and possibly glue the aneurysms.  I have explained all this to the patient.  I have told him at his age, even need his even though he has a robust 89-year-old, that he would do better with a minimally invasive procedure and that I would not consider a full open laparotomy on him.      Snehal my nurse will reach out to him and set up a date for the repair with the interventional radiologist

## 2024-09-03 ENCOUNTER — LAB VISIT (OUTPATIENT)
Dept: LAB | Facility: HOSPITAL | Age: 89
End: 2024-09-03
Attending: PHYSICIAN ASSISTANT
Payer: MEDICARE

## 2024-09-03 ENCOUNTER — OFFICE VISIT (OUTPATIENT)
Dept: INTERVENTIONAL RADIOLOGY/VASCULAR | Facility: CLINIC | Age: 89
End: 2024-09-03
Payer: MEDICARE

## 2024-09-03 VITALS
BODY MASS INDEX: 21.93 KG/M2 | WEIGHT: 161.94 LBS | HEART RATE: 55 BPM | DIASTOLIC BLOOD PRESSURE: 56 MMHG | HEIGHT: 72 IN | SYSTOLIC BLOOD PRESSURE: 115 MMHG

## 2024-09-03 DIAGNOSIS — I97.89 TYPE II ENDOLEAK OF AORTIC GRAFT: Primary | ICD-10-CM

## 2024-09-03 DIAGNOSIS — N18.9 CHRONIC KIDNEY DISEASE, UNSPECIFIED CKD STAGE: ICD-10-CM

## 2024-09-03 DIAGNOSIS — I97.89 TYPE II ENDOLEAK OF AORTIC GRAFT: ICD-10-CM

## 2024-09-03 DIAGNOSIS — Z86.79 S/P AAA (ABDOMINAL AORTIC ANEURYSM) REPAIR: ICD-10-CM

## 2024-09-03 DIAGNOSIS — Z98.890 S/P AAA (ABDOMINAL AORTIC ANEURYSM) REPAIR: ICD-10-CM

## 2024-09-03 LAB
ALBUMIN SERPL BCP-MCNC: 3.5 G/DL (ref 3.5–5.2)
ALP SERPL-CCNC: 135 U/L (ref 55–135)
ALT SERPL W/O P-5'-P-CCNC: 21 U/L (ref 10–44)
ANION GAP SERPL CALC-SCNC: 9 MMOL/L (ref 8–16)
AST SERPL-CCNC: 27 U/L (ref 10–40)
BASOPHILS # BLD AUTO: 0.06 K/UL (ref 0–0.2)
BASOPHILS NFR BLD: 0.6 % (ref 0–1.9)
BILIRUB SERPL-MCNC: 0.5 MG/DL (ref 0.1–1)
BUN SERPL-MCNC: 18 MG/DL (ref 8–23)
CALCIUM SERPL-MCNC: 10.3 MG/DL (ref 8.7–10.5)
CHLORIDE SERPL-SCNC: 106 MMOL/L (ref 95–110)
CO2 SERPL-SCNC: 28 MMOL/L (ref 23–29)
CREAT SERPL-MCNC: 1.6 MG/DL (ref 0.5–1.4)
DIFFERENTIAL METHOD BLD: ABNORMAL
EOSINOPHIL # BLD AUTO: 0.2 K/UL (ref 0–0.5)
EOSINOPHIL NFR BLD: 1.7 % (ref 0–8)
ERYTHROCYTE [DISTWIDTH] IN BLOOD BY AUTOMATED COUNT: 14.1 % (ref 11.5–14.5)
EST. GFR  (NO RACE VARIABLE): 41 ML/MIN/1.73 M^2
GLUCOSE SERPL-MCNC: 93 MG/DL (ref 70–110)
HCT VFR BLD AUTO: 42.5 % (ref 40–54)
HGB BLD-MCNC: 14 G/DL (ref 14–18)
IMM GRANULOCYTES # BLD AUTO: 0.04 K/UL (ref 0–0.04)
IMM GRANULOCYTES NFR BLD AUTO: 0.4 % (ref 0–0.5)
INR PPP: 1.1 (ref 0.8–1.2)
LYMPHOCYTES # BLD AUTO: 2.5 K/UL (ref 1–4.8)
LYMPHOCYTES NFR BLD: 25.8 % (ref 18–48)
MCH RBC QN AUTO: 29.2 PG (ref 27–31)
MCHC RBC AUTO-ENTMCNC: 32.9 G/DL (ref 32–36)
MCV RBC AUTO: 89 FL (ref 82–98)
MONOCYTES # BLD AUTO: 1.1 K/UL (ref 0.3–1)
MONOCYTES NFR BLD: 11.1 % (ref 4–15)
NEUTROPHILS # BLD AUTO: 5.7 K/UL (ref 1.8–7.7)
NEUTROPHILS NFR BLD: 60.4 % (ref 38–73)
NRBC BLD-RTO: 0 /100 WBC
PLATELET # BLD AUTO: 187 K/UL (ref 150–450)
PMV BLD AUTO: 11.2 FL (ref 9.2–12.9)
POTASSIUM SERPL-SCNC: 4.5 MMOL/L (ref 3.5–5.1)
PROT SERPL-MCNC: 7.7 G/DL (ref 6–8.4)
PROTHROMBIN TIME: 11.5 SEC (ref 9–12.5)
RBC # BLD AUTO: 4.79 M/UL (ref 4.6–6.2)
SODIUM SERPL-SCNC: 143 MMOL/L (ref 136–145)
WBC # BLD AUTO: 9.48 K/UL (ref 3.9–12.7)

## 2024-09-03 PROCEDURE — 3288F FALL RISK ASSESSMENT DOCD: CPT | Mod: HCNC,CPTII,S$GLB, | Performed by: PHYSICIAN ASSISTANT

## 2024-09-03 PROCEDURE — 1160F RVW MEDS BY RX/DR IN RCRD: CPT | Mod: HCNC,CPTII,S$GLB, | Performed by: PHYSICIAN ASSISTANT

## 2024-09-03 PROCEDURE — 85025 COMPLETE CBC W/AUTO DIFF WBC: CPT | Mod: HCNC | Performed by: PHYSICIAN ASSISTANT

## 2024-09-03 PROCEDURE — 80053 COMPREHEN METABOLIC PANEL: CPT | Mod: HCNC | Performed by: PHYSICIAN ASSISTANT

## 2024-09-03 PROCEDURE — 99214 OFFICE O/P EST MOD 30 MIN: CPT | Mod: HCNC,S$GLB,, | Performed by: PHYSICIAN ASSISTANT

## 2024-09-03 PROCEDURE — 85610 PROTHROMBIN TIME: CPT | Mod: HCNC | Performed by: PHYSICIAN ASSISTANT

## 2024-09-03 PROCEDURE — 1159F MED LIST DOCD IN RCRD: CPT | Mod: HCNC,CPTII,S$GLB, | Performed by: PHYSICIAN ASSISTANT

## 2024-09-03 PROCEDURE — 99999 PR PBB SHADOW E&M-EST. PATIENT-LVL IV: CPT | Mod: PBBFAC,HCNC,, | Performed by: PHYSICIAN ASSISTANT

## 2024-09-03 PROCEDURE — 1101F PT FALLS ASSESS-DOCD LE1/YR: CPT | Mod: HCNC,CPTII,S$GLB, | Performed by: PHYSICIAN ASSISTANT

## 2024-09-03 PROCEDURE — 1157F ADVNC CARE PLAN IN RCRD: CPT | Mod: HCNC,CPTII,S$GLB, | Performed by: PHYSICIAN ASSISTANT

## 2024-09-03 PROCEDURE — 36415 COLL VENOUS BLD VENIPUNCTURE: CPT | Mod: HCNC | Performed by: PHYSICIAN ASSISTANT

## 2024-09-03 PROCEDURE — 1126F AMNT PAIN NOTED NONE PRSNT: CPT | Mod: HCNC,CPTII,S$GLB, | Performed by: PHYSICIAN ASSISTANT

## 2024-09-03 NOTE — PROGRESS NOTES
Subjective     Patient ID: Nelson GIBBONS Parent is a 89 y.o. male.    Chief Complaint: No chief complaint on file.    Patient referred to Interventional Radiology by Dr Granado for Type 2 endoleak repair.  He has a history of CHF, CAD, MGUS, GERD; s/p AAA repair with imaging noting type 2 endoleak.  Patient reports at baseline.  He denies any fever, chills, unexpected wt change, decreased appetite, fatigue, CP, SOB, cough, abdominal pain or distention, N/V/D, leg pain or swelling, jaundice, confusion, sleep disturbance.    Patient denies AC, antiplatelet use, or other medications containing asa.  He is no longer taking asa or plavix.  Allergies reviewed, pt denies allergy to contrast.  Vascular Surgery notes reviewed.         Review of Systems   Constitutional:  Negative for activity change, appetite change, chills, fatigue, fever and unexpected weight change.   Respiratory:  Negative for cough and shortness of breath.    Cardiovascular:  Negative for chest pain and leg swelling.   Gastrointestinal:  Negative for abdominal distention, abdominal pain, constipation, diarrhea, nausea and vomiting.   Genitourinary:  Negative for difficulty urinating and flank pain.   Musculoskeletal:  Negative for back pain and gait problem.   Neurological:  Negative for weakness and memory loss.   Psychiatric/Behavioral:  Negative for confusion and sleep disturbance.           Objective     Physical Exam  Constitutional:       General: He is not in acute distress.     Appearance: He is well-developed. He is not diaphoretic.   HENT:      Head: Normocephalic and atraumatic.   Pulmonary:      Effort: Pulmonary effort is normal. No respiratory distress.   Neurological:      Mental Status: He is alert and oriented to person, place, and time.   Psychiatric:         Behavior: Behavior normal.         Thought Content: Thought content normal.         Judgment: Judgment normal.     EXAMINATION:  CTA CHEST AORTA NON CORONARY     CLINICAL  HISTORY:  Hemoptysis;Time contrast for bronchial artery to assess hemoptysis;     TECHNIQUE:  There is a noncontrast CT of the chest followed by arterial enhance contrast CT of the chest was performed following the administration of 100 cc of Omnipaque 350 contrast medium.     COMPARISON:  None     FINDINGS:  There is no abnormal arterial enhancement to explain hemoptysis.     However, there are multiple foci of tree-in-bud nodular opacities with bronchiectasis throughout the upper lobes with multiple nodular foci.  No cavitary lesions.  No large consolidation.  No significant pleural fluid.     The heart and pericardium appear normal in size and configuration.  Three vessel coronary artery calcific disease is present.  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     There is no evidence of endobronchial lesion in the major airways or trachea.     The thoracic inlet and axillary regions appear normal.  The chest wall is intact.     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  Multiple renal cysts are noted.  Accessory spleen on the left.     Impression:     Stable scattered ground-glass and consolidated lung changes with tree-in-bud formation nodular opacities primarily in the upper lobes with bronchiectasis.  Findings most compatible with infectious or inflammatory pneumonia/pneumonitis.     No new cavitary lesion.     No acute arterial extravasation to explain hemoptysis.     Persistent abdominal aortic aneurysm sac incompletely imaged previously seen with suspected type 2 endoleak.  Correlate for abdominal symptoms.     Bilateral renal cysts.     This report was flagged in Epic as abnormal.        Electronically signed by:Darien Gilliam  Date:                                            07/04/2024  Time:                                           17:49       Assessment and Plan     1. Type II endoleak of  aortic graft    2. S/P AAA (abdominal aortic aneurysm) repair  Overview:  Stented by Efra    S/P AAA (abdominal aortic aneurysm) repair  CT demonstrating persistent abdominal aortic aneurysm sac incompletely imaged previously seen with suspected type 2 endoleak.    Recommend following up with vascular as outpatient  We will schedule them for an angiogram in a proximally 3-4 weeks      Electronically signed by Gonzalez Granado MD at 7/9/2024  3:13 PM      Discussed how the procedure Type 2 endoleak repair will be performed, risks (including, but not limited to, pain, bleeding, infection, damage to nearby structures, and the need for additional procedures), benefits, possible complications, pre-post procedure expectations, and alternatives. The patient voices understanding and all questions have been answered.  The patient agrees to proceed as planned.     Patient scheduled for 9/12 at [facility] under general anesthesia (Collin/Loy).  2.  Pre-procedure labs scheduled for 9/3  3.  Pt denies taking AC, antiplatelet medication, or other medications containing asa.  Pt denies taking GLP-1 agonist.    4.  Allergies reviewed.  Pt denies allergy to contrast, lidocaine, betadine and chlorhexidine.  5.  Pre-procedure handout with clinic phone number provided.  Pt advised to call if any further questions or need to reschedule.  Discussed with patient our nursing team will call the day before the procedure with reminder for instructions.          No follow-ups on file.

## 2024-09-03 NOTE — LETTER
September 3, 2024    Nelson Huntley  [address]  [address]  [place] [address]     [facility]  [address]  [address]  [place] [address]  Phone: [phone]  Fax: [phone] Dear Mr. Huntley:  PRE-PROCEDURE INSTRUCTIONS  Your procedure with Interventional Radiology is scheduled for 9/12. Please arrive by 6 AM.    You must check-in and receive a wristband before going to your procedure. Your check-in location is [facility].    ONLY TAKE amlodipine (norvasc), metoprolol (toprol), sacubitril-valsartan (entresto) the morning of your procedure with a sip of water.    **Do not eat or drink anything between midnight and the time of your procedure. This includes gum, mints, and candy lemon drops.    **Do not smoke or drink alcoholic beverages 24 hours prior to your procedure.    **If you wear contact lenses, dentures, hearing aids, or glasses, bring a container to put them in during the procedure and give them to a family member for safekeeping.    **If you have been diagnosed with sleep apnea please bring your CPAP machine.    **If your doctor has scheduled you for an overnight stay, bring a small overnight bag with any personal items that you may need.    **Make arrangements in advance for transportation home by a responsible adult. It is not safe to drive a vehicle during the 24 hours following the procedure.    **All [facility] facilities and properties are tobacco free. Smoking is NOT allowed.    PLEASE NOTE: The procedure schedule has many variables which affect the time of your procedure. Family members should be available if your surgery time changes.    If you have any questions about these instructions call Interventional Radiology at [phone] Monday - Friday between 8:00am and 4:00pm or [phone] (ask for interventional radiology resident) for after hours.     Eva Skelton PA-C

## 2024-09-07 ENCOUNTER — CLINICAL SUPPORT (OUTPATIENT)
Dept: CARDIOLOGY | Facility: HOSPITAL | Age: 89
End: 2024-09-07
Attending: INTERNAL MEDICINE
Payer: MEDICARE

## 2024-09-07 ENCOUNTER — CLINICAL SUPPORT (OUTPATIENT)
Dept: CARDIOLOGY | Facility: HOSPITAL | Age: 89
End: 2024-09-07
Payer: MEDICARE

## 2024-09-07 DIAGNOSIS — I25.5 ISCHEMIC CARDIOMYOPATHY: ICD-10-CM

## 2024-09-07 DIAGNOSIS — Z95.810 PRESENCE OF AUTOMATIC (IMPLANTABLE) CARDIAC DEFIBRILLATOR: ICD-10-CM

## 2024-09-07 PROCEDURE — 93295 DEV INTERROG REMOTE 1/2/MLT: CPT | Mod: HCNC,,, | Performed by: INTERNAL MEDICINE

## 2024-09-07 PROCEDURE — 93296 REM INTERROG EVL PM/IDS: CPT | Mod: HCNC | Performed by: INTERNAL MEDICINE

## 2024-09-15 ENCOUNTER — NURSE TRIAGE (OUTPATIENT)
Dept: ADMINISTRATIVE | Facility: CLINIC | Age: 89
End: 2024-09-15
Payer: MEDICARE

## 2024-09-15 NOTE — TELEPHONE ENCOUNTER
Pt states constipation,drank (1) 10oz Mag citrate approx 14 hours ago. Pt states since mag citrate, going to bathroom every 10-15 mins, just liquids. Brown. States feels ok, staying hydrated. Pt very hard of hearing, call disconnects. Attempted to contact pt, no answer. Unable to complete triage.   Reason for Disposition   Unable to complete triage due to phone connection issues    Protocols used: No Contact or Duplicate Contact Call-A-AH

## 2024-09-27 LAB
OHS CV AF BURDEN PERCENT: < 1
OHS CV DC REMOTE DEVICE TYPE: NORMAL
OHS CV ICD SHOCK: NO
OHS CV RV PACING PERCENT: 1.5 %

## 2024-09-30 ENCOUNTER — PATIENT MESSAGE (OUTPATIENT)
Dept: INTERVENTIONAL RADIOLOGY/VASCULAR | Facility: HOSPITAL | Age: 89
End: 2024-09-30
Payer: MEDICARE

## 2024-10-02 ENCOUNTER — DOCUMENTATION ONLY (OUTPATIENT)
Dept: PREADMISSION TESTING | Facility: HOSPITAL | Age: 89
End: 2024-10-02
Payer: MEDICARE

## 2024-10-02 ENCOUNTER — PATIENT MESSAGE (OUTPATIENT)
Dept: INTERNAL MEDICINE | Facility: CLINIC | Age: 89
End: 2024-10-02
Payer: MEDICARE

## 2024-10-10 ENCOUNTER — LAB VISIT (OUTPATIENT)
Dept: LAB | Facility: HOSPITAL | Age: 89
End: 2024-10-10
Attending: INTERNAL MEDICINE
Payer: MEDICARE

## 2024-10-10 DIAGNOSIS — N18.31 CHRONIC KIDNEY DISEASE, STAGE 3A: ICD-10-CM

## 2024-10-10 LAB
ALBUMIN SERPL BCP-MCNC: 3 G/DL (ref 3.5–5.2)
ALP SERPL-CCNC: 165 U/L (ref 55–135)
ALT SERPL W/O P-5'-P-CCNC: 54 U/L (ref 10–44)
ANION GAP SERPL CALC-SCNC: 8 MMOL/L (ref 8–16)
AST SERPL-CCNC: 45 U/L (ref 10–40)
BASOPHILS # BLD AUTO: 0.06 K/UL (ref 0–0.2)
BASOPHILS NFR BLD: 0.7 % (ref 0–1.9)
BILIRUB DIRECT SERPL-MCNC: 0.4 MG/DL (ref 0.1–0.3)
BILIRUB SERPL-MCNC: 0.8 MG/DL (ref 0.1–1)
BUN SERPL-MCNC: 17 MG/DL (ref 8–23)
CALCIUM SERPL-MCNC: 9.8 MG/DL (ref 8.7–10.5)
CHLORIDE SERPL-SCNC: 107 MMOL/L (ref 95–110)
CHOLEST SERPL-MCNC: 117 MG/DL (ref 120–199)
CHOLEST/HDLC SERPL: 3 {RATIO} (ref 2–5)
CO2 SERPL-SCNC: 26 MMOL/L (ref 23–29)
CREAT SERPL-MCNC: 1.3 MG/DL (ref 0.5–1.4)
DIFFERENTIAL METHOD BLD: ABNORMAL
EOSINOPHIL # BLD AUTO: 0.2 K/UL (ref 0–0.5)
EOSINOPHIL NFR BLD: 1.8 % (ref 0–8)
ERYTHROCYTE [DISTWIDTH] IN BLOOD BY AUTOMATED COUNT: 15 % (ref 11.5–14.5)
EST. GFR  (NO RACE VARIABLE): 52.5 ML/MIN/1.73 M^2
GLUCOSE SERPL-MCNC: 92 MG/DL (ref 70–110)
HCT VFR BLD AUTO: 43.8 % (ref 40–54)
HDLC SERPL-MCNC: 39 MG/DL (ref 40–75)
HDLC SERPL: 33.3 % (ref 20–50)
HGB BLD-MCNC: 13.9 G/DL (ref 14–18)
IMM GRANULOCYTES # BLD AUTO: 0.04 K/UL (ref 0–0.04)
IMM GRANULOCYTES NFR BLD AUTO: 0.5 % (ref 0–0.5)
LDLC SERPL CALC-MCNC: 66.2 MG/DL (ref 63–159)
LYMPHOCYTES # BLD AUTO: 2.6 K/UL (ref 1–4.8)
LYMPHOCYTES NFR BLD: 30.6 % (ref 18–48)
MCH RBC QN AUTO: 28.4 PG (ref 27–31)
MCHC RBC AUTO-ENTMCNC: 31.7 G/DL (ref 32–36)
MCV RBC AUTO: 89 FL (ref 82–98)
MONOCYTES # BLD AUTO: 1 K/UL (ref 0.3–1)
MONOCYTES NFR BLD: 12.2 % (ref 4–15)
NEUTROPHILS # BLD AUTO: 4.6 K/UL (ref 1.8–7.7)
NEUTROPHILS NFR BLD: 54.2 % (ref 38–73)
NONHDLC SERPL-MCNC: 78 MG/DL
NRBC BLD-RTO: 0 /100 WBC
PLATELET # BLD AUTO: 150 K/UL (ref 150–450)
PMV BLD AUTO: 12.5 FL (ref 9.2–12.9)
POTASSIUM SERPL-SCNC: 3.8 MMOL/L (ref 3.5–5.1)
PROT SERPL-MCNC: 7 G/DL (ref 6–8.4)
PTH-INTACT SERPL-MCNC: 47.2 PG/ML (ref 9–77)
RBC # BLD AUTO: 4.9 M/UL (ref 4.6–6.2)
SODIUM SERPL-SCNC: 141 MMOL/L (ref 136–145)
TRIGL SERPL-MCNC: 59 MG/DL (ref 30–150)
WBC # BLD AUTO: 8.54 K/UL (ref 3.9–12.7)

## 2024-10-10 PROCEDURE — 80048 BASIC METABOLIC PNL TOTAL CA: CPT | Mod: HCNC | Performed by: INTERNAL MEDICINE

## 2024-10-10 PROCEDURE — 83970 ASSAY OF PARATHORMONE: CPT | Mod: HCNC | Performed by: INTERNAL MEDICINE

## 2024-10-10 PROCEDURE — 85025 COMPLETE CBC W/AUTO DIFF WBC: CPT | Mod: HCNC | Performed by: INTERNAL MEDICINE

## 2024-10-10 PROCEDURE — 80061 LIPID PANEL: CPT | Mod: HCNC | Performed by: INTERNAL MEDICINE

## 2024-10-10 PROCEDURE — 36415 COLL VENOUS BLD VENIPUNCTURE: CPT | Mod: HCNC,PO | Performed by: INTERNAL MEDICINE

## 2024-10-10 PROCEDURE — 80076 HEPATIC FUNCTION PANEL: CPT | Mod: HCNC | Performed by: INTERNAL MEDICINE

## 2024-10-13 NOTE — PROGRESS NOTES
Hi  Patient had mild elevation in lft , I may hold statin but do you see any relation to the NTMB infection?    Assessment:         1. Chronic kidney disease, stage 3a    2. Need for vaccination    3. Elevated LFTs          Plan:           Nelson was seen today for follow-up.    Diagnoses and all orders for this visit:    Chronic kidney disease, stage 3a  -     Microalbumin/Creatinine Ratio, Urine; Standing  -     PTH, Intact; Standing  -     Lipid Panel; Standing  -     Hepatic Function Panel; Standing  -     CBC Auto Differential; Standing  -     Basic Metabolic Panel; Standing    Need for vaccination  -     influenza (adjuvanted) (Fluad) 45 mcg/0.5 mL IM vaccine (> or = 64 yo) 0.5 mL    Elevated LFTs  -     Hepatic Function Panel; Standing        Assessment & Plan    Assessed slightly elevated liver function tests (LFTs): AST 45, ALT 44, and ALP 55. Considered potential causes including medications, recent illness, and non-tubercular mycobacterium infection  Evaluated cholesterol levels: slight decrease in HDL (39) and increase in LDL (66), but overall still within acceptable range  Reviewed elevated urine microalbumin to creatinine ratio, contextualizing it as mildly elevated compared to severe kidney disease  Auscultated lungs, noting some crackles but no significant changes warranting immediate intervention  Will monitor LFTs conservatively given mild elevation and multiple potential contributing factors  Planned to consult with ID specialist regarding potential contribution of non-tubercular mycobacterium infection to LFT elevation    LIVER FUNCTION:   Explained liver function tests and their significance, emphasizing that current elevations are mild.   Discussed common causes of elevated LFTs, including medications, illness, and alcohol consumption.   Repeat liver function tests ordered in 4 weeks.   Contact the office if experiencing yellowing of skin, overall achiness, or nausea.    KIDNEY FUNCTION:    "Explained microalbumin to creatinine ratio and its relation to kidney function.   Edward to maintain adequate hydration to support kidney function.   Edward to aim for straw-colored urine.   Explained relationship between urine color and hydration status.   Continued Entresto for urine microalbumin.    HYPERLIPIDEMIA:   Educated on the impact of exercise on HDL cholesterol levels.   Continued pravastatin for cholesterol management.    CARDIOVASCULAR HEALTH:   Edward to continue treadmill exercise 3 days per week.   Edward to attempt incorporating some body exercises as tolerated, being mindful of lung condition.   Continued furosemide as prescribed by cardiologist.    FLU VACCINATION:   High-dose flu vaccine administered.    FOLLOW UP:   Follow up in February, to be scheduled on the same day as spouse's appointment.           Ask ID about lft  4 week lft repeat he prefers   Fu in 6 months     Subjective:       Patient ID: Nelson GIBBONS Parent is a 89 y.o. male.    Chief Complaint: Follow-up      Interim Hx        History of Present Illness    CHIEF COMPLAINT:  Nelson presents today for follow up.    RESPIRATORY CONCERNS:  He has a known non-tubercular mycobacterium infection. Infectious Disease recommended watchful waiting with continued sputum culture surveillance and a repeat CT scan in 6 months. He was advised to take a proton pump inhibitor (PPI). He reports significant impact on energy and stamina due to his respiratory condition, describing having no energy, no "get up and go," and no stamina. He notes quick recovery, but proportionate to energy expended. He acknowledges that while his current respiratory symptoms are manageable and not always pleasant, he can handle them. He expresses hesitancy about starting treatment for atypical mycobacterium, citing concerns about potential side effects.    CARDIOVASCULAR:  His vascular surgery procedure has been rescheduled for October 25th, after being postponed twice " previously. He expresses uncertainty about the necessity of the procedure. He is taking Entresto for microalbuminuria and furosemide as prescribed by his cardiologist.    LAB RESULTS:  Recent lab results from October 10th show stable GFR, blood counts, and lipid levels. He notes a slight increase in urine microalbumin and liver function tests. His HDL cholesterol decreased from 50 to 39, while LDL increased from 55 to 66. Overall cholesterol level is 117. He acknowledges a history of low hemoglobin and white blood cell counts.    MEDICATIONS:  Current medications include Entresto, pravastatin for cholesterol management, and furosemide.    EXERCISE:  He uses the treadmill three days a week for exercise. He has reduced body exercises due to his lung condition, noting that these activities cause him to become fatigued quickly.    URINARY CHANGES:  He reports changes in urine color from pale yellow to very light shantelle, to recently becoming darker. He expresses concern about the relationship between urine color and kidney function.    PREVENTIVE CARE:  He received the high-dose flu vaccine during the visit.      ROS:  General: -fever, -chills, +fatigue, -weight gain, -weight loss  Eyes: -vision changes, -redness, -discharge  ENT: -ear pain, -nasal congestion, -sore throat  Cardiovascular: -chest pain, -palpitations, -lower extremity edema  Respiratory: -cough, -shortness of breath  Gastrointestinal: -abdominal pain, -nausea, -vomiting, -diarrhea, -constipation, -blood in stool  Genitourinary: -dysuria, -hematuria, -frequency, -urine changes  Musculoskeletal: -joint pain, -muscle pain  Skin: -rash, -lesion  Neurological: -headache, -dizziness, -numbness, -tingling  Psychiatric: -anxiety, -depression, -sleep difficulty           Review of Systems   All other systems reviewed and are negative.          Health Maintenance Due   Topic Date Due    Aspirin/Antiplatelet Therapy  Never done    RSV Vaccine (Age 60+ and Pregnant  patients) (1 - 1-dose 75+ series) Never done    TETANUS VACCINE  11/22/2023    COVID-19 Vaccine (6 - 2024-25 season) 09/01/2024         Objective:     /60   Pulse 66   Ht 6' (1.829 m)   Wt 74.7 kg (164 lb 10.9 oz)   SpO2 97%   BMI 22.34 kg/m²   .Physical Exam    General: No acute distress. Well-developed. Well-nourished.  Eyes: EOMI. Sclerae anicteric.  HENT: Normocephalic. Atraumatic. Nares patent. Moist oral mucosa.  Ears: Bilateral TMs clear. Bilateral EACs clear.  Cardiovascular: Regular rate. Regular rhythm. No murmurs. No rubs. No gallops. Normal S1, S2.  Respiratory: Normal respiratory effort. Clear to auscultation bilaterally. No rales. No rhonchi. No wheezing. Crackles in lungs.  Abdomen: Soft. Non-tender. Non-distended. Normoactive bowel sounds.  Musculoskeletal: No  obvious deformity.  Extremities: No lower extremity edema.  Neurological: Alert & oriented x3. No slurred speech. Normal gait.  Psychiatric: Normal mood. Normal affect. Good insight. Good judgment.  Skin: Warm. Dry. No rash.                Physical Exam  Pulmonary:      Breath sounds: Rhonchi present.           Recent Results (from the past 2 weeks)   Microalbumin/Creatinine Ratio, Urine    Collection Time: 10/10/24  8:35 AM   Result Value Ref Range    Microalbumin, Urine 35.0 ug/mL    Creatinine, Urine 73.0 23.0 - 375.0 mg/dL    Microalb/Creat Ratio 47.9 (H) 0.0 - 30.0 ug/mg   Basic Metabolic Panel    Collection Time: 10/10/24  8:47 AM   Result Value Ref Range    Sodium 141 136 - 145 mmol/L    Potassium 3.8 3.5 - 5.1 mmol/L    Chloride 107 95 - 110 mmol/L    CO2 26 23 - 29 mmol/L    Glucose 92 70 - 110 mg/dL    BUN 17 8 - 23 mg/dL    Creatinine 1.3 0.5 - 1.4 mg/dL    Calcium 9.8 8.7 - 10.5 mg/dL    Anion Gap 8 8 - 16 mmol/L    eGFR 52.5 (A) >60 mL/min/1.73 m^2   CBC Auto Differential    Collection Time: 10/10/24  8:47 AM   Result Value Ref Range    WBC 8.54 3.90 - 12.70 K/uL    RBC 4.90 4.60 - 6.20 M/uL    Hemoglobin 13.9 (L) 14.0  - 18.0 g/dL    Hematocrit 43.8 40.0 - 54.0 %    MCV 89 82 - 98 fL    MCH 28.4 27.0 - 31.0 pg    MCHC 31.7 (L) 32.0 - 36.0 g/dL    RDW 15.0 (H) 11.5 - 14.5 %    Platelets 150 150 - 450 K/uL    MPV 12.5 9.2 - 12.9 fL    Immature Granulocytes 0.5 0.0 - 0.5 %    Gran # (ANC) 4.6 1.8 - 7.7 K/uL    Immature Grans (Abs) 0.04 0.00 - 0.04 K/uL    Lymph # 2.6 1.0 - 4.8 K/uL    Mono # 1.0 0.3 - 1.0 K/uL    Eos # 0.2 0.0 - 0.5 K/uL    Baso # 0.06 0.00 - 0.20 K/uL    nRBC 0 0 /100 WBC    Gran % 54.2 38.0 - 73.0 %    Lymph % 30.6 18.0 - 48.0 %    Mono % 12.2 4.0 - 15.0 %    Eosinophil % 1.8 0.0 - 8.0 %    Basophil % 0.7 0.0 - 1.9 %    Differential Method Automated    Hepatic Function Panel    Collection Time: 10/10/24  8:47 AM   Result Value Ref Range    Total Protein 7.0 6.0 - 8.4 g/dL    Albumin 3.0 (L) 3.5 - 5.2 g/dL    Total Bilirubin 0.8 0.1 - 1.0 mg/dL    Bilirubin, Direct 0.4 (H) 0.1 - 0.3 mg/dL    AST 45 (H) 10 - 40 U/L    ALT 54 (H) 10 - 44 U/L    Alkaline Phosphatase 165 (H) 55 - 135 U/L   Lipid Panel    Collection Time: 10/10/24  8:47 AM   Result Value Ref Range    Cholesterol 117 (L) 120 - 199 mg/dL    Triglycerides 59 30 - 150 mg/dL    HDL 39 (L) 40 - 75 mg/dL    LDL Cholesterol 66.2 63.0 - 159.0 mg/dL    HDL/Cholesterol Ratio 33.3 20.0 - 50.0 %    Total Cholesterol/HDL Ratio 3.0 2.0 - 5.0    Non-HDL Cholesterol 78 mg/dL   PTH, Intact    Collection Time: 10/10/24  8:47 AM   Result Value Ref Range    PTH, Intact 47.2 9.0 - 77.0 pg/mL         Future Appointments   Date Time Provider Department Center   10/25/2024  9:30 AM [facility] [id number] [facility] RAD IR [facility]   11/11/2024 11:15 AM LAB, [facility] LAB [place]   12/5/2024  2:00 PM Roselia Bradford MD [facility] PULMO [facility]   2/6/2025  2:30 PM [facility] OIC-CT1 500 LB LIMIT [facility]   2/10/2025  1:20 PM Jerry Castro III, MD [facility] [place]   2/28/2025 11:00 AM LAB, [facility] LAB [place]   2/28/2025 11:00 AM SPECIMEN, DEANNA MORROW SPECLAB  [place]   3/3/2025  1:00 PM Amanda Sahu PA-C [facility] FAM MED [place]         Medication List with Changes/Refills   Current Medications    AMLODIPINE (NORVASC) 10 MG TABLET    TAKE 1 TABLET EVERY DAY    AZELASTINE (ASTELIN) 137 MCG (0.1 %) NASAL SPRAY    1 spray (137 mcg total) by Nasal route 2 (two) times daily.    BETA-CAROTENE,A,-VITS C,E/MINS (OCUVITE ORAL)    Take by mouth every evening.    FAMOTIDINE (PEPCID) 20 MG TABLET    Take 20 mg by mouth before dinner.    FLUTICASONE PROPIONATE (FLONASE) 50 MCG/ACTUATION NASAL SPRAY    1 spray (50 mcg total) by Each Nostril route once daily.    FUROSEMIDE (LASIX) 20 MG TABLET    Take 1 tablet (20 mg total) by mouth 2 (two) times daily. Take one tablet every other day orbas directed    METOPROLOL SUCCINATE (TOPROL-XL) 25 MG 24 HR TABLET    TAKE 1 TABLET TWICE DAILY    MULTIVITAMIN WITH MINERALS TABLET    Take 1 tablet by mouth every evening.    NEBULIZER AND COMPRESSOR DEEPTHI    1 Device by Misc.(Non-Drug; Combo Route) route 2 (two) times a day.    NIACIN 500 MG TABLET    Take 500 mg by mouth Every PM.    NITROGLYCERIN (NITROSTAT) 0.4 MG SL TABLET    Take 1 tab under the tongue for chest pain. May repeat every 5 minutes for a total of 3 doses. If chest pain not relieved go to the ED.    OMEGA-3 FATTY ACIDS-VITAMIN E 1,000 MG CAP    Take 1 capsule by mouth every evening.    POTASSIUM CHLORIDE SA (K-DUR,KLOR-CON M) 10 MEQ TABLET    Take 1 tablet (10 mEq total) by mouth once daily.    PRAVASTATIN (PRAVACHOL) 40 MG TABLET    Take 1 tablet (40 mg total) by mouth every evening.    SACUBITRIL-VALSARTAN (ENTRESTO)  MG PER TABLET    Take 1 tablet by mouth 2 (two) times daily.    SODIUM CHLORIDE 3% 3 % NEBULIZER SOLUTION    Take 4 mLs by nebulization 2 (two) times a day.    SOTALOL (BETAPACE) 120 MG TAB    Take 1 tablet (120 mg total) by mouth 2 (two) times daily.    UBIDECARENONE (COENZYME Q10 ORAL)    Take 1 tablet by mouth every evening.    VITAMIN D 1000 UNITS TAB     Take 1,000 Units by mouth every Tues, Thurs, Sat. TAKES AT NIGHT         Disclaimer:  This note has been generated using voice-recognition software. There may be grammatical or spelling errors that have been missed during proof-reading   This note was generated with the assistance of ambient listening technology. Verbal consent was obtained by the patient and accompanying visitor(s) for the recording of patient appointment to facilitate this note. I attest to having reviewed and edited the generated note for accuracy, though some syntax or spelling errors may persist. Please contact the author of this note for any clarification.       no

## 2024-10-14 ENCOUNTER — PATIENT OUTREACH (OUTPATIENT)
Dept: FAMILY MEDICINE | Facility: CLINIC | Age: 89
End: 2024-10-14
Payer: MEDICARE

## 2024-10-14 ENCOUNTER — OFFICE VISIT (OUTPATIENT)
Dept: INTERNAL MEDICINE | Facility: CLINIC | Age: 89
End: 2024-10-14
Payer: MEDICARE

## 2024-10-14 VITALS
HEIGHT: 72 IN | SYSTOLIC BLOOD PRESSURE: 126 MMHG | BODY MASS INDEX: 22.31 KG/M2 | DIASTOLIC BLOOD PRESSURE: 60 MMHG | WEIGHT: 164.69 LBS | OXYGEN SATURATION: 97 % | HEART RATE: 66 BPM

## 2024-10-14 DIAGNOSIS — Z23 NEED FOR VACCINATION: ICD-10-CM

## 2024-10-14 DIAGNOSIS — N18.31 CHRONIC KIDNEY DISEASE, STAGE 3A: Primary | ICD-10-CM

## 2024-10-14 DIAGNOSIS — R79.89 ELEVATED LFTS: ICD-10-CM

## 2024-10-14 PROCEDURE — 1159F MED LIST DOCD IN RCRD: CPT | Mod: HCNC,CPTII,S$GLB, | Performed by: INTERNAL MEDICINE

## 2024-10-14 PROCEDURE — 99999 PR PBB SHADOW E&M-EST. PATIENT-LVL V: CPT | Mod: PBBFAC,HCNC,, | Performed by: INTERNAL MEDICINE

## 2024-10-14 PROCEDURE — 90653 IIV ADJUVANT VACCINE IM: CPT | Mod: HCNC,S$GLB,, | Performed by: INTERNAL MEDICINE

## 2024-10-14 PROCEDURE — 3288F FALL RISK ASSESSMENT DOCD: CPT | Mod: HCNC,CPTII,S$GLB, | Performed by: INTERNAL MEDICINE

## 2024-10-14 PROCEDURE — 1160F RVW MEDS BY RX/DR IN RCRD: CPT | Mod: HCNC,CPTII,S$GLB, | Performed by: INTERNAL MEDICINE

## 2024-10-14 PROCEDURE — 1157F ADVNC CARE PLAN IN RCRD: CPT | Mod: HCNC,CPTII,S$GLB, | Performed by: INTERNAL MEDICINE

## 2024-10-14 PROCEDURE — 1126F AMNT PAIN NOTED NONE PRSNT: CPT | Mod: HCNC,CPTII,S$GLB, | Performed by: INTERNAL MEDICINE

## 2024-10-14 PROCEDURE — 99214 OFFICE O/P EST MOD 30 MIN: CPT | Mod: HCNC,S$GLB,, | Performed by: INTERNAL MEDICINE

## 2024-10-14 PROCEDURE — G0008 ADMIN INFLUENZA VIRUS VAC: HCPCS | Mod: HCNC,S$GLB,, | Performed by: INTERNAL MEDICINE

## 2024-10-14 PROCEDURE — 1101F PT FALLS ASSESS-DOCD LE1/YR: CPT | Mod: HCNC,CPTII,S$GLB, | Performed by: INTERNAL MEDICINE

## 2024-10-14 NOTE — Clinical Note
Hi Patient had mild elevation in lft , I may hold statin but do you see any relation to the NTMB infection?

## 2024-10-17 ENCOUNTER — TELEPHONE (OUTPATIENT)
Dept: INTERNAL MEDICINE | Facility: CLINIC | Age: 89
End: 2024-10-17
Payer: MEDICARE

## 2024-10-17 NOTE — TELEPHONE ENCOUNTER
----- Message from Jerry Castro MD sent at 10/15/2024 11:26 AM CDT -----  Please call patient let him know that I talked to ID and they do not this his liver test were related to the lung infections thankS!  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  Per her last note, he is not on treatment for NTM, so that wouldn't be the cause either.   Thanks,  sbs  ----- Message -----  From: Jerry aCstro III, MD  Sent: 10/14/2024   8:49 AM CDT  To: MD Len Ellis  Patient had mild elevation in lft , I may hold statin but do you see any relation to the NTMB infection?

## 2024-10-17 NOTE — TELEPHONE ENCOUNTER
Called and spoke with pt in regards to dr hansen message pt verbalize understands and would like to know what's the next step he would like a call back once dr hansen responds

## 2024-10-21 ENCOUNTER — PATIENT MESSAGE (OUTPATIENT)
Dept: INTERVENTIONAL RADIOLOGY/VASCULAR | Facility: HOSPITAL | Age: 89
End: 2024-10-21
Payer: MEDICARE

## 2024-10-23 ENCOUNTER — DOCUMENTATION ONLY (OUTPATIENT)
Dept: PREADMISSION TESTING | Facility: HOSPITAL | Age: 89
End: 2024-10-23
Payer: MEDICARE

## 2024-10-23 ENCOUNTER — DOCUMENTATION ONLY (OUTPATIENT)
Dept: CARDIOLOGY | Facility: HOSPITAL | Age: 89
End: 2024-10-23
Payer: MEDICARE

## 2024-10-23 NOTE — PRE-PROCEDURE INSTRUCTIONS
PRE-OP INSTRUCTIONS:  Instructed patient to have no food,milk or milk products after midnight 10/24/2024  Clear liquids are allowed up until 2 hours before surgery  Medication instructions for pm prior to and am of surgery reviewed.  Instructed patient to avoid taking vitamins,supplements,aspirin or ibuprofen the am of surgery.  Patient prefers to hold all medications the am of surgery  Shower instructions provided    Patient denies any side effects or issues with anesthesia or sedation.     PT HAS A ST DIA AICD.BART SEN IN THE [facility] NOTIFIED AT 1:20 PM ON 10/23/2024 AND REPLIED A MAGNET MAY BE PLACED DIRECTLY OVER THE DEVICE FOR THE DURATION OF THE PROCEDURE IF CAUTERY WILL BE USED

## 2024-10-23 NOTE — PROGRESS NOTES
Patient has been identified as having an implanted cardiac rhythm device (CRD); the implanted device is a [employer] defibrillator.      The planned surgical procedure is a ANES SSCU- Type 2 endoleak repair .    No noted pacer dependency.       DEFIBRILLATORS/NON-DEPENDENT ANY LOCATION    Per protocol,a magnet should be applied directly over the implanted device during entire surgical procedure when electrocautery will be used.    If no electrocautery is planned, magnet application is not needed.    For additional questions, please contact the Arrhythmia Department at Ext [phone].

## 2024-10-25 ENCOUNTER — ANESTHESIA EVENT (OUTPATIENT)
Dept: INTERVENTIONAL RADIOLOGY/VASCULAR | Facility: HOSPITAL | Age: 89
End: 2024-10-25
Payer: MEDICARE

## 2024-10-25 ENCOUNTER — HOSPITAL ENCOUNTER (OUTPATIENT)
Dept: INTERVENTIONAL RADIOLOGY/VASCULAR | Facility: HOSPITAL | Age: 89
Discharge: HOME | End: 2024-10-25
Attending: PHYSICIAN ASSISTANT
Payer: MEDICARE

## 2024-10-25 VITALS
RESPIRATION RATE: 14 BRPM | OXYGEN SATURATION: 93 % | HEIGHT: 72 IN | TEMPERATURE: 98 F | WEIGHT: 162 LBS | BODY MASS INDEX: 21.94 KG/M2 | DIASTOLIC BLOOD PRESSURE: 70 MMHG | HEART RATE: 62 BPM | SYSTOLIC BLOOD PRESSURE: 151 MMHG

## 2024-10-25 DIAGNOSIS — I97.89 TYPE II ENDOLEAK OF AORTIC GRAFT: Primary | ICD-10-CM

## 2024-10-25 DIAGNOSIS — Z95.810 AICD (AUTOMATIC CARDIOVERTER/DEFIBRILLATOR) PRESENT: Chronic | ICD-10-CM

## 2024-10-25 DIAGNOSIS — D69.6 THROMBOCYTOPENIA: ICD-10-CM

## 2024-10-25 DIAGNOSIS — Z79.01 LONG TERM CURRENT USE OF ANTICOAGULANT: ICD-10-CM

## 2024-10-25 LAB
ALBUMIN SERPL BCP-MCNC: 3.5 G/DL (ref 3.5–5.2)
ALP SERPL-CCNC: 159 U/L (ref 40–150)
ALT SERPL W/O P-5'-P-CCNC: 26 U/L (ref 10–44)
ANION GAP SERPL CALC-SCNC: 9 MMOL/L (ref 8–16)
AST SERPL-CCNC: 27 U/L (ref 10–40)
BASOPHILS # BLD AUTO: 0.07 K/UL (ref 0–0.2)
BASOPHILS NFR BLD: 0.9 % (ref 0–1.9)
BILIRUB SERPL-MCNC: 0.7 MG/DL (ref 0.1–1)
BUN SERPL-MCNC: 18 MG/DL (ref 8–23)
CALCIUM SERPL-MCNC: 10 MG/DL (ref 8.7–10.5)
CHLORIDE SERPL-SCNC: 108 MMOL/L (ref 95–110)
CO2 SERPL-SCNC: 25 MMOL/L (ref 23–29)
CREAT SERPL-MCNC: 1.5 MG/DL (ref 0.5–1.4)
DIFFERENTIAL METHOD BLD: ABNORMAL
EOSINOPHIL # BLD AUTO: 0.2 K/UL (ref 0–0.5)
EOSINOPHIL NFR BLD: 2 % (ref 0–8)
ERYTHROCYTE [DISTWIDTH] IN BLOOD BY AUTOMATED COUNT: 15.1 % (ref 11.5–14.5)
EST. GFR  (NO RACE VARIABLE): 44.2 ML/MIN/1.73 M^2
GLUCOSE SERPL-MCNC: 104 MG/DL (ref 70–110)
HCT VFR BLD AUTO: 43.3 % (ref 40–54)
HGB BLD-MCNC: 14 G/DL (ref 14–18)
IMM GRANULOCYTES # BLD AUTO: 0.03 K/UL (ref 0–0.04)
IMM GRANULOCYTES NFR BLD AUTO: 0.4 % (ref 0–0.5)
INR PPP: 1 (ref 0.8–1.2)
LYMPHOCYTES # BLD AUTO: 2.1 K/UL (ref 1–4.8)
LYMPHOCYTES NFR BLD: 27.1 % (ref 18–48)
MCH RBC QN AUTO: 27.9 PG (ref 27–31)
MCHC RBC AUTO-ENTMCNC: 32.3 G/DL (ref 32–36)
MCV RBC AUTO: 86 FL (ref 82–98)
MONOCYTES # BLD AUTO: 1 K/UL (ref 0.3–1)
MONOCYTES NFR BLD: 12 % (ref 4–15)
NEUTROPHILS # BLD AUTO: 4.6 K/UL (ref 1.8–7.7)
NEUTROPHILS NFR BLD: 57.6 % (ref 38–73)
NRBC BLD-RTO: 0 /100 WBC
PLATELET # BLD AUTO: 172 K/UL (ref 150–450)
PMV BLD AUTO: 11.7 FL (ref 9.2–12.9)
POTASSIUM SERPL-SCNC: 4.2 MMOL/L (ref 3.5–5.1)
PROT SERPL-MCNC: 7.7 G/DL (ref 6–8.4)
PROTHROMBIN TIME: 11 SEC (ref 9–12.5)
RBC # BLD AUTO: 5.02 M/UL (ref 4.6–6.2)
SODIUM SERPL-SCNC: 142 MMOL/L (ref 136–145)
WBC # BLD AUTO: 7.9 K/UL (ref 3.9–12.7)

## 2024-10-25 PROCEDURE — 27000221 HC OXYGEN, UP TO 24 HOURS: Mod: HCNC

## 2024-10-25 PROCEDURE — 63600175 PHARM REV CODE 636 W HCPCS: Mod: HCNC

## 2024-10-25 PROCEDURE — 37000009 HC ANESTHESIA EA ADD 15 MINS: Mod: HCNC

## 2024-10-25 PROCEDURE — 85610 PROTHROMBIN TIME: CPT | Mod: HCNC

## 2024-10-25 PROCEDURE — 25500020 PHARM REV CODE 255: Mod: HCNC | Performed by: INTERNAL MEDICINE

## 2024-10-25 PROCEDURE — 85025 COMPLETE CBC W/AUTO DIFF WBC: CPT | Mod: HCNC

## 2024-10-25 PROCEDURE — D9220A PRA ANESTHESIA: Mod: HCNC,CRNA,, | Performed by: NURSE ANESTHETIST, CERTIFIED REGISTERED

## 2024-10-25 PROCEDURE — 25000003 PHARM REV CODE 250: Mod: HCNC

## 2024-10-25 PROCEDURE — 27800903 OPTIME MED/SURG SUP & DEVICES OTHER IMPLANTS: Mod: HCNC

## 2024-10-25 PROCEDURE — 63600175 PHARM REV CODE 636 W HCPCS: Mod: JZ,JG,HCNC | Performed by: INTERNAL MEDICINE

## 2024-10-25 PROCEDURE — D9220A PRA ANESTHESIA: Mod: HCNC,ANES,, | Performed by: ANESTHESIOLOGY

## 2024-10-25 PROCEDURE — C1887 CATHETER, GUIDING: HCPCS | Mod: HCNC

## 2024-10-25 PROCEDURE — C1760 CLOSURE DEV, VASC: HCPCS | Mod: HCNC

## 2024-10-25 PROCEDURE — 80053 COMPREHEN METABOLIC PANEL: CPT | Mod: HCNC

## 2024-10-25 PROCEDURE — 94761 N-INVAS EAR/PLS OXIMETRY MLT: CPT | Mod: HCNC

## 2024-10-25 PROCEDURE — C1757 CATH, THROMBECTOMY/EMBOLECT: HCPCS | Mod: HCNC

## 2024-10-25 PROCEDURE — 37000008 HC ANESTHESIA 1ST 15 MINUTES: Mod: HCNC

## 2024-10-25 PROCEDURE — 25000003 PHARM REV CODE 250: Mod: HCNC | Performed by: NURSE ANESTHETIST, CERTIFIED REGISTERED

## 2024-10-25 PROCEDURE — C1769 GUIDE WIRE: HCPCS | Mod: HCNC

## 2024-10-25 PROCEDURE — C1894 INTRO/SHEATH, NON-LASER: HCPCS | Mod: HCNC

## 2024-10-25 PROCEDURE — 63600175 PHARM REV CODE 636 W HCPCS: Mod: HCNC | Performed by: NURSE ANESTHETIST, CERTIFIED REGISTERED

## 2024-10-25 RX ORDER — SODIUM CHLORIDE 9 MG/ML
INJECTION, SOLUTION INTRAVENOUS CONTINUOUS
Status: DISCONTINUED | OUTPATIENT
Start: 2024-10-25 | End: 2024-10-26 | Stop reason: HOSPADM

## 2024-10-25 RX ORDER — ONDANSETRON HYDROCHLORIDE 2 MG/ML
INJECTION, SOLUTION INTRAVENOUS
Status: DISCONTINUED | OUTPATIENT
Start: 2024-10-25 | End: 2024-10-25

## 2024-10-25 RX ORDER — EPHEDRINE SULFATE 50 MG/ML
INJECTION, SOLUTION INTRAVENOUS
Status: DISCONTINUED | OUTPATIENT
Start: 2024-10-25 | End: 2024-10-25

## 2024-10-25 RX ORDER — PROPOFOL 10 MG/ML
VIAL (ML) INTRAVENOUS
Status: DISCONTINUED | OUTPATIENT
Start: 2024-10-25 | End: 2024-10-25

## 2024-10-25 RX ORDER — SODIUM CHLORIDE, SODIUM LACTATE, POTASSIUM CHLORIDE, CALCIUM CHLORIDE 600; 310; 30; 20 MG/100ML; MG/100ML; MG/100ML; MG/100ML
INJECTION, SOLUTION INTRAVENOUS CONTINUOUS
Status: DISPENSED | OUTPATIENT
Start: 2024-10-25 | End: 2024-10-25

## 2024-10-25 RX ORDER — HYDROMORPHONE HYDROCHLORIDE 1 MG/ML
INJECTION, SOLUTION INTRAMUSCULAR; INTRAVENOUS; SUBCUTANEOUS
Status: DISCONTINUED
Start: 2024-10-25 | End: 2024-10-26 | Stop reason: HOSPADM

## 2024-10-25 RX ORDER — FENTANYL CITRATE 50 UG/ML
INJECTION, SOLUTION INTRAMUSCULAR; INTRAVENOUS
Status: DISCONTINUED | OUTPATIENT
Start: 2024-10-25 | End: 2024-10-25

## 2024-10-25 RX ORDER — GLUCAGON 1 MG
1 KIT INJECTION
Status: DISCONTINUED | OUTPATIENT
Start: 2024-10-25 | End: 2024-10-26 | Stop reason: HOSPADM

## 2024-10-25 RX ORDER — LIDOCAINE HYDROCHLORIDE 10 MG/ML
1 INJECTION, SOLUTION EPIDURAL; INFILTRATION; INTRACAUDAL; PERINEURAL ONCE
Status: DISCONTINUED | OUTPATIENT
Start: 2024-10-25 | End: 2024-10-26 | Stop reason: HOSPADM

## 2024-10-25 RX ORDER — SODIUM CHLORIDE 0.9 % (FLUSH) 0.9 %
10 SYRINGE (ML) INJECTION
Status: DISCONTINUED | OUTPATIENT
Start: 2024-10-25 | End: 2024-10-26 | Stop reason: HOSPADM

## 2024-10-25 RX ORDER — PHENYLEPHRINE HYDROCHLORIDE 10 MG/ML
INJECTION INTRAVENOUS
Status: DISCONTINUED | OUTPATIENT
Start: 2024-10-25 | End: 2024-10-25

## 2024-10-25 RX ORDER — HYDROMORPHONE HYDROCHLORIDE 1 MG/ML
0.2 INJECTION, SOLUTION INTRAMUSCULAR; INTRAVENOUS; SUBCUTANEOUS
Status: DISCONTINUED | OUTPATIENT
Start: 2024-10-25 | End: 2024-10-26 | Stop reason: HOSPADM

## 2024-10-25 RX ORDER — PROCHLORPERAZINE EDISYLATE 5 MG/ML
5 INJECTION INTRAMUSCULAR; INTRAVENOUS EVERY 30 MIN PRN
Status: DISCONTINUED | OUTPATIENT
Start: 2024-10-25 | End: 2024-10-26 | Stop reason: HOSPADM

## 2024-10-25 RX ORDER — ROCURONIUM BROMIDE 10 MG/ML
INJECTION, SOLUTION INTRAVENOUS
Status: DISCONTINUED | OUTPATIENT
Start: 2024-10-25 | End: 2024-10-25

## 2024-10-25 RX ORDER — LIDOCAINE HYDROCHLORIDE 20 MG/ML
INJECTION INTRAVENOUS
Status: DISCONTINUED | OUTPATIENT
Start: 2024-10-25 | End: 2024-10-25

## 2024-10-25 RX ORDER — OXYCODONE HYDROCHLORIDE 5 MG/1
5 TABLET ORAL
Status: DISCONTINUED | OUTPATIENT
Start: 2024-10-25 | End: 2024-10-26 | Stop reason: HOSPADM

## 2024-10-25 RX ADMIN — SUGAMMADEX 200 MG: 100 INJECTION, SOLUTION INTRAVENOUS at 03:10

## 2024-10-25 RX ADMIN — SODIUM CHLORIDE: 0.9 INJECTION, SOLUTION INTRAVENOUS at 01:10

## 2024-10-25 RX ADMIN — PHENYLEPHRINE HYDROCHLORIDE 100 MCG: 10 INJECTION INTRAVENOUS at 01:10

## 2024-10-25 RX ADMIN — ONDANSETRON 4 MG: 2 INJECTION INTRAMUSCULAR; INTRAVENOUS at 03:10

## 2024-10-25 RX ADMIN — SODIUM CHLORIDE, POTASSIUM CHLORIDE, SODIUM LACTATE AND CALCIUM CHLORIDE: 600; 310; 30; 20 INJECTION, SOLUTION INTRAVENOUS at 04:10

## 2024-10-25 RX ADMIN — EPHEDRINE SULFATE 10 MG: 50 INJECTION INTRAVENOUS at 11:10

## 2024-10-25 RX ADMIN — FENTANYL CITRATE 50 MCG: 50 INJECTION, SOLUTION INTRAMUSCULAR; INTRAVENOUS at 10:10

## 2024-10-25 RX ADMIN — ROCURONIUM BROMIDE 15 MG: 10 INJECTION INTRAVENOUS at 12:10

## 2024-10-25 RX ADMIN — FENTANYL CITRATE 25 MCG: 50 INJECTION, SOLUTION INTRAMUSCULAR; INTRAVENOUS at 11:10

## 2024-10-25 RX ADMIN — HYDROMORPHONE HYDROCHLORIDE 0.2 MG: 1 INJECTION, SOLUTION INTRAMUSCULAR; INTRAVENOUS; SUBCUTANEOUS at 04:10

## 2024-10-25 RX ADMIN — NITROGLYCERIN 1 ML: 20 INJECTION INTRAVENOUS at 12:10

## 2024-10-25 RX ADMIN — OXYCODONE 5 MG: 5 TABLET ORAL at 04:10

## 2024-10-25 RX ADMIN — SODIUM CHLORIDE: 0.9 INJECTION, SOLUTION INTRAVENOUS at 11:10

## 2024-10-25 RX ADMIN — PHENYLEPHRINE HYDROCHLORIDE 100 MCG: 10 INJECTION INTRAVENOUS at 12:10

## 2024-10-25 RX ADMIN — SODIUM CHLORIDE: 0.9 INJECTION, SOLUTION INTRAVENOUS at 03:10

## 2024-10-25 RX ADMIN — PHENYLEPHRINE HYDROCHLORIDE 100 MCG: 10 INJECTION INTRAVENOUS at 11:10

## 2024-10-25 RX ADMIN — EPHEDRINE SULFATE 10 MG: 50 INJECTION INTRAVENOUS at 01:10

## 2024-10-25 RX ADMIN — LIDOCAINE HYDROCHLORIDE 80 MG: 20 INJECTION INTRAVENOUS at 11:10

## 2024-10-25 RX ADMIN — ROCURONIUM BROMIDE 10 MG: 10 INJECTION INTRAVENOUS at 02:10

## 2024-10-25 RX ADMIN — ROCURONIUM BROMIDE 20 MG: 10 INJECTION INTRAVENOUS at 03:10

## 2024-10-25 RX ADMIN — SODIUM CHLORIDE: 0.9 INJECTION, SOLUTION INTRAVENOUS at 12:10

## 2024-10-25 RX ADMIN — PROPOFOL 100 MG: 10 INJECTION, EMULSION INTRAVENOUS at 11:10

## 2024-10-25 RX ADMIN — IOHEXOL 200 ML: 300 INJECTION, SOLUTION INTRAVENOUS at 04:10

## 2024-10-25 RX ADMIN — PHENYLEPHRINE HYDROCHLORIDE 100 MCG: 10 INJECTION INTRAVENOUS at 03:10

## 2024-10-25 RX ADMIN — ROCURONIUM BROMIDE 50 MG: 10 INJECTION INTRAVENOUS at 11:10

## 2024-10-25 NOTE — PROGRESS NOTES
Vital signs stable. Afebrile. Alert, oriented and following commands. Pain controlled with PRN meds. Denies nausea. Dressing to right groin remains CDI. IVF infusing per MD order. Pt voiding via external cath. POC reviewed with pt and spouse via phone and understanding verbalized. Pt transferred to [facility] for remainder of stay. Report given at bedside. Belongings with pt.

## 2024-10-25 NOTE — DISCHARGE INSTRUCTIONS
"Please call with any questions or concerns.    Monday through Friday 8:00 am - 4:30 pm  Interventional Radiology Clinic  [phone]    After hours/weekends  Ask the  for the "Interventional Radiology Physician on call"  [phone]      "

## 2024-10-25 NOTE — PLAN OF CARE
Pt arrived to IR Room 189 for endoleak repair with anesthesia. Pt oriented to unit and staff. Plan of care reviewed with patient, patient verbalizes understanding. Comfort measures utilized. Pt safely transferred from stretcher to procedural table. Fall risk reviewed with patient, fall risk interventions maintained. Safety strap applied, positioner pillows utilized to minimize pressure points. Blankets applied. Pt prepped and draped utilizing standard sterile technique. Patient placed on continuous monitoring, as required by sedation policy. Timeouts completed utilizing standard universal time-out, per department and facility policy. CRNA to remain at bedside, continuous monitoring maintained. Pt resting comfortably. Denies pain/discomfort. Will continue to monitor. See anesthesia flow sheets for monitoring, medication administration, and updates.

## 2024-10-25 NOTE — DISCHARGE SUMMARY
V[facility] Discharge Summary      Hospital Course: No complications    Admit Date: 10/25/2024  Discharge Date: 10/25/2024     Instructions Given to Patient: Yes  Diet: Resume prior diet  Activity:   No driving for 3 days, no heavy lifting or strenuous exercise for 10 days.     Description of Condition on Discharge: Stable  Vital Signs (Most Recent): Temp: 97.9 °F (36.6 °C) (10/25/24 0800)  Pulse: 62 (10/25/24 0800)  Resp: 20 (10/25/24 0800)  BP: (!) 150/67 (10/25/24 0800)  SpO2: 96 % (10/25/24 0800)    Discharge Disposition: Home    Discharge Diagnosis: Type II endoleak     Follow-up: As scheduled. Will call patient with any follow up plans.        Joe Espino MD  V[facility]

## 2024-10-25 NOTE — PLAN OF CARE
Procedure complete. Pt tolerated well. Recovery for 4 hr, hemostasis 1545 up at 1745. Site CDI. Pt transferred to PACU and report to be given bedside.

## 2024-10-25 NOTE — H&P
Radiology History & Physical      SUBJECTIVE:     Chief Complaint: Type 2 Endoleak    History of Present Illness:  Nelson GIBBONS Parent is a 89 y.o. male with PMHx CHF, CAD, MGUS, GERD; s/p AAA repair with imaging noting type 2 endoleak who presents today for endoleak repair.     Past Medical History:   Diagnosis Date    AICD (automatic cardioverter/defibrillator) present 7/17/2012    Cardiomyopathy     CKD (chronic kidney disease) stage 3, GFR 30-59 ml/min 7/17/2012    Coronary artery disease     GERD (gastroesophageal reflux disease)     Tovar's; Dr. Vu    Hyperlipidemia 7/17/2012    Hypertension 7/17/2012    Ischemic cardiomyopathy 7/17/2012    MGUS (monoclonal gammopathy of unknown significance)     Thrombocytopenia 7/17/2012    Ventricular tachycardia     VT (ventricular tachycardia) 7/17/2012     Past Surgical History:   Procedure Laterality Date    ABDOMINAL AORTIC ANEURYSM REPAIR      AORTOGRAPHY N/A 8/21/2024    Procedure: AORTOGRAM;  Surgeon: Gonzalez Granado MD;  Location: [facility] OR [address] [place] [address];  Service: Vascular;  Laterality: N/A;  4.4 min  401.06 mGy  70.8925 Gy.cm  49ml Dye    CARDIAC DEFIBRILLATOR PLACEMENT      CATARACT EXTRACTION, BILATERAL  2018    CORONARY ANGIOPLASTY      EYE SURGERY Bilateral     cataract    HERNIA REPAIR      x2    REPLACEMENT OF IMPLANTABLE CARDIOVERTER-DEFIBRILLATOR (ICD) GENERATOR Left 03/18/2022    Procedure: REPLACEMENT, ICD GENERATOR;  Surgeon: Felipe Woodard MD;  Location: [facility] EP LAB;  Service: Cardiology;  Laterality: Left;  SEFERINO, ICD gen chg, SJM, anes, MB, 3prep*ANUJ ICD insitu*        Home Meds:   Prior to Admission medications    Medication Sig Start Date End Date Taking? Authorizing Provider   amLODIPine (NORVASC) 10 MG tablet TAKE 1 TABLET EVERY DAY  Patient taking differently: Take 10 mg by mouth once daily. 5/8/24  Yes Bety Tomlinson MD   beta-carotene,A,-vits C,E/mins (OCUVITE ORAL) Take by mouth every evening.   Yes Provider, Historical   famotidine  (PEPCID) 20 MG tablet Take 20 mg by mouth before dinner.   Yes Provider, Historical   metoprolol succinate (TOPROL-XL) 25 MG 24 hr tablet TAKE 1 TABLET TWICE DAILY  Patient taking differently: Take 25 mg by mouth 2 (two) times daily. 3/20/24  Yes Bety Tomlinson MD   multivitamin with minerals tablet Take 1 tablet by mouth every evening. 1/1/18  Yes Provider, Historical   niacin 500 MG tablet Take 500 mg by mouth Every PM.   Yes Provider, Historical   omega-3 fatty acids-vitamin E 1,000 mg Cap Take 1 capsule by mouth every evening.   Yes Provider, Historical   potassium chloride SA (K-DUR,KLOR-CON M) 10 MEQ tablet Take 1 tablet (10 mEq total) by mouth once daily.  Patient taking differently: Take 10 mEq by mouth once daily. TAKES AT NOON 12/1/23  Yes Shiraz Cartagena MD   pravastatin (PRAVACHOL) 40 MG tablet Take 1 tablet (40 mg total) by mouth every evening. 8/27/24  Yes Jerry Castro III, MD   sacubitriL-valsartan (ENTRESTO)  mg per tablet Take 1 tablet by mouth 2 (two) times daily. 12/18/23  Yes Shiraz Cartagena MD   sotaloL (BETAPACE) 120 MG Tab Take 1 tablet (120 mg total) by mouth 2 (two) times daily. 6/18/24  Yes Solange Moon PA-C   ubidecarenone (COENZYME Q10 ORAL) Take 1 tablet by mouth every evening. 12/9/22  Yes Provider, Historical   vitamin D 1000 units Tab Take 1,000 Units by mouth every Tues, Thurs, Sat. TAKES AT NIGHT   Yes Provider, Historical   azelastine (ASTELIN) 137 mcg (0.1 %) nasal spray 1 spray (137 mcg total) by Nasal route 2 (two) times daily. 10/24/23 10/23/24  Bety Tomlinson MD   fluticasone propionate (FLONASE) 50 mcg/actuation nasal spray 1 spray (50 mcg total) by Each Nostril route once daily. 10/24/23   Bety Tomlinson MD   furosemide (LASIX) 20 MG tablet Take 1 tablet (20 mg total) by mouth 2 (two) times daily. Take one tablet every other day orbas directed 12/1/23   Shiraz Cartagena MD   nebulizer and compressor Archana 1 Device by Misc.(Non-Drug; Combo  Route) route 2 (two) times a day. 4/25/24   Bety Tomlinson MD   nitroGLYCERIN (NITROSTAT) 0.4 MG SL tablet Take 1 tab under the tongue for chest pain. May repeat every 5 minutes for a total of 3 doses. If chest pain not relieved go to the ED.  Patient taking differently: Place 0.4 mg under the tongue every 5 (five) minutes as needed for Chest pain.  If chest pain not relieved go to the ED. 4/25/24   Bety Tomlinson MD   sodium chloride 3% 3 % nebulizer solution Take 4 mLs by nebulization 2 (two) times a day.  Patient taking differently: Take 4 mLs by nebulization 2 (two) times daily as needed. 2/28/24 2/27/25  Eliza Rueda MD     Anticoagulants/Antiplatelets: no anticoagulation    Allergies:   Review of patient's allergies indicates:   Allergen Reactions    Fluorescein-benoxinate Other (See Comments)    Pcn  [penicillins]      Other reaction(s): Unknown    Quinolones Other (See Comments)     H/o AAA    Tropicamide Other (See Comments)    Clindamycin Rash     Sedation History:  no adverse reactions    Review of Systems:   Hematological: no known coagulopathies  Respiratory: no shortness of breath  Cardiovascular: no chest pain  Gastrointestinal: no abdominal pain  Genito-Urinary: no dysuria  Musculoskeletal: negative  Neurological: no TIA or stroke symptoms         OBJECTIVE:     Vital Signs (Most Recent)  Temp: 97.9 °F (36.6 °C) (10/25/24 0800)  Pulse: 62 (10/25/24 0800)  Resp: 20 (10/25/24 0800)  BP: (!) 150/67 (10/25/24 0800)  SpO2: 96 % (10/25/24 0800)    Physical Exam:  ASA: per anesthesia  Mallampati: per anesthesia    General: no acute distress  Mental Status: alert and oriented to person, place and time  HEENT: normocephalic, atraumatic  Chest: unlabored breathing  Heart: regular heart rate  Abdomen: nondistended  Extremity: moves all extremities    Laboratory  Lab Results   Component Value Date    INR 1.0 10/25/2024       Lab Results   Component Value Date    WBC 7.90 10/25/2024    HGB 14.0  10/25/2024    HCT 43.3 10/25/2024    MCV 86 10/25/2024     10/25/2024      Lab Results   Component Value Date     10/25/2024     10/25/2024    K 4.2 10/25/2024     10/25/2024    CO2 25 10/25/2024    BUN 18 10/25/2024    CREATININE 1.5 (H) 10/25/2024    CALCIUM 10.0 10/25/2024    MG 2.0 01/20/2023    ALT 26 10/25/2024    AST 27 10/25/2024    ALBUMIN 3.5 10/25/2024    BILITOT 0.7 10/25/2024    BILIDIR 0.4 (H) 10/10/2024       ASSESSMENT/PLAN:     Sedation Plan:  per anesthesia  Patient will undergo endoleak repair.    Written consent was obtained from the patient. All questions answered.     Demetrius Shirley MD  Radiology PGY-2

## 2024-10-25 NOTE — PROCEDURES
VIR Procedure Note    Pre Op Diagnosis: Type II endoleak    Post Op Diagnosis: same    Procedure: Mesenteric, pelvic & lumbar angiography & embolization    Procedure performed by: Joe Espino MD     Written Informed Consent Obtained: Yes    Specimen Removed:  Yes    Estimated Blood Loss:  Minimal     Findings:     RCFA access     SMA, Right iliac, & multiple lumbar angiography. Complex type II endoleak with nuermous lumbar feeders & possible JEAN CARLOS outflow.     Successful embolization of JEAN CARLOS & 5 different lumbar arteries/branches using coils & Obsidio.     Potential subtle lateral to medial sacral feeder noted at the final angiogram, likely will thrombose in it's own. Will follow up on next CTA.      Plan:  IR clinic follow up in 4 weeks with CTA AP aorta protocol.          Joe Espino MD  VIR

## 2024-10-28 ENCOUNTER — TELEPHONE (OUTPATIENT)
Dept: INTERVENTIONAL RADIOLOGY/VASCULAR | Facility: CLINIC | Age: 89
End: 2024-10-28
Payer: MEDICARE

## 2024-10-28 DIAGNOSIS — I97.89 TYPE II ENDOLEAK OF AORTIC GRAFT: Primary | ICD-10-CM

## 2024-11-11 ENCOUNTER — LAB VISIT (OUTPATIENT)
Dept: LAB | Facility: HOSPITAL | Age: 89
End: 2024-11-11
Attending: INTERNAL MEDICINE
Payer: MEDICARE

## 2024-11-11 DIAGNOSIS — R79.89 ELEVATED LFTS: ICD-10-CM

## 2024-11-11 LAB
ALBUMIN SERPL BCP-MCNC: 3.3 G/DL (ref 3.5–5.2)
ALP SERPL-CCNC: 165 U/L (ref 40–150)
ALT SERPL W/O P-5'-P-CCNC: 40 U/L (ref 10–44)
AST SERPL-CCNC: 35 U/L (ref 10–40)
BILIRUB DIRECT SERPL-MCNC: 0.3 MG/DL (ref 0.1–0.3)
BILIRUB SERPL-MCNC: 0.7 MG/DL (ref 0.1–1)
PROT SERPL-MCNC: 7 G/DL (ref 6–8.4)

## 2024-11-11 PROCEDURE — 36415 COLL VENOUS BLD VENIPUNCTURE: CPT | Mod: HCNC,PO | Performed by: INTERNAL MEDICINE

## 2024-11-11 PROCEDURE — 80076 HEPATIC FUNCTION PANEL: CPT | Mod: HCNC | Performed by: INTERNAL MEDICINE

## 2024-11-22 ENCOUNTER — HOSPITAL ENCOUNTER (OUTPATIENT)
Dept: RADIOLOGY | Facility: HOSPITAL | Age: 89
Discharge: HOME | End: 2024-11-22
Attending: PHYSICIAN ASSISTANT
Payer: MEDICARE

## 2024-11-22 DIAGNOSIS — I97.89 TYPE II ENDOLEAK OF AORTIC GRAFT: ICD-10-CM

## 2024-11-22 PROCEDURE — 74174 CTA ABD&PLVS W/CONTRAST: CPT | Mod: 26,HCNC,, | Performed by: RADIOLOGY

## 2024-11-22 PROCEDURE — 25500020 PHARM REV CODE 255: Mod: HCNC | Performed by: PHYSICIAN ASSISTANT

## 2024-11-22 PROCEDURE — 74174 CTA ABD&PLVS W/CONTRAST: CPT | Mod: TC,HCNC

## 2024-11-22 RX ADMIN — IOHEXOL 100 ML: 350 INJECTION, SOLUTION INTRAVENOUS at 03:11

## 2024-11-25 ENCOUNTER — TELEPHONE (OUTPATIENT)
Dept: INTERVENTIONAL RADIOLOGY/VASCULAR | Facility: CLINIC | Age: 89
End: 2024-11-25
Payer: MEDICARE

## 2024-11-26 ENCOUNTER — LAB VISIT (OUTPATIENT)
Dept: LAB | Facility: HOSPITAL | Age: 89
End: 2024-11-26
Attending: PHYSICIAN ASSISTANT
Payer: MEDICARE

## 2024-11-26 ENCOUNTER — OFFICE VISIT (OUTPATIENT)
Dept: INTERVENTIONAL RADIOLOGY/VASCULAR | Facility: CLINIC | Age: 89
End: 2024-11-26
Payer: MEDICARE

## 2024-11-26 ENCOUNTER — TELEPHONE (OUTPATIENT)
Dept: INFECTIOUS DISEASES | Facility: CLINIC | Age: 89
End: 2024-11-26
Payer: MEDICARE

## 2024-11-26 VITALS
WEIGHT: 165.81 LBS | HEART RATE: 61 BPM | HEIGHT: 72 IN | SYSTOLIC BLOOD PRESSURE: 128 MMHG | DIASTOLIC BLOOD PRESSURE: 67 MMHG | BODY MASS INDEX: 22.46 KG/M2

## 2024-11-26 DIAGNOSIS — T80.1XXA VASCULAR COMPLICATIONS FOLLOWING INFUSION, TRANSFUSION AND THERAPEUTIC INJECTION, INITIAL ENCOUNTER: ICD-10-CM

## 2024-11-26 DIAGNOSIS — I97.89 TYPE II ENDOLEAK OF AORTIC GRAFT: Primary | ICD-10-CM

## 2024-11-26 DIAGNOSIS — I97.89 TYPE II ENDOLEAK OF AORTIC GRAFT: ICD-10-CM

## 2024-11-26 LAB
ALBUMIN SERPL BCP-MCNC: 3.4 G/DL (ref 3.5–5.2)
ALP SERPL-CCNC: 159 U/L (ref 40–150)
ALT SERPL W/O P-5'-P-CCNC: 32 U/L (ref 10–44)
ANION GAP SERPL CALC-SCNC: 8 MMOL/L (ref 8–16)
AST SERPL-CCNC: 30 U/L (ref 10–40)
BASOPHILS # BLD AUTO: 0.05 K/UL (ref 0–0.2)
BASOPHILS NFR BLD: 0.8 % (ref 0–1.9)
BILIRUB SERPL-MCNC: 0.8 MG/DL (ref 0.1–1)
BUN SERPL-MCNC: 18 MG/DL (ref 8–23)
CALCIUM SERPL-MCNC: 9.5 MG/DL (ref 8.7–10.5)
CHLORIDE SERPL-SCNC: 108 MMOL/L (ref 95–110)
CO2 SERPL-SCNC: 26 MMOL/L (ref 23–29)
CREAT SERPL-MCNC: 1.5 MG/DL (ref 0.5–1.4)
DIFFERENTIAL METHOD BLD: ABNORMAL
EOSINOPHIL # BLD AUTO: 0.2 K/UL (ref 0–0.5)
EOSINOPHIL NFR BLD: 3.2 % (ref 0–8)
ERYTHROCYTE [DISTWIDTH] IN BLOOD BY AUTOMATED COUNT: 16.3 % (ref 11.5–14.5)
EST. GFR  (NO RACE VARIABLE): 44 ML/MIN/1.73 M^2
GLUCOSE SERPL-MCNC: 98 MG/DL (ref 70–110)
HCT VFR BLD AUTO: 40.6 % (ref 40–54)
HGB BLD-MCNC: 13.5 G/DL (ref 14–18)
IMM GRANULOCYTES # BLD AUTO: 0.03 K/UL (ref 0–0.04)
IMM GRANULOCYTES NFR BLD AUTO: 0.5 % (ref 0–0.5)
INR PPP: 1.1 (ref 0.8–1.2)
LYMPHOCYTES # BLD AUTO: 2 K/UL (ref 1–4.8)
LYMPHOCYTES NFR BLD: 31.8 % (ref 18–48)
MCH RBC QN AUTO: 29 PG (ref 27–31)
MCHC RBC AUTO-ENTMCNC: 33.3 G/DL (ref 32–36)
MCV RBC AUTO: 87 FL (ref 82–98)
MONOCYTES # BLD AUTO: 0.8 K/UL (ref 0.3–1)
MONOCYTES NFR BLD: 12.5 % (ref 4–15)
NEUTROPHILS # BLD AUTO: 3.3 K/UL (ref 1.8–7.7)
NEUTROPHILS NFR BLD: 51.2 % (ref 38–73)
NRBC BLD-RTO: 0 /100 WBC
PLATELET # BLD AUTO: 86 K/UL (ref 150–450)
PMV BLD AUTO: 11.2 FL (ref 9.2–12.9)
POTASSIUM SERPL-SCNC: 3.9 MMOL/L (ref 3.5–5.1)
PROT SERPL-MCNC: 7.2 G/DL (ref 6–8.4)
PROTHROMBIN TIME: 11.4 SEC (ref 9–12.5)
RBC # BLD AUTO: 4.65 M/UL (ref 4.6–6.2)
SODIUM SERPL-SCNC: 142 MMOL/L (ref 136–145)
WBC # BLD AUTO: 6.33 K/UL (ref 3.9–12.7)

## 2024-11-26 PROCEDURE — 36415 COLL VENOUS BLD VENIPUNCTURE: CPT | Mod: HCNC | Performed by: PHYSICIAN ASSISTANT

## 2024-11-26 PROCEDURE — 85025 COMPLETE CBC W/AUTO DIFF WBC: CPT | Mod: HCNC | Performed by: PHYSICIAN ASSISTANT

## 2024-11-26 PROCEDURE — 99999 PR PBB SHADOW E&M-EST. PATIENT-LVL IV: CPT | Mod: PBBFAC,HCNC,, | Performed by: PHYSICIAN ASSISTANT

## 2024-11-26 PROCEDURE — 1159F MED LIST DOCD IN RCRD: CPT | Mod: HCNC,CPTII,S$GLB, | Performed by: PHYSICIAN ASSISTANT

## 2024-11-26 PROCEDURE — 80053 COMPREHEN METABOLIC PANEL: CPT | Mod: HCNC | Performed by: PHYSICIAN ASSISTANT

## 2024-11-26 PROCEDURE — 1126F AMNT PAIN NOTED NONE PRSNT: CPT | Mod: HCNC,CPTII,S$GLB, | Performed by: PHYSICIAN ASSISTANT

## 2024-11-26 PROCEDURE — 1157F ADVNC CARE PLAN IN RCRD: CPT | Mod: HCNC,CPTII,S$GLB, | Performed by: PHYSICIAN ASSISTANT

## 2024-11-26 PROCEDURE — 3288F FALL RISK ASSESSMENT DOCD: CPT | Mod: HCNC,CPTII,S$GLB, | Performed by: PHYSICIAN ASSISTANT

## 2024-11-26 PROCEDURE — 1101F PT FALLS ASSESS-DOCD LE1/YR: CPT | Mod: HCNC,CPTII,S$GLB, | Performed by: PHYSICIAN ASSISTANT

## 2024-11-26 PROCEDURE — 85610 PROTHROMBIN TIME: CPT | Mod: HCNC | Performed by: PHYSICIAN ASSISTANT

## 2024-11-26 PROCEDURE — 99214 OFFICE O/P EST MOD 30 MIN: CPT | Mod: HCNC,S$GLB,, | Performed by: PHYSICIAN ASSISTANT

## 2024-11-26 NOTE — PROGRESS NOTES
Subjective     Patient ID: Nelson Huntley is a 89 y.o. male.    Chief Complaint: Follow-up    Follow up post embolization 10/25/24.  CTA abdomen s/p endoleak embo reviewed with Dr. Rodrigues noting less endoleak, but there is still more smaller areas to embolize (sacral, R L4, R L3-4 communicator). Recommended repeat procedure for those additional areas especially that aneurysm size is slightly larger compared to CT from June.    Visit 9/3/2024  Patient referred to Interventional Radiology by Dr Granado for Type 2 endoleak repair.  He has a history of CHF, CAD, MGUS, GERD; s/p AAA repair with imaging noting type 2 endoleak.  Patient reports at baseline.  He denies any fever, chills, unexpected wt change, decreased appetite, fatigue, CP, SOB, cough, abdominal pain or distention, N/V/D, leg pain or swelling, jaundice, confusion, sleep disturbance.     Patient denies AC, antiplatelet use, or other medications containing asa.  He is no longer taking asa or plavix.  Allergies reviewed, pt denies allergy to contrast.  Vascular Surgery notes reviewed.       Review of Systems   Constitutional:  Negative for activity change, appetite change, chills, fatigue, fever and unexpected weight change.   Respiratory:  Negative for cough and shortness of breath.    Cardiovascular:  Negative for chest pain and leg swelling.   Gastrointestinal:  Negative for abdominal distention, abdominal pain, constipation, diarrhea, nausea and vomiting.   Genitourinary:  Negative for difficulty urinating and flank pain.   Musculoskeletal:  Negative for back pain and gait problem.   Neurological:  Negative for weakness and memory loss.   Psychiatric/Behavioral:  Negative for confusion and sleep disturbance.           Objective     Physical Exam  Constitutional:       General: He is not in acute distress.     Appearance: He is well-developed. He is not diaphoretic.   HENT:      Head: Normocephalic and atraumatic.   Pulmonary:      Effort: Pulmonary effort  is normal. No respiratory distress.   Neurological:      Mental Status: He is alert and oriented to person, place, and time.   Psychiatric:         Behavior: Behavior normal.         Thought Content: Thought content normal.         Judgment: Judgment normal.          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 Vascular complications following infusion, transfusion and therapeutic injection, initial encounter  -     Protime-INR; Future; Expected date: 11/26/2024      Discussed how the procedure Transarterial endoleak angiogram & embolization  will be performed, risks (including, but not limited to, pain, bleeding, infection, damage to nearby structures, and the need for additional procedures), benefits, possible complications, pre-post procedure expectations, and alternatives. The patient voices understanding and all questions have been answered.  The patient agrees to proceed as planned.     Patient to be scheduled under general anesthesia with Dr. Espino at  or .  Snapboard comments: Right CFA access. Need separate glue table with multiple sizes & colors of drug resistant syringes (at least 4 of each, with 2x drug resistant 3-way).  Also need D5W in the glue table. Will do median sacral, right L4 & right L4-L3 communicator.   2.  Pre-procedure labs scheduled for 11/26  3.  Pt denies taking AC, antiplatelet medication, or other medications containing asa.  Pt denies taking GLP-1 agonist.    4.  Allergies reviewed.  Pt denies allergy to contrast, lidocaine, betadine and chlorhexidine.  5.  Pre-procedure handout with clinic phone number provided.  Pt advised to call if any further questions or need to reschedule.  Discussed  with patient our nursing team will call the day before the procedure with reminder for instructions.  Discussed pt will need a ride to and from the procedure, no driving for 24 hrs after the procedure.          No follow-ups on file.

## 2024-12-05 ENCOUNTER — OFFICE VISIT (OUTPATIENT)
Dept: PULMONOLOGY | Facility: CLINIC | Age: 89
End: 2024-12-05
Payer: MEDICARE

## 2024-12-05 VITALS
DIASTOLIC BLOOD PRESSURE: 67 MMHG | OXYGEN SATURATION: 97 % | SYSTOLIC BLOOD PRESSURE: 136 MMHG | BODY MASS INDEX: 22.17 KG/M2 | HEIGHT: 72 IN | HEART RATE: 66 BPM | WEIGHT: 163.69 LBS

## 2024-12-05 DIAGNOSIS — J47.9 BRONCHIECTASIS WITHOUT COMPLICATION: Primary | ICD-10-CM

## 2024-12-05 PROCEDURE — 1157F ADVNC CARE PLAN IN RCRD: CPT | Mod: HCNC,CPTII,S$GLB, | Performed by: INTERNAL MEDICINE

## 2024-12-05 PROCEDURE — 1160F RVW MEDS BY RX/DR IN RCRD: CPT | Mod: HCNC,CPTII,S$GLB, | Performed by: INTERNAL MEDICINE

## 2024-12-05 PROCEDURE — 1126F AMNT PAIN NOTED NONE PRSNT: CPT | Mod: HCNC,CPTII,S$GLB, | Performed by: INTERNAL MEDICINE

## 2024-12-05 PROCEDURE — 1159F MED LIST DOCD IN RCRD: CPT | Mod: HCNC,CPTII,S$GLB, | Performed by: INTERNAL MEDICINE

## 2024-12-05 PROCEDURE — 99999 PR PBB SHADOW E&M-EST. PATIENT-LVL III: CPT | Mod: PBBFAC,HCNC,, | Performed by: INTERNAL MEDICINE

## 2024-12-05 PROCEDURE — 3288F FALL RISK ASSESSMENT DOCD: CPT | Mod: HCNC,CPTII,S$GLB, | Performed by: INTERNAL MEDICINE

## 2024-12-05 PROCEDURE — 1101F PT FALLS ASSESS-DOCD LE1/YR: CPT | Mod: HCNC,CPTII,S$GLB, | Performed by: INTERNAL MEDICINE

## 2024-12-05 PROCEDURE — 99213 OFFICE O/P EST LOW 20 MIN: CPT | Mod: HCNC,S$GLB,, | Performed by: INTERNAL MEDICINE

## 2024-12-05 RX ORDER — DOXYCYCLINE HYCLATE 100 MG
100 TABLET ORAL EVERY 12 HOURS
Qty: 14 TABLET | Refills: 1 | Status: SHIPPED | OUTPATIENT
Start: 2024-12-05

## 2024-12-05 NOTE — PROGRESS NOTES
Subjective:      Patient ID: Nelson Huntley is a 89 y.o. male.    Chief Complaint: No chief complaint on file.    Nelson Huntley is a 88 y.o. male who presents for initial evaluation of cough. The patient has not been seen in pulmonary clinic before. He has a pst medical history significant for CHF, CAD, MGUS, and GERD. The patient is on plavix and ASA.     He initially presented to his PCP in 10/2023 with complaints of cough for several months. He was treated conservatively as there was significant post-nasal drip. He was treated with doxycyline and antihistamines with some improvement in his cough. He subsequently had an episode of hemoptysis and chest tightness on 11/7 and presented to the ED for evaluation. X-ray was performed and had no acute abnormalities, he was at his baseline with monitoring and no return of hemoptysis so he was discharged. He presented again to the ED on 11/22 with return of cough and hemoptysis. CT at that time with multifocal tree-in-bud opacities, emphysematous changes, and bronchiectasis. He was started on cefpodoxime and azithromycin.     Today, patient reports no further episodes of hemoptysis. He felt he improved with his antibiotic course. After completing it he reports he does feel his cough is a bit worse than when he was on it, but overall much better than it was. He remains on plavix and aspirin. He denies any fever or chills. He lost 8lbs over the past few months, on purpose with diet and exercise. He denies night sweats. He is able to exercise on the treadmill without any shortness of breath.     Interval hx 2/27/2024: ID appointment tomorrow.   Denies fever or chills.      Interval hx 5/7/2024: Patient is extremely fatigue with minimal improvement.      Interval hx 8/1/2024: Recent hospitalization with bronch for hemoptysis.      Intervak hx 12/5/2024: No hemoptysis.       Review of Systems   Respiratory:  Negative for shortness of breath and dyspnea on extertion.       Objective:     Physical Exam   Constitutional: He is oriented to person, place, and time. He appears well-developed and well-nourished. No distress.   Cardiovascular: Normal rate.   Pulmonary/Chest: Normal expansion and effort normal. He has no rhonchi. He has no rales.   Musculoskeletal:         General: Normal range of motion.   Neurological: He is alert and oriented to person, place, and time.   Skin: Skin is warm and dry.   Nursing note and vitals reviewed.    Personal Diagnostic Review  none pertinent      12/5/2024     1:52 PM 11/26/2024     1:59 PM 10/25/2024     7:20 PM 10/25/2024     6:08 PM 10/25/2024     5:45 PM 10/25/2024     5:30 PM 10/25/2024     5:15 PM   Pulmonary Function Tests   SpO2 97 %  93 % 96 % 97 % 99 % 98 %   Height 6' (1.829 m) 6' (1.829 m)        Weight 74.2 kg (163 lb 11.1 oz) 75.2 kg (165 lb 12.6 oz)        BMI (Calculated) 22.2 22.5             Assessment:     No diagnosis found.     Outpatient Encounter Medications as of 12/5/2024   Medication Sig Dispense Refill    amLODIPine (NORVASC) 10 MG tablet TAKE 1 TABLET EVERY DAY (Patient taking differently: Take 10 mg by mouth once daily.) 90 tablet 3    azelastine (ASTELIN) 137 mcg (0.1 %) nasal spray 1 spray (137 mcg total) by Nasal route 2 (two) times daily. 30 mL 11    beta-carotene,A,-vits C,E/mins (OCUVITE ORAL) Take by mouth every evening.      famotidine (PEPCID) 20 MG tablet Take 20 mg by mouth before dinner.      fluticasone propionate (FLONASE) 50 mcg/actuation nasal spray 1 spray (50 mcg total) by Each Nostril route once daily. 16 g 11    furosemide (LASIX) 20 MG tablet Take 1 tablet (20 mg total) by mouth 2 (two) times daily. Take one tablet every other day orbas directed 30 tablet 11    metoprolol succinate (TOPROL-XL) 25 MG 24 hr tablet TAKE 1 TABLET TWICE DAILY (Patient taking differently: Take 25 mg by mouth 2 (two) times daily.) 180 tablet 3    multivitamin with minerals tablet Take 1 tablet by mouth every evening.       nebulizer and compressor Archana 1 Device by Misc.(Non-Drug; Combo Route) route 2 (two) times a day. 1 each 0    niacin 500 MG tablet Take 500 mg by mouth Every PM.      nitroGLYCERIN (NITROSTAT) 0.4 MG SL tablet Take 1 tab under the tongue for chest pain. May repeat every 5 minutes for a total of 3 doses. If chest pain not relieved go to the ED. (Patient taking differently: Place 0.4 mg under the tongue every 5 (five) minutes as needed for Chest pain.  If chest pain not relieved go to the ED.) 25 tablet 4    omega-3 fatty acids-vitamin E 1,000 mg Cap Take 1 capsule by mouth every evening.      potassium chloride SA (K-DUR,KLOR-CON M) 10 MEQ tablet Take 1 tablet (10 mEq total) by mouth once daily. (Patient taking differently: Take 10 mEq by mouth once daily. TAKES AT NOON) 30 tablet 11    pravastatin (PRAVACHOL) 40 MG tablet Take 1 tablet (40 mg total) by mouth every evening. 90 tablet 3    sacubitriL-valsartan (ENTRESTO)  mg per tablet Take 1 tablet by mouth 2 (two) times daily. 60 tablet 11    sodium chloride 3% 3 % nebulizer solution Take 4 mLs by nebulization 2 (two) times a day. (Patient taking differently: Take 4 mLs by nebulization 2 (two) times daily as needed.) 240 mL 5    sotaloL (BETAPACE) 120 MG Tab Take 1 tablet (120 mg total) by mouth 2 (two) times daily. 180 tablet 3    ubidecarenone (COENZYME Q10 ORAL) Take 1 tablet by mouth every evening.      vitamin D 1000 units Tab Take 1,000 Units by mouth every Tues, Thurs, Sat. TAKES AT NIGHT       Facility-Administered Encounter Medications as of 12/5/2024   Medication Dose Route Frequency Provider Last Rate Last Admin    sodium chloride 0.9% bolus 1,000 mL  1,000 mL Intravenous Once Violette Delgado NP        vancomycin in dextrose 5 % 1 gram/250 mL IVPB 1,000 mg  1,000 mg Intravenous On Call Procedure Violette Delgado NP   1,000 mg at 03/18/22 1232     No orders of the defined types were placed in this encounter.      Plan:      1. Bronchiectasis without  complication  Overview:  Fu with pulm     Assessment & Plan:  Repeat green sputum. Gave a prescription for Doxcycline.       Other orders  -     doxycycline (VIBRA-TABS) 100 MG tablet; Take 1 tablet (100 mg total) by mouth every 12 (twelve) hours.  Dispense: 14 tablet; Refill: 1

## 2024-12-07 ENCOUNTER — CLINICAL SUPPORT (OUTPATIENT)
Dept: CARDIOLOGY | Facility: HOSPITAL | Age: 89
End: 2024-12-07
Attending: INTERNAL MEDICINE
Payer: MEDICARE

## 2024-12-07 ENCOUNTER — CLINICAL SUPPORT (OUTPATIENT)
Dept: CARDIOLOGY | Facility: HOSPITAL | Age: 89
End: 2024-12-07

## 2024-12-07 DIAGNOSIS — I25.5 ISCHEMIC CARDIOMYOPATHY: ICD-10-CM

## 2024-12-07 DIAGNOSIS — Z95.810 PRESENCE OF AUTOMATIC (IMPLANTABLE) CARDIAC DEFIBRILLATOR: ICD-10-CM

## 2024-12-07 PROCEDURE — 93295 DEV INTERROG REMOTE 1/2/MLT: CPT | Mod: ,,, | Performed by: INTERNAL MEDICINE

## 2024-12-07 PROCEDURE — 93296 REM INTERROG EVL PM/IDS: CPT | Performed by: INTERNAL MEDICINE

## 2024-12-17 ENCOUNTER — PATIENT MESSAGE (OUTPATIENT)
Dept: CARDIOLOGY | Facility: CLINIC | Age: 89
End: 2024-12-17
Payer: MEDICARE

## 2024-12-17 RX ORDER — SACUBITRIL AND VALSARTAN 97; 103 MG/1; MG/1
1 TABLET, FILM COATED ORAL 2 TIMES DAILY
Qty: 60 TABLET | Refills: 11 | Status: SHIPPED | OUTPATIENT
Start: 2024-12-17

## 2024-12-18 ENCOUNTER — PATIENT MESSAGE (OUTPATIENT)
Dept: INTERVENTIONAL RADIOLOGY/VASCULAR | Facility: HOSPITAL | Age: 89
End: 2024-12-18
Payer: MEDICARE

## 2024-12-23 ENCOUNTER — ANESTHESIA EVENT (OUTPATIENT)
Dept: INTERVENTIONAL RADIOLOGY/VASCULAR | Facility: HOSPITAL | Age: 89
End: 2024-12-23
Payer: MEDICARE

## 2024-12-23 ENCOUNTER — HOSPITAL ENCOUNTER (OUTPATIENT)
Dept: INTERVENTIONAL RADIOLOGY/VASCULAR | Facility: HOSPITAL | Age: 89
Discharge: HOME | End: 2024-12-23
Attending: PHYSICIAN ASSISTANT | Admitting: INTERNAL MEDICINE
Payer: MEDICARE

## 2024-12-23 VITALS
OXYGEN SATURATION: 89 % | BODY MASS INDEX: 22.35 KG/M2 | WEIGHT: 165 LBS | HEART RATE: 70 BPM | SYSTOLIC BLOOD PRESSURE: 135 MMHG | HEIGHT: 72 IN | DIASTOLIC BLOOD PRESSURE: 62 MMHG | TEMPERATURE: 98 F | RESPIRATION RATE: 12 BRPM

## 2024-12-23 DIAGNOSIS — I97.89 TYPE II ENDOLEAK OF AORTIC GRAFT: ICD-10-CM

## 2024-12-23 DIAGNOSIS — I97.89 TYPE II ENDOLEAK OF AORTIC GRAFT: Primary | ICD-10-CM

## 2024-12-23 PROCEDURE — 36245 INS CATH ABD/L-EXT ART 1ST: CPT | Mod: 59,HCNC,RT, | Performed by: INTERNAL MEDICINE

## 2024-12-23 PROCEDURE — 75736 ARTERY X-RAYS PELVIS: CPT | Mod: 26,59,HCNC, | Performed by: INTERNAL MEDICINE

## 2024-12-23 PROCEDURE — 25000003 PHARM REV CODE 250: Mod: HCNC | Performed by: NURSE ANESTHETIST, CERTIFIED REGISTERED

## 2024-12-23 PROCEDURE — 76937 US GUIDE VASCULAR ACCESS: CPT | Mod: 26,HCNC,, | Performed by: INTERNAL MEDICINE

## 2024-12-23 PROCEDURE — 36245 INS CATH ABD/L-EXT ART 1ST: CPT | Mod: 59,HCNC,RT | Performed by: INTERNAL MEDICINE

## 2024-12-23 PROCEDURE — 37000009 HC ANESTHESIA EA ADD 15 MINS: Mod: HCNC

## 2024-12-23 PROCEDURE — C1769 GUIDE WIRE: HCPCS | Mod: HCNC

## 2024-12-23 PROCEDURE — 36247 INS CATH ABD/L-EXT ART 3RD: CPT | Mod: HCNC,RT | Performed by: INTERNAL MEDICINE

## 2024-12-23 PROCEDURE — G0269 OCCLUSIVE DEVICE IN VEIN ART: HCPCS | Mod: HCNC | Performed by: INTERNAL MEDICINE

## 2024-12-23 PROCEDURE — 37000008 HC ANESTHESIA 1ST 15 MINUTES: Mod: HCNC

## 2024-12-23 PROCEDURE — 75705 ARTERY X-RAYS SPINE: CPT | Mod: 26,59,HCNC, | Performed by: INTERNAL MEDICINE

## 2024-12-23 PROCEDURE — 27800903 OPTIME MED/SURG SUP & DEVICES OTHER IMPLANTS: Mod: HCNC

## 2024-12-23 PROCEDURE — 63600175 PHARM REV CODE 636 W HCPCS: Mod: JZ,JG,HCNC | Performed by: INTERNAL MEDICINE

## 2024-12-23 PROCEDURE — C1757 CATH, THROMBECTOMY/EMBOLECT: HCPCS | Mod: HCNC

## 2024-12-23 PROCEDURE — 75774 ARTERY X-RAY EACH VESSEL: CPT | Mod: TC,59,HCNC | Performed by: INTERNAL MEDICINE

## 2024-12-23 PROCEDURE — 75705 ARTERY X-RAYS SPINE: CPT | Mod: TC,59,HCNC | Performed by: INTERNAL MEDICINE

## 2024-12-23 PROCEDURE — 37242 VASC EMBOLIZE/OCCLUDE ARTERY: CPT | Mod: HCNC | Performed by: INTERNAL MEDICINE

## 2024-12-23 PROCEDURE — C1730 CATH, EP, 19 OR FEW ELECT: HCPCS | Mod: HCNC

## 2024-12-23 PROCEDURE — 27201423 OPTIME MED/SURG SUP & DEVICES STERILE SUPPLY: Mod: HCNC

## 2024-12-23 PROCEDURE — 76937 US GUIDE VASCULAR ACCESS: CPT | Mod: TC,HCNC | Performed by: INTERNAL MEDICINE

## 2024-12-23 PROCEDURE — 75774 ARTERY X-RAY EACH VESSEL: CPT | Mod: 26,59,HCNC, | Performed by: INTERNAL MEDICINE

## 2024-12-23 PROCEDURE — C1887 CATHETER, GUIDING: HCPCS | Mod: HCNC

## 2024-12-23 PROCEDURE — C1894 INTRO/SHEATH, NON-LASER: HCPCS | Mod: HCNC

## 2024-12-23 PROCEDURE — 63600175 PHARM REV CODE 636 W HCPCS: Mod: HCNC | Performed by: NURSE ANESTHETIST, CERTIFIED REGISTERED

## 2024-12-23 PROCEDURE — C1760 CLOSURE DEV, VASC: HCPCS | Mod: HCNC

## 2024-12-23 PROCEDURE — 25500020 PHARM REV CODE 255: Mod: HCNC | Performed by: INTERNAL MEDICINE

## 2024-12-23 PROCEDURE — 36247 INS CATH ABD/L-EXT ART 3RD: CPT | Mod: 51,HCNC,RT, | Performed by: INTERNAL MEDICINE

## 2024-12-23 PROCEDURE — 75736 ARTERY X-RAYS PELVIS: CPT | Mod: TC,59,HCNC | Performed by: INTERNAL MEDICINE

## 2024-12-23 RX ORDER — PHENYLEPHRINE HYDROCHLORIDE 10 MG/ML
INJECTION INTRAVENOUS
Status: DISCONTINUED | OUTPATIENT
Start: 2024-12-23 | End: 2024-12-23

## 2024-12-23 RX ORDER — HYDROCODONE BITARTRATE AND ACETAMINOPHEN 5; 325 MG/1; MG/1
2 TABLET ORAL EVERY 4 HOURS PRN
Status: DISCONTINUED | OUTPATIENT
Start: 2024-12-23 | End: 2024-12-24 | Stop reason: HOSPADM

## 2024-12-23 RX ORDER — ONDANSETRON HYDROCHLORIDE 2 MG/ML
INJECTION, SOLUTION INTRAVENOUS
Status: DISCONTINUED | OUTPATIENT
Start: 2024-12-23 | End: 2024-12-23

## 2024-12-23 RX ORDER — ROCURONIUM BROMIDE 10 MG/ML
INJECTION, SOLUTION INTRAVENOUS
Status: DISCONTINUED | OUTPATIENT
Start: 2024-12-23 | End: 2024-12-23

## 2024-12-23 RX ORDER — EPHEDRINE SULFATE 50 MG/ML
INJECTION, SOLUTION INTRAVENOUS
Status: DISCONTINUED | OUTPATIENT
Start: 2024-12-23 | End: 2024-12-23

## 2024-12-23 RX ORDER — LIDOCAINE HYDROCHLORIDE 20 MG/ML
INJECTION INTRAVENOUS
Status: DISCONTINUED | OUTPATIENT
Start: 2024-12-23 | End: 2024-12-23

## 2024-12-23 RX ORDER — PROPOFOL 10 MG/ML
VIAL (ML) INTRAVENOUS
Status: DISCONTINUED | OUTPATIENT
Start: 2024-12-23 | End: 2024-12-23

## 2024-12-23 RX ORDER — DEXAMETHASONE SODIUM PHOSPHATE 4 MG/ML
INJECTION, SOLUTION INTRA-ARTICULAR; INTRALESIONAL; INTRAMUSCULAR; INTRAVENOUS; SOFT TISSUE
Status: DISCONTINUED | OUTPATIENT
Start: 2024-12-23 | End: 2024-12-23

## 2024-12-23 RX ORDER — LIDOCAINE HYDROCHLORIDE 10 MG/ML
1 INJECTION, SOLUTION EPIDURAL; INFILTRATION; INTRACAUDAL; PERINEURAL ONCE
Status: DISCONTINUED | OUTPATIENT
Start: 2024-12-23 | End: 2024-12-24 | Stop reason: HOSPADM

## 2024-12-23 RX ORDER — SODIUM CHLORIDE 9 MG/ML
INJECTION, SOLUTION INTRAVENOUS CONTINUOUS
Status: DISCONTINUED | OUTPATIENT
Start: 2024-12-23 | End: 2024-12-24 | Stop reason: HOSPADM

## 2024-12-23 RX ORDER — FENTANYL CITRATE 50 UG/ML
INJECTION, SOLUTION INTRAMUSCULAR; INTRAVENOUS
Status: DISCONTINUED | OUTPATIENT
Start: 2024-12-23 | End: 2024-12-23

## 2024-12-23 RX ADMIN — FENTANYL CITRATE 75 MCG: 50 INJECTION, SOLUTION INTRAMUSCULAR; INTRAVENOUS at 08:12

## 2024-12-23 RX ADMIN — ROCURONIUM BROMIDE 20 MG: 10 INJECTION INTRAVENOUS at 10:12

## 2024-12-23 RX ADMIN — ONDANSETRON 4 MG: 2 INJECTION INTRAMUSCULAR; INTRAVENOUS at 08:12

## 2024-12-23 RX ADMIN — LIDOCAINE HYDROCHLORIDE 100 MG: 20 INJECTION INTRAVENOUS at 08:12

## 2024-12-23 RX ADMIN — IOHEXOL 40 ML: 300 INJECTION, SOLUTION INTRAVENOUS at 01:12

## 2024-12-23 RX ADMIN — SUGAMMADEX 200 MG: 100 INJECTION, SOLUTION INTRAVENOUS at 12:12

## 2024-12-23 RX ADMIN — FENTANYL CITRATE 25 MCG: 50 INJECTION, SOLUTION INTRAMUSCULAR; INTRAVENOUS at 01:12

## 2024-12-23 RX ADMIN — NITROGLYCERIN 200 MCG: 20 INJECTION INTRAVENOUS at 10:12

## 2024-12-23 RX ADMIN — EPHEDRINE SULFATE 5 MG: 50 INJECTION INTRAVENOUS at 10:12

## 2024-12-23 RX ADMIN — ROCURONIUM BROMIDE 10 MG: 10 INJECTION INTRAVENOUS at 10:12

## 2024-12-23 RX ADMIN — FENTANYL CITRATE 25 MCG: 50 INJECTION, SOLUTION INTRAMUSCULAR; INTRAVENOUS at 11:12

## 2024-12-23 RX ADMIN — PHENYLEPHRINE HYDROCHLORIDE 100 MCG: 10 INJECTION INTRAVENOUS at 09:12

## 2024-12-23 RX ADMIN — SODIUM CHLORIDE: 0.9 INJECTION, SOLUTION INTRAVENOUS at 08:12

## 2024-12-23 RX ADMIN — ROCURONIUM BROMIDE 50 MG: 10 INJECTION INTRAVENOUS at 08:12

## 2024-12-23 RX ADMIN — ROCURONIUM BROMIDE 10 MG: 10 INJECTION INTRAVENOUS at 11:12

## 2024-12-23 RX ADMIN — ROCURONIUM BROMIDE 10 MG: 10 INJECTION INTRAVENOUS at 12:12

## 2024-12-23 RX ADMIN — PROPOFOL 100 MG: 10 INJECTION, EMULSION INTRAVENOUS at 08:12

## 2024-12-23 RX ADMIN — DEXAMETHASONE SODIUM PHOSPHATE 4 MG: 4 INJECTION, SOLUTION INTRAMUSCULAR; INTRAVENOUS at 08:12

## 2024-12-23 NOTE — DISCHARGE SUMMARY
VIR Discharge Summary      Hospital Course: No complications    Admit Date: 12/23/2024  Discharge Date: 12/23/2024     Instructions Given to Patient: Yes  Diet: Resume prior diet  Activity:   No driving for 3 days, no heavy lifting or strenuous exercise for 10 days.     Description of Condition on Discharge: Stable  Vital Signs (Most Recent): Temp: 97.9 °F (36.6 °C) (12/23/24 0621)  Pulse: 64 (12/23/24 0621)  Resp: 16 (12/23/24 0621)  BP: (!) 151/67 (12/23/24 0625)  SpO2: 98 % (12/23/24 0621)    Discharge Disposition: Home    Discharge Diagnosis: endoleak Type II     Follow-up: As scheduled. Will call patient with any follow up plans.        Joe Espino MD  VIR

## 2024-12-23 NOTE — PROGRESS NOTES
Discharge instructions given and explained to patient and family with verbalized understanding. VSS. Denies N/V, tolerating PO fluids. Rates pain level as tolerable. IV removed. All belongings obtained,Pt left floor in W/C, stable for transport, responsible person available for transport home.  Bed rest over at 1500

## 2024-12-23 NOTE — ANESTHESIA PREPROCEDURE EVALUATION
12/23/2024  Nelson Huntley is a 89 y.o., male with a PMHx that includes HTN, CAD, CKD, GERD, and AAA (s/p repair with subsequent leak) who presents for IR embolization      Pre-op Assessment    I have reviewed the Patient Summary Reports.     I have reviewed the Nursing Notes. I have reviewed the NPO Status.   I have reviewed the Medications.     Review of Systems  Anesthesia Hx:  No problems with previous Anesthesia               Denies Personal Hx of Anesthesia complications.                    Social:  Non-Smoker, No Alcohol Use       Hematology/Oncology:  Hematology Normal   Oncology Normal                                   Cardiovascular:     Hypertension  Past MI CAD               AAA                           Pulmonary:  Pulmonary Normal                       Renal/:  Chronic Renal Disease, CKD                Hepatic/GI:     GERD                Musculoskeletal:  Musculoskeletal Normal                Neurological:  Neurology Normal                                      Psych:  Psychiatric Normal                    Physical Exam  General: Well nourished, Cooperative, Alert and Oriented    Airway:  Mallampati: I   Mouth Opening: Normal  TM Distance: Normal  Tongue: Normal  Neck ROM: Normal ROM    Dental:  Intact    Chest/Lungs:  Clear to auscultation, Normal Respiratory Rate    Heart:  Rate: Normal  Rhythm: Regular Rhythm        Anesthesia Plan  Type of Anesthesia, risks & benefits discussed:    Anesthesia Type: Gen ETT  Intra-op Monitoring Plan: Standard ASA Monitors  Post Op Pain Control Plan: multimodal analgesia and IV/PO Opioids PRN  Induction:  IV  Airway Plan: Direct, Post-Induction  Informed Consent: Informed consent signed with the Patient and all parties understand the risks and agree with anesthesia plan.  All questions answered.   ASA Score: 3  Day of Surgery Review of History & Physical:  H&P Update referred to the surgeon/provider.    Ready For Surgery From Anesthesia Perspective.     .

## 2024-12-23 NOTE — DISCHARGE INSTRUCTIONS
ANGIOGRAM GROIN RECOVERY INSTRUCTIONS:    You have had a procedure called a Angiogram. This procedure is usually performed by a specially trained doctor called an interventional radiologist. The procedure allows the doctor visualize the arteries in you body, and to either stop blood flow or improve blood flow to different parts of your body. A catheter - a thin, flexible tube - was inserted into one of your blood vessels through a small incision in your groin and guided to a specific artery to deliver the therapy. Please follow the following instructions while recovering:    Monitor the groin site for bleeding. Apply firm pressure to the groin site if you need to cough, during sneezes, and in the event you have to vomit. This reduces the risk of your site bleeding. If bleeding does occurs, apply firm, manual pressure and seek medical assistance immediately!  Do not shower/bathe tonight. Shower tomorrow, at least 24 hours post-procedure. After your shower, you may remove the groin dressing.   Carefully cleanse the groin site with gentle soap and water; do not pick at any scab formation.  Monitor the groin site for signs and symptoms of infection (See below).      Recovering at Home:  Follow your doctor's recommendations on when it is safe to drive - at least THREE days after your procedure.  Do not lift anything heavier than a gallon of milk for the next TEN days.  Avoid strenuous activity/exercise for the next TEN days - this includes climbing stairs.   Do not submerge in a bathtub, pool, or Jacuzzi until the groin site is fully closed - at least 14 days.  Rest often. You may be more tired than usual.  Take your medications exactly as directed. Do not skip doses. Note - some medications should not be resumed after this procedure to prevent any adverse interaction with the contrast dye, which may be harmful to your kidneys. Be sure to ask your healthcare team about any potential reactions and for guidance on what  medications to hold.  Unless directed otherwise, drink six to eight glasses of water a day for the next TWO days to prevent dehydration and to help flush your body of the contrast dye used during your procedure.  Take your temperature and check the groin site for signs of infection - redness, swelling, drainage, or warmth - every day for one week.    When to call your doctor:  Fever of 100.4°F (38°C) or higher, or as directed by your health care provider.  Sudden shortness breath or any bleeding, bruising, or a large area of swelling at the groin site.  Signs of an allergic reaction to the contrast dye: rash, nausea, vomiting, or trouble breathing.  Signs of infection at the groin site: increased pain, redness, swelling, warmth, or foul-smelling drainage.   Constant or increasing pain or numbness in your leg.  Your leg feels cold, looks blue; numbness and/or tingling in the leg, especially near the catheter insertion site.   Rapid, pounding, or irregular heartbeat.  Blood in your urine or black, tarry stools.     [facility] Radiology [facility]    [phone], choose prompt 3, Monday - Friday, 8:00 am - 4:00 pm    [phone] After hours and on holidays. Ask to speak with the interventional radiologist on call.

## 2024-12-23 NOTE — TRANSFER OF CARE
Anesthesia Transfer of Care Note    Patient: Nelson GIBBONS Parent    Procedure(s) Performed: * No procedures listed *    Patient location: [facility]    Anesthesia Type: general    Transport from OR: Transported from OR on 6-10 L/min O2 by face mask with adequate spontaneous ventilation    Post pain: adequate analgesia    Post assessment: no apparent anesthetic complications and tolerated procedure well    Post vital signs: stable    Level of consciousness: awake and alert    Nausea/Vomiting: no nausea/vomiting    Complications: none    Transfer of care protocol was followed      Last vitals: Visit Vitals  BP (!) 151/67 (BP Location: Right arm, Patient Position: Lying)   Pulse 64   Temp 36.6 °C (97.9 °F)   Resp 16   Ht 6' (1.829 m)   Wt 74.8 kg (165 lb)   SpO2 98%   BMI 22.38 kg/m²

## 2024-12-23 NOTE — PLAN OF CARE
Pt arrived to  for endoleak embolization. Pt oriented to unit and staff, Pt safely transferred from stretcher to procedural table. Fall risk reviewed and comfort measures utilized with interventions. Safety strap applied, position pillows to minimize pressure points. Blankets applied. Pt prepped and draped utilizing standard sterile technique. Patient placed on continuous monitoring, as required by sedation policy. Timeouts implemented utilizing standard universal time-out per department and facility policy. CRNA to remain at bedside with continuous monitoring. Pt resting comfortably. Denies pain/discomfort. Will continue to monitor. See flow sheets for monitoring, medication administration, and updates. patient verbalizes understanding.

## 2024-12-23 NOTE — PLAN OF CARE
Patient ambulated on unit this morning with spouse at side.  Denies pain or SOB.  Verbalized an understanding of procedure.  Oriented to unit and call bell provided.  Will continue to monitor.

## 2024-12-23 NOTE — H&P
Radiology History & Physical      SUBJECTIVE:     Chief Complaint: Type 2 endoleak    History of Present Illness:  Nelson GIBBONS Parent is a 89 y.o. male who presents for angiogram and endoleak embolization  Past Medical History:   Diagnosis Date    AICD (automatic cardioverter/defibrillator) present 7/17/2012    Cardiomyopathy     CKD (chronic kidney disease) stage 3, GFR 30-59 ml/min 7/17/2012    Coronary artery disease     GERD (gastroesophageal reflux disease)     Tovar's; Dr. Vu    Hyperlipidemia 7/17/2012    Hypertension 7/17/2012    Ischemic cardiomyopathy 7/17/2012    MGUS (monoclonal gammopathy of unknown significance)     Thrombocytopenia 7/17/2012    Ventricular tachycardia     VT (ventricular tachycardia) 7/17/2012     Past Surgical History:   Procedure Laterality Date    ABDOMINAL AORTIC ANEURYSM REPAIR      AORTOGRAPHY N/A 8/21/2024    Procedure: AORTOGRAM;  Surgeon: Gonzalez Granado MD;  Location: [facility] OR [address] [place] [address];  Service: Vascular;  Laterality: N/A;  4.4 min  401.06 mGy  70.8925 Gy.cm  49ml Dye    CARDIAC DEFIBRILLATOR PLACEMENT      CATARACT EXTRACTION, BILATERAL  2018    CORONARY ANGIOPLASTY      EYE SURGERY Bilateral     cataract    HERNIA REPAIR      x2    REPLACEMENT OF IMPLANTABLE CARDIOVERTER-DEFIBRILLATOR (ICD) GENERATOR Left 03/18/2022    Procedure: REPLACEMENT, ICD GENERATOR;  Surgeon: Felipe Woodard MD;  Location: [facility] EP LAB;  Service: Cardiology;  Laterality: Left;  SEFERINO, ICD gen chg, SJM, elyssa, MB, 3prep*ANUJ ICD insitu*        Home Meds:   Prior to Admission medications    Medication Sig Start Date End Date Taking? Authorizing Provider   amLODIPine (NORVASC) 10 MG tablet TAKE 1 TABLET EVERY DAY  Patient taking differently: Take 10 mg by mouth once daily. 5/8/24  Yes Bety Tomlinson MD   beta-carotene,A,-vits C,E/mins (OCUVITE ORAL) Take by mouth every evening.   Yes Provider, Historical   famotidine (PEPCID) 20 MG tablet Take 20 mg by mouth before dinner.   Yes Provider,  Historical   furosemide (LASIX) 20 MG tablet Take 1 tablet (20 mg total) by mouth 2 (two) times daily. Take one tablet every other day orbas directed 12/1/23  Yes Shiraz Cartagena MD   metoprolol succinate (TOPROL-XL) 25 MG 24 hr tablet TAKE 1 TABLET TWICE DAILY  Patient taking differently: Take 25 mg by mouth 2 (two) times daily. 3/20/24  Yes Bety Tomlinson MD   multivitamin with minerals tablet Take 1 tablet by mouth every evening. 1/1/18  Yes Provider, Historical   niacin 500 MG tablet Take 500 mg by mouth Every PM.   Yes Provider, Historical   omega-3 fatty acids-vitamin E 1,000 mg Cap Take 1 capsule by mouth every evening.   Yes Provider, Historical   potassium chloride SA (K-DUR,KLOR-CON M) 10 MEQ tablet Take 1 tablet (10 mEq total) by mouth once daily.  Patient taking differently: Take 10 mEq by mouth once daily. TAKES AT NOON 12/1/23  Yes Shiraz Cartagena MD   sacubitriL-valsartan (ENTRESTO)  mg per tablet Take 1 tablet by mouth 2 (two) times daily. 12/17/24  Yes Shiraz Cartagena MD   sotaloL (BETAPACE) 120 MG Tab Take 1 tablet (120 mg total) by mouth 2 (two) times daily. 6/18/24  Yes Solange Moon PA-C   ubidecarenone (COENZYME Q10 ORAL) Take 1 tablet by mouth every evening. 12/9/22  Yes Provider, Historical   vitamin D 1000 units Tab Take 1,000 Units by mouth every Tues, Thurs, Sat. TAKES AT NIGHT   Yes Provider, Historical   azelastine (ASTELIN) 137 mcg (0.1 %) nasal spray 1 spray (137 mcg total) by Nasal route 2 (two) times daily. 10/24/23 10/23/24  Bety Tomlinson MD   doxycycline (VIBRA-TABS) 100 MG tablet Take 1 tablet (100 mg total) by mouth every 12 (twelve) hours.  Patient taking differently: Take 100 mg by mouth every 12 (twelve) hours. Pt has not started taking 12/5/24   Roselia Bradford MD   fluticasone propionate (FLONASE) 50 mcg/actuation nasal spray 1 spray (50 mcg total) by Each Nostril route once daily. 10/24/23   Bety Tomlinson MD   nebulizer and compressor  Archana 1 Device by Misc.(Non-Drug; Combo Route) route 2 (two) times a day. 4/25/24   Bety Tomlinson MD   nitroGLYCERIN (NITROSTAT) 0.4 MG SL tablet Take 1 tab under the tongue for chest pain. May repeat every 5 minutes for a total of 3 doses. If chest pain not relieved go to the ED.  Patient taking differently: Place 0.4 mg under the tongue every 5 (five) minutes as needed for Chest pain.  If chest pain not relieved go to the ED. 4/25/24   Bety Tomlinson MD   pravastatin (PRAVACHOL) 40 MG tablet Take 1 tablet (40 mg total) by mouth every evening. 8/27/24   Jerry Castro III, MD   sodium chloride 3% 3 % nebulizer solution Take 4 mLs by nebulization 2 (two) times a day.  Patient taking differently: Take 4 mLs by nebulization 2 (two) times daily as needed. 2/28/24 2/27/25  Eliza Rueda MD     Anticoagulants/Antiplatelets: no anticoagulation    Allergies:   Review of patient's allergies indicates:   Allergen Reactions    Fluorescein-benoxinate Other (See Comments)    Pcn  [penicillins]      Other reaction(s): Unknown    Quinolones Other (See Comments)     H/o AAA    Tropicamide Other (See Comments)    Clindamycin Rash     Sedation History:  no adverse reactions    Review of Systems:   Hematological: no known coagulopathies  Respiratory: no shortness of breath  Cardiovascular: no chest pain  Gastrointestinal: no abdominal pain  Genito-Urinary: no dysuria  Musculoskeletal: negative  Neurological: no TIA or stroke symptoms         OBJECTIVE:     Vital Signs (Most Recent)  Temp: 97.9 °F (36.6 °C) (12/23/24 0621)  Pulse: 64 (12/23/24 0621)  Resp: 16 (12/23/24 0621)  BP: (!) 151/67 (12/23/24 0625)  SpO2: 98 % (12/23/24 0621)    Physical Exam:  ASA: per anesthesia  Mallampati: per anesthesia  Access: 20G PIV    General: no acute distress  Mental Status: alert and oriented to person, place and time  HEENT: normocephalic, atraumatic  Chest: unlabored breathing  Heart: regular heart rate  Abdomen:  nondistended  Extremity: moves all extremities    ASSESSMENT/PLAN:     Sedation Plan: per anesthesia  Patient will undergo: Endoleak embolization    Kehinde Lyn  Diagnostic Radiology PGY-2

## 2024-12-23 NOTE — PROCEDURES
VIR Procedure Note    Pre Op Diagnosis: Type II endoleak    Post Op Diagnosis: same    Procedure: Mesenteric angiography & embolization    Procedure performed by: Joe Espino MD     Written Informed Consent Obtained: Yes    Specimen Removed:  Yes    Estimated Blood Loss:  Minimal     Findings:     RCFA access and iliac angiography demonstrated endoleak from right previously embolized L3 & right L4.     Successful embolization of Right L4 using Obsidio & coil.     Unable to catheterize the right L3, will need direct aneurysm access & embolization      Joe Espino MD  VIR

## 2024-12-23 NOTE — ANESTHESIA POSTPROCEDURE EVALUATION
Anesthesia Post Evaluation    Patient: Nelson Huntley    Procedure(s) Performed: * No procedures listed *    Final Anesthesia Type: general      Patient location during evaluation: PACU  Patient participation: Yes- Able to Participate  Level of consciousness: awake and alert  Post-procedure vital signs: reviewed and stable  Pain management: adequate  Airway patency: patent    PONV status at discharge: No PONV  Anesthetic complications: no      Cardiovascular status: blood pressure returned to baseline and hemodynamically stable  Respiratory status: spontaneous ventilation  Hydration status: euvolemic  Follow-up not needed.              Vitals Value Taken Time   /59 12/23/24 1345   Temp 36.4 °C (97.6 °F) 12/23/24 1315   Pulse 61 12/23/24 1345   Resp 20 12/23/24 1345   SpO2 92 % 12/23/24 1345         No case tracking events are documented in the log.      Pain/Perlita Score: Perlita Score: 10 (12/23/2024  1:15 PM)

## 2024-12-23 NOTE — ANESTHESIA PROCEDURE NOTES
Intubation    Date/Time: 12/23/2024 8:46 AM    Performed by: Shane Braden CRNA  Authorized by: Lb Leo MD    Intubation:     Induction:  Intravenous    Intubated:  Postinduction    Mask Ventilation:  Easy with oral airway    Attempts:  1    Attempted By:  CRNA    Method of Intubation:  Video laryngoscopy    Blade:  Curry 3    Laryngeal View Grade: Grade I - full view of cords      Difficult Airway Encountered?: No      Complications:  None    Airway Device:  Oral endotracheal tube    Airway Device Size:  7.5    Style/Cuff Inflation:  Cuffed    Inflation Amount (mL):  6    Tube secured:  22    Secured at:  The lips    Placement Verified By:  Capnometry    Complicating Factors:  None    Findings Post-Intubation:  BS equal bilateral and atraumatic/condition of teeth unchanged

## 2024-12-24 DIAGNOSIS — Z51.89 ENCOUNTER FOR OTHER SPECIFIED AFTERCARE: ICD-10-CM

## 2024-12-24 DIAGNOSIS — I97.89 TYPE II ENDOLEAK OF AORTIC GRAFT: Primary | ICD-10-CM

## 2025-01-09 ENCOUNTER — PATIENT MESSAGE (OUTPATIENT)
Dept: CARDIOLOGY | Facility: CLINIC | Age: OVER 89
End: 2025-01-09
Payer: MEDICARE

## 2025-01-09 RX ORDER — POTASSIUM CHLORIDE 750 MG/1
10 TABLET, EXTENDED RELEASE ORAL DAILY
Qty: 90 TABLET | Refills: 3 | Status: SHIPPED | OUTPATIENT
Start: 2025-01-09

## 2025-01-14 LAB
OHS CV AF BURDEN PERCENT: < 1
OHS CV DC REMOTE DEVICE TYPE: NORMAL
OHS CV RV PACING PERCENT: 2.7 %

## 2025-01-16 ENCOUNTER — LAB VISIT (OUTPATIENT)
Dept: LAB | Facility: HOSPITAL | Age: OVER 89
End: 2025-01-16
Attending: PHYSICIAN ASSISTANT
Payer: MEDICARE

## 2025-01-16 DIAGNOSIS — I97.89 TYPE II ENDOLEAK OF AORTIC GRAFT: ICD-10-CM

## 2025-01-16 DIAGNOSIS — Z51.89 ENCOUNTER FOR OTHER SPECIFIED AFTERCARE: ICD-10-CM

## 2025-01-16 LAB
ALBUMIN SERPL BCP-MCNC: 3.4 G/DL (ref 3.5–5.2)
ALP SERPL-CCNC: 140 U/L (ref 40–150)
ALT SERPL W/O P-5'-P-CCNC: 19 U/L (ref 10–44)
ANION GAP SERPL CALC-SCNC: 9 MMOL/L (ref 8–16)
AST SERPL-CCNC: 23 U/L (ref 10–40)
BASOPHILS # BLD AUTO: 0.05 K/UL (ref 0–0.2)
BASOPHILS NFR BLD: 0.5 % (ref 0–1.9)
BILIRUB SERPL-MCNC: 0.7 MG/DL (ref 0.1–1)
BUN SERPL-MCNC: 21 MG/DL (ref 8–23)
CALCIUM SERPL-MCNC: 9.7 MG/DL (ref 8.7–10.5)
CHLORIDE SERPL-SCNC: 105 MMOL/L (ref 95–110)
CO2 SERPL-SCNC: 26 MMOL/L (ref 23–29)
CREAT SERPL-MCNC: 1.3 MG/DL (ref 0.5–1.4)
DIFFERENTIAL METHOD BLD: ABNORMAL
EOSINOPHIL # BLD AUTO: 0.1 K/UL (ref 0–0.5)
EOSINOPHIL NFR BLD: 1 % (ref 0–8)
ERYTHROCYTE [DISTWIDTH] IN BLOOD BY AUTOMATED COUNT: 15.2 % (ref 11.5–14.5)
EST. GFR  (NO RACE VARIABLE): 52.5 ML/MIN/1.73 M^2
GLUCOSE SERPL-MCNC: 100 MG/DL (ref 70–110)
HCT VFR BLD AUTO: 46.3 % (ref 40–54)
HGB BLD-MCNC: 15.3 G/DL (ref 14–18)
IMM GRANULOCYTES # BLD AUTO: 0.05 K/UL (ref 0–0.04)
IMM GRANULOCYTES NFR BLD AUTO: 0.5 % (ref 0–0.5)
INR PPP: 1.1 (ref 0.8–1.2)
LYMPHOCYTES # BLD AUTO: 3.2 K/UL (ref 1–4.8)
LYMPHOCYTES NFR BLD: 31.7 % (ref 18–48)
MCH RBC QN AUTO: 30.1 PG (ref 27–31)
MCHC RBC AUTO-ENTMCNC: 33 G/DL (ref 32–36)
MCV RBC AUTO: 91 FL (ref 82–98)
MONOCYTES # BLD AUTO: 0.9 K/UL (ref 0.3–1)
MONOCYTES NFR BLD: 9.2 % (ref 4–15)
NEUTROPHILS # BLD AUTO: 5.7 K/UL (ref 1.8–7.7)
NEUTROPHILS NFR BLD: 57.1 % (ref 38–73)
NRBC BLD-RTO: 0 /100 WBC
PLATELET # BLD AUTO: 125 K/UL (ref 150–450)
PMV BLD AUTO: 13 FL (ref 9.2–12.9)
POTASSIUM SERPL-SCNC: 4.1 MMOL/L (ref 3.5–5.1)
PROT SERPL-MCNC: 7.8 G/DL (ref 6–8.4)
PROTHROMBIN TIME: 11.9 SEC (ref 9–12.5)
RBC # BLD AUTO: 5.08 M/UL (ref 4.6–6.2)
SODIUM SERPL-SCNC: 140 MMOL/L (ref 136–145)
WBC # BLD AUTO: 9.99 K/UL (ref 3.9–12.7)

## 2025-01-16 PROCEDURE — 80053 COMPREHEN METABOLIC PANEL: CPT | Mod: HCNC | Performed by: PHYSICIAN ASSISTANT

## 2025-01-16 PROCEDURE — 85025 COMPLETE CBC W/AUTO DIFF WBC: CPT | Mod: HCNC | Performed by: PHYSICIAN ASSISTANT

## 2025-01-16 PROCEDURE — 36415 COLL VENOUS BLD VENIPUNCTURE: CPT | Mod: HCNC,PO | Performed by: PHYSICIAN ASSISTANT

## 2025-01-16 PROCEDURE — 85610 PROTHROMBIN TIME: CPT | Mod: HCNC | Performed by: PHYSICIAN ASSISTANT

## 2025-01-27 ENCOUNTER — PATIENT MESSAGE (OUTPATIENT)
Dept: INTERVENTIONAL RADIOLOGY/VASCULAR | Facility: HOSPITAL | Age: OVER 89
End: 2025-01-27
Payer: MEDICARE

## 2025-01-27 ENCOUNTER — DOCUMENTATION ONLY (OUTPATIENT)
Dept: PREADMISSION TESTING | Facility: HOSPITAL | Age: OVER 89
End: 2025-01-27
Payer: MEDICARE

## 2025-01-28 NOTE — PRE-PROCEDURE INSTRUCTIONS
PRE-OP INSTRUCTIONS:  Instructed patient to have no food,milk or milk products after midnight   It is ok to take AM medications with a few sips of water   Medication instructions for pm prior to and am of surgery reviewed.  Instructed patient to avoid taking vitamins,supplements,aspirin or ibuprofen the am of surgery.  Shower instructions provided     Patient denies any side effects or issues with anesthesia or sedation.      [facility]    [facility] - Device clinic called - STEVEN Pérez - Magnet over device for the duration of procedure if cautery is to be used.

## 2025-01-29 NOTE — PROGRESS NOTES
Patient has been identified as having an implanted cardiac rhythm device (CRD); the implanted device is a [employer] defibrillator.      The planned surgical procedure is an Embolization .    No noted pacer dependency.       DEFIBRILLATORS/NON-DEPENDENT ANY LOCATION    Per protocol,a magnet should be applied directly over the implanted device during entire surgical procedure when electrocautery will be used.    If no electrocautery is planned, magnet application is not needed.    For additional questions, please contact the Arrhythmia Department at Ext [phone].

## 2025-01-30 ENCOUNTER — PATIENT MESSAGE (OUTPATIENT)
Dept: INTERNAL MEDICINE | Facility: CLINIC | Age: OVER 89
End: 2025-01-30
Payer: MEDICARE

## 2025-01-31 ENCOUNTER — TELEPHONE (OUTPATIENT)
Dept: ELECTROPHYSIOLOGY | Facility: CLINIC | Age: OVER 89
End: 2025-01-31
Payer: MEDICARE

## 2025-01-31 ENCOUNTER — TELEPHONE (OUTPATIENT)
Dept: CARDIOLOGY | Facility: CLINIC | Age: OVER 89
End: 2025-01-31
Payer: MEDICARE

## 2025-01-31 DIAGNOSIS — I10 ESSENTIAL HYPERTENSION: ICD-10-CM

## 2025-01-31 DIAGNOSIS — I48.0 PAROXYSMAL ATRIAL FIBRILLATION: Primary | ICD-10-CM

## 2025-01-31 RX ORDER — METOPROLOL SUCCINATE 25 MG/1
25 TABLET, EXTENDED RELEASE ORAL 2 TIMES DAILY
Qty: 180 TABLET | Refills: 2 | Status: SHIPPED | OUTPATIENT
Start: 2025-01-31

## 2025-01-31 NOTE — TELEPHONE ENCOUNTER
No care due was identified.  Health [facility] Embedded Care Due Messages. Reference number: [id number].   1/31/2025 8:37:02 AM CST

## 2025-01-31 NOTE — TELEPHONE ENCOUNTER
----- Message from Shiraz Cartagena MD sent at 1/31/2025 12:16 PM CST -----  Regarding: RE: meds and  Exam follow up  He could easily be on both but I would not have started the sotalol- in past he was very sharp on his meds.Yes needs follow-up,CJL  ----- Message -----  From: Jerry Castro III, MD  Sent: 1/31/2025  11:02 AM CST  To: Shiraz Cartagena MD; Felipe Woodard MD; #  Subject: meds and  Exam follow up                         Greetings  I have 2 questions. I realize you havent seen him in some time    1. Is this patient supposed to be on metoprolol and sotalol  2. Is This patient is due for follow up  . If so Can we get  His  scheduled for follow up . Thanks so much!      Thanks

## 2025-02-01 NOTE — TELEPHONE ENCOUNTER
Pt already scheduled 2/6 w. Ms Boykin in EP. I scheduled him for f/u w. dr cortez on 3/14 and wife agreed to date/time of appointment(s). Per Ms Red' note, the next appt was to be w. dr Cortez.

## 2025-02-04 PROBLEM — I48.19 PERSISTENT ATRIAL FIBRILLATION: Status: ACTIVE | Noted: 2025-02-04

## 2025-02-04 NOTE — PROGRESS NOTES
"Mr. Huntley is a patient of Dr. Woodard and was last seen in clinic 11/13/2023.      Subjective:   Patient ID:  Nelson Huntley is an 89 y.o. male who presents for follow up of ICD  .     HPI:    Mr. Huntley is an 89 y.o. male with hx of near syncope, CAD/MI s/p PCIs, ICM, TIA, thrombocytopenia, VT (on sotalol), ICD, bronchiectasis, AAA (s/p repair) here for annual follow up.     Background:    8/19: Former patient of Dr Melissa. He had near syncope, symptomatic VTNS, inducible MVTS (multiple morphs). He had dual chamber Anuj ICD implanted 3/15/12  He is also on sotalol. Interrogation today reveals stable lead function, no significant arrhythmias, 73% atrial pacing, minimal RV pacing    11/2022: DC ICD generator change (ANUJ to [employer]) 3/22. Normal function with no sustained arrhythmias  87 yoM here for ICD management. Normal DC ICd function with no sustained arrhythmias. I discussed routine device follow up including quarterly to bi-annual device checks for device function as well as yearly follow up in the EP clinic. The patient  was advised to call with any concerns regarding their device. Device clinic follow up as scheduled. RTC 1y    11/13/2023: Mr. Huntley is doing well from a device perspective with stable lead and device function. No arrhythmia noted. On sotalol for VT. ECG with stable intervals. No treated episodes. No RV pacing. RA autocap changed from on to monitor due to difference in in clinic measure today. Will recheck in 6 mo to confirm stability. Patient feels great. No recent CHF symptoms. Follows with Dr. Cartagena in general cardiology.    Update (02/05/2025):    11/2023: ED with hemoptysis. Later diagnosed with MAC infection and bronchiectasis.     Echo 7/5/2024 showed EF 45-50% (was 38%)    10/25/2024 and 12/23/2024: Mesenteric angiography & embolization for Type II endoleak.  Note from procedure 12/23/2024 "Unable to catheterize the right L3, will need direct aneurysm access & embolization. Will " "schedule patient for direct aneurysm access and embolization in 2-4 weeks"    Today he reports cough and CARBONE that comes and goes - not worsening overall. No CP, palps, LH, syncope reported. Off plavix and ASA. Off statin due to transaminitis (resolved).  On sotalol 120mg BID for VT management. Creatinine 1.3 on 1/16/2025.  On toprol and entresto for GDMT.  Not on OAC due to paucity of AF.    Device Interrogation (2/5/2025) reveals an intrinsic SB with 1st deg AVB with stable lead and device function. AMSx22, max 46 seconds. No ventricular arrhythmias or treated episodes were noted.  He paces 18% in the RA and 5.1% in the RV. Estimated battery longevity 5.5-6.3 years.     I have personally reviewed the patient's EKG today, which shows SB with 1st deg AVB at 56bpm. ND interval is 252. QRS is 104. QT is 450.    Relevant Cardiac Test Results:    2D Echo (7/5/2024):    Left Ventricle: The left ventricle is mildly dilated. Normal wall thickness. Regional wall motion abnormalities present. See diagram for wall motion findings. There is mildly reduced systolic function with a visually estimated ejection fraction of 45 - 50%.    Right Ventricle: Normal right ventricular cavity size. Wall thickness is normal. Systolic function is normal. Pacemaker lead present in the ventricle.    Aortic Valve: There is mild aortic valve sclerosis.    Pulmonary Artery: The estimated pulmonary artery systolic pressure is 39 mmHg.    IVC/SVC: Normal venous pressure at 3 mmHg.    Current Outpatient Medications   Medication Sig    amLODIPine (NORVASC) 10 MG tablet TAKE 1 TABLET EVERY DAY (Patient taking differently: Take 10 mg by mouth once daily.)    azelastine (ASTELIN) 137 mcg (0.1 %) nasal spray 1 spray (137 mcg total) by Nasal route 2 (two) times daily.    beta-carotene,A,-vits C,E/mins (OCUVITE ORAL) Take by mouth every evening.    doxycycline (VIBRA-TABS) 100 MG tablet Take 1 tablet (100 mg total) by mouth every 12 (twelve) hours. (Patient " taking differently: Take 100 mg by mouth every 12 (twelve) hours. Pt has not started taking)    famotidine (PEPCID) 20 MG tablet Take 20 mg by mouth before dinner.    fluticasone propionate (FLONASE) 50 mcg/actuation nasal spray 1 spray (50 mcg total) by Each Nostril route once daily.    furosemide (LASIX) 20 MG tablet Take 1 tablet (20 mg total) by mouth 2 (two) times daily. Take one tablet every other day orbas directed    metoprolol succinate (TOPROL-XL) 25 MG 24 hr tablet Take 1 tablet (25 mg total) by mouth 2 (two) times daily.    multivitamin with minerals tablet Take 1 tablet by mouth every evening.    nebulizer and compressor Archana 1 Device by Misc.(Non-Drug; Combo Route) route 2 (two) times a day.    niacin 500 MG tablet Take 500 mg by mouth Every PM.    nitroGLYCERIN (NITROSTAT) 0.4 MG SL tablet Take 1 tab under the tongue for chest pain. May repeat every 5 minutes for a total of 3 doses. If chest pain not relieved go to the ED. (Patient taking differently: Place 0.4 mg under the tongue every 5 (five) minutes as needed for Chest pain.  If chest pain not relieved go to the ED.)    omega-3 fatty acids-vitamin E 1,000 mg Cap Take 1 capsule by mouth every evening.    potassium chloride SA (K-DUR,KLOR-CON M) 10 MEQ tablet Take 1 tablet (10 mEq total) by mouth once daily.    pravastatin (PRAVACHOL) 40 MG tablet Take 1 tablet (40 mg total) by mouth every evening.    sacubitriL-valsartan (ENTRESTO)  mg per tablet Take 1 tablet by mouth 2 (two) times daily.    sodium chloride 3% 3 % nebulizer solution Take 4 mLs by nebulization 2 (two) times a day. (Patient taking differently: Take 4 mLs by nebulization 2 (two) times daily as needed.)    sotaloL (BETAPACE) 120 MG Tab Take 1 tablet (120 mg total) by mouth 2 (two) times daily.    ubidecarenone (COENZYME Q10 ORAL) Take 1 tablet by mouth every evening.    vitamin D 1000 units Tab Take 1,000 Units by mouth every Tues, Thurs, Sat. TAKES AT NIGHT     No current  facility-administered medications for this visit.     Facility-Administered Medications Ordered in Other Visits   Medication    sodium chloride 0.9% bolus 1,000 mL    vancomycin in dextrose 5 % 1 gram/250 mL IVPB 1,000 mg       Review of Systems   Constitutional: Positive for malaise/fatigue.   Cardiovascular:  Positive for dyspnea on exertion. Negative for chest pain, irregular heartbeat, leg swelling and palpitations.   Respiratory:  Positive for cough and shortness of breath.    Hematologic/Lymphatic: Negative for bleeding problem.   Skin:  Negative for rash.   Musculoskeletal:  Negative for myalgias.   Gastrointestinal:  Negative for hematemesis, hematochezia and nausea.   Genitourinary:  Negative for hematuria.   Neurological:  Negative for light-headedness.   Psychiatric/Behavioral:  Negative for altered mental status.    Allergic/Immunologic: Negative for persistent infections.       Objective:          /67   Pulse (!) 56   Ht 6' (1.829 m)   Wt 73.9 kg (162 lb 14.7 oz)   BMI 22.10 kg/m²     Physical Exam  Vitals and nursing note reviewed.   Constitutional:       Appearance: Normal appearance. He is well-developed.   HENT:      Head: Normocephalic.      Nose: Nose normal.   Eyes:      Pupils: Pupils are equal, round, and reactive to light.   Cardiovascular:      Rate and Rhythm: Normal rate and regular rhythm.   Pulmonary:      Effort: No respiratory distress.   Chest:      Comments: Device to LUCW. Incision and pocket in good repair.    Musculoskeletal:         General: Normal range of motion.   Skin:     General: Skin is warm and dry.      Findings: No erythema.   Neurological:      Mental Status: He is alert and oriented to person, place, and time.   Psychiatric:         Speech: Speech normal.         Behavior: Behavior normal.           Lab Results   Component Value Date     01/16/2025    K 4.1 01/16/2025    MG 2.0 01/20/2023    BUN 21 01/16/2025    CREATININE 1.3 01/16/2025    ALT 19  01/16/2025    AST 23 01/16/2025    HGB 15.3 01/16/2025    HCT 46.3 01/16/2025    TSH 2.613 09/22/2023    LDLCALC 66.2 10/10/2024       Recent Labs   Lab 09/03/24  1624 10/25/24  0754 11/26/24  1504 01/16/25  0853   INR 1.1 1.0 1.1 1.1       Assessment:     1. AICD (automatic cardioverter/defibrillator) present    2. Paroxysmal atrial fibrillation    3. Chronic combined systolic and diastolic congestive heart failure    4. Essential hypertension    5. Ischemic cardiomyopathy    6. History of ventricular tachycardia    7. Encounter for monitoring sotalol therapy      Plan:     In summary, Mr. Huntley is an 89 y.o. male with hx of near syncope, CAD/MI s/p PCIs, ICM, TIA, thrombocytopenia, VT (on sotalol), ICD here for annual follow up.   Mr. Huntley is doing well from a device perspective with stable lead and device function. No sustained arrhythmia noted. Hx of brief (<1 min) AF not on OAC.  He is on sotalol for VT suppression. ECG with stable intervals. On metoprolol for GDMT. Bradycardia not a concern due to presence of RA lead (currently 18% RA paced).    Echo 7/5/2024 showed EF 45-50% (was 38%). RV pacing 5%. No CHF symptoms although he does have chronic cough/CARBONE due to MAC infection/bronchiectasis. Follows with pulmonology.   10/25/2024 and 12/23/2024 underwent embolization for Type II endoleak. Unable to catheterize the right L3. Direct aneurysm access and embolization scheduled for 2/26/2025.    Continue current medication regimen and device settings.   Follow up in device clinic as scheduled.   Follow up in EP clinic in 1 year, sooner as needed.     *A copy of this note has been sent to Dr. Woodard*    Follow up in about 1 year (around 2/5/2026).    ------------------------------------------------------------------    RICHIE Lunsford, NP-C  Cardiac Electrophysiology

## 2025-02-05 ENCOUNTER — OFFICE VISIT (OUTPATIENT)
Dept: ELECTROPHYSIOLOGY | Facility: CLINIC | Age: OVER 89
End: 2025-02-05
Attending: INTERNAL MEDICINE
Payer: MEDICARE

## 2025-02-05 ENCOUNTER — CLINICAL SUPPORT (OUTPATIENT)
Dept: CARDIOLOGY | Facility: HOSPITAL | Age: OVER 89
End: 2025-02-05
Attending: INTERNAL MEDICINE
Payer: MEDICARE

## 2025-02-05 ENCOUNTER — HOSPITAL ENCOUNTER (OUTPATIENT)
Dept: CARDIOLOGY | Facility: CLINIC | Age: OVER 89
Discharge: HOME | End: 2025-02-05
Attending: INTERNAL MEDICINE
Payer: MEDICARE

## 2025-02-05 VITALS
BODY MASS INDEX: 22.07 KG/M2 | DIASTOLIC BLOOD PRESSURE: 67 MMHG | HEIGHT: 72 IN | SYSTOLIC BLOOD PRESSURE: 127 MMHG | WEIGHT: 162.94 LBS | HEART RATE: 56 BPM

## 2025-02-05 DIAGNOSIS — Z51.81 ENCOUNTER FOR MONITORING SOTALOL THERAPY: ICD-10-CM

## 2025-02-05 DIAGNOSIS — I25.5 ISCHEMIC CARDIOMYOPATHY: ICD-10-CM

## 2025-02-05 DIAGNOSIS — Z95.810 AICD (AUTOMATIC CARDIOVERTER/DEFIBRILLATOR) PRESENT: Primary | Chronic | ICD-10-CM

## 2025-02-05 DIAGNOSIS — I10 ESSENTIAL HYPERTENSION: ICD-10-CM

## 2025-02-05 DIAGNOSIS — Z86.79 HISTORY OF VENTRICULAR TACHYCARDIA: ICD-10-CM

## 2025-02-05 DIAGNOSIS — I50.42 CHRONIC COMBINED SYSTOLIC AND DIASTOLIC CONGESTIVE HEART FAILURE: ICD-10-CM

## 2025-02-05 DIAGNOSIS — I48.0 PAROXYSMAL ATRIAL FIBRILLATION: ICD-10-CM

## 2025-02-05 DIAGNOSIS — Z79.899 ENCOUNTER FOR MONITORING SOTALOL THERAPY: ICD-10-CM

## 2025-02-05 LAB
OHS QRS DURATION: 104 MS
OHS QTC CALCULATION: 434 MS

## 2025-02-05 PROCEDURE — 93283 PRGRMG EVAL IMPLANTABLE DFB: CPT | Mod: HCNC

## 2025-02-05 PROCEDURE — 93000 ELECTROCARDIOGRAM COMPLETE: CPT | Mod: XE,HCNC,S$GLB, | Performed by: INTERNAL MEDICINE

## 2025-02-05 PROCEDURE — 1159F MED LIST DOCD IN RCRD: CPT | Mod: HCNC,CPTII,S$GLB, | Performed by: NURSE PRACTITIONER

## 2025-02-05 PROCEDURE — 3288F FALL RISK ASSESSMENT DOCD: CPT | Mod: HCNC,CPTII,S$GLB, | Performed by: NURSE PRACTITIONER

## 2025-02-05 PROCEDURE — 1157F ADVNC CARE PLAN IN RCRD: CPT | Mod: HCNC,CPTII,S$GLB, | Performed by: NURSE PRACTITIONER

## 2025-02-05 PROCEDURE — 99999 PR PBB SHADOW E&M-EST. PATIENT-LVL IV: CPT | Mod: PBBFAC,HCNC,, | Performed by: NURSE PRACTITIONER

## 2025-02-05 PROCEDURE — 1160F RVW MEDS BY RX/DR IN RCRD: CPT | Mod: HCNC,CPTII,S$GLB, | Performed by: NURSE PRACTITIONER

## 2025-02-05 PROCEDURE — 93283 PRGRMG EVAL IMPLANTABLE DFB: CPT | Mod: 26,HCNC,, | Performed by: INTERNAL MEDICINE

## 2025-02-05 PROCEDURE — 99214 OFFICE O/P EST MOD 30 MIN: CPT | Mod: HCNC,S$GLB,, | Performed by: NURSE PRACTITIONER

## 2025-02-05 PROCEDURE — 1126F AMNT PAIN NOTED NONE PRSNT: CPT | Mod: HCNC,CPTII,S$GLB, | Performed by: NURSE PRACTITIONER

## 2025-02-05 PROCEDURE — 1101F PT FALLS ASSESS-DOCD LE1/YR: CPT | Mod: HCNC,CPTII,S$GLB, | Performed by: NURSE PRACTITIONER

## 2025-02-06 ENCOUNTER — HOSPITAL ENCOUNTER (OUTPATIENT)
Dept: RADIOLOGY | Facility: HOSPITAL | Age: OVER 89
Discharge: HOME | End: 2025-02-06
Attending: INTERNAL MEDICINE
Payer: MEDICARE

## 2025-02-06 DIAGNOSIS — A31.0 MYCOBACTERIUM AVIUM INFECTION: ICD-10-CM

## 2025-02-06 PROCEDURE — 71250 CT THORAX DX C-: CPT | Mod: 26,HCNC,, | Performed by: RADIOLOGY

## 2025-02-06 PROCEDURE — 71250 CT THORAX DX C-: CPT | Mod: TC,HCNC

## 2025-02-10 ENCOUNTER — OFFICE VISIT (OUTPATIENT)
Dept: INTERNAL MEDICINE | Facility: CLINIC | Age: OVER 89
End: 2025-02-10
Payer: MEDICARE

## 2025-02-10 VITALS
HEART RATE: 56 BPM | WEIGHT: 161.63 LBS | SYSTOLIC BLOOD PRESSURE: 112 MMHG | OXYGEN SATURATION: 96 % | HEIGHT: 72 IN | DIASTOLIC BLOOD PRESSURE: 60 MMHG | BODY MASS INDEX: 21.89 KG/M2

## 2025-02-10 DIAGNOSIS — N18.31 CHRONIC KIDNEY DISEASE, STAGE 3A: ICD-10-CM

## 2025-02-10 DIAGNOSIS — D47.2 MGUS (MONOCLONAL GAMMOPATHY OF UNKNOWN SIGNIFICANCE): Primary | ICD-10-CM

## 2025-02-10 DIAGNOSIS — J47.9 BRONCHIECTASIS WITHOUT COMPLICATION: ICD-10-CM

## 2025-02-10 DIAGNOSIS — R74.8 ELEVATED ALKALINE PHOSPHATASE LEVEL: ICD-10-CM

## 2025-02-10 DIAGNOSIS — E78.00 PURE HYPERCHOLESTEROLEMIA: ICD-10-CM

## 2025-02-10 DIAGNOSIS — D69.3 IDIOPATHIC THROMBOCYTOPENIC PURPURA: ICD-10-CM

## 2025-02-10 DIAGNOSIS — I12.9 HYPERTENSIVE KIDNEY DISEASE: ICD-10-CM

## 2025-02-10 PROCEDURE — 99214 OFFICE O/P EST MOD 30 MIN: CPT | Mod: HCNC,S$GLB,, | Performed by: INTERNAL MEDICINE

## 2025-02-10 PROCEDURE — 1126F AMNT PAIN NOTED NONE PRSNT: CPT | Mod: HCNC,CPTII,S$GLB, | Performed by: INTERNAL MEDICINE

## 2025-02-10 PROCEDURE — 1157F ADVNC CARE PLAN IN RCRD: CPT | Mod: HCNC,CPTII,S$GLB, | Performed by: INTERNAL MEDICINE

## 2025-02-10 PROCEDURE — 99999 PR PBB SHADOW E&M-EST. PATIENT-LVL IV: CPT | Mod: PBBFAC,HCNC,, | Performed by: INTERNAL MEDICINE

## 2025-02-10 PROCEDURE — 1101F PT FALLS ASSESS-DOCD LE1/YR: CPT | Mod: HCNC,CPTII,S$GLB, | Performed by: INTERNAL MEDICINE

## 2025-02-10 PROCEDURE — G2211 COMPLEX E/M VISIT ADD ON: HCPCS | Mod: HCNC,S$GLB,, | Performed by: INTERNAL MEDICINE

## 2025-02-10 PROCEDURE — 1159F MED LIST DOCD IN RCRD: CPT | Mod: HCNC,CPTII,S$GLB, | Performed by: INTERNAL MEDICINE

## 2025-02-10 PROCEDURE — 3288F FALL RISK ASSESSMENT DOCD: CPT | Mod: HCNC,CPTII,S$GLB, | Performed by: INTERNAL MEDICINE

## 2025-02-10 PROCEDURE — 1160F RVW MEDS BY RX/DR IN RCRD: CPT | Mod: HCNC,CPTII,S$GLB, | Performed by: INTERNAL MEDICINE

## 2025-02-10 RX ORDER — PRAVASTATIN SODIUM 20 MG/1
20 TABLET ORAL DAILY
Qty: 90 TABLET | Refills: 3 | Status: SHIPPED | OUTPATIENT
Start: 2025-02-10 | End: 2026-02-10

## 2025-02-10 NOTE — PROGRESS NOTES
Hi, pt stopped the lasix, (he was only taking three times a weekly. He stopped about a month ago. Hasn't noticed and symptomatic change - I tolded him he can continue off of this. He is also going to resume statin - he held it when were were looking in to his liver fx      Assessment:         1. MGUS (monoclonal gammopathy of unknown significance)    2. Chronic kidney disease, stage 3a    3. Elevated alkaline phosphatase level    4. Idiopathic thrombocytopenic purpura    5. Bronchiectasis without complication    6. Hypertensive kidney disease    7. Pure hypercholesterolemia          Plan:           Nelson was seen today for follow-up.    Diagnoses and all orders for this visit:    MGUS (monoclonal gammopathy of unknown significance)    Chronic kidney disease, stage 3a  Chronic  Controlled  Patient is at goal today   I have reviewed lifestyle modification to achieve/maintain goals  We will continue the current medication regimen as listed below  Patient will follow up in 3 months   -     Basic Metabolic Panel; Standing  -     CBC Auto Differential; Standing  -     Hepatic Function Panel; Standing  -     Phosphorus; Standing  -     Urinalysis; Standing      Elevated alkaline phosphatase level    -     Gamma GT; Standing    Idiopathic thrombocytopenic purpura  chronic  stable  Continue current regimen and follow up with  heme/onc      Bronchiectasis without complication  chronic  stable  Continue current regimen and follow up with  pulm        Assessment & Plan    IMPRESSION:  Assessed CKD stage based on EGFR of 57.8, determined to be Stage 3A (EGFR 45-60)  Evaluated slightly elevated alkaline phosphatase, considering M-DOS as potential cause  Will continue Pravastatin despite mild liver enzyme elevation, given patient's cardiac history  Consulted with cardiologist regarding continuation of Lasix (furosemide)  Noted chronic thrombocytopenia, patient already following with hematology    MEDICATIONS:  - Restarted  Pravastatin  - Continued Lasix (furosemide) as prescribed by cardiologist  - Continued potassium supplement (10 mEq)    ORDERS:  - Ordered GGT to help determine source of elevated alkaline phosphatase    FOLLOW UP:  - Follow up in 3 months with labs             Subjective:       Patient ID: Nelson GIBBONS Parent is a 89 y.o. male.    Chief Complaint: Follow-up      Interim Hx  Concerns today  Chronic problems        History of Present Illness    CHIEF COMPLAINT:  Patient presents today for follow up.    CURRENT SYMPTOMS:  He reports occasional cough and shortness of breath. He acknowledges having a chronic lung issue but states it is manageable.    MEDICATIONS:  His current medications include Entresto 97/103, aspirin (baby), Vitamin D, fish oil, sotalol 120, metoprolol 25, amlodipine, Centrum Silver, nitroglycerin, furosemide, and potassium 10 mEq. He has discontinued Plavix and pravastatin.    DIET AND EXERCISE:  He is not following his diet as strictly as before but maintains moderate dietary habits. He drinks electrolyte water regularly. He exercises at the gym 3 times weekly, having transitioned from weight training to treadmill workouts.    SOCIAL HISTORY:  He denies alcohol consumption, tobacco use, and illicit drug use.    LABS:  Microalbumin ratio was abnormal. eGFR was 57.8 mL/min/1.73m². Platelet count was low.      ROS:  Respiratory: +cough, +shortness of breath           Review of Systems   All other systems reviewed and are negative.          Health Maintenance Due   Topic Date Due    Aspirin/Antiplatelet Therapy  Never done    RSV Vaccine (Age 60+ and Pregnant patients) (1 - 1-dose 75+ series) Never done    TETANUS VACCINE  11/22/2023    COVID-19 Vaccine (6 - 2024-25 season) 09/01/2024         Objective:     /60 (BP Location: Right arm, Patient Position: Sitting)   Pulse (!) 56   Ht 6' (1.829 m)   Wt 73.3 kg (161 lb 9.6 oz)   SpO2 96%   BMI 21.92 kg/m²   .Physical Exam    Cardiovascular:  Bradycardia. Good pulses.  Respiratory: Normal respiratory effort. Clear to auscultation bilaterally. No rales. No rhonchi. No wheezing.  Abdomen: Umbilical hernia.               2/10/2025     1:01 PM 2/5/2025     3:10 PM 12/23/2024     6:21 AM 12/5/2024     1:52 PM 11/26/2024     1:59 PM   Vitals   Height 6' (1.829 m) 6' (1.829 m) 6' (1.829 m) 6' (1.829 m) 6' (1.829 m)   Weight (lbs) 161.6 162.92 165 163.69 165.79   BMI (kg/m2) 21.9 22.1 22.4 22.2 22.5          Physical Exam  Vitals and nursing note reviewed.   Constitutional:       General: He is not in acute distress.     Appearance: He is well-developed. He is not ill-appearing, toxic-appearing or diaphoretic.   HENT:      Head: Normocephalic.   Eyes:      Conjunctiva/sclera: Conjunctivae normal.   Cardiovascular:      Rate and Rhythm: Normal rate and regular rhythm.      Heart sounds: Normal heart sounds. No murmur heard.     No friction rub. No gallop.   Pulmonary:      Effort: Pulmonary effort is normal. No respiratory distress.   Abdominal:      General: There is no distension.      Palpations: Abdomen is soft.   Neurological:      General: No focal deficit present.      Mental Status: He is alert and oriented to person, place, and time.           Recent Results (from the past 2 weeks)   Rhythm strip    Collection Time: 02/05/25  3:01 PM   Result Value Ref Range    QRS Duration 104 ms    OHS QTC Calculation 434 ms   PTH, Intact    Collection Time: 02/06/25  8:33 AM   Result Value Ref Range    PTH, Intact 50.2 9.0 - 77.0 pg/mL   Lipid Panel    Collection Time: 02/06/25  8:33 AM   Result Value Ref Range    Cholesterol 139 120 - 199 mg/dL    Triglycerides 64 30 - 150 mg/dL    HDL 43 40 - 75 mg/dL    LDL Cholesterol 83.2 63.0 - 159.0 mg/dL    HDL/Cholesterol Ratio 30.9 20.0 - 50.0 %    Total Cholesterol/HDL Ratio 3.2 2.0 - 5.0    Non-HDL Cholesterol 96 mg/dL   Hepatic Function Panel    Collection Time: 02/06/25  8:33 AM   Result Value Ref Range    Total Protein  7.3 6.0 - 8.4 g/dL    Albumin 3.4 (L) 3.5 - 5.2 g/dL    Total Bilirubin 0.8 0.1 - 1.0 mg/dL    Bilirubin, Direct 0.3 0.1 - 0.3 mg/dL    AST 25 10 - 40 U/L    ALT 29 10 - 44 U/L    Alkaline Phosphatase 151 (H) 40 - 150 U/L   CBC Auto Differential    Collection Time: 02/06/25  8:33 AM   Result Value Ref Range    WBC 7.03 3.90 - 12.70 K/uL    RBC 4.92 4.60 - 6.20 M/uL    Hemoglobin 14.2 14.0 - 18.0 g/dL    Hematocrit 45.0 40.0 - 54.0 %    MCV 92 82 - 98 fL    MCH 28.9 27.0 - 31.0 pg    MCHC 31.6 (L) 32.0 - 36.0 g/dL    RDW 14.7 (H) 11.5 - 14.5 %    Platelets 96 (L) 150 - 450 K/uL    MPV 12.7 9.2 - 12.9 fL    Immature Granulocytes 0.3 0.0 - 0.5 %    Gran # (ANC) 3.3 1.8 - 7.7 K/uL    Immature Grans (Abs) 0.02 0.00 - 0.04 K/uL    Lymph # 2.6 1.0 - 4.8 K/uL    Mono # 0.8 0.3 - 1.0 K/uL    Eos # 0.2 0.0 - 0.5 K/uL    Baso # 0.06 0.00 - 0.20 K/uL    nRBC 0 0 /100 WBC    Gran % 47.2 38.0 - 73.0 %    Lymph % 37.4 18.0 - 48.0 %    Mono % 11.9 4.0 - 15.0 %    Eosinophil % 2.3 0.0 - 8.0 %    Basophil % 0.9 0.0 - 1.9 %    Differential Method Automated    Basic Metabolic Panel    Collection Time: 02/06/25  8:33 AM   Result Value Ref Range    Sodium 142 136 - 145 mmol/L    Potassium 4.2 3.5 - 5.1 mmol/L    Chloride 108 95 - 110 mmol/L    CO2 22 (L) 23 - 29 mmol/L    Glucose 86 70 - 110 mg/dL    BUN 17 8 - 23 mg/dL    Creatinine 1.2 0.5 - 1.4 mg/dL    Calcium 9.3 8.7 - 10.5 mg/dL    Anion Gap 12 8 - 16 mmol/L    eGFR 57.8 (A) >60 mL/min/1.73 m^2   Microalbumin/Creatinine Ratio, Urine    Collection Time: 02/06/25  9:33 AM   Result Value Ref Range    Microalbumin, Urine 32.0 ug/mL    Creatinine, Urine 67.0 23.0 - 375.0 mg/dL    Microalb/Creat Ratio 47.8 (H) 0.0 - 30.0 ug/mg         Future Appointments   Date Time Provider Department Center   2/26/2025  8:30 AM [facility] [id number] [facility] RAD IR [facility]   3/3/2025  1:00 PM Amanda Sahu PA-C [facility]   3/13/2025  2:40 PM Aye Price MD [facility] LEA [place]   3/14/2025   3:00 PM Shiraz Cartagena MD [facility] CARDIO [address]   5/1/2025 11:20 AM Roselia Bradford MD [facility] PULMO [facility]   5/19/2025 11:00 AM [facility] LAB [place]   5/19/2025 11:00 AM [facility] SPE[facility] [place]   5/23/2025  2:00 PM Jerry Castro III, MD [facility]         Medication List with Changes/Refills   New Medications    PRAVASTATIN (PRAVACHOL) 20 MG TABLET    Take 1 tablet (20 mg total) by mouth once daily.   Current Medications    AMLODIPINE (NORVASC) 10 MG TABLET    TAKE 1 TABLET EVERY DAY    BETA-CAROTENE,A,-VITS C,E/MINS (OCUVITE ORAL)    Take by mouth every evening.    DOXYCYCLINE (VIBRA-TABS) 100 MG TABLET    Take 1 tablet (100 mg total) by mouth every 12 (twelve) hours.    FAMOTIDINE (PEPCID) 20 MG TABLET    Take 20 mg by mouth before dinner.    FLUTICASONE PROPIONATE (FLONASE) 50 MCG/ACTUATION NASAL SPRAY    1 spray (50 mcg total) by Each Nostril route once daily.    FUROSEMIDE (LASIX) 20 MG TABLET    Take 1 tablet (20 mg total) by mouth 2 (two) times daily. Take one tablet every other day orbas directed    METOPROLOL SUCCINATE (TOPROL-XL) 25 MG 24 HR TABLET    Take 1 tablet (25 mg total) by mouth 2 (two) times daily.    MULTIVITAMIN WITH MINERALS TABLET    Take 1 tablet by mouth every evening.    NIACIN 500 MG TABLET    Take 500 mg by mouth Every PM.    NITROGLYCERIN (NITROSTAT) 0.4 MG SL TABLET    Take 1 tab under the tongue for chest pain. May repeat every 5 minutes for a total of 3 doses. If chest pain not relieved go to the ED.    OMEGA-3 FATTY ACIDS-VITAMIN E 1,000 MG CAP    Take 1 capsule by mouth every evening.    POTASSIUM CHLORIDE SA (K-DUR,KLOR-CON M) 10 MEQ TABLET    Take 1 tablet (10 mEq total) by mouth once daily.    SACUBITRIL-VALSARTAN (ENTRESTO)  MG PER TABLET    Take 1 tablet by mouth 2 (two) times daily.    SOTALOL (BETAPACE) 120 MG TAB    Take 1 tablet (120 mg total) by mouth 2 (two) times daily.    UBIDECARENONE (COENZYME Q10 ORAL)    Take  1 tablet by mouth every evening.    VITAMIN D 1000 UNITS TAB    Take 1,000 Units by mouth every Tues, Thurs, Sat. TAKES AT NIGHT   Discontinued Medications    AZELASTINE (ASTELIN) 137 MCG (0.1 %) NASAL SPRAY    1 spray (137 mcg total) by Nasal route 2 (two) times daily.    NEBULIZER AND COMPRESSOR DEEPTHI    1 Device by Misc.(Non-Drug; Combo Route) route 2 (two) times a day.    PRAVASTATIN (PRAVACHOL) 40 MG TABLET    Take 1 tablet (40 mg total) by mouth every evening.    SODIUM CHLORIDE 3% 3 % NEBULIZER SOLUTION    Take 4 mLs by nebulization 2 (two) times a day.         Disclaimer:  This note has been generated using voice-recognition software. There may be grammatical or spelling errors that have been missed during proof-reading   This note was generated with the assistance of ambient listening technology. Verbal consent was obtained by the patient and accompanying visitor(s) for the recording of patient appointment to facilitate this note. I attest to having reviewed and edited the generated note for accuracy, though some syntax or spelling errors may persist. Please contact the author of this note for any clarification.

## 2025-02-10 NOTE — Clinical Note
Hi, pt stopped the lasix, (he was only taking three times a weekly. He stopped about a month ago. Hasn't noticed and symptomatic change - I tolded him he can continue off of this. He is also going to resume statin - he held it when were were looking in to his liver fx

## 2025-02-11 ENCOUNTER — PATIENT MESSAGE (OUTPATIENT)
Dept: INTERNAL MEDICINE | Facility: CLINIC | Age: OVER 89
End: 2025-02-11
Payer: MEDICARE

## 2025-02-13 ENCOUNTER — TELEPHONE (OUTPATIENT)
Dept: INFECTIOUS DISEASES | Facility: CLINIC | Age: OVER 89
End: 2025-02-13
Payer: MEDICARE

## 2025-02-13 ENCOUNTER — PATIENT MESSAGE (OUTPATIENT)
Dept: INFECTIOUS DISEASES | Facility: CLINIC | Age: OVER 89
End: 2025-02-13
Payer: MEDICARE

## 2025-02-13 NOTE — TELEPHONE ENCOUNTER
----- Message from Med Assistant Santoyo sent at 2/13/2025 12:56 PM CST -----  Regarding: FW: Appt Access  Contact: [phone]    ----- Message -----  From: Marlee Quick  Sent: 2/13/2025  12:38 PM CST  To: Marybeth Kay Staff  Subject: Appt Access                                      Patient calling to schedule appt. Pls call [phone]

## 2025-02-17 LAB
OHS CV AF BURDEN PERCENT: < 1
OHS CV DC REMOTE DEVICE TYPE: NORMAL
OHS CV RV PACING PERCENT: 5.1

## 2025-02-18 ENCOUNTER — PATIENT MESSAGE (OUTPATIENT)
Dept: INTERVENTIONAL RADIOLOGY/VASCULAR | Facility: HOSPITAL | Age: OVER 89
End: 2025-02-18
Payer: MEDICARE

## 2025-02-21 ENCOUNTER — LAB VISIT (OUTPATIENT)
Dept: LAB | Facility: HOSPITAL | Age: OVER 89
End: 2025-02-21
Attending: FAMILY MEDICINE
Payer: MEDICARE

## 2025-02-21 DIAGNOSIS — Z86.79 S/P AAA (ABDOMINAL AORTIC ANEURYSM) REPAIR: ICD-10-CM

## 2025-02-21 DIAGNOSIS — I97.89 TYPE II ENDOLEAK OF AORTIC GRAFT: ICD-10-CM

## 2025-02-21 DIAGNOSIS — Z98.890 S/P AAA (ABDOMINAL AORTIC ANEURYSM) REPAIR: ICD-10-CM

## 2025-02-21 LAB
INR PPP: 1 (ref 0.8–1.2)
PROTHROMBIN TIME: 11.8 SEC (ref 9–12.5)

## 2025-02-21 PROCEDURE — 36415 COLL VENOUS BLD VENIPUNCTURE: CPT | Mod: HCNC | Performed by: FAMILY MEDICINE

## 2025-02-21 PROCEDURE — 85610 PROTHROMBIN TIME: CPT | Mod: HCNC | Performed by: FAMILY MEDICINE

## 2025-02-26 ENCOUNTER — ANESTHESIA (OUTPATIENT)
Dept: INTERVENTIONAL RADIOLOGY/VASCULAR | Facility: HOSPITAL | Age: OVER 89
End: 2025-02-26
Payer: MEDICARE

## 2025-02-26 ENCOUNTER — HOSPITAL ENCOUNTER (OUTPATIENT)
Dept: INTERVENTIONAL RADIOLOGY/VASCULAR | Facility: HOSPITAL | Age: OVER 89
Discharge: HOME | End: 2025-02-26
Attending: FAMILY MEDICINE
Payer: MEDICARE

## 2025-02-26 VITALS
OXYGEN SATURATION: 97 % | BODY MASS INDEX: 22.08 KG/M2 | HEIGHT: 72 IN | TEMPERATURE: 98 F | HEART RATE: 54 BPM | WEIGHT: 163 LBS | RESPIRATION RATE: 11 BRPM | DIASTOLIC BLOOD PRESSURE: 61 MMHG | SYSTOLIC BLOOD PRESSURE: 128 MMHG

## 2025-02-26 DIAGNOSIS — I97.89 TYPE II ENDOLEAK OF AORTIC GRAFT: ICD-10-CM

## 2025-02-26 DIAGNOSIS — Z86.79 S/P AAA (ABDOMINAL AORTIC ANEURYSM) REPAIR: Primary | ICD-10-CM

## 2025-02-26 DIAGNOSIS — Z98.890 S/P AAA (ABDOMINAL AORTIC ANEURYSM) REPAIR: Primary | ICD-10-CM

## 2025-02-26 PROCEDURE — 25000003 PHARM REV CODE 250: Mod: HCNC | Performed by: ANESTHESIOLOGY

## 2025-02-26 PROCEDURE — 37000009 HC ANESTHESIA EA ADD 15 MINS: Mod: HCNC

## 2025-02-26 PROCEDURE — 25000003 PHARM REV CODE 250: Mod: HCNC

## 2025-02-26 PROCEDURE — 25500020 PHARM REV CODE 255: Mod: HCNC | Performed by: INTERNAL MEDICINE

## 2025-02-26 PROCEDURE — 63600175 PHARM REV CODE 636 W HCPCS: Mod: HCNC

## 2025-02-26 PROCEDURE — 37000008 HC ANESTHESIA 1ST 15 MINUTES: Mod: HCNC

## 2025-02-26 PROCEDURE — 27800903 OPTIME MED/SURG SUP & DEVICES OTHER IMPLANTS: Mod: HCNC

## 2025-02-26 PROCEDURE — C1884 EMBOLIZATION PROTECT SYST: HCPCS | Mod: HCNC

## 2025-02-26 RX ORDER — GLUCAGON 1 MG
1 KIT INJECTION
Status: DISCONTINUED | OUTPATIENT
Start: 2025-02-26 | End: 2025-02-27 | Stop reason: HOSPADM

## 2025-02-26 RX ORDER — DEXAMETHASONE SODIUM PHOSPHATE 4 MG/ML
INJECTION, SOLUTION INTRA-ARTICULAR; INTRALESIONAL; INTRAMUSCULAR; INTRAVENOUS; SOFT TISSUE
Status: DISCONTINUED | OUTPATIENT
Start: 2025-02-26 | End: 2025-02-26

## 2025-02-26 RX ORDER — FENTANYL CITRATE 50 UG/ML
INJECTION, SOLUTION INTRAMUSCULAR; INTRAVENOUS
Status: DISCONTINUED | OUTPATIENT
Start: 2025-02-26 | End: 2025-02-26

## 2025-02-26 RX ORDER — LIDOCAINE HYDROCHLORIDE 10 MG/ML
1 INJECTION, SOLUTION EPIDURAL; INFILTRATION; INTRACAUDAL; PERINEURAL ONCE
Status: DISCONTINUED | OUTPATIENT
Start: 2025-02-26 | End: 2025-02-27 | Stop reason: HOSPADM

## 2025-02-26 RX ORDER — ONDANSETRON HYDROCHLORIDE 2 MG/ML
INJECTION, SOLUTION INTRAVENOUS
Status: DISCONTINUED | OUTPATIENT
Start: 2025-02-26 | End: 2025-02-26

## 2025-02-26 RX ORDER — FENTANYL CITRATE 50 UG/ML
25 INJECTION, SOLUTION INTRAMUSCULAR; INTRAVENOUS EVERY 5 MIN PRN
Status: DISCONTINUED | OUTPATIENT
Start: 2025-02-26 | End: 2025-02-27 | Stop reason: HOSPADM

## 2025-02-26 RX ORDER — PROPOFOL 10 MG/ML
VIAL (ML) INTRAVENOUS
Status: DISCONTINUED | OUTPATIENT
Start: 2025-02-26 | End: 2025-02-26

## 2025-02-26 RX ORDER — CEFAZOLIN SODIUM 1 G/3ML
INJECTION, POWDER, FOR SOLUTION INTRAMUSCULAR; INTRAVENOUS
Status: DISCONTINUED | OUTPATIENT
Start: 2025-02-26 | End: 2025-02-26

## 2025-02-26 RX ORDER — ONDANSETRON HYDROCHLORIDE 2 MG/ML
4 INJECTION, SOLUTION INTRAVENOUS DAILY PRN
Status: DISCONTINUED | OUTPATIENT
Start: 2025-02-26 | End: 2025-02-27 | Stop reason: HOSPADM

## 2025-02-26 RX ORDER — OXYCODONE HYDROCHLORIDE 5 MG/1
5 TABLET ORAL
Status: DISCONTINUED | OUTPATIENT
Start: 2025-02-26 | End: 2025-02-27 | Stop reason: HOSPADM

## 2025-02-26 RX ORDER — PHENYLEPHRINE HYDROCHLORIDE 10 MG/ML
INJECTION INTRAVENOUS
Status: DISCONTINUED | OUTPATIENT
Start: 2025-02-26 | End: 2025-02-26

## 2025-02-26 RX ORDER — PROCHLORPERAZINE EDISYLATE 5 MG/ML
5 INJECTION INTRAMUSCULAR; INTRAVENOUS EVERY 30 MIN PRN
Status: DISCONTINUED | OUTPATIENT
Start: 2025-02-26 | End: 2025-02-27 | Stop reason: HOSPADM

## 2025-02-26 RX ORDER — SODIUM CHLORIDE 9 MG/ML
INJECTION, SOLUTION INTRAVENOUS CONTINUOUS
Status: DISCONTINUED | OUTPATIENT
Start: 2025-02-26 | End: 2025-02-27 | Stop reason: HOSPADM

## 2025-02-26 RX ORDER — LIDOCAINE HYDROCHLORIDE 20 MG/ML
INJECTION INTRAVENOUS
Status: DISCONTINUED | OUTPATIENT
Start: 2025-02-26 | End: 2025-02-26

## 2025-02-26 RX ORDER — ROCURONIUM BROMIDE 10 MG/ML
INJECTION, SOLUTION INTRAVENOUS
Status: DISCONTINUED | OUTPATIENT
Start: 2025-02-26 | End: 2025-02-26

## 2025-02-26 RX ORDER — HYDROMORPHONE HYDROCHLORIDE 1 MG/ML
0.5 INJECTION, SOLUTION INTRAMUSCULAR; INTRAVENOUS; SUBCUTANEOUS EVERY 10 MIN PRN
Status: DISCONTINUED | OUTPATIENT
Start: 2025-02-26 | End: 2025-02-27 | Stop reason: HOSPADM

## 2025-02-26 RX ADMIN — PHENYLEPHRINE HYDROCHLORIDE 50 MCG: 10 INJECTION INTRAVENOUS at 08:02

## 2025-02-26 RX ADMIN — LIDOCAINE HYDROCHLORIDE 60 MG: 20 INJECTION INTRAVENOUS at 08:02

## 2025-02-26 RX ADMIN — IOHEXOL 12 ML: 300 INJECTION, SOLUTION INTRAVENOUS at 11:02

## 2025-02-26 RX ADMIN — DEXAMETHASONE SODIUM PHOSPHATE 4 MG: 4 INJECTION, SOLUTION INTRAMUSCULAR; INTRAVENOUS at 08:02

## 2025-02-26 RX ADMIN — SODIUM CHLORIDE: 0.9 INJECTION, SOLUTION INTRAVENOUS at 08:02

## 2025-02-26 RX ADMIN — FENTANYL CITRATE 50 MCG: 50 INJECTION, SOLUTION INTRAMUSCULAR; INTRAVENOUS at 08:02

## 2025-02-26 RX ADMIN — SODIUM CHLORIDE: 0.9 INJECTION, SOLUTION INTRAVENOUS at 11:02

## 2025-02-26 RX ADMIN — ONDANSETRON 4 MG: 2 INJECTION INTRAMUSCULAR; INTRAVENOUS at 11:02

## 2025-02-26 RX ADMIN — ROCURONIUM BROMIDE 10 MG: 10 INJECTION INTRAVENOUS at 11:02

## 2025-02-26 RX ADMIN — CEFAZOLIN 2 G: 330 INJECTION, POWDER, FOR SOLUTION INTRAMUSCULAR; INTRAVENOUS at 08:02

## 2025-02-26 RX ADMIN — PROPOFOL 120 MG: 10 INJECTION, EMULSION INTRAVENOUS at 08:02

## 2025-02-26 RX ADMIN — SUGAMMADEX 200 MG: 100 INJECTION, SOLUTION INTRAVENOUS at 11:02

## 2025-02-26 RX ADMIN — OXYCODONE 5 MG: 5 TABLET ORAL at 12:02

## 2025-02-26 RX ADMIN — PHENYLEPHRINE HYDROCHLORIDE 0.5 MCG/KG/MIN: 10 INJECTION INTRAVENOUS at 08:02

## 2025-02-26 RX ADMIN — ROCURONIUM BROMIDE 50 MG: 10 INJECTION INTRAVENOUS at 08:02

## 2025-02-26 RX ADMIN — PHENYLEPHRINE HYDROCHLORIDE 50 MCG: 10 INJECTION INTRAVENOUS at 09:02

## 2025-02-26 RX ADMIN — ROCURONIUM BROMIDE 30 MG: 10 INJECTION INTRAVENOUS at 10:02

## 2025-02-26 NOTE — PLAN OF CARE
Pt arrived to IR room 188 for Endoleak repair. Pt oriented to unit and staff. Plan of care reviewed with patient, patient verbalizes understanding. Comfort measures utilized. Pt safely transferred from stretcher to procedural table. Fall risk reviewed with patient, fall risk interventions maintained. Safety strap applied, positioner pillows utilized to minimize pressure points. Blankets applied. Pt prepped and draped utilizing standard sterile technique. Patient placed on continuous monitoring, as required by sedation policy. Timeouts completed utilizing standard universal time-out, per department and facility policy. RN to remain at bedside, continuous monitoring maintained. Pt resting comfortably. Denies pain/discomfort. Will continue to monitor. See flow sheets for monitoring, medication administration, and updates.

## 2025-02-26 NOTE — DISCHARGE INSTRUCTIONS
Please call with any questions or concerns.      Monday through Friday 8:00 am - 5:00 pm    Interventional Radiology Clinic  [phone]    After Hours    Ask the  for the Radiology Resident on call  [phone]        You may remove the dressing over your procedure site 24 hours after your procedure. You may shower 24 hours after your procedure. Keep the procedure site clean, dry, and open to air. Do not submerge in water (bath, pool, hot tub, ect.) until the site is completely healed.

## 2025-02-26 NOTE — ANESTHESIA PROCEDURE NOTES
Intubation    Date/Time: 2/26/2025 8:37 AM    Performed by: Sangeetha Bettencourt CRNA  Authorized by: Colten Manzano MD    Intubation:     Induction:  Intravenous    Intubated:  Postinduction    Mask Ventilation:  Easy with oral airway    Attempts:  1    Attempted By:  CRNA    Method of Intubation:  Video laryngoscopy    Blade:  Curry 3    Laryngeal View Grade: Grade I - full view of cords      Difficult Airway Encountered?: No      Complications:  None    Airway Device:  Oral endotracheal tube    Airway Device Size:  7.5    Style/Cuff Inflation:  Cuffed (inflated to minimal occlusive pressure)    Tube secured:  22    Secured at:  The lips    Placement Verified By:  Capnometry    Complicating Factors:  None    Findings Post-Intubation:  BS equal bilateral and atraumatic/condition of teeth unchanged

## 2025-02-26 NOTE — NURSING
Pt prepped in room 4 on SSCU. Pt is AAOx4 and follows commands appropriately. VS taken and wnl and pt is free from s/s of pain/distress. IV started and preop questions completed. Pt requested to go without mepilex to heals and bottom. Consents not done at this time. Safety measures in place. Pt's wife is at bedside.

## 2025-02-26 NOTE — PLAN OF CARE
Endoleak embolization complete. Abd and bilateral back sites CDI.  Pt will be transferred to recovery bed and report to be given at bedside.

## 2025-02-26 NOTE — PROCEDURES
VIR procedure note      Pre Op Diagnosis: Type II endoleak  Post Op Diagnosis: Same    Procedure: Percutaneous direct endoleak embolization x4    Procedure performed by:  Joe Espino MD      Written Informed Consent Obtained: Yes  Specimen Removed: No  Estimated Blood Loss: Minimal    Findings:     Localization using 3D imaging and cone beam CTs. Fluoro guided direct percutaneous needle access to aneurysm sac. Adequate position was confirmed by back bleed, angiogram and CBCT.     4 potential sites of endoleak were accessed and embolized.    Individual areas at the origins of bilateral L3s & right L4, embolized with Glue.     An anterior inferior area embolized with obsidio.        Plan:    IR clinic visit in 1 month with 3-phase CTA AP.         Joe Espino MD  VIR

## 2025-02-26 NOTE — TRANSFER OF CARE
Anesthesia Transfer of Care Note    Patient: eNlson GIBBONS Parent    Procedure(s) Performed: * No procedures listed *    Patient location: PACU    Anesthesia Type: general    Transport from OR: Transported from OR on room air with adequate spontaneous ventilation    Post pain: adequate analgesia    Post assessment: no apparent anesthetic complications and tolerated procedure well    Post vital signs: stable    Level of consciousness: sedated and alert    Nausea/Vomiting: no nausea/vomiting    Complications: none    Transfer of care protocol was followed      Last vitals: Visit Vitals  /80 (BP Location: Right arm, Patient Position: Lying)   Pulse 63   Temp 36.5 °C (97.7 °F) (Temporal)   Resp 20   Ht 6' (1.829 m)   Wt 73.9 kg (163 lb)   SpO2 98%   BMI 22.11 kg/m²

## 2025-02-26 NOTE — PLAN OF CARE
Pt transferred to [place] [address] after 2hr in PACU. Bedside report given to BRYAN Delacruz. Upon arrival pt Aox4, VSS. Pt wife at bedside with patient belongings.

## 2025-02-26 NOTE — DISCHARGE SUMMARY
VIR Discharge Summary      Hospital Course: No complications    Admit Date: 2/26/2025  Discharge Date: 02/26/2025     Instructions Given to Patient: Yes  Diet: Resume prior diet  Activity:   Activity as tolerated and no driving for today.    Description of Condition on Discharge: Stable  Vital Signs (Most Recent): Temp: 97.7 °F (36.5 °C) (02/26/25 0722)  Pulse: 63 (02/26/25 0722)  Resp: 20 (02/26/25 0722)  BP: (!) 151/70 (02/26/25 0722)  SpO2: 96 % (02/26/25 0722)    Discharge Disposition: Home    Discharge Diagnosis: endoleak s/p embolization.     Follow-up: As scheduled. Will call patient with any follow up plans.        Joe Espino MD  VIR

## 2025-02-26 NOTE — H&P
Vascular and Interventional Radiology History & Physical    Date:  2/26/2025    Chief Complaint:   Type 2 endoleak    History of Present Illness:  Nelson GIBBONS Parent is a 89 y.o. male w history of AAA s/p repair c/b type 2 endoleak who presents today for direct aneurysm access and embolization.    Past Medical History:  Past Medical History:   Diagnosis Date    AICD (automatic cardioverter/defibrillator) present 07/17/2012    Cardiomyopathy     CKD (chronic kidney disease) stage 3, GFR 30-59 ml/min 07/17/2012    Coronary artery disease     GERD (gastroesophageal reflux disease)     Tovar's; Dr. Vu    Heart attack     Hyperlipidemia 07/17/2012    Hypertension 07/17/2012    Ischemic cardiomyopathy 07/17/2012    MGUS (monoclonal gammopathy of unknown significance)     Thrombocytopenia 07/17/2012    TIA (transient ischemic attack)     Ventricular tachycardia     VT (ventricular tachycardia) 07/17/2012       Past Surgical History:  Past Surgical History:   Procedure Laterality Date    ABDOMINAL AORTIC ANEURYSM REPAIR      AORTOGRAPHY N/A 8/21/2024    Procedure: AORTOGRAM;  Surgeon: Gonzalez Granado MD;  Location: [facility] OR [address] [place] [address];  Service: Vascular;  Laterality: N/A;  4.4 min  401.06 mGy  70.8925 Gy.cm  49ml Dye    CARDIAC DEFIBRILLATOR PLACEMENT      CATARACT EXTRACTION, BILATERAL  2018    CORONARY ANGIOPLASTY      EYE SURGERY Bilateral     cataract    HERNIA REPAIR      x2    REPLACEMENT OF IMPLANTABLE CARDIOVERTER-DEFIBRILLATOR (ICD) GENERATOR Left 03/18/2022    Procedure: REPLACEMENT, ICD GENERATOR;  Surgeon: Felipe Woodard MD;  Location: [facility] EP LAB;  Service: Cardiology;  Laterality: Left;  SEFERINO, ICD gen chg, SJM, anes, MB, 3prep*ANUJ ICD insitu*         Sedation History:    Denies any adverse reactions.  Denies problems laying flat.    Social History:  Social History[1]     Home Medications:   Prior to Admission medications    Medication Sig Start Date End Date Taking? Authorizing Provider   amLODIPine  (NORVASC) 10 MG tablet TAKE 1 TABLET EVERY DAY  Patient taking differently: Take 10 mg by mouth once daily. 5/8/24  Yes Bety Tomlinson MD   beta-carotene,A,-vits C,E/mins (OCUVITE ORAL) Take by mouth every evening.   Yes Provider, Historical   fluticasone propionate (FLONASE) 50 mcg/actuation nasal spray 1 spray (50 mcg total) by Each Nostril route once daily. 10/24/23  Yes Bety Tomlinson MD   furosemide (LASIX) 20 MG tablet Take 1 tablet (20 mg total) by mouth 2 (two) times daily. Take one tablet every other day orbas directed 12/1/23  Yes Shiraz Cartagena MD   metoprolol succinate (TOPROL-XL) 25 MG 24 hr tablet Take 1 tablet (25 mg total) by mouth 2 (two) times daily. 1/31/25  Yes Jerry Castro III, MD   niacin 500 MG tablet Take 500 mg by mouth Every PM.   Yes Provider, Historical   omega-3 fatty acids-vitamin E 1,000 mg Cap Take 1 capsule by mouth every evening.   Yes Provider, Historical   potassium chloride SA (K-DUR,KLOR-CON M) 10 MEQ tablet Take 1 tablet (10 mEq total) by mouth once daily. 1/9/25  Yes Shiraz Cartagena MD   pravastatin (PRAVACHOL) 20 MG tablet Take 1 tablet (20 mg total) by mouth once daily. 2/10/25 2/10/26 Yes Jerry Castro III, MD   sacubitriL-valsartan (ENTRESTO)  mg per tablet Take 1 tablet by mouth 2 (two) times daily. 12/17/24  Yes Shiraz Cartagena MD   sotaloL (BETAPACE) 120 MG Tab Take 1 tablet (120 mg total) by mouth 2 (two) times daily. 6/18/24  Yes Solange Moon, ALEX   ubidecarenone (COENZYME Q10 ORAL) Take 1 tablet by mouth every evening. 12/9/22  Yes Provider, Historical   vitamin D 1000 units Tab Take 1,000 Units by mouth every Tues, Thurs, Sat. TAKES AT NIGHT   Yes Provider, Historical   doxycycline (VIBRA-TABS) 100 MG tablet Take 1 tablet (100 mg total) by mouth every 12 (twelve) hours.  Patient not taking: Reported on 2/24/2025 12/5/24   Roselia Bradford MD   famotidine (PEPCID) 20 MG tablet Take 20 mg by mouth before dinner.    Provider,  Historical   multivitamin with minerals tablet Take 1 tablet by mouth every evening. 1/1/18   Provider, Historical   nitroGLYCERIN (NITROSTAT) 0.4 MG SL tablet Take 1 tab under the tongue for chest pain. May repeat every 5 minutes for a total of 3 doses. If chest pain not relieved go to the ED.  Patient taking differently: Place 0.4 mg under the tongue every 5 (five) minutes as needed for Chest pain.  If chest pain not relieved go to the ED. 4/25/24   Bety Tomlinson MD       Inpatient Medications:  Current Medications[2]     Anticoagulants/Antiplatelets:   Reviewed.    Allergies:   Review of patient's allergies indicates:   Allergen Reactions    Fluorescein-benoxinate Other (See Comments)    Pcn  [penicillins]      Other reaction(s): Unknown    Quinolones Other (See Comments)     H/o AAA    Tropicamide Other (See Comments)    Clindamycin Rash       Review of Systems:   As documented in primary provider H&P.    Vital Signs (Most Recent):  Temp: 97.7 °F (36.5 °C) (02/26/25 0722)  Pulse: 63 (02/26/25 0722)  Resp: 20 (02/26/25 0722)  BP: (!) 151/70 (02/26/25 0722)  SpO2: 96 % (02/26/25 0722)    Physical Exam:  No acute distress, laying comfortably in bed, pleasant and cooperative  Regular rate and rhythm  Breathing unlabored  Abdomen benign  Extremities warm and well perfused    Sedation Exam:  ASA: II - Patient appears to have mild systemic disease, adequately controlled   Mallampati: II (hard and soft palate, upper portion of tonsils anduvula visible)     Laboratory:  Lab Results   Component Value Date    INR 1.0 02/21/2025       Lab Results   Component Value Date    WBC 7.03 02/06/2025    HGB 14.2 02/06/2025    HCT 45.0 02/06/2025    MCV 92 02/06/2025    PLT 96 (L) 02/06/2025      Lab Results   Component Value Date    GLU 86 02/06/2025     02/06/2025    K 4.2 02/06/2025     02/06/2025    CO2 22 (L) 02/06/2025    BUN 17 02/06/2025    CREATININE 1.2 02/06/2025    CALCIUM 9.3 02/06/2025    MG 2.0  01/20/2023    ALT 29 02/06/2025    AST 25 02/06/2025    ALBUMIN 3.4 (L) 02/06/2025    BILITOT 0.8 02/06/2025    BILIDIR 0.3 02/06/2025       Imaging:  Reviewed.      ASSESSMENT/PLAN:   Nelson Huntley is an 88 yo man who presents for direct aneurysm access and embolization for type 2 endoleak.    -Pertinent labs and imaging reviewed  -Risks and benefits of the procedure were discussed with the patient. Informed consent was obtained.                      Sedation Plan: per anesthesia      Demetrius Sage MD  R[facility]         [1]   Social History  Tobacco Use    Smoking status: Never     Passive exposure: Past    Smokeless tobacco: Never   Substance Use Topics    Alcohol use: No     Comment: none    Drug use: No   [2]   Current Outpatient Medications:     amLODIPine (NORVASC) 10 MG tablet, TAKE 1 TABLET EVERY DAY (Patient taking differently: Take 10 mg by mouth once daily.), Disp: 90 tablet, Rfl: 3    beta-carotene,A,-vits C,E/mins (OCUVITE ORAL), Take by mouth every evening., Disp: , Rfl:     fluticasone propionate (FLONASE) 50 mcg/actuation nasal spray, 1 spray (50 mcg total) by Each Nostril route once daily., Disp: 16 g, Rfl: 11    furosemide (LASIX) 20 MG tablet, Take 1 tablet (20 mg total) by mouth 2 (two) times daily. Take one tablet every other day karissa stein, Disp: 30 tablet, Rfl: 11    metoprolol succinate (TOPROL-XL) 25 MG 24 hr tablet, Take 1 tablet (25 mg total) by mouth 2 (two) times daily., Disp: 180 tablet, Rfl: 2    niacin 500 MG tablet, Take 500 mg by mouth Every PM., Disp: , Rfl:     omega-3 fatty acids-vitamin E 1,000 mg Cap, Take 1 capsule by mouth every evening., Disp: , Rfl:     potassium chloride SA (K-DUR,KLOR-CON M) 10 MEQ tablet, Take 1 tablet (10 mEq total) by mouth once daily., Disp: 90 tablet, Rfl: 3    pravastatin (PRAVACHOL) 20 MG tablet, Take 1 tablet (20 mg total) by mouth once daily., Disp: 90 tablet, Rfl: 3    sacubitriL-valsartan (ENTRESTO)  mg per tablet, Take 1  tablet by mouth 2 (two) times daily., Disp: 60 tablet, Rfl: 11    sotaloL (BETAPACE) 120 MG Tab, Take 1 tablet (120 mg total) by mouth 2 (two) times daily., Disp: 180 tablet, Rfl: 3    ubidecarenone (COENZYME Q10 ORAL), Take 1 tablet by mouth every evening., Disp: , Rfl:     vitamin D 1000 units Tab, Take 1,000 Units by mouth every Tues, Thurs, Sat. TAKES AT NIGHT, Disp: , Rfl:     doxycycline (VIBRA-TABS) 100 MG tablet, Take 1 tablet (100 mg total) by mouth every 12 (twelve) hours. (Patient not taking: Reported on 2/24/2025), Disp: 14 tablet, Rfl: 1    famotidine (PEPCID) 20 MG tablet, Take 20 mg by mouth before dinner., Disp: , Rfl:     multivitamin with minerals tablet, Take 1 tablet by mouth every evening., Disp: , Rfl:     nitroGLYCERIN (NITROSTAT) 0.4 MG SL tablet, Take 1 tab under the tongue for chest pain. May repeat every 5 minutes for a total of 3 doses. If chest pain not relieved go to the ED. (Patient taking differently: Place 0.4 mg under the tongue every 5 (five) minutes as needed for Chest pain.  If chest pain not relieved go to the ED.), Disp: 25 tablet, Rfl: 4    Current Facility-Administered Medications:     0.9% NaCl infusion, , Intravenous, Continuous, Zee Evans PA-C    LIDOcaine (PF) 10 mg/ml (1%) injection 10 mg, 1 mL, Other, Once, Zee Evans PA-C    Facility-Administered Medications Ordered in Other Encounters:     sodium chloride 0.9% bolus 1,000 mL, 1,000 mL, Intravenous, Once, Violette Delgado NP    vancomycin in dextrose 5 % 1 gram/250 mL IVPB 1,000 mg, 1,000 mg, Intravenous, On Call Procedure, Violette Delgado NP, 1,000 mg at 03/18/22 1232

## 2025-02-26 NOTE — ANESTHESIA PREPROCEDURE EVALUATION
"                                                                                                             2025  Nelson GIBBONS Parent is a 89 y.o., male.    Pre-operative evaluation for * No procedures listed *    Nelson GIBBONS Parent is a 89 y.o. male     Problem List[1]    Review of patient's allergies indicates:   Allergen Reactions    Fluorescein-benoxinate Other (See Comments)    Pcn  [penicillins]      Other reaction(s): Unknown    Quinolones Other (See Comments)     H/o AAA    Tropicamide Other (See Comments)    Clindamycin Rash       Medications Ordered Prior to Encounter[2]    Past Surgical History:   Procedure Laterality Date    ABDOMINAL AORTIC ANEURYSM REPAIR      AORTOGRAPHY N/A 2024    Procedure: AORTOGRAM;  Surgeon: Gonzalez Granado MD;  Location: [facility] OR [address] [place] [address];  Service: Vascular;  Laterality: N/A;  4.4 min  401.06 mGy  70.8925 Gy.cm  49ml Dye    CARDIAC DEFIBRILLATOR PLACEMENT      CATARACT EXTRACTION, BILATERAL  2018    CORONARY ANGIOPLASTY      EYE SURGERY Bilateral     cataract    HERNIA REPAIR      x2    REPLACEMENT OF IMPLANTABLE CARDIOVERTER-DEFIBRILLATOR (ICD) GENERATOR Left 2022    Procedure: REPLACEMENT, ICD GENERATOR;  Surgeon: Felipe Woodard MD;  Location: [facility] EP LAB;  Service: Cardiology;  Laterality: Left;  SEFERINO, ICD gen chg, SJM, anes, MB, 3prep*ANUJ ICD insitu*        Social History[3]      CBC: No results for input(s): "WBC", "RBC", "HGB", "HCT", "PLT", "MCV", "MCH", "MCHC" in the last 72 hours.    CMP: No results for input(s): "NA", "K", "CL", "CO2", "BUN", "CREATININE", "GLU", "MG", "PHOS", "CALCIUM", "ALBUMIN", "PROT", "ALKPHOS", "ALT", "AST", "BILITOT" in the last 72 hours.    INR  No results for input(s): "PT", "INR", "PROTIME", "APTT" in the last 72 hours.        Diagnostic Studies:      EKD Echo:  Results for orders placed or performed during the hospital encounter of 18   2D echo with color flow doppler    Collection Time: 18  8:30 AM "   Result Value Ref Range    EF + QEF 40 (A) 55 - 65    Diastolic Dysfunction Yes (A)     Est. PA Systolic Pressure 18.84     Pericardial Effusion NONE            Pre-op Assessment    I have reviewed the Patient Summary Reports.     I have reviewed the Nursing Notes. I have reviewed the NPO Status.   I have reviewed the Medications.     Review of Systems  Anesthesia Hx:  No problems with previous Anesthesia               Denies Personal Hx of Anesthesia complications.                    Social:  Non-Smoker, No Alcohol Use       Hematology/Oncology:  Hematology Normal   Oncology Normal                                   Cardiovascular:  Exercise tolerance: good   Hypertension  Past MI CAD              ECG has been reviewed. AAA                           Pulmonary:         Bronchiectasis               Renal/:  Chronic Renal Disease, CKD                Hepatic/GI:     GERD, well controlled                Musculoskeletal:  Musculoskeletal Normal                Neurological:   CVA, no residual symptoms                                    Psych:  Psychiatric Normal                    Physical Exam  General: Well nourished, Cooperative and Alert    Airway:  Mallampati: II   Mouth Opening: Normal  TM Distance: Normal  Tongue: Normal  Neck ROM: Normal ROM    Dental:    Chest/Lungs:  Normal Respiratory Rate    Heart:  Rate: Normal        Anesthesia Plan  Type of Anesthesia, risks & benefits discussed:    Anesthesia Type: Gen ETT  Intra-op Monitoring Plan: Standard ASA Monitors  Post Op Pain Control Plan: multimodal analgesia and IV/PO Opioids PRN  Induction:  IV  Airway Plan: Direct, Post-Induction  Informed Consent: Informed consent signed with the Patient and all parties understand the risks and agree with anesthesia plan.  All questions answered.   ASA Score: 3  Day of Surgery Review of History & Physical: H&P Update referred to the surgeon/provider.    Ready For Surgery From Anesthesia Perspective.     .           [1]    Patient Active Problem List  Diagnosis    Thrombocytopenia    Chronic kidney disease, stage 3a    AICD (automatic cardioverter/defibrillator) present    Essential hypertension    Pure hypercholesterolemia    Anemia associated with chronic renal failure    Coronary artery disease of native artery with stable angina pectoris    Old myocardial infarction    S/P AAA (abdominal aortic aneurysm) repair    Diverticulosis    Nuclear sclerosis    Vasculogenic erectile dysfunction    Tovar's esophagus without dysplasia    Monoclonal paraproteinemia    Aortic atherosclerosis    Ischemic cardiomyopathy    Seasonal allergic rhinitis    Body mass index (BMI) of 22.0 to 22.9 in adult    Positive ELYSIA (antinuclear antibody)    Abnormal serum protein electrophoresis    Vitamin D deficiency    MGUS (monoclonal gammopathy of unknown significance)    Gastroesophageal reflux disease    History of TIA (transient ischemic attack)    Metabolic bone disease    Abnormal CT of the head    Cerebral infarction, remote, resolved    History of ventricular tachycardia    Chronic combined systolic and diastolic congestive heart failure    Idiopathic thrombocytopenic purpura    Sensorineural hearing loss, bilateral    Bronchiectasis without complication    Mycobacterium avium infection    Nontuberculous mycobacterial disease of lung    Bronchiectasis, non-tuberculous    Persistent atrial fibrillation    Hypertensive kidney disease    Elevated alkaline phosphatase level   [2]   Current Outpatient Medications on File Prior to Encounter   Medication Sig Dispense Refill    amLODIPine (NORVASC) 10 MG tablet TAKE 1 TABLET EVERY DAY (Patient taking differently: Take 10 mg by mouth once daily.) 90 tablet 3    beta-carotene,A,-vits C,E/mins (OCUVITE ORAL) Take by mouth every evening.      fluticasone propionate (FLONASE) 50 mcg/actuation nasal spray 1 spray (50 mcg total) by Each Nostril route once daily. 16 g 11    furosemide (LASIX) 20 MG tablet Take 1  tablet (20 mg total) by mouth 2 (two) times daily. Take one tablet every other day orbas directed 30 tablet 11    metoprolol succinate (TOPROL-XL) 25 MG 24 hr tablet Take 1 tablet (25 mg total) by mouth 2 (two) times daily. 180 tablet 2    niacin 500 MG tablet Take 500 mg by mouth Every PM.      omega-3 fatty acids-vitamin E 1,000 mg Cap Take 1 capsule by mouth every evening.      potassium chloride SA (K-DUR,KLOR-CON M) 10 MEQ tablet Take 1 tablet (10 mEq total) by mouth once daily. 90 tablet 3    pravastatin (PRAVACHOL) 20 MG tablet Take 1 tablet (20 mg total) by mouth once daily. 90 tablet 3    sacubitriL-valsartan (ENTRESTO)  mg per tablet Take 1 tablet by mouth 2 (two) times daily. 60 tablet 11    sotaloL (BETAPACE) 120 MG Tab Take 1 tablet (120 mg total) by mouth 2 (two) times daily. 180 tablet 3    ubidecarenone (COENZYME Q10 ORAL) Take 1 tablet by mouth every evening.      vitamin D 1000 units Tab Take 1,000 Units by mouth every Tues, Thurs, Sat. TAKES AT NIGHT      doxycycline (VIBRA-TABS) 100 MG tablet Take 1 tablet (100 mg total) by mouth every 12 (twelve) hours. (Patient not taking: Reported on 2/24/2025) 14 tablet 1    famotidine (PEPCID) 20 MG tablet Take 20 mg by mouth before dinner.      multivitamin with minerals tablet Take 1 tablet by mouth every evening.      nitroGLYCERIN (NITROSTAT) 0.4 MG SL tablet Take 1 tab under the tongue for chest pain. May repeat every 5 minutes for a total of 3 doses. If chest pain not relieved go to the ED. (Patient taking differently: Place 0.4 mg under the tongue every 5 (five) minutes as needed for Chest pain.  If chest pain not relieved go to the ED.) 25 tablet 4     Current Facility-Administered Medications on File Prior to Encounter   Medication Dose Route Frequency Provider Last Rate Last Admin    sodium chloride 0.9% bolus 1,000 mL  1,000 mL Intravenous Once Violette Delgado, ELIZABETH        vancomycin in dextrose 5 % 1 gram/250 mL IVPB 1,000 mg  1,000 mg  Intravenous On Call Procedure Violette Delgado, ELIZABETH   1,000 mg at 03/18/22 1232   [3]   Social History  Socioeconomic History    Marital status:    Tobacco Use    Smoking status: Never     Passive exposure: Past    Smokeless tobacco: Never   Substance and Sexual Activity    Alcohol use: No     Comment: none    Drug use: No    Sexual activity: Yes     Partners: Female     Birth control/protection: None     Comment:    Social History Narrative    Patient is originally from [facility] in [place] since 60     Graduated [employer]  [employer]     College/[employer]          background    Working [employer], retired now         0 Children     Lives with wife         Diet/Exercise ;         Exercise        Eating    B     L    D    Snacks    Beverages    Fast:food             Social Drivers of Health     Financial Resource Strain: Low Risk  (7/5/2024)    Overall Financial Resource Strain (CARDIA)     Difficulty of Paying Living Expenses: Not hard at all   Food Insecurity: No Food Insecurity (7/5/2024)    Hunger Vital Sign     Worried About Running Out of Food in the Last Year: Never true     Ran Out of Food in the Last Year: Never true   Transportation Needs: No Transportation Needs (7/5/2024)    TRANSPORTATION NEEDS     Transportation : No   Physical Activity: Insufficiently Active (7/5/2024)    Exercise Vital Sign     Days of Exercise per Week: 3 days     Minutes of Exercise per Session: 30 min   Stress: No Stress Concern Present (7/5/2024)    [employer] [place] [employer] - Occupational Stress Questionnaire     Feeling of Stress : Not at all   Housing Stability: Unknown (7/5/2024)    Housing Stability Vital Sign     Unable to Pay for Housing in the Last Year: No     Homeless in the Last Year: No

## 2025-02-27 NOTE — ANESTHESIA POSTPROCEDURE EVALUATION
Anesthesia Post Evaluation    Patient: Nelson Huntley    Procedure(s) Performed: * No procedures listed *    Final Anesthesia Type: general      Patient location during evaluation: PACU  Patient participation: Yes- Able to Participate  Level of consciousness: awake and alert  Post-procedure vital signs: reviewed and stable  Pain management: adequate  Airway patency: patent  IVAN mitigation strategies: Extubation while patient is awake and Multimodal analgesia  PONV status at discharge: No PONV  Anesthetic complications: no      Cardiovascular status: stable  Respiratory status: unassisted and spontaneous ventilation  Hydration status: euvolemic  Follow-up not needed.              Vitals Value Taken Time   /59 02/26/25 15:46   Temp 36.6 °C (97.9 °F) 02/26/25 14:16   Pulse 64 02/26/25 15:52   Resp 29 02/26/25 15:52   SpO2 95 % 02/26/25 15:52   Vitals shown include unfiled device data.      Event Time   Out of Recovery 14:08:00         Pain/Perlita Score: Pain Rating Prior to Med Admin: 5 (2/26/2025 12:39 PM)  Perlita Score: 10 (2/26/2025  2:45 PM)

## 2025-02-28 DIAGNOSIS — I97.89 TYPE II ENDOLEAK OF AORTIC GRAFT: Primary | ICD-10-CM

## 2025-02-28 DIAGNOSIS — Z98.890 S/P AAA (ABDOMINAL AORTIC ANEURYSM) REPAIR: ICD-10-CM

## 2025-02-28 DIAGNOSIS — Z86.79 S/P AAA (ABDOMINAL AORTIC ANEURYSM) REPAIR: ICD-10-CM

## 2025-03-01 ENCOUNTER — PATIENT MESSAGE (OUTPATIENT)
Dept: INTERNAL MEDICINE | Facility: CLINIC | Age: OVER 89
End: 2025-03-01
Payer: MEDICARE

## 2025-03-01 DIAGNOSIS — I10 ESSENTIAL HYPERTENSION: ICD-10-CM

## 2025-03-03 ENCOUNTER — OFFICE VISIT (OUTPATIENT)
Dept: FAMILY MEDICINE | Facility: CLINIC | Age: OVER 89
End: 2025-03-03
Payer: MEDICARE

## 2025-03-03 VITALS
HEART RATE: 61 BPM | OXYGEN SATURATION: 95 % | HEIGHT: 72 IN | DIASTOLIC BLOOD PRESSURE: 60 MMHG | WEIGHT: 166.44 LBS | BODY MASS INDEX: 22.54 KG/M2 | SYSTOLIC BLOOD PRESSURE: 126 MMHG

## 2025-03-03 DIAGNOSIS — I10 ESSENTIAL HYPERTENSION: ICD-10-CM

## 2025-03-03 DIAGNOSIS — I50.42 CHRONIC COMBINED SYSTOLIC AND DIASTOLIC CONGESTIVE HEART FAILURE: ICD-10-CM

## 2025-03-03 DIAGNOSIS — E78.00 PURE HYPERCHOLESTEROLEMIA: ICD-10-CM

## 2025-03-03 DIAGNOSIS — J47.9 BRONCHIECTASIS WITHOUT COMPLICATION: ICD-10-CM

## 2025-03-03 DIAGNOSIS — D69.3 IDIOPATHIC THROMBOCYTOPENIC PURPURA: ICD-10-CM

## 2025-03-03 DIAGNOSIS — N18.31 CHRONIC KIDNEY DISEASE, STAGE 3A: ICD-10-CM

## 2025-03-03 DIAGNOSIS — H90.3 SENSORINEURAL HEARING LOSS, BILATERAL: ICD-10-CM

## 2025-03-03 DIAGNOSIS — A31.0 MYCOBACTERIUM AVIUM INFECTION: ICD-10-CM

## 2025-03-03 DIAGNOSIS — D69.2 SENILE PURPURA: ICD-10-CM

## 2025-03-03 DIAGNOSIS — Z00.00 ENCOUNTER FOR MEDICARE ANNUAL WELLNESS EXAM: Primary | ICD-10-CM

## 2025-03-03 DIAGNOSIS — I48.19 PERSISTENT ATRIAL FIBRILLATION: ICD-10-CM

## 2025-03-03 PROCEDURE — 1159F MED LIST DOCD IN RCRD: CPT | Mod: HCNC,CPTII,S$GLB,

## 2025-03-03 PROCEDURE — 1123F ACP DISCUSS/DSCN MKR DOCD: CPT | Mod: HCNC,CPTII,S$GLB,

## 2025-03-03 PROCEDURE — 1126F AMNT PAIN NOTED NONE PRSNT: CPT | Mod: HCNC,CPTII,S$GLB,

## 2025-03-03 PROCEDURE — 1160F RVW MEDS BY RX/DR IN RCRD: CPT | Mod: HCNC,CPTII,S$GLB,

## 2025-03-03 PROCEDURE — 1101F PT FALLS ASSESS-DOCD LE1/YR: CPT | Mod: HCNC,CPTII,S$GLB,

## 2025-03-03 PROCEDURE — G0439 PPPS, SUBSEQ VISIT: HCPCS | Mod: HCNC,S$GLB,,

## 2025-03-03 PROCEDURE — 3288F FALL RISK ASSESSMENT DOCD: CPT | Mod: HCNC,CPTII,S$GLB,

## 2025-03-03 PROCEDURE — 1170F FXNL STATUS ASSESSED: CPT | Mod: HCNC,CPTII,S$GLB,

## 2025-03-03 PROCEDURE — 99999 PR PBB SHADOW E&M-EST. PATIENT-LVL V: CPT | Mod: PBBFAC,HCNC,,

## 2025-03-03 RX ORDER — AMLODIPINE BESYLATE 10 MG/1
10 TABLET ORAL DAILY
Qty: 90 TABLET | Refills: 3 | Status: SHIPPED | OUTPATIENT
Start: 2025-03-03

## 2025-03-03 RX ORDER — PRAVASTATIN SODIUM 40 MG/1
TABLET ORAL
COMMUNITY
Start: 2025-02-13

## 2025-03-03 NOTE — TELEPHONE ENCOUNTER
No care due was identified.  [facility] Embedded Care Due Messages. Reference number: [id number].   3/03/2025 7:47:47 AM CST

## 2025-03-03 NOTE — TELEPHONE ENCOUNTER
Refill Routing Note   Medication(s) are not appropriate for processing by [facility] for the following reason(s):        No active prescription written by provider    [facility] action(s):  Defer               Appointments  past 12m or future 3m with PCP    Date Provider   Last Visit   2/10/2025 Jerry Castro III, MD   Next Visit   5/23/2025 Jerry Castro III, MD   ED visits in past 90 days: 0        Note composed:8:59 AM 03/03/2025

## 2025-03-03 NOTE — PROGRESS NOTES
Nelson Huntley presented for a  Medicare AWV and comprehensive Health Risk Assessment today. The following components were reviewed and updated:    Medical history  Family History  Social history  Allergies and Current Medications  Health Risk Assessment  Health Maintenance  Care Team         ** See Completed Assessments for Annual Wellness Visit within the encounter summary.**         The following assessments were completed:  Living Situation  CAGE  Depression Screening  Timed Get Up and Go  Whisper Test  Cognitive Function Screening    Nutrition Screening  ADL Screening  PAQ Screening      Opioid documentation for eAWV      Patient does not have a current opioid prescription.        Review for Substance Use Disorders: Patient does not use substance      Current Medications[1]       Vitals:    03/03/25 1245   BP: 126/60   Pulse: 61   SpO2: 95%   Weight: 75.5 kg (166 lb 7.2 oz)   Height: 6' (1.829 m)   PainSc: 0-No pain      Physical Exam  Vitals reviewed.   Constitutional:       General: He is not in acute distress.     Appearance: Normal appearance. He is not ill-appearing.   HENT:      Head: Normocephalic and atraumatic.      Nose: Nose normal.      Mouth/Throat:      Mouth: Mucous membranes are moist.   Eyes:      General: No scleral icterus.        Right eye: No discharge.         Left eye: No discharge.      Extraocular Movements: Extraocular movements intact.      Conjunctiva/sclera: Conjunctivae normal.   Cardiovascular:      Rate and Rhythm: Normal rate.   Pulmonary:      Effort: Pulmonary effort is normal. No respiratory distress.   Musculoskeletal:      Cervical back: Normal range of motion.   Skin:     General: Skin is warm and dry.   Neurological:      Mental Status: He is alert and oriented to person, place, and time.   Psychiatric:         Mood and Affect: Mood normal.         Behavior: Behavior normal. Behavior is cooperative.         Cognition and Memory: Cognition and memory normal.                Diagnoses and health risks identified today and associated recommendations/orders:    1. Encounter for Medicare annual wellness exam  - Chart reviewed. Problem list updated. Discussed current medical diagnosis, current medications, medical/surgical/family/social history; updated provider list; documented vital signs; identified any cognitive impairment; and updated risk factor list. Addressed any outstanding health maintenance. Provided patient with personalized health advice. Continue to follow up with PCP and any specialists.       2. Idiopathic thrombocytopenic purpura  Chronic; follow up with PCP  -plts are low   -follows with hematology     3. Mycobacterium avium infection  Chronic; stable on current treatment plan; follow up with PCP  -managed with doxy   - follows with pulm    4. Chronic kidney disease, stage 3a  Chronic; stable on current treatment plan; follow up with PCP  -managed with entrresto   -stable on recent labs      5. Essential hypertension  Chronic; stable on current treatment plan; follow up with PCP  -managed with amlodpione, metoprolol, entresto,sotalol    6. Pure hypercholesterolemia  Chronic; stable on current treatment plan; follow up with PCP  -managed with statin    7. Sensorineural hearing loss, bilateral  Chronic; follow up with PCP  -does not use hearing aids    8. Bronchiectasis without complication  Chronic; stable on current treatment plan; follow up with PCP  - follows with pulm    9. Persistent atrial fibrillation  Chronic; stable on current treatment plan; follow up with PCP  -managed with beta blocker  - has ICD in place   -follows with cardio and EP    10. Chronic combined systolic and diastolic congestive heart failure  Chronic; stable on current treatment plan; follow up with PCP  - managed with entresto  -follows with cardio    11. Senile purpura  See media  -does not take blood thinners    12. Body mass index (BMI) of 22.0 to 22.9 in adult  - Recommendation for healthy  diet and increasing exercise as tolerated with goal of 150min/week       Provided Edward with a 5-10 year written screening schedule and personal prevention plan. Recommendations were developed using the USPSTF age appropriate recommendations. Education, counseling, and referrals were provided as needed. After Visit Summary printed and given to patient which includes a list of additional screenings\tests needed.    Follow up in about 1 year (around 3/3/2026) for your next medicare wellness visit.    Amanda Sahu PA-C    Advance Care Planning I offered to discuss advanced care planning, including how to pick a person who would make decisions for you if you were unable to make them for yourself, called a health care power of , and what kind of decisions you might make such as use of life sustaining treatments such as ventilators and tube feeding when faced with a life limiting illness recorded on a living will that they will need to know. (How you want to be cared for as you near the end of your natural life)     X  Patient has advanced directives on file, which we reviewed, and they do not wish to make changes.       [1]   Current Outpatient Medications:     beta-carotene,A,-vits C,E/mins (OCUVITE ORAL), Take by mouth every evening., Disp: , Rfl:     doxycycline (VIBRA-TABS) 100 MG tablet, Take 1 tablet (100 mg total) by mouth every 12 (twelve) hours., Disp: 14 tablet, Rfl: 1    famotidine (PEPCID) 20 MG tablet, Take 20 mg by mouth before dinner., Disp: , Rfl:     fluticasone propionate (FLONASE) 50 mcg/actuation nasal spray, 1 spray (50 mcg total) by Each Nostril route once daily., Disp: 16 g, Rfl: 11    metoprolol succinate (TOPROL-XL) 25 MG 24 hr tablet, Take 1 tablet (25 mg total) by mouth 2 (two) times daily., Disp: 180 tablet, Rfl: 2    multivitamin with minerals tablet, Take 1 tablet by mouth every evening., Disp: , Rfl:     niacin 500 MG tablet, Take 500 mg by mouth Every PM., Disp: , Rfl:      nitroGLYCERIN (NITROSTAT) 0.4 MG SL tablet, Take 1 tab under the tongue for chest pain. May repeat every 5 minutes for a total of 3 doses. If chest pain not relieved go to the ED. (Patient taking differently: Place 0.4 mg under the tongue every 5 (five) minutes as needed for Chest pain.  If chest pain not relieved go to the ED.), Disp: 25 tablet, Rfl: 4    omega-3 fatty acids-vitamin E 1,000 mg Cap, Take 1 capsule by mouth every evening., Disp: , Rfl:     potassium chloride SA (K-DUR,KLOR-CON M) 10 MEQ tablet, Take 1 tablet (10 mEq total) by mouth once daily., Disp: 90 tablet, Rfl: 3    pravastatin (PRAVACHOL) 20 MG tablet, Take 1 tablet (20 mg total) by mouth once daily., Disp: 90 tablet, Rfl: 3    pravastatin (PRAVACHOL) 40 MG tablet, , Disp: , Rfl:     sacubitriL-valsartan (ENTRESTO)  mg per tablet, Take 1 tablet by mouth 2 (two) times daily., Disp: 60 tablet, Rfl: 11    sotaloL (BETAPACE) 120 MG Tab, Take 1 tablet (120 mg total) by mouth 2 (two) times daily., Disp: 180 tablet, Rfl: 3    ubidecarenone (COENZYME Q10 ORAL), Take 1 tablet by mouth every evening., Disp: , Rfl:     vitamin D 1000 units Tab, Take 1,000 Units by mouth every Tues, Thurs, Sat. TAKES AT NIGHT, Disp: , Rfl:     amLODIPine (NORVASC) 10 MG tablet, Take 1 tablet (10 mg total) by mouth once daily., Disp: 90 tablet, Rfl: 3    furosemide (LASIX) 20 MG tablet, Take 1 tablet (20 mg total) by mouth 2 (two) times daily. Take one tablet every other day orbas directed (Patient not taking: Reported on 3/3/2025), Disp: 30 tablet, Rfl: 11  No current facility-administered medications for this visit.    Facility-Administered Medications Ordered in Other Visits:     sodium chloride 0.9% bolus 1,000 mL, 1,000 mL, Intravenous, Once, Violette Delgado, NP    vancomycin in dextrose 5 % 1 gram/250 mL IVPB 1,000 mg, 1,000 mg, Intravenous, On Call Procedure, Violette Delgado, NP, 1,000 mg at 03/18/22 1234

## 2025-03-03 NOTE — PATIENT INSTRUCTIONS
Counseling and Referral of Other Preventative  (Italic type indicates deductible and co-insurance are waived)    Patient Name: Nelson Huntley  Today's Date: 3/3/2025    Health Maintenance       Date Due Completion Date    Aspirin/Antiplatelet Therapy Never done ---    RSV Vaccine (Age 60+ and Pregnant patients) (1 - 1-dose 75+ series) Never done ---    TETANUS VACCINE 11/22/2023 11/22/2013    COVID-19 Vaccine (6 - 2024-25 season) 09/01/2024 7/28/2022    Lipid Panel 02/06/2030 2/6/2025        No orders of the defined types were placed in this encounter.      The following information is provided to all patients.  This information is to help you find resources for any of the problems found today that may be affecting your health:                  Living healthy guide: www.[facility].[place].gov      Understanding Diabetes: www.diabetes.org      Eating healthy: www.cdc.gov/healthyweight      [facility] home safety checklist: www.cdc.gov/steadi/patient.html      Agency on Aging: www.goea.[place].[facility]      Alcoholics anonymous (AA): www.aa.org      Physical Activity: www.blas.nih.gov/yf8gfyd      Tobacco use: www.quitwithusla.org

## 2025-03-07 ENCOUNTER — TELEPHONE (OUTPATIENT)
Dept: INTERVENTIONAL RADIOLOGY/VASCULAR | Facility: CLINIC | Age: OVER 89
End: 2025-03-07
Payer: MEDICARE

## 2025-03-08 ENCOUNTER — CLINICAL SUPPORT (OUTPATIENT)
Dept: CARDIOLOGY | Facility: HOSPITAL | Age: OVER 89
End: 2025-03-08
Attending: INTERNAL MEDICINE
Payer: MEDICARE

## 2025-03-08 ENCOUNTER — CLINICAL SUPPORT (OUTPATIENT)
Dept: CARDIOLOGY | Facility: HOSPITAL | Age: OVER 89
End: 2025-03-08
Payer: MEDICARE

## 2025-03-08 DIAGNOSIS — Z95.810 PRESENCE OF AUTOMATIC (IMPLANTABLE) CARDIAC DEFIBRILLATOR: ICD-10-CM

## 2025-03-08 DIAGNOSIS — I25.5 ISCHEMIC CARDIOMYOPATHY: ICD-10-CM

## 2025-03-08 PROCEDURE — 93295 DEV INTERROG REMOTE 1/2/MLT: CPT | Mod: HCNC,,, | Performed by: INTERNAL MEDICINE

## 2025-03-08 PROCEDURE — 93296 REM INTERROG EVL PM/IDS: CPT | Mod: HCNC | Performed by: INTERNAL MEDICINE

## 2025-03-11 ENCOUNTER — PATIENT MESSAGE (OUTPATIENT)
Dept: HEMATOLOGY/ONCOLOGY | Facility: CLINIC | Age: OVER 89
End: 2025-03-11
Payer: MEDICARE

## 2025-03-11 DIAGNOSIS — D47.2 MGUS (MONOCLONAL GAMMOPATHY OF UNKNOWN SIGNIFICANCE): Primary | ICD-10-CM

## 2025-03-12 ENCOUNTER — TELEPHONE (OUTPATIENT)
Dept: INTERVENTIONAL RADIOLOGY/VASCULAR | Facility: CLINIC | Age: OVER 89
End: 2025-03-12
Payer: MEDICARE

## 2025-03-12 NOTE — PROGRESS NOTES
Subjective:   Patient ID:  Nelson GIBBONS Parent is a 89 y.o. male who presents for follow-up of ICM    HPI:  The patient is here for CAD.Patient is here for congestive heart failure.I last saw him 9/22-saw NPs twice.    The patient has no chest pain, TIA, palpitations, syncope or pre-syncope.Patient does not exercise. As much as in past. Had to stop blood thinners due to too much Hemoptysis with lung disease.More SOB /CARBONE.        Review of Systems   Constitutional: Negative for chills, decreased appetite, diaphoresis, fever, malaise/fatigue, night sweats, weight gain and weight loss.   HENT:  Negative for congestion, hoarse voice, nosebleeds, sore throat and tinnitus.    Eyes:  Negative for blurred vision, double vision, vision loss in left eye, vision loss in right eye, visual disturbance and visual halos.   Cardiovascular:  Positive for dyspnea on exertion. Negative for chest pain, claudication, cyanosis, irregular heartbeat, leg swelling, near-syncope, orthopnea, palpitations, paroxysmal nocturnal dyspnea and syncope.   Respiratory:  Positive for shortness of breath. Negative for cough, hemoptysis, sleep disturbances due to breathing, snoring, sputum production and wheezing.    Endocrine: Negative for cold intolerance, heat intolerance, polydipsia, polyphagia and polyuria.   Hematologic/Lymphatic: Negative for adenopathy and bleeding problem. Does not bruise/bleed easily.   Skin:  Negative for color change, dry skin, flushing, itching, nail changes, poor wound healing, rash, skin cancer, suspicious lesions and unusual hair distribution.   Musculoskeletal:  Negative for arthritis, back pain, falls, gout, joint pain, joint swelling, muscle cramps, muscle weakness, myalgias and stiffness.   Gastrointestinal:  Negative for abdominal pain, anorexia, change in bowel habit, constipation, diarrhea, dysphagia, heartburn, hematemesis, hematochezia, melena and vomiting.   Genitourinary:  Negative for decreased libido, dysuria,  hematuria, hesitancy and urgency.   Neurological:  Negative for excessive daytime sleepiness, dizziness, focal weakness, headaches, light-headedness, loss of balance, numbness, paresthesias, seizures, sensory change, tremors, vertigo and weakness.   Psychiatric/Behavioral:  Negative for altered mental status, depression, hallucinations, memory loss, substance abuse and suicidal ideas. The patient does not have insomnia and is not nervous/anxious.    Allergic/Immunologic: Negative for environmental allergies and hives.       Objective: BP (!) 151/62 (BP Location: Left arm)   Pulse (!) 57   Ht 6' (1.829 m)   Wt 74 kg (163 lb 2.3 oz)   BMI 22.13 kg/m²      Physical Exam  Constitutional:       General: He is not in acute distress.     Appearance: He is well-developed. He is not diaphoretic.   HENT:      Head: Normocephalic.   Eyes:      Pupils: Pupils are equal, round, and reactive to light.   Neck:      Thyroid: No thyromegaly.   Cardiovascular:      Rate and Rhythm: Normal rate and regular rhythm.      Pulses: Intact distal pulses.           Carotid pulses are 3+ on the right side and 3+ on the left side.       Radial pulses are 3+ on the right side and 3+ on the left side.        Femoral pulses are 3+ on the right side and 3+ on the left side.       Popliteal pulses are 3+ on the right side and 3+ on the left side.        Dorsalis pedis pulses are 3+ on the right side and 3+ on the left side.        Posterior tibial pulses are 3+ on the right side and 3+ on the left side.      Heart sounds: Normal heart sounds. No murmur heard.     No friction rub. No gallop.   Pulmonary:      Effort: Pulmonary effort is normal. No respiratory distress.      Breath sounds: Normal breath sounds. No wheezing or rales.   Chest:      Chest wall: No tenderness.   Abdominal:      General: There is no distension.      Palpations: Abdomen is soft. There is no mass.      Tenderness: There is no abdominal tenderness.   Musculoskeletal:          General: Normal range of motion.      Cervical back: Normal range of motion.   Lymphadenopathy:      Cervical: No cervical adenopathy.   Skin:     General: Skin is warm.      Nails: There is no clubbing.   Neurological:      Mental Status: He is alert and oriented to person, place, and time.   Psychiatric:         Speech: Speech normal.         Behavior: Behavior normal.         Thought Content: Thought content normal.         Judgment: Judgment normal.         Assessment:     1. Chronic combined systolic and diastolic congestive heart failure    2. Cerebral infarction, remote, resolved    3. Coronary artery disease of native artery of native heart with stable angina pectoris    4. History of TIA (transient ischemic attack)    5. Hypertensive kidney disease    6. Idiopathic thrombocytopenic purpura    7. MGUS (monoclonal gammopathy of unknown significance)    8. Monoclonal paraproteinemia    9. Old myocardial infarction    10. Persistent atrial fibrillation    11. Pure hypercholesterolemia    12. S/P AAA (abdominal aortic aneurysm) repair    13. Erectile dysfunction due to arterial insufficiency    14. VT (ventricular tachycardia)    15. Abnormal serum protein electrophoresis    16. Acute bilateral low back pain without sciatica    17. Cardiac angina Inactive       Plan:   Discussed diet , achieving and maintaining ideal body weight, and exercise.   We reviewed meds in detail.  Reassured-Discussed goals, options, plan.  Omega-3 > 800 mg/d combined EPA/DHA.  Co Q 10 200 mg/d  Goal BP< 135/85 at [id number].  Goal LDL < 70 ( maybe 55).  NTG should be refilled every 8-9 months.  Double Prava than switch to Atorva 40 mg but just take half           Diagnoses and all orders for this visit:    Chronic combined systolic and diastolic congestive heart failure  -     Comprehensive Metabolic Panel; Future  -     NT-Pro Natriuretic Peptide; Future  -     Echo; Future  -     EKG 12-lead; Future    Cerebral infarction, remote,  resolved  -     Lipid Panel; Future  -     Comprehensive Metabolic Panel; Future  -     TSH; Future  -     NT-Pro Natriuretic Peptide; Future  -     atorvastatin (LIPITOR) 40 MG tablet; Take 1 tablet (40 mg total) by mouth once daily.  -     EKG 12-lead; Future    Coronary artery disease of native artery of native heart with stable angina pectoris  -     EKG 12-lead; Future    History of TIA (transient ischemic attack)    Hypertensive kidney disease  -     TSH; Future    Idiopathic thrombocytopenic purpura    MGUS (monoclonal gammopathy of unknown significance)    Monoclonal paraproteinemia    Old myocardial infarction  -     Lipid Panel; Future  -     Comprehensive Metabolic Panel; Future  -     atorvastatin (LIPITOR) 40 MG tablet; Take 1 tablet (40 mg total) by mouth once daily.    Persistent atrial fibrillation  -     EKG 12-lead; Future    Pure hypercholesterolemia  -     Lipid Panel; Future  -     Comprehensive Metabolic Panel; Future  -     atorvastatin (LIPITOR) 40 MG tablet; Take 1 tablet (40 mg total) by mouth once daily.    S/P AAA (abdominal aortic aneurysm) repair    Erectile dysfunction due to arterial insufficiency    VT (ventricular tachycardia)    Abnormal serum protein electrophoresis    Acute bilateral low back pain without sciatica    Cardiac angina            Follow up in about 1 year (around 3/14/2026) for with ECG and CFD Osiel Cartagena to read ; lab 14 weeks.

## 2025-03-14 ENCOUNTER — OFFICE VISIT (OUTPATIENT)
Dept: CARDIOLOGY | Facility: CLINIC | Age: OVER 89
End: 2025-03-14
Payer: MEDICARE

## 2025-03-14 VITALS
WEIGHT: 163.13 LBS | DIASTOLIC BLOOD PRESSURE: 62 MMHG | HEIGHT: 72 IN | HEART RATE: 57 BPM | BODY MASS INDEX: 22.09 KG/M2 | SYSTOLIC BLOOD PRESSURE: 151 MMHG

## 2025-03-14 DIAGNOSIS — Z86.73 CEREBRAL INFARCTION, REMOTE, RESOLVED: ICD-10-CM

## 2025-03-14 DIAGNOSIS — D47.2 MONOCLONAL PARAPROTEINEMIA: ICD-10-CM

## 2025-03-14 DIAGNOSIS — I25.2 OLD MYOCARDIAL INFARCTION: ICD-10-CM

## 2025-03-14 DIAGNOSIS — I12.9 HYPERTENSIVE KIDNEY DISEASE: ICD-10-CM

## 2025-03-14 DIAGNOSIS — Z86.79 S/P AAA (ABDOMINAL AORTIC ANEURYSM) REPAIR: ICD-10-CM

## 2025-03-14 DIAGNOSIS — D69.3 IDIOPATHIC THROMBOCYTOPENIC PURPURA: ICD-10-CM

## 2025-03-14 DIAGNOSIS — Z98.890 S/P AAA (ABDOMINAL AORTIC ANEURYSM) REPAIR: ICD-10-CM

## 2025-03-14 DIAGNOSIS — I50.42 CHRONIC COMBINED SYSTOLIC AND DIASTOLIC CONGESTIVE HEART FAILURE: Primary | ICD-10-CM

## 2025-03-14 DIAGNOSIS — R77.8 ABNORMAL SERUM PROTEIN ELECTROPHORESIS: ICD-10-CM

## 2025-03-14 DIAGNOSIS — I47.20 VT (VENTRICULAR TACHYCARDIA): ICD-10-CM

## 2025-03-14 DIAGNOSIS — Z86.73 HISTORY OF TIA (TRANSIENT ISCHEMIC ATTACK): Chronic | ICD-10-CM

## 2025-03-14 DIAGNOSIS — M54.50 ACUTE BILATERAL LOW BACK PAIN WITHOUT SCIATICA: ICD-10-CM

## 2025-03-14 DIAGNOSIS — I48.19 PERSISTENT ATRIAL FIBRILLATION: ICD-10-CM

## 2025-03-14 DIAGNOSIS — I25.118 CORONARY ARTERY DISEASE OF NATIVE ARTERY OF NATIVE HEART WITH STABLE ANGINA PECTORIS: ICD-10-CM

## 2025-03-14 DIAGNOSIS — N52.01 ERECTILE DYSFUNCTION DUE TO ARTERIAL INSUFFICIENCY: ICD-10-CM

## 2025-03-14 DIAGNOSIS — E78.00 PURE HYPERCHOLESTEROLEMIA: ICD-10-CM

## 2025-03-14 DIAGNOSIS — D47.2 MGUS (MONOCLONAL GAMMOPATHY OF UNKNOWN SIGNIFICANCE): ICD-10-CM

## 2025-03-14 DIAGNOSIS — I20.9 CARDIAC ANGINA: ICD-10-CM

## 2025-03-14 PROCEDURE — 99999 PR PBB SHADOW E&M-EST. PATIENT-LVL IV: CPT | Mod: PBBFAC,HCNC,, | Performed by: INTERNAL MEDICINE

## 2025-03-14 RX ORDER — ATORVASTATIN CALCIUM 40 MG/1
40 TABLET, FILM COATED ORAL DAILY
Qty: 90 TABLET | Refills: 3 | Status: SHIPPED | OUTPATIENT
Start: 2025-03-14

## 2025-03-14 RX ORDER — NITROGLYCERIN 0.4 MG/1
TABLET SUBLINGUAL
Qty: 25 TABLET | Refills: 4 | Status: SHIPPED | OUTPATIENT
Start: 2025-03-14

## 2025-03-14 NOTE — PATIENT INSTRUCTIONS
Discussed diet , achieving and maintaining ideal body weight, and exercise.   We reviewed meds in detail.  Reassured-Discussed goals, options, plan.  Omega-3 > 800 mg/d combined EPA/DHA.  Co Q 10 200 mg/d  Goal BP< 135/85 at [id number].  Goal LDL < 70 ( maybe 55).  NTG should be refilled every 8-9 months.  Double Prava than switch to Atorva 40 mg but just take half

## 2025-03-15 ENCOUNTER — PATIENT MESSAGE (OUTPATIENT)
Dept: CARDIOLOGY | Facility: CLINIC | Age: OVER 89
End: 2025-03-15
Payer: MEDICARE

## 2025-03-22 LAB
OHS CV AF BURDEN PERCENT: < 1
OHS CV DC REMOTE DEVICE TYPE: NORMAL
OHS CV RV PACING PERCENT: 3.6 %

## 2025-03-28 ENCOUNTER — HOSPITAL ENCOUNTER (OUTPATIENT)
Dept: RADIOLOGY | Facility: HOSPITAL | Age: OVER 89
Discharge: HOME | End: 2025-03-28
Attending: FAMILY MEDICINE
Payer: MEDICARE

## 2025-03-28 DIAGNOSIS — I97.89 TYPE II ENDOLEAK OF AORTIC GRAFT: ICD-10-CM

## 2025-03-28 DIAGNOSIS — Z98.890 S/P AAA (ABDOMINAL AORTIC ANEURYSM) REPAIR: ICD-10-CM

## 2025-03-28 DIAGNOSIS — Z86.79 S/P AAA (ABDOMINAL AORTIC ANEURYSM) REPAIR: ICD-10-CM

## 2025-03-28 PROCEDURE — 74174 CTA ABD&PLVS W/CONTRAST: CPT | Mod: 26,HCNC,, | Performed by: RADIOLOGY

## 2025-03-28 PROCEDURE — 25500020 PHARM REV CODE 255: Mod: HCNC | Performed by: FAMILY MEDICINE

## 2025-03-28 PROCEDURE — 74174 CTA ABD&PLVS W/CONTRAST: CPT | Mod: TC,HCNC

## 2025-03-28 RX ADMIN — IOHEXOL 150 ML: 350 INJECTION, SOLUTION INTRAVENOUS at 10:03

## 2025-04-07 RX ORDER — SOTALOL HYDROCHLORIDE 120 MG/1
120 TABLET ORAL 2 TIMES DAILY
Qty: 180 TABLET | Refills: 3 | Status: SHIPPED | OUTPATIENT
Start: 2025-04-07

## 2025-04-08 ENCOUNTER — OFFICE VISIT (OUTPATIENT)
Dept: INFECTIOUS DISEASES | Facility: CLINIC | Age: OVER 89
End: 2025-04-08
Payer: MEDICARE

## 2025-04-08 ENCOUNTER — OFFICE VISIT (OUTPATIENT)
Dept: INTERVENTIONAL RADIOLOGY/VASCULAR | Facility: CLINIC | Age: OVER 89
End: 2025-04-08
Payer: MEDICARE

## 2025-04-08 ENCOUNTER — PATIENT MESSAGE (OUTPATIENT)
Dept: INTERNAL MEDICINE | Facility: CLINIC | Age: OVER 89
End: 2025-04-08
Payer: MEDICARE

## 2025-04-08 VITALS
SYSTOLIC BLOOD PRESSURE: 123 MMHG | HEIGHT: 72 IN | WEIGHT: 166 LBS | BODY MASS INDEX: 22.48 KG/M2 | TEMPERATURE: 98 F | HEART RATE: 60 BPM | DIASTOLIC BLOOD PRESSURE: 58 MMHG

## 2025-04-08 VITALS
WEIGHT: 165.25 LBS | SYSTOLIC BLOOD PRESSURE: 125 MMHG | BODY MASS INDEX: 22.41 KG/M2 | HEART RATE: 59 BPM | DIASTOLIC BLOOD PRESSURE: 59 MMHG

## 2025-04-08 DIAGNOSIS — Z98.890 S/P AAA (ABDOMINAL AORTIC ANEURYSM) REPAIR: ICD-10-CM

## 2025-04-08 DIAGNOSIS — I97.89 TYPE II ENDOLEAK OF AORTIC GRAFT: Primary | ICD-10-CM

## 2025-04-08 DIAGNOSIS — A31.0 NONTUBERCULOUS MYCOBACTERIAL DISEASE OF LUNG: Primary | ICD-10-CM

## 2025-04-08 DIAGNOSIS — Z86.79 S/P AAA (ABDOMINAL AORTIC ANEURYSM) REPAIR: ICD-10-CM

## 2025-04-08 PROCEDURE — 99214 OFFICE O/P EST MOD 30 MIN: CPT | Mod: HCNC,S$GLB,, | Performed by: INTERNAL MEDICINE

## 2025-04-08 PROCEDURE — 1126F AMNT PAIN NOTED NONE PRSNT: CPT | Mod: HCNC,CPTII,S$GLB, | Performed by: INTERNAL MEDICINE

## 2025-04-08 PROCEDURE — 3288F FALL RISK ASSESSMENT DOCD: CPT | Mod: HCNC,CPTII,S$GLB, | Performed by: INTERNAL MEDICINE

## 2025-04-08 PROCEDURE — 1101F PT FALLS ASSESS-DOCD LE1/YR: CPT | Mod: HCNC,CPTII,S$GLB, | Performed by: INTERNAL MEDICINE

## 2025-04-08 PROCEDURE — 99999 PR PBB SHADOW E&M-EST. PATIENT-LVL IV: CPT | Mod: PBBFAC,HCNC,, | Performed by: FAMILY MEDICINE

## 2025-04-08 PROCEDURE — 1157F ADVNC CARE PLAN IN RCRD: CPT | Mod: HCNC,CPTII,S$GLB, | Performed by: INTERNAL MEDICINE

## 2025-04-08 PROCEDURE — 99999 PR PBB SHADOW E&M-EST. PATIENT-LVL III: CPT | Mod: PBBFAC,HCNC,, | Performed by: INTERNAL MEDICINE

## 2025-04-08 PROCEDURE — 1159F MED LIST DOCD IN RCRD: CPT | Mod: HCNC,CPTII,S$GLB, | Performed by: INTERNAL MEDICINE

## 2025-04-08 NOTE — PROGRESS NOTES
Subjective     Patient ID: Nelson Huntley is a 89 y.o. male.    Chief Complaint: Follow-up (Endoleak type II)    Patient here for follow up of an endoleak type II. He is s/p AAA repair over 20 years ago at an outside facility. Endoleak was originally identified via CT scan obtained on 6/5/2024 as part of a GI workup. He was referred to Vascular surgery who performed an aortogram on 8/21/2024 and confirmed endoleak type II finding. He underwent endoleak embolization with IR on 10/25/2024. Follow up CTA noted persistent endoleak. Patient underwent repeat endoleak embolization on 12/23/2024 and 2/26/2025. Follow up CTA obtained on 3/28/2025.    Patient reports feeling well and in his usual state of health. He is accompanied by his wife.     Vascular surgery progress note reviewed.      Review of Systems   Constitutional:  Negative for activity change, appetite change, chills, fatigue and fever.   Respiratory:  Negative for cough, shortness of breath, wheezing and stridor.    Cardiovascular:  Negative for chest pain, palpitations and leg swelling.   Gastrointestinal:  Negative for abdominal distention, abdominal pain, constipation, diarrhea, nausea and vomiting.          Objective     Physical Exam  Constitutional:       General: He is not in acute distress.     Appearance: He is well-developed. He is not diaphoretic.   HENT:      Head: Normocephalic and atraumatic.   Pulmonary:      Effort: Pulmonary effort is normal. No respiratory distress.   Neurological:      Mental Status: He is alert and oriented to person, place, and time.   Psychiatric:         Behavior: Behavior normal.         Thought Content: Thought content normal.         Judgment: Judgment normal.       CTA 3/28/2025  FINDINGS:  CTA: Aortoiliac atherosclerosis.  The celiac and SMA axis appear patent.  Renal arteries (with bilateral accessory renal arteries which appear patent).  Redemonstration of aorto bi-iliac stent graft.  Aneurysm sac measuring 14.0  x 12.2 cm (series 4, image 285), previously 13.5 x 11.7 cm.  Patient with reported history of endoleak status post embolizations.  Several scattered intraluminal foci of arterial enhancement, most notable at the more superior and inferior aspect of the aneurysm sac with more confluent serpiginous appearance on delayed series (for reference on series 3, image 201, 206, 210, 217, 231, 375, 380, 385, and 401).     Non-CTA: Similar chronic interstitial lung disease at the lower lungs and bases.     The liver, gallbladder, spleen, pancreas, and adrenal glands appear unremarkable.  Kidneys enhance symmetrically with bilateral renal cysts with several additional hypodensities, too small to characterize.  No hydronephrosis.  Urinary bladder is partially decompressed.  Mild punctate prostate calcification with prominent central gland.     The GI tract is normal in caliber.  Colonic diverticulosis.  Appendix is normal.  Small fat containing umbilical hernia and inguinal hernias.  Main portal vein, splenic vein, and SMV are patent.  No pathologic adenopathy.  Small volume intrapelvic fluid.     No aggressive osseous lesion.     Impression:     Aorto bi-iliac stent graft for abdominal aortic aneurysm with prior embolization changes.  Focal areas of arterial enhancement within the aneurysm sac suggesting persistent sites of endoleak with areas of more conspicuous serpiginous appearance on delayed series.  See above for detail.     Infrarenal abdominal aortic aneurysm measuring 14.0 x 12.2 cm, previously 13.5 x 11.7 cm.     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 S/P AAA (abdominal aortic aneurysm) repair  Overview:  Stented by  Efra    S/P AAA (abdominal aortic aneurysm) repair  CT demonstrating persistent abdominal aortic aneurysm sac incompletely imaged previously seen with suspected type 2 endoleak.    Recommend following up with vascular as outpatient  We will schedule them for an angiogram in a proximally 3-4 weeks      Electronically signed by Gonzalez Granado MD at 7/9/2024  3:13 PM    Orders:  -     IR Embolization Arterial Comp other than; Future; Expected date: 04/08/2025  -     Comprehensive Metabolic Panel; Future; Expected date: 04/08/2025  -     Protime-INR; Future; Expected date: 04/08/2025  -     CBC Auto Differential; Future; Expected date: 04/08/2025        Reviewed CTA with Dr. Espino. Explained to patient CTA shows good response to treatment. However, new endoleak has developed. Recommendation is to repeat embolization. Discussed how the procedure will be performed, risks (including, but not limited to, pain, bleeding, infection, damage to nearby structures, and the need for additional procedures), benefits, possible complications, pre-post procedure expectations, and alternatives. The patient voices understanding and all questions have been answered.  The patient agrees to proceed as planned. Patient scheduled for 5/23/2025. Pre-procedure handout with clinic phone number provided.

## 2025-04-08 NOTE — LETTER
April 8, 2025    Nelson Huntley  [address]  [address]  [place] [id number]     [facility] [address]  [address]  [place] [address]  Phone: [phone] PRE-PROCEDURE INSTRUCTIONS    Your procedure with Interventional Radiology is scheduled for _______________________.     Please arrive by _______________________. Please note your appointment time in [facility] will be different.    You must check-in and receive a wristband before going to your procedure. We will call you the day before to let you know your check-in location.    **Do not eat or drink anything between midnight and the time of your procedure. This includes gum, mints, and candy lemon drops.    **Do not smoke or drink alcoholic beverages 24 hours prior to your procedure.    **If you wear contact lenses, dentures, hearing aids, or glasses, bring a container to put them in during the procedure and give them to a family member for safekeeping.    **If you have been diagnosed with sleep apnea please bring your CPAP machine.    **If your doctor has scheduled you for an overnight stay, bring a small overnight bag with any personal items that you may need.    **Make arrangements in advance for transportation home by a responsible adult. It is not safe to drive a vehicle during the 24 hours following the procedure.    **All [facility] facilities and properties are tobacco free. Smoking is NOT allowed.    PLEASE NOTE: The procedure schedule has many variables which affect the time of your procedure. Family members should be available if your surgery time changes.    If you have any questions about these instructions call Interventional Radiology at [phone] Monday - Friday between 8:00am and 4:00pm or [phone] (ask for interventional radiology physician) for after hours.

## 2025-04-08 NOTE — PROGRESS NOTES
INFECTIOUS DISEASE CLINIC  4/8/25     Subjective:      Chief Complaint:   Chief Complaint   Patient presents with    Follow-up       History of Present Illness:    Patient Nelson GIBBONS Parent is a 89 y.o. 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 Feeling well. Doesn' t cough a lot. No wt loss. Decreased stamina. Worried about aneurysm and has upcoming IR cards appt to discuss. Doesn't want to start treatment for pulm NTM infection (even if there is radiographic progression ) because he's feeling well and nervous for potential side effects.  Reports has had a hard time getting any sputum to come up recently for cultures    Never smoker  H/o kendall's esophagus  Sleeps in chair  exercise at gym regularly        6/7- M.abscessus, macrolide intermediate  4/17- no growth  3/4- M. Abscessus  2/8- MAC  1/2- MAc  12/18- M.abscessus     [employer], 2002    [employer] travel          Review of Symptoms:  Constitutional: Denies fevers, chills, or weakness.  ENT: Denies dysphagia, nasal discharge, ear pain or discharge.  Cardiovascular: Denies chest pain, palpitations, orthopnea, or claudication.  Respiratory: Denies shortness of breath, cough, hemoptysis, or wheezing.  GI: Denies nausea/vomitting, hematochezia, melena, abd pain, or changes in appetite.  : Denies dysuria, incontinence, or hematuria.  Musculoskeletal: Denies joint pain or myalgias.  Skin/breast: Denies rashes, lumps, lesions, or discharge.  Neurologic: Denies headache, dizziness, vertigo, or paresthesias.    Past Medical History:   Diagnosis Date    AICD (automatic cardioverter/defibrillator) present 07/17/2012    Cardiomyopathy     CKD (chronic kidney disease) stage 3, GFR 30-59 ml/min 07/17/2012    Coronary artery disease     GERD (gastroesophageal reflux disease)     Kendall's; Dr. Vu    Heart attack     Hyperlipidemia 07/17/2012    Hypertension 07/17/2012    Ischemic cardiomyopathy 07/17/2012    MGUS (monoclonal  gammopathy of unknown significance)     Thrombocytopenia 07/17/2012    TIA (transient ischemic attack)     Ventricular tachycardia     VT (ventricular tachycardia) 07/17/2012       Past Surgical History:   Procedure Laterality Date    ABDOMINAL AORTIC ANEURYSM REPAIR      AORTOGRAPHY N/A 8/21/2024    Procedure: AORTOGRAM;  Surgeon: Gonzalez Granado MD;  Location: [facility] OR [address] [place] [address];  Service: Vascular;  Laterality: N/A;  4.4 min  401.06 mGy  70.8925 Gy.cm  49ml Dye    CARDIAC DEFIBRILLATOR PLACEMENT      CATARACT EXTRACTION, BILATERAL  2018    CORONARY ANGIOPLASTY      EYE SURGERY Bilateral     cataract    HERNIA REPAIR      x2    REPLACEMENT OF IMPLANTABLE CARDIOVERTER-DEFIBRILLATOR (ICD) GENERATOR Left 03/18/2022    Procedure: REPLACEMENT, ICD GENERATOR;  Surgeon: Felipe Woodard MD;  Location: [facility] EP LAB;  Service: Cardiology;  Laterality: Left;  SEFERINO, ICD gen chg, SJM, elyssa, MB, 3prep*ANUJ ICD insitu*        Family History   Problem Relation Name Age of Onset    Cancer Mother Rebecca         Lymphoma    Lymphoma Mother Rebecca     Coronary artery disease Mother Rebecca     Heart disease Mother Rebecca     Heart disease Father      Heart attacks under age 50 Father      Heart disease Brother Hernando     Cancer Brother Hernando         lymphoma    Colon cancer Neg Hx      Prostate cancer Neg Hx         Social History     Socioeconomic History    Marital status:    Tobacco Use    Smoking status: Never     Passive exposure: Past    Smokeless tobacco: Never   Substance and Sexual Activity    Alcohol use: No     Comment: none    Drug use: No    Sexual activity: Yes     Partners: Female     Birth control/protection: None     Comment:    Social History Narrative    Patient is originally from [facility] in [place] since 60     Graduated [employer]  [employer]     College/university [employer] background    Working [employer], retired now         0 Children     Lives with wife          Diet/Exercise ;         Exercise        Eating    B     L    D    Snacks    Beverages    Fast:food             Social Drivers of Health     Financial Resource Strain: Low Risk  (3/3/2025)    Overall Financial Resource Strain (CARDIA)     Difficulty of Paying Living Expenses: Not hard at all   Food Insecurity: No Food Insecurity (3/3/2025)    Hunger Vital Sign     Worried About Running Out of Food in the Last Year: Never true     Ran Out of Food in the Last Year: Never true   Transportation Needs: No Transportation Needs (3/3/2025)    PRAPARE - Transportation     Lack of Transportation (Medical): No     Lack of Transportation (Non-Medical): No   Physical Activity: Sufficiently Active (3/3/2025)    Exercise Vital Sign     Days of Exercise per Week: 4 days     Minutes of Exercise per Session: 40 min   Stress: No Stress Concern Present (3/3/2025)    [employer] - Occupational Stress Questionnaire     Feeling of Stress : Not at all   Housing Stability: Low Risk  (3/3/2025)    Housing Stability Vital Sign     Unable to Pay for Housing in the Last Year: No     Homeless in the Last Year: No       Review of patient's allergies indicates:   Allergen Reactions    Fluorescein-benoxinate Other (See Comments)    Pcn  [penicillins]      Other reaction(s): Unknown    Quinolones Other (See Comments)     H/o AAA    Tropicamide Other (See Comments)    Clindamycin Rash         Objective:   VS (24h):   Vitals:    04/08/25 1141   BP: (!) 123/58   Pulse: 60   Temp: 97.8 °F (36.6 °C)     [unfilled]  General: Afebrile, alert, comfortable, no acute distress.   HEENT: DIANA. EOMI, no scleral icterus.  Pulmonary: Non labored,clear to auscultation A/P/L. Crackles at LLL  Cardiac: normal S1 & S2 w/o rubs/murmurs/gallops.   Abdominal: Non-tender, non-distended.  Extremities: Moves all extremities x 4.   Skin: No jaundice, rashes, or visible lesions.   Neurological:  Alert and oriented x 4.     Labs:    Glucose    Date Value Ref Range Status   02/06/2025 86 70 - 110 mg/dL Final   01/16/2025 100 70 - 110 mg/dL Final   11/26/2024 98 70 - 110 mg/dL Final       Calcium   Date Value Ref Range Status   02/06/2025 9.3 8.7 - 10.5 mg/dL Final   01/16/2025 9.7 8.7 - 10.5 mg/dL Final   11/26/2024 9.5 8.7 - 10.5 mg/dL Final       Albumin   Date Value Ref Range Status   02/06/2025 3.4 (L) 3.5 - 5.2 g/dL Final   01/16/2025 3.4 (L) 3.5 - 5.2 g/dL Final   11/26/2024 3.4 (L) 3.5 - 5.2 g/dL Final       Total Protein   Date Value Ref Range Status   02/06/2025 7.3 6.0 - 8.4 g/dL Final   01/16/2025 7.8 6.0 - 8.4 g/dL Final   11/26/2024 7.2 6.0 - 8.4 g/dL Final       Sodium   Date Value Ref Range Status   02/06/2025 142 136 - 145 mmol/L Final   01/16/2025 140 136 - 145 mmol/L Final   11/26/2024 142 136 - 145 mmol/L Final       Potassium   Date Value Ref Range Status   02/06/2025 4.2 3.5 - 5.1 mmol/L Final   01/16/2025 4.1 3.5 - 5.1 mmol/L Final   11/26/2024 3.9 3.5 - 5.1 mmol/L Final       CO2   Date Value Ref Range Status   02/06/2025 22 (L) 23 - 29 mmol/L Final   01/16/2025 26 23 - 29 mmol/L Final   11/26/2024 26 23 - 29 mmol/L Final       Chloride   Date Value Ref Range Status   02/06/2025 108 95 - 110 mmol/L Final   01/16/2025 105 95 - 110 mmol/L Final   11/26/2024 108 95 - 110 mmol/L Final       BUN   Date Value Ref Range Status   02/06/2025 17 8 - 23 mg/dL Final   01/16/2025 21 8 - 23 mg/dL Final   11/26/2024 18 8 - 23 mg/dL Final       Creatinine   Date Value Ref Range Status   02/06/2025 1.2 0.5 - 1.4 mg/dL Final   01/16/2025 1.3 0.5 - 1.4 mg/dL Final   11/26/2024 1.5 (H) 0.5 - 1.4 mg/dL Final       Alkaline Phosphatase   Date Value Ref Range Status   02/06/2025 151 (H) 40 - 150 U/L Final   01/16/2025 140 40 - 150 U/L Final   11/26/2024 159 (H) 40 - 150 U/L Final       ALT   Date Value Ref Range Status   02/06/2025 29 10 - 44 U/L Final   01/16/2025 19 10 - 44 U/L Final   11/26/2024 32 10 - 44 U/L Final       AST   Date Value Ref Range  Status   02/06/2025 25 10 - 40 U/L Final   01/16/2025 23 10 - 40 U/L Final   11/26/2024 30 10 - 40 U/L Final       Total Bilirubin   Date Value Ref Range Status   02/06/2025 0.8 0.1 - 1.0 mg/dL Final     Comment:     For infants and newborns, interpretation of results should be based  on gestational age, weight and in agreement with clinical  observations.    Premature Infant recommended reference ranges:  Up to 24 hours.............<8.0 mg/dL  Up to 48 hours............<12.0 mg/dL  3-5 days..................<15.0 mg/dL  6-29 days.................<15.0 mg/dL     01/16/2025 0.7 0.1 - 1.0 mg/dL Final     Comment:     For infants and newborns, interpretation of results should be based  on gestational age, weight and in agreement with clinical  observations.    Premature Infant recommended reference ranges:  Up to 24 hours.............<8.0 mg/dL  Up to 48 hours............<12.0 mg/dL  3-5 days..................<15.0 mg/dL  6-29 days.................<15.0 mg/dL     11/26/2024 0.8 0.1 - 1.0 mg/dL Final     Comment:     For infants and newborns, interpretation of results should be based  on gestational age, weight and in agreement with clinical  observations.    Premature Infant recommended reference ranges:  Up to 24 hours.............<8.0 mg/dL  Up to 48 hours............<12.0 mg/dL  3-5 days..................<15.0 mg/dL  6-29 days.................<15.0 mg/dL         WBC   Date Value Ref Range Status   02/06/2025 7.03 3.90 - 12.70 K/uL Final   01/16/2025 9.99 3.90 - 12.70 K/uL Final   11/26/2024 6.33 3.90 - 12.70 K/uL Final       Hemoglobin   Date Value Ref Range Status   02/06/2025 14.2 14.0 - 18.0 g/dL Final   01/16/2025 15.3 14.0 - 18.0 g/dL Final   11/26/2024 13.5 (L) 14.0 - 18.0 g/dL Final       Hematocrit   Date Value Ref Range Status   02/06/2025 45.0 40.0 - 54.0 % Final   01/16/2025 46.3 40.0 - 54.0 % Final   11/26/2024 40.6 40.0 - 54.0 % Final       MCV   Date Value Ref Range Status   02/06/2025 92 82 - 98 fL  "Final   01/16/2025 91 82 - 98 fL Final   11/26/2024 87 82 - 98 fL Final       Platelets   Date Value Ref Range Status   02/06/2025 96 (L) 150 - 450 K/uL Final   01/16/2025 125 (L) 150 - 450 K/uL Final   11/26/2024 86 (L) 150 - 450 K/uL Final       Lab Results   Component Value Date    CHOL 139 02/06/2025    CHOL 117 (L) 10/10/2024    CHOL 117 (L) 09/22/2023       Lab Results   Component Value Date    HDL 43 02/06/2025    HDL 39 (L) 10/10/2024    HDL 50 09/22/2023       Lab Results   Component Value Date    LDLCALC 83.2 02/06/2025    LDLCALC 66.2 10/10/2024    LDLCALC 55.2 (L) 09/22/2023       Lab Results   Component Value Date    TRIG 64 02/06/2025    TRIG 59 10/10/2024    TRIG 59 09/22/2023       Lab Results   Component Value Date    CHOLHDL 30.9 02/06/2025    CHOLHDL 33.3 10/10/2024    CHOLHDL 42.7 09/22/2023     No results found for: "RPR"  No results found for: "QUANTIFERON"    Medications:  Current Outpatient Medications on File Prior to Visit   Medication Sig Dispense Refill    amLODIPine (NORVASC) 10 MG tablet Take 1 tablet (10 mg total) by mouth once daily. 90 tablet 3    atorvastatin (LIPITOR) 40 MG tablet Take 1 tablet (40 mg total) by mouth once daily. 90 tablet 3    beta-carotene,A,-vits C,E/mins (OCUVITE ORAL) Take by mouth every evening.      doxycycline (VIBRA-TABS) 100 MG tablet Take 1 tablet (100 mg total) by mouth every 12 (twelve) hours. 14 tablet 1    famotidine (PEPCID) 20 MG tablet Take 20 mg by mouth before dinner.      fluticasone propionate (FLONASE) 50 mcg/actuation nasal spray 1 spray (50 mcg total) by Each Nostril route once daily. 16 g 11    furosemide (LASIX) 20 MG tablet Take 1 tablet (20 mg total) by mouth 2 (two) times daily. Take one tablet every other day orbas directed 30 tablet 11    metoprolol succinate (TOPROL-XL) 25 MG 24 hr tablet Take 1 tablet (25 mg total) by mouth 2 (two) times daily. 180 tablet 2    multivitamin with minerals tablet Take 1 tablet by mouth every evening.   " "   niacin 500 MG tablet Take 500 mg by mouth Every PM.      nitroGLYCERIN (NITROSTAT) 0.4 MG SL tablet Take 1 tab under the tongue for chest pain. May repeat every 5 minutes for a total of 3 doses. If chest pain not relieved go to the ED. 25 tablet 4    omega-3 fatty acids-vitamin E 1,000 mg Cap Take 1 capsule by mouth every evening.      potassium chloride SA (K-DUR,KLOR-CON M) 10 MEQ tablet Take 1 tablet (10 mEq total) by mouth once daily. 90 tablet 3    sacubitriL-valsartan (ENTRESTO)  mg per tablet Take 1 tablet by mouth 2 (two) times daily. 60 tablet 11    sotaloL (BETAPACE) 120 MG Tab TAKE 1 TABLET TWICE DAILY 180 tablet 3    ubidecarenone (COENZYME Q10 ORAL) Take 1 tablet by mouth every evening.      vitamin D 1000 units Tab Take 1,000 Units by mouth every Tues, Thurs, Sat. TAKES AT NIGHT       Current Facility-Administered Medications on File Prior to Visit   Medication Dose Route Frequency Provider Last Rate Last Admin    sodium chloride 0.9% bolus 1,000 mL  1,000 mL Intravenous Once Violette Delgado NP        vancomycin in dextrose 5 % 1 gram/250 mL IVPB 1,000 mg  1,000 mg Intravenous On Call Procedure Violette Delgado NP   1,000 mg at 03/18/22 1232       Antibiotics:   Antibiotics (From admission, onward)      None            HIV: No components found for: "HIV 1/2 AG/AB"  Hepatitis C IgG: No components found for: "HEPATITIS C"  Syphilis: No results found for: "RPR"    Hepatitis A IgG: No components found for: "HEPATITIS A IGG"  Hepatitis Bc IgG: No components found for: "HEPATITIS B CORE IGG"  Hepatitis Bs IgG:  Quantiferon: No results found for: "QUANTIFERON"  VZV IgG: No components found for: "VARICELLA IGG"    No components found for: "SEDIMENTATION RATE"  No components found for: "C-REACTIVE PROTEIN"      Microbiology x 7d:   Microbiology Results (last 7 days)       ** No results found for the last 168 hours. **            Immunization History   Administered Date(s) Administered    COVID-19, " MRNA, LN-S, PF (Pfizer) (Gray Cap) 07/28/2022    COVID-19, MRNA, LN-S, PF (Pfizer) (Purple Cap) 01/14/2021, 02/06/2021, 10/14/2021, 07/28/2022    Influenza 10/18/2007, 10/28/2008, 10/14/2009, 10/28/2010, 10/18/2011, 10/25/2012, 10/08/2018    Influenza (FLUAD) - Quadrivalent - Adjuvanted - PF *Preferred* (65+) 10/15/2020, 10/16/2021, 09/24/2022, 10/24/2023    Influenza - Quadrivalent - PF *Preferred* (6 months and older) 10/25/2012    Influenza - Trivalent - Afluria, Fluzone MDV 10/18/2007, 10/28/2008, 10/14/2009, 10/28/2010, 10/18/2011, 10/25/2012    Influenza - Trivalent - Fluad - Adjuvanted - PF (65 years and older 10/14/2024    Influenza - Trivalent - Fluzone High Dose - PF (65 years and older) 10/08/2013, 10/09/2014, 11/03/2015, 10/25/2016, 10/10/2017, 10/08/2018, 10/29/2019    Influenza Split 10/25/2012    Pneumococcal Conjugate - 13 Valent 02/01/2017, 02/24/2017    Pneumococcal Polysaccharide - 23 Valent 08/23/2012    Td (ADULT) 10/18/2007    Tdap 11/22/2013         Reviewed records today as well as relevant labs, cultures, and imaging    Assessment:       MAC +resp culture 1/2/24, 2/8/24  M.abscessus + resp culture 12/18/23, 3/4/24, 6/7/24  Non-cavitary    Symptoms previously improved with pseudomonas treatment.  Has had several positive sputum cultures for NTM, but not growing same organism consistently and symptoms mild and no radiographic progression of infection. Pt prefers watchful waiting to starting antibiotics for NTM    Notably with h/o AAA, would avoid quinolone use in future 2/2 potential risk of worsening of aneurysm    No toxicities from antimicrobials  Extremely medically complex  Patient is high risk for infectious complications given drug resistant infections    Plan:     Agree with watchful waiting, assuming the NTM infection was not the reason for hemoptysis event, which it doesn't sound like it was (but will send message to pulmonary to clarify)    Continue afb sputum cultures  surveillance    Will hold off on repeating CT given that patient would not want treatment even if he has radiographic progression    Discussed importance of airway clearance. Encouraged use of Aerobika and nebulized hypertonic saline twice a day (not currently doing)    Prevent reflux- avoid stomach sleeping. Sleep inclined. Famotidine/tums preferrable to PPI if needed. Stop liquids 3hr before bedtime.     Regular f/u with GI re: h/o barett's esophagus, which may be risk factor for ntm infection    Encourage probiotics      The total time for evaluation and management services performed on 4/8/25 was greater than 30 minutes.  This includes face to face time and non-face to face time preparing to see the patient (eg, review of tests), obtaining and/or reviewing separately obtained history, documenting clinical information in the electronic or other health record, independently interpreting results, and communicating results to the patient/family/caregiver, or care coordination.             Eliza Rueda MD, MPH  Infectious Disease

## 2025-04-12 ENCOUNTER — PATIENT MESSAGE (OUTPATIENT)
Dept: INTERNAL MEDICINE | Facility: CLINIC | Age: OVER 89
End: 2025-04-12
Payer: MEDICARE

## 2025-04-14 DIAGNOSIS — I10 ESSENTIAL HYPERTENSION: ICD-10-CM

## 2025-04-14 DIAGNOSIS — I25.10 CORONARY ARTERY DISEASE INVOLVING NATIVE CORONARY ARTERY OF NATIVE HEART WITHOUT ANGINA PECTORIS: ICD-10-CM

## 2025-04-14 RX ORDER — CLOPIDOGREL BISULFATE 75 MG/1
75 TABLET ORAL DAILY
Qty: 90 TABLET | Refills: 3 | Status: CANCELLED | OUTPATIENT
Start: 2025-04-14

## 2025-04-14 RX ORDER — AMLODIPINE BESYLATE 10 MG/1
10 TABLET ORAL DAILY
Qty: 90 TABLET | Refills: 3 | Status: CANCELLED | OUTPATIENT
Start: 2025-04-14

## 2025-04-14 NOTE — TELEPHONE ENCOUNTER
No care due was identified.  [facility] Embedded Care Due Messages. Reference number: [id number].   4/14/2025 8:50:19 AM CDT

## 2025-04-15 ENCOUNTER — TELEPHONE (OUTPATIENT)
Dept: INTERVENTIONAL RADIOLOGY/VASCULAR | Facility: CLINIC | Age: OVER 89
End: 2025-04-15
Payer: MEDICARE

## 2025-04-15 NOTE — TELEPHONE ENCOUNTER
Returned patient's phone call. He asks if this will cause other leaks to occur. Explained that new leaks are always a risk. He asks if there is benefit to undergoing embolization. Explained if embolization is successful, and no new leaks form, then the aneurysm sac should stabilize and not continue to grow. Patient verbalized understanding. No further questions at this time.

## 2025-04-30 ENCOUNTER — LAB VISIT (OUTPATIENT)
Dept: LAB | Facility: HOSPITAL | Age: OVER 89
End: 2025-04-30
Attending: INTERNAL MEDICINE
Payer: MEDICARE

## 2025-04-30 DIAGNOSIS — D47.2 MGUS (MONOCLONAL GAMMOPATHY OF UNKNOWN SIGNIFICANCE): ICD-10-CM

## 2025-04-30 LAB
ABSOLUTE EOSINOPHIL (OHS): 0.1 K/UL
ABSOLUTE MONOCYTE (OHS): 1.01 K/UL (ref 0.3–1)
ABSOLUTE NEUTROPHIL COUNT (OHS): 3.86 K/UL (ref 1.8–7.7)
ALBUMIN SERPL BCP-MCNC: 3.5 G/DL (ref 3.5–5.2)
ALP SERPL-CCNC: 146 UNIT/L (ref 40–150)
ALT SERPL W/O P-5'-P-CCNC: 43 UNIT/L (ref 10–44)
ANION GAP (OHS): 8 MMOL/L (ref 8–16)
AST SERPL-CCNC: 34 UNIT/L (ref 11–45)
BASOPHILS # BLD AUTO: 0.03 K/UL
BASOPHILS NFR BLD AUTO: 0.4 %
BILIRUB SERPL-MCNC: 0.7 MG/DL (ref 0.1–1)
BUN SERPL-MCNC: 23 MG/DL (ref 8–23)
CALCIUM SERPL-MCNC: 9.2 MG/DL (ref 8.7–10.5)
CHLORIDE SERPL-SCNC: 109 MMOL/L (ref 95–110)
CO2 SERPL-SCNC: 26 MMOL/L (ref 23–29)
CREAT SERPL-MCNC: 1.4 MG/DL (ref 0.5–1.4)
ERYTHROCYTE [DISTWIDTH] IN BLOOD BY AUTOMATED COUNT: 15.1 % (ref 11.5–14.5)
GFR SERPLBLD CREATININE-BSD FMLA CKD-EPI: 48 ML/MIN/1.73/M2
GLUCOSE SERPL-MCNC: 84 MG/DL (ref 70–110)
HCT VFR BLD AUTO: 42.4 % (ref 40–54)
HGB BLD-MCNC: 13.5 GM/DL (ref 14–18)
IMM GRANULOCYTES # BLD AUTO: 0.03 K/UL (ref 0–0.04)
IMM GRANULOCYTES NFR BLD AUTO: 0.4 % (ref 0–0.5)
LYMPHOCYTES # BLD AUTO: 2.23 K/UL (ref 1–4.8)
MCH RBC QN AUTO: 29.5 PG (ref 27–31)
MCHC RBC AUTO-ENTMCNC: 31.8 G/DL (ref 32–36)
MCV RBC AUTO: 93 FL (ref 82–98)
NUCLEATED RBC (/100WBC) (OHS): 0 /100 WBC
PLATELET # BLD AUTO: 81 K/UL (ref 150–450)
PMV BLD AUTO: 12.4 FL (ref 9.2–12.9)
POTASSIUM SERPL-SCNC: 4 MMOL/L (ref 3.5–5.1)
PROT SERPL-MCNC: 6.9 GM/DL (ref 6–8.4)
RBC # BLD AUTO: 4.58 M/UL (ref 4.6–6.2)
RELATIVE EOSINOPHIL (OHS): 1.4 %
RELATIVE LYMPHOCYTE (OHS): 30.7 % (ref 18–48)
RELATIVE MONOCYTE (OHS): 13.9 % (ref 4–15)
RELATIVE NEUTROPHIL (OHS): 53.2 % (ref 38–73)
SODIUM SERPL-SCNC: 143 MMOL/L (ref 136–145)
WBC # BLD AUTO: 7.26 K/UL (ref 3.9–12.7)

## 2025-04-30 PROCEDURE — 84165 PROTEIN E-PHORESIS SERUM: CPT | Mod: HCNC,,, | Performed by: PATHOLOGY

## 2025-04-30 PROCEDURE — 80053 COMPREHEN METABOLIC PANEL: CPT | Mod: HCNC

## 2025-04-30 PROCEDURE — 85025 COMPLETE CBC W/AUTO DIFF WBC: CPT | Mod: HCNC

## 2025-04-30 PROCEDURE — 36415 COLL VENOUS BLD VENIPUNCTURE: CPT | Mod: HCNC,PO

## 2025-04-30 PROCEDURE — 83521 IG LIGHT CHAINS FREE EACH: CPT | Mod: HCNC

## 2025-04-30 PROCEDURE — 84165 PROTEIN E-PHORESIS SERUM: CPT | Mod: HCNC

## 2025-05-01 ENCOUNTER — OFFICE VISIT (OUTPATIENT)
Facility: CLINIC | Age: OVER 89
End: 2025-05-01
Payer: MEDICARE

## 2025-05-01 VITALS
WEIGHT: 167.88 LBS | DIASTOLIC BLOOD PRESSURE: 72 MMHG | BODY MASS INDEX: 22.74 KG/M2 | HEART RATE: 66 BPM | SYSTOLIC BLOOD PRESSURE: 143 MMHG | HEIGHT: 72 IN | OXYGEN SATURATION: 96 %

## 2025-05-01 DIAGNOSIS — J47.9 BRONCHIECTASIS WITHOUT COMPLICATION: Primary | ICD-10-CM

## 2025-05-01 LAB
KAPPA LC FREE SER-MCNC: 1.2 MG/L (ref 0.26–1.65)
KAPPA LC FREE/LAMBDA FREE SER: 3.7 MG/DL (ref 0.33–1.94)
LAMBDA LC FREE SERPL-MCNC: 3.08 MG/DL (ref 0.57–2.63)

## 2025-05-01 PROCEDURE — 99999 PR PBB SHADOW E&M-EST. PATIENT-LVL IV: CPT | Mod: PBBFAC,HCNC,, | Performed by: INTERNAL MEDICINE

## 2025-05-01 PROCEDURE — 1101F PT FALLS ASSESS-DOCD LE1/YR: CPT | Mod: CPTII,HCNC,S$GLB, | Performed by: INTERNAL MEDICINE

## 2025-05-01 PROCEDURE — 3288F FALL RISK ASSESSMENT DOCD: CPT | Mod: CPTII,HCNC,S$GLB, | Performed by: INTERNAL MEDICINE

## 2025-05-01 PROCEDURE — 1157F ADVNC CARE PLAN IN RCRD: CPT | Mod: CPTII,HCNC,S$GLB, | Performed by: INTERNAL MEDICINE

## 2025-05-01 PROCEDURE — 99213 OFFICE O/P EST LOW 20 MIN: CPT | Mod: HCNC,S$GLB,, | Performed by: INTERNAL MEDICINE

## 2025-05-01 PROCEDURE — 1126F AMNT PAIN NOTED NONE PRSNT: CPT | Mod: CPTII,HCNC,S$GLB, | Performed by: INTERNAL MEDICINE

## 2025-05-02 LAB
ALBUMIN, SPE (OHS): 3.71 G/DL (ref 3.35–5.55)
ALPHA 1 GLOB (OHS): 0.29 GM/DL (ref 0.17–0.41)
ALPHA 2 GLOB (OHS): 0.77 GM/DL (ref 0.43–0.99)
BETA GLOB (OHS): 0.79 GM/DL (ref 0.5–1.1)
GAMMA GLOBULIN (OHS): 1.15 GM/DL (ref 0.67–1.58)
PATHOLOGIST REVIEW - SPE (OHS): NORMAL
PROT SERPL-MCNC: 6.7 GM/DL (ref 6–8.4)

## 2025-05-05 ENCOUNTER — OFFICE VISIT (OUTPATIENT)
Dept: HEMATOLOGY/ONCOLOGY | Facility: CLINIC | Age: OVER 89
End: 2025-05-05
Payer: MEDICARE

## 2025-05-05 VITALS
WEIGHT: 168 LBS | HEART RATE: 53 BPM | BODY MASS INDEX: 22.75 KG/M2 | DIASTOLIC BLOOD PRESSURE: 59 MMHG | TEMPERATURE: 97 F | HEIGHT: 72 IN | OXYGEN SATURATION: 96 % | SYSTOLIC BLOOD PRESSURE: 126 MMHG

## 2025-05-05 DIAGNOSIS — D69.6 THROMBOCYTOPENIA: ICD-10-CM

## 2025-05-05 DIAGNOSIS — D47.2 MGUS (MONOCLONAL GAMMOPATHY OF UNKNOWN SIGNIFICANCE): Primary | ICD-10-CM

## 2025-05-05 PROCEDURE — 1159F MED LIST DOCD IN RCRD: CPT | Mod: CPTII,HCNC,S$GLB, | Performed by: INTERNAL MEDICINE

## 2025-05-05 PROCEDURE — 99999 PR PBB SHADOW E&M-EST. PATIENT-LVL IV: CPT | Mod: PBBFAC,HCNC,, | Performed by: INTERNAL MEDICINE

## 2025-05-05 PROCEDURE — 1126F AMNT PAIN NOTED NONE PRSNT: CPT | Mod: CPTII,HCNC,S$GLB, | Performed by: INTERNAL MEDICINE

## 2025-05-05 PROCEDURE — 99214 OFFICE O/P EST MOD 30 MIN: CPT | Mod: HCNC,S$GLB,, | Performed by: INTERNAL MEDICINE

## 2025-05-05 PROCEDURE — 1157F ADVNC CARE PLAN IN RCRD: CPT | Mod: CPTII,HCNC,S$GLB, | Performed by: INTERNAL MEDICINE

## 2025-05-05 PROCEDURE — 1101F PT FALLS ASSESS-DOCD LE1/YR: CPT | Mod: CPTII,HCNC,S$GLB, | Performed by: INTERNAL MEDICINE

## 2025-05-05 PROCEDURE — G2211 COMPLEX E/M VISIT ADD ON: HCPCS | Mod: HCNC,S$GLB,, | Performed by: INTERNAL MEDICINE

## 2025-05-05 PROCEDURE — 3288F FALL RISK ASSESSMENT DOCD: CPT | Mod: CPTII,HCNC,S$GLB, | Performed by: INTERNAL MEDICINE

## 2025-05-05 RX ORDER — ATORVASTATIN CALCIUM 80 MG/1
40 TABLET, FILM COATED ORAL
COMMUNITY
Start: 2025-04-22

## 2025-05-05 NOTE — PROGRESS NOTES
Route Chart for Scheduling    BMT Chart Routing      Follow up with physician 1 year.   Follow up with [facility]    Provider visit type    Infusion scheduling note    Injection scheduling note    Labs CBC, CMP, free light chains and SPEP   Scheduling:  Preferred lab:  Lab interval:     Imaging    Pharmacy appointment    Other referrals                       Subjective:       Patient ID: Nelson GIBBONS Parent is a 89 y.o. male.    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   The monoclonal protein was measured at 0.27 grams. The patient does not meet any CRAB criteria. He appears well today. He does not have constitutional B symptoms.  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    Follow-up 9/5/17  Return visit with his wife for evaluation of MGUS. Renal function is stable. CBC is stable with moderate, stable thrombocytopenia.  Total protein remains normal with paraprotein levels pending.    Follow-up 3/20/18  Return visit for MGUS. Renal function remains stable. CBC remains stable- mild, asymptomatic thrombocytopenia.  Total protein is normal. Paraprotein was normal 9/2017. Free light chains pending.  ROS is negative.    Follow-up 3/28/19  Return visit for MGUS. Renal function, calcium, and CBC remain stable.  Total protein is normal with paraprotein studies pending.  ROS is negative.    Follow-up 6/3/2020  Return visit for MGUS. Last seen 1 year ago.   Renal function, calcium, and CBC remain stable.  Total protein is normal with paraprotein studies stable.  ROS is negative.    Follow-up 8/12/2021  Return visit for MGUS, annual visit  CBC and CMP remain stable  Paraprotein studies remain stable.  ROS is negative  No acute interval events    Follow-up 8/4/2022  Return visit for MGUS, annual visit  CBC and CMP remain  stable  Paraprotein levels remain stable  ROS remains negative  No acute interval events    Follow-up 2/1/2024  Return visit for MGUS, annual visit  CBC and CMP remain stable  Paraprotein albs pending  ROS remains negative  No acute interval events    Follow-up 5/5/2025  Return visit for MGUS, annual visit  CBC stable with chronic mild thrombocytopenia  Paraprotein labs are normal  ROS remains negative    HPI  Review of Systems   Constitutional: Negative.  Negative for appetite change and unexpected weight change.   HENT: Negative.  Negative for mouth sores.    Eyes: Negative.  Negative for visual disturbance.   Respiratory: Negative.  Negative for cough and shortness of breath.    Cardiovascular: Negative.  Negative for chest pain.   Gastrointestinal: Negative.  Negative for abdominal pain and diarrhea.   Endocrine: Negative.    Genitourinary: Negative.  Negative for frequency.   Musculoskeletal: Negative.  Negative for back pain.   Skin: Negative.  Negative for rash.   Allergic/Immunologic: Negative for environmental allergies, food allergies and immunocompromised state.   Neurological: Negative.  Negative for headaches.   Hematological:  Negative for adenopathy. Does not bruise/bleed easily.   Psychiatric/Behavioral: Negative.  The patient is not nervous/anxious.        Objective:      Physical Exam  Vitals and nursing note reviewed.   Constitutional:       Appearance: He is well-developed.   HENT:      Head: Normocephalic and atraumatic.   Eyes:      General: No scleral icterus.     Conjunctiva/sclera: Conjunctivae normal.   Cardiovascular:      Rate and Rhythm: Normal rate.   Pulmonary:      Effort: Pulmonary effort is normal. No respiratory distress.   Musculoskeletal:         General: Normal range of motion.      Cervical back: Normal range of motion.   Skin:     General: Skin is warm and dry.   Neurological:      Mental Status: He is alert and oriented to person, place, and time.   Psychiatric:          Behavior: Behavior normal.         Assessment:       No diagnosis found.        Plan:       MGUS stable. Continue to monitor.  Repeat monoclonal protein markers in 12 months     A total of 10 minutes was spent in pre-visit chart review, personal interpretation of labs and imaging, and medication review. Total visit time 30 minutes, >50 % counseling.

## 2025-05-15 ENCOUNTER — OFFICE VISIT (OUTPATIENT)
Dept: VASCULAR SURGERY | Facility: CLINIC | Age: OVER 89
End: 2025-05-15
Payer: MEDICARE

## 2025-05-15 VITALS
SYSTOLIC BLOOD PRESSURE: 132 MMHG | HEIGHT: 72 IN | DIASTOLIC BLOOD PRESSURE: 65 MMHG | HEART RATE: 58 BPM | WEIGHT: 167.56 LBS | BODY MASS INDEX: 22.69 KG/M2 | TEMPERATURE: 98 F

## 2025-05-15 DIAGNOSIS — Z98.890 S/P AAA (ABDOMINAL AORTIC ANEURYSM) REPAIR: Primary | ICD-10-CM

## 2025-05-15 DIAGNOSIS — Z86.79 S/P AAA (ABDOMINAL AORTIC ANEURYSM) REPAIR: Primary | ICD-10-CM

## 2025-05-15 PROCEDURE — 99999 PR PBB SHADOW E&M-EST. PATIENT-LVL IV: CPT | Mod: PBBFAC,HCNC,, | Performed by: SURGERY

## 2025-05-15 NOTE — PROGRESS NOTES
Patient returns.  This is a very spry pleasant 89-year-old gentleman who had an aneurysms repaired a proximally 20-25 years ago.  He was lost to follow up.  He had it done at an outside institution.  The patient now comes in because his aneurysms continues to enlarge.  It is now over 14 cm.  He has been treated by Interventional Radiology on 3 occasions in the aneurysms continues to enlarge.  He said is there anything you can do open to repair it.  I said that has risk at your age.  He said I would rather die from the knife and have this take care of then have a time bomb in my belly.  We discussed it at length.  I have looked at his CAT scan.  He does have multiple areas of endoleak.  In addition to that the aneurysms is not suprarenal fixation repaired.  It is infrarenal but it is right abutting on the renals.  At this point I am not sure whether I would consider intervening on him.  A lot of it depends on his medical risk.    At this point he will get another attempt at IR embolization.  If the aneurysms continues to go if there are any issues we would consider open repair.  Prior to that he needs a stress echocardiogram.  Probably a nuclear medicine stress echo.  His ejection is 47-50%.  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  So he will need a nuclear medicine stress echo.  And PFTs if he calls and they can not do anything to help this aneurysms.  From growing.  An aneurysms of 14 cm in an 89-year-old does scares me, but it is infrarenal.  All of his questions were answered.  We will possibly see him back, if Interventional Radiology can take care of him that would be great.      So in short if he calls in the aneurysms continues to enlarge and he continues to have endoleaks we need a nuclear medicine stress echocardiogram and PFTs.  There was we will help us decide whether we can do surgery or not on him

## 2025-05-16 ENCOUNTER — LAB VISIT (OUTPATIENT)
Dept: LAB | Facility: HOSPITAL | Age: OVER 89
End: 2025-05-16
Attending: FAMILY MEDICINE
Payer: MEDICARE

## 2025-05-16 DIAGNOSIS — Z86.79 S/P AAA (ABDOMINAL AORTIC ANEURYSM) REPAIR: ICD-10-CM

## 2025-05-16 DIAGNOSIS — I97.89 TYPE II ENDOLEAK OF AORTIC GRAFT: ICD-10-CM

## 2025-05-16 DIAGNOSIS — Z98.890 S/P AAA (ABDOMINAL AORTIC ANEURYSM) REPAIR: ICD-10-CM

## 2025-05-16 LAB
ABSOLUTE EOSINOPHIL (OHS): 0.11 K/UL
ABSOLUTE MONOCYTE (OHS): 0.99 K/UL (ref 0.3–1)
ABSOLUTE NEUTROPHIL COUNT (OHS): 3.92 K/UL (ref 1.8–7.7)
ALBUMIN SERPL BCP-MCNC: 3.5 G/DL (ref 3.5–5.2)
ALP SERPL-CCNC: 153 UNIT/L (ref 40–150)
ALT SERPL W/O P-5'-P-CCNC: 23 UNIT/L (ref 10–44)
ANION GAP (OHS): 8 MMOL/L (ref 8–16)
AST SERPL-CCNC: 24 UNIT/L (ref 11–45)
BASOPHILS # BLD AUTO: 0.04 K/UL
BASOPHILS NFR BLD AUTO: 0.5 %
BILIRUB SERPL-MCNC: 0.7 MG/DL (ref 0.1–1)
BUN SERPL-MCNC: 18 MG/DL (ref 8–23)
CALCIUM SERPL-MCNC: 9.1 MG/DL (ref 8.7–10.5)
CHLORIDE SERPL-SCNC: 108 MMOL/L (ref 95–110)
CO2 SERPL-SCNC: 27 MMOL/L (ref 23–29)
CREAT SERPL-MCNC: 1.3 MG/DL (ref 0.5–1.4)
ERYTHROCYTE [DISTWIDTH] IN BLOOD BY AUTOMATED COUNT: 15.2 % (ref 11.5–14.5)
GFR SERPLBLD CREATININE-BSD FMLA CKD-EPI: 53 ML/MIN/1.73/M2
GLUCOSE SERPL-MCNC: 70 MG/DL (ref 70–110)
HCT VFR BLD AUTO: 41.9 % (ref 40–54)
HGB BLD-MCNC: 13.8 GM/DL (ref 14–18)
IMM GRANULOCYTES # BLD AUTO: 0.04 K/UL (ref 0–0.04)
IMM GRANULOCYTES NFR BLD AUTO: 0.5 % (ref 0–0.5)
INR PPP: 1.1 (ref 0.8–1.2)
LYMPHOCYTES # BLD AUTO: 2.46 K/UL (ref 1–4.8)
MCH RBC QN AUTO: 30.7 PG (ref 27–31)
MCHC RBC AUTO-ENTMCNC: 32.9 G/DL (ref 32–36)
MCV RBC AUTO: 93 FL (ref 82–98)
NUCLEATED RBC (/100WBC) (OHS): 0 /100 WBC
PLATELET # BLD AUTO: 94 K/UL (ref 150–450)
PMV BLD AUTO: 12.9 FL (ref 9.2–12.9)
POTASSIUM SERPL-SCNC: 4 MMOL/L (ref 3.5–5.1)
PROT SERPL-MCNC: 7.1 GM/DL (ref 6–8.4)
PROTHROMBIN TIME: 11.5 SECONDS (ref 9–12.5)
RBC # BLD AUTO: 4.49 M/UL (ref 4.6–6.2)
RELATIVE EOSINOPHIL (OHS): 1.5 %
RELATIVE LYMPHOCYTE (OHS): 32.5 % (ref 18–48)
RELATIVE MONOCYTE (OHS): 13.1 % (ref 4–15)
RELATIVE NEUTROPHIL (OHS): 51.9 % (ref 38–73)
SODIUM SERPL-SCNC: 143 MMOL/L (ref 136–145)
WBC # BLD AUTO: 7.56 K/UL (ref 3.9–12.7)

## 2025-05-16 PROCEDURE — 85610 PROTHROMBIN TIME: CPT | Mod: HCNC

## 2025-05-16 PROCEDURE — 80053 COMPREHEN METABOLIC PANEL: CPT | Mod: HCNC

## 2025-05-16 PROCEDURE — 85025 COMPLETE CBC W/AUTO DIFF WBC: CPT | Mod: HCNC

## 2025-05-16 PROCEDURE — 36415 COLL VENOUS BLD VENIPUNCTURE: CPT | Mod: HCNC,PO

## 2025-05-19 ENCOUNTER — PATIENT MESSAGE (OUTPATIENT)
Dept: INTERVENTIONAL RADIOLOGY/VASCULAR | Facility: HOSPITAL | Age: OVER 89
End: 2025-05-19
Payer: MEDICARE

## 2025-05-19 ENCOUNTER — LAB VISIT (OUTPATIENT)
Dept: LAB | Facility: HOSPITAL | Age: OVER 89
End: 2025-05-19
Attending: INTERNAL MEDICINE
Payer: MEDICARE

## 2025-05-19 DIAGNOSIS — N18.31 CHRONIC KIDNEY DISEASE, STAGE 3A: ICD-10-CM

## 2025-05-19 DIAGNOSIS — R74.8 ELEVATED ALKALINE PHOSPHATASE LEVEL: ICD-10-CM

## 2025-05-19 LAB
ABSOLUTE EOSINOPHIL (OHS): 0.1 K/UL
ABSOLUTE MONOCYTE (OHS): 0.86 K/UL (ref 0.3–1)
ABSOLUTE NEUTROPHIL COUNT (OHS): 4.2 K/UL (ref 1.8–7.7)
ALBUMIN SERPL BCP-MCNC: 3.5 G/DL (ref 3.5–5.2)
ALP SERPL-CCNC: 150 UNIT/L (ref 40–150)
ALT SERPL W/O P-5'-P-CCNC: 22 UNIT/L (ref 10–44)
ANION GAP (OHS): 9 MMOL/L (ref 8–16)
AST SERPL-CCNC: 23 UNIT/L (ref 11–45)
BASOPHILS # BLD AUTO: 0.03 K/UL
BASOPHILS NFR BLD AUTO: 0.4 %
BILIRUB DIRECT SERPL-MCNC: 0.4 MG/DL (ref 0.1–0.3)
BILIRUB SERPL-MCNC: 0.9 MG/DL (ref 0.1–1)
BUN SERPL-MCNC: 17 MG/DL (ref 8–23)
CALCIUM SERPL-MCNC: 9.3 MG/DL (ref 8.7–10.5)
CHLORIDE SERPL-SCNC: 107 MMOL/L (ref 95–110)
CO2 SERPL-SCNC: 22 MMOL/L (ref 23–29)
CREAT SERPL-MCNC: 1.4 MG/DL (ref 0.5–1.4)
ERYTHROCYTE [DISTWIDTH] IN BLOOD BY AUTOMATED COUNT: 15 % (ref 11.5–14.5)
GFR SERPLBLD CREATININE-BSD FMLA CKD-EPI: 48 ML/MIN/1.73/M2
GGT SERPL-CCNC: 34 U/L (ref 8–55)
GLUCOSE SERPL-MCNC: 88 MG/DL (ref 70–110)
HCT VFR BLD AUTO: 42.9 % (ref 40–54)
HGB BLD-MCNC: 14.2 GM/DL (ref 14–18)
IMM GRANULOCYTES # BLD AUTO: 0.05 K/UL (ref 0–0.04)
IMM GRANULOCYTES NFR BLD AUTO: 0.6 % (ref 0–0.5)
LYMPHOCYTES # BLD AUTO: 2.54 K/UL (ref 1–4.8)
MCH RBC QN AUTO: 30.3 PG (ref 27–31)
MCHC RBC AUTO-ENTMCNC: 33.1 G/DL (ref 32–36)
MCV RBC AUTO: 92 FL (ref 82–98)
NUCLEATED RBC (/100WBC) (OHS): 0 /100 WBC
PHOSPHATE SERPL-MCNC: 2.8 MG/DL (ref 2.7–4.5)
PLATELET # BLD AUTO: 98 K/UL (ref 150–450)
PMV BLD AUTO: 12.8 FL (ref 9.2–12.9)
POTASSIUM SERPL-SCNC: 3.9 MMOL/L (ref 3.5–5.1)
PROT SERPL-MCNC: 7.1 GM/DL (ref 6–8.4)
RBC # BLD AUTO: 4.69 M/UL (ref 4.6–6.2)
RELATIVE EOSINOPHIL (OHS): 1.3 %
RELATIVE LYMPHOCYTE (OHS): 32.6 % (ref 18–48)
RELATIVE MONOCYTE (OHS): 11.1 % (ref 4–15)
RELATIVE NEUTROPHIL (OHS): 54 % (ref 38–73)
SODIUM SERPL-SCNC: 138 MMOL/L (ref 136–145)
WBC # BLD AUTO: 7.78 K/UL (ref 3.9–12.7)

## 2025-05-19 PROCEDURE — 82310 ASSAY OF CALCIUM: CPT | Mod: HCNC

## 2025-05-19 PROCEDURE — 36415 COLL VENOUS BLD VENIPUNCTURE: CPT | Mod: HCNC,PO

## 2025-05-19 PROCEDURE — 85025 COMPLETE CBC W/AUTO DIFF WBC: CPT | Mod: HCNC

## 2025-05-19 PROCEDURE — 82977 ASSAY OF GGT: CPT | Mod: HCNC

## 2025-05-19 PROCEDURE — 82248 BILIRUBIN DIRECT: CPT | Mod: HCNC

## 2025-05-19 PROCEDURE — 84100 ASSAY OF PHOSPHORUS: CPT | Mod: HCNC

## 2025-05-22 ENCOUNTER — DOCUMENTATION ONLY (OUTPATIENT)
Dept: CARDIOLOGY | Facility: HOSPITAL | Age: OVER 89
End: 2025-05-22
Payer: MEDICARE

## 2025-05-22 NOTE — PROGRESS NOTES
Patient has been identified as having an implanted cardiac rhythm device (CRD); the implanted device is a [employer] defibrillator.      The planned surgical procedure is a IR Embolization, type 2 Endoleak.    No noted pacer dependency.       DEFIBRILLATORS/NON-DEPENDENT ANY LOCATION    Per protocol,a magnet should be applied directly over the implanted device during entire surgical procedure when electrocautery will be used.    If no electrocautery is planned, magnet application is not needed.    For additional questions, please contact the Arrhythmia Department at Ext [phone].

## 2025-05-23 ENCOUNTER — ANESTHESIA EVENT (OUTPATIENT)
Dept: INTERVENTIONAL RADIOLOGY/VASCULAR | Facility: HOSPITAL | Age: OVER 89
End: 2025-05-23
Payer: MEDICARE

## 2025-05-23 ENCOUNTER — HOSPITAL ENCOUNTER (OUTPATIENT)
Dept: INTERVENTIONAL RADIOLOGY/VASCULAR | Facility: HOSPITAL | Age: OVER 89
Discharge: HOME | End: 2025-05-23
Attending: FAMILY MEDICINE
Payer: MEDICARE

## 2025-05-23 VITALS
TEMPERATURE: 98 F | HEIGHT: 72 IN | OXYGEN SATURATION: 97 % | WEIGHT: 165 LBS | SYSTOLIC BLOOD PRESSURE: 126 MMHG | BODY MASS INDEX: 22.35 KG/M2 | RESPIRATION RATE: 16 BRPM | DIASTOLIC BLOOD PRESSURE: 58 MMHG | HEART RATE: 56 BPM

## 2025-05-23 DIAGNOSIS — I97.89 TYPE II ENDOLEAK OF AORTIC GRAFT: ICD-10-CM

## 2025-05-23 DIAGNOSIS — Z86.79 S/P AAA (ABDOMINAL AORTIC ANEURYSM) REPAIR: ICD-10-CM

## 2025-05-23 DIAGNOSIS — Z98.890 S/P AAA (ABDOMINAL AORTIC ANEURYSM) REPAIR: ICD-10-CM

## 2025-05-23 LAB
ABO + RH BLD: NORMAL
ABO + RH BLD: NORMAL
BLD PROD TYP BPU: NORMAL
BLD PROD TYP BPU: NORMAL
BLOOD UNIT EXPIRATION DATE: NORMAL
BLOOD UNIT EXPIRATION DATE: NORMAL
BLOOD UNIT TYPE CODE: 6200
BLOOD UNIT TYPE CODE: 6200
CROSSMATCH INTERPRETATION: NORMAL
CROSSMATCH INTERPRETATION: NORMAL
DISPENSE STATUS: NORMAL
DISPENSE STATUS: NORMAL
INDIRECT COOMBS: NORMAL
RH BLD: NORMAL
SPECIMEN OUTDATE: NORMAL
UNIT NUMBER: NORMAL
UNIT NUMBER: NORMAL

## 2025-05-23 PROCEDURE — 86920 COMPATIBILITY TEST SPIN: CPT | Mod: HCNC | Performed by: INTERNAL MEDICINE

## 2025-05-23 PROCEDURE — 94761 N-INVAS EAR/PLS OXIMETRY MLT: CPT | Mod: HCNC

## 2025-05-23 PROCEDURE — 27201423 OPTIME MED/SURG SUP & DEVICES STERILE SUPPLY: Mod: HCNC

## 2025-05-23 PROCEDURE — 86900 BLOOD TYPING SEROLOGIC ABO: CPT | Mod: HCNC | Performed by: INTERNAL MEDICINE

## 2025-05-23 PROCEDURE — 25000003 PHARM REV CODE 250: Mod: HCNC | Performed by: NURSE ANESTHETIST, CERTIFIED REGISTERED

## 2025-05-23 PROCEDURE — 37000008 HC ANESTHESIA 1ST 15 MINUTES: Mod: HCNC

## 2025-05-23 PROCEDURE — C1884 EMBOLIZATION PROTECT SYST: HCPCS | Mod: HCNC

## 2025-05-23 PROCEDURE — D9220A PRA ANESTHESIA: Mod: HCNC,CRNA,, | Performed by: NURSE ANESTHETIST, CERTIFIED REGISTERED

## 2025-05-23 PROCEDURE — 99900035 HC TECH TIME PER 15 MIN (STAT): Mod: HCNC

## 2025-05-23 PROCEDURE — 25500020 PHARM REV CODE 255: Mod: HCNC | Performed by: INTERNAL MEDICINE

## 2025-05-23 PROCEDURE — 63600175 PHARM REV CODE 636 W HCPCS: Mod: HCNC | Performed by: NURSE ANESTHETIST, CERTIFIED REGISTERED

## 2025-05-23 PROCEDURE — 63600175 PHARM REV CODE 636 W HCPCS: Mod: HCNC | Performed by: STUDENT IN AN ORGANIZED HEALTH CARE EDUCATION/TRAINING PROGRAM

## 2025-05-23 PROCEDURE — 27000221 HC OXYGEN, UP TO 24 HOURS: Mod: HCNC

## 2025-05-23 PROCEDURE — 36620 INSERTION CATHETER ARTERY: CPT | Mod: 59,HCNC,, | Performed by: STUDENT IN AN ORGANIZED HEALTH CARE EDUCATION/TRAINING PROGRAM

## 2025-05-23 PROCEDURE — D9220A PRA ANESTHESIA: Mod: HCNC,ANES,, | Performed by: STUDENT IN AN ORGANIZED HEALTH CARE EDUCATION/TRAINING PROGRAM

## 2025-05-23 PROCEDURE — 37000009 HC ANESTHESIA EA ADD 15 MINS: Mod: HCNC

## 2025-05-23 RX ORDER — LIDOCAINE HYDROCHLORIDE 10 MG/ML
1 INJECTION, SOLUTION EPIDURAL; INFILTRATION; INTRACAUDAL; PERINEURAL ONCE
Status: DISCONTINUED | OUTPATIENT
Start: 2025-05-23 | End: 2025-05-24 | Stop reason: HOSPADM

## 2025-05-23 RX ORDER — ACETAMINOPHEN 10 MG/ML
1000 INJECTION, SOLUTION INTRAVENOUS ONCE
Status: COMPLETED | OUTPATIENT
Start: 2025-05-23 | End: 2025-05-23

## 2025-05-23 RX ORDER — NICARDIPINE HYDROCHLORIDE 0.2 MG/ML
INJECTION INTRAVENOUS CONTINUOUS PRN
Status: DISCONTINUED | OUTPATIENT
Start: 2025-05-23 | End: 2025-05-23

## 2025-05-23 RX ORDER — PROPOFOL 10 MG/ML
VIAL (ML) INTRAVENOUS
Status: DISCONTINUED | OUTPATIENT
Start: 2025-05-23 | End: 2025-05-23

## 2025-05-23 RX ORDER — SODIUM CHLORIDE 0.9 % (FLUSH) 0.9 %
10 SYRINGE (ML) INJECTION
Status: DISCONTINUED | OUTPATIENT
Start: 2025-05-23 | End: 2025-05-24 | Stop reason: HOSPADM

## 2025-05-23 RX ORDER — SODIUM CHLORIDE 9 MG/ML
INJECTION, SOLUTION INTRAVENOUS CONTINUOUS
Status: DISCONTINUED | OUTPATIENT
Start: 2025-05-23 | End: 2025-05-24 | Stop reason: HOSPADM

## 2025-05-23 RX ORDER — LABETALOL HYDROCHLORIDE 5 MG/ML
INJECTION, SOLUTION INTRAVENOUS
Status: DISCONTINUED | OUTPATIENT
Start: 2025-05-23 | End: 2025-05-23

## 2025-05-23 RX ORDER — FENTANYL CITRATE 50 UG/ML
25 INJECTION, SOLUTION INTRAMUSCULAR; INTRAVENOUS EVERY 5 MIN PRN
Status: DISCONTINUED | OUTPATIENT
Start: 2025-05-23 | End: 2025-05-24 | Stop reason: HOSPADM

## 2025-05-23 RX ORDER — ROCURONIUM BROMIDE 10 MG/ML
INJECTION, SOLUTION INTRAVENOUS
Status: DISCONTINUED | OUTPATIENT
Start: 2025-05-23 | End: 2025-05-23

## 2025-05-23 RX ORDER — NOREPINEPHRINE BITARTRATE 1 MG/ML
INJECTION, SOLUTION INTRAVENOUS
Status: DISCONTINUED | OUTPATIENT
Start: 2025-05-23 | End: 2025-05-23

## 2025-05-23 RX ORDER — FENTANYL CITRATE 50 UG/ML
INJECTION, SOLUTION INTRAMUSCULAR; INTRAVENOUS
Status: DISCONTINUED | OUTPATIENT
Start: 2025-05-23 | End: 2025-05-23

## 2025-05-23 RX ORDER — HYDROCODONE BITARTRATE AND ACETAMINOPHEN 500; 5 MG/1; MG/1
TABLET ORAL
Status: DISCONTINUED | OUTPATIENT
Start: 2025-05-23 | End: 2025-05-24 | Stop reason: HOSPADM

## 2025-05-23 RX ORDER — LIDOCAINE HYDROCHLORIDE 20 MG/ML
INJECTION, SOLUTION EPIDURAL; INFILTRATION; INTRACAUDAL; PERINEURAL
Status: DISCONTINUED | OUTPATIENT
Start: 2025-05-23 | End: 2025-05-23

## 2025-05-23 RX ORDER — NICARDIPINE HYDROCHLORIDE 2.5 MG/ML
INJECTION INTRAVENOUS
Status: DISCONTINUED | OUTPATIENT
Start: 2025-05-23 | End: 2025-05-23

## 2025-05-23 RX ORDER — HALOPERIDOL LACTATE 5 MG/ML
0.5 INJECTION, SOLUTION INTRAMUSCULAR EVERY 10 MIN PRN
Status: DISCONTINUED | OUTPATIENT
Start: 2025-05-23 | End: 2025-05-24 | Stop reason: HOSPADM

## 2025-05-23 RX ORDER — GLUCAGON 1 MG
1 KIT INJECTION
Status: DISCONTINUED | OUTPATIENT
Start: 2025-05-23 | End: 2025-05-24 | Stop reason: HOSPADM

## 2025-05-23 RX ADMIN — NICARDIPINE HYDROCHLORIDE 0.2 MCG: 25 INJECTION INTRAVENOUS at 10:05

## 2025-05-23 RX ADMIN — PROPOFOL 20 MG: 10 INJECTION, EMULSION INTRAVENOUS at 09:05

## 2025-05-23 RX ADMIN — PROPOFOL 20 MG: 10 INJECTION, EMULSION INTRAVENOUS at 07:05

## 2025-05-23 RX ADMIN — NICARDIPINE HYDROCHLORIDE 15 MG/HR: 0.2 INJECTION, SOLUTION INTRAVENOUS at 10:05

## 2025-05-23 RX ADMIN — LABETALOL HYDROCHLORIDE 15 MG: 5 INJECTION, SOLUTION INTRAVENOUS at 10:05

## 2025-05-23 RX ADMIN — SODIUM CHLORIDE, SODIUM GLUCONATE, SODIUM ACETATE, POTASSIUM CHLORIDE, MAGNESIUM CHLORIDE, SODIUM PHOSPHATE, DIBASIC, AND POTASSIUM PHOSPHATE: .53; .5; .37; .037; .03; .012; .00082 INJECTION, SOLUTION INTRAVENOUS at 07:05

## 2025-05-23 RX ADMIN — ACETAMINOPHEN 1000 MG: 10 INJECTION, SOLUTION INTRAVENOUS at 11:05

## 2025-05-23 RX ADMIN — SODIUM CHLORIDE: 0.9 INJECTION, SOLUTION INTRAVENOUS at 07:05

## 2025-05-23 RX ADMIN — Medication 2 G: at 08:05

## 2025-05-23 RX ADMIN — SUGAMMADEX 400 MG: 100 INJECTION, SOLUTION INTRAVENOUS at 10:05

## 2025-05-23 RX ADMIN — PROPOFOL 30 MG: 10 INJECTION, EMULSION INTRAVENOUS at 07:05

## 2025-05-23 RX ADMIN — LIDOCAINE HYDROCHLORIDE 100 MG: 20 INJECTION, SOLUTION EPIDURAL; INFILTRATION; INTRACAUDAL; PERINEURAL at 10:05

## 2025-05-23 RX ADMIN — ROCURONIUM BROMIDE 50 MG: 10 INJECTION INTRAVENOUS at 07:05

## 2025-05-23 RX ADMIN — NOREPINEPHRINE BITARTRATE 0.04 MCG/KG/MIN: 1 INJECTION, SOLUTION, CONCENTRATE INTRAVENOUS at 08:05

## 2025-05-23 RX ADMIN — FENTANYL CITRATE 25 MCG: 50 INJECTION, SOLUTION INTRAMUSCULAR; INTRAVENOUS at 07:05

## 2025-05-23 RX ADMIN — IOHEXOL 12 ML: 300 INJECTION, SOLUTION INTRAVENOUS at 10:05

## 2025-05-23 RX ADMIN — NOREPINEPHRINE BITARTRATE 16 MCG: 1 INJECTION, SOLUTION, CONCENTRATE INTRAVENOUS at 07:05

## 2025-05-23 RX ADMIN — LIDOCAINE HYDROCHLORIDE 60 MG: 20 INJECTION, SOLUTION EPIDURAL; INFILTRATION; INTRACAUDAL; PERINEURAL at 07:05

## 2025-05-23 RX ADMIN — ROCURONIUM BROMIDE 50 MG: 10 INJECTION INTRAVENOUS at 08:05

## 2025-05-23 RX ADMIN — FENTANYL CITRATE 75 MCG: 50 INJECTION, SOLUTION INTRAMUSCULAR; INTRAVENOUS at 08:05

## 2025-05-23 RX ADMIN — PROPOFOL 50 MG: 10 INJECTION, EMULSION INTRAVENOUS at 08:05

## 2025-05-23 RX ADMIN — NOREPINEPHRINE BITARTRATE 8 MCG: 1 INJECTION, SOLUTION, CONCENTRATE INTRAVENOUS at 08:05

## 2025-05-23 RX ADMIN — NOREPINEPHRINE BITARTRATE 16 MCG: 1 INJECTION, SOLUTION, CONCENTRATE INTRAVENOUS at 08:05

## 2025-05-23 RX ADMIN — PROPOFOL 80 MG: 10 INJECTION, EMULSION INTRAVENOUS at 07:05

## 2025-05-23 RX ADMIN — PROPOFOL 20 MG: 10 INJECTION, EMULSION INTRAVENOUS at 10:05

## 2025-05-23 RX ADMIN — PROPOFOL 30 MG: 10 INJECTION, EMULSION INTRAVENOUS at 08:05

## 2025-05-23 RX ADMIN — SODIUM CHLORIDE: 0.9 INJECTION, SOLUTION INTRAVENOUS at 09:05

## 2025-05-23 RX ADMIN — PROPOFOL 10 MG: 10 INJECTION, EMULSION INTRAVENOUS at 09:05

## 2025-05-23 NOTE — PLAN OF CARE
Patient arrived from PACU.  Patient stable.  Report received at this time from HOWARD Brown RN.  Assumed care of patient at this time.

## 2025-05-23 NOTE — ANESTHESIA PROCEDURE NOTES
Intubation    Date/Time: 5/23/2025 7:30 AM    Performed by: Analisa Moody CRNA  Authorized by: Bruce Salgado MD    Intubation:     Induction:  Intravenous    Intubated:  Postinduction    Mask Ventilation:  Easy with oral airway    Attempts:  1    Attempted By:  CRNA    Method of Intubation:  Video laryngoscopy    Blade:  Curry 3    Laryngeal View Grade: Grade I - full view of cords      Difficult Airway Encountered?: No      Complications:  None    Airway Device:  Oral endotracheal tube    Airway Device Size:  7.5    Style/Cuff Inflation:  Cuffed (inflated to minimal occlusive pressure)    Tube secured:  22    Secured at:  The lips    Placement Verified By:  Capnometry    Complicating Factors:  None    Findings Post-Intubation:  BS equal bilateral and atraumatic/condition of teeth unchanged

## 2025-05-23 NOTE — PROCEDURES
VIR procedure note      Pre Op Diagnosis: Type II endoleak  Post Op Diagnosis: Same    Procedure: Percutaneous direct endoleak embolization x4    Procedure performed by:  Joe Espino MD      Written Informed Consent Obtained: Yes  Specimen Removed: No  Estimated Blood Loss: Minimal    Findings:     Localization using 3D imaging and cone beam CT and fluoro guided direct percutaneous needle access to aneurysm sac. Adequate position was confirmed by back bleed, angiogram and CBCT.     5 different locations of endo leak were accessed and embolized using Lava 18 (for 2 spots) and Glue (3 spots).       Plan:    IR clinic visit in 1 month with 3-phase CTA AP.       Joe Espino MD  VIR

## 2025-05-23 NOTE — ANESTHESIA PREPROCEDURE EVALUATION
"                                                                                                             05/23/2025  Nelson GIBBONS Parent is a 89 y.o., male with previous AAA repair 20 yrs ago, with several procedures for endoleaks, infrarenal AAA still enlarging, now 14cm in size    Pre-operative evaluation for IR embolization for endoleaks.  Problem List[1]    Patient has a living will:      HPOA doc:        Review of patient's allergies indicates:   Allergen Reactions    Fluorescein-benoxinate Other (See Comments)    Pcn  [penicillins]      Other reaction(s): Unknown    Quinolones Other (See Comments)     H/o AAA    Tropicamide Other (See Comments)    Clindamycin Rash       Medications Ordered Prior to Encounter[2]    Past Surgical History:   Procedure Laterality Date    ABDOMINAL AORTIC ANEURYSM REPAIR      AORTOGRAPHY N/A 8/21/2024    Procedure: AORTOGRAM;  Surgeon: Gonzalez Granado MD;  Location: [facility] OR [address] [place] [address];  Service: Vascular;  Laterality: N/A;  4.4 min  401.06 mGy  70.8925 Gy.cm  49ml Dye    CARDIAC DEFIBRILLATOR PLACEMENT      CATARACT EXTRACTION, BILATERAL  2018    CORONARY ANGIOPLASTY      EYE SURGERY Bilateral     cataract    HERNIA REPAIR      x2    REPLACEMENT OF IMPLANTABLE CARDIOVERTER-DEFIBRILLATOR (ICD) GENERATOR Left 03/18/2022    Procedure: REPLACEMENT, ICD GENERATOR;  Surgeon: Felipe Woodard MD;  Location: [facility] EP LAB;  Service: Cardiology;  Laterality: Left;  SEFERINO, ICD gen chg, SJM, AKIKO bergeron, 3prep*ANUJ ICD insitu*        Social History[3]      CBC: No results for input(s): "WBC", "RBC", "HGB", "HCT", "PLT", "MCV", "MCH", "MCHC" in the last 72 hours.    CMP: No results for input(s): "NA", "K", "CL", "CO2", "BUN", "CREATININE", "GLU", "MG", "PHOS", "CALCIUM", "ALBUMIN", "PROT", "ALKPHOS", "ALT", "AST", "BILITOT" in the last 72 hours.    INR  No results for input(s): "PT", "INR", "PROTIME", "APTT" in the last 72 hours.        Diagnostic Studies:    Pertinent Cardiac Studies:  Transthoracic " Echo Left Heart Catheterization   Results for orders placed during the hospital encounter of 07/03/24    Echo    Interpretation Summary    Left Ventricle: The left ventricle is mildly dilated. Normal wall thickness. Regional wall motion abnormalities present. See diagram for wall motion findings. There is mildly reduced systolic function with a visually estimated ejection fraction of 45 - 50%.    Right Ventricle: Normal right ventricular cavity size. Wall thickness is normal. Systolic function is normal. Pacemaker lead present in the ventricle.    Aortic Valve: There is mild aortic valve sclerosis.    Pulmonary Artery: The estimated pulmonary artery systolic pressure is 39 mmHg.    IVC/SVC: Normal venous pressure at 3 mmHg.   No results found for this or any previous visit.           2D Echo:  Results for orders placed or performed during the hospital encounter of 09/24/18   2D echo with color flow doppler    Collection Time: 09/25/18  8:30 AM   Result Value Ref Range    EF + QEF 40 (A) 55 - 65    Diastolic Dysfunction Yes (A)     Est. PA Systolic Pressure 18.84     Pericardial Effusion NONE            Pre-op Assessment    I have reviewed the Patient Summary Reports.     I have reviewed the Nursing Notes. I have reviewed the NPO Status.   I have reviewed the Medications.     Review of Systems  Anesthesia Hx:  No problems with previous Anesthesia               Denies Personal Hx of Anesthesia complications.                    Social:  Non-Smoker, No Alcohol Use       Hematology/Oncology:  Hematology Normal   Oncology Normal                                   Cardiovascular:  Exercise tolerance: good   Hypertension  Past MI CAD              ECG has been reviewed. AAA                           Pulmonary:         Bronchiectasis               Renal/:  Chronic Renal Disease, CKD                Hepatic/GI:     GERD, well controlled                Musculoskeletal:  Musculoskeletal Normal                Neurological:   CVA,  no residual symptoms                                    Psych:  Psychiatric Normal                    Physical Exam  General: Well nourished, Cooperative and Alert    Airway:  Mallampati: II   Mouth Opening: Normal  TM Distance: Normal  Tongue: Normal  Neck ROM: Normal ROM    Dental:    Chest/Lungs:  Normal Respiratory Rate    Heart:  Rate: Normal        Anesthesia Plan  Type of Anesthesia, risks & benefits discussed:    Anesthesia Type: Gen ETT  Intra-op Monitoring Plan: Standard ASA Monitors and Art Line  Post Op Pain Control Plan: multimodal analgesia and IV/PO Opioids PRN  Induction:  IV  Airway Plan: Direct, Post-Induction  Informed Consent: Informed consent signed with the Patient and all parties understand the risks and agree with anesthesia plan.  All questions answered.   ASA Score: 4  Day of Surgery Review of History & Physical: H&P Update referred to the surgeon/provider.    Ready For Surgery From Anesthesia Perspective.     .           [1]   Patient Active Problem List  Diagnosis    Thrombocytopenia    Chronic kidney disease, stage 3a    AICD (automatic cardioverter/defibrillator) present    Essential hypertension    Pure hypercholesterolemia    Anemia associated with chronic renal failure    Coronary artery disease of native artery with stable angina pectoris    Old myocardial infarction    S/P AAA (abdominal aortic aneurysm) repair    Diverticulosis    Nuclear sclerosis    Vasculogenic erectile dysfunction    Tovar's esophagus without dysplasia    Monoclonal paraproteinemia    Aortic atherosclerosis    Ischemic cardiomyopathy    Seasonal allergic rhinitis    Body mass index (BMI) of 22.0 to 22.9 in adult    Positive ELYSIA (antinuclear antibody)    Abnormal serum protein electrophoresis    Vitamin D deficiency    MGUS (monoclonal gammopathy of unknown significance)    Gastroesophageal reflux disease    History of TIA (transient ischemic attack)    Metabolic bone disease    Abnormal CT of the head     Cerebral infarction, remote, resolved    History of ventricular tachycardia    Chronic combined systolic and diastolic congestive heart failure    Idiopathic thrombocytopenic purpura    Sensorineural hearing loss, bilateral    Bronchiectasis without complication    Mycobacterium avium infection    Nontuberculous mycobacterial disease of lung    Bronchiectasis, non-tuberculous    Persistent atrial fibrillation    Hypertensive kidney disease    Elevated alkaline phosphatase level   [2]   Current Outpatient Medications on File Prior to Encounter   Medication Sig Dispense Refill    amLODIPine (NORVASC) 10 MG tablet Take 1 tablet (10 mg total) by mouth once daily. 90 tablet 3    atorvastatin (LIPITOR) 80 MG tablet Take 40 mg by mouth.      beta-carotene,A,-vits C,E/mins (OCUVITE ORAL) Take by mouth every evening.      doxycycline (VIBRA-TABS) 100 MG tablet Take 1 tablet (100 mg total) by mouth every 12 (twelve) hours. 14 tablet 1    famotidine (PEPCID) 20 MG tablet Take 20 mg by mouth before dinner.      fluticasone propionate (FLONASE) 50 mcg/actuation nasal spray 1 spray (50 mcg total) by Each Nostril route once daily. 16 g 11    furosemide (LASIX) 20 MG tablet Take 1 tablet (20 mg total) by mouth 2 (two) times daily. Take one tablet every other day orbas directed 30 tablet 11    metoprolol succinate (TOPROL-XL) 25 MG 24 hr tablet Take 1 tablet (25 mg total) by mouth 2 (two) times daily. 180 tablet 2    multivitamin with minerals tablet Take 1 tablet by mouth every evening.      niacin 500 MG tablet Take 500 mg by mouth Every PM.      nitroGLYCERIN (NITROSTAT) 0.4 MG SL tablet Take 1 tab under the tongue for chest pain. May repeat every 5 minutes for a total of 3 doses. If chest pain not relieved go to the ED. 25 tablet 4    omega-3 fatty acids-vitamin E 1,000 mg Cap Take 1 capsule by mouth every evening.      potassium chloride SA (K-DUR,KLOR-CON M) 10 MEQ tablet Take 1 tablet (10 mEq total) by mouth once daily. 90  tablet 3    sacubitriL-valsartan (ENTRESTO)  mg per tablet Take 1 tablet by mouth 2 (two) times daily. 60 tablet 11    sotaloL (BETAPACE) 120 MG Tab TAKE 1 TABLET TWICE DAILY 180 tablet 3    ubidecarenone (COENZYME Q10 ORAL) Take 1 tablet by mouth every evening.      vitamin D 1000 units Tab Take 1,000 Units by mouth every Tues, Thurs, Sat. TAKES AT NIGHT       Current Facility-Administered Medications on File Prior to Encounter   Medication Dose Route Frequency Provider Last Rate Last Admin    sodium chloride 0.9% bolus 1,000 mL  1,000 mL Intravenous Once Violette Delgado NP        vancomycin in dextrose 5 % 1 gram/250 mL IVPB 1,000 mg  1,000 mg Intravenous On Call Procedure Violette Delgado NP   1,000 mg at 03/18/22 1232   [3]   Social History  Socioeconomic History    Marital status:    Tobacco Use    Smoking status: Never     Passive exposure: Past    Smokeless tobacco: Never   Substance and Sexual Activity    Alcohol use: No     Comment: none    Drug use: No    Sexual activity: Yes     Partners: Female     Birth control/protection: None     Comment:    Social History Narrative    Patient is originally from [facility] in [place] since 60     Graduated [employer]  [employer]     College/university [employer]         [place] background    Working [employer], retired now         0 Children     Lives with wife         Diet/Exercise ;         Exercise        Eating    B     L    D    Snacks    Beverages    Fast:food             Social Drivers of Health     Financial Resource Strain: Low Risk  (5/8/2025)    Overall Financial Resource Strain (CARDIA)     Difficulty of Paying Living Expenses: Not hard at all   Food Insecurity: No Food Insecurity (5/8/2025)    Hunger Vital Sign     Worried About Running Out of Food in the Last Year: Never true     Ran Out of Food in the Last Year: Never true   Transportation Needs: No Transportation Needs (5/8/2025)    PRAPARE - Transportation      Lack of Transportation (Medical): No     Lack of Transportation (Non-Medical): No   Physical Activity: Insufficiently Active (5/8/2025)    Exercise Vital Sign     Days of Exercise per Week: 3 days     Minutes of Exercise per Session: 30 min   Stress: No Stress Concern Present (5/8/2025)    [employer] [place] [employer] - Occupational Stress Questionnaire     Feeling of Stress : Not at all   Housing Stability: Low Risk  (5/8/2025)    Housing Stability Vital Sign     Unable to Pay for Housing in the Last Year: No     Number of Times Moved in the Last Year: 0     Homeless in the Last Year: No

## 2025-05-23 NOTE — DISCHARGE SUMMARY
VIR Discharge Summary      Hospital Course: No complications    Admit Date: 5/23/2025  Discharge Date: 05/23/2025     Instructions Given to Patient: Yes  Diet: Resume prior diet  Activity:   Activity as tolerated and no driving for today.    Description of Condition on Discharge: Stable  Vital Signs (Most Recent): Temp: 98.2 °F (36.8 °C) (05/23/25 0542)  Pulse: (!) 55 (05/23/25 0552)  Resp: 19 (05/23/25 0542)  BP: (!) 144/70 (05/23/25 0551)  SpO2: 97 % (05/23/25 0542)    Discharge Disposition: Home    Discharge Diagnosis: Endoleak     Follow-up: As scheduled. Will call patient with any follow up plans.        Joe Espino MD  VIR

## 2025-05-23 NOTE — PROGRESS NOTES
Secure esdras Espino MD regarding coming to talk to patient and patient's family prior to discharge. Informed resident that patient is ready for discharge. Informed patient's spouse of reason for delay and she verbalized understanding.

## 2025-05-23 NOTE — H&P
Radiology History & Physical      SUBJECTIVE:     History of Present Illness:  Nelson GIBBONS Parent is a 89 y.o. male with hx of AAA s/p c/b type 2 endoleak repair who presents for direct aneurysm access and embolization.   Past Medical History:   Diagnosis Date    AICD (automatic cardioverter/defibrillator) present 07/17/2012    Cardiomyopathy     CKD (chronic kidney disease) stage 3, GFR 30-59 ml/min 07/17/2012    Coronary artery disease     GERD (gastroesophageal reflux disease)     Tovar's; Dr. Vu    Heart attack     Hyperlipidemia 07/17/2012    Hypertension 07/17/2012    Ischemic cardiomyopathy 07/17/2012    MGUS (monoclonal gammopathy of unknown significance)     Thrombocytopenia 07/17/2012    TIA (transient ischemic attack)     Ventricular tachycardia     VT (ventricular tachycardia) 07/17/2012     Past Surgical History:   Procedure Laterality Date    ABDOMINAL AORTIC ANEURYSM REPAIR      AORTOGRAPHY N/A 8/21/2024    Procedure: AORTOGRAM;  Surgeon: Gonzalez Granado MD;  Location: [facility] OR [address] [place] [address];  Service: Vascular;  Laterality: N/A;  4.4 min  401.06 mGy  70.8925 Gy.cm  49ml Dye    CARDIAC DEFIBRILLATOR PLACEMENT      CATARACT EXTRACTION, BILATERAL  2018    CORONARY ANGIOPLASTY      EYE SURGERY Bilateral     cataract    HERNIA REPAIR      x2    REPLACEMENT OF IMPLANTABLE CARDIOVERTER-DEFIBRILLATOR (ICD) GENERATOR Left 03/18/2022    Procedure: REPLACEMENT, ICD GENERATOR;  Surgeon: Felipe Woodard MD;  Location: [facility] EP LAB;  Service: Cardiology;  Laterality: Left;  SEFERINO, ICD gen chg, SJM, giovannis, MB, 3prep*ANUJ ICD insitu*        Home Meds:   Prior to Admission medications    Medication Sig Start Date End Date Taking? Authorizing Provider   amLODIPine (NORVASC) 10 MG tablet Take 1 tablet (10 mg total) by mouth once daily. 3/3/25  Yes Amanda Sahu PA-C   atorvastatin (LIPITOR) 80 MG tablet Take 40 mg by mouth. 4/22/25  Yes Provider, Historical   beta-carotene,A,-vits C,E/mins (OCUVITE ORAL) Take by mouth  every evening.   Yes Provider, Historical   famotidine (PEPCID) 20 MG tablet Take 20 mg by mouth before dinner.   Yes Provider, Historical   furosemide (LASIX) 20 MG tablet Take 1 tablet (20 mg total) by mouth 2 (two) times daily. Take one tablet every other day orbas directed 12/1/23  Yes Shiraz Cartagena MD   metoprolol succinate (TOPROL-XL) 25 MG 24 hr tablet Take 1 tablet (25 mg total) by mouth 2 (two) times daily. 1/31/25  Yes Jerry Castro III, MD   multivitamin with minerals tablet Take 1 tablet by mouth every evening. 1/1/18  Yes Provider, Historical   niacin 500 MG tablet Take 500 mg by mouth Every PM.   Yes Provider, Historical   omega-3 fatty acids-vitamin E 1,000 mg Cap Take 1 capsule by mouth every evening.   Yes Provider, Historical   potassium chloride SA (K-DUR,KLOR-CON M) 10 MEQ tablet Take 1 tablet (10 mEq total) by mouth once daily. 1/9/25  Yes Shirza Cartagena MD   sacubitriL-valsartan (ENTRESTO)  mg per tablet Take 1 tablet by mouth 2 (two) times daily. 12/17/24  Yes Shiraz Cartagena MD   sotaloL (BETAPACE) 120 MG Tab TAKE 1 TABLET TWICE DAILY 4/7/25  Yes Solange Moon PA-C   ubidecarenone (COENZYME Q10 ORAL) Take 1 tablet by mouth every evening. 12/9/22  Yes Provider, Historical   vitamin D 1000 units Tab Take 1,000 Units by mouth every Tues, Thurs, Sat. TAKES AT NIGHT   Yes Provider, Historical   doxycycline (VIBRA-TABS) 100 MG tablet Take 1 tablet (100 mg total) by mouth every 12 (twelve) hours. 12/5/24   Roselia Bradford MD   fluticasone propionate (FLONASE) 50 mcg/actuation nasal spray 1 spray (50 mcg total) by Each Nostril route once daily. 10/24/23   Bety Tomlinson MD   nitroGLYCERIN (NITROSTAT) 0.4 MG SL tablet Take 1 tab under the tongue for chest pain. May repeat every 5 minutes for a total of 3 doses. If chest pain not relieved go to the ED. 3/14/25   Shiraz Cartagena MD     Anticoagulants/Antiplatelets: reviewed    Allergies:   Review of patient's allergies  indicates:   Allergen Reactions    Fluorescein-benoxinate Other (See Comments)    Pcn  [penicillins]      Other reaction(s): Unknown    Quinolones Other (See Comments)     H/o AAA    Tropicamide Other (See Comments)    Clindamycin Rash     Sedation History:  no adverse reactions    Review of Systems:   Hematological: no known coagulopathies  Respiratory: no shortness of breath  Cardiovascular: no chest pain  Gastrointestinal: no abdominal pain  Genito-Urinary: no dysuria  Musculoskeletal: negative  Neurological: no TIA or stroke symptoms         OBJECTIVE:     Vital Signs (Most Recent)  Temp: 98.2 °F (36.8 °C) (05/23/25 0542)  Pulse: (!) 55 (05/23/25 0552)  Resp: 19 (05/23/25 0542)  BP: (!) 144/70 (05/23/25 0551)  SpO2: 97 % (05/23/25 0542)    Physical Exam:  ASA: per giovannis   Mallampati: per anes     General: no acute distress  Mental Status: alert and oriented to person, place and time  HEENT: normocephalic, atraumatic  Chest: unlabored breathing  Heart: regular heart rate  Abdomen: nondistended  Extremity: moves all extremities    Laboratory  Lab Results   Component Value Date    INR 1.1 05/16/2025       Lab Results   Component Value Date    WBC 7.78 05/19/2025    HGB 14.2 05/19/2025    HCT 42.9 05/19/2025    MCV 92 05/19/2025    PLT 98 (L) 05/19/2025      Lab Results   Component Value Date    GLU 88 05/19/2025     05/19/2025    K 3.9 05/19/2025     05/19/2025    CO2 22 (L) 05/19/2025    BUN 17 05/19/2025    CREATININE 1.4 05/19/2025    CALCIUM 9.3 05/19/2025    MG 2.0 01/20/2023    ALT 22 05/19/2025    AST 23 05/19/2025    ALBUMIN 3.5 05/19/2025    BILITOT 0.9 05/19/2025    BILIDIR 0.4 (H) 05/19/2025       ASSESSMENT/PLAN:     Sedation Plan: per anes  Patient will undergo directed aneurysm access and embolization for type 2 endoleak .    Diann Calabrese MD PGY-2  Department of Radiology  [facility]

## 2025-05-23 NOTE — TRANSFER OF CARE
Anesthesia Transfer of Care Note    Patient: Nelson GIBBONS Parent    Procedure(s) Performed: * No procedures listed *    Patient location: PACU    Anesthesia Type: general    Transport from OR: Transported from OR on 6-10 L/min O2 by face mask with adequate spontaneous ventilation    Post pain: adequate analgesia    Post assessment: no apparent anesthetic complications and tolerated procedure well    Post vital signs: stable    Level of consciousness: sedated    Nausea/Vomiting: no nausea/vomiting    Complications: none    Transfer of care protocol was followed      Last vitals: Visit Vitals  BP (!) 96/51 (BP Location: Left arm, Patient Position: Lying)   Pulse 60   Temp 36.3 °C (97.3 °F) (Temporal)   Resp 19   Ht 6' (1.829 m)   Wt 74.8 kg (165 lb)   SpO2 100%   BMI 22.38 kg/m²

## 2025-05-23 NOTE — PLAN OF CARE
Patient and patient's spouse received discharge instructions and no prescriptions. Patient and patient's spouse verbalized understanding of all instructions given and all questions were addressed prior to patient's discharge. Patient's vital signs are stable and within patient's baseline. Patient tolerated clear liquids PO. Patient denies pain. Patient denies nausea and vomiting at this time. Patient meets all criteria for discharge at this time.

## 2025-05-23 NOTE — NURSING TRANSFER
Nursing Transfer Note      5/23/2025   11:35 AM    Nurse giving handoff: BRYAN Rogers  Nurse receiving handoff: BRYAN Decker      Transfer To: [facility]    Transfer via stretcher      Transported by RN    Order for Tele Monitor? No    Patient belongings transferred with patient: Yes one personal belongings bag    Chart send with patient: Yes    Notified: spouse    Patient reassessed at: 1100

## 2025-05-23 NOTE — ANESTHESIA PROCEDURE NOTES
Arterial    Diagnosis: AAA with endoleaks    Patient location during procedure: done in OR    Staffing  Authorizing Provider: Bruce Salgado MD  Performing Provider: Bruce Salgado MD    Staffing  Performed by: Bruce Salgado MD  Authorized by: Bruce Salgado MD    Anesthesiologist was present at the time of the procedure.    Preanesthetic Checklist  Completed: patient identified, IV checked, site marked, risks and benefits discussed, surgical consent, monitors and equipment checked, pre-op evaluation, timeout performed and anesthesia consent givenArterial  Skin Prep: alcohol swabs  Local Infiltration: none  Orientation: right  Location: radial    Catheter Size: 20 G  Catheter placement by Ultrasound guidance. Heme positive aspiration all ports.   Vessel Caliber: small, patent, compressibility normal  Vascular Doppler:  not done  Needle advanced into vessel with real time Ultrasound guidance.Insertion Attempts: 1  Assessment  Dressing: secured with tape and tegaderm  Patient: Tolerated well

## 2025-05-26 NOTE — ANESTHESIA POSTPROCEDURE EVALUATION
Anesthesia Post Evaluation    Patient: Nelson Huntley    Procedure(s) Performed: * No procedures listed *    Final Anesthesia Type: general      Patient location during evaluation: PACU  Patient participation: Yes- Able to Participate  Level of consciousness: awake and alert  Post-procedure vital signs: reviewed and stable  Pain management: adequate  Airway patency: patent    PONV status at discharge: No PONV  Anesthetic complications: no      Cardiovascular status: hemodynamically stable  Respiratory status: spontaneous ventilation  Hydration status: euvolemic  Follow-up not needed.              Vitals Value Taken Time   /58 05/23/25 13:45   Temp 36.7 °C (98.1 °F) 05/23/25 13:45   Pulse 56 05/23/25 13:45   Resp 16 05/23/25 13:45   SpO2 97 % 05/23/25 13:45         Event Time   Out of Recovery 11:25:00         Pain/Perlita Score: No data recorded

## 2025-05-27 LAB
ABO + RH BLD: NORMAL
ABO + RH BLD: NORMAL
BLD PROD TYP BPU: NORMAL
BLD PROD TYP BPU: NORMAL
BLOOD UNIT EXPIRATION DATE: NORMAL
BLOOD UNIT EXPIRATION DATE: NORMAL
BLOOD UNIT TYPE CODE: 6200
BLOOD UNIT TYPE CODE: 6200
CROSSMATCH INTERPRETATION: NORMAL
CROSSMATCH INTERPRETATION: NORMAL
DISPENSE STATUS: NORMAL
DISPENSE STATUS: NORMAL
UNIT NUMBER: NORMAL
UNIT NUMBER: NORMAL

## 2025-05-28 NOTE — TELEPHONE ENCOUNTER
----- Message from Carolann Howard sent at 11/24/2023  9:13 AM CST -----  Type:  Needs Medical Advice    Who Called: pt  Would the patient rather a call back or a response via [employer]? call  Best Call Back Number:  [phone]  Additional Information: emergency room follow up, patient states he had another episode and he would like to speak with someone in office, no further information was released        
Called patient to schedule appt with them. Scheduled over the phone with patient.   
[Follow-up] : a follow-up of an existing diagnosis

## 2025-05-30 DIAGNOSIS — I97.89 TYPE II ENDOLEAK OF AORTIC GRAFT: Primary | ICD-10-CM

## 2025-05-30 DIAGNOSIS — Z86.79 S/P AAA (ABDOMINAL AORTIC ANEURYSM) REPAIR: ICD-10-CM

## 2025-05-30 DIAGNOSIS — Z98.890 S/P AAA (ABDOMINAL AORTIC ANEURYSM) REPAIR: ICD-10-CM

## 2025-06-07 ENCOUNTER — CLINICAL SUPPORT (OUTPATIENT)
Dept: CARDIOLOGY | Facility: HOSPITAL | Age: OVER 89
End: 2025-06-07
Attending: INTERNAL MEDICINE
Payer: MEDICARE

## 2025-06-07 ENCOUNTER — CLINICAL SUPPORT (OUTPATIENT)
Dept: CARDIOLOGY | Facility: HOSPITAL | Age: OVER 89
End: 2025-06-07
Payer: MEDICARE

## 2025-06-07 DIAGNOSIS — Z95.810 PRESENCE OF AUTOMATIC (IMPLANTABLE) CARDIAC DEFIBRILLATOR: ICD-10-CM

## 2025-06-07 DIAGNOSIS — I25.5 ISCHEMIC CARDIOMYOPATHY: ICD-10-CM

## 2025-06-07 PROCEDURE — 93296 REM INTERROG EVL PM/IDS: CPT | Mod: HCNC | Performed by: INTERNAL MEDICINE

## 2025-06-07 PROCEDURE — 93295 DEV INTERROG REMOTE 1/2/MLT: CPT | Mod: HCNC,,, | Performed by: INTERNAL MEDICINE

## 2025-06-11 ENCOUNTER — OFFICE VISIT (OUTPATIENT)
Dept: FAMILY MEDICINE | Facility: CLINIC | Age: OVER 89
End: 2025-06-11
Payer: MEDICARE

## 2025-06-11 ENCOUNTER — E-CONSULT (OUTPATIENT)
Dept: UROLOGY | Facility: CLINIC | Age: OVER 89
End: 2025-06-11
Payer: MEDICARE

## 2025-06-11 VITALS
HEART RATE: 60 BPM | WEIGHT: 164 LBS | OXYGEN SATURATION: 96 % | DIASTOLIC BLOOD PRESSURE: 82 MMHG | HEIGHT: 72 IN | BODY MASS INDEX: 22.21 KG/M2 | SYSTOLIC BLOOD PRESSURE: 128 MMHG

## 2025-06-11 DIAGNOSIS — R31.29 HEMATURIA, MICROSCOPIC: Primary | ICD-10-CM

## 2025-06-11 DIAGNOSIS — R31.29 MICROSCOPIC HEMATURIA: Primary | ICD-10-CM

## 2025-06-11 DIAGNOSIS — N18.31 CHRONIC KIDNEY DISEASE, STAGE 3A: ICD-10-CM

## 2025-06-11 PROCEDURE — 99499 UNLISTED E&M SERVICE: CPT | Mod: S$GLB,,, | Performed by: UROLOGY

## 2025-06-11 PROCEDURE — 99999 PR PBB SHADOW E&M-EST. PATIENT-LVL IV: CPT | Mod: PBBFAC,HCNC,, | Performed by: INTERNAL MEDICINE

## 2025-06-11 NOTE — CONSULTS
[facility] - Urology [facility] 4th Fl  Response for E-Consult     Patient Name: Nelson Huntley  MRN: [id number]  Primary Care Provider: Jerry Castro III, MD   Requesting Provider: Jerry Castro III, MD  Consults    After evaluation of your eConsult clinical questions, I believe the patient should be scheduled for an office visit in our specialty due to the need to be worked up for his hematuria.    Total time of Consultation: 5 minute    *This eConsult is based on the clinical data available to me and is furnished without benefit of a physician examination.  The eConsult will need to be interpreted in light of any clinical issues of changes in patient status not available to me at the time rime of filing this eConsults.  Significant changes in patient condition of level of acuity should result in a referral for in person consultation and reevaluation.  Please alert me if you have any furth questions.     Thank you for this eConsult referral.     MD Isma Hdz [facility] - Urology [facility] 4th Fl

## 2025-06-11 NOTE — PROGRESS NOTES
Assessment:         1. Microscopic hematuria    2. Chronic kidney disease, stage 3a          Plan:           Nelson was seen today for follow-up.    Diagnoses and all orders for this visit:    Microscopic hematuria  -     Urinalysis; Future  -     E-Consult to Urology    Chronic kidney disease, stage 3a  -     Urinalysis; Standing  -     Urinalysis Microscopic; Standing  -     Basic Metabolic Panel; Standing  -     CBC Auto Differential; Standing  -     Hepatic Function Panel; Standing        Assessment & Plan    IMPRESSION:  Assessed urine test results showing 1+ blood and RBCs with oxalate crystals; considered possibility of kidney stones due to presence of oxalate crystals and history of remote kidney stone passage, though recent CTs show no evidence of current stones.  Evaluated options for addressing microscopic hematuria, ranging from aggressive workup to conservative approach.  Decided on middle-ground approach with repeat urine test and urologist electronic consult to review past scans and current findings.  Considered bladder cancer as differential diagnosis for microscopic hematuria, though deemed less likely given history and lack of symptoms.  Noted elevated diastolic BP (82 mmHg) compared to usual, but did not consider it clinically significant at this time.    PATIENT EDUCATION:  - Explained significance of oxalate crystals in urine and their relation to kidney stone formation.  - Discussed potential causes of microscopic hematuria, including kidney stones and bladder cancer.  - Clarified that darker urine color is usually related to hydration status rather than indicating a problem.    ORDERS:  - Ordered repeat urine test.    REFERRALS:  - Initiated electronic consult with urologist to review scans and provide recommendations regarding blood in urine.       FOLLOW UP :  - 6 months with prelabs         Subjective:       Patient ID: Nelson GIBBONS Parent is a 89 y.o. male.    Chief Complaint:  "Follow-up      Interim Hx  Concerns today  Chronic problems        History of Present Illness    CHIEF COMPLAINT:  Patient presents today for follow-up.    CARDIOVASCULAR:  He has an aortic aneurysm that has increased in size from 8 x 5 to 14 x 12, expressing concern that "it could rupture at any time." He underwent an aortic aneurysm leak repair with interventional radiology since the last visit, his fifth endoleak repair with the most recent in February. He has a history of myocardial infarctions, paroxysmal atrial fibrillation managed by electrophysiology, and coronary artery disease. An AICD has been placed.    MEDICAL HISTORY:  He has chronic kidney disease with associated anemia, thrombocytopenia, hypertension, hyperlipidemia, transient ischemic attack, and monoclonal gammopathy of undetermined significance (MGUS). He is followed by hematology for MGUS and thrombocytopenia.    RESPIRATORY:  He has bronchiectasis and Mycobacterium avium complex (MAC) infection, followed by pulmonology and infectious disease respectively.    GI:  He has Tovar's esophagus and gastroesophageal reflux disease.    GENITOURINARY:  Urinalysis showed 1+ blood with RBCs and oxalate crystals. He denies visible hematuria and regularly monitors his urine.    ENT:  He reports progressive hearing loss and finds his current hearing aids ineffective. He has difficulty hearing in crowded environments or across tables, requiring others to speak louder. However, he manages well in normal conversations. He reports requiring new reading glasses due to changes in near vision but denies other vision changes.      ROS:  Eyes: +vision changes, +wear glasses or contacts, +blurry vision  ENT: +hearing loss, +difficulty hearing  Genitourinary: -hematuria, +abnormal urine appearance, +urine changes           Review of Systems        Health Maintenance Due   Topic Date Due    Aspirin/Antiplatelet Therapy  Never done    RSV Vaccine (Age 60+ and Pregnant " patients) (1 - 1-dose 75+ series) Never done    TETANUS VACCINE  11/22/2023    COVID-19 Vaccine (6 - 2024-25 season) 09/01/2024         Objective:     /82 (BP Location: Right arm, Patient Position: Sitting)   Pulse 60   Ht 6' (1.829 m)   Wt 74.4 kg (164 lb 0.4 oz)   SpO2 96%   BMI 22.25 kg/m²   .Physical Exam                    6/11/2025     2:54 PM 5/23/2025     5:42 AM 5/15/2025     2:47 PM 5/5/2025     1:44 PM 5/1/2025    11:28 AM   Vitals   Height 6' (1.829 m) 6' (1.829 m) 6' (1.829 m) 6' (1.829 m) 6' (1.829 m)   Weight (lbs) 164.02 165 167.55 167.99 167.88   BMI (kg/m2) 22.2 22.4 22.7 22.8 22.8          Physical Exam  Vitals and nursing note reviewed.   Constitutional:       General: He is not in acute distress.     Appearance: He is well-developed. He is not ill-appearing, toxic-appearing or diaphoretic.   HENT:      Head: Normocephalic.   Eyes:      Conjunctiva/sclera: Conjunctivae normal.   Cardiovascular:      Rate and Rhythm: Normal rate and regular rhythm.      Heart sounds: Normal heart sounds. No murmur heard.     No friction rub. No gallop.   Pulmonary:      Effort: Pulmonary effort is normal. No respiratory distress.   Abdominal:      General: There is no distension.      Palpations: Abdomen is soft.   Neurological:      General: No focal deficit present.      Mental Status: He is alert and oriented to person, place, and time.               Basic Labs    BMP  Lab Results   Component Value Date     05/19/2025    K 3.9 05/19/2025     05/19/2025    CO2 22 (L) 05/19/2025    BUN 17 05/19/2025    CREATININE 1.4 05/19/2025    CALCIUM 9.3 05/19/2025    ANIONGAP 9 05/19/2025    ESTGFRAFRICA 52.2 (A) 06/30/2022    EGFRNONAA 45.2 (A) 06/30/2022     Lab Results   Component Value Date    EGFRNORACEVR 48 (L) 05/19/2025       Lab Results   Component Value Date    ALT 22 05/19/2025    AST 23 05/19/2025    GGT 34 05/19/2025    ALKPHOS 150 05/19/2025    BILITOT 0.9 05/19/2025         Lab Results    Component Value Date    TSH 2.613 09/22/2023     Lab Results   Component Value Date    WBC 7.78 05/19/2025    HGB 14.2 05/19/2025    HCT 42.9 05/19/2025    MCV 92 05/19/2025    PLT 98 (L) 05/19/2025             Lab Results   Component Value Date    HEPCAB Negative 02/22/2017             Lipids  Lab Results   Component Value Date    CHOL 139 02/06/2025     Lab Results   Component Value Date    HDL 43 02/06/2025     Lab Results   Component Value Date    LDLCALC 83.2 02/06/2025     Lab Results   Component Value Date    TRIG 64 02/06/2025     Lab Results   Component Value Date    CHOLHDL 30.9 02/06/2025       DM  Lab Results   Component Value Date    HGBA1C 4.8 10/26/2007       PSA  PSA, Screen (ng/mL)   Date Value   09/22/2023 1.1       Future Appointments   Date Time Provider Department Center   6/19/2025  8:30 AM APPOINTMENT LAB, [facility] LAB [facility]   6/23/2025 11:00 AM [facility] CT2 LIMIT 450 LBS [facility] CT SCAN [facility]   7/1/2025  1:00 PM Eva Skelton PA-C [facility] RAD IR [place]   12/4/2025  8:15 AM LABBRENDA [facility] LAB [place]   12/4/2025  8:15 AM SPECIMEN, [facility]   12/15/2025  2:40 PM Jerry Castro III, MD [facility]   3/3/2026 11:00 AM Amanda Sahu PA-C [facility]         Medication List with Changes/Refills   Current Medications    AMLODIPINE (NORVASC) 10 MG TABLET    Take 1 tablet (10 mg total) by mouth once daily.    ATORVASTATIN (LIPITOR) 80 MG TABLET    Take 40 mg by mouth.    BETA-CAROTENE,A,-VITS C,E/MINS (OCUVITE ORAL)    Take by mouth every evening.    DOXYCYCLINE (VIBRA-TABS) 100 MG TABLET    Take 1 tablet (100 mg total) by mouth every 12 (twelve) hours.    FAMOTIDINE (PEPCID) 20 MG TABLET    Take 20 mg by mouth before dinner.    FLUTICASONE PROPIONATE (FLONASE) 50 MCG/ACTUATION NASAL SPRAY    1 spray (50 mcg total) by Each Nostril route once daily.    FUROSEMIDE (LASIX) 20 MG TABLET    Take 1 tablet (20 mg total) by mouth 2 (two)  times daily. Take one tablet every other day orbas directed    METOPROLOL SUCCINATE (TOPROL-XL) 25 MG 24 HR TABLET    Take 1 tablet (25 mg total) by mouth 2 (two) times daily.    MULTIVITAMIN WITH MINERALS TABLET    Take 1 tablet by mouth every evening.    NIACIN 500 MG TABLET    Take 500 mg by mouth Every PM.    NITROGLYCERIN (NITROSTAT) 0.4 MG SL TABLET    Take 1 tab under the tongue for chest pain. May repeat every 5 minutes for a total of 3 doses. If chest pain not relieved go to the ED.    OMEGA-3 FATTY ACIDS-VITAMIN E 1,000 MG CAP    Take 1 capsule by mouth every evening.    POTASSIUM CHLORIDE SA (K-DUR,KLOR-CON M) 10 MEQ TABLET    Take 1 tablet (10 mEq total) by mouth once daily.    SACUBITRIL-VALSARTAN (ENTRESTO)  MG PER TABLET    Take 1 tablet by mouth 2 (two) times daily.    SOTALOL (BETAPACE) 120 MG TAB    TAKE 1 TABLET TWICE DAILY    UBIDECARENONE (COENZYME Q10 ORAL)    Take 1 tablet by mouth every evening.    VITAMIN D 1000 UNITS TAB    Take 1,000 Units by mouth every Tues, Thurs, Sat. TAKES AT NIGHT         Disclaimer:  This note has been generated using voice-recognition software. There may be grammatical or spelling errors that have been missed during proof-reading   This note was generated with the assistance of ambient listening technology. Verbal consent was obtained by the patient and accompanying visitor(s) for the recording of patient appointment to facilitate this note. I attest to having reviewed and edited the generated note for accuracy, though some syntax or spelling errors may persist. Please contact the author of this note for any clarification.

## 2025-06-12 ENCOUNTER — LAB VISIT (OUTPATIENT)
Dept: LAB | Facility: HOSPITAL | Age: OVER 89
End: 2025-06-12
Attending: INTERNAL MEDICINE
Payer: MEDICARE

## 2025-06-12 DIAGNOSIS — N18.31 CHRONIC KIDNEY DISEASE, STAGE 3A: ICD-10-CM

## 2025-06-12 LAB
BILIRUB UR QL STRIP.AUTO: NEGATIVE
CAOX CRY UR QL COMP ASSIST: ABNORMAL
CLARITY UR: CLEAR
COLOR UR AUTO: YELLOW
GLUCOSE UR QL STRIP: NEGATIVE
HGB UR QL STRIP: NEGATIVE
KETONES UR QL STRIP: NEGATIVE
LEUKOCYTE ESTERASE UR QL STRIP: NEGATIVE
MICROSCOPIC COMMENT: ABNORMAL
NITRITE UR QL STRIP: NEGATIVE
PH UR STRIP: 7 [PH]
PROT UR QL STRIP: NEGATIVE
RBC #/AREA URNS AUTO: 8 /HPF (ref 0–4)
SP GR UR STRIP: 1.02
UROBILINOGEN UR STRIP-ACNC: NEGATIVE EU/DL
WBC #/AREA URNS AUTO: 1 /HPF (ref 0–5)

## 2025-06-12 PROCEDURE — 81001 URINALYSIS AUTO W/SCOPE: CPT | Mod: HCNC

## 2025-06-13 ENCOUNTER — RESULTS FOLLOW-UP (OUTPATIENT)
Dept: FAMILY MEDICINE | Facility: CLINIC | Age: OVER 89
End: 2025-06-13
Payer: MEDICARE

## 2025-06-13 DIAGNOSIS — R31.29 MICROSCOPIC HEMATURIA: Primary | ICD-10-CM

## 2025-06-19 ENCOUNTER — RESULTS FOLLOW-UP (OUTPATIENT)
Dept: CARDIOLOGY | Facility: CLINIC | Age: OVER 89
End: 2025-06-19

## 2025-06-19 ENCOUNTER — LAB VISIT (OUTPATIENT)
Dept: LAB | Facility: HOSPITAL | Age: OVER 89
End: 2025-06-19
Attending: INTERNAL MEDICINE
Payer: MEDICARE

## 2025-06-19 DIAGNOSIS — Z86.73 CEREBRAL INFARCTION, REMOTE, RESOLVED: ICD-10-CM

## 2025-06-19 DIAGNOSIS — I12.9 HYPERTENSIVE KIDNEY DISEASE: ICD-10-CM

## 2025-06-19 DIAGNOSIS — E78.00 PURE HYPERCHOLESTEROLEMIA: ICD-10-CM

## 2025-06-19 DIAGNOSIS — I50.42 CHRONIC COMBINED SYSTOLIC AND DIASTOLIC CONGESTIVE HEART FAILURE: ICD-10-CM

## 2025-06-19 DIAGNOSIS — I25.2 OLD MYOCARDIAL INFARCTION: ICD-10-CM

## 2025-06-19 LAB
ALBUMIN SERPL BCP-MCNC: 3.5 G/DL (ref 3.5–5.2)
ALP SERPL-CCNC: 134 UNIT/L (ref 40–150)
ALT SERPL W/O P-5'-P-CCNC: 18 UNIT/L (ref 10–44)
ANION GAP (OHS): 6 MMOL/L (ref 8–16)
AST SERPL-CCNC: 19 UNIT/L (ref 11–45)
BILIRUB SERPL-MCNC: 1.1 MG/DL (ref 0.1–1)
BUN SERPL-MCNC: 19 MG/DL (ref 8–23)
CALCIUM SERPL-MCNC: 9.3 MG/DL (ref 8.7–10.5)
CHLORIDE SERPL-SCNC: 110 MMOL/L (ref 95–110)
CHOLEST SERPL-MCNC: 103 MG/DL (ref 120–199)
CHOLEST/HDLC SERPL: 2.5 {RATIO} (ref 2–5)
CO2 SERPL-SCNC: 25 MMOL/L (ref 23–29)
CREAT SERPL-MCNC: 1.5 MG/DL (ref 0.5–1.4)
GFR SERPLBLD CREATININE-BSD FMLA CKD-EPI: 44 ML/MIN/1.73/M2
GLUCOSE SERPL-MCNC: 94 MG/DL (ref 70–110)
HDLC SERPL-MCNC: 41 MG/DL (ref 40–75)
HDLC SERPL: 39.8 % (ref 20–50)
LDLC SERPL CALC-MCNC: 52.4 MG/DL (ref 63–159)
NONHDLC SERPL-MCNC: 62 MG/DL
POTASSIUM SERPL-SCNC: 4 MMOL/L (ref 3.5–5.1)
PROT SERPL-MCNC: 7.3 GM/DL (ref 6–8.4)
SODIUM SERPL-SCNC: 141 MMOL/L (ref 136–145)
TRIGL SERPL-MCNC: 48 MG/DL (ref 30–150)
TSH SERPL-ACNC: 3.82 UIU/ML (ref 0.4–4)

## 2025-06-19 PROCEDURE — 84443 ASSAY THYROID STIM HORMONE: CPT | Mod: HCNC

## 2025-06-19 PROCEDURE — 80061 LIPID PANEL: CPT | Mod: HCNC

## 2025-06-19 PROCEDURE — 83880 ASSAY OF NATRIURETIC PEPTIDE: CPT | Mod: HCNC

## 2025-06-19 PROCEDURE — 80053 COMPREHEN METABOLIC PANEL: CPT | Mod: HCNC

## 2025-06-19 PROCEDURE — 36415 COLL VENOUS BLD VENIPUNCTURE: CPT | Mod: HCNC

## 2025-06-21 LAB — NT-PROBNP SERPL IA-MCNC: 1536 PG/ML

## 2025-06-23 ENCOUNTER — HOSPITAL ENCOUNTER (OUTPATIENT)
Dept: RADIOLOGY | Facility: HOSPITAL | Age: OVER 89
Discharge: HOME | End: 2025-06-23
Attending: FAMILY MEDICINE
Payer: MEDICARE

## 2025-06-23 DIAGNOSIS — Z98.890 S/P AAA (ABDOMINAL AORTIC ANEURYSM) REPAIR: ICD-10-CM

## 2025-06-23 DIAGNOSIS — Z86.79 S/P AAA (ABDOMINAL AORTIC ANEURYSM) REPAIR: ICD-10-CM

## 2025-06-23 DIAGNOSIS — I97.89 TYPE II ENDOLEAK OF AORTIC GRAFT: ICD-10-CM

## 2025-06-23 PROCEDURE — 74174 CTA ABD&PLVS W/CONTRAST: CPT | Mod: TC,HCNC

## 2025-06-23 PROCEDURE — 25500020 PHARM REV CODE 255: Mod: HCNC | Performed by: FAMILY MEDICINE

## 2025-06-23 PROCEDURE — 74174 CTA ABD&PLVS W/CONTRAST: CPT | Mod: 26,HCNC,, | Performed by: RADIOLOGY

## 2025-06-23 RX ADMIN — IOHEXOL 100 ML: 350 INJECTION, SOLUTION INTRAVENOUS at 11:06

## 2025-06-24 LAB
OHS CV AF BURDEN PERCENT: < 1
OHS CV DC REMOTE DEVICE TYPE: NORMAL
OHS CV RV PACING PERCENT: 1 %

## 2025-07-01 ENCOUNTER — OFFICE VISIT (OUTPATIENT)
Dept: INTERVENTIONAL RADIOLOGY/VASCULAR | Facility: CLINIC | Age: OVER 89
End: 2025-07-01
Attending: FAMILY MEDICINE
Payer: MEDICARE

## 2025-07-01 VITALS
DIASTOLIC BLOOD PRESSURE: 68 MMHG | BODY MASS INDEX: 22.68 KG/M2 | WEIGHT: 167.44 LBS | SYSTOLIC BLOOD PRESSURE: 122 MMHG | HEIGHT: 72 IN | HEART RATE: 60 BPM

## 2025-07-01 DIAGNOSIS — I97.89 TYPE II ENDOLEAK OF AORTIC GRAFT: Primary | ICD-10-CM

## 2025-07-01 PROCEDURE — 99999 PR PBB SHADOW E&M-EST. PATIENT-LVL III: CPT | Mod: PBBFAC,HCNC,, | Performed by: PHYSICIAN ASSISTANT

## 2025-07-01 NOTE — PROGRESS NOTES
"Subjective     Patient ID: Nelson Huntley is a 89 y.o. male.    Chief Complaint: No chief complaint on file.    Follow up visit s/p IR Embolization on 5/23/25 with Dr. Espino.  Patient tolerated the procedure well.  CTA 6/23 reviewed by Dr. Granado, Dr. Polanco and Dr Espino noting significant response after multiple treatments (5/23/25, 2/26/25, 12/23/24, 10/25/24). Currently with the exception of minor one, no foci of endoleaks on arterial phase are seen. The residual is very insignificant.  Pt doing well post procedure.  No new complaints or concerns.    Vascular Surgery 5/15/25: "At this point he will get another attempt at IR embolization.  If the aneurysms continues to go if there are any issues we would consider open repair.  Prior to that he needs a stress echocardiogram.  Probably a nuclear medicine stress echo.  His ejection is 47-50%.  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  So he will need a nuclear medicine stress echo.  And PFTs if he calls and they can not do anything to help this aneurysms.  From growing.  An aneurysms of 14 cm in an 89-year-old does scares me, but it is infrarenal.  All of his questions were answered.  We will possibly see him back, if Interventional Radiology can take care of him that would be great.  So in short if he calls in the aneurysms continues to enlarge and he continues to have endoleaks we need a nuclear medicine stress echocardiogram and PFTs.  There was we will help us decide whether we can do surgery or not on him."    IR Visit 4/8/2025  Patient here for follow up of an endoleak type II. He is s/p AAA repair over 20 years ago at an outside facility. Endoleak was originally identified via CT scan obtained on 6/5/2024 as part of a GI workup. He was referred to Vascular surgery who performed an aortogram on 8/21/2024 and confirmed endoleak type II finding. He underwent endoleak embolization with IR on 10/25/2024. Follow up CTA noted persistent " endoleak. Patient underwent repeat endoleak embolization on 12/23/2024 and 2/26/2025. Follow up CTA obtained on 3/28/2025.    Review of Systems   Constitutional:  Negative for activity change, appetite change, chills, fatigue, fever and unexpected weight change.   Respiratory:  Positive for cough (nonproductive). Negative for shortness of breath.    Cardiovascular:  Negative for chest pain and leg swelling.   Gastrointestinal:  Negative for abdominal distention, abdominal pain, constipation, diarrhea, nausea and vomiting.   Genitourinary:  Negative for difficulty urinating and flank pain.   Musculoskeletal:  Negative for back pain and gait problem.   Neurological:  Negative for weakness and memory loss.   Psychiatric/Behavioral:  Negative for confusion and sleep disturbance.           Objective     Physical Exam  Constitutional:       General: He is not in acute distress.     Appearance: He is well-developed. He is not diaphoretic.   HENT:      Head: Normocephalic and atraumatic.   Pulmonary:      Effort: Pulmonary effort is normal. No respiratory distress.   Neurological:      Mental Status: He is alert and oriented to person, place, and time.   Psychiatric:         Behavior: Behavior normal.         Thought Content: Thought content normal.         Judgment: Judgment normal.   Imaging reviewed independently  EXAMINATION:  CTA ABDOMEN AND PELVIS     CLINICAL HISTORY:  s/p endoleak embo;     TECHNIQUE:  1.25 mm axial images were obtained through the abdomen and pelvis with and without the administration of 100 cc of Omnipaque 350 IV contrast.  Coronal and sagittal reformats were performed.     COMPARISON:  CTA 03/28/2025.     FINDINGS:  Heart: Partially visualized ICD wire.  No pericardial effusion.     Lungs: Stable interlobular septal thickening throughout the visualized lungs.  Trace bilateral dependent pleural effusions, new when compared to the previous exam.     Liver: Normal in size.  No focal enhancing  lesions.  No intrahepatic bile duct dilatation.Portal vein, splenic vein and SMV are patent.     Gallbladder: Unremarkable.  Extra-hepatic bile ducts are normal in caliber.     Stomach: Probable small hiatal hernia.     Duodenum: Unremarkable.     Spleen: Normal in size. No focal lesions.     Pancreas: Stable scattered subcentimeter low-attenuation foci.  No pancreatic ductal dilatation.     Adrenal glands: Unremarkable     Kidneys: Normal in size and location.  Bilateral simple and complex renal cysts.  No solid enhancing renal masses.  Bilateral renal stones.  No hydronephrosis or hydroureter.  Bladder is mostly decompressed and not well evaluated.     Genital organs: Prostate is enlarged and encroaches into the bladder base.Small volume of pelvic free fluid.     Bowel: Small bowel is normal in caliber.  Multiple colonic diverticuli.  Appendix is normal.  No obstruction or inflammatory changes.     Aorta: 12.2 x 14 cm fusiform aneurysm of the infrarenal abdominal aorta status post endovascular repair.  Persistent areas of enhancement within the aneurysmal sac (delayed series 4 image 196, 360) concerning for residual endoleak, improved when compared to the previous exam.  No dissection.  Celiac, superior mesenteric, and bilateral renal arteries are patent.     IVC/Veins: Not well evaluated secondary to extrinsic compression by the aortic aneurysm.     Peritoneum/mesentery: No focal mesenteric masses.  No omental or peritoneal lesions.     Lymph nodes: No lymphadenopathy.     Subcutaneous soft tissues: Stable fat containing right spigelian hernia.  Mild diffuse subcutaneous edema.     Osseous structures: Degenerative changes of the spine and bilateral hips.  No acute fractures. No suspicious lytic or blastic lesions.     Impression:     1. 12.2 x 14 cm fusiform aneurysm of the infrarenal abdominal aorta status post endovascular repair and multiple endoleak embolizations.  Persistent areas of enhancement within the  aneurysmal sac concerning for residual endoleak, improved when compared to the previous CTA.  2. Small bilateral pleural effusions, new when compared to the previous CTA.  3. Additional stable findings as detailed above.        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  We did discuss with the exception of minor one, no foci of endoleaks on arterial phase are seen. The residual is very insignificant.  Case discussed with Dr Granado Vascular who is in agreement for CTA in a few months.  -repeat CTA 09/2025 with IR clinic visit after          No follow-ups on file.

## 2025-07-18 ENCOUNTER — LAB VISIT (OUTPATIENT)
Dept: LAB | Facility: HOSPITAL | Age: OVER 89
End: 2025-07-18
Attending: INTERNAL MEDICINE
Payer: MEDICARE

## 2025-07-18 DIAGNOSIS — R31.29 MICROSCOPIC HEMATURIA: ICD-10-CM

## 2025-07-18 LAB
BILIRUB UR QL STRIP.AUTO: NEGATIVE
CLARITY UR: CLEAR
COLOR UR AUTO: YELLOW
GLUCOSE UR QL STRIP: NEGATIVE
HGB UR QL STRIP: NEGATIVE
KETONES UR QL STRIP: NEGATIVE
LEUKOCYTE ESTERASE UR QL STRIP: NEGATIVE
NITRITE UR QL STRIP: NEGATIVE
PH UR STRIP: 7 [PH]
PROT UR QL STRIP: NEGATIVE
SP GR UR STRIP: 1.01
UROBILINOGEN UR STRIP-ACNC: NEGATIVE EU/DL

## 2025-07-18 PROCEDURE — 81003 URINALYSIS AUTO W/O SCOPE: CPT | Mod: HCNC

## 2025-08-11 DIAGNOSIS — I10 ESSENTIAL HYPERTENSION: ICD-10-CM

## 2025-08-11 RX ORDER — METOPROLOL SUCCINATE 25 MG/1
25 TABLET, EXTENDED RELEASE ORAL 2 TIMES DAILY
Qty: 180 TABLET | Refills: 3 | Status: SHIPPED | OUTPATIENT
Start: 2025-08-11

## (undated) DEVICE — Device

## (undated) DEVICE — CONTRAST FLEXCIL INJECTION

## (undated) DEVICE — DRAPE INCISE IOBAN 2 23X17IN

## (undated) DEVICE — SYR MED RAD 150ML

## (undated) DEVICE — PACK PACER PERMANENT

## (undated) DEVICE — ELECTRODE REM PLYHSV RETURN 9

## (undated) DEVICE — SOL NS 1000CC

## (undated) DEVICE — CATH 5FR OMNIFLUSH 65CM .038

## (undated) DEVICE — CATH GLIDE ANGLED 5FR 65CM

## (undated) DEVICE — PAD DEFIB CADENCE ADULT R2

## (undated) DEVICE — ADHESIVE DERMABOND ADVANCED

## (undated) DEVICE — DRESSING AQUACEL AG ADV 3.5X6

## (undated) DEVICE — DEVICE CLOSURE MYNX GRIP 5FR

## (undated) DEVICE — INTRODUCER VASC RADPQ 5FRX10CM

## (undated) DEVICE — DECANTER FLUID TRNSF WHITE 9IN

## (undated) DEVICE — COVER INSTR ELASTIC BAND 40X20

## (undated) DEVICE — GUIDEWIRE STF .035X260CM ANG

## (undated) DEVICE — KIT INTRO MICRO NIT VSI 4FR

## (undated) DEVICE — DRAPE U SPLIT SHEET 54X76IN